# Patient Record
Sex: MALE | Race: WHITE | Employment: OTHER | URBAN - METROPOLITAN AREA
[De-identification: names, ages, dates, MRNs, and addresses within clinical notes are randomized per-mention and may not be internally consistent; named-entity substitution may affect disease eponyms.]

---

## 2017-01-01 ENCOUNTER — HOSPITAL ENCOUNTER (INPATIENT)
Facility: CLINIC | Age: 82
LOS: 6 days | Discharge: NURSING, REHAB OR LONG-TERM CARE | DRG: 086 | End: 2017-08-24
Attending: EMERGENCY MEDICINE | Admitting: INTERNAL MEDICINE
Payer: MEDICARE

## 2017-01-01 ENCOUNTER — APPOINTMENT (OUTPATIENT)
Dept: PHYSICAL THERAPY | Facility: CLINIC | Age: 82
DRG: 086 | End: 2017-01-01
Attending: PSYCHIATRY & NEUROLOGY
Payer: MEDICARE

## 2017-01-01 ENCOUNTER — TRANSFERRED RECORDS (OUTPATIENT)
Dept: HEALTH INFORMATION MANAGEMENT | Facility: CLINIC | Age: 82
End: 2017-01-01

## 2017-01-01 ENCOUNTER — APPOINTMENT (OUTPATIENT)
Dept: OCCUPATIONAL THERAPY | Facility: CLINIC | Age: 82
DRG: 086 | End: 2017-01-01
Attending: PSYCHIATRY & NEUROLOGY
Payer: MEDICARE

## 2017-01-01 ENCOUNTER — APPOINTMENT (OUTPATIENT)
Dept: SPEECH THERAPY | Facility: CLINIC | Age: 82
DRG: 280 | End: 2017-01-01
Payer: MEDICARE

## 2017-01-01 ENCOUNTER — HOSPITAL ENCOUNTER (OUTPATIENT)
Dept: GENERAL RADIOLOGY | Facility: CLINIC | Age: 82
Discharge: HOME | End: 2017-02-13
Attending: PHYSICIAN ASSISTANT | Admitting: PHYSICIAN ASSISTANT
Payer: MEDICARE

## 2017-01-01 ENCOUNTER — HOSPITAL ENCOUNTER (EMERGENCY)
Facility: CLINIC | Age: 82
Discharge: ANOTHER HOSPITAL | End: 2017-08-18
Attending: EMERGENCY MEDICINE | Admitting: EMERGENCY MEDICINE
Payer: MEDICARE

## 2017-01-01 ENCOUNTER — OFFICE VISIT (OUTPATIENT)
Dept: CARDIOLOGY | Facility: CLINIC | Age: 82
End: 2017-01-01
Attending: PHYSICIAN ASSISTANT
Payer: COMMERCIAL

## 2017-01-01 ENCOUNTER — CARE COORDINATION (OUTPATIENT)
Dept: CARE COORDINATION | Facility: CLINIC | Age: 82
End: 2017-01-01

## 2017-01-01 ENCOUNTER — APPOINTMENT (OUTPATIENT)
Dept: CT IMAGING | Facility: CLINIC | Age: 82
End: 2017-01-01
Attending: EMERGENCY MEDICINE
Payer: MEDICARE

## 2017-01-01 ENCOUNTER — DOCUMENTATION ONLY (OUTPATIENT)
Dept: GERIATRICS | Facility: CLINIC | Age: 82
End: 2017-01-01

## 2017-01-01 ENCOUNTER — APPOINTMENT (OUTPATIENT)
Dept: GENERAL RADIOLOGY | Facility: CLINIC | Age: 82
DRG: 086 | End: 2017-01-01
Attending: PHYSICIAN ASSISTANT
Payer: MEDICARE

## 2017-01-01 ENCOUNTER — NURSING HOME VISIT (OUTPATIENT)
Dept: GERIATRICS | Facility: CLINIC | Age: 82
End: 2017-01-01
Payer: COMMERCIAL

## 2017-01-01 ENCOUNTER — OFFICE VISIT (OUTPATIENT)
Dept: URGENT CARE | Facility: URGENT CARE | Age: 82
End: 2017-01-01
Payer: COMMERCIAL

## 2017-01-01 ENCOUNTER — HOSPITAL ENCOUNTER (OUTPATIENT)
Facility: CLINIC | Age: 82
End: 2017-01-01
Attending: OTOLARYNGOLOGY | Admitting: OTOLARYNGOLOGY
Payer: MEDICARE

## 2017-01-01 ENCOUNTER — APPOINTMENT (OUTPATIENT)
Dept: SPEECH THERAPY | Facility: CLINIC | Age: 82
DRG: 086 | End: 2017-01-01
Attending: PSYCHIATRY & NEUROLOGY
Payer: MEDICARE

## 2017-01-01 ENCOUNTER — RECORDS - HEALTHEAST (OUTPATIENT)
Dept: LAB | Facility: CLINIC | Age: 82
End: 2017-01-01

## 2017-01-01 ENCOUNTER — APPOINTMENT (OUTPATIENT)
Dept: OCCUPATIONAL THERAPY | Facility: CLINIC | Age: 82
DRG: 280 | End: 2017-01-01
Payer: MEDICARE

## 2017-01-01 ENCOUNTER — OFFICE VISIT (OUTPATIENT)
Dept: NEUROSURGERY | Facility: CLINIC | Age: 82
End: 2017-01-01

## 2017-01-01 ENCOUNTER — TELEPHONE (OUTPATIENT)
Dept: CARDIOLOGY | Facility: CLINIC | Age: 82
End: 2017-01-01

## 2017-01-01 ENCOUNTER — HOSPITAL ENCOUNTER (INPATIENT)
Facility: CLINIC | Age: 82
LOS: 7 days | Discharge: NURSING, REHAB OR LONG-TERM CARE | DRG: 280 | End: 2017-12-19
Attending: EMERGENCY MEDICINE | Admitting: INTERNAL MEDICINE
Payer: MEDICARE

## 2017-01-01 ENCOUNTER — OFFICE VISIT (OUTPATIENT)
Dept: CARDIOLOGY | Facility: CLINIC | Age: 82
End: 2017-01-01
Attending: INTERNAL MEDICINE
Payer: COMMERCIAL

## 2017-01-01 ENCOUNTER — APPOINTMENT (OUTPATIENT)
Dept: MRI IMAGING | Facility: CLINIC | Age: 82
DRG: 086 | End: 2017-01-01
Attending: NURSE PRACTITIONER
Payer: MEDICARE

## 2017-01-01 ENCOUNTER — APPOINTMENT (OUTPATIENT)
Dept: OCCUPATIONAL THERAPY | Facility: CLINIC | Age: 82
DRG: 280 | End: 2017-01-01
Attending: INTERNAL MEDICINE
Payer: MEDICARE

## 2017-01-01 ENCOUNTER — APPOINTMENT (OUTPATIENT)
Dept: GENERAL RADIOLOGY | Facility: CLINIC | Age: 82
DRG: 086 | End: 2017-01-01
Attending: PSYCHIATRY & NEUROLOGY
Payer: MEDICARE

## 2017-01-01 ENCOUNTER — APPOINTMENT (OUTPATIENT)
Dept: GENERAL RADIOLOGY | Facility: CLINIC | Age: 82
DRG: 086 | End: 2017-01-01
Attending: EMERGENCY MEDICINE
Payer: MEDICARE

## 2017-01-01 ENCOUNTER — ALLIED HEALTH/NURSE VISIT (OUTPATIENT)
Dept: NEUROLOGY | Facility: CLINIC | Age: 82
DRG: 086 | End: 2017-01-01
Attending: PSYCHIATRY & NEUROLOGY
Payer: MEDICARE

## 2017-01-01 ENCOUNTER — APPOINTMENT (OUTPATIENT)
Dept: CT IMAGING | Facility: CLINIC | Age: 82
DRG: 086 | End: 2017-01-01
Attending: SURGERY
Payer: MEDICARE

## 2017-01-01 ENCOUNTER — OFFICE VISIT (OUTPATIENT)
Dept: FAMILY MEDICINE | Facility: CLINIC | Age: 82
End: 2017-01-01
Payer: COMMERCIAL

## 2017-01-01 ENCOUNTER — APPOINTMENT (OUTPATIENT)
Dept: GENERAL RADIOLOGY | Facility: CLINIC | Age: 82
DRG: 280 | End: 2017-01-01
Attending: INTERNAL MEDICINE
Payer: MEDICARE

## 2017-01-01 ENCOUNTER — OFFICE VISIT (OUTPATIENT)
Dept: NEUROLOGY | Facility: CLINIC | Age: 82
End: 2017-01-01

## 2017-01-01 ENCOUNTER — APPOINTMENT (OUTPATIENT)
Dept: SPEECH THERAPY | Facility: CLINIC | Age: 82
DRG: 280 | End: 2017-01-01
Attending: INTERNAL MEDICINE
Payer: MEDICARE

## 2017-01-01 ENCOUNTER — APPOINTMENT (OUTPATIENT)
Dept: ULTRASOUND IMAGING | Facility: CLINIC | Age: 82
DRG: 086 | End: 2017-01-01
Attending: NURSE PRACTITIONER
Payer: MEDICARE

## 2017-01-01 ENCOUNTER — CARE COORDINATION (OUTPATIENT)
Dept: CARDIOLOGY | Facility: CLINIC | Age: 82
End: 2017-01-01

## 2017-01-01 ENCOUNTER — APPOINTMENT (OUTPATIENT)
Dept: CT IMAGING | Facility: CLINIC | Age: 82
DRG: 280 | End: 2017-01-01
Attending: EMERGENCY MEDICINE
Payer: MEDICARE

## 2017-01-01 ENCOUNTER — HOSPITAL ENCOUNTER (OUTPATIENT)
Dept: CARDIOLOGY | Facility: CLINIC | Age: 82
Discharge: HOME | End: 2017-12-26
Attending: INTERNAL MEDICINE | Admitting: INTERNAL MEDICINE
Payer: MEDICARE

## 2017-01-01 ENCOUNTER — APPOINTMENT (OUTPATIENT)
Dept: PHYSICAL THERAPY | Facility: CLINIC | Age: 82
DRG: 280 | End: 2017-01-01
Attending: INTERNAL MEDICINE
Payer: MEDICARE

## 2017-01-01 ENCOUNTER — TELEPHONE (OUTPATIENT)
Dept: FAMILY MEDICINE | Facility: CLINIC | Age: 82
End: 2017-01-01

## 2017-01-01 ENCOUNTER — APPOINTMENT (OUTPATIENT)
Dept: CARDIOLOGY | Facility: CLINIC | Age: 82
DRG: 280 | End: 2017-01-01
Attending: INTERNAL MEDICINE
Payer: MEDICARE

## 2017-01-01 ENCOUNTER — APPOINTMENT (OUTPATIENT)
Dept: PHYSICAL THERAPY | Facility: CLINIC | Age: 82
DRG: 086 | End: 2017-01-01
Attending: SURGERY
Payer: MEDICARE

## 2017-01-01 ENCOUNTER — OFFICE VISIT (OUTPATIENT)
Dept: CARDIOLOGY | Facility: CLINIC | Age: 82
End: 2017-01-01
Payer: COMMERCIAL

## 2017-01-01 ENCOUNTER — APPOINTMENT (OUTPATIENT)
Dept: SPEECH THERAPY | Facility: CLINIC | Age: 82
DRG: 086 | End: 2017-01-01
Attending: NURSE PRACTITIONER
Payer: MEDICARE

## 2017-01-01 ENCOUNTER — APPOINTMENT (OUTPATIENT)
Dept: OCCUPATIONAL THERAPY | Facility: CLINIC | Age: 82
DRG: 086 | End: 2017-01-01
Attending: SURGERY
Payer: MEDICARE

## 2017-01-01 ENCOUNTER — MEDICAL CORRESPONDENCE (OUTPATIENT)
Dept: HEALTH INFORMATION MANAGEMENT | Facility: CLINIC | Age: 82
End: 2017-01-01

## 2017-01-01 ENCOUNTER — TELEPHONE (OUTPATIENT)
Dept: NEUROLOGY | Facility: CLINIC | Age: 82
End: 2017-01-01

## 2017-01-01 VITALS
RESPIRATION RATE: 18 BRPM | WEIGHT: 198.19 LBS | HEART RATE: 64 BPM | OXYGEN SATURATION: 96 % | HEIGHT: 74 IN | BODY MASS INDEX: 25.44 KG/M2 | SYSTOLIC BLOOD PRESSURE: 122 MMHG | DIASTOLIC BLOOD PRESSURE: 65 MMHG | TEMPERATURE: 97.8 F

## 2017-01-01 VITALS
OXYGEN SATURATION: 95 % | HEART RATE: 69 BPM | DIASTOLIC BLOOD PRESSURE: 60 MMHG | TEMPERATURE: 97.9 F | WEIGHT: 182.5 LBS | BODY MASS INDEX: 23.42 KG/M2 | SYSTOLIC BLOOD PRESSURE: 107 MMHG | HEIGHT: 74 IN

## 2017-01-01 VITALS
BODY MASS INDEX: 23.51 KG/M2 | SYSTOLIC BLOOD PRESSURE: 137 MMHG | TEMPERATURE: 95.4 F | WEIGHT: 183.2 LBS | HEART RATE: 61 BPM | OXYGEN SATURATION: 95 % | RESPIRATION RATE: 16 BRPM | HEIGHT: 74 IN | DIASTOLIC BLOOD PRESSURE: 75 MMHG

## 2017-01-01 VITALS
HEART RATE: 64 BPM | DIASTOLIC BLOOD PRESSURE: 58 MMHG | SYSTOLIC BLOOD PRESSURE: 116 MMHG | WEIGHT: 194.3 LBS | HEIGHT: 74 IN | BODY MASS INDEX: 24.93 KG/M2 | OXYGEN SATURATION: 98 %

## 2017-01-01 VITALS
WEIGHT: 194.5 LBS | HEART RATE: 75 BPM | HEIGHT: 74 IN | SYSTOLIC BLOOD PRESSURE: 132 MMHG | BODY MASS INDEX: 24.96 KG/M2 | RESPIRATION RATE: 16 BRPM | DIASTOLIC BLOOD PRESSURE: 74 MMHG | OXYGEN SATURATION: 97 %

## 2017-01-01 VITALS
TEMPERATURE: 97.4 F | HEART RATE: 58 BPM | WEIGHT: 182.98 LBS | HEIGHT: 74 IN | RESPIRATION RATE: 20 BRPM | DIASTOLIC BLOOD PRESSURE: 57 MMHG | SYSTOLIC BLOOD PRESSURE: 121 MMHG | OXYGEN SATURATION: 95 % | BODY MASS INDEX: 23.48 KG/M2

## 2017-01-01 VITALS
HEIGHT: 74 IN | RESPIRATION RATE: 18 BRPM | SYSTOLIC BLOOD PRESSURE: 110 MMHG | WEIGHT: 185 LBS | OXYGEN SATURATION: 94 % | HEART RATE: 61 BPM | TEMPERATURE: 96.3 F | BODY MASS INDEX: 23.74 KG/M2 | DIASTOLIC BLOOD PRESSURE: 91 MMHG

## 2017-01-01 VITALS — DIASTOLIC BLOOD PRESSURE: 61 MMHG | SYSTOLIC BLOOD PRESSURE: 117 MMHG | HEART RATE: 61 BPM | HEIGHT: 74 IN

## 2017-01-01 VITALS — DIASTOLIC BLOOD PRESSURE: 62 MMHG | HEART RATE: 55 BPM | HEIGHT: 74 IN | SYSTOLIC BLOOD PRESSURE: 103 MMHG

## 2017-01-01 VITALS
OXYGEN SATURATION: 97 % | BODY MASS INDEX: 25.24 KG/M2 | TEMPERATURE: 96.6 F | DIASTOLIC BLOOD PRESSURE: 64 MMHG | HEART RATE: 60 BPM | RESPIRATION RATE: 18 BRPM | SYSTOLIC BLOOD PRESSURE: 108 MMHG | WEIGHT: 196.6 LBS

## 2017-01-01 VITALS
DIASTOLIC BLOOD PRESSURE: 76 MMHG | WEIGHT: 209.6 LBS | HEART RATE: 70 BPM | BODY MASS INDEX: 26.9 KG/M2 | HEIGHT: 74 IN | OXYGEN SATURATION: 98 % | SYSTOLIC BLOOD PRESSURE: 130 MMHG

## 2017-01-01 VITALS
BODY MASS INDEX: 22.72 KG/M2 | RESPIRATION RATE: 20 BRPM | OXYGEN SATURATION: 99 % | WEIGHT: 177 LBS | DIASTOLIC BLOOD PRESSURE: 57 MMHG | SYSTOLIC BLOOD PRESSURE: 113 MMHG | HEIGHT: 74 IN | TEMPERATURE: 98.3 F | HEART RATE: 66 BPM

## 2017-01-01 VITALS
HEIGHT: 74 IN | DIASTOLIC BLOOD PRESSURE: 72 MMHG | WEIGHT: 186.1 LBS | SYSTOLIC BLOOD PRESSURE: 126 MMHG | HEART RATE: 76 BPM | BODY MASS INDEX: 23.88 KG/M2

## 2017-01-01 VITALS
OXYGEN SATURATION: 100 % | BODY MASS INDEX: 24.27 KG/M2 | DIASTOLIC BLOOD PRESSURE: 76 MMHG | WEIGHT: 189.1 LBS | SYSTOLIC BLOOD PRESSURE: 124 MMHG | HEART RATE: 72 BPM | HEIGHT: 74 IN

## 2017-01-01 VITALS
HEIGHT: 74 IN | TEMPERATURE: 96.8 F | DIASTOLIC BLOOD PRESSURE: 68 MMHG | SYSTOLIC BLOOD PRESSURE: 131 MMHG | OXYGEN SATURATION: 98 % | WEIGHT: 193.8 LBS | HEART RATE: 73 BPM | BODY MASS INDEX: 24.87 KG/M2

## 2017-01-01 VITALS
DIASTOLIC BLOOD PRESSURE: 100 MMHG | OXYGEN SATURATION: 95 % | SYSTOLIC BLOOD PRESSURE: 150 MMHG | WEIGHT: 185 LBS | TEMPERATURE: 97.4 F | HEART RATE: 85 BPM | BODY MASS INDEX: 23.75 KG/M2

## 2017-01-01 VITALS
HEART RATE: 89 BPM | BODY MASS INDEX: 23.1 KG/M2 | SYSTOLIC BLOOD PRESSURE: 125 MMHG | WEIGHT: 180 LBS | DIASTOLIC BLOOD PRESSURE: 70 MMHG | HEIGHT: 74 IN

## 2017-01-01 VITALS
TEMPERATURE: 97.4 F | DIASTOLIC BLOOD PRESSURE: 78 MMHG | WEIGHT: 184.5 LBS | HEIGHT: 74 IN | HEART RATE: 87 BPM | OXYGEN SATURATION: 99 % | SYSTOLIC BLOOD PRESSURE: 123 MMHG | BODY MASS INDEX: 23.68 KG/M2

## 2017-01-01 DIAGNOSIS — N18.30 TYPE 2 DIABETES MELLITUS WITH STAGE 3 CHRONIC KIDNEY DISEASE, WITH LONG-TERM CURRENT USE OF INSULIN (H): ICD-10-CM

## 2017-01-01 DIAGNOSIS — S06.6X0A SUBARACHNOID HEMORRHAGE FOLLOWING INJURY, NO LOSS OF CONSCIOUSNESS, INITIAL ENCOUNTER (H): ICD-10-CM

## 2017-01-01 DIAGNOSIS — I50.22 CHRONIC SYSTOLIC CONGESTIVE HEART FAILURE (H): ICD-10-CM

## 2017-01-01 DIAGNOSIS — I48.20 CHRONIC ATRIAL FIBRILLATION (H): ICD-10-CM

## 2017-01-01 DIAGNOSIS — I25.10 CORONARY ARTERY DISEASE INVOLVING NATIVE CORONARY ARTERY OF NATIVE HEART WITHOUT ANGINA PECTORIS: ICD-10-CM

## 2017-01-01 DIAGNOSIS — E11.22 TYPE 2 DIABETES MELLITUS WITH STAGE 3 CHRONIC KIDNEY DISEASE, WITH LONG-TERM CURRENT USE OF INSULIN (H): ICD-10-CM

## 2017-01-01 DIAGNOSIS — I10 BENIGN ESSENTIAL HYPERTENSION: ICD-10-CM

## 2017-01-01 DIAGNOSIS — E78.5 HYPERLIPIDEMIA LDL GOAL <70: ICD-10-CM

## 2017-01-01 DIAGNOSIS — E11.22 TYPE 2 DIABETES MELLITUS WITH STAGE 3 CHRONIC KIDNEY DISEASE, WITH LONG-TERM CURRENT USE OF INSULIN (H): Primary | ICD-10-CM

## 2017-01-01 DIAGNOSIS — I44.0 FIRST DEGREE HEART BLOCK: ICD-10-CM

## 2017-01-01 DIAGNOSIS — I47.29 PAROXYSMAL VENTRICULAR TACHYCARDIA (H): ICD-10-CM

## 2017-01-01 DIAGNOSIS — R56.9 SEIZURES (H): Primary | ICD-10-CM
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
## 2017-01-01 DIAGNOSIS — Z79.4 TYPE 2 DIABETES MELLITUS WITH STAGE 3 CHRONIC KIDNEY DISEASE, WITH LONG-TERM CURRENT USE OF INSULIN (H): ICD-10-CM

## 2017-01-01 DIAGNOSIS — N18.30 TYPE 2 DIABETES MELLITUS WITH STAGE 3 CHRONIC KIDNEY DISEASE, WITH LONG-TERM CURRENT USE OF INSULIN (H): Primary | ICD-10-CM

## 2017-01-01 DIAGNOSIS — I10 ESSENTIAL HYPERTENSION WITH GOAL BLOOD PRESSURE LESS THAN 130/80: ICD-10-CM

## 2017-01-01 DIAGNOSIS — R06.09 DYSPNEA ON EXERTION: ICD-10-CM
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
## 2017-01-01 DIAGNOSIS — G40.909 SEIZURE DISORDER (H): ICD-10-CM

## 2017-01-01 DIAGNOSIS — D50.8 OTHER IRON DEFICIENCY ANEMIA: ICD-10-CM

## 2017-01-01 DIAGNOSIS — I50.43 ACUTE ON CHRONIC COMBINED SYSTOLIC AND DIASTOLIC CONGESTIVE HEART FAILURE (H): ICD-10-CM

## 2017-01-01 DIAGNOSIS — I50.22 CHRONIC SYSTOLIC CONGESTIVE HEART FAILURE (H): Primary | ICD-10-CM

## 2017-01-01 DIAGNOSIS — S01.01XA LACERATION OF SCALP, INITIAL ENCOUNTER: ICD-10-CM

## 2017-01-01 DIAGNOSIS — S91.312A LACERATION OF FOOT, LEFT, INITIAL ENCOUNTER: Primary | ICD-10-CM

## 2017-01-01 DIAGNOSIS — I25.10 ATHEROSCLEROSIS OF NATIVE CORONARY ARTERY OF NATIVE HEART WITHOUT ANGINA PECTORIS: ICD-10-CM

## 2017-01-01 DIAGNOSIS — S01.01XA SCALP LACERATION, INITIAL ENCOUNTER: ICD-10-CM

## 2017-01-01 DIAGNOSIS — I73.9 PERIPHERAL ARTERY DISEASE (H): ICD-10-CM

## 2017-01-01 DIAGNOSIS — K21.9 GASTROESOPHAGEAL REFLUX DISEASE, ESOPHAGITIS PRESENCE NOT SPECIFIED: ICD-10-CM

## 2017-01-01 DIAGNOSIS — I35.0 NONRHEUMATIC AORTIC VALVE STENOSIS: ICD-10-CM

## 2017-01-01 DIAGNOSIS — I67.9 CEREBRAL VASCULAR DISEASE: ICD-10-CM

## 2017-01-01 DIAGNOSIS — I60.9 SAH (SUBARACHNOID HEMORRHAGE) (H): ICD-10-CM

## 2017-01-01 DIAGNOSIS — E11.01 TYPE 2 DIABETES MELLITUS WITH HYPEROSMOLAR COMA, WITH LONG-TERM CURRENT USE OF INSULIN (H): ICD-10-CM

## 2017-01-01 DIAGNOSIS — I63.9 CEREBROVASCULAR ACCIDENT (CVA), UNSPECIFIED MECHANISM (H): ICD-10-CM

## 2017-01-01 DIAGNOSIS — I25.5 ISCHEMIC CARDIOMYOPATHY: Primary | ICD-10-CM

## 2017-01-01 DIAGNOSIS — I50.22 CHRONIC SYSTOLIC HEART FAILURE (H): Primary | ICD-10-CM

## 2017-01-01 DIAGNOSIS — I48.0 PAROXYSMAL ATRIAL FIBRILLATION (H): ICD-10-CM

## 2017-01-01 DIAGNOSIS — I25.10 ATHEROSCLEROSIS OF NATIVE CORONARY ARTERY OF NATIVE HEART WITHOUT ANGINA PECTORIS: Primary | ICD-10-CM

## 2017-01-01 DIAGNOSIS — I50.9 CHF (CONGESTIVE HEART FAILURE) (H): ICD-10-CM

## 2017-01-01 DIAGNOSIS — Z86.73 HISTORY OF STROKE: ICD-10-CM

## 2017-01-01 DIAGNOSIS — Z86.73 HISTORY OF CVA (CEREBROVASCULAR ACCIDENT): ICD-10-CM

## 2017-01-01 DIAGNOSIS — S06.5X0A: ICD-10-CM

## 2017-01-01 DIAGNOSIS — I50.22 CHRONIC SYSTOLIC HEART FAILURE (H): ICD-10-CM

## 2017-01-01 DIAGNOSIS — S10.93XA CONTUSION OF FACE, SCALP AND NECK, INITIAL ENCOUNTER: ICD-10-CM

## 2017-01-01 DIAGNOSIS — I50.23 ACUTE ON CHRONIC SYSTOLIC CONGESTIVE HEART FAILURE (H): ICD-10-CM

## 2017-01-01 DIAGNOSIS — K59.03 DRUG-INDUCED CONSTIPATION: Primary | ICD-10-CM

## 2017-01-01 DIAGNOSIS — R49.0 HOARSE VOICE QUALITY: ICD-10-CM

## 2017-01-01 DIAGNOSIS — I60.9 SUBARACHNOID HEMORRHAGE (H): Primary | ICD-10-CM

## 2017-01-01 DIAGNOSIS — E56.9 VITAMIN DEFICIENCY: ICD-10-CM

## 2017-01-01 DIAGNOSIS — F10.11 HISTORY OF ALCOHOL ABUSE: ICD-10-CM

## 2017-01-01 DIAGNOSIS — Z79.4 TYPE 2 DIABETES MELLITUS WITH HYPEROSMOLAR COMA, WITH LONG-TERM CURRENT USE OF INSULIN (H): ICD-10-CM
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
## 2017-01-01 DIAGNOSIS — I60.9 SAH (SUBARACHNOID HEMORRHAGE) (H): Primary | ICD-10-CM

## 2017-01-01 DIAGNOSIS — R55 SYNCOPE, UNSPECIFIED SYNCOPE TYPE: ICD-10-CM

## 2017-01-01 DIAGNOSIS — S01.01XD LACERATION OF SCALP, SUBSEQUENT ENCOUNTER: ICD-10-CM

## 2017-01-01 DIAGNOSIS — I50.21 ACUTE SYSTOLIC CONGESTIVE HEART FAILURE (H): ICD-10-CM

## 2017-01-01 DIAGNOSIS — G40.109 SEIZURE DISORDER, FOCAL MOTOR (H): Primary | ICD-10-CM

## 2017-01-01 DIAGNOSIS — I63.9 CEREBRAL INFARCTION, UNSPECIFIED MECHANISM (H): Primary | ICD-10-CM

## 2017-01-01 DIAGNOSIS — Z79.4 TYPE 2 DIABETES MELLITUS WITH STAGE 3 CHRONIC KIDNEY DISEASE, WITH LONG-TERM CURRENT USE OF INSULIN (H): Primary | ICD-10-CM

## 2017-01-01 DIAGNOSIS — R05.9 COUGH: Primary | ICD-10-CM

## 2017-01-01 DIAGNOSIS — K21.9 GASTROESOPHAGEAL REFLUX DISEASE, ESOPHAGITIS PRESENCE NOT SPECIFIED: Primary | ICD-10-CM

## 2017-01-01 DIAGNOSIS — K21.00 GASTROESOPHAGEAL REFLUX DISEASE WITH ESOPHAGITIS: ICD-10-CM

## 2017-01-01 DIAGNOSIS — G81.90 HEMIPLEGIA, UNSPECIFIED ETIOLOGY, UNSPECIFIED HEMIPLEGIA LATERALITY, UNSPECIFIED HEMIPLEGIA TYPE: ICD-10-CM

## 2017-01-01 DIAGNOSIS — S00.03XA CONTUSION OF FACE, SCALP AND NECK, INITIAL ENCOUNTER: ICD-10-CM

## 2017-01-01 DIAGNOSIS — S00.83XA CONTUSION OF FACE, SCALP AND NECK, INITIAL ENCOUNTER: ICD-10-CM

## 2017-01-01 DIAGNOSIS — D64.9 ANEMIA, UNSPECIFIED TYPE: ICD-10-CM

## 2017-01-01 DIAGNOSIS — S06.5XAA SDH (SUBDURAL HEMATOMA) (H): ICD-10-CM

## 2017-01-01 DIAGNOSIS — I10 ESSENTIAL HYPERTENSION: ICD-10-CM

## 2017-01-01 DIAGNOSIS — I35.0 AORTIC STENOSIS: Primary | ICD-10-CM

## 2017-01-01 DIAGNOSIS — R79.89 TROPONIN LEVEL ELEVATED: ICD-10-CM

## 2017-01-01 DIAGNOSIS — S50.312A ABRASION OF LEFT ELBOW, INITIAL ENCOUNTER: ICD-10-CM

## 2017-01-01 DIAGNOSIS — S80.211A ABRASION OF RIGHT KNEE, INITIAL ENCOUNTER: ICD-10-CM

## 2017-01-01 DIAGNOSIS — S80.211A ABRASION, RIGHT KNEE, INITIAL ENCOUNTER: ICD-10-CM

## 2017-01-01 DIAGNOSIS — R52 PAIN: ICD-10-CM

## 2017-01-01 DIAGNOSIS — I95.89 OTHER SPECIFIED HYPOTENSION: ICD-10-CM

## 2017-01-01 DIAGNOSIS — S06.5X0D SUBDURAL HEMATOMA, POST-TRAUMATIC, WITHOUT LOC, SUBSEQUENT ENCOUNTER: ICD-10-CM

## 2017-01-01 DIAGNOSIS — I25.9 CHRONIC ISCHEMIC HEART DISEASE, UNSPECIFIED: ICD-10-CM

## 2017-01-01 DIAGNOSIS — T07.XXXA MULTIPLE ABRASIONS: ICD-10-CM

## 2017-01-01 LAB
ABO + RH BLD: NORMAL
ALBUMIN SERPL-MCNC: 2.8 G/DL (ref 3.4–5)
ALBUMIN SERPL-MCNC: 3.6 G/DL (ref 3.4–5)
ALBUMIN UR-MCNC: 10 MG/DL
ALBUMIN UR-MCNC: NEGATIVE MG/DL
ALBUMIN UR-MCNC: NEGATIVE MG/DL
ALP SERPL-CCNC: 221 U/L (ref 40–150)
ALP SERPL-CCNC: 259 U/L (ref 40–150)
ALT SERPL W P-5'-P-CCNC: 20 U/L (ref 0–70)
ALT SERPL W P-5'-P-CCNC: 22 U/L (ref 0–70)
ANION GAP SERPL CALCULATED.3IONS-SCNC: 10 MMOL/L (ref 3–14)
ANION GAP SERPL CALCULATED.3IONS-SCNC: 10 MMOL/L (ref 3–14)
ANION GAP SERPL CALCULATED.3IONS-SCNC: 10 MMOL/L (ref 5–18)
ANION GAP SERPL CALCULATED.3IONS-SCNC: 11 MMOL/L (ref 3–14)
ANION GAP SERPL CALCULATED.3IONS-SCNC: 12 MMOL/L (ref 3–14)
ANION GAP SERPL CALCULATED.3IONS-SCNC: 12 MMOL/L (ref 3–14)
ANION GAP SERPL CALCULATED.3IONS-SCNC: 12 MMOL/L (ref 5–18)
ANION GAP SERPL CALCULATED.3IONS-SCNC: 13.1 MMOL/L (ref 6–17)
ANION GAP SERPL CALCULATED.3IONS-SCNC: 13.7 MMOL/L (ref 6–17)
ANION GAP SERPL CALCULATED.3IONS-SCNC: 13.8 MMOL/L (ref 6–17)
ANION GAP SERPL CALCULATED.3IONS-SCNC: 14 MMOL/L (ref 5–18)
ANION GAP SERPL CALCULATED.3IONS-SCNC: 14.8 MMOL/L (ref 6–17)
ANION GAP SERPL CALCULATED.3IONS-SCNC: 14.9 MMOL/L (ref 6–17)
ANION GAP SERPL CALCULATED.3IONS-SCNC: 15 MMOL/L (ref 6–17)
ANION GAP SERPL CALCULATED.3IONS-SCNC: 15.1 MMOL/L (ref 6–17)
ANION GAP SERPL CALCULATED.3IONS-SCNC: 15.2 MMOL/L (ref 6–17)
ANION GAP SERPL CALCULATED.3IONS-SCNC: 16 MMOL/L (ref 3–14)
ANION GAP SERPL CALCULATED.3IONS-SCNC: 17.8 MMOL/L (ref 6–17)
ANION GAP SERPL CALCULATED.3IONS-SCNC: 7 MMOL/L (ref 3–14)
ANION GAP SERPL CALCULATED.3IONS-SCNC: 8 MMOL/L (ref 3–14)
ANION GAP SERPL CALCULATED.3IONS-SCNC: 8 MMOL/L (ref 3–14)
ANION GAP SERPL CALCULATED.3IONS-SCNC: 8 MMOL/L (ref 5–18)
ANION GAP SERPL CALCULATED.3IONS-SCNC: 9 MMOL/L (ref 3–14)
ANION GAP SERPL CALCULATED.3IONS-SCNC: 9 MMOL/L (ref 3–14)
APPEARANCE UR: ABNORMAL
APPEARANCE UR: CLEAR
APPEARANCE UR: CLEAR
APTT PPP: 34 SEC (ref 22–37)
AST SERPL W P-5'-P-CCNC: 21 U/L (ref 0–45)
AST SERPL W P-5'-P-CCNC: 22 U/L (ref 0–45)
BACTERIA SPEC CULT: ABNORMAL
BACTERIA SPEC CULT: NO GROWTH
BASOPHILS # BLD AUTO: 0 10E9/L (ref 0–0.2)
BASOPHILS # BLD AUTO: 0.1 10E9/L (ref 0–0.2)
BASOPHILS NFR BLD AUTO: 0.4 %
BASOPHILS NFR BLD AUTO: 0.6 %
BILIRUB SERPL-MCNC: 0.8 MG/DL (ref 0.2–1.3)
BILIRUB SERPL-MCNC: 1.3 MG/DL (ref 0.2–1.3)
BILIRUB UR QL STRIP: NEGATIVE
BLD GP AB SCN SERPL QL: NORMAL
BLD GP AB SCN SERPL QL: NORMAL
BLOOD BANK CMNT PATIENT-IMP: NORMAL
BLOOD BANK CMNT PATIENT-IMP: NORMAL
BUN SERPL-MCNC: 23 MG/DL (ref 7–30)
BUN SERPL-MCNC: 26 MG/DL (ref 7–30)
BUN SERPL-MCNC: 26 MG/DL (ref 7–30)
BUN SERPL-MCNC: 27 MG/DL (ref 7–30)
BUN SERPL-MCNC: 30 MG/DL (ref 7–30)
BUN SERPL-MCNC: 31 MG/DL (ref 8–28)
BUN SERPL-MCNC: 33 MG/DL (ref 7–30)
BUN SERPL-MCNC: 33 MG/DL (ref 8–28)
BUN SERPL-MCNC: 33 MG/DL (ref 8–28)
BUN SERPL-MCNC: 36 MG/DL (ref 7–30)
BUN SERPL-MCNC: 37 MG/DL (ref 7–30)
BUN SERPL-MCNC: 37 MG/DL (ref 7–30)
BUN SERPL-MCNC: 38 MG/DL (ref 7–30)
BUN SERPL-MCNC: 38 MG/DL (ref 7–30)
BUN SERPL-MCNC: 39 MG/DL (ref 7–30)
BUN SERPL-MCNC: 39 MG/DL (ref 7–30)
BUN SERPL-MCNC: 39 MG/DL (ref 8–28)
BUN SERPL-MCNC: 40 MG/DL (ref 7–30)
BUN SERPL-MCNC: 41 MG/DL (ref 7–30)
BUN SERPL-MCNC: 42 MG/DL (ref 7–30)
BUN SERPL-MCNC: 43 MG/DL (ref 7–30)
BUN SERPL-MCNC: 45 MG/DL (ref 7–30)
BUN SERPL-MCNC: 46 MG/DL (ref 7–30)
BUN SERPL-MCNC: 47 MG/DL (ref 7–30)
C COLI+JEJUNI+LARI FUSA STL QL NAA+PROBE: NOT DETECTED
C DIFF TOX B STL QL: NEGATIVE
CALCIUM SERPL-MCNC: 7.3 MG/DL (ref 8.5–10.5)
CALCIUM SERPL-MCNC: 7.6 MG/DL (ref 8.5–10.1)
CALCIUM SERPL-MCNC: 7.7 MG/DL (ref 8.5–10.1)
CALCIUM SERPL-MCNC: 7.7 MG/DL (ref 8.5–10.1)
CALCIUM SERPL-MCNC: 8 MG/DL (ref 8.5–10.1)
CALCIUM SERPL-MCNC: 8.1 MG/DL (ref 8.5–10.1)
CALCIUM SERPL-MCNC: 8.2 MG/DL (ref 8.5–10.1)
CALCIUM SERPL-MCNC: 8.4 MG/DL (ref 8.5–10.1)
CALCIUM SERPL-MCNC: 8.5 MG/DL (ref 8.5–10.5)
CALCIUM SERPL-MCNC: 8.7 MG/DL (ref 8.5–10.1)
CALCIUM SERPL-MCNC: 8.7 MG/DL (ref 8.5–10.1)
CALCIUM SERPL-MCNC: 8.7 MG/DL (ref 8.5–10.5)
CALCIUM SERPL-MCNC: 8.8 MG/DL (ref 8.5–10.5)
CALCIUM SERPL-MCNC: 8.9 MG/DL (ref 8.5–10.1)
CALCIUM SERPL-MCNC: 9.1 MG/DL (ref 8.5–10.1)
CALCIUM SERPL-MCNC: 9.2 MG/DL (ref 8.5–10.1)
CALCIUM SERPL-MCNC: 9.2 MG/DL (ref 8.5–10.1)
CALCIUM SERPL-MCNC: 9.3 MG/DL (ref 8.5–10.5)
CALCIUM SERPL-MCNC: 9.4 MG/DL (ref 8.5–10.1)
CALCIUM SERPL-MCNC: 9.4 MG/DL (ref 8.5–10.5)
CALCIUM SERPL-MCNC: 9.5 MG/DL (ref 8.5–10.1)
CALCIUM SERPL-MCNC: 9.5 MG/DL (ref 8.5–10.5)
CALCIUM SERPL-MCNC: 9.5 MG/DL (ref 8.5–10.5)
CALCIUM SERPL-MCNC: 9.7 MG/DL (ref 8.5–10.5)
CALCIUM SERPL-MCNC: 9.9 MG/DL (ref 8.5–10.1)
CALCIUM SERPL-MCNC: 9.9 MG/DL (ref 8.5–10.5)
CHLORIDE SERPL-SCNC: 100 MMOL/L (ref 98–107)
CHLORIDE SERPL-SCNC: 102 MMOL/L (ref 94–109)
CHLORIDE SERPL-SCNC: 102 MMOL/L (ref 98–107)
CHLORIDE SERPL-SCNC: 102 MMOL/L (ref 98–107)
CHLORIDE SERPL-SCNC: 103 MMOL/L (ref 98–107)
CHLORIDE SERPL-SCNC: 103 MMOL/L (ref 98–107)
CHLORIDE SERPL-SCNC: 104 MMOL/L (ref 94–109)
CHLORIDE SERPL-SCNC: 104 MMOL/L (ref 94–109)
CHLORIDE SERPL-SCNC: 104 MMOL/L (ref 98–107)
CHLORIDE SERPL-SCNC: 105 MMOL/L (ref 94–109)
CHLORIDE SERPL-SCNC: 106 MMOL/L (ref 94–109)
CHLORIDE SERPL-SCNC: 106 MMOL/L (ref 98–107)
CHLORIDE SERPL-SCNC: 107 MMOL/L (ref 94–109)
CHLORIDE SERPL-SCNC: 108 MMOL/L (ref 94–109)
CHLORIDE SERPL-SCNC: 109 MMOL/L (ref 94–109)
CHLORIDE SERPL-SCNC: 110 MMOL/L (ref 94–109)
CHLORIDE SERPL-SCNC: 110 MMOL/L (ref 94–109)
CHLORIDE SERPLBLD-SCNC: 104 MMOL/L (ref 98–107)
CHLORIDE SERPLBLD-SCNC: 104 MMOL/L (ref 98–107)
CHLORIDE SERPLBLD-SCNC: 105 MMOL/L (ref 98–107)
CHLORIDE SERPLBLD-SCNC: 107 MMOL/L (ref 98–107)
CK SERPL-CCNC: 109 U/L (ref 30–300)
CK SERPL-CCNC: NORMAL U/L (ref 30–300)
CO2 BLDCOV-SCNC: 24 MMOL/L (ref 21–28)
CO2 BLDCOV-SCNC: 26 MMOL/L (ref 21–28)
CO2 SERPL-SCNC: 15 MMOL/L (ref 20–32)
CO2 SERPL-SCNC: 16 MMOL/L (ref 20–32)
CO2 SERPL-SCNC: 16 MMOL/L (ref 20–32)
CO2 SERPL-SCNC: 18 MMOL/L (ref 20–32)
CO2 SERPL-SCNC: 18 MMOL/L (ref 20–32)
CO2 SERPL-SCNC: 19 MMOL/L (ref 20–32)
CO2 SERPL-SCNC: 20 MMOL/L (ref 20–32)
CO2 SERPL-SCNC: 20 MMOL/L (ref 20–32)
CO2 SERPL-SCNC: 22 MMOL/L (ref 22–31)
CO2 SERPL-SCNC: 23 MMOL/L (ref 23–29)
CO2 SERPL-SCNC: 24 MMOL/L (ref 22–31)
CO2 SERPL-SCNC: 24 MMOL/L (ref 22–31)
CO2 SERPL-SCNC: 24 MMOL/L (ref 23–29)
CO2 SERPL-SCNC: 25 MMOL/L (ref 20–32)
CO2 SERPL-SCNC: 25 MMOL/L (ref 23–29)
CO2 SERPL-SCNC: 26 MMOL/L (ref 20–32)
CO2 SERPL-SCNC: 27 MMOL/L (ref 20–32)
CO2 SERPL-SCNC: 27 MMOL/L (ref 23–29)
CO2 SERPL-SCNC: 28 MMOL/L (ref 23–29)
CO2 SERPL-SCNC: 29 MMOL/L (ref 22–31)
CO2 SERPL-SCNC: 29 MMOL/L (ref 23–29)
COLOR UR AUTO: YELLOW
CREAT SERPL-MCNC: 1.15 MG/DL (ref 0.66–1.25)
CREAT SERPL-MCNC: 1.17 MG/DL (ref 0.66–1.25)
CREAT SERPL-MCNC: 1.18 MG/DL (ref 0.7–1.3)
CREAT SERPL-MCNC: 1.19 MG/DL (ref 0.66–1.25)
CREAT SERPL-MCNC: 1.19 MG/DL (ref 0.66–1.25)
CREAT SERPL-MCNC: 1.22 MG/DL (ref 0.7–1.3)
CREAT SERPL-MCNC: 1.27 MG/DL (ref 0.66–1.25)
CREAT SERPL-MCNC: 1.29 MG/DL (ref 0.66–1.25)
CREAT SERPL-MCNC: 1.36 MG/DL (ref 0.7–1.3)
CREAT SERPL-MCNC: 1.39 MG/DL (ref 0.7–1.3)
CREAT SERPL-MCNC: 1.4 MG/DL (ref 0.66–1.25)
CREAT SERPL-MCNC: 1.42 MG/DL (ref 0.66–1.25)
CREAT SERPL-MCNC: 1.44 MG/DL (ref 0.66–1.25)
CREAT SERPL-MCNC: 1.48 MG/DL (ref 0.7–1.3)
CREAT SERPL-MCNC: 1.49 MG/DL (ref 0.7–1.3)
CREAT SERPL-MCNC: 1.49 MG/DL (ref 0.7–1.3)
CREAT SERPL-MCNC: 1.5 MG/DL (ref 0.7–1.3)
CREAT SERPL-MCNC: 1.52 MG/DL (ref 0.66–1.25)
CREAT SERPL-MCNC: 1.52 MG/DL (ref 0.66–1.25)
CREAT SERPL-MCNC: 1.53 MG/DL (ref 0.7–1.3)
CREAT SERPL-MCNC: 1.56 MG/DL (ref 0.7–1.3)
CREAT SERPL-MCNC: 1.57 MG/DL (ref 0.66–1.25)
CREAT SERPL-MCNC: 1.61 MG/DL (ref 0.66–1.25)
CREAT SERPL-MCNC: 1.61 MG/DL (ref 0.7–1.3)
CREAT SERPL-MCNC: 1.61 MG/DL (ref 0.7–1.3)
CREAT SERPL-MCNC: 1.64 MG/DL (ref 0.66–1.25)
CREAT SERPL-MCNC: 1.69 MG/DL (ref 0.7–1.3)
CREAT SERPL-MCNC: 1.74 MG/DL (ref 0.66–1.25)
CREAT SERPL-MCNC: 1.76 MG/DL (ref 0.66–1.25)
CREAT UR-MCNC: 135 MG/DL
CREAT UR-MCNC: 96 MG/DL
DIFFERENTIAL METHOD BLD: ABNORMAL
DIFFERENTIAL METHOD BLD: ABNORMAL
EC STX1 GENE STL QL NAA+PROBE: NOT DETECTED
EC STX2 GENE STL QL NAA+PROBE: NOT DETECTED
ENTERIC PATHOGEN COMMENT: NORMAL
EOSINOPHIL # BLD AUTO: 0.1 10E9/L (ref 0–0.7)
EOSINOPHIL # BLD AUTO: 0.3 10E9/L (ref 0–0.7)
EOSINOPHIL NFR BLD AUTO: 1.1 %
EOSINOPHIL NFR BLD AUTO: 2.8 %
ERYTHROCYTE [DISTWIDTH] IN BLOOD BY AUTOMATED COUNT: 15.3 % (ref 11–14.5)
ERYTHROCYTE [DISTWIDTH] IN BLOOD BY AUTOMATED COUNT: 15.5 % (ref 10–15)
ERYTHROCYTE [DISTWIDTH] IN BLOOD BY AUTOMATED COUNT: 15.7 % (ref 10–15)
ERYTHROCYTE [DISTWIDTH] IN BLOOD BY AUTOMATED COUNT: 15.7 % (ref 10–15)
ERYTHROCYTE [DISTWIDTH] IN BLOOD BY AUTOMATED COUNT: 15.9 % (ref 10–15)
ERYTHROCYTE [DISTWIDTH] IN BLOOD BY AUTOMATED COUNT: 16 % (ref 10–15)
ERYTHROCYTE [DISTWIDTH] IN BLOOD BY AUTOMATED COUNT: 16.1 % (ref 10–15)
ERYTHROCYTE [DISTWIDTH] IN BLOOD BY AUTOMATED COUNT: 16.1 % (ref 11–14.5)
ERYTHROCYTE [DISTWIDTH] IN BLOOD BY AUTOMATED COUNT: 16.2 % (ref 10–15)
ERYTHROCYTE [DISTWIDTH] IN BLOOD BY AUTOMATED COUNT: 16.3 % (ref 10–15)
ERYTHROCYTE [DISTWIDTH] IN BLOOD BY AUTOMATED COUNT: 16.9 % (ref 10–15)
ERYTHROCYTE [DISTWIDTH] IN BLOOD BY AUTOMATED COUNT: 17 % (ref 10–15)
ERYTHROCYTE [DISTWIDTH] IN BLOOD BY AUTOMATED COUNT: 17 % (ref 10–15)
ERYTHROCYTE [DISTWIDTH] IN BLOOD BY AUTOMATED COUNT: 17.1 % (ref 10–15)
ERYTHROCYTE [DISTWIDTH] IN BLOOD BY AUTOMATED COUNT: 17.2 % (ref 10–15)
ERYTHROCYTE [DISTWIDTH] IN BLOOD BY AUTOMATED COUNT: 17.3 % (ref 11–14.5)
FRACT EXCRET NA UR+SERPL-RTO: 0.3 %
GFR SERPL CREATININE-BSD FRML MDRD: 37 ML/MIN/1.7M2
GFR SERPL CREATININE-BSD FRML MDRD: 38 ML/MIN/1.7M2
GFR SERPL CREATININE-BSD FRML MDRD: 39 ML/MIN/1.7M2
GFR SERPL CREATININE-BSD FRML MDRD: 41 ML/MIN/1.7M2
GFR SERPL CREATININE-BSD FRML MDRD: 41 ML/MIN/1.7M2
GFR SERPL CREATININE-BSD FRML MDRD: 42 ML/MIN/1.7M2
GFR SERPL CREATININE-BSD FRML MDRD: 42 ML/MIN/1.7M2
GFR SERPL CREATININE-BSD FRML MDRD: 43 ML/MIN/1.7M2
GFR SERPL CREATININE-BSD FRML MDRD: 43 ML/MIN/1.7M2
GFR SERPL CREATININE-BSD FRML MDRD: 44 ML/MIN/1.7M2
GFR SERPL CREATININE-BSD FRML MDRD: 45 ML/MIN/1.73M2
GFR SERPL CREATININE-BSD FRML MDRD: 45 ML/MIN/1.7M2
GFR SERPL CREATININE-BSD FRML MDRD: 45 ML/MIN/1.7M2
GFR SERPL CREATININE-BSD FRML MDRD: 46 ML/MIN/1.7M2
GFR SERPL CREATININE-BSD FRML MDRD: 47 ML/MIN/1.7M2
GFR SERPL CREATININE-BSD FRML MDRD: 48 ML/MIN/1.7M2
GFR SERPL CREATININE-BSD FRML MDRD: 49 ML/MIN/1.7M2
GFR SERPL CREATININE-BSD FRML MDRD: 49 ML/MIN/1.7M2
GFR SERPL CREATININE-BSD FRML MDRD: 50 ML/MIN/1.73M2
GFR SERPL CREATININE-BSD FRML MDRD: 53 ML/MIN/1.7M2
GFR SERPL CREATININE-BSD FRML MDRD: 54 ML/MIN/1.7M2
GFR SERPL CREATININE-BSD FRML MDRD: 57 ML/MIN/1.73M2
GFR SERPL CREATININE-BSD FRML MDRD: 59 ML/MIN/1.73M2
GFR SERPL CREATININE-BSD FRML MDRD: 59 ML/MIN/1.7M2
GFR SERPL CREATININE-BSD FRML MDRD: 59 ML/MIN/1.7M2
GFR SERPL CREATININE-BSD FRML MDRD: 60 ML/MIN/1.7M2
GFR SERPL CREATININE-BSD FRML MDRD: 61 ML/MIN/1.7M2
GLUCOSE BLDC GLUCOMTR-MCNC: 102 MG/DL (ref 70–99)
GLUCOSE BLDC GLUCOMTR-MCNC: 104 MG/DL (ref 70–99)
GLUCOSE BLDC GLUCOMTR-MCNC: 107 MG/DL (ref 70–99)
GLUCOSE BLDC GLUCOMTR-MCNC: 109 MG/DL (ref 70–99)
GLUCOSE BLDC GLUCOMTR-MCNC: 114 MG/DL (ref 70–99)
GLUCOSE BLDC GLUCOMTR-MCNC: 115 MG/DL (ref 70–99)
GLUCOSE BLDC GLUCOMTR-MCNC: 117 MG/DL (ref 70–99)
GLUCOSE BLDC GLUCOMTR-MCNC: 121 MG/DL (ref 70–99)
GLUCOSE BLDC GLUCOMTR-MCNC: 121 MG/DL (ref 70–99)
GLUCOSE BLDC GLUCOMTR-MCNC: 123 MG/DL (ref 70–99)
GLUCOSE BLDC GLUCOMTR-MCNC: 126 MG/DL (ref 70–99)
GLUCOSE BLDC GLUCOMTR-MCNC: 130 MG/DL (ref 70–99)
GLUCOSE BLDC GLUCOMTR-MCNC: 134 MG/DL (ref 70–99)
GLUCOSE BLDC GLUCOMTR-MCNC: 134 MG/DL (ref 70–99)
GLUCOSE BLDC GLUCOMTR-MCNC: 138 MG/DL (ref 70–99)
GLUCOSE BLDC GLUCOMTR-MCNC: 141 MG/DL (ref 70–99)
GLUCOSE BLDC GLUCOMTR-MCNC: 142 MG/DL (ref 70–99)
GLUCOSE BLDC GLUCOMTR-MCNC: 143 MG/DL (ref 70–99)
GLUCOSE BLDC GLUCOMTR-MCNC: 143 MG/DL (ref 70–99)
GLUCOSE BLDC GLUCOMTR-MCNC: 144 MG/DL (ref 70–99)
GLUCOSE BLDC GLUCOMTR-MCNC: 145 MG/DL (ref 70–99)
GLUCOSE BLDC GLUCOMTR-MCNC: 146 MG/DL (ref 70–99)
GLUCOSE BLDC GLUCOMTR-MCNC: 150 MG/DL (ref 70–99)
GLUCOSE BLDC GLUCOMTR-MCNC: 152 MG/DL (ref 70–99)
GLUCOSE BLDC GLUCOMTR-MCNC: 155 MG/DL (ref 70–99)
GLUCOSE BLDC GLUCOMTR-MCNC: 157 MG/DL (ref 70–99)
GLUCOSE BLDC GLUCOMTR-MCNC: 158 MG/DL (ref 70–99)
GLUCOSE BLDC GLUCOMTR-MCNC: 161 MG/DL (ref 70–99)
GLUCOSE BLDC GLUCOMTR-MCNC: 161 MG/DL (ref 70–99)
GLUCOSE BLDC GLUCOMTR-MCNC: 163 MG/DL (ref 70–99)
GLUCOSE BLDC GLUCOMTR-MCNC: 165 MG/DL (ref 70–99)
GLUCOSE BLDC GLUCOMTR-MCNC: 166 MG/DL (ref 70–99)
GLUCOSE BLDC GLUCOMTR-MCNC: 170 MG/DL (ref 70–99)
GLUCOSE BLDC GLUCOMTR-MCNC: 173 MG/DL (ref 70–99)
GLUCOSE BLDC GLUCOMTR-MCNC: 174 MG/DL (ref 70–99)
GLUCOSE BLDC GLUCOMTR-MCNC: 174 MG/DL (ref 70–99)
GLUCOSE BLDC GLUCOMTR-MCNC: 178 MG/DL (ref 70–99)
GLUCOSE BLDC GLUCOMTR-MCNC: 178 MG/DL (ref 70–99)
GLUCOSE BLDC GLUCOMTR-MCNC: 184 MG/DL (ref 70–99)
GLUCOSE BLDC GLUCOMTR-MCNC: 185 MG/DL (ref 70–99)
GLUCOSE BLDC GLUCOMTR-MCNC: 185 MG/DL (ref 70–99)
GLUCOSE BLDC GLUCOMTR-MCNC: 186 MG/DL (ref 70–99)
GLUCOSE BLDC GLUCOMTR-MCNC: 191 MG/DL (ref 70–99)
GLUCOSE BLDC GLUCOMTR-MCNC: 201 MG/DL (ref 70–99)
GLUCOSE BLDC GLUCOMTR-MCNC: 214 MG/DL (ref 70–99)
GLUCOSE BLDC GLUCOMTR-MCNC: 219 MG/DL (ref 70–99)
GLUCOSE BLDC GLUCOMTR-MCNC: 223 MG/DL (ref 70–99)
GLUCOSE BLDC GLUCOMTR-MCNC: 225 MG/DL (ref 70–99)
GLUCOSE BLDC GLUCOMTR-MCNC: 237 MG/DL (ref 70–99)
GLUCOSE BLDC GLUCOMTR-MCNC: 239 MG/DL (ref 70–99)
GLUCOSE BLDC GLUCOMTR-MCNC: 246 MG/DL (ref 70–99)
GLUCOSE BLDC GLUCOMTR-MCNC: 258 MG/DL (ref 70–99)
GLUCOSE BLDC GLUCOMTR-MCNC: 264 MG/DL (ref 70–99)
GLUCOSE BLDC GLUCOMTR-MCNC: 274 MG/DL (ref 70–99)
GLUCOSE BLDC GLUCOMTR-MCNC: 280 MG/DL (ref 70–99)
GLUCOSE BLDC GLUCOMTR-MCNC: 88 MG/DL (ref 70–99)
GLUCOSE BLDC GLUCOMTR-MCNC: 91 MG/DL (ref 70–99)
GLUCOSE BLDC GLUCOMTR-MCNC: 92 MG/DL (ref 70–99)
GLUCOSE SERPL-MCNC: 100 MG/DL (ref 70–105)
GLUCOSE SERPL-MCNC: 106 MG/DL (ref 70–99)
GLUCOSE SERPL-MCNC: 108 MG/DL (ref 70–125)
GLUCOSE SERPL-MCNC: 112 MG/DL (ref 70–99)
GLUCOSE SERPL-MCNC: 126 MG/DL (ref 70–99)
GLUCOSE SERPL-MCNC: 131 MG/DL (ref 70–99)
GLUCOSE SERPL-MCNC: 14 MG/DL (ref 70–125)
GLUCOSE SERPL-MCNC: 147 MG/DL (ref 70–99)
GLUCOSE SERPL-MCNC: 151 MG/DL (ref 70–99)
GLUCOSE SERPL-MCNC: 154 MG/DL (ref 70–99)
GLUCOSE SERPL-MCNC: 158 MG/DL (ref 70–105)
GLUCOSE SERPL-MCNC: 158 MG/DL (ref 70–99)
GLUCOSE SERPL-MCNC: 162 MG/DL (ref 70–105)
GLUCOSE SERPL-MCNC: 163 MG/DL (ref 70–99)
GLUCOSE SERPL-MCNC: 166 MG/DL (ref 70–105)
GLUCOSE SERPL-MCNC: 167 MG/DL (ref 70–99)
GLUCOSE SERPL-MCNC: 168 MG/DL (ref 70–105)
GLUCOSE SERPL-MCNC: 170 MG/DL (ref 70–99)
GLUCOSE SERPL-MCNC: 175 MG/DL (ref 70–105)
GLUCOSE SERPL-MCNC: 175 MG/DL (ref 70–99)
GLUCOSE SERPL-MCNC: 184 MG/DL (ref 70–99)
GLUCOSE SERPL-MCNC: 195 MG/DL (ref 70–105)
GLUCOSE SERPL-MCNC: 199 MG/DL (ref 70–99)
GLUCOSE SERPL-MCNC: 207 MG/DL (ref 70–125)
GLUCOSE SERPL-MCNC: 209 MG/DL (ref 70–105)
GLUCOSE SERPL-MCNC: 237 MG/DL (ref 70–105)
GLUCOSE SERPL-MCNC: 85 MG/DL (ref 70–99)
GLUCOSE SERPL-MCNC: 88 MG/DL (ref 70–125)
GLUCOSE SERPL-MCNC: 99 MG/DL (ref 70–99)
GLUCOSE UR STRIP-MCNC: NEGATIVE MG/DL
HBA1C MFR BLD: 6.6 % (ref 4.2–6.1)
HBA1C MFR BLD: 6.6 % (ref 4.2–6.1)
HBA1C MFR BLD: 7 % (ref 4.3–6)
HBA1C MFR BLD: 7.2 % (ref 4.3–6)
HBA1C MFR BLD: 7.4 % (ref 4.3–6)
HCT VFR BLD AUTO: 27.6 % (ref 40–53)
HCT VFR BLD AUTO: 28.5 % (ref 40–53)
HCT VFR BLD AUTO: 29.2 % (ref 40–53)
HCT VFR BLD AUTO: 29.3 % (ref 40–53)
HCT VFR BLD AUTO: 30.1 % (ref 40–53)
HCT VFR BLD AUTO: 30.2 % (ref 40–53)
HCT VFR BLD AUTO: 30.5 % (ref 40–53)
HCT VFR BLD AUTO: 30.5 % (ref 40–53)
HCT VFR BLD AUTO: 30.7 % (ref 40–54)
HCT VFR BLD AUTO: 30.9 % (ref 40–53)
HCT VFR BLD AUTO: 31 % (ref 40–53)
HCT VFR BLD AUTO: 31.1 % (ref 40–54)
HCT VFR BLD AUTO: 31.2 % (ref 40–53)
HCT VFR BLD AUTO: 31.7 % (ref 40–53)
HCT VFR BLD AUTO: 31.8 % (ref 40–53)
HCT VFR BLD AUTO: 32.1 % (ref 40–53)
HCT VFR BLD AUTO: 32.1 % (ref 40–53)
HCT VFR BLD AUTO: 32.1 % (ref 40–54)
HCT VFR BLD AUTO: 34 % (ref 40–53)
HEMOGLOBIN: 10 G/DL (ref 14–18)
HEMOGLOBIN: 10.1 G/DL (ref 14–18)
HEMOGLOBIN: 10.3 G/DL (ref 14–18)
HGB BLD-MCNC: 10 G/DL (ref 13.3–17.7)
HGB BLD-MCNC: 10.1 G/DL (ref 13.3–17.7)
HGB BLD-MCNC: 10.2 G/DL (ref 13.3–17.7)
HGB BLD-MCNC: 10.3 G/DL (ref 13.3–17.7)
HGB BLD-MCNC: 10.4 G/DL (ref 13.3–17.7)
HGB BLD-MCNC: 10.5 G/DL (ref 13.3–17.7)
HGB BLD-MCNC: 10.5 G/DL (ref 13.3–17.7)
HGB BLD-MCNC: 11.4 G/DL (ref 13.3–17.7)
HGB BLD-MCNC: 9.1 G/DL (ref 13.3–17.7)
HGB BLD-MCNC: 9.5 G/DL (ref 13.3–17.7)
HGB BLD-MCNC: 9.5 G/DL (ref 13.3–17.7)
HGB BLD-MCNC: 9.6 G/DL (ref 13.3–17.7)
HGB BLD-MCNC: 9.8 G/DL (ref 13.3–17.7)
HGB BLD-MCNC: 9.9 G/DL (ref 13.3–17.7)
HGB BLD-MCNC: 9.9 G/DL (ref 13.3–17.7)
HGB UR QL STRIP: NEGATIVE
HYALINE CASTS #/AREA URNS LPF: 19 /LPF (ref 0–2)
HYALINE CASTS #/AREA URNS LPF: 21 /LPF (ref 0–2)
IMM GRANULOCYTES # BLD: 0 10E9/L (ref 0–0.4)
IMM GRANULOCYTES # BLD: 0.1 10E9/L (ref 0–0.4)
IMM GRANULOCYTES NFR BLD: 0.4 %
IMM GRANULOCYTES NFR BLD: 0.6 %
INR PPP: 1.32 (ref 0.86–1.14)
INR PPP: 1.32 (ref 0.86–1.14)
INTERPRETATION ECG - MUSE: NORMAL
KETONES UR STRIP-MCNC: 10 MG/DL
KETONES UR STRIP-MCNC: NEGATIVE MG/DL
KETONES UR STRIP-MCNC: NEGATIVE MG/DL
LACTATE BLD-SCNC: 1.5 MMOL/L (ref 0.7–2)
LACTATE BLD-SCNC: 1.7 MMOL/L (ref 0.7–2.1)
LACTATE BLD-SCNC: 2.2 MMOL/L (ref 0.7–2.1)
LEUKOCYTE ESTERASE UR QL STRIP: ABNORMAL
LEUKOCYTE ESTERASE UR QL STRIP: NEGATIVE
LEUKOCYTE ESTERASE UR QL STRIP: NEGATIVE
LEVETIRACETAM SERPL-MCNC: 38 UG/ML (ref 12–46)
LEVETIRACETAM SERPL-MCNC: 45 UG/ML (ref 12–46)
LEVETIRACETAM SERPL-MCNC: 73 UG/ML (ref 12–46)
LYMPHOCYTES # BLD AUTO: 0.5 10E9/L (ref 0.8–5.3)
LYMPHOCYTES # BLD AUTO: 0.8 10E9/L (ref 0.8–5.3)
LYMPHOCYTES NFR BLD AUTO: 7 %
LYMPHOCYTES NFR BLD AUTO: 9 %
Lab: ABNORMAL
Lab: ABNORMAL
Lab: NORMAL
MAGNESIUM SERPL-MCNC: 1 MG/DL (ref 1.6–2.3)
MAGNESIUM SERPL-MCNC: 1.2 MG/DL (ref 1.6–2.3)
MAGNESIUM SERPL-MCNC: 1.9 MG/DL (ref 1.6–2.3)
MAGNESIUM SERPL-MCNC: 2 MG/DL (ref 1.6–2.3)
MAGNESIUM SERPL-MCNC: 2.1 MG/DL (ref 1.6–2.3)
MAGNESIUM SERPL-MCNC: 2.1 MG/DL (ref 1.6–2.3)
MAGNESIUM SERPL-MCNC: 2.2 MG/DL (ref 1.6–2.3)
MAGNESIUM SERPL-MCNC: 2.2 MG/DL (ref 1.6–2.3)
MCH RBC QN AUTO: 28.7 PG (ref 26.5–33)
MCH RBC QN AUTO: 28.9 PG (ref 26.5–33)
MCH RBC QN AUTO: 28.9 PG (ref 26.5–33)
MCH RBC QN AUTO: 29.1 PG (ref 26.5–33)
MCH RBC QN AUTO: 29.2 PG (ref 26.5–33)
MCH RBC QN AUTO: 29.2 PG (ref 26.5–33)
MCH RBC QN AUTO: 29.3 PG (ref 26.5–33)
MCH RBC QN AUTO: 29.3 PG (ref 26.5–33)
MCH RBC QN AUTO: 29.3 PG (ref 27–34)
MCH RBC QN AUTO: 29.4 PG (ref 26.5–33)
MCH RBC QN AUTO: 29.4 PG (ref 26.5–33)
MCH RBC QN AUTO: 29.5 PG (ref 26.5–33)
MCH RBC QN AUTO: 29.6 PG (ref 26.5–33)
MCH RBC QN AUTO: 29.6 PG (ref 26.5–33)
MCH RBC QN AUTO: 29.7 PG (ref 26.5–33)
MCH RBC QN AUTO: 29.7 PG (ref 27–34)
MCH RBC QN AUTO: 30.2 PG (ref 27–34)
MCHC RBC AUTO-ENTMCNC: 31.7 G/DL (ref 31.5–36.5)
MCHC RBC AUTO-ENTMCNC: 31.8 G/DL (ref 31.5–36.5)
MCHC RBC AUTO-ENTMCNC: 31.8 G/DL (ref 31.5–36.5)
MCHC RBC AUTO-ENTMCNC: 31.9 G/DL (ref 31.5–36.5)
MCHC RBC AUTO-ENTMCNC: 32.1 G/DL (ref 31.5–36.5)
MCHC RBC AUTO-ENTMCNC: 32.1 G/DL (ref 32–36)
MCHC RBC AUTO-ENTMCNC: 32.2 G/DL (ref 32–36)
MCHC RBC AUTO-ENTMCNC: 32.4 G/DL (ref 31.5–36.5)
MCHC RBC AUTO-ENTMCNC: 32.5 G/DL (ref 31.5–36.5)
MCHC RBC AUTO-ENTMCNC: 32.7 G/DL (ref 31.5–36.5)
MCHC RBC AUTO-ENTMCNC: 32.9 G/DL (ref 31.5–36.5)
MCHC RBC AUTO-ENTMCNC: 32.9 G/DL (ref 32–36)
MCHC RBC AUTO-ENTMCNC: 33 G/DL (ref 31.5–36.5)
MCHC RBC AUTO-ENTMCNC: 33.2 G/DL (ref 31.5–36.5)
MCHC RBC AUTO-ENTMCNC: 33.2 G/DL (ref 31.5–36.5)
MCHC RBC AUTO-ENTMCNC: 33.4 G/DL (ref 31.5–36.5)
MCHC RBC AUTO-ENTMCNC: 33.5 G/DL (ref 31.5–36.5)
MCHC RBC AUTO-ENTMCNC: 33.7 G/DL (ref 31.5–36.5)
MCHC RBC AUTO-ENTMCNC: 33.7 G/DL (ref 31.5–36.5)
MCV RBC AUTO: 87 FL (ref 78–100)
MCV RBC AUTO: 88 FL (ref 78–100)
MCV RBC AUTO: 89 FL (ref 78–100)
MCV RBC AUTO: 90 FL (ref 78–100)
MCV RBC AUTO: 91 FL (ref 78–100)
MCV RBC AUTO: 91 FL (ref 78–100)
MCV RBC AUTO: 91 FL (ref 80–100)
MCV RBC AUTO: 92 FL (ref 78–100)
MCV RBC AUTO: 92 FL (ref 80–100)
MCV RBC AUTO: 92 FL (ref 80–100)
MICROALBUMIN UR-MCNC: 192 MG/L
MICROALBUMIN/CREAT UR: 142.22 MG/G CR (ref 0–17)
MONOCYTES # BLD AUTO: 0.5 10E9/L (ref 0–1.3)
MONOCYTES # BLD AUTO: 0.7 10E9/L (ref 0–1.3)
MONOCYTES NFR BLD AUTO: 5.9 %
MONOCYTES NFR BLD AUTO: 9.6 %
MRSA DNA SPEC QL NAA+PROBE: NEGATIVE
MUCOUS THREADS #/AREA URNS LPF: PRESENT /LPF
NEUTROPHILS # BLD AUTO: 5.9 10E9/L (ref 1.6–8.3)
NEUTROPHILS # BLD AUTO: 7.3 10E9/L (ref 1.6–8.3)
NEUTROPHILS NFR BLD AUTO: 81.1 %
NEUTROPHILS NFR BLD AUTO: 81.5 %
NITRATE UR QL: NEGATIVE
NOROV GI+II ORF1-ORF2 JNC STL QL NAA+PR: NOT DETECTED
NRBC # BLD AUTO: 0 10*3/UL
NRBC # BLD AUTO: 0 10*3/UL
NRBC BLD AUTO-RTO: 0 /100
NRBC BLD AUTO-RTO: 0 /100
PCO2 BLDV: 37 MM HG (ref 40–50)
PCO2 BLDV: 38 MM HG (ref 40–50)
PH BLDV: 7.42 PH (ref 7.32–7.43)
PH BLDV: 7.44 PH (ref 7.32–7.43)
PH UR STRIP: 5 PH (ref 5–7)
PHOSPHATE SERPL-MCNC: 2.1 MG/DL (ref 2.5–4.5)
PHOSPHATE SERPL-MCNC: 3 MG/DL (ref 2.5–4.5)
PHOSPHATE SERPL-MCNC: 3.1 MG/DL (ref 2.5–4.5)
PHOSPHATE SERPL-MCNC: 3.1 MG/DL (ref 2.5–4.5)
PHOSPHATE SERPL-MCNC: 3.2 MG/DL (ref 2.5–4.5)
PHOSPHATE SERPL-MCNC: 3.4 MG/DL (ref 2.5–4.5)
PHOSPHATE SERPL-MCNC: 3.5 MG/DL (ref 2.5–4.5)
PHOSPHATE SERPL-MCNC: 3.9 MG/DL (ref 2.5–4.5)
PLATELET # BLD AUTO: 121 10E9/L (ref 150–450)
PLATELET # BLD AUTO: 124 THOU/UL (ref 140–440)
PLATELET # BLD AUTO: 125 10E9/L (ref 150–450)
PLATELET # BLD AUTO: 128 10E9/L (ref 150–450)
PLATELET # BLD AUTO: 130 10E9/L (ref 150–450)
PLATELET # BLD AUTO: 131 10E9/L (ref 150–450)
PLATELET # BLD AUTO: 132 10E9/L (ref 150–450)
PLATELET # BLD AUTO: 134 10E9/L (ref 150–450)
PLATELET # BLD AUTO: 142 10E9/L (ref 150–450)
PLATELET # BLD AUTO: 144 10E9/L (ref 150–450)
PLATELET # BLD AUTO: 144 10E9/L (ref 150–450)
PLATELET # BLD AUTO: 145 10E9/L (ref 150–450)
PLATELET # BLD AUTO: 148 10E9/L (ref 150–450)
PLATELET # BLD AUTO: 151 THOU/UL (ref 140–440)
PLATELET # BLD AUTO: 157 10E9/L (ref 150–450)
PLATELET # BLD AUTO: 158 10E9/L (ref 150–450)
PLATELET # BLD AUTO: 168 10E9/L (ref 150–450)
PLATELET # BLD AUTO: 184 10E9/L (ref 150–450)
PLATELET # BLD AUTO: 196 THOU/UL (ref 140–440)
PO2 BLDV: 32 MM HG (ref 25–47)
PO2 BLDV: 41 MM HG (ref 25–47)
POTASSIUM SERPL-SCNC: 3.4 MMOL/L (ref 3.4–5.3)
POTASSIUM SERPL-SCNC: 3.4 MMOL/L (ref 3.5–5)
POTASSIUM SERPL-SCNC: 3.6 MMOL/L (ref 3.4–5.3)
POTASSIUM SERPL-SCNC: 3.6 MMOL/L (ref 3.4–5.3)
POTASSIUM SERPL-SCNC: 3.7 MMOL/L (ref 3.4–5.3)
POTASSIUM SERPL-SCNC: 3.7 MMOL/L (ref 3.5–5)
POTASSIUM SERPL-SCNC: 3.7 MMOL/L (ref 3.5–5.1)
POTASSIUM SERPL-SCNC: 3.8 MMOL/L (ref 3.4–5.3)
POTASSIUM SERPL-SCNC: 3.8 MMOL/L (ref 3.5–5)
POTASSIUM SERPL-SCNC: 3.8 MMOL/L (ref 3.5–5.1)
POTASSIUM SERPL-SCNC: 3.9 MMOL/L (ref 3.4–5.3)
POTASSIUM SERPL-SCNC: 3.9 MMOL/L (ref 3.4–5.3)
POTASSIUM SERPL-SCNC: 3.9 MMOL/L (ref 3.5–5.1)
POTASSIUM SERPL-SCNC: 4 MMOL/L (ref 3.4–5.3)
POTASSIUM SERPL-SCNC: 4 MMOL/L (ref 3.5–5.1)
POTASSIUM SERPL-SCNC: 4.1 MMOL/L (ref 3.5–5.1)
POTASSIUM SERPL-SCNC: 4.1 MMOL/L (ref 3.5–5.1)
POTASSIUM SERPL-SCNC: 4.2 MMOL/L (ref 3.5–5)
POTASSIUM SERPL-SCNC: 4.2 MMOL/L (ref 3.5–5.1)
POTASSIUM SERPL-SCNC: 4.3 MMOL/L (ref 3.4–5.3)
POTASSIUM SERPL-SCNC: 4.4 MMOL/L (ref 3.4–5.3)
POTASSIUM SERPL-SCNC: 4.4 MMOL/L (ref 3.4–5.3)
POTASSIUM SERPL-SCNC: 4.7 MMOL/L (ref 3.4–5.3)
POTASSIUM SERPL-SCNC: 5.4 MMOL/L (ref 3.5–5)
PROT SERPL-MCNC: 6.6 G/DL (ref 6.8–8.8)
PROT SERPL-MCNC: 7.6 G/DL (ref 6.8–8.8)
RADIOLOGIST FLAGS: ABNORMAL
RADIOLOGIST FLAGS: NORMAL
RBC # BLD AUTO: 3.1 10E12/L (ref 4.4–5.9)
RBC # BLD AUTO: 3.24 10E12/L (ref 4.4–5.9)
RBC # BLD AUTO: 3.26 10E12/L (ref 4.4–5.9)
RBC # BLD AUTO: 3.29 10E12/L (ref 4.4–5.9)
RBC # BLD AUTO: 3.34 10E12/L (ref 4.4–5.9)
RBC # BLD AUTO: 3.34 MILL/UL (ref 4.4–6.2)
RBC # BLD AUTO: 3.37 MILL/UL (ref 4.4–6.2)
RBC # BLD AUTO: 3.38 10E12/L (ref 4.4–5.9)
RBC # BLD AUTO: 3.38 10E12/L (ref 4.4–5.9)
RBC # BLD AUTO: 3.42 10E12/L (ref 4.4–5.9)
RBC # BLD AUTO: 3.46 10E12/L (ref 4.4–5.9)
RBC # BLD AUTO: 3.47 10E12/L (ref 4.4–5.9)
RBC # BLD AUTO: 3.5 10E12/L (ref 4.4–5.9)
RBC # BLD AUTO: 3.52 MILL/UL (ref 4.4–6.2)
RBC # BLD AUTO: 3.53 10E12/L (ref 4.4–5.9)
RBC # BLD AUTO: 3.58 10E12/L (ref 4.4–5.9)
RBC # BLD AUTO: 3.91 10E12/L (ref 4.4–5.9)
RBC #/AREA URNS AUTO: 0 /HPF (ref 0–2)
RBC #/AREA URNS AUTO: 0 /HPF (ref 0–2)
RBC #/AREA URNS AUTO: 2 /HPF (ref 0–2)
RVA NSP5 STL QL NAA+PROBE: NOT DETECTED
SALMONELLA SP RPOD STL QL NAA+PROBE: NOT DETECTED
SAO2 % BLDV FROM PO2: 65 %
SAO2 % BLDV FROM PO2: 77 %
SHIGELLA SP+EIEC IPAH STL QL NAA+PROBE: NOT DETECTED
SODIUM SERPL-SCNC: 134 MMOL/L (ref 133–144)
SODIUM SERPL-SCNC: 136 MMOL/L (ref 133–144)
SODIUM SERPL-SCNC: 137 MMOL/L (ref 133–144)
SODIUM SERPL-SCNC: 137 MMOL/L (ref 133–144)
SODIUM SERPL-SCNC: 137 MMOL/L (ref 136–145)
SODIUM SERPL-SCNC: 138 MMOL/L (ref 133–144)
SODIUM SERPL-SCNC: 138 MMOL/L (ref 136–145)
SODIUM SERPL-SCNC: 139 MMOL/L (ref 133–144)
SODIUM SERPL-SCNC: 139 MMOL/L (ref 136–145)
SODIUM SERPL-SCNC: 139 MMOL/L (ref 136–145)
SODIUM SERPL-SCNC: 140 MMOL/L (ref 133–144)
SODIUM SERPL-SCNC: 140 MMOL/L (ref 136–145)
SODIUM SERPL-SCNC: 141 MMOL/L (ref 133–144)
SODIUM SERPL-SCNC: 141 MMOL/L (ref 133–144)
SODIUM SERPL-SCNC: 141 MMOL/L (ref 136–145)
SODIUM SERPL-SCNC: 142 MMOL/L (ref 133–144)
SODIUM SERPL-SCNC: 142 MMOL/L (ref 136–145)
SODIUM SERPL-SCNC: 142 MMOL/L (ref 136–145)
SODIUM SERPL-SCNC: 143 MMOL/L (ref 136–145)
SODIUM UR-SCNC: 25 MMOL/L
SOURCE: ABNORMAL
SP GR UR STRIP: 1.01 (ref 1–1.03)
SPECIMEN EXP DATE BLD: NORMAL
SPECIMEN EXP DATE BLD: NORMAL
SPECIMEN SOURCE: ABNORMAL
SPECIMEN SOURCE: ABNORMAL
SPECIMEN SOURCE: NORMAL
SQUAMOUS #/AREA URNS AUTO: <1 /HPF (ref 0–1)
TROPONIN I SERPL-MCNC: 0.63 UG/L (ref 0–0.04)
TROPONIN I SERPL-MCNC: 0.7 UG/L (ref 0–0.04)
TROPONIN I SERPL-MCNC: 0.71 UG/L (ref 0–0.04)
TROPONIN I SERPL-MCNC: NORMAL UG/L (ref 0–0.04)
UROBILINOGEN UR STRIP-MCNC: NORMAL MG/DL (ref 0–2)
V CHOL+PARA RFBL+TRKH+TNAA STL QL NAA+PR: NOT DETECTED
WBC # BLD AUTO: 13.4 10E9/L (ref 4–11)
WBC # BLD AUTO: 3.3 10E9/L (ref 4–11)
WBC # BLD AUTO: 4.4 10E9/L (ref 4–11)
WBC # BLD AUTO: 5.3 10E9/L (ref 4–11)
WBC # BLD AUTO: 5.8 10E9/L (ref 4–11)
WBC # BLD AUTO: 5.9 10E9/L (ref 4–11)
WBC # BLD AUTO: 6.1 10E9/L (ref 4–11)
WBC # BLD AUTO: 6.5 10E9/L (ref 4–11)
WBC # BLD AUTO: 7.2 THOU/UL (ref 4–11)
WBC # BLD AUTO: 7.3 10E9/L (ref 4–11)
WBC # BLD AUTO: 7.5 10E9/L (ref 4–11)
WBC # BLD AUTO: 7.6 10E9/L (ref 4–11)
WBC # BLD AUTO: 7.6 10E9/L (ref 4–11)
WBC # BLD AUTO: 8.2 10E9/L (ref 4–11)
WBC # BLD AUTO: 8.5 THOU/UL (ref 4–11)
WBC # BLD AUTO: 8.6 THOU/UL (ref 4–11)
WBC # BLD AUTO: 8.8 10E9/L (ref 4–11)
WBC # BLD AUTO: 8.9 10E9/L (ref 4–11)
WBC # BLD AUTO: 9.5 10E9/L (ref 4–11)
WBC #/AREA URNS AUTO: 1 /HPF (ref 0–2)
WBC #/AREA URNS AUTO: 14 /HPF (ref 0–2)
WBC #/AREA URNS AUTO: <1 /HPF (ref 0–2)
Y ENTERO RECN STL QL NAA+PROBE: NOT DETECTED

## 2017-01-01 PROCEDURE — 80048 BASIC METABOLIC PNL TOTAL CA: CPT | Performed by: NURSE PRACTITIONER

## 2017-01-01 PROCEDURE — A9270 NON-COVERED ITEM OR SERVICE: HCPCS | Mod: GY | Performed by: NURSE PRACTITIONER

## 2017-01-01 PROCEDURE — A9270 NON-COVERED ITEM OR SERVICE: HCPCS | Mod: GY | Performed by: INTERNAL MEDICINE

## 2017-01-01 PROCEDURE — 25000132 ZZH RX MED GY IP 250 OP 250 PS 637: Mod: GY | Performed by: NURSE PRACTITIONER

## 2017-01-01 PROCEDURE — 83036 HEMOGLOBIN GLYCOSYLATED A1C: CPT | Performed by: SURGERY

## 2017-01-01 PROCEDURE — 12004 RPR S/N/AX/GEN/TRK7.6-12.5CM: CPT

## 2017-01-01 PROCEDURE — 87493 C DIFF AMPLIFIED PROBE: CPT | Performed by: INTERNAL MEDICINE

## 2017-01-01 PROCEDURE — 25000132 ZZH RX MED GY IP 250 OP 250 PS 637: Mod: GY | Performed by: INTERNAL MEDICINE

## 2017-01-01 PROCEDURE — 12000000 ZZH R&B MED SURG/OB

## 2017-01-01 PROCEDURE — 99222 1ST HOSP IP/OBS MODERATE 55: CPT | Mod: 25 | Performed by: INTERNAL MEDICINE

## 2017-01-01 PROCEDURE — 97535 SELF CARE MNGMENT TRAINING: CPT | Mod: GO | Performed by: OCCUPATIONAL THERAPIST

## 2017-01-01 PROCEDURE — 84100 ASSAY OF PHOSPHORUS: CPT | Performed by: NURSE PRACTITIONER

## 2017-01-01 PROCEDURE — 99233 SBSQ HOSP IP/OBS HIGH 50: CPT | Performed by: INTERNAL MEDICINE

## 2017-01-01 PROCEDURE — 40000133 ZZH STATISTIC OT WARD VISIT: Performed by: OCCUPATIONAL THERAPIST

## 2017-01-01 PROCEDURE — 40000193 ZZH STATISTIC PT WARD VISIT

## 2017-01-01 PROCEDURE — 82550 ASSAY OF CK (CPK): CPT | Performed by: EMERGENCY MEDICINE

## 2017-01-01 PROCEDURE — A9270 NON-COVERED ITEM OR SERVICE: HCPCS | Mod: GY | Performed by: SURGERY

## 2017-01-01 PROCEDURE — 36415 COLL VENOUS BLD VENIPUNCTURE: CPT | Performed by: NURSE PRACTITIONER

## 2017-01-01 PROCEDURE — 84100 ASSAY OF PHOSPHORUS: CPT | Performed by: SURGERY

## 2017-01-01 PROCEDURE — 85027 COMPLETE CBC AUTOMATED: CPT | Performed by: NURSE PRACTITIONER

## 2017-01-01 PROCEDURE — A9270 NON-COVERED ITEM OR SERVICE: HCPCS | Mod: GY | Performed by: STUDENT IN AN ORGANIZED HEALTH CARE EDUCATION/TRAINING PROGRAM

## 2017-01-01 PROCEDURE — 71010 XR CHEST PORT 1 VW: CPT

## 2017-01-01 PROCEDURE — 85027 COMPLETE CBC AUTOMATED: CPT | Performed by: EMERGENCY MEDICINE

## 2017-01-01 PROCEDURE — 84100 ASSAY OF PHOSPHORUS: CPT | Performed by: INTERNAL MEDICINE

## 2017-01-01 PROCEDURE — 85027 COMPLETE CBC AUTOMATED: CPT | Performed by: INTERNAL MEDICINE

## 2017-01-01 PROCEDURE — 84484 ASSAY OF TROPONIN QUANT: CPT | Performed by: EMERGENCY MEDICINE

## 2017-01-01 PROCEDURE — 80048 BASIC METABOLIC PNL TOTAL CA: CPT | Performed by: PHYSICIAN ASSISTANT

## 2017-01-01 PROCEDURE — 25000128 H RX IP 250 OP 636: Performed by: NURSE PRACTITIONER

## 2017-01-01 PROCEDURE — 87640 STAPH A DNA AMP PROBE: CPT | Performed by: SURGERY

## 2017-01-01 PROCEDURE — 99207 ZZC APP CREDIT; MD BILLING SHARED VISIT: CPT | Performed by: NURSE PRACTITIONER

## 2017-01-01 PROCEDURE — 00000146 ZZHCL STATISTIC GLUCOSE BY METER IP

## 2017-01-01 PROCEDURE — 25000132 ZZH RX MED GY IP 250 OP 250 PS 637: Mod: GY | Performed by: STUDENT IN AN ORGANIZED HEALTH CARE EDUCATION/TRAINING PROGRAM

## 2017-01-01 PROCEDURE — 99213 OFFICE O/P EST LOW 20 MIN: CPT | Performed by: INTERNAL MEDICINE

## 2017-01-01 PROCEDURE — 97530 THERAPEUTIC ACTIVITIES: CPT | Mod: GO | Performed by: OCCUPATIONAL THERAPIST

## 2017-01-01 PROCEDURE — 80177 DRUG SCRN QUAN LEVETIRACETAM: CPT | Performed by: NURSE PRACTITIONER

## 2017-01-01 PROCEDURE — 93308 TTE F-UP OR LMTD: CPT | Mod: 26 | Performed by: INTERNAL MEDICINE

## 2017-01-01 PROCEDURE — 25000125 ZZHC RX 250: Performed by: NURSE PRACTITIONER

## 2017-01-01 PROCEDURE — 86900 BLOOD TYPING SEROLOGIC ABO: CPT | Performed by: EMERGENCY MEDICINE

## 2017-01-01 PROCEDURE — 36415 COLL VENOUS BLD VENIPUNCTURE: CPT | Performed by: INTERNAL MEDICINE

## 2017-01-01 PROCEDURE — 80048 BASIC METABOLIC PNL TOTAL CA: CPT | Performed by: INTERNAL MEDICINE

## 2017-01-01 PROCEDURE — 25000132 ZZH RX MED GY IP 250 OP 250 PS 637: Mod: GY | Performed by: SURGERY

## 2017-01-01 PROCEDURE — 96360 HYDRATION IV INFUSION INIT: CPT

## 2017-01-01 PROCEDURE — 97112 NEUROMUSCULAR REEDUCATION: CPT | Mod: GO | Performed by: OCCUPATIONAL THERAPIST

## 2017-01-01 PROCEDURE — 95951 ZZHC EEG VIDEO EACH 24 HR: CPT | Mod: ZF

## 2017-01-01 PROCEDURE — 86901 BLOOD TYPING SEROLOGIC RH(D): CPT | Performed by: EMERGENCY MEDICINE

## 2017-01-01 PROCEDURE — 0296T ZIO PATCH HOLTER: CPT

## 2017-01-01 PROCEDURE — 99214 OFFICE O/P EST MOD 30 MIN: CPT | Performed by: PHYSICIAN ASSISTANT

## 2017-01-01 PROCEDURE — 80048 BASIC METABOLIC PNL TOTAL CA: CPT | Performed by: EMERGENCY MEDICINE

## 2017-01-01 PROCEDURE — 40000264 ECHO LIMITED WITH LUMASON

## 2017-01-01 PROCEDURE — 84100 ASSAY OF PHOSPHORUS: CPT | Performed by: EMERGENCY MEDICINE

## 2017-01-01 PROCEDURE — 87506 IADNA-DNA/RNA PROBE TQ 6-11: CPT | Performed by: INTERNAL MEDICINE

## 2017-01-01 PROCEDURE — 87077 CULTURE AEROBIC IDENTIFY: CPT | Performed by: INTERNAL MEDICINE

## 2017-01-01 PROCEDURE — 83036 HEMOGLOBIN GLYCOSYLATED A1C: CPT | Performed by: INTERNAL MEDICINE

## 2017-01-01 PROCEDURE — 85610 PROTHROMBIN TIME: CPT | Performed by: EMERGENCY MEDICINE

## 2017-01-01 PROCEDURE — 84484 ASSAY OF TROPONIN QUANT: CPT | Performed by: NURSE PRACTITIONER

## 2017-01-01 PROCEDURE — 97530 THERAPEUTIC ACTIVITIES: CPT | Mod: GP | Performed by: PHYSICAL THERAPIST

## 2017-01-01 PROCEDURE — 87040 BLOOD CULTURE FOR BACTERIA: CPT | Performed by: INTERNAL MEDICINE

## 2017-01-01 PROCEDURE — 99214 OFFICE O/P EST MOD 30 MIN: CPT | Performed by: INTERNAL MEDICINE

## 2017-01-01 PROCEDURE — 12000008 ZZH R&B INTERMEDIATE UMMC

## 2017-01-01 PROCEDURE — 0HQ0XZZ REPAIR SCALP SKIN, EXTERNAL APPROACH: ICD-10-PCS | Performed by: EMERGENCY MEDICINE

## 2017-01-01 PROCEDURE — 36415 COLL VENOUS BLD VENIPUNCTURE: CPT | Performed by: PHYSICIAN ASSISTANT

## 2017-01-01 PROCEDURE — 97165 OT EVAL LOW COMPLEX 30 MIN: CPT | Mod: GO | Performed by: OCCUPATIONAL THERAPIST

## 2017-01-01 PROCEDURE — 81001 URINALYSIS AUTO W/SCOPE: CPT | Performed by: INTERNAL MEDICINE

## 2017-01-01 PROCEDURE — 97530 THERAPEUTIC ACTIVITIES: CPT | Mod: GO | Performed by: OCCUPATIONAL THERAPY ASSISTANT

## 2017-01-01 PROCEDURE — 70450 CT HEAD/BRAIN W/O DYE: CPT

## 2017-01-01 PROCEDURE — 25000131 ZZH RX MED GY IP 250 OP 636 PS 637: Mod: GY | Performed by: NURSE PRACTITIONER

## 2017-01-01 PROCEDURE — 99232 SBSQ HOSP IP/OBS MODERATE 35: CPT | Performed by: NURSE PRACTITIONER

## 2017-01-01 PROCEDURE — 82803 BLOOD GASES ANY COMBINATION: CPT

## 2017-01-01 PROCEDURE — 95951 ZZHC EEG VIDEO < 12 HR: CPT | Mod: 52,ZF

## 2017-01-01 PROCEDURE — 82570 ASSAY OF URINE CREATININE: CPT | Performed by: NURSE PRACTITIONER

## 2017-01-01 PROCEDURE — 12032 INTMD RPR S/A/T/EXT 2.6-7.5: CPT

## 2017-01-01 PROCEDURE — 25000128 H RX IP 250 OP 636: Performed by: SURGERY

## 2017-01-01 PROCEDURE — 92610 EVALUATE SWALLOWING FUNCTION: CPT | Mod: GN | Performed by: SPEECH-LANGUAGE PATHOLOGIST

## 2017-01-01 PROCEDURE — 99207 ZZC CDG-CORRECTLY CODED, REVIEWED AND AGREE: CPT | Performed by: NURSE PRACTITIONER

## 2017-01-01 PROCEDURE — 36415 COLL VENOUS BLD VENIPUNCTURE: CPT | Performed by: SURGERY

## 2017-01-01 PROCEDURE — 96365 THER/PROPH/DIAG IV INF INIT: CPT

## 2017-01-01 PROCEDURE — 97116 GAIT TRAINING THERAPY: CPT | Mod: GP

## 2017-01-01 PROCEDURE — 92526 ORAL FUNCTION THERAPY: CPT | Mod: GN | Performed by: SPEECH-LANGUAGE PATHOLOGIST

## 2017-01-01 PROCEDURE — 83735 ASSAY OF MAGNESIUM: CPT | Performed by: NURSE PRACTITIONER

## 2017-01-01 PROCEDURE — 25000132 ZZH RX MED GY IP 250 OP 250 PS 637: Mod: GY | Performed by: PHYSICIAN ASSISTANT

## 2017-01-01 PROCEDURE — 93005 ELECTROCARDIOGRAM TRACING: CPT

## 2017-01-01 PROCEDURE — 21000001 ZZH R&B HEART CARE

## 2017-01-01 PROCEDURE — 83605 ASSAY OF LACTIC ACID: CPT

## 2017-01-01 PROCEDURE — 81001 URINALYSIS AUTO W/SCOPE: CPT | Performed by: NURSE PRACTITIONER

## 2017-01-01 PROCEDURE — 25000131 ZZH RX MED GY IP 250 OP 636 PS 637: Mod: GY | Performed by: INTERNAL MEDICINE

## 2017-01-01 PROCEDURE — 40000193 ZZH STATISTIC PT WARD VISIT: Performed by: PHYSICAL THERAPIST

## 2017-01-01 PROCEDURE — 25000128 H RX IP 250 OP 636: Performed by: EMERGENCY MEDICINE

## 2017-01-01 PROCEDURE — 82043 UR ALBUMIN QUANTITATIVE: CPT | Performed by: INTERNAL MEDICINE

## 2017-01-01 PROCEDURE — A9270 NON-COVERED ITEM OR SERVICE: HCPCS | Mod: GY | Performed by: PHYSICIAN ASSISTANT

## 2017-01-01 PROCEDURE — 40000225 ZZH STATISTIC SLP WARD VISIT: Performed by: SPEECH-LANGUAGE PATHOLOGIST

## 2017-01-01 PROCEDURE — 97161 PT EVAL LOW COMPLEX 20 MIN: CPT | Mod: GP

## 2017-01-01 PROCEDURE — 99232 SBSQ HOSP IP/OBS MODERATE 35: CPT | Performed by: INTERNAL MEDICINE

## 2017-01-01 PROCEDURE — 93010 ELECTROCARDIOGRAM REPORT: CPT | Performed by: INTERNAL MEDICINE

## 2017-01-01 PROCEDURE — 40000225 ZZH STATISTIC SLP WARD VISIT

## 2017-01-01 PROCEDURE — 20000004 ZZH R&B ICU UMMC

## 2017-01-01 PROCEDURE — 71020 XR CHEST 2 VW: CPT

## 2017-01-01 PROCEDURE — 80053 COMPREHEN METABOLIC PANEL: CPT | Performed by: EMERGENCY MEDICINE

## 2017-01-01 PROCEDURE — 97110 THERAPEUTIC EXERCISES: CPT | Mod: GO | Performed by: OCCUPATIONAL THERAPY ASSISTANT

## 2017-01-01 PROCEDURE — 87088 URINE BACTERIA CULTURE: CPT | Performed by: SURGERY

## 2017-01-01 PROCEDURE — 25000128 H RX IP 250 OP 636: Performed by: STUDENT IN AN ORGANIZED HEALTH CARE EDUCATION/TRAINING PROGRAM

## 2017-01-01 PROCEDURE — 99285 EMERGENCY DEPT VISIT HI MDM: CPT | Mod: 25

## 2017-01-01 PROCEDURE — 40000893 ZZH STATISTIC PT IP EVAL DEFER: Performed by: PHYSICAL THERAPIST

## 2017-01-01 PROCEDURE — 99213 OFFICE O/P EST LOW 20 MIN: CPT | Performed by: FAMILY MEDICINE

## 2017-01-01 PROCEDURE — 73030 X-RAY EXAM OF SHOULDER: CPT | Mod: RT

## 2017-01-01 PROCEDURE — 96361 HYDRATE IV INFUSION ADD-ON: CPT

## 2017-01-01 PROCEDURE — 83735 ASSAY OF MAGNESIUM: CPT | Performed by: SURGERY

## 2017-01-01 PROCEDURE — 25000128 H RX IP 250 OP 636

## 2017-01-01 PROCEDURE — 83036 HEMOGLOBIN GLYCOSYLATED A1C: CPT | Performed by: PHYSICIAN ASSISTANT

## 2017-01-01 PROCEDURE — 87086 URINE CULTURE/COLONY COUNT: CPT | Performed by: SURGERY

## 2017-01-01 PROCEDURE — 97530 THERAPEUTIC ACTIVITIES: CPT | Mod: GP

## 2017-01-01 PROCEDURE — 93325 DOPPLER ECHO COLOR FLOW MAPG: CPT | Mod: 26 | Performed by: INTERNAL MEDICINE

## 2017-01-01 PROCEDURE — 99239 HOSP IP/OBS DSCHRG MGMT >30: CPT | Performed by: INTERNAL MEDICINE

## 2017-01-01 PROCEDURE — 0298T ZZC EXT ECG > 48HR TO 21 DAY REVIEW AND INTERPRETATN: CPT | Performed by: INTERNAL MEDICINE

## 2017-01-01 PROCEDURE — 40000141 ZZH STATISTIC PERIPHERAL IV START W/O US GUIDANCE

## 2017-01-01 PROCEDURE — 40000014 ZZH STATISTIC ARTERIAL MONITORING DAILY

## 2017-01-01 PROCEDURE — 92526 ORAL FUNCTION THERAPY: CPT | Mod: GN

## 2017-01-01 PROCEDURE — 97116 GAIT TRAINING THERAPY: CPT | Mod: GP | Performed by: PHYSICAL THERAPIST

## 2017-01-01 PROCEDURE — 99211 OFF/OP EST MAY X REQ PHY/QHP: CPT

## 2017-01-01 PROCEDURE — 73221 MRI JOINT UPR EXTREM W/O DYE: CPT | Mod: RT

## 2017-01-01 PROCEDURE — 99306 1ST NF CARE HIGH MDM 50: CPT | Performed by: INTERNAL MEDICINE

## 2017-01-01 PROCEDURE — 85018 HEMOGLOBIN: CPT | Performed by: EMERGENCY MEDICINE

## 2017-01-01 PROCEDURE — 85027 COMPLETE CBC AUTOMATED: CPT | Performed by: SURGERY

## 2017-01-01 PROCEDURE — 83735 ASSAY OF MAGNESIUM: CPT | Performed by: EMERGENCY MEDICINE

## 2017-01-01 PROCEDURE — 96361 HYDRATE IV INFUSION ADD-ON: CPT | Mod: 59

## 2017-01-01 PROCEDURE — 93880 EXTRACRANIAL BILAT STUDY: CPT

## 2017-01-01 PROCEDURE — 72125 CT NECK SPINE W/O DYE: CPT

## 2017-01-01 PROCEDURE — 85025 COMPLETE CBC W/AUTO DIFF WBC: CPT | Performed by: EMERGENCY MEDICINE

## 2017-01-01 PROCEDURE — 99285 EMERGENCY DEPT VISIT HI MDM: CPT | Mod: 25 | Performed by: EMERGENCY MEDICINE

## 2017-01-01 PROCEDURE — 99215 OFFICE O/P EST HI 40 MIN: CPT | Mod: 25 | Performed by: INTERNAL MEDICINE

## 2017-01-01 PROCEDURE — 25000131 ZZH RX MED GY IP 250 OP 636 PS 637: Mod: GY | Performed by: SURGERY

## 2017-01-01 PROCEDURE — 87186 SC STD MICRODIL/AGAR DIL: CPT | Performed by: INTERNAL MEDICINE

## 2017-01-01 PROCEDURE — 68200001 ZZH PARTIAL TRAUMA W/O CC LEVEL II: Performed by: EMERGENCY MEDICINE

## 2017-01-01 PROCEDURE — 99291 CRITICAL CARE FIRST HOUR: CPT | Mod: Z6 | Performed by: EMERGENCY MEDICINE

## 2017-01-01 PROCEDURE — 99207 ZZC APP CREDIT; MD BILLING SHARED VISIT: CPT | Performed by: SURGERY

## 2017-01-01 PROCEDURE — 40000133 ZZH STATISTIC OT WARD VISIT: Performed by: OCCUPATIONAL THERAPY ASSISTANT

## 2017-01-01 PROCEDURE — 86850 RBC ANTIBODY SCREEN: CPT | Performed by: EMERGENCY MEDICINE

## 2017-01-01 PROCEDURE — 73060 X-RAY EXAM OF HUMERUS: CPT | Mod: LT

## 2017-01-01 PROCEDURE — 87186 SC STD MICRODIL/AGAR DIL: CPT | Performed by: SURGERY

## 2017-01-01 PROCEDURE — 84300 ASSAY OF URINE SODIUM: CPT | Performed by: NURSE PRACTITIONER

## 2017-01-01 PROCEDURE — 87800 DETECT AGNT MULT DNA DIREC: CPT | Performed by: INTERNAL MEDICINE

## 2017-01-01 PROCEDURE — 93005 ELECTROCARDIOGRAM TRACING: CPT | Mod: 59

## 2017-01-01 PROCEDURE — 93321 DOPPLER ECHO F-UP/LMTD STD: CPT | Mod: 26 | Performed by: INTERNAL MEDICINE

## 2017-01-01 PROCEDURE — 93005 ELECTROCARDIOGRAM TRACING: CPT | Mod: 76

## 2017-01-01 PROCEDURE — 99207 ZZC CDG-MDM COMPONENT: MEETS LOW - DOWN CODED: CPT | Performed by: INTERNAL MEDICINE

## 2017-01-01 PROCEDURE — 40000826 ZZH STATISTIC TRAUMA CODE W/O ACCESS

## 2017-01-01 PROCEDURE — 83735 ASSAY OF MAGNESIUM: CPT | Performed by: INTERNAL MEDICINE

## 2017-01-01 PROCEDURE — 99310 SBSQ NF CARE HIGH MDM 45: CPT | Performed by: NURSE PRACTITIONER

## 2017-01-01 PROCEDURE — 81001 URINALYSIS AUTO W/SCOPE: CPT | Performed by: EMERGENCY MEDICINE

## 2017-01-01 PROCEDURE — 97162 PT EVAL MOD COMPLEX 30 MIN: CPT | Mod: GP | Performed by: PHYSICAL THERAPIST

## 2017-01-01 PROCEDURE — 85730 THROMBOPLASTIN TIME PARTIAL: CPT | Performed by: EMERGENCY MEDICINE

## 2017-01-01 PROCEDURE — 83605 ASSAY OF LACTIC ACID: CPT | Performed by: INTERNAL MEDICINE

## 2017-01-01 PROCEDURE — 93308 TTE F-UP OR LMTD: CPT

## 2017-01-01 PROCEDURE — 87641 MR-STAPH DNA AMP PROBE: CPT | Performed by: SURGERY

## 2017-01-01 PROCEDURE — 25500064 ZZH RX 255 OP 636: Performed by: INTERNAL MEDICINE

## 2017-01-01 PROCEDURE — 80048 BASIC METABOLIC PNL TOTAL CA: CPT | Performed by: SURGERY

## 2017-01-01 PROCEDURE — 73030 X-RAY EXAM OF SHOULDER: CPT | Mod: LT

## 2017-01-01 PROCEDURE — 99223 1ST HOSP IP/OBS HIGH 75: CPT | Mod: AI | Performed by: INTERNAL MEDICINE

## 2017-01-01 PROCEDURE — 97110 THERAPEUTIC EXERCISES: CPT | Mod: GP

## 2017-01-01 PROCEDURE — 99223 1ST HOSP IP/OBS HIGH 75: CPT | Performed by: NURSE PRACTITIONER

## 2017-01-01 RX ORDER — PANTOPRAZOLE SODIUM 40 MG/1
TABLET, DELAYED RELEASE ORAL
Qty: 90 TABLET | Refills: 2 | Status: ON HOLD | OUTPATIENT
Start: 2017-01-01 | End: 2017-01-01

## 2017-01-01 RX ORDER — ISOSORBIDE MONONITRATE 60 MG/1
120 TABLET, EXTENDED RELEASE ORAL DAILY
Qty: 180 TABLET | Refills: 3 | Status: ON HOLD | OUTPATIENT
Start: 2017-01-01 | End: 2017-01-01

## 2017-01-01 RX ORDER — NALOXONE HYDROCHLORIDE 0.4 MG/ML
.1-.4 INJECTION, SOLUTION INTRAMUSCULAR; INTRAVENOUS; SUBCUTANEOUS
Status: DISCONTINUED | OUTPATIENT
Start: 2017-01-01 | End: 2017-01-01 | Stop reason: HOSPADM

## 2017-01-01 RX ORDER — ISOSORBIDE MONONITRATE 30 MG/1
90 TABLET, EXTENDED RELEASE ORAL DAILY
DISCHARGE
Start: 2017-01-01

## 2017-01-01 RX ORDER — PANTOPRAZOLE SODIUM 40 MG/1
TABLET, DELAYED RELEASE ORAL
Qty: 90 TABLET | Refills: 0 | Status: SHIPPED | OUTPATIENT
Start: 2017-01-01 | End: 2017-01-01

## 2017-01-01 RX ORDER — PANTOPRAZOLE SODIUM 40 MG/1
40 TABLET, DELAYED RELEASE ORAL DAILY
Status: DISCONTINUED | OUTPATIENT
Start: 2017-01-01 | End: 2017-01-01 | Stop reason: HOSPADM

## 2017-01-01 RX ORDER — OXCARBAZEPINE 150 MG/1
150 TABLET, FILM COATED ORAL 2 TIMES DAILY
Status: ON HOLD | DISCHARGE
Start: 2017-01-01 | End: 2018-01-01

## 2017-01-01 RX ORDER — ACETAMINOPHEN 325 MG/1
650 TABLET ORAL EVERY 6 HOURS PRN
COMMUNITY

## 2017-01-01 RX ORDER — BISACODYL 10 MG
10 SUPPOSITORY, RECTAL RECTAL ONCE
Status: DISCONTINUED | OUTPATIENT
Start: 2017-01-01 | End: 2017-01-01 | Stop reason: HOSPADM

## 2017-01-01 RX ORDER — HYDRALAZINE HYDROCHLORIDE 50 MG/1
25 TABLET, FILM COATED ORAL 2 TIMES DAILY
Qty: 60 TABLET | DISCHARGE
Start: 2017-01-01

## 2017-01-01 RX ORDER — AMOXICILLIN 250 MG
1-2 CAPSULE ORAL 2 TIMES DAILY
Status: DISCONTINUED | OUTPATIENT
Start: 2017-01-01 | End: 2017-01-01 | Stop reason: HOSPADM

## 2017-01-01 RX ORDER — LEVETIRACETAM 500 MG/1
500 TABLET ORAL 2 TIMES DAILY
COMMUNITY

## 2017-01-01 RX ORDER — LORAZEPAM 2 MG/ML
0.5 INJECTION INTRAMUSCULAR EVERY 6 HOURS PRN
Status: DISCONTINUED | OUTPATIENT
Start: 2017-01-01 | End: 2017-01-01 | Stop reason: HOSPADM

## 2017-01-01 RX ORDER — ISOSORBIDE MONONITRATE 60 MG/1
120 TABLET, EXTENDED RELEASE ORAL DAILY
Status: DISCONTINUED | OUTPATIENT
Start: 2017-01-01 | End: 2017-01-01

## 2017-01-01 RX ORDER — TORSEMIDE 10 MG/1
TABLET ORAL
Qty: 135 TABLET | Refills: 3 | Status: SHIPPED | OUTPATIENT
Start: 2017-01-01 | End: 2017-01-01

## 2017-01-01 RX ORDER — LEVETIRACETAM 500 MG/1
500 TABLET ORAL 2 TIMES DAILY
Status: DISCONTINUED | OUTPATIENT
Start: 2017-01-01 | End: 2017-01-01 | Stop reason: HOSPADM

## 2017-01-01 RX ORDER — HYDRALAZINE HYDROCHLORIDE 25 MG/1
50 TABLET, FILM COATED ORAL 3 TIMES DAILY
Status: DISCONTINUED | OUTPATIENT
Start: 2017-01-01 | End: 2017-01-01

## 2017-01-01 RX ORDER — PROCHLORPERAZINE MALEATE 5 MG
5 TABLET ORAL EVERY 6 HOURS PRN
Status: DISCONTINUED | OUTPATIENT
Start: 2017-01-01 | End: 2017-01-01 | Stop reason: HOSPADM

## 2017-01-01 RX ORDER — TORSEMIDE 10 MG/1
20 TABLET ORAL DAILY
Status: DISCONTINUED | OUTPATIENT
Start: 2017-01-01 | End: 2017-01-01 | Stop reason: HOSPADM

## 2017-01-01 RX ORDER — SPIRONOLACTONE 50 MG/1
50 TABLET, FILM COATED ORAL DAILY
Status: DISCONTINUED | OUTPATIENT
Start: 2017-01-01 | End: 2017-01-01 | Stop reason: HOSPADM

## 2017-01-01 RX ORDER — AMOXICILLIN 250 MG
1-2 CAPSULE ORAL 2 TIMES DAILY PRN
Qty: 100 TABLET | DISCHARGE
Start: 2017-01-01 | End: 2017-01-01

## 2017-01-01 RX ORDER — NICOTINE POLACRILEX 4 MG
15-30 LOZENGE BUCCAL
Status: DISCONTINUED | OUTPATIENT
Start: 2017-01-01 | End: 2017-01-01 | Stop reason: HOSPADM

## 2017-01-01 RX ORDER — POLYETHYLENE GLYCOL 3350 17 G/17G
17 POWDER, FOR SOLUTION ORAL DAILY PRN
Status: DISCONTINUED | OUTPATIENT
Start: 2017-01-01 | End: 2017-01-01 | Stop reason: HOSPADM

## 2017-01-01 RX ORDER — POTASSIUM CHLORIDE 29.8 MG/ML
20 INJECTION INTRAVENOUS
Status: DISCONTINUED | OUTPATIENT
Start: 2017-01-01 | End: 2017-01-01 | Stop reason: HOSPADM

## 2017-01-01 RX ORDER — AMLODIPINE BESYLATE 10 MG/1
10 TABLET ORAL DAILY
Status: DISCONTINUED | OUTPATIENT
Start: 2017-01-01 | End: 2017-01-01 | Stop reason: HOSPADM

## 2017-01-01 RX ORDER — LIDOCAINE 40 MG/G
CREAM TOPICAL
Status: DISCONTINUED | OUTPATIENT
Start: 2017-01-01 | End: 2017-01-01 | Stop reason: HOSPADM

## 2017-01-01 RX ORDER — HYDRALAZINE HYDROCHLORIDE 50 MG/1
50 TABLET, FILM COATED ORAL 3 TIMES DAILY
Qty: 270 TABLET | Refills: 3 | Status: ON HOLD | OUTPATIENT
Start: 2017-01-01 | End: 2017-01-01

## 2017-01-01 RX ORDER — HYDRALAZINE HYDROCHLORIDE 20 MG/ML
10 INJECTION INTRAMUSCULAR; INTRAVENOUS EVERY 6 HOURS PRN
Status: DISCONTINUED | OUTPATIENT
Start: 2017-01-01 | End: 2017-01-01 | Stop reason: HOSPADM

## 2017-01-01 RX ORDER — ATORVASTATIN CALCIUM 40 MG/1
TABLET, FILM COATED ORAL
Qty: 90 TABLET | Refills: 0 | Status: SHIPPED | OUTPATIENT
Start: 2017-01-01 | End: 2017-01-01

## 2017-01-01 RX ORDER — LEVETIRACETAM 1000 MG/1
1000 TABLET ORAL 2 TIMES DAILY
Qty: 60 TABLET | DISCHARGE
Start: 2017-01-01 | End: 2017-01-01

## 2017-01-01 RX ORDER — POTASSIUM CHLORIDE 7.45 MG/ML
10 INJECTION INTRAVENOUS
Status: DISCONTINUED | OUTPATIENT
Start: 2017-01-01 | End: 2017-01-01 | Stop reason: HOSPADM

## 2017-01-01 RX ORDER — SPIRONOLACTONE 50 MG/1
50 TABLET, FILM COATED ORAL DAILY
Qty: 90 TABLET | Refills: 3 | Status: SHIPPED | OUTPATIENT
Start: 2017-01-01 | End: 2017-01-01

## 2017-01-01 RX ORDER — MUPIROCIN 20 MG/G
OINTMENT TOPICAL 2 TIMES DAILY
Qty: 22 G | DISCHARGE
Start: 2017-01-01 | End: 2017-01-01

## 2017-01-01 RX ORDER — CLOPIDOGREL BISULFATE 75 MG/1
75 TABLET ORAL DAILY
Qty: 90 TABLET | Refills: 3 | Status: ON HOLD | OUTPATIENT
Start: 2017-01-01 | End: 2017-01-01

## 2017-01-01 RX ORDER — LEVETIRACETAM 500 MG/1
1000 TABLET ORAL 2 TIMES DAILY
Status: DISCONTINUED | OUTPATIENT
Start: 2017-01-01 | End: 2017-01-01

## 2017-01-01 RX ORDER — AMOXICILLIN 250 MG
2 CAPSULE ORAL 2 TIMES DAILY PRN
Status: DISCONTINUED | OUTPATIENT
Start: 2017-01-01 | End: 2017-01-01 | Stop reason: HOSPADM

## 2017-01-01 RX ORDER — AMOXICILLIN 250 MG
1 CAPSULE ORAL 2 TIMES DAILY PRN
Status: DISCONTINUED | OUTPATIENT
Start: 2017-01-01 | End: 2017-01-01 | Stop reason: HOSPADM

## 2017-01-01 RX ORDER — LANOLIN ALCOHOL/MO/W.PET/CERES
100 CREAM (GRAM) TOPICAL DAILY
COMMUNITY

## 2017-01-01 RX ORDER — ONDANSETRON 4 MG/1
4 TABLET, ORALLY DISINTEGRATING ORAL EVERY 6 HOURS PRN
Status: DISCONTINUED | OUTPATIENT
Start: 2017-01-01 | End: 2017-01-01 | Stop reason: HOSPADM

## 2017-01-01 RX ORDER — CARVEDILOL 3.12 MG/1
12.5 TABLET ORAL EVERY MORNING
Status: DISCONTINUED | OUTPATIENT
Start: 2017-01-01 | End: 2017-01-01 | Stop reason: HOSPADM

## 2017-01-01 RX ORDER — LORAZEPAM 2 MG/ML
2 INJECTION INTRAMUSCULAR ONCE
Status: COMPLETED | OUTPATIENT
Start: 2017-01-01 | End: 2017-01-01

## 2017-01-01 RX ORDER — TORSEMIDE 10 MG/1
20 TABLET ORAL DAILY
Qty: 90 TABLET | Refills: 3
Start: 2017-01-01 | End: 2017-01-01

## 2017-01-01 RX ORDER — PANTOPRAZOLE SODIUM 40 MG/1
40 TABLET, DELAYED RELEASE ORAL EVERY EVENING
Status: DISCONTINUED | OUTPATIENT
Start: 2017-01-01 | End: 2017-01-01 | Stop reason: HOSPADM

## 2017-01-01 RX ORDER — SODIUM CHLORIDE 9 MG/ML
INJECTION, SOLUTION INTRAVENOUS CONTINUOUS
Status: DISCONTINUED | OUTPATIENT
Start: 2017-01-01 | End: 2017-01-01 | Stop reason: HOSPADM

## 2017-01-01 RX ORDER — AMLODIPINE BESYLATE 10 MG/1
10 TABLET ORAL DAILY
Qty: 90 TABLET | Refills: 4 | Status: ON HOLD | OUTPATIENT
Start: 2017-01-01 | End: 2017-01-01

## 2017-01-01 RX ORDER — POTASSIUM CHLORIDE 750 MG/1
20-40 TABLET, EXTENDED RELEASE ORAL
Status: DISCONTINUED | OUTPATIENT
Start: 2017-01-01 | End: 2017-01-01 | Stop reason: HOSPADM

## 2017-01-01 RX ORDER — DEXTROSE MONOHYDRATE 25 G/50ML
25-50 INJECTION, SOLUTION INTRAVENOUS
Status: DISCONTINUED | OUTPATIENT
Start: 2017-01-01 | End: 2017-01-01 | Stop reason: HOSPADM

## 2017-01-01 RX ORDER — SPIRONOLACTONE 25 MG/1
50 TABLET ORAL DAILY
Qty: 180 TABLET | Refills: 3 | Status: SHIPPED | OUTPATIENT
Start: 2017-01-01 | End: 2017-01-01

## 2017-01-01 RX ORDER — POTASSIUM CHLORIDE 1.5 G/1.58G
20-40 POWDER, FOR SOLUTION ORAL
Status: DISCONTINUED | OUTPATIENT
Start: 2017-01-01 | End: 2017-01-01 | Stop reason: HOSPADM

## 2017-01-01 RX ORDER — CLOPIDOGREL BISULFATE 75 MG/1
75 TABLET ORAL DAILY
Status: DISCONTINUED | OUTPATIENT
Start: 2017-01-01 | End: 2017-01-01 | Stop reason: HOSPADM

## 2017-01-01 RX ORDER — MUPIROCIN 20 MG/G
OINTMENT TOPICAL DAILY
Status: DISCONTINUED | OUTPATIENT
Start: 2017-01-01 | End: 2017-01-01 | Stop reason: HOSPADM

## 2017-01-01 RX ORDER — AMLODIPINE BESYLATE 10 MG/1
10 TABLET ORAL DAILY
Status: DISCONTINUED | OUTPATIENT
Start: 2017-01-01 | End: 2017-01-01

## 2017-01-01 RX ORDER — TORSEMIDE 10 MG/1
10 TABLET ORAL DAILY
Qty: 90 TABLET | Refills: 3 | Status: ON HOLD | OUTPATIENT
Start: 2017-01-01 | End: 2017-01-01

## 2017-01-01 RX ORDER — POTASSIUM CHLORIDE 750 MG/1
10 TABLET, EXTENDED RELEASE ORAL DAILY
Qty: 90 TABLET | Refills: 3
Start: 2017-01-01 | End: 2017-01-01

## 2017-01-01 RX ORDER — INSULIN GLARGINE 100 [IU]/ML
INJECTION, SOLUTION SUBCUTANEOUS
Qty: 30 ML | Refills: 0 | Status: SHIPPED | OUTPATIENT
Start: 2017-01-01 | End: 2017-01-01

## 2017-01-01 RX ORDER — HYDRALAZINE HYDROCHLORIDE 25 MG/1
25 TABLET, FILM COATED ORAL EVERY 8 HOURS SCHEDULED
Status: DISCONTINUED | OUTPATIENT
Start: 2017-01-01 | End: 2017-01-01 | Stop reason: HOSPADM

## 2017-01-01 RX ORDER — POTASSIUM CHLORIDE 750 MG/1
10 TABLET, EXTENDED RELEASE ORAL 2 TIMES DAILY
Qty: 90 TABLET
Start: 2017-01-01 | End: 2017-01-01

## 2017-01-01 RX ORDER — MULTIVIT WITH MINERALS/LUTEIN
1 TABLET ORAL DAILY
Qty: 30 TABLET | DISCHARGE
Start: 2017-01-01

## 2017-01-01 RX ORDER — CARVEDILOL 25 MG/1
12.5 TABLET ORAL EVERY MORNING
Qty: 180 TABLET | Refills: 3 | COMMUNITY
Start: 2017-01-01 | End: 2017-01-01

## 2017-01-01 RX ORDER — AMOXICILLIN 250 MG
1-2 CAPSULE ORAL 2 TIMES DAILY
Qty: 60 TABLET | Refills: 0 | DISCHARGE
Start: 2017-01-01 | End: 2017-01-01

## 2017-01-01 RX ORDER — OXCARBAZEPINE 150 MG/1
150 TABLET, FILM COATED ORAL 2 TIMES DAILY
Status: DISCONTINUED | OUTPATIENT
Start: 2017-01-01 | End: 2017-01-01 | Stop reason: HOSPADM

## 2017-01-01 RX ORDER — POTASSIUM CHLORIDE 750 MG/1
10 TABLET, EXTENDED RELEASE ORAL DAILY
Status: ON HOLD | COMMUNITY
End: 2017-01-01

## 2017-01-01 RX ORDER — CEFAZOLIN SODIUM 2 G/100ML
2 INJECTION, SOLUTION INTRAVENOUS
Status: CANCELLED | OUTPATIENT
Start: 2017-01-01

## 2017-01-01 RX ORDER — LEVETIRACETAM 500 MG/1
1000 TABLET ORAL
Status: DISCONTINUED | OUTPATIENT
Start: 2017-01-01 | End: 2017-01-01

## 2017-01-01 RX ORDER — HYDRALAZINE HYDROCHLORIDE 50 MG/1
50 TABLET, FILM COATED ORAL 2 TIMES DAILY
Status: ON HOLD | DISCHARGE
Start: 2017-01-01 | End: 2017-01-01

## 2017-01-01 RX ORDER — ONDANSETRON 2 MG/ML
4 INJECTION INTRAMUSCULAR; INTRAVENOUS EVERY 6 HOURS PRN
Status: DISCONTINUED | OUTPATIENT
Start: 2017-01-01 | End: 2017-01-01 | Stop reason: HOSPADM

## 2017-01-01 RX ORDER — ACETAMINOPHEN 325 MG/1
650 TABLET ORAL EVERY 4 HOURS PRN
Qty: 100 TABLET | DISCHARGE
Start: 2017-01-01 | End: 2017-01-01 | Stop reason: DRUGHIGH

## 2017-01-01 RX ORDER — ISOSORBIDE MONONITRATE 30 MG/1
90 TABLET, EXTENDED RELEASE ORAL DAILY
Status: DISCONTINUED | OUTPATIENT
Start: 2017-01-01 | End: 2017-01-01 | Stop reason: HOSPADM

## 2017-01-01 RX ORDER — HYDRALAZINE HYDROCHLORIDE 25 MG/1
50 TABLET, FILM COATED ORAL 2 TIMES DAILY
Status: DISCONTINUED | OUTPATIENT
Start: 2017-01-01 | End: 2017-01-01 | Stop reason: HOSPADM

## 2017-01-01 RX ORDER — OXCARBAZEPINE 150 MG/1
150 TABLET, FILM COATED ORAL 2 TIMES DAILY
Status: DISCONTINUED | OUTPATIENT
Start: 2017-01-01 | End: 2017-01-01

## 2017-01-01 RX ORDER — SPIRONOLACTONE 50 MG/1
50 TABLET, FILM COATED ORAL DAILY
Qty: 90 TABLET | Refills: 3 | Status: ON HOLD | DISCHARGE
Start: 2017-01-01 | End: 2017-01-01

## 2017-01-01 RX ORDER — ISOSORBIDE MONONITRATE 60 MG/1
120 TABLET, EXTENDED RELEASE ORAL DAILY
Status: DISCONTINUED | OUTPATIENT
Start: 2017-01-01 | End: 2017-01-01 | Stop reason: HOSPADM

## 2017-01-01 RX ORDER — INSULIN GLARGINE 100 [IU]/ML
INJECTION, SOLUTION SUBCUTANEOUS
Qty: 30 ML | Refills: 0 | Status: ON HOLD | OUTPATIENT
Start: 2017-01-01 | End: 2017-01-01

## 2017-01-01 RX ORDER — SODIUM CHLORIDE, SODIUM LACTATE, POTASSIUM CHLORIDE, CALCIUM CHLORIDE 600; 310; 30; 20 MG/100ML; MG/100ML; MG/100ML; MG/100ML
INJECTION, SOLUTION INTRAVENOUS CONTINUOUS
Status: DISCONTINUED | OUTPATIENT
Start: 2017-01-01 | End: 2017-01-01

## 2017-01-01 RX ORDER — SODIUM CHLORIDE, SODIUM LACTATE, POTASSIUM CHLORIDE, CALCIUM CHLORIDE 600; 310; 30; 20 MG/100ML; MG/100ML; MG/100ML; MG/100ML
INJECTION, SOLUTION INTRAVENOUS
Status: DISCONTINUED
Start: 2017-01-01 | End: 2017-01-01 | Stop reason: HOSPADM

## 2017-01-01 RX ORDER — CARVEDILOL 25 MG/1
25 TABLET ORAL 2 TIMES DAILY WITH MEALS
Qty: 180 TABLET | Refills: 3 | Status: SHIPPED | OUTPATIENT
Start: 2017-01-01 | End: 2017-01-01

## 2017-01-01 RX ORDER — ATORVASTATIN CALCIUM 40 MG/1
40 TABLET, FILM COATED ORAL AT BEDTIME
COMMUNITY
End: 2017-01-01

## 2017-01-01 RX ORDER — CARVEDILOL 12.5 MG/1
12.5 TABLET ORAL EVERY MORNING
Status: DISCONTINUED | OUTPATIENT
Start: 2017-01-01 | End: 2017-01-01 | Stop reason: HOSPADM

## 2017-01-01 RX ORDER — LORAZEPAM 2 MG/ML
2 INJECTION INTRAMUSCULAR EVERY 6 HOURS PRN
Status: DISCONTINUED | OUTPATIENT
Start: 2017-01-01 | End: 2017-01-01

## 2017-01-01 RX ORDER — HYDRALAZINE HYDROCHLORIDE 50 MG/1
50 TABLET, FILM COATED ORAL 2 TIMES DAILY
Status: DISCONTINUED | OUTPATIENT
Start: 2017-01-01 | End: 2017-01-01

## 2017-01-01 RX ORDER — DEXAMETHASONE SODIUM PHOSPHATE 10 MG/ML
10 INJECTION, SOLUTION INTRAMUSCULAR; INTRAVENOUS ONCE
Status: CANCELLED | OUTPATIENT
Start: 2017-01-01 | End: 2017-01-01

## 2017-01-01 RX ORDER — ATORVASTATIN CALCIUM 40 MG/1
40 TABLET, FILM COATED ORAL DAILY
Status: DISCONTINUED | OUTPATIENT
Start: 2017-01-01 | End: 2017-01-01 | Stop reason: HOSPADM

## 2017-01-01 RX ORDER — TORSEMIDE 10 MG/1
10 TABLET ORAL DAILY
Status: DISCONTINUED | OUTPATIENT
Start: 2017-01-01 | End: 2017-01-01 | Stop reason: HOSPADM

## 2017-01-01 RX ORDER — ATORVASTATIN CALCIUM 40 MG/1
40 TABLET, FILM COATED ORAL DAILY
Qty: 90 TABLET | Refills: 3 | Status: SHIPPED | OUTPATIENT
Start: 2017-01-01 | End: 2017-01-01 | Stop reason: DRUGHIGH

## 2017-01-01 RX ORDER — ACETAMINOPHEN 325 MG/1
650 TABLET ORAL EVERY 4 HOURS PRN
Status: DISCONTINUED | OUTPATIENT
Start: 2017-01-01 | End: 2017-01-01 | Stop reason: HOSPADM

## 2017-01-01 RX ORDER — LEVETIRACETAM 100 MG/ML
500 SOLUTION ORAL 2 TIMES DAILY
Status: DISCONTINUED | OUTPATIENT
Start: 2017-01-01 | End: 2017-01-01 | Stop reason: HOSPADM

## 2017-01-01 RX ORDER — LEVETIRACETAM 500 MG/1
500 TABLET ORAL 2 TIMES DAILY
Status: DISCONTINUED | OUTPATIENT
Start: 2017-01-01 | End: 2017-01-01

## 2017-01-01 RX ORDER — AMOXICILLIN 250 MG
1-2 CAPSULE ORAL 2 TIMES DAILY PRN
Status: DISCONTINUED | OUTPATIENT
Start: 2017-01-01 | End: 2017-01-01 | Stop reason: HOSPADM

## 2017-01-01 RX ORDER — MAGNESIUM SULFATE HEPTAHYDRATE 40 MG/ML
4 INJECTION, SOLUTION INTRAVENOUS EVERY 4 HOURS PRN
Status: DISCONTINUED | OUTPATIENT
Start: 2017-01-01 | End: 2017-01-01 | Stop reason: HOSPADM

## 2017-01-01 RX ORDER — POTASSIUM CL/LIDO/0.9 % NACL 10MEQ/0.1L
10 INTRAVENOUS SOLUTION, PIGGYBACK (ML) INTRAVENOUS
Status: DISCONTINUED | OUTPATIENT
Start: 2017-01-01 | End: 2017-01-01 | Stop reason: HOSPADM

## 2017-01-01 RX ORDER — BISACODYL 10 MG
10 SUPPOSITORY, RECTAL RECTAL DAILY PRN
Status: DISCONTINUED | OUTPATIENT
Start: 2017-01-01 | End: 2017-01-01 | Stop reason: HOSPADM

## 2017-01-01 RX ORDER — TORSEMIDE 10 MG/1
10 TABLET ORAL DAILY
Qty: 90 TABLET | Refills: 3 | DISCHARGE
Start: 2017-01-01 | End: 2017-01-01 | Stop reason: DRUGHIGH

## 2017-01-01 RX ORDER — CLOPIDOGREL BISULFATE 75 MG/1
75 TABLET ORAL DAILY
COMMUNITY

## 2017-01-01 RX ORDER — PROCHLORPERAZINE 25 MG
12.5 SUPPOSITORY, RECTAL RECTAL EVERY 12 HOURS PRN
Status: DISCONTINUED | OUTPATIENT
Start: 2017-01-01 | End: 2017-01-01 | Stop reason: HOSPADM

## 2017-01-01 RX ORDER — ATORVASTATIN CALCIUM 40 MG/1
40 TABLET, FILM COATED ORAL AT BEDTIME
Status: DISCONTINUED | OUTPATIENT
Start: 2017-01-01 | End: 2017-01-01 | Stop reason: HOSPADM

## 2017-01-01 RX ORDER — SPIRONOLACTONE 25 MG/1
50 TABLET ORAL DAILY
Qty: 90 TABLET | Refills: 3
Start: 2017-01-01 | End: 2017-01-01

## 2017-01-01 RX ORDER — SPIRONOLACTONE 25 MG/1
25 TABLET ORAL DAILY
DISCHARGE
Start: 2017-01-01

## 2017-01-01 RX ORDER — CARVEDILOL 12.5 MG/1
12.5 TABLET ORAL DAILY
COMMUNITY

## 2017-01-01 RX ORDER — ATORVASTATIN CALCIUM 40 MG/1
40 TABLET, FILM COATED ORAL AT BEDTIME
COMMUNITY

## 2017-01-01 RX ORDER — LEVETIRACETAM 500 MG/1
500 TABLET ORAL 2 TIMES DAILY
Qty: 60 TABLET | DISCHARGE
Start: 2017-01-01 | End: 2017-01-01 | Stop reason: DRUGHIGH

## 2017-01-01 RX ORDER — POLYETHYLENE GLYCOL 3350 17 G/17G
17 POWDER, FOR SOLUTION ORAL DAILY
Status: DISCONTINUED | OUTPATIENT
Start: 2017-01-01 | End: 2017-01-01 | Stop reason: HOSPADM

## 2017-01-01 RX ORDER — TORSEMIDE 10 MG/1
20 TABLET ORAL DAILY
DISCHARGE
Start: 2017-01-01 | End: 2018-01-01 | Stop reason: DRUGHIGH

## 2017-01-01 RX ORDER — SPIRONOLACTONE 50 MG/1
50 TABLET, FILM COATED ORAL DAILY
Qty: 90 TABLET | Refills: 3 | Status: ON HOLD | OUTPATIENT
Start: 2017-01-01 | End: 2017-01-01

## 2017-01-01 RX ORDER — SODIUM CHLORIDE, SODIUM LACTATE, POTASSIUM CHLORIDE, CALCIUM CHLORIDE 600; 310; 30; 20 MG/100ML; MG/100ML; MG/100ML; MG/100ML
INJECTION, SOLUTION INTRAVENOUS
Status: COMPLETED
Start: 2017-01-01 | End: 2017-01-01

## 2017-01-01 RX ORDER — MIRTAZAPINE 15 MG/1
15 TABLET, FILM COATED ORAL AT BEDTIME
COMMUNITY

## 2017-01-01 RX ORDER — TORSEMIDE 10 MG/1
30 TABLET ORAL DAILY
Status: ON HOLD | COMMUNITY
End: 2017-01-01

## 2017-01-01 RX ORDER — CEFAZOLIN SODIUM 1 G/3ML
1 INJECTION, POWDER, FOR SOLUTION INTRAMUSCULAR; INTRAVENOUS SEE ADMIN INSTRUCTIONS
Status: CANCELLED | OUTPATIENT
Start: 2017-01-01

## 2017-01-01 RX ORDER — PANTOPRAZOLE SODIUM 40 MG/1
TABLET, DELAYED RELEASE ORAL
Qty: 90 TABLET | Refills: 2 | DISCHARGE
Start: 2017-01-01

## 2017-01-01 RX ORDER — LABETALOL HYDROCHLORIDE 5 MG/ML
20 INJECTION, SOLUTION INTRAVENOUS EVERY 4 HOURS PRN
Status: DISCONTINUED | OUTPATIENT
Start: 2017-01-01 | End: 2017-01-01 | Stop reason: HOSPADM

## 2017-01-01 RX ADMIN — INSULIN ASPART 2 UNITS: 100 INJECTION, SOLUTION INTRAVENOUS; SUBCUTANEOUS at 12:46

## 2017-01-01 RX ADMIN — ACETAMINOPHEN 650 MG: 325 TABLET, FILM COATED ORAL at 20:24

## 2017-01-01 RX ADMIN — AMLODIPINE BESYLATE 10 MG: 10 TABLET ORAL at 11:59

## 2017-01-01 RX ADMIN — HYDRALAZINE HYDROCHLORIDE 25 MG: 25 TABLET ORAL at 14:11

## 2017-01-01 RX ADMIN — ISOSORBIDE MONONITRATE 120 MG: 60 TABLET, EXTENDED RELEASE ORAL at 08:30

## 2017-01-01 RX ADMIN — SENNOSIDES AND DOCUSATE SODIUM 1 TABLET: 8.6; 5 TABLET ORAL at 10:51

## 2017-01-01 RX ADMIN — LEVETIRACETAM 1000 MG: 500 TABLET ORAL at 20:03

## 2017-01-01 RX ADMIN — INSULIN ASPART 1 UNITS: 100 INJECTION, SOLUTION INTRAVENOUS; SUBCUTANEOUS at 18:10

## 2017-01-01 RX ADMIN — INSULIN ASPART 1 UNITS: 100 INJECTION, SOLUTION INTRAVENOUS; SUBCUTANEOUS at 12:45

## 2017-01-01 RX ADMIN — CLOPIDOGREL 75 MG: 75 TABLET, FILM COATED ORAL at 09:18

## 2017-01-01 RX ADMIN — HYDRALAZINE HYDROCHLORIDE 25 MG: 25 TABLET ORAL at 06:06

## 2017-01-01 RX ADMIN — PANTOPRAZOLE SODIUM 40 MG: 40 TABLET, DELAYED RELEASE ORAL at 11:59

## 2017-01-01 RX ADMIN — TORSEMIDE 20 MG: 20 TABLET ORAL at 09:19

## 2017-01-01 RX ADMIN — ONDANSETRON 4 MG: 4 TABLET, ORALLY DISINTEGRATING ORAL at 21:27

## 2017-01-01 RX ADMIN — SODIUM CHLORIDE 1000 ML: 9 INJECTION, SOLUTION INTRAVENOUS at 14:54

## 2017-01-01 RX ADMIN — OXCARBAZEPINE 150 MG: 150 TABLET ORAL at 21:18

## 2017-01-01 RX ADMIN — CARVEDILOL 12.5 MG: 12.5 TABLET, FILM COATED ORAL at 08:25

## 2017-01-01 RX ADMIN — ACETAMINOPHEN 650 MG: 325 TABLET, FILM COATED ORAL at 11:12

## 2017-01-01 RX ADMIN — ATORVASTATIN CALCIUM 40 MG: 40 TABLET, FILM COATED ORAL at 21:30

## 2017-01-01 RX ADMIN — SODIUM CHLORIDE, POTASSIUM CHLORIDE, SODIUM LACTATE AND CALCIUM CHLORIDE: 600; 310; 30; 20 INJECTION, SOLUTION INTRAVENOUS at 04:09

## 2017-01-01 RX ADMIN — CLOPIDOGREL 75 MG: 75 TABLET, FILM COATED ORAL at 09:31

## 2017-01-01 RX ADMIN — ATORVASTATIN CALCIUM 40 MG: 40 TABLET, FILM COATED ORAL at 12:01

## 2017-01-01 RX ADMIN — HYDRALAZINE HYDROCHLORIDE 50 MG: 25 TABLET ORAL at 20:03

## 2017-01-01 RX ADMIN — LEVETIRACETAM 500 MG: 100 SOLUTION ORAL at 21:18

## 2017-01-01 RX ADMIN — INSULIN ASPART 1 UNITS: 100 INJECTION, SOLUTION INTRAVENOUS; SUBCUTANEOUS at 12:20

## 2017-01-01 RX ADMIN — LEVETIRACETAM 1000 MG: 100 INJECTION, SOLUTION INTRAVENOUS at 00:43

## 2017-01-01 RX ADMIN — TORSEMIDE 20 MG: 20 TABLET ORAL at 09:02

## 2017-01-01 RX ADMIN — LEVETIRACETAM 1000 MG: 500 TABLET ORAL at 08:25

## 2017-01-01 RX ADMIN — INSULIN ASPART 1 UNITS: 100 INJECTION, SOLUTION INTRAVENOUS; SUBCUTANEOUS at 18:41

## 2017-01-01 RX ADMIN — CARVEDILOL 12.5 MG: 12.5 TABLET, FILM COATED ORAL at 09:01

## 2017-01-01 RX ADMIN — LEVETIRACETAM 1000 MG: 500 TABLET ORAL at 08:31

## 2017-01-01 RX ADMIN — INSULIN ASPART 1 UNITS: 100 INJECTION, SOLUTION INTRAVENOUS; SUBCUTANEOUS at 12:23

## 2017-01-01 RX ADMIN — SODIUM CHLORIDE: 9 INJECTION, SOLUTION INTRAVENOUS at 13:08

## 2017-01-01 RX ADMIN — ATORVASTATIN CALCIUM 40 MG: 40 TABLET, FILM COATED ORAL at 08:26

## 2017-01-01 RX ADMIN — TORSEMIDE 10 MG: 10 TABLET ORAL at 08:30

## 2017-01-01 RX ADMIN — HYDRALAZINE HYDROCHLORIDE 50 MG: 25 TABLET ORAL at 10:50

## 2017-01-01 RX ADMIN — CLOPIDOGREL 75 MG: 75 TABLET, FILM COATED ORAL at 10:22

## 2017-01-01 RX ADMIN — POLYETHYLENE GLYCOL 3350 17 G: 17 POWDER, FOR SOLUTION ORAL at 11:58

## 2017-01-01 RX ADMIN — POTASSIUM CHLORIDE 10 MEQ: 14.9 INJECTION, SOLUTION, CONCENTRATE PARENTERAL at 04:48

## 2017-01-01 RX ADMIN — LEVETIRACETAM 500 MG: 500 TABLET, FILM COATED ORAL at 22:20

## 2017-01-01 RX ADMIN — ISOSORBIDE MONONITRATE 120 MG: 60 TABLET, EXTENDED RELEASE ORAL at 08:52

## 2017-01-01 RX ADMIN — ATORVASTATIN CALCIUM 40 MG: 40 TABLET, FILM COATED ORAL at 21:13

## 2017-01-01 RX ADMIN — CARVEDILOL 12.5 MG: 12.5 TABLET, FILM COATED ORAL at 10:50

## 2017-01-01 RX ADMIN — SODIUM CHLORIDE: 9 INJECTION, SOLUTION INTRAVENOUS at 23:20

## 2017-01-01 RX ADMIN — TORSEMIDE 10 MG: 10 TABLET ORAL at 12:01

## 2017-01-01 RX ADMIN — INSULIN ASPART 3 UNITS: 100 INJECTION, SOLUTION INTRAVENOUS; SUBCUTANEOUS at 18:32

## 2017-01-01 RX ADMIN — MUPIROCIN: 20 OINTMENT TOPICAL at 08:33

## 2017-01-01 RX ADMIN — OXCARBAZEPINE 150 MG: 150 TABLET ORAL at 12:01

## 2017-01-01 RX ADMIN — SPIRONOLACTONE 50 MG: 25 TABLET ORAL at 08:26

## 2017-01-01 RX ADMIN — HYDRALAZINE HYDROCHLORIDE 25 MG: 25 TABLET ORAL at 15:45

## 2017-01-01 RX ADMIN — LEVETIRACETAM 500 MG: 500 TABLET ORAL at 10:51

## 2017-01-01 RX ADMIN — ACETAMINOPHEN 650 MG: 325 TABLET, FILM COATED ORAL at 00:58

## 2017-01-01 RX ADMIN — ISOSORBIDE MONONITRATE 120 MG: 60 TABLET, EXTENDED RELEASE ORAL at 08:19

## 2017-01-01 RX ADMIN — INSULIN ASPART 1 UNITS: 100 INJECTION, SOLUTION INTRAVENOUS; SUBCUTANEOUS at 08:22

## 2017-01-01 RX ADMIN — INSULIN GLARGINE 6 UNITS: 100 INJECTION, SOLUTION SUBCUTANEOUS at 21:14

## 2017-01-01 RX ADMIN — CARVEDILOL 12.5 MG: 12.5 TABLET, FILM COATED ORAL at 09:16

## 2017-01-01 RX ADMIN — LEVETIRACETAM 1000 MG: 500 TABLET ORAL at 08:24

## 2017-01-01 RX ADMIN — TORSEMIDE 10 MG: 10 TABLET ORAL at 09:16

## 2017-01-01 RX ADMIN — OXCARBAZEPINE 150 MG: 150 TABLET ORAL at 22:59

## 2017-01-01 RX ADMIN — PANTOPRAZOLE SODIUM 40 MG: 40 TABLET, DELAYED RELEASE ORAL at 21:18

## 2017-01-01 RX ADMIN — LEVETIRACETAM 500 MG: 500 TABLET, FILM COATED ORAL at 11:08

## 2017-01-01 RX ADMIN — ATORVASTATIN CALCIUM 40 MG: 40 TABLET, FILM COATED ORAL at 09:17

## 2017-01-01 RX ADMIN — SPIRONOLACTONE 50 MG: 25 TABLET ORAL at 08:28

## 2017-01-01 RX ADMIN — TORSEMIDE 20 MG: 20 TABLET ORAL at 11:08

## 2017-01-01 RX ADMIN — HYDRALAZINE HYDROCHLORIDE 50 MG: 25 TABLET ORAL at 13:47

## 2017-01-01 RX ADMIN — SPIRONOLACTONE 50 MG: 25 TABLET ORAL at 08:53

## 2017-01-01 RX ADMIN — CALCIUM CARBONATE-VITAMIN D TAB 500 MG-200 UNIT 1 TABLET: 500-200 TAB at 10:50

## 2017-01-01 RX ADMIN — SENNOSIDES AND DOCUSATE SODIUM 2 TABLET: 8.6; 5 TABLET ORAL at 11:59

## 2017-01-01 RX ADMIN — INSULIN ASPART 1 UNITS: 100 INJECTION, SOLUTION INTRAVENOUS; SUBCUTANEOUS at 18:33

## 2017-01-01 RX ADMIN — HYDRALAZINE HYDROCHLORIDE 50 MG: 50 TABLET ORAL at 22:21

## 2017-01-01 RX ADMIN — INSULIN ASPART 3 UNITS: 100 INJECTION, SOLUTION INTRAVENOUS; SUBCUTANEOUS at 17:06

## 2017-01-01 RX ADMIN — HYDRALAZINE HYDROCHLORIDE 25 MG: 25 TABLET ORAL at 05:26

## 2017-01-01 RX ADMIN — PANTOPRAZOLE SODIUM 40 MG: 40 TABLET, DELAYED RELEASE ORAL at 08:27

## 2017-01-01 RX ADMIN — THIAMINE HYDROCHLORIDE 250 MG: 100 INJECTION, SOLUTION INTRAMUSCULAR; INTRAVENOUS at 12:35

## 2017-01-01 RX ADMIN — OXCARBAZEPINE 150 MG: 150 TABLET ORAL at 10:50

## 2017-01-01 RX ADMIN — LEVETIRACETAM 500 MG: 100 SOLUTION ORAL at 21:38

## 2017-01-01 RX ADMIN — HYDRALAZINE HYDROCHLORIDE 50 MG: 50 TABLET ORAL at 08:20

## 2017-01-01 RX ADMIN — POLYETHYLENE GLYCOL 3350 17 G: 17 POWDER, FOR SOLUTION ORAL at 10:49

## 2017-01-01 RX ADMIN — CARVEDILOL 12.5 MG: 12.5 TABLET, FILM COATED ORAL at 12:01

## 2017-01-01 RX ADMIN — OXCARBAZEPINE 150 MG: 150 TABLET ORAL at 08:20

## 2017-01-01 RX ADMIN — CLOPIDOGREL 75 MG: 75 TABLET, FILM COATED ORAL at 08:45

## 2017-01-01 RX ADMIN — LORAZEPAM 2 MG: 2 INJECTION INTRAMUSCULAR; INTRAVENOUS at 18:45

## 2017-01-01 RX ADMIN — INSULIN ASPART 3 UNITS: 100 INJECTION, SOLUTION INTRAVENOUS; SUBCUTANEOUS at 17:48

## 2017-01-01 RX ADMIN — LEVETIRACETAM 500 MG: 500 TABLET, FILM COATED ORAL at 20:49

## 2017-01-01 RX ADMIN — HYDRALAZINE HYDROCHLORIDE 25 MG: 25 TABLET ORAL at 06:11

## 2017-01-01 RX ADMIN — INSULIN ASPART 1 UNITS: 100 INJECTION, SOLUTION INTRAVENOUS; SUBCUTANEOUS at 09:18

## 2017-01-01 RX ADMIN — INSULIN ASPART 1 UNITS: 100 INJECTION, SOLUTION INTRAVENOUS; SUBCUTANEOUS at 12:25

## 2017-01-01 RX ADMIN — ISOSORBIDE MONONITRATE 90 MG: 60 TABLET, EXTENDED RELEASE ORAL at 09:19

## 2017-01-01 RX ADMIN — ISOSORBIDE MONONITRATE 90 MG: 60 TABLET, EXTENDED RELEASE ORAL at 10:40

## 2017-01-01 RX ADMIN — ISOSORBIDE MONONITRATE 120 MG: 60 TABLET, EXTENDED RELEASE ORAL at 08:22

## 2017-01-01 RX ADMIN — ACETAMINOPHEN 650 MG: 325 TABLET, FILM COATED ORAL at 05:19

## 2017-01-01 RX ADMIN — CARVEDILOL 12.5 MG: 12.5 TABLET, FILM COATED ORAL at 08:47

## 2017-01-01 RX ADMIN — SULFUR HEXAFLUORIDE 4 ML: KIT at 10:43

## 2017-01-01 RX ADMIN — PANTOPRAZOLE SODIUM 40 MG: 40 TABLET, DELAYED RELEASE ORAL at 22:20

## 2017-01-01 RX ADMIN — ATORVASTATIN CALCIUM 40 MG: 40 TABLET, FILM COATED ORAL at 21:18

## 2017-01-01 RX ADMIN — CARVEDILOL 12.5 MG: 12.5 TABLET, FILM COATED ORAL at 08:28

## 2017-01-01 RX ADMIN — HYDRALAZINE HYDROCHLORIDE 25 MG: 25 TABLET ORAL at 13:51

## 2017-01-01 RX ADMIN — INSULIN ASPART 1 UNITS: 100 INJECTION, SOLUTION INTRAVENOUS; SUBCUTANEOUS at 18:37

## 2017-01-01 RX ADMIN — PANTOPRAZOLE SODIUM 40 MG: 40 TABLET, DELAYED RELEASE ORAL at 08:53

## 2017-01-01 RX ADMIN — LEVETIRACETAM 500 MG: 100 SOLUTION ORAL at 09:10

## 2017-01-01 RX ADMIN — HYDRALAZINE HYDROCHLORIDE 25 MG: 25 TABLET ORAL at 21:30

## 2017-01-01 RX ADMIN — ATORVASTATIN CALCIUM 40 MG: 40 TABLET, FILM COATED ORAL at 08:30

## 2017-01-01 RX ADMIN — HYDRALAZINE HYDROCHLORIDE 50 MG: 25 TABLET ORAL at 08:25

## 2017-01-01 RX ADMIN — LEVETIRACETAM 500 MG: 100 SOLUTION ORAL at 09:31

## 2017-01-01 RX ADMIN — HYDRALAZINE HYDROCHLORIDE 50 MG: 25 TABLET ORAL at 09:16

## 2017-01-01 RX ADMIN — MUPIROCIN: 20 OINTMENT TOPICAL at 08:28

## 2017-01-01 RX ADMIN — CARVEDILOL 12.5 MG: 12.5 TABLET, FILM COATED ORAL at 09:31

## 2017-01-01 RX ADMIN — ATORVASTATIN CALCIUM 40 MG: 40 TABLET, FILM COATED ORAL at 08:56

## 2017-01-01 RX ADMIN — ISOSORBIDE MONONITRATE 90 MG: 60 TABLET, EXTENDED RELEASE ORAL at 10:28

## 2017-01-01 RX ADMIN — AMLODIPINE BESYLATE 10 MG: 10 TABLET ORAL at 10:50

## 2017-01-01 RX ADMIN — OXCARBAZEPINE 150 MG: 150 TABLET ORAL at 21:02

## 2017-01-01 RX ADMIN — AMLODIPINE BESYLATE 10 MG: 10 TABLET ORAL at 08:52

## 2017-01-01 RX ADMIN — CALCIUM CARBONATE-VITAMIN D TAB 500 MG-200 UNIT 1 TABLET: 500-200 TAB at 08:32

## 2017-01-01 RX ADMIN — ISOSORBIDE MONONITRATE 90 MG: 60 TABLET, EXTENDED RELEASE ORAL at 08:45

## 2017-01-01 RX ADMIN — HYDRALAZINE HYDROCHLORIDE 50 MG: 25 TABLET ORAL at 08:23

## 2017-01-01 RX ADMIN — LEVETIRACETAM 1000 MG: 500 TABLET ORAL at 09:00

## 2017-01-01 RX ADMIN — OXCARBAZEPINE 150 MG: 150 TABLET ORAL at 20:49

## 2017-01-01 RX ADMIN — INSULIN ASPART 2 UNITS: 100 INJECTION, SOLUTION INTRAVENOUS; SUBCUTANEOUS at 12:16

## 2017-01-01 RX ADMIN — CARVEDILOL 12.5 MG: 12.5 TABLET, FILM COATED ORAL at 11:08

## 2017-01-01 RX ADMIN — HYDRALAZINE HYDROCHLORIDE 25 MG: 25 TABLET ORAL at 06:17

## 2017-01-01 RX ADMIN — LEVETIRACETAM 500 MG: 100 SOLUTION ORAL at 08:51

## 2017-01-01 RX ADMIN — OXCARBAZEPINE 150 MG: 150 TABLET ORAL at 21:14

## 2017-01-01 RX ADMIN — ACETAMINOPHEN 650 MG: 325 TABLET, FILM COATED ORAL at 16:48

## 2017-01-01 RX ADMIN — ATORVASTATIN CALCIUM 40 MG: 40 TABLET, FILM COATED ORAL at 10:50

## 2017-01-01 RX ADMIN — HYDRALAZINE HYDROCHLORIDE 50 MG: 25 TABLET ORAL at 08:53

## 2017-01-01 RX ADMIN — POTASSIUM CHLORIDE 10 MEQ: 14.9 INJECTION, SOLUTION, CONCENTRATE PARENTERAL at 06:08

## 2017-01-01 RX ADMIN — OXCARBAZEPINE 150 MG: 150 TABLET ORAL at 09:31

## 2017-01-01 RX ADMIN — PANTOPRAZOLE SODIUM 40 MG: 40 TABLET, DELAYED RELEASE ORAL at 20:49

## 2017-01-01 RX ADMIN — MUPIROCIN: 20 OINTMENT TOPICAL at 08:54

## 2017-01-01 RX ADMIN — SPIRONOLACTONE 50 MG: 25 TABLET ORAL at 09:16

## 2017-01-01 RX ADMIN — INSULIN ASPART 1 UNITS: 100 INJECTION, SOLUTION INTRAVENOUS; SUBCUTANEOUS at 08:29

## 2017-01-01 RX ADMIN — CARVEDILOL 12.5 MG: 12.5 TABLET, FILM COATED ORAL at 10:24

## 2017-01-01 RX ADMIN — ISOSORBIDE MONONITRATE 120 MG: 60 TABLET, EXTENDED RELEASE ORAL at 10:51

## 2017-01-01 RX ADMIN — TORSEMIDE 20 MG: 20 TABLET ORAL at 10:23

## 2017-01-01 RX ADMIN — CARVEDILOL 12.5 MG: 12.5 TABLET, FILM COATED ORAL at 09:19

## 2017-01-01 RX ADMIN — AMLODIPINE BESYLATE 10 MG: 10 TABLET ORAL at 08:28

## 2017-01-01 RX ADMIN — LEVETIRACETAM 500 MG: 500 TABLET, FILM COATED ORAL at 08:19

## 2017-01-01 RX ADMIN — SPIRONOLACTONE 50 MG: 25 TABLET ORAL at 08:31

## 2017-01-01 RX ADMIN — OXCARBAZEPINE 150 MG: 150 TABLET ORAL at 21:30

## 2017-01-01 RX ADMIN — INSULIN ASPART 1 UNITS: 100 INJECTION, SOLUTION INTRAVENOUS; SUBCUTANEOUS at 16:24

## 2017-01-01 RX ADMIN — MAGNESIUM SULFATE IN WATER 4 G: 40 INJECTION, SOLUTION INTRAVENOUS at 12:11

## 2017-01-01 RX ADMIN — HYDRALAZINE HYDROCHLORIDE 25 MG: 25 TABLET ORAL at 21:13

## 2017-01-01 RX ADMIN — INSULIN ASPART 1 UNITS: 100 INJECTION, SOLUTION INTRAVENOUS; SUBCUTANEOUS at 04:17

## 2017-01-01 RX ADMIN — AMLODIPINE BESYLATE 10 MG: 10 TABLET ORAL at 08:31

## 2017-01-01 RX ADMIN — ATORVASTATIN CALCIUM 40 MG: 40 TABLET, FILM COATED ORAL at 08:33

## 2017-01-01 RX ADMIN — CARVEDILOL 12.5 MG: 12.5 TABLET, FILM COATED ORAL at 08:54

## 2017-01-01 RX ADMIN — TORSEMIDE 10 MG: 10 TABLET ORAL at 08:53

## 2017-01-01 RX ADMIN — ATORVASTATIN CALCIUM 40 MG: 40 TABLET, FILM COATED ORAL at 22:20

## 2017-01-01 RX ADMIN — TORSEMIDE 10 MG: 10 TABLET ORAL at 08:28

## 2017-01-01 RX ADMIN — INSULIN ASPART 2 UNITS: 100 INJECTION, SOLUTION INTRAVENOUS; SUBCUTANEOUS at 09:27

## 2017-01-01 RX ADMIN — CARVEDILOL 12.5 MG: 12.5 TABLET, FILM COATED ORAL at 08:45

## 2017-01-01 RX ADMIN — AMLODIPINE BESYLATE 10 MG: 10 TABLET ORAL at 08:24

## 2017-01-01 RX ADMIN — OXCARBAZEPINE 150 MG: 150 TABLET ORAL at 20:46

## 2017-01-01 RX ADMIN — OXCARBAZEPINE 150 MG: 150 TABLET ORAL at 10:24

## 2017-01-01 RX ADMIN — OXCARBAZEPINE 150 MG: 150 TABLET ORAL at 20:19

## 2017-01-01 RX ADMIN — SPIRONOLACTONE 50 MG: 25 TABLET ORAL at 12:01

## 2017-01-01 RX ADMIN — LEVETIRACETAM 500 MG: 100 SOLUTION ORAL at 10:20

## 2017-01-01 RX ADMIN — OXCARBAZEPINE 150 MG: 150 TABLET ORAL at 09:16

## 2017-01-01 RX ADMIN — PANTOPRAZOLE SODIUM 40 MG: 40 TABLET, DELAYED RELEASE ORAL at 21:30

## 2017-01-01 RX ADMIN — SPIRONOLACTONE 50 MG: 25 TABLET ORAL at 10:51

## 2017-01-01 RX ADMIN — INSULIN ASPART 1 UNITS: 100 INJECTION, SOLUTION INTRAVENOUS; SUBCUTANEOUS at 13:39

## 2017-01-01 RX ADMIN — HYDRALAZINE HYDROCHLORIDE 50 MG: 25 TABLET ORAL at 12:01

## 2017-01-01 RX ADMIN — HYDRALAZINE HYDROCHLORIDE 50 MG: 25 TABLET ORAL at 19:34

## 2017-01-01 RX ADMIN — OXCARBAZEPINE 150 MG: 150 TABLET ORAL at 09:19

## 2017-01-01 RX ADMIN — MUPIROCIN: 20 OINTMENT TOPICAL at 10:49

## 2017-01-01 RX ADMIN — MUPIROCIN: 20 OINTMENT TOPICAL at 09:24

## 2017-01-01 RX ADMIN — PANTOPRAZOLE SODIUM 40 MG: 40 TABLET, DELAYED RELEASE ORAL at 08:32

## 2017-01-01 RX ADMIN — INSULIN ASPART 2 UNITS: 100 INJECTION, SOLUTION INTRAVENOUS; SUBCUTANEOUS at 14:43

## 2017-01-01 RX ADMIN — LEVETIRACETAM 500 MG: 100 SOLUTION ORAL at 09:18

## 2017-01-01 RX ADMIN — CLOPIDOGREL 75 MG: 75 TABLET, FILM COATED ORAL at 11:08

## 2017-01-01 RX ADMIN — LEVETIRACETAM 500 MG: 100 SOLUTION ORAL at 20:21

## 2017-01-01 RX ADMIN — ACETAMINOPHEN 650 MG: 325 SOLUTION ORAL at 03:48

## 2017-01-01 RX ADMIN — OXCARBAZEPINE 150 MG: 150 TABLET ORAL at 08:30

## 2017-01-01 RX ADMIN — INSULIN ASPART 1 UNITS: 100 INJECTION, SOLUTION INTRAVENOUS; SUBCUTANEOUS at 12:13

## 2017-01-01 RX ADMIN — CALCIUM CARBONATE-VITAMIN D TAB 500 MG-200 UNIT 1 TABLET: 500-200 TAB at 08:26

## 2017-01-01 RX ADMIN — OXCARBAZEPINE 150 MG: 150 TABLET ORAL at 22:37

## 2017-01-01 RX ADMIN — TORSEMIDE 20 MG: 20 TABLET ORAL at 11:11

## 2017-01-01 RX ADMIN — HYDRALAZINE HYDROCHLORIDE 25 MG: 25 TABLET ORAL at 22:19

## 2017-01-01 RX ADMIN — ACETAMINOPHEN 650 MG: 325 TABLET, FILM COATED ORAL at 01:04

## 2017-01-01 RX ADMIN — HYDRALAZINE HYDROCHLORIDE 50 MG: 25 TABLET ORAL at 08:31

## 2017-01-01 RX ADMIN — ACETAMINOPHEN 650 MG: 325 TABLET, FILM COATED ORAL at 09:33

## 2017-01-01 RX ADMIN — ISOSORBIDE MONONITRATE 120 MG: 60 TABLET, EXTENDED RELEASE ORAL at 08:29

## 2017-01-01 RX ADMIN — CALCIUM CARBONATE-VITAMIN D TAB 500 MG-200 UNIT 1 TABLET: 500-200 TAB at 08:53

## 2017-01-01 RX ADMIN — AMLODIPINE BESYLATE 10 MG: 10 TABLET ORAL at 08:20

## 2017-01-01 RX ADMIN — LEVETIRACETAM 500 MG: 100 SOLUTION ORAL at 21:02

## 2017-01-01 RX ADMIN — ISOSORBIDE MONONITRATE 120 MG: 60 TABLET, EXTENDED RELEASE ORAL at 11:08

## 2017-01-01 RX ADMIN — TORSEMIDE 20 MG: 20 TABLET ORAL at 09:31

## 2017-01-01 RX ADMIN — LEVETIRACETAM 500 MG: 100 SOLUTION ORAL at 01:47

## 2017-01-01 RX ADMIN — PANTOPRAZOLE SODIUM 40 MG: 40 TABLET, DELAYED RELEASE ORAL at 20:04

## 2017-01-01 RX ADMIN — HYDRALAZINE HYDROCHLORIDE 50 MG: 25 TABLET ORAL at 20:46

## 2017-01-01 RX ADMIN — PANTOPRAZOLE SODIUM 40 MG: 40 TABLET, DELAYED RELEASE ORAL at 08:24

## 2017-01-01 RX ADMIN — OXCARBAZEPINE 150 MG: 150 TABLET ORAL at 18:45

## 2017-01-01 RX ADMIN — INSULIN ASPART 1 UNITS: 100 INJECTION, SOLUTION INTRAVENOUS; SUBCUTANEOUS at 08:31

## 2017-01-01 RX ADMIN — INSULIN ASPART 2 UNITS: 100 INJECTION, SOLUTION INTRAVENOUS; SUBCUTANEOUS at 17:50

## 2017-01-01 RX ADMIN — TORSEMIDE 10 MG: 10 TABLET ORAL at 10:51

## 2017-01-01 RX ADMIN — ISOSORBIDE MONONITRATE 120 MG: 60 TABLET, EXTENDED RELEASE ORAL at 11:59

## 2017-01-01 RX ADMIN — CLOPIDOGREL 75 MG: 75 TABLET, FILM COATED ORAL at 08:20

## 2017-01-01 RX ADMIN — HYDRALAZINE HYDROCHLORIDE 50 MG: 25 TABLET ORAL at 21:14

## 2017-01-01 RX ADMIN — SODIUM CHLORIDE, POTASSIUM CHLORIDE, SODIUM LACTATE AND CALCIUM CHLORIDE: 600; 310; 30; 20 INJECTION, SOLUTION INTRAVENOUS at 03:56

## 2017-01-01 RX ADMIN — PANTOPRAZOLE SODIUM 40 MG: 40 TABLET, DELAYED RELEASE ORAL at 09:17

## 2017-01-01 RX ADMIN — CALCIUM CARBONATE-VITAMIN D TAB 500 MG-200 UNIT 1 TABLET: 500-200 TAB at 09:17

## 2017-01-01 RX ADMIN — Medication 2 G: at 14:01

## 2017-01-01 RX ADMIN — CARVEDILOL 12.5 MG: 12.5 TABLET, FILM COATED ORAL at 08:20

## 2017-01-01 RX ADMIN — ISOSORBIDE MONONITRATE 90 MG: 60 TABLET, EXTENDED RELEASE ORAL at 09:31

## 2017-01-01 RX ADMIN — ACETAMINOPHEN 650 MG: 325 TABLET, FILM COATED ORAL at 11:06

## 2017-01-01 RX ADMIN — HYDRALAZINE HYDROCHLORIDE 25 MG: 25 TABLET ORAL at 15:05

## 2017-01-01 RX ADMIN — POTASSIUM CHLORIDE 20 MEQ: 10 TABLET, EXTENDED RELEASE ORAL at 05:58

## 2017-01-01 RX ADMIN — HYDRALAZINE HYDROCHLORIDE 25 MG: 25 TABLET ORAL at 21:32

## 2017-01-01 RX ADMIN — TORSEMIDE 20 MG: 20 TABLET ORAL at 08:44

## 2017-01-01 RX ADMIN — CLOPIDOGREL 75 MG: 75 TABLET, FILM COATED ORAL at 09:02

## 2017-01-01 RX ADMIN — PANTOPRAZOLE SODIUM 40 MG: 40 TABLET, DELAYED RELEASE ORAL at 10:50

## 2017-01-01 RX ADMIN — ISOSORBIDE MONONITRATE 120 MG: 60 TABLET, EXTENDED RELEASE ORAL at 09:16

## 2017-01-01 RX ADMIN — OXCARBAZEPINE 150 MG: 150 TABLET ORAL at 09:02

## 2017-01-01 RX ADMIN — LEVETIRACETAM 500 MG: 500 TABLET ORAL at 21:14

## 2017-01-01 RX ADMIN — ATORVASTATIN CALCIUM 40 MG: 40 TABLET, FILM COATED ORAL at 22:19

## 2017-01-01 RX ADMIN — AMLODIPINE BESYLATE 10 MG: 10 TABLET ORAL at 09:16

## 2017-01-01 RX ADMIN — OXCARBAZEPINE 150 MG: 150 TABLET ORAL at 11:08

## 2017-01-01 RX ADMIN — OXCARBAZEPINE 150 MG: 150 TABLET ORAL at 08:45

## 2017-01-01 RX ADMIN — CALCIUM CARBONATE-VITAMIN D TAB 500 MG-200 UNIT 1 TABLET: 500-200 TAB at 08:30

## 2017-01-01 RX ADMIN — LEVETIRACETAM 1000 MG: 500 TABLET ORAL at 19:34

## 2017-01-01 RX ADMIN — HYDRALAZINE HYDROCHLORIDE 25 MG: 25 TABLET ORAL at 05:39

## 2017-01-01 RX ADMIN — CALCIUM CARBONATE-VITAMIN D TAB 500 MG-200 UNIT 1 TABLET: 500-200 TAB at 12:02

## 2017-01-01 RX ADMIN — ACETAMINOPHEN 650 MG: 325 TABLET, FILM COATED ORAL at 11:34

## 2017-01-01 RX ADMIN — PANTOPRAZOLE SODIUM 40 MG: 40 TABLET, DELAYED RELEASE ORAL at 20:19

## 2017-01-01 ASSESSMENT — VISUAL ACUITY
OU: NORMAL ACUITY

## 2017-01-01 ASSESSMENT — ENCOUNTER SYMPTOMS
RESPIRATORY NEGATIVE: 1
NECK PAIN: 0
NEUROLOGICAL NEGATIVE: 1
HEADACHES: 1
FEVER: 0
ENDOCRINE NEGATIVE: 1
WOUND: 1
WEAKNESS: 1
CONSTITUTIONAL NEGATIVE: 1
MYALGIAS: 1
PSYCHIATRIC NEGATIVE: 1
GASTROINTESTINAL NEGATIVE: 1
SPEECH DIFFICULTY: 1
ABDOMINAL PAIN: 0
ALLERGIC/IMMUNOLOGIC NEGATIVE: 1
HEMATOLOGIC/LYMPHATIC NEGATIVE: 1
CARDIOVASCULAR NEGATIVE: 1
FEVER: 0
SHORTNESS OF BREATH: 0
EYES NEGATIVE: 1
WOUND: 1
ARTHRALGIAS: 1
WOUND: 1

## 2017-01-01 ASSESSMENT — PAIN SCALES - GENERAL
PAINLEVEL: NO PAIN (0)
PAINLEVEL: NO PAIN (0)

## 2017-01-01 ASSESSMENT — PAIN DESCRIPTION - DESCRIPTORS: DESCRIPTORS: DULL;DISCOMFORT

## 2017-01-12 ENCOUNTER — TELEPHONE (OUTPATIENT)
Dept: CARDIOLOGY | Facility: CLINIC | Age: 82
End: 2017-01-12

## 2017-01-12 DIAGNOSIS — I50.9 CHF (CONGESTIVE HEART FAILURE) (H): Primary | ICD-10-CM

## 2017-01-12 NOTE — TELEPHONE ENCOUNTER
Received call from pt, he had been doing well, and then the last week he developed a terrible cough. Pt states it is dry, once he starts coughing, he can't stop, then he gets SOB and then all of a sudden it goes away. Pt has tried cough drops, but that does not seem to help.Pt does weigh daily, wt today 196#, up about 2-3# in the last few weeks or so. Pt states he has very little swelling in ankles, no change from usual. He gets a little SOB if sitting and moves quick, but then goes away. Pt denies SOB at night. Pt reports some wheezing at times before bed when getting ready/changing/brushing teeth/etc. Pt recently having trouble sleeping, he wakes at 2-3 am and then can't get back to sleep. He does not wake up from SOB or pain, he just wakes up and can't fall back to sleep. Denies chest pain. Denies fever or chills. Per last visit with Jean Marie 11/2016, dry weight around 185-190#. Pt taking torsemide 10mg daily and spironolactone 25mg daily. Pt not due for follow up until 5/2017. Pt wondering if he should see Jean Marie with labs soon. Not due for f/u with PMD until February, actually has f/u scheduled for 2/6. Will message Jean Marie for review. MIMI Montenegro

## 2017-01-12 NOTE — TELEPHONE ENCOUNTER
This sounds more like a uri but he can do a trial of torsemide 20 mg daily for 3 days then back to 10 mg daily.  No add'l potassium necessary.  If sx do not improve, he should see pcp for uri.    Eirca Beach PA-C 1/12/2017 3:09 PM

## 2017-01-16 NOTE — TELEPHONE ENCOUNTER
"Called pt for update, he states cough is \"fading away\" and improving. He went back to torsemide 10mg daily today. Wt remained about the same, he states been fluctuating about 196-198# up and down. Told pt if cough starts to come back, or if SOB, swelling, wt gain of 2# in a day or 5# in a week, then he needs to call back. Pt agreed with plan. MIMI Montenegro  "

## 2017-01-18 DIAGNOSIS — E11.22 TYPE 2 DIABETES MELLITUS WITH STAGE 3 CHRONIC KIDNEY DISEASE, WITH LONG-TERM CURRENT USE OF INSULIN (H): Primary | ICD-10-CM

## 2017-01-18 DIAGNOSIS — Z79.4 TYPE 2 DIABETES MELLITUS WITH STAGE 3 CHRONIC KIDNEY DISEASE, WITH LONG-TERM CURRENT USE OF INSULIN (H): Primary | ICD-10-CM

## 2017-01-18 DIAGNOSIS — N18.30 TYPE 2 DIABETES MELLITUS WITH STAGE 3 CHRONIC KIDNEY DISEASE, WITH LONG-TERM CURRENT USE OF INSULIN (H): Primary | ICD-10-CM

## 2017-01-18 RX ORDER — BLOOD SUGAR DIAGNOSTIC
STRIP MISCELLANEOUS
Qty: 300 STRIP | Refills: 1 | Status: SHIPPED | OUTPATIENT
Start: 2017-01-18 | End: 2017-01-01

## 2017-01-18 NOTE — TELEPHONE ENCOUNTER
ACCU-CHEK MARY PLUS STRIPS 100'S         Last Written Prescription Date: ?  Last Fill Quantity: ?, # refills: ?  Last Office Visit with [facility], P or  Health prescribing provider:  11/30/2016   Next 5 appointments (look out 90 days)     Feb 06, 2017  9:30 AM   [facility]    [address] [facility] [id number] [phone]                   BP Readings from Last 3 Encounters:   12/05/16 124/66   11/30/16 132/75   11/21/16 114/62     No results found for this basename: microl  No results found for this basename: microalbumin  CREATININE   Date Value Ref Range Status   12/05/2016 1.40* 0.70 - 1.30 mg/dL Final   ]  GFR ESTIMATE   Date Value Ref Range Status   12/05/2016 49* >60 mL/min/1.7m2 Final   11/21/2016 44* >60 mL/min/1.7m2 Final   10/31/2016 49* >60 mL/min/1.7m2 Final     GFR ESTIMATE IF BLACK   Date Value Ref Range Status   12/05/2016 59* >60 mL/min/1.7m2 Final   11/21/2016 54* >60 mL/min/1.7m2 Final   10/31/2016 59* >60 mL/min/1.7m2 Final     CHOL      139   9/6/2016  HDL       52   9/6/2016  LDL       62   9/6/2016  TRIG      123   9/6/2016  CHOLHDLRATIO      3.5   5/9/2013  AST       15   9/19/2016  ALT       20   9/19/2016  A1C      7.1   8/11/2016  A1C      7.5   4/6/2016  A1C      8.3   12/18/2015  A1C      7.9   6/24/2015  A1C      7.9   12/16/2014  POTASSIUM   Date Value Ref Range Status   12/05/2016 4.0 3.5 - 5.1 mmol/L Final

## 2017-01-30 PROBLEM — I50.22 CHRONIC SYSTOLIC HEART FAILURE (H): Status: ACTIVE | Noted: 2017-01-01

## 2017-01-30 NOTE — MR AVS SNAPSHOT
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 Medication changes:  Increase torsemide to 20 mg twice a day.  Start potassium 20 mEq twice a day.  Continue other medications.      2. Weigh yourself daily and write it down.     3. Call CORE nurse if your weight is up more than 2 pounds in one day, or 5 pounds in one week.    4. Call CORE nurse if you feel more short of breath, have more abdominal bloating or leg swelling.    5. Continue low sodium diet (less than 2000mg daily). If you eat less salt, you will retain less fluid.     6. Lab results:   Component      Latest Ref Rng 12/5/2016 1/30/2017   Sodium      136 - 145 mmol/L 141 140   Potassium      3.5 - 5.1 mmol/L 4.0 3.8   Chloride      98 - 107 mmol/L 106 104   Carbon Dioxide      23 - 29 mmol/L 25 25   Anion Gap      6 - 17 mmol/L 14 14.8   Glucose      70 - 105 mg/dL 139 (H) 209 (H)   Urea Nitrogen      7 - 30 mg/dL 22 26   Creatinine      0.70 - 1.30 mg/dL 1.40 (H) 1.49 (H)   GFR Estimate      >60 mL/min/1.7m2 49 (L) 45 (L)   GFR Estimate If Black      >60 mL/min/1.7m2 59 (L) 55 (L)   Calcium      8.5 - 10.5 mg/dL 9.0 9.5       **Do NOT take Aleve or Ibuprofen without checking with your doctor first    [facility]: Cardiomyopathy, Optimization, Rehabilitation, Education   The [facility] is a heart failure specialty clinic within the [facility]  [place] [facility] where you will work with specialized nurse practioners, physician assistants, doctors and registered nurses. They are dedicated to helping patients with heart failure to carefully adjust medications, receive education, and learn who and when to call if symptoms develop. They specialize in helping you better understand your condition, slow the progression of your disease, improve the length and quality of your life, help you detect future heart problems before they become life threatening, and avoid hospitalizations.              Follow-ups after your visit        Additional Services     Follow-Up with [facility] Clinic - ALVARO visit                 Your next 10 appointments already scheduled     Feb 06, 2017  9:30 AM   Office Visit with Etienne Gee MD   [facility]    [address] [facility] [address] [phone]           Bring a current list of meds and any records pertaining to this visit.  For Physicals, please bring immunization records and any forms needing to be filled out.  Please arrive 10 minutes early to complete paperwork.              Future tests that were ordered for you today     Open Future Orders        Priority Expected Expires Ordered    Basic metabolic panel Routine 2/6/2017 1/30/2018 1/30/2017    Follow-Up with [facility] Clinic - ALVARO visit Routine 2/6/2017 1/30/2018 1/30/2017            Who to contact     If you have questions or need follow up information about today's clinic visit or your schedule please contact [facility] AT [place] directly at [phone].  Normal or non-critical lab and imaging results will be communicated to you by MyChart, letter or phone within 4 business days after the clinic has received the results. If you do not hear from us within 7 days, please contact the clinic through MyChart or phone. If you have a critical or abnormal lab result, we will notify you by  "phone as soon as possible.  Submit refill requests through [employer] or call your pharmacy and they will forward the refill request to us. Please allow 3 business days for your refill to be completed.          Additional Information About Your Visit        [employer] Information     [employer] gives you secure access to your electronic health record. If you see a primary care provider, you can also send messages to your care team and make appointments. If you have questions, please call your primary care clinic.  If you do not have a primary care provider, please call [phone] and they will assist you.        Care EveryWhere ID     This is your Care EveryWhere ID. This could be used by other organizations to access your [place] medical records  [id number]        Your Vitals Were     Pulse Height BMI (Body Mass Index) Pulse Oximetry          70 1.88 m (6' 2\") 26.90 kg/m2 98%         Blood Pressure from Last 3 Encounters:   01/30/17 130/76   12/05/16 124/66   11/30/16 132/75    Weight from Last 3 Encounters:   01/30/17 95.074 kg (209 lb 9.6 oz)   12/20/16 88.905 kg (196 lb)   12/05/16 88.451 kg (195 lb)                 Today's Medication Changes          These changes are accurate as of: 1/30/17  3:11 PM.  If you have any questions, ask your nurse or doctor.               Start taking these medicines.        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  Ask your nurse if you have questions.     You don't need a prescription for these medications    - potassium chloride SA 10 MEQ CR tablet  - torsemide 10 MG tablet             Primary Care Provider Office Phone # Fax #    Etienne Gee [phone]       [facility] [address] [facility] [address]        Goals        Exercise    I will exercise 2x per week (15 min per time) , for the next 6 weeks.     Goal Comments - Note created  12/22/2016 12:14 PM by Ting Bullard, RN    As of today's date 12/22/2016 goal is met at 0 - 25%.   Goal Status:  Active           Patient-Stated    I will not add any salt to my food and will limit my salt intake to 2,000 mg of sodium per day.     Goal Comments - Note created  9/26/2016  3:53 PM by Ting Bullard, RN    As of today's date 9/26/2016 goal is met at 26 - 50%.   Goal Status:  Active        I will weigh myself every day and call my clinic if I am more than 2 pounds heavier in a day or 5 pounds heavier in a week.      Goal Comments - Note created  9/26/2016  3:52 PM by Ting Bullard RN    As of today's date 9/26/2016 goal is met at 26 - 50%.   Goal Status:  Active          Thank you!     Thank you for choosing [facility] AT [place]  for your care. Our goal is always to provide you with excellent care. Hearing back from our patients is one way we can continue to improve our services. Please take a few minutes to complete the written survey that you may receive in the mail after your visit with us. Thank you!             Your Updated Medication List - Protect others around you: Learn how to safely use, store and throw away your medicines  at www.disposemymeds.org.          This list is accurate as of: 1/30/17  3:11 PM.  Always use your most recent med list.                   Brand Name Dispense Instructions for use    ACCU-CHEK MARY PLUS test strip   Generic drug:  blood glucose monitoring     300 strip    TEST THREE TIMES DAILY       amLODIPine 10 MG tablet    NORVASC    90 tablet    Take 1 tablet (10 mg) by mouth daily       B-D U/F 31G X 8 MM   Generic drug:  insulin pen needle          CALTRATE 600+D 600-800 MG-UNIT Tabs   Generic drug:  Calcium Carb-Cholecalciferol      Take 1 tablet by mouth daily.       carvedilol 25 MG tablet    COREG    180 tablet    Take 1 tablet (25 mg) by mouth 2 times daily (with meals)       CENTRUM SILVER per tablet      Take 1 tablet by mouth daily.       clopidogrel 75 MG tablet    PLAVIX    90 tablet    Take 1 tablet (75 mg) by mouth daily       hydrALAZINE 50 MG tablet    APRESOLINE    270 tablet    Take 1 tablet (50 mg) by mouth 3 times daily       insulin glargine 100 UNIT/ML injection    LANTUS    36 mL    Inject 35-40 Units Subcutaneous At Bedtime       isosorbide mononitrate 60 MG 24 hr tablet    IMDUR    180 tablet    Take 2 tablets (120 mg) by mouth daily       LIPITOR 40 MG tablet   Generic drug:  atorvastatin     90 tablet    Take 1 tablet (40 mg) by mouth daily       pantoprazole 40 MG EC tablet    PROTONIX     Take 40 mg by mouth daily       potassium chloride SA 10 MEQ CR tablet    K-DUR/KLOR-CON M    90 tablet    Take 1 tablet (10 mEq) by mouth 2 times daily       spironolactone 25 MG tablet    ALDACTONE    90 tablet    Take 1 tablet (25 mg) by mouth daily       torsemide 10 MG tablet    DEMADEX    90 tablet    Take 2 tablets (20 mg) by mouth daily

## 2017-01-30 NOTE — PATIENT INSTRUCTIONS
Call CORE nurse for any questions or concerns:    Geeta Muñoz, or Venus: [phone]   If you have concerns after hours, please call [phone], option 2    1. Medication changes:  Increase torsemide to 20 mg twice a day.  Start potassium 20 mEq twice a day.  Continue other medications.      2. Weigh yourself daily and write it down.     3. Call CORE nurse if your weight is up more than 2 pounds in one day, or 5 pounds in one week.    4. Call CORE nurse if you feel more short of breath, have more abdominal bloating or leg swelling.    5. Continue low sodium diet (less than 2000mg daily). If you eat less salt, you will retain less fluid.     6. Lab results:   Component      Latest Ref Rng 12/5/2016 1/30/2017   Sodium      136 - 145 mmol/L 141 140   Potassium      3.5 - 5.1 mmol/L 4.0 3.8   Chloride      98 - 107 mmol/L 106 104   Carbon Dioxide      23 - 29 mmol/L 25 25   Anion Gap      6 - 17 mmol/L 14 14.8   Glucose      70 - 105 mg/dL 139 (H) 209 (H)   Urea Nitrogen      7 - 30 mg/dL 22 26   Creatinine      0.70 - 1.30 mg/dL 1.40 (H) 1.49 (H)   GFR Estimate      >60 mL/min/1.7m2 49 (L) 45 (L)   GFR Estimate If Black      >60 mL/min/1.7m2 59 (L) 55 (L)   Calcium      8.5 - 10.5 mg/dL 9.0 9.5       **Do NOT take Aleve or Ibuprofen without checking with your doctor first    [facility]: Cardiomyopathy, Optimization, Rehabilitation, Education   The [facility] is a heart failure specialty clinic within the [facility] where you will work with specialized nurse practioners, physician assistants, doctors and registered nurses. They are dedicated to helping patients with heart failure to carefully adjust medications, receive education, and learn who and when to call if symptoms develop. They specialize in helping you better understand your condition, slow the progression of your disease, improve the length and quality of your life, help you detect future heart problems before  they become life threatening, and avoid hospitalizations.

## 2017-01-30 NOTE — LETTER
1/30/2017    Etienne Gee MD  [facility]  [address] [place] [address]    RE: Kelechi Coleman       Dear Colleague,    I had the pleasure of seeing Kelechi Coleman in the [facility].    PRIMARY CARDIOLOGIST:  Dr. Charles.      REASON FOR VISIT:  Dyspnea on exertion and weight gain.      Mr. Coleman is a delightful 81-year-old gentleman with past cardiac history as follows:   1.  Coronary artery disease with history of CAB in 2002 with LIMA to LAD, saphenous vein graft to OM1, saphenous vein graft to the RPDA and RPLA sequentially with NSTEMI in 09/2016 where he underwent stenting to the proximal and distal portions of the vein graft to the RPDA and RPLA.  His graft to the OM1 was found to be occluded, but he had a patent LIMA to the LAD.   2.  Ischemic cardiomyopathy with EF about 40%-45% with associated chronic systolic heart failure.   3.  Hypertension.   4.  Hyperlipidemia.   5.  CVA post-MI when he presented in September with left arm and hand numbness.   6.  Mild to moderate MR and TR.      I met him after he presented with his NSTEMI this fall and was really having a difficult time with shortness of breath and readmissions for CVA and ultimately orthopnea, PND and progressive weight gain.  His weight went up to about 210, and we slowly diuresed him down to a weight of about 185.  He did well throughout the fall, and we eventually decreased his torsemide back to 10 mg daily with spironolactone at 25 mg daily.  He said he did well up until about the end of the year.  He then got a cold and noticed that his swelling was up, but his weight did not increase.  In fact, on 12/28 he had a documented weight at home at about 190.  Just in the last 3-4 days, he has developed worsening shortness of breath and has gained about 4-8 pounds.  This winter earlier in January he had a cough but no fevers and chills.  He was coughing all of the time, and this seems  to have resolved.  He also complains of hoarseness that comes and goes and raspiness of his voice.  He also seems to have worsening abdominal discomfort and passing gas.  He has not had samantha orthopnea or PND.  When he lies down, he feels well.  He does wake up frequently at night.  It is not necessarily due to difficulty breathing.  He also describes just overall weakness, particularly in his legs.  He denies samantha chest pain.  At the time of his presentation for visit NSTEMI in September, he had sudden onset shortness of breath.      SOCIAL HISTORY:  He is single.  Former smoker, quit in 1998.  Daily alcohol, no drug use.      PHYSICAL EXAMINATION:   GENERAL:  Well-developed, well-nourished pleasant gentleman in no acute distress.   HEENT:  Normocephalic, atraumatic.   HEART:  Regular, I do not appreciate murmur, rub or gallop.   LUNGS:  Markedly diminished in the left lower lobe to absent in the lower 1/4.  Right is clear in the upper lobes and middle lobes but diminished in the base.  I do not appreciate wheezes, rales or rhonchi.   EXTREMITIES:  With 2+ pitting edema to knee, trace edema in the thigh.   NECK:  Neck veins are about 3 cm above the sternal notch.   SKIN:  Warm and dry.      Outpatient Encounter Prescriptions as of 1/30/2017   Medication Sig Dispense Refill     [DISCONTINUED] torsemide (DEMADEX) 10 MG tablet Take 2 tablets (20 mg) by mouth daily 90 tablet 3     [DISCONTINUED] potassium chloride SA (K-DUR/KLOR-CON M) 10 MEQ CR tablet Take 1 tablet (10 mEq) by mouth 2 times daily 90 tablet      [DISCONTINUED] spironolactone (ALDACTONE) 25 MG tablet Take 1 tablet (25 mg) by mouth daily 90 tablet 3     insulin glargine (LANTUS) 100 UNIT/ML PEN Inject 35-40 Units Subcutaneous At Bedtime 36 mL 0     clopidogrel (PLAVIX) 75 MG tablet Take 1 tablet (75 mg) by mouth daily 90 tablet 3     carvedilol (COREG) 25 MG tablet Take 1 tablet (25 mg) by mouth 2 times daily (with meals) 180 tablet 3     hydrALAZINE  (APRESOLINE) 50 MG tablet Take 1 tablet (50 mg) by mouth 3 times daily 270 tablet 3     isosorbide mononitrate (IMDUR) 60 MG 24 hr tablet Take 2 tablets (120 mg) by mouth daily 180 tablet 3     amLODIPine (NORVASC) 10 MG tablet Take 1 tablet (10 mg) by mouth daily 90 tablet 4     atorvastatin (LIPITOR) 40 MG tablet Take 1 tablet (40 mg) by mouth daily 90 tablet 3     pantoprazole (PROTONIX) 40 MG enteric coated tablet Take 40 mg by mouth daily       Multiple Vitamins-Minerals (CENTRUM SILVER) per tablet Take 1 tablet by mouth daily.       Calcium Carb-Cholecalciferol (CALTRATE 600+D) 600-800 MG-UNIT TABS Take 1 tablet by mouth daily.       ACCU-CHEK MARY PLUS test strip TEST THREE TIMES DAILY 300 strip 1     B-D U/F 31G X 8 MM insulin pen needle        [DISCONTINUED] torsemide (DEMADEX) 10 MG tablet Take 1 tablet (10 mg) by mouth daily 90 tablet 3     No facility-administered encounter medications on file as of 1/30/2017.      ASSESSMENT AND PLAN:   1.  Recent weight gain and dyspnea on exertion and shortness of breath without orthopnea.  Based on his exam and knowing his history, his weight here today is 209, which is as high as it was this fall when he presented with heart failure.  I suspect the cause of his exacerbation is him developing upper respiratory infection versus slow gradual increase from us under-diuresing him due to changes this fall.  It is possible he also has a pleural effusion, as that is certainly what his exam appears to reveal.  At this point, we are going to increase his torsemide from 10 mg daily to 20 mg b.i.d.  I am going to have him start 20 mEq of potassium and continue his spironolactone at 25 mg daily.  We will see him back in a week with a BMP at that time.  I did ask him to call if his symptoms do not improve and his weight does not trend down; we may need to consider adding a 2.5 mg dose of metolazone.   2.  Hypertension, reasonable control.   3.  Coronary artery disease with  NSTEMI and drug-eluting stent to the vein graft to the right on Plavix.  Allergic to aspirin.   4.  Hyperlipidemia, excellent control.   5.  History of AFib at time of his CAB, none noted since then.  Clinically in sinus today.   6.  CVA with good recovery.   7.  Insomnia, per primary.      Thank you for allowing me to participate in this delightful patient's care.  Again, we will see him back in 1 week, sooner with concerns.  If he is not improving, we will consider echo at that time.     Sincerely,    Erica Beach PA-C     [facility]

## 2017-01-30 NOTE — TELEPHONE ENCOUNTER
"Pt called and states he is \"reaining liquid\". His wt 2 weeks ago was around 196-198#, then went up to 200# and was there all last week, and since Saturday has gained 2# every day. Wt this am is 204#, so wt actually up almost 8# in the last 2 weeks. He has increased swelling and feels a little more SOB than usual. Pt had called about 2 weeks ago with complaints of cough. Wt at that time was 196#. Pt normally takes torsemide 10mg daily. Torsemide increased to 20mg daily x 3 days per [facility]. Wt unchanged with extra torsemide. Cough he had a few weeks ago is gone however. Pt last saw Jean Marie 11/2016, at which time dry weight thought to be around 185-190#. Last BMP 12/5/16. Offered pt OV and labs this afternoon with Jean Marie, and he agreed. Scheduled pt for BMP and to see Jean Marie at 2:30pm today. Sent update to Jean Marie. MIMI Montenegro  "

## 2017-01-30 NOTE — PROGRESS NOTES
[id number]  HPI and Plan:   See dictation    Orders this Visit:  Orders Placed This Encounter   Procedures     Basic metabolic panel     Follow-Up with [facility] - [facility]O visit     Orders Placed This Encounter   Medications     torsemide (DEMADEX) 10 MG tablet     Sig: Take 2 tablets (20 mg) by mouth daily     Dispense:  90 tablet     Refill:  3     potassium chloride SA (K-DUR/KLOR-CON M) 10 MEQ CR tablet     Sig: Take 1 tablet (10 mEq) by mouth 2 times daily     Dispense:  90 tablet     Medications Discontinued During This Encounter   Medication Reason     torsemide (DEMADEX) 10 MG tablet Reorder         Encounter Diagnoses   Name Primary?     Chronic systolic heart failure (H) Yes     Acute on chronic combined systolic and diastolic congestive heart failure (H)        CURRENT MEDICATIONS:  Current Outpatient Prescriptions   Medication Sig Dispense Refill     torsemide (DEMADEX) 10 MG tablet Take 2 tablets (20 mg) by mouth daily 90 tablet 3     potassium chloride SA (K-DUR/KLOR-CON M) 10 MEQ CR tablet Take 1 tablet (10 mEq) by mouth 2 times daily 90 tablet      spironolactone (ALDACTONE) 25 MG tablet Take 1 tablet (25 mg) by mouth daily 90 tablet 3     insulin glargine (LANTUS) 100 UNIT/ML PEN Inject 35-40 Units Subcutaneous At Bedtime 36 mL 0     clopidogrel (PLAVIX) 75 MG tablet Take 1 tablet (75 mg) by mouth daily 90 tablet 3     carvedilol (COREG) 25 MG tablet Take 1 tablet (25 mg) by mouth 2 times daily (with meals) 180 tablet 3     hydrALAZINE (APRESOLINE) 50 MG tablet Take 1 tablet (50 mg) by mouth 3 times daily 270 tablet 3     isosorbide mononitrate (IMDUR) 60 MG 24 hr tablet Take 2 tablets (120 mg) by mouth daily 180 tablet 3     amLODIPine (NORVASC) 10 MG tablet Take 1 tablet (10 mg) by mouth daily 90 tablet 4     atorvastatin (LIPITOR) 40 MG tablet Take 1 tablet (40 mg) by mouth daily 90 tablet 3     pantoprazole (PROTONIX) 40 MG enteric coated tablet Take 40 mg by mouth daily       Multiple  Vitamins-Minerals (CENTRUM SILVER) per tablet Take 1 tablet by mouth daily.       Calcium Carb-Cholecalciferol (CALTRATE 600+D) 600-800 MG-UNIT TABS Take 1 tablet by mouth daily.       ACCU-CHEK MARY PLUS test strip TEST THREE TIMES DAILY 300 strip 1     B-D U/F 31G X 8 MM insulin pen needle        [DISCONTINUED] torsemide (DEMADEX) 10 MG tablet Take 1 tablet (10 mg) by mouth daily 90 tablet 3       ALLERGIES     Allergies   Allergen Reactions     Aspirin Hives     Penicillins Hives     Contrast Dye Hives       PAST MEDICAL HISTORY:  Past Medical History   Diagnosis Date     Coronary artery disease      Unspecified cerebral artery occlusion with cerebral infarction      Myocardial infarction (H)      PAD (peripheral artery disease) (H)      Hyperlipidaemia      Hypertension      Stented coronary artery      CABG 2002 4 VESSEL     Atrial fibrillation (H)      Diabetes (H)      Acute exacerbation of CHF (congestive heart failure) (H) 9/2/2016     Cerebral infarction (H) 12/13/2013, 9/2016      Diagnosis updated by automated process. Provider to review and confirm.     Type 2 diabetes mellitus with stage 3 chronic kidney disease, with long-term current use of insulin (H) 11/4/2016       PAST SURGICAL HISTORY:  Past Surgical History   Procedure Laterality Date     Coronary artery bypass  2002     LIMA-LAD, SVG-OM1, UAR-BBRC-JCIF     C mra upper extremity wo&w cont       Ent surgery       VOCAL CORD LESION REMOVED     Vascular surgery       ANGIOPLASTY LEFT LEG FOR pvd     Genitourinary surgery       ANGIOPLASTY FOR RENAL ARTERT STENOSIS     Phacoemulsification clear cornea with standard intraocular lens implant Left 1/22/2015     Procedure: PHACOEMULSIFICATION CLEAR CORNEA WITH STANDARD INTRAOCULAR LENS IMPLANT;  Surgeon: Bart Johnson MD;  Location: [facility]     Heart cath left heart cath  11/30/2001      left heart catheterization  9/7/2016 09/07/2016: CHUCHO x2 to distal and proximal SVG to to RPDA  "      FAMILY HISTORY:  Family History   Problem Relation Age of Onset     DIABETES Mother      CANCER Maternal Grandmother      Family History Negative Father        SOCIAL HISTORY:  Social History     Social History     Marital Status: Single     Spouse Name: N/A     Number of Children: N/A     Years of Education: N/A     Social History Main Topics     Smoking status: Former Smoker -- 40 years     Types: Pipe     Quit date: 12/18/1998     Smokeless tobacco: Never Used     Alcohol Use: 0.0 oz/week     0 Standard drinks or equivalent per week      Comment: 3 hard liquor drinks daily     Drug Use: No     Sexual Activity:     Partners: Female     Other Topics Concern     Parent/Sibling W/ Cabg, Mi Or Angioplasty Before 65f 55m? No     Caffeine Concern Yes     decaff only      Stress Concern No     Special Diet Yes     low sodium      Exercise Yes     walking, rehab 3 times a week      Seat Belt Yes     Social History Narrative       Review of Systems:  Skin:        Eyes:       ENT:  Positive for hoarseness  Respiratory:  Positive for dyspnea on exertion;wheezing  Cardiovascular:  Negative for;palpitations;chest pain Positive for;edema;fatigue;lightheadedness;dizziness  Gastroenterology: Positive for excessive gas or bloating  Genitourinary:  Negative    Musculoskeletal:  Negative    Neurologic:  Negative    Psychiatric:  Negative    Heme/Lymph/Imm:  Negative    Endocrine:  Negative      Physical Exam:  Vitals: /76 mmHg  Pulse 70  Ht 1.88 m (6' 2\")  Wt 95.074 kg (209 lb 9.6 oz)  BMI 26.90 kg/m2  SpO2 98%   Please refer to dictation for physical exam    Recent Lab Results:  LIPID RESULTS:  Lab Results   Component Value Date    CHOL 139 09/06/2016    HDL 52 09/06/2016    LDL 62 09/06/2016    TRIG 123 09/06/2016    CHOLHDLRATIO 3.5 05/09/2013       LIVER ENZYME RESULTS:  Lab Results   Component Value Date    AST 15 09/19/2016    ALT 20 09/19/2016       CBC RESULTS:  Lab Results   Component Value Date    WBC " 11.5* 09/19/2016    RBC 3.50* 09/19/2016    HGB 11.6* 10/26/2016    HCT 32.3* 09/19/2016    MCV 92 09/19/2016    MCH 30.6 09/19/2016    MCHC 33.1 09/19/2016    RDW 13.7 09/19/2016     09/19/2016       BMP RESULTS:  Lab Results   Component Value Date     01/30/2017    POTASSIUM 3.8 01/30/2017    CHLORIDE 104 01/30/2017    CO2 25 01/30/2017    ANIONGAP 14.8 01/30/2017    * 01/30/2017    BUN 26 01/30/2017    BUN 14.2 01/23/2013    CR 1.49* 01/30/2017    GFRESTIMATED 45* 01/30/2017    GFRESTBLACK 55* 01/30/2017    MIGUEL 9.5 01/30/2017        A1C RESULTS:  Lab Results   Component Value Date    A1C 7.1* 08/11/2016       INR RESULTS:  Lab Results   Component Value Date    INR 1.10 09/19/2016    INR 1.04 09/16/2016           CC  Etienne Gee MD  [facility]  [address] [place] [address]

## 2017-01-31 NOTE — PROGRESS NOTES
PRIMARY CARDIOLOGIST:  Dr. Charles.      REASON FOR VISIT:  Dyspnea on exertion and weight gain.      HISTORY OF PRESENT ILLNESS:  Mr. Coleman is a delightful 81-year-old gentleman with past cardiac history as follows:   1.  Coronary artery disease with history of CAB in 2002 with LIMA to LAD, saphenous vein graft to OM1, saphenous vein graft to the RPDA and RPLA sequentially with NSTEMI in 09/2016 where he underwent stenting to the proximal and distal portions of the vein graft to the RPDA and RPLA.  His graft to the OM1 was found to be occluded, but he had a patent LIMA to the LAD.   2.  Ischemic cardiomyopathy with EF about 40%-45% with associated chronic systolic heart failure.   3.  Hypertension.   4.  Hyperlipidemia.   5.  CVA post-MI when he presented in September with left arm and hand numbness.   6.  Mild to moderate MR and TR.      I met him after he presented with his NSTEMI this fall and was really having a difficult time with shortness of breath and readmissions for CVA and ultimately orthopnea, PND and progressive weight gain.  His weight went up to about 210, and we slowly diuresed him down to a weight of about 185.  He did well throughout the fall, and we eventually decreased his torsemide back to 10 mg daily with spironolactone at 25 mg daily.  He said he did well up until about the end of the year.  He then got a cold and noticed that his swelling was up, but his weight did not increase.  In fact, on 12/28 he had a documented weight at home at about 190.  Just in the last 3-4 days, he has developed worsening shortness of breath and has gained about 4-8 pounds.  This winter earlier in January he had a cough but no fevers and chills.  He was coughing all of the time, and this seems to have resolved.  He also complains of hoarseness that comes and goes and raspiness of his voice.  He also seems to have worsening abdominal discomfort and passing gas.  He has not had samantha orthopnea or PND.  When he lies  down, he feels well.  He does wake up frequently at night.  It is not necessarily due to difficulty breathing.  He also describes just overall weakness, particularly in his legs.  He denies samantha chest pain.  At the time of his presentation for visit NSTEMI in September, he had sudden onset shortness of breath.      SOCIAL HISTORY:  He is single.  Former smoker, quit in 1998.  Daily alcohol, no drug use.      PHYSICAL EXAMINATION:   GENERAL:  Well-developed, well-nourished pleasant gentleman in no acute distress.   HEENT:  Normocephalic, atraumatic.   HEART:  Regular, I do not appreciate murmur, rub or gallop.   LUNGS:  Markedly diminished in the left lower lobe to absent in the lower 1/4.  Right is clear in the upper lobes and middle lobes but diminished in the base.  I do not appreciate wheezes, rales or rhonchi.   EXTREMITIES:  With 2+ pitting edema to knee, trace edema in the thigh.   NECK:  Neck veins are about 3 cm above the sternal notch.   SKIN:  Warm and dry.      ASSESSMENT AND PLAN:   1.  Recent weight gain and dyspnea on exertion and shortness of breath without orthopnea.  Based on his exam and knowing his history, his weight here today is 209, which is as high as it was this fall when he presented with heart failure.  I suspect the cause of his exacerbation is him developing upper respiratory infection versus slow gradual increase from us under-diuresing him due to changes this fall.  It is possible he also has a pleural effusion, as that is certainly what his exam appears to reveal.  At this point, we are going to increase his torsemide from 10 mg daily to 20 mg b.i.d.  I am going to have him start 20 mEq of potassium and continue his spironolactone at 25 mg daily.  We will see him back in a week with a BMP at that time.  I did ask him to call if his symptoms do not improve and his weight does not trend down; we may need to consider adding a 2.5 mg dose of metolazone.   2.  Hypertension, reasonable  control.   3.  Coronary artery disease with NSTEMI and drug-eluting stent to the vein graft to the right on Plavix.  Allergic to aspirin.   4.  Hyperlipidemia, excellent control.   5.  History of AFib at time of his CAB, none noted since then.  Clinically in sinus today.   6.  CVA with good recovery.   7.  Insomnia, per primary.      Thank you for allowing me to participate in this delightful patient's care.  Again, we will see him back in 1 week, sooner with concerns.  If he is not improving, we will consider echo at that time.         JOANN EDGAR PA-C             D: 2017 15:25   T: 2017 22:04   MT: KARLEE      Name:     MARLA VÁZQUEZ   MRN:      [id number]        Account:      [id number]   :      1935           Service Date: 2017      Document: [id number]

## 2017-02-02 NOTE — PROGRESS NOTES
Clinic Care Coordination Contact  Care Team Conversations    Patient continues to be followed by [facility].  Will close to active clinic care coordination at this time.  Future referral may be made if need arises.    Will route to PCP as ELZBIETA Bullard RN  Clinic Care Coordinator  [facility]  [phone]

## 2017-02-06 NOTE — MR AVS SNAPSHOT
After Visit Summary   2/6/2017    Kelechi Coleman    MRN: [id number]           Patient Information     Date Of Birth          1935        Visit Information        Provider Department      2/6/2017 2:30 PM Baylee, Erica Ferreira PA-C [facility] [place]        Today's Diagnoses     Chronic systolic heart failure (H)    -  1     Acute on chronic combined systolic and diastolic congestive heart failure (H)         Type 2 diabetes mellitus with stage 3 chronic kidney disease, with long-term current use of insulin (H)         Chronic systolic congestive heart failure (H)           Care Instructions    Call [facility] Nurse for any questions or concerns:   Geeta Muñoz or Anne: [phone]   If you have concerns after hours, please call [phone], option 2    1. Medication changes: Continue torsemide at 20 mg once a day.   Increase spironolactone to 50 mg once a day.  This is 2 pills.   Decrease potassium to 10 mEq once a day.    Continue other medications.    2. Weigh yourself daily and write it down.     3. Call [facility] nurse if your weight is up more than 2 pounds in one day, or 5 pounds in one week.    4. Call [facility] nurse if you feel more short of breath, have more abdominal bloating or leg swelling.    5. Continue low sodium diet (less than 2000mg daily). If you eat less salt, you will retain less fluid.     6. Lab results:   Component      Latest Ref Rng 2/6/2017   Sodium      136 - 145 mmol/L 142   Potassium      3.5 - 5.1 mmol/L 3.8   Chloride      98 - 107 mmol/L 100   Carbon Dioxide      23 - 29 mmol/L 28   Anion Gap      6 - 17 mmol/L 17.8 (H)   Glucose      70 - 105 mg/dL 168 (H)   Urea Nitrogen      7 - 30 mg/dL 30   Creatinine      0.70 - 1.30 mg/dL 1.56 (H)   GFR Estimate      >60 mL/min/1.7m2 43 (L)   GFR Estimate If Black      >60 mL/min/1.7m2 52 (L)   Calcium      8.5 - 10.5 mg/dL 9.5       **Do NOT take Aleve or Ibuprofen without checking with  your doctor first        [facility]: Cardiomyopathy, Optimization, Rehabilitation, Education   The [facility] is a heart failure specialty clinic within the [facility] where you will work with specialized nurse practioners, physician assistants, doctors and registered nurses. They are dedicated to helping patients with heart failure to carefully adjust medications, receive education, and learn who and when to call if symptoms develop. They specialize in helping you better understand your condition, slow the progression of your disease, improve the length and quality of your life, help you detect future heart problems before they become life threatening, and avoid hospitalizations.          Follow-ups after your visit        Additional Services     Follow-Up with [facility] - ALVARO visit                 Your next 10 appointments already scheduled     May 08, 2017 10:30 AM   Office Visit with Et[facility] Gee MD   [facility]    [address] [address] [phone]           Bring a current list of meds and any records pertaining to this visit.  For Physicals, please bring immunization records and any forms needing to be filled out.  Please arrive 10 minutes early to complete paperwork.              Future tests that were ordered for you today     Open Future Orders        Priority Expected Expires Ordered    Follow-Up with [facility] - [facility] visit Routine 2/13/2017 2/6/2018 2/6/2017    Basic metabolic panel Routine 2/13/2017 2/6/2018 2/6/2017    Albumin Random Urine Quantitative Routine  2/6/2018 2/6/2017            Who to contact     If you have questions or need follow up information about today's clinic visit or your schedule please contact [facility] [place] directly at [phone].  Normal or non-critical lab and imaging results will be communicated to you by MyChart, letter or phone  "within 4 business days after the clinic has received the results. If you do not hear from us within 7 days, please contact the clinic through [employer] or phone. If you have a critical or abnormal lab result, we will notify you by phone as soon as possible.  Submit refill requests through [employer] or call your pharmacy and they will forward the refill request to us. Please allow 3 business days for your refill to be completed.          Additional Information About Your Visit        [employer] Information     [employer] gives you secure access to your electronic health record. If you see a primary care provider, you can also send messages to your care team and make appointments. If you have questions, please call your primary care clinic.  If you do not have a primary care provider, please call [phone] and they will assist you.        Care EveryWhere ID     This is your Care EveryWhere ID. This could be used by other organizations to access your [facility] medical records  [id number]        Your Vitals Were     Pulse Respirations Height BMI (Body Mass Index) Pulse Oximetry       75 16 1.88 m (6' 2\") 24.96 kg/m2 97%        Blood Pressure from Last 3 Encounters:   02/06/17 132/74   02/06/17 131/68   01/30/17 130/76    Weight from Last 3 Encounters:   02/06/17 88.225 kg (194 lb 8 oz)   02/06/17 87.907 kg (193 lb 12.8 oz)   01/30/17 95.074 kg (209 lb 9.6 oz)              We Performed the Following     Follow-Up with [facility] - [facility]VARO visit          Today's Medication Changes          These changes are accurate as of: 2/6/17  2:59 PM.  If you have any questions, ask your nurse or doctor.               These medicines have changed or have updated prescriptions.        Dose/Directions    potassium chloride SA 10 MEQ CR tablet   Commonly known as:  K-DUR/KLOR-CON M   This may have changed:  when to take this   Used for:  Acute on chronic combined systolic and diastolic congestive heart failure (H)   Changed by:  Erica Beach " ALIX Ferreira        Dose:  10 mEq   Take 1 tablet (10 mEq) by mouth daily   Quantity:  90 tablet   Refills:  3       spironolactone 25 MG tablet   Commonly known as:  ALDACTONE   This may have changed:  how much to take   Used for:  Chronic systolic congestive heart failure (H)   Changed by:  Erica Beach PA-C        Dose:  50 mg   Take 2 tablets (50 mg) by mouth daily   Quantity:  90 tablet   Refills:  3            Where to get your medicines      Some of these will need a paper prescription and others can be bought over the counter.  Ask your nurse if you have questions.     You don't need a prescription for these medications    - potassium chloride SA 10 MEQ CR tablet  - spironolactone 25 MG tablet             Primary Care Provider Office Phone # Fax #    Etienne Gee [phone]       [facility] [address]  [facility] [address]        Goals        Exercise    I will exercise 2x per week (15 min per time) , for the next 6 weeks.     Goal Comments - Note created  12/22/2016 12:14 PM by Ting Bullard RN    As of today's date 12/22/2016 goal is met at 0 - 25%.   Goal Status:  Active           Patient-Stated    I will not add any salt to my food and will limit my salt intake to 2,000 mg of sodium per day.     Goal Comments - Note created  9/26/2016  3:53 PM by Ting Bullard RN    As of today's date 9/26/2016 goal is met at 26 - 50%.   Goal Status:  Active        I will weigh myself every day and call my clinic if I am more than 2 pounds heavier in a day or 5 pounds heavier in a week.      Goal Comments - Note created  9/26/2016  3:52 PM by Ting Bullard, RN    As of today's date 9/26/2016 goal is met at 26 - 50%.   Goal Status:  Active          Thank you!     Thank you for choosing [facility] [place]  for your care. Our goal is always to provide you with excellent care. Hearing back from our patients is one way we can  continue to improve our services. Please take a few minutes to complete the written survey that you may receive in the mail after your visit with us. Thank you!             Your Updated Medication List - Protect others around you: Learn how to safely use, store and throw away your medicines at www.disposemymeds.org.          This list is accurate as of: 2/6/17  2:59 PM.  Always use your most recent med list.                   Brand Name Dispense Instructions for use    ACCU-CHEK MARY PLUS test strip   Generic drug:  blood glucose monitoring     300 strip    TEST THREE TIMES DAILY       amLODIPine 10 MG tablet    NORVASC    90 tablet    Take 1 tablet (10 mg) by mouth daily       B-D U/F 31G X 8 MM   Generic drug:  insulin pen needle          CALTRATE 600+D 600-800 MG-UNIT Tabs   Generic drug:  Calcium Carb-Cholecalciferol      Take 1 tablet by mouth daily.       carvedilol 25 MG tablet    COREG    180 tablet    Take 1 tablet (25 mg) by mouth 2 times daily (with meals)       CENTRUM SILVER per tablet      Take 1 tablet by mouth daily.       clopidogrel 75 MG tablet    PLAVIX    90 tablet    Take 1 tablet (75 mg) by mouth daily       hydrALAZINE 50 MG tablet    APRESOLINE    270 tablet    Take 1 tablet (50 mg) by mouth 3 times daily       insulin glargine 100 UNIT/ML injection    LANTUS    36 mL    Inject 35-40 Units Subcutaneous At Bedtime       isosorbide mononitrate 60 MG 24 hr tablet    IMDUR    180 tablet    Take 2 tablets (120 mg) by mouth daily       LIPITOR 40 MG tablet   Generic drug:  atorvastatin     90 tablet    Take 1 tablet (40 mg) by mouth daily       pantoprazole 40 MG EC tablet    PROTONIX     Take 40 mg by mouth daily       potassium chloride SA 10 MEQ CR tablet    K-DUR/KLOR-CON M    90 tablet    Take 1 tablet (10 mEq) by mouth daily       spironolactone 25 MG tablet    ALDACTONE    90 tablet    Take 2 tablets (50 mg) by mouth daily       torsemide 10 MG tablet    DEMADEX    90 tablet    Take 2  tablets (20 mg) by mouth daily

## 2017-02-06 NOTE — Clinical Note
[facility]  [address]  [place] [address]  [phone]                                                                                               Date: 2/7/2017    Kelechi Coleman                                                                               [address] [facility] [address]              Dear Kelechi,    The following letter pertains to your most recent diagnostic tests:     Good news! Your diabetes blood test is remaining stable and at goal.               Sincerely,     Dr. Gee/Gardenia MILLER CMA    Results for orders placed or performed in visit on 02/06/17   Basic metabolic panel   Result Value Ref Range    Sodium 142 136 - 145 mmol/L    Potassium 3.8 3.5 - 5.1 mmol/L    Chloride 100 98 - 107 mmol/L    Carbon Dioxide 28 23 - 29 mmol/L    Anion Gap 17.8 (H) 6 - 17 mmol/L    Glucose 168 (H) 70 - 105 mg/dL    Urea Nitrogen 30 7 - 30 mg/dL    Creatinine 1.56 (H) 0.70 - 1.30 mg/dL    GFR Estimate 43 (L) >60 mL/min/1.7m2    GFR Estimate If Black 52 (L) >60 mL/min/1.7m2    Calcium 9.5 8.5 - 10.5 mg/dL   HEMOGLOBIN A1C   Result Value Ref Range    Hemoglobin A1C 7.2 (H) 4.3 - 6.0 %

## 2017-02-06 NOTE — PROGRESS NOTES
[id number]  HPI and Plan:   See dictation    Orders this Visit:  Orders Placed This Encounter   Procedures     Basic metabolic panel     Follow-Up with [facility] - ALVARO visit     Orders Placed This Encounter   Medications     spironolactone (ALDACTONE) 25 MG tablet     Sig: Take 2 tablets (50 mg) by mouth daily     Dispense:  90 tablet     Refill:  3     potassium chloride SA (K-DUR/KLOR-CON M) 10 MEQ CR tablet     Sig: Take 1 tablet (10 mEq) by mouth daily     Dispense:  90 tablet     Refill:  3     Medications Discontinued During This Encounter   Medication Reason     spironolactone (ALDACTONE) 25 MG tablet Reorder     potassium chloride SA (K-DUR/KLOR-CON M) 10 MEQ CR tablet Reorder         Encounter Diagnoses   Name Primary?     Acute on chronic combined systolic and diastolic congestive heart failure (H)      Chronic systolic heart failure (H) Yes     Type 2 diabetes mellitus with stage 3 chronic kidney disease, with long-term current use of insulin (H)      Chronic systolic congestive heart failure (H)        CURRENT MEDICATIONS:  Current Outpatient Prescriptions   Medication Sig Dispense Refill     spironolactone (ALDACTONE) 25 MG tablet Take 2 tablets (50 mg) by mouth daily 90 tablet 3     potassium chloride SA (K-DUR/KLOR-CON M) 10 MEQ CR tablet Take 1 tablet (10 mEq) by mouth daily 90 tablet 3     torsemide (DEMADEX) 10 MG tablet Take 2 tablets (20 mg) by mouth daily 90 tablet 3     ACCU-CHEK MARY PLUS test strip TEST THREE TIMES DAILY 300 strip 1     B-D U/F 31G X 8 MM insulin pen needle        insulin glargine (LANTUS) 100 UNIT/ML PEN Inject 35-40 Units Subcutaneous At Bedtime 36 mL 0     clopidogrel (PLAVIX) 75 MG tablet Take 1 tablet (75 mg) by mouth daily 90 tablet 3     carvedilol (COREG) 25 MG tablet Take 1 tablet (25 mg) by mouth 2 times daily (with meals) 180 tablet 3     hydrALAZINE (APRESOLINE) 50 MG tablet Take 1 tablet (50 mg) by mouth 3 times daily 270 tablet 3     isosorbide mononitrate  (IMDUR) 60 MG 24 hr tablet Take 2 tablets (120 mg) by mouth daily 180 tablet 3     amLODIPine (NORVASC) 10 MG tablet Take 1 tablet (10 mg) by mouth daily 90 tablet 4     atorvastatin (LIPITOR) 40 MG tablet Take 1 tablet (40 mg) by mouth daily 90 tablet 3     pantoprazole (PROTONIX) 40 MG enteric coated tablet Take 40 mg by mouth daily       Multiple Vitamins-Minerals (CENTRUM SILVER) per tablet Take 1 tablet by mouth daily.       Calcium Carb-Cholecalciferol (CALTRATE 600+D) 600-800 MG-UNIT TABS Take 1 tablet by mouth daily.       [DISCONTINUED] spironolactone (ALDACTONE) 25 MG tablet Take 1 tablet (25 mg) by mouth daily 90 tablet 3       ALLERGIES     Allergies   Allergen Reactions     Aspirin Hives     Penicillins Hives     Contrast Dye Hives       PAST MEDICAL HISTORY:  Past Medical History   Diagnosis Date     Coronary artery disease      Unspecified cerebral artery occlusion with cerebral infarction      Myocardial infarction (H)      PAD (peripheral artery disease) (H)      Hyperlipidaemia      Hypertension      Stented coronary artery      CABG 2002 4 VESSEL     Atrial fibrillation (H)      Diabetes (H)      Acute exacerbation of CHF (congestive heart failure) (H) 9/2/2016     Cerebral infarction (H) 12/13/2013, 9/2016      Diagnosis updated by automated process. Provider to review and confirm.     Type 2 diabetes mellitus with stage 3 chronic kidney disease, with long-term current use of insulin (H) 11/4/2016       PAST SURGICAL HISTORY:  Past Surgical History   Procedure Laterality Date     Coronary artery bypass  2002     LIMA-LAD, SVG-OM1, [id number]     C mra upper extremity wo&w cont       Ent surgery       VOCAL CORD LESION REMOVED     Vascular surgery       ANGIOPLASTY LEFT LEG FOR pvd     Genitourinary surgery       ANGIOPLASTY FOR RENAL ARTERT STENOSIS     Phacoemulsification clear cornea with standard intraocular lens implant Left 1/22/2015     Procedure: PHACOEMULSIFICATION CLEAR CORNEA WITH  "STANDARD INTRAOCULAR LENS IMPLANT;  Surgeon: Bart Johnson MD;  [facility]:  [facility]     Heart cath left heart cath  11/30/2001     Hc left heart catheterization  9/7/2016 09/07/2016: CHUCHO x2 to distal and proximal SVG to to RPDA       FAMILY HISTORY:  Family History   Problem Relation Age of Onset     DIABETES Mother      CANCER Maternal Grandmother      Family History Negative Father        SOCIAL HISTORY:  Social History     Social History     Marital Status: Single     Spouse Name: N/A     Number of Children: N/A     Years of Education: N/A     Social History Main Topics     Smoking status: Former Smoker -- 40 years     Types: Pipe     Quit date: 12/18/1998     Smokeless tobacco: Never Used     Alcohol Use: 0.0 oz/week     0 Standard drinks or equivalent per week      Comment: 3 hard liquor drinks daily     Drug Use: No     Sexual Activity:     Partners: Female     Other Topics Concern     Parent/Sibling W/ Cabg, Mi Or Angioplasty Before 65f 55m? No     Caffeine Concern Yes     decaff only      Stress Concern No     Special Diet Yes     low sodium      Exercise Yes     walking, rehab 3 times a week      Seat Belt Yes     Social History Narrative       Review of Systems:  Skin:  Negative     Eyes:  Negative    ENT:  Negative    Respiratory:  Positive for dyspnea on exertion  Cardiovascular:    Positive for;edema  Gastroenterology: Negative    Genitourinary:  Negative    Musculoskeletal:  Positive for muscular weakness  Neurologic:  Negative    Psychiatric:  Negative    Heme/Lymph/Imm:  Negative    Endocrine:  Positive for diabetes    Physical Exam:  Vitals: /74 mmHg  Pulse 75  Resp 16  Ht 1.88 m (6' 2\")  Wt 88.225 kg (194 lb 8 oz)  BMI 24.96 kg/m2  SpO2 97%   Please refer to dictation for physical exam    Recent Lab Results:  LIPID RESULTS:  Lab Results   Component Value Date    CHOL 139 09/06/2016    HDL 52 09/06/2016    LDL 62 09/06/2016    TRIG 123 09/06/2016    CHOLHDLRATIO 3.5 05/09/2013 "       LIVER ENZYME RESULTS:  Lab Results   Component Value Date    AST 15 09/19/2016    ALT 20 09/19/2016       CBC RESULTS:  Lab Results   Component Value Date    WBC 11.5* 09/19/2016    RBC 3.50* 09/19/2016    HGB 11.6* 10/26/2016    HCT 32.3* 09/19/2016    MCV 92 09/19/2016    MCH 30.6 09/19/2016    MCHC 33.1 09/19/2016    RDW 13.7 09/19/2016     09/19/2016       BMP RESULTS:  Lab Results   Component Value Date     02/06/2017    POTASSIUM 3.8 02/06/2017    CHLORIDE 100 02/06/2017    CO2 28 02/06/2017    ANIONGAP 17.8* 02/06/2017    * 02/06/2017    BUN 30 02/06/2017    BUN 14.2 01/23/2013    CR 1.56* 02/06/2017    GFRESTIMATED 43* 02/06/2017    GFRESTBLACK 52* 02/06/2017    MIGUEL 9.5 02/06/2017        A1C RESULTS:  Lab Results   Component Value Date    A1C 7.2* 02/06/2017       INR RESULTS:  Lab Results   Component Value Date    INR 1.10 09/19/2016    INR 1.04 09/16/2016           CC  Erica Beach PA-C  [facility] [place]  [address]  [place] [id number]

## 2017-02-06 NOTE — PROGRESS NOTES
Quick Note:    Reviewed during clinic visit. Please see progress note for plan. Erica Beach PA-C 2/6/2017 4:39 PM        ______

## 2017-02-06 NOTE — PROGRESS NOTES
SUBJECTIVE:                                                    Kelechi Coleman is a 81 year old male who presents to clinic today for the following health issues:        Diabetes Follow-up    Patient is checking blood sugars: twice daily.    Blood sugar testing frequency justification: Risk of hypoglycemia with medication(s)  Results are as follows:         am -          suppertime -     Diabetic concerns: None     Symptoms of hypoglycemia (low blood sugar): none     Paresthesias (numbness or burning in feet) or sores: No     Date of last diabetic eye exam: 2016     Hyperlipidemia Follow-Up      Rate your low fat/cholesterol diet?: good    Taking statin?  Yes, no muscle aches from statin    Other lipid medications/supplements?:  none     Hypertension Follow-up      Outpatient blood pressures are not being checked.    Low Salt Diet: no added salt         Amount of exercise or physical activity: None    Problems taking medications regularly: No    Medication side effects: none    Diet: low salt    Vascular Disease Follow-up:  Coronary Artery Disease (CAD)      Chest pain or pressure, left side neck or arm pain: No    Shortness of breath/increased sweats/nausea with exertion: Yes stable unchanged     Pain in calves walking 1-2 blocks: No    Worsened or new symptoms since last visit: No    Nitroglycerin use: no    Daily aspirin use: Plavix due to aspirin allergy      Heart Failure Follow-up    Symptoms:    Shortness of breath: happens with exertion only - stable; with taking laundry up stairs     Lower extremity edema: none; improving since cardiology increased torsemide last week     Chest pain: No    Using more pillows than normal: No    Cough at night: No    Weight:    Checking weight daily: Yes    Weight change: weight decrease of 10 lb    Cardiology visits, ER/UC, or hospital admissions since last visit: None and Cardiology Visit - last week and today     Medication side effects: none         Problem list  and histories reviewed & adjusted, as indicated.  Additional history: as documented    Patient Active Problem List   Diagnosis     CAD (coronary artery disease)     Atrial fibrillation (H)     Peripheral artery disease (H)     Hyperlipidemia LDL goal <70     Hypertension     Paroxysmal ventricular tachycardia (H)     Cerebral infarction (H)     Acute exacerbation of CHF (congestive heart failure) (H)     Stroke (cerebrum) (H)     Type 2 diabetes mellitus with stage 3 chronic kidney disease, with long-term current use of insulin (H)     Chronic systolic heart failure (H)     Past Surgical History   Procedure Laterality Date     Coronary artery bypass  2002     LIMA-LAD, SVG-OM1, [id number]     C mra upper extremity wo&w cont       Ent surgery       VOCAL CORD LESION REMOVED     Vascular surgery       ANGIOPLASTY LEFT LEG FOR pvd     Genitourinary surgery       ANGIOPLASTY FOR RENAL ARTERT STENOSIS     Phacoemulsification clear cornea with standard intraocular lens implant Left 1/22/2015     Procedure: PHACOEMULSIFICATION CLEAR CORNEA WITH STANDARD INTRAOCULAR LENS IMPLANT;  Surgeon: Bart Johnson MD;  Location:  [facility]     Heart cath left heart cath  11/30/2001     Hc left heart catheterization  9/7/2016 09/07/2016: CHUCHO x2 to distal and proximal SVG to to RPDA       Social History   Substance Use Topics     Smoking status: Former Smoker -- 40 years     Types: Pipe     Quit date: 12/18/1998     Smokeless tobacco: Never Used     Alcohol Use: 0.0 oz/week     0 Standard drinks or equivalent per week      Comment: 3 hard liquor drinks daily     Family History   Problem Relation Age of Onset     DIABETES Mother      CANCER Maternal Grandmother      Family History Negative Father          Current Outpatient Prescriptions   Medication Sig Dispense Refill     torsemide (DEMADEX) 10 MG tablet Take 2 tablets (20 mg) by mouth daily 90 tablet 3     potassium chloride SA (K-DUR/KLOR-CON M) 10 MEQ CR tablet Take 1 tablet  "(10 mEq) by mouth 2 times daily 90 tablet      ACCU-CHEK MARY PLUS test strip TEST THREE TIMES DAILY 300 strip 1     B-D U/F 31G X 8 MM insulin pen needle        spironolactone (ALDACTONE) 25 MG tablet Take 1 tablet (25 mg) by mouth daily 90 tablet 3     insulin glargine (LANTUS) 100 UNIT/ML PEN Inject 35-40 Units Subcutaneous At Bedtime 36 mL 0     clopidogrel (PLAVIX) 75 MG tablet Take 1 tablet (75 mg) by mouth daily 90 tablet 3     carvedilol (COREG) 25 MG tablet Take 1 tablet (25 mg) by mouth 2 times daily (with meals) 180 tablet 3     hydrALAZINE (APRESOLINE) 50 MG tablet Take 1 tablet (50 mg) by mouth 3 times daily 270 tablet 3     isosorbide mononitrate (IMDUR) 60 MG 24 hr tablet Take 2 tablets (120 mg) by mouth daily 180 tablet 3     amLODIPine (NORVASC) 10 MG tablet Take 1 tablet (10 mg) by mouth daily 90 tablet 4     atorvastatin (LIPITOR) 40 MG tablet Take 1 tablet (40 mg) by mouth daily 90 tablet 3     pantoprazole (PROTONIX) 40 MG enteric coated tablet Take 40 mg by mouth daily       Multiple Vitamins-Minerals (CENTRUM SILVER) per tablet Take 1 tablet by mouth daily.       Calcium Carb-Cholecalciferol (CALTRATE 600+D) 600-800 MG-UNIT TABS Take 1 tablet by mouth daily.       Allergies   Allergen Reactions     Aspirin Hives     Penicillins Hives     Contrast Dye Hives       ROS:  C: NEGATIVE for fever, chills, change in weight other than weight loss associated with increasing diuretic   E/M: NEGATIVE for ear, mouth and throat problems  R: NEGATIVE for significant cough or SOB  CV: NEGATIVE for chest pain, palpitations; peripheral edema improving with increased diuretic dose   P:  No depressed mood, no anhedonia     OBJECTIVE:                                                    /68 mmHg  Pulse 73  Temp(Src) 96.8  F (36  C) (Oral)  Ht 6' 2\" (1.88 m)  Wt 193 lb 12.8 oz (87.907 kg)  BMI 24.87 kg/m2  SpO2 98%  Body mass index is 24.87 kg/(m^2).  GENERAL: healthy, alert and no distress  EYES: Eyes " grossly normal to inspection, PERRL and conjunctivae and sclerae normal  HENT: ear canals and TM's normal, nose and mouth without ulcers or lesions  NECK: no adenopathy, no asymmetry, masses, or scars and thyroid normal to palpation  RESP: lungs clear to auscultation - no rales, rhonchi or wheezes  CV: The heart has an irregularly irregular rhythm with a normal rate. 1+ bilateral pitting edema both lower extremities   NEURO: Normal strength and tone, mentation intact and speech normal  PSYCH: mentation appears normal, affect normal/bright    Diagnostic Test Results:  Results for orders placed or performed in visit on 01/30/17   Basic metabolic panel   Result Value Ref Range    Sodium 140 136 - 145 mmol/L    Potassium 3.8 3.5 - 5.1 mmol/L    Chloride 104 98 - 107 mmol/L    Carbon Dioxide 25 23 - 29 mmol/L    Anion Gap 14.8 6 - 17 mmol/L    Glucose 209 (H) 70 - 105 mg/dL    Urea Nitrogen 26 7 - 30 mg/dL    Creatinine 1.49 (H) 0.70 - 1.30 mg/dL    GFR Estimate 45 (L) >60 mL/min/1.7m2    GFR Estimate If Black 55 (L) >60 mL/min/1.7m2    Calcium 9.5 8.5 - 10.5 mg/dL        ASSESSMENT/PLAN:                                                      1. Type 2 diabetes mellitus with stage 3 chronic kidney disease, with long-term current use of insulin (H)  A1c with blood draw this afternoon at heart clinic  This has been under good control; microalbumin when we we can; hie no longer on losartan, but is on hydralazine and long acting nitrate  - HEMOGLOBIN A1C; Future  - Albumin Random Urine Quantitative; Future    2. Chronic systolic heart failure (H)  Improving volume management with the help of the [facility]     3. Benign essential hypertension  Well controlled     4. Hyperlipidemia LDL goal <70  On statin therapy      5. Atherosclerosis of native coronary artery of native heart without angina pectoris  Stable symptoms       FUTURE APPOINTMENTS:       - Follow-up visit in 3 months or sooner pending A1c    Etienne Gee,  MD  [facility]

## 2017-02-06 NOTE — PATIENT INSTRUCTIONS
Call [facility] Nurse for any questions or concerns:   Geeta Muñoz, or [place]: [phone]   If you have concerns after hours, please call [phone], option 2    1. Medication changes: Continue torsemide at 20 mg once a day.   Increase spironolactone to 50 mg once a day.  This is 2 pills.   Decrease potassium to 10 mEq once a day.    Continue other medications.    2. Weigh yourself daily and write it down.     3. Call [facility] nurse if your weight is up more than 2 pounds in one day, or 5 pounds in one week.    4. Call [facility] nurse if you feel more short of breath, have more abdominal bloating or leg swelling.    5. Continue low sodium diet (less than 2000mg daily). If you eat less salt, you will retain less fluid.     6. Lab results:   Component      Latest Ref Rng 2/6/2017   Sodium      136 - 145 mmol/L 142   Potassium      3.5 - 5.1 mmol/L 3.8   Chloride      98 - 107 mmol/L 100   Carbon Dioxide      23 - 29 mmol/L 28   Anion Gap      6 - 17 mmol/L 17.8 (H)   Glucose      70 - 105 mg/dL 168 (H)   Urea Nitrogen      7 - 30 mg/dL 30   Creatinine      0.70 - 1.30 mg/dL 1.56 (H)   GFR Estimate      >60 mL/min/1.7m2 43 (L)   GFR Estimate If Black      >60 mL/min/1.7m2 52 (L)   Calcium      8.5 - 10.5 mg/dL 9.5       **Do NOT take Aleve or Ibuprofen without checking with your doctor first        [facility]: Cardiomyopathy, Optimization, Rehabilitation, Education   The [facility] is a heart failure specialty clinic within the [facility] where you will work with specialized nurse practioners, physician assistants, doctors and registered nurses. They are dedicated to helping patients with heart failure to carefully adjust medications, receive education, and learn who and when to call if symptoms develop. They specialize in helping you better understand your condition, slow the progression of your disease, improve the length and quality of your life, help you detect future heart problems  before they become life threatening, and avoid hospitalizations.

## 2017-02-06 NOTE — Clinical Note
2/6/2017    Etienne Gee MD  [facility]   [address]  [place] [address]    RE: Kelechi Coleman       Dear Colleague,    I had the pleasure of seeing Kelechi Coleman in the [facility].    PRIMARY CARDIOLOGIST:  Isaiah Charles MD      REASON FOR VISIT:  Dyspnea on exertion, weight gain, followup.      HISTORY OF PRESENT ILLNESS:  Mr. Coleman is a delightful 81-year-old gentleman with past cardiac history as follows:   1.  Coronary artery disease with history of CAB in 2002 with a LIMA to the LAD, saphenous vein graft to the OM1, saphenous vein graft to the RPDA and RPLA sequentially.  He had an NSTEMI in 09/2016 where he underwent stenting to the proximal and distal portions of the vein graft to the RPDA and RPLA and was found to have a graft to the OM1 occluded but a patent LIMA to the LAD.   2.  Ischemic cardiomyopathy with EF 40%-45% and associated chronic systolic heart failure.   3.  Hypertension.   4.  Hyperlipidemia.   5.  CVA post-MI when he presented in September with left arm and hand numbness.   6.  Mild to moderate MR and TR.      I initially met him after he presented with his NSTEMI this fall and was having a difficult time with shortness of breath and readmissions with orthopnea, PND and progressive weight gain.  His weight had peaked at 210, and we slowly diuresed him back to a weight of 185.  Throughout the fall we decreased his torsemide back to 10 mg and increased his spironolactone, and he did well until late January.  Actually on 12/28/2016, he had a home weight of about 190 pounds.  He got a cough but no fevers and chills.  When I last saw him, his weight had gone up significantly and had peaked at 204 pounds.  He had dyspnea on exertion.  He had abdominal distention and gas.  He was waking up at night frequently with his mind racing but not necessarily orthopnea.  He had weakness.  We increased his torsemide back up to 20 mg daily,  and he comes in today for followup.      Today in the last 1 week, his weight has dropped from 204 to 191 pounds.  He still feels somewhat weak and a little bit short of breath, but this is much improved from previous.  He is now sleeping better, and his breathing seems the same but when he wakes up his mind is not racing as much.  His abdominal distention is much less.  He is no longer passing as much gas.  His peripheral edema is improved, although his legs still feels fairly weak.  He denies samantha chest pain.      SOCIAL HISTORY:  He is single.  He has never .  Former smoker, quit in 1998.  Daily alcohol, no drug use.  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      PHYSICAL EXAMINATION:   GENERAL:  Well-developed, well-nourished pleasant gentleman in no acute distress.   HEENT:  Normocephalic, atraumatic.   HEART:  Regular.  I do not appreciate a murmur, rub or gallop.   LUNGS:  Today are clear, slightly diminished in the left but much improved from previous.   EXTREMITIES:  With 1+ edema to mid shin.  No edema in the thighs today.  Neck veins are about 2 cm above the sternal notch.   SKIN:  Warm and dry.      ASSESSMENT AND PLAN:   1.  Chronic systolic heart failure with recent weight gain of unclear etiology, potentially exacerbated by upper respiratory infection or us under-diuresing him.  He is down 13 pounds in the last 1 week and has a stable creatinine and BUN today.  He still appears mildly hypervolemic.  At this point, we are going to continue him on torsemide 20 mg daily.  I am going to increase his spironolactone to 50 mg daily and decrease his potassium to 10 mEq daily.  Hopefully, this will provide him with a little bit of extra potassium and next  week we will be able to discontinue potassium completely.  I think his dry weight is likely in the high 180s, but we will check back with him in a week with a BMP at that time.   2.  Hypertension, reasonable control.   3.  Coronary artery disease with history of NSTEMI and drug-eluting stent to the vein graft to the right, maintained on Plavix.  Allergic to aspirin.   4.  Hyperlipidemia, excellent control.   5.  History of AFib noted at the time of his CAB, none known since then, clinically in sinus rhythm.   6.  CVA with good recovery.   7.  Insomnia, improved with diuresis even though he never described orthopnea, I do wonder if when he becomes hypervolemic and he is uncomfortable, this worsens his insomnia.      Thank you for allowing me to participate in his care.  I will see him back in 1 week, sooner with concerns.      Erica Beach PA-C

## 2017-02-06 NOTE — NURSING NOTE
"Chief Complaint   Patient presents with     Diabetes     Lipids     Hypertension       Initial /68 mmHg  Pulse 73  Temp(Src) 96.8  F (36  C) (Oral)  Ht 6' 2\" (1.88 m)  Wt 193 lb 12.8 oz (87.907 kg)  BMI 24.87 kg/m2  SpO2 98% Estimated body mass index is 24.87 kg/(m^2) as calculated from the following:    Height as of this encounter: 6' 2\" (1.88 m).    Weight as of this encounter: 193 lb 12.8 oz (87.907 kg).  Medication Reconciliation: complete. Madelin Earl MA  "

## 2017-02-07 NOTE — PROGRESS NOTES
Quick Note:    The following letter pertains to your most recent diagnostic tests:    Good news! Your diabetes blood test is remaining stable and at goal.               Sincerely,    Dr. Gee  ______

## 2017-02-07 NOTE — PROGRESS NOTES
PRIMARY CARDIOLOGIST:  Isaiah Charles MD      REASON FOR VISIT:  Dyspnea on exertion, weight gain, followup.      HISTORY OF PRESENT ILLNESS:  Mr. Coleman is a delightful 81-year-old gentleman with past cardiac history as follows:   1.  Coronary artery disease with history of CAB in 2002 with a LIMA to the LAD, saphenous vein graft to the OM1, saphenous vein graft to the RPDA and RPLA sequentially.  He had an NSTEMI in 09/2016 where he underwent stenting to the proximal and distal portions of the vein graft to the RPDA and RPLA and was found to have a graft to the OM1 occluded but a patent LIMA to the LAD.   2.  Ischemic cardiomyopathy with EF 40%-45% and associated chronic systolic heart failure.   3.  Hypertension.   4.  Hyperlipidemia.   5.  CVA post-MI when he presented in September with left arm and hand numbness.   6.  Mild to moderate MR and TR.      I initially met him after he presented with his NSTEMI this fall and was having a difficult time with shortness of breath and readmissions with orthopnea, PND and progressive weight gain.  His weight had peaked at 210, and we slowly diuresed him back to a weight of 185.  Throughout the fall we decreased his torsemide back to 10 mg and increased his spironolactone, and he did well until late January.  Actually on 12/28/2016, he had a home weight of about 190 pounds.  He got a cough but no fevers and chills.  When I last saw him, his weight had gone up significantly and had peaked at 204 pounds.  He had dyspnea on exertion.  He had abdominal distention and gas.  He was waking up at night frequently with his mind racing but not necessarily orthopnea.  He had weakness.  We increased his torsemide back up to 20 mg daily, and he comes in today for followup.      Today in the last 1 week, his weight has dropped from 204 to 191 pounds.  He still feels somewhat weak and a little bit short of breath, but this is much improved from previous.  He is now sleeping better, and  his breathing seems the same but when he wakes up his mind is not racing as much.  His abdominal distention is much less.  He is no longer passing as much gas.  His peripheral edema is improved, although his legs still feels fairly weak.  He denies samantha chest pain.      SOCIAL HISTORY:  He is single.  He has never .  Former smoker, quit in 1998.  Daily alcohol, no drug use.  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      PHYSICAL EXAMINATION:   GENERAL:  Well-developed, well-nourished pleasant gentleman in no acute distress.   HEENT:  Normocephalic, atraumatic.   HEART:  Regular.  I do not appreciate a murmur, rub or gallop.   LUNGS:  Today are clear, slightly diminished in the left but much improved from previous.   EXTREMITIES:  With 1+ edema to mid shin.  No edema in the thighs today.  Neck veins are about 2 cm above the sternal notch.   SKIN:  Warm and dry.      ASSESSMENT AND PLAN:   1.  Chronic systolic heart failure with recent weight gain of unclear etiology, potentially exacerbated by upper respiratory infection or us under-diuresing him.  He is down 13 pounds in the last 1 week and has a stable creatinine and BUN today.  He still appears mildly hypervolemic.  At this point, we are going to continue him on torsemide 20 mg daily.  I am going to increase his spironolactone to 50 mg daily and decrease his potassium to 10 mEq daily.  Hopefully, this will provide him with a little bit of extra potassium and next week we will be able to discontinue potassium completely.  I think his dry weight is likely in the high 180s, but we will check back with him in a week with a BMP at that time.   2.  Hypertension, reasonable control.   3.  Coronary artery disease with  history of NSTEMI and drug-eluting stent to the vein graft to the right, maintained on Plavix.  Allergic to aspirin.   4.  Hyperlipidemia, excellent control.   5.  History of AFib noted at the time of his CAB, none known since then, clinically in sinus rhythm.   6.  CVA with good recovery.   7.  Insomnia, improved with diuresis even though he never described orthopnea, I do wonder if when he becomes hypervolemic and he is uncomfortable, this worsens his insomnia.      Thank you for allowing me to participate in his care.  I will see him back in 1 week, sooner with concerns.      ALIX Locke PA-C             D: 2017 15:11   T: 2017 04:26   MT: KARLEE      Name:     MARLA VÁZQUEZ   MRN:      [id number]        Account:      [id number]   :      1935           Service Date: 2017      Document: [id number]

## 2017-02-13 NOTE — MR AVS SNAPSHOT
After Visit Summary   2/13/2017    Kelechi Coleman    MRN: [id number]           Patient Information     Date Of Birth          1935        Visit Information        Provider Department      2/13/2017 3:50 PM More, Erica Ferreira PA-C [facility] [place]        Today's Diagnoses     Cough    -  1    Chronic systolic heart failure (H)        Acute on chronic combined systolic and diastolic congestive heart failure (H)          Care Instructions    Please call [facility] nurse for any questions or concerns:       [phone]    1. Medication changes:  Stop taking potassium.   Change torsemide to 20 mg (2 tablets) one day, alternating with 10 mg (1 tablet) on the other day.      2.  Weigh yourself daily and write it down.     3. Call [facility] nurse if your weight is up more than 2 pounds in one day, or 5 pounds in one week.    4. Call [facility] nurse if you feel more short of breath, have more abdominal bloating or leg swelling.    5. Eat a low sodium diet (less than 2,000mg daily). If you eat less salt, you will retain less fluid.     6. Results: Kidney numbers are a bit high, potassium is normal.    Component      Latest Ref Rng & Units 2/13/2017   Sodium      136 - 145 mmol/L 141   Potassium      3.5 - 5.1 mmol/L 4.2   Chloride      98 - 107 mmol/L 103   Carbon Dioxide      23 - 29 mmol/L 27   Anion Gap      6 - 17 mmol/L 15.2   Glucose      70 - 105 mg/dL 175 (H)   Urea Nitrogen      7 - 30 mg/dL 33 (H)   Creatinine      0.70 - 1.30 mg/dL 1.69 (H)   GFR Estimate      >60 mL/min/1.7m2 39 (L)   GFR Estimate If Black      >60 mL/min/1.7m2 47 (L)   Calcium      8.5 - 10.5 mg/dL 9.4     CXR today please.      7.  Follow up: with me in 1 month with labs prior.      Scheduling phone number: [phone]  Reminder: Please bring in all current medications, over the counter supplements and vitamin bottles to your next appointment.          Follow-ups after your visit         Additional Services     Follow-Up with [facility] - [facility] visit                 Your next 10 appointments already scheduled     May 08, 2017 10:30 AM CDT   [facility]ee [facility]    [address]  [facility] [address] [phone]           Bring a current list of meds and any records pertaining to this visit.  For Physicals, please bring immunization records and any forms needing to be filled out.  Please arrive 10 minutes early to complete paperwork.              Future tests that were ordered for you today     Open Future Orders        Priority Expected Expires Ordered    Follow-Up with [facility] - [facility] visit Routine 3/13/2017 2/13/2018 2/13/2017    Basic metabolic panel Routine 2/20/2017 3/13/2018 2/13/2017            Who to contact     If you have questions or need follow up information about today's clinic visit or your schedule please contact [facility] AT [place] directly at [phone].  Normal or non-critical lab and imaging results will be communicated to you by Blue Tornadohart, letter or phone within 4 business days after the clinic has received the results. If you do not hear from us within 7 days, please contact the clinic through [employer] or phone. If you have a critical or abnormal lab result, we will notify you by phone as soon as possible.  Submit refill requests through [employer] or call your pharmacy and they will forward the refill request to us. Please allow 3 business days for your refill to be completed.          Additional Information About Your Visit        [employer] Information     [employer] gives you secure access to your electronic health record. If you see a primary care provider, you can also send messages to your care team and make appointments. If you have questions, please call your primary care clinic.  If you do not have a primary care provider, please call [phone] and they will assist  "you.        Care EveryWhere ID     This is your Care EveryWhere ID. This could be used by other organizations to access your [place] medical records  [id number]        Your Vitals Were     Pulse Height Pulse Oximetry BMI (Body Mass Index)          64 1.88 m (6' 2\") 98% 24.95 kg/m2         Blood Pressure from Last 3 Encounters:   02/13/17 116/58   02/06/17 132/74   02/06/17 131/68    Weight from Last 3 Encounters:   02/13/17 88.1 kg (194 lb 4.8 oz)   02/06/17 88.2 kg (194 lb 8 oz)   02/06/17 87.9 kg (193 lb 12.8 oz)              We Performed the Following     Follow-Up with [facility] - [facility] visit     X-ray Chest 2 vws*          Today's Medication Changes          These changes are accurate as of: 2/13/17  4:21 PM.  If you have any questions, ask your nurse or doctor.               These medicines have changed or have updated prescriptions.        Dose/Directions    torsemide 10 MG tablet   Commonly known as:  DEMADEX   This may have changed:    - how much to take  - how to take this  - when to take this  - additional instructions   Used for:  Acute on chronic combined systolic and diastolic congestive heart failure (H)   Changed by:  Erica Beach PA-C        Take 20 mg (2 tabs) every other day with 10 mg (1 tab) on the alternate day.   Quantity:  135 tablet   Refills:  3         Stop taking these medicines if you haven't already. Please contact your care team if you have questions.     potassium chloride SA 10 MEQ CR tablet   Commonly known as:  K-DUR/KLOR-CON M   Stopped by:  Erica Beach PA-C                Where to get your medicines      These medications were sent to [facility] [place]""[place], [place] - [address] AT [facility] [address]  [address] [facility] [address]     Phone:  [phone]     torsemide 10 MG tablet                Primary Care Provider Office Phone # Fax #    Etienne Gee [phone]       " [facility] [address] [place] [address]        Goals        Exercise    I will exercise 2x per week (15 min per time) , for the next 6 weeks.     Notes - Note created  12/22/2016 12:14 PM by Ting Bullard, RN    As of today's date 12/22/2016 goal is met at 0 - 25%.   Goal Status:  Active           General    I will not add any salt to my food and will limit my salt intake to 2,000 mg of sodium per day. (pt-stated)     Notes - Note created  9/26/2016  3:53 PM by Ting Bullard, RN    As of today's date 9/26/2016 goal is met at 26 - 50%.   Goal Status:  Active        I will weigh myself every day and call my clinic if I am more than 2 pounds heavier in a day or 5 pounds heavier in a week.  (pt-stated)     Notes - Note created  9/26/2016  3:52 PM by Ting Bullard RN    As of today's date 9/26/2016 goal is met at 26 - 50%.   Goal Status:  Active          Thank you!     Thank you for choosing [facility] AT [place]  for your care. Our goal is always to provide you with excellent care. Hearing back from our patients is one way we can continue to improve our services. Please take a few minutes to complete the written survey that you may receive in the mail after your visit with us. Thank you!             Your Updated Medication List - Protect others around you: Learn how to safely use, store and throw away your medicines at www.disposemymeds.org.          This list is accurate as of: 2/13/17  4:21 PM.  Always use your most recent med list.                   Brand Name Dispense Instructions for use    ACCU-CHEK MARY PLUS test strip   Generic drug:  blood glucose monitoring     300 strip    TEST THREE TIMES DAILY       amLODIPine 10 MG tablet    NORVASC    90 tablet    Take 1 tablet (10 mg) by mouth daily       B-D U/F 31G X 8 MM   Generic drug:  insulin pen needle          CALTRATE 600+D 600-800 MG-UNIT Tabs   Generic drug:  Calcium Carb-Cholecalciferol      Take 1  tablet by mouth daily.       carvedilol 25 MG tablet    COREG    180 tablet    Take 1 tablet (25 mg) by mouth 2 times daily (with meals)       CENTRUM SILVER per tablet      Take 1 tablet by mouth daily.       clopidogrel 75 MG tablet    PLAVIX    90 tablet    Take 1 tablet (75 mg) by mouth daily       hydrALAZINE 50 MG tablet    APRESOLINE    270 tablet    Take 1 tablet (50 mg) by mouth 3 times daily       insulin glargine 100 UNIT/ML injection    LANTUS    36 mL    Inject 35-40 Units Subcutaneous At Bedtime       isosorbide mononitrate 60 MG 24 hr tablet    IMDUR    180 tablet    Take 2 tablets (120 mg) by mouth daily       LIPITOR 40 MG tablet   Generic drug:  atorvastatin     90 tablet    Take 1 tablet (40 mg) by mouth daily       pantoprazole 40 MG EC tablet    PROTONIX     Take 40 mg by mouth daily       spironolactone 25 MG tablet    ALDACTONE    90 tablet    Take 2 tablets (50 mg) by mouth daily       torsemide 10 MG tablet    DEMADEX    135 tablet    Take 20 mg (2 tabs) every other day with 10 mg (1 tab) on the alternate day.

## 2017-02-13 NOTE — PATIENT INSTRUCTIONS
Please call [facility] nurse for any questions or concerns:       [phone]    1. Medication changes:  Stop taking potassium.   Change torsemide to 20 mg (2 tablets) one day, alternating with 10 mg (1 tablet) on the other day.      2.  Weigh yourself daily and write it down.     3. Call [facility] nurse if your weight is up more than 2 pounds in one day, or 5 pounds in one week.    4. Call [facility] nurse if you feel more short of breath, have more abdominal bloating or leg swelling.    5. Eat a low sodium diet (less than 2,000mg daily). If you eat less salt, you will retain less fluid.     6. Results: Kidney numbers are a bit high, potassium is normal.    Component      Latest Ref Rng & Units 2/13/2017   Sodium      136 - 145 mmol/L 141   Potassium      3.5 - 5.1 mmol/L 4.2   Chloride      98 - 107 mmol/L 103   Carbon Dioxide      23 - 29 mmol/L 27   Anion Gap      6 - 17 mmol/L 15.2   Glucose      70 - 105 mg/dL 175 (H)   Urea Nitrogen      7 - 30 mg/dL 33 (H)   Creatinine      0.70 - 1.30 mg/dL 1.69 (H)   GFR Estimate      >60 mL/min/1.7m2 39 (L)   GFR Estimate If Black      >60 mL/min/1.7m2 47 (L)   Calcium      8.5 - 10.5 mg/dL 9.4     CXR today please.      7.  Follow up: with me in 1 month with labs prior.      Scheduling phone number: [phone]  Reminder: Please bring in all current medications, over the counter supplements and vitamin bottles to your next appointment.

## 2017-02-13 NOTE — PROGRESS NOTES
Reviewed during clinic visit.  Please see progress note for plan.  Erica Beach PA-C 2/13/2017 3:54 PM

## 2017-02-13 NOTE — PROGRESS NOTES
[id number]  HPI and Plan:   See dictation    Orders this Visit:  Orders Placed This Encounter   Procedures     X-ray Chest 2 vws*     Basic metabolic panel     Follow-Up with [facility] - [facility] visit     Orders Placed This Encounter   Medications     torsemide (DEMADEX) 10 MG tablet     Sig: Take 20 mg (2 tabs) every other day with 10 mg (1 tab) on the alternate day.     Dispense:  135 tablet     Refill:  3     Medications Discontinued During This Encounter   Medication Reason     potassium chloride SA (K-DUR/KLOR-CON M) 10 MEQ CR tablet      torsemide (DEMADEX) 10 MG tablet Reorder         Encounter Diagnoses   Name Primary?     Chronic systolic heart failure (H)      Cough Yes     Acute on chronic combined systolic and diastolic congestive heart failure (H)        CURRENT MEDICATIONS:  Current Outpatient Prescriptions   Medication Sig Dispense Refill     torsemide (DEMADEX) 10 MG tablet Take 20 mg (2 tabs) every other day with 10 mg (1 tab) on the alternate day. 135 tablet 3     spironolactone (ALDACTONE) 25 MG tablet Take 2 tablets (50 mg) by mouth daily 90 tablet 3     insulin glargine (LANTUS) 100 UNIT/ML PEN Inject 35-40 Units Subcutaneous At Bedtime 36 mL 0     clopidogrel (PLAVIX) 75 MG tablet Take 1 tablet (75 mg) by mouth daily 90 tablet 3     carvedilol (COREG) 25 MG tablet Take 1 tablet (25 mg) by mouth 2 times daily (with meals) 180 tablet 3     hydrALAZINE (APRESOLINE) 50 MG tablet Take 1 tablet (50 mg) by mouth 3 times daily 270 tablet 3     isosorbide mononitrate (IMDUR) 60 MG 24 hr tablet Take 2 tablets (120 mg) by mouth daily 180 tablet 3     amLODIPine (NORVASC) 10 MG tablet Take 1 tablet (10 mg) by mouth daily 90 tablet 4     atorvastatin (LIPITOR) 40 MG tablet Take 1 tablet (40 mg) by mouth daily 90 tablet 3     pantoprazole (PROTONIX) 40 MG enteric coated tablet Take 40 mg by mouth daily       Multiple Vitamins-Minerals (CENTRUM SILVER) per tablet Take 1 tablet by mouth daily.       Calcium  Carb-Cholecalciferol (CALTRATE 600+D) 600-800 MG-UNIT TABS Take 1 tablet by mouth daily.       [DISCONTINUED] torsemide (DEMADEX) 10 MG tablet Take 2 tablets (20 mg) by mouth daily 90 tablet 3     ACCU-CHEK MARY PLUS test strip TEST THREE TIMES DAILY 300 strip 1     B-D U/F 31G X 8 MM insulin pen needle          ALLERGIES     Allergies   Allergen Reactions     Aspirin Hives     Penicillins Hives     Contrast Dye Hives       PAST MEDICAL HISTORY:  Past Medical History   Diagnosis Date     Acute exacerbation of CHF (congestive heart failure) (H) 9/2/2016     Atrial fibrillation (H)      Cerebral infarction (H) 12/13/2013, 9/2016      Diagnosis updated by automated process. Provider to review and confirm.     Coronary artery disease      Diabetes (H)      Hyperlipidaemia      Hypertension      Myocardial infarction (H)      PAD (peripheral artery disease) (H)      Stented coronary artery      CABG 2002 4 VESSEL     Type 2 diabetes mellitus with stage 3 chronic kidney disease, with long-term current use of insulin (H) 11/4/2016     Unspecified cerebral artery occlusion with cerebral infarction        PAST SURGICAL HISTORY:  Past Surgical History   Procedure Laterality Date     Coronary artery bypass  2002     LIMA-LAD, SVG-OM1, [id number]     C mra upper extremity wo&w cont       Ent surgery       VOCAL CORD LESION REMOVED     Vascular surgery       ANGIOPLASTY LEFT LEG FOR pvd     Genitourinary surgery       ANGIOPLASTY FOR RENAL ARTERT STENOSIS     Phacoemulsification clear cornea with standard intraocular lens implant Left 1/22/2015     Procedure: PHACOEMULSIFICATION CLEAR CORNEA WITH STANDARD INTRAOCULAR LENS IMPLANT;  Surgeon: Bart Johnson MD;  Location:  [facility]     Heart cath left heart cath  11/30/2001      left heart catheterization  9/7/2016 09/07/2016: CHUCHO x2 to distal and proximal SVG to to RPDA       FAMILY HISTORY:  Family History   Problem Relation Age of Onset     DIABETES Mother       "CANCER Maternal Grandmother      Family History Negative Father        SOCIAL HISTORY:  Social History     Social History     Marital status: Single     Spouse name: N/A     Number of children: N/A     Years of education: N/A     Social History Main Topics     Smoking status: Former Smoker     Years: 40.00     Types: Pipe     Quit date: 12/18/1998     Smokeless tobacco: Never Used     Alcohol use 0.0 oz/week     0 Standard drinks or equivalent per week      Comment: 3 hard liquor drinks daily     Drug use: No     Sexual activity: Not Currently     Partners: Female     Other Topics Concern     Parent/Sibling W/ Cabg, Mi Or Angioplasty Before 65f 55m? No     Caffeine Concern Yes     decaff only      Stress Concern No     Special Diet Yes     low sodium      Exercise Yes     walking, rehab 3 times a week      Seat Belt Yes     Social History Narrative       Review of Systems:  Skin:  Negative     Eyes:  Negative    ENT:  Negative    Respiratory:  Positive for cough  Cardiovascular:    Positive for;fatigue;exercise intolerance  Gastroenterology: Negative    Genitourinary:  Negative    Musculoskeletal:  Positive for muscular weakness  Neurologic:  Negative    Psychiatric:  Negative    Heme/Lymph/Imm:  Negative    Endocrine:  Positive for diabetes    Physical Exam:  Vitals: /58  Pulse 64  Ht 1.88 m (6' 2\")  Wt 88.1 kg (194 lb 4.8 oz)  SpO2 98%  BMI 24.95 kg/m2   Please refer to dictation for physical exam    Recent Lab Results:  LIPID RESULTS:  Lab Results   Component Value Date    CHOL 139 09/06/2016    HDL 52 09/06/2016    LDL 62 09/06/2016    TRIG 123 09/06/2016    CHOLHDLRATIO 3.5 05/09/2013       LIVER ENZYME RESULTS:  Lab Results   Component Value Date    AST 15 09/19/2016    ALT 20 09/19/2016       CBC RESULTS:  Lab Results   Component Value Date    WBC 11.5 (H) 09/19/2016    RBC 3.50 (L) 09/19/2016    HGB 11.6 (L) 10/26/2016    HCT 32.3 (L) 09/19/2016    MCV 92 09/19/2016    MCH 30.6 09/19/2016    MCHC 33.1 " 09/19/2016    RDW 13.7 09/19/2016     09/19/2016       BMP RESULTS:  Lab Results   Component Value Date     02/13/2017    POTASSIUM 4.2 02/13/2017    CHLORIDE 103 02/13/2017    CO2 27 02/13/2017    ANIONGAP 15.2 02/13/2017     (H) 02/13/2017    BUN 33 (H) 02/13/2017    BUN 14.2 01/23/2013    CR 1.69 (H) 02/13/2017    GFRESTIMATED 39 (L) 02/13/2017    GFRESTBLACK 47 (L) 02/13/2017    MIGUEL 9.4 02/13/2017        A1C RESULTS:  Lab Results   Component Value Date    A1C 7.2 (H) 02/06/2017       INR RESULTS:  Lab Results   Component Value Date    INR 1.10 09/19/2016    INR 1.04 09/16/2016           CC  Erica Beach PA-C  [facility] HEART AT [address]  [place] [address]

## 2017-02-13 NOTE — LETTER
2/13/2017    Etienne Gee MD  [facility]   [address]  [facility] [address]    RE: Kelechi Coleman       Dear Colleague,    I had the pleasure of seeing Kelechi Coleman in the [facility].    PRIMARY CARDIOLOGIST:  Dr. Charles.      REASON FOR VISIT:  Heart failure followup.      Mr. Coleman is a delightful 81-year-old gentleman with past cardiac history as follows:   1.  Coronary artery disease with history of CABG in 2000 with LIMA to LAD, saphenous vein graft to the OM and saphenous vein graft to the RPA and RPLA sequentially.  He had an NSTEMI in 09/2016 where he underwent a drug-eluting stent to the proximal and distal portions of the vein graft to the RPDA and RPLA, had a patent LIMA to the LAD, but occluded graft to the OM1.   2.  Ischemic cardiomyopathy with EF 40%-45% and associated chronic systolic heart failure.   3.  Hypertension.   4.  Hyperlipidemia.   5.  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   6.  Mild to moderate mitral regurgitation and tricuspid regurgitation.      I had initially met him in the fall after he presented with an NSTEMI and was having a difficult time with shortness of breath, with readmissions for orthopnea, PND with weight gain.  His weight had peaked at 210 pounds.  We had slowly diuresed him back to 185 pounds.  We decreased his torsemide back to 10 and increased his spironolactone, and he did well until late January and his weight again peaked up to 204 pounds.  We could not identify a trigger other than he had had a cough for a couple of weeks.  At that time, he also had abdominal distention and gas.  We have been diuresing him slowly.      He comes in today for followup.  His weight is now down to 187 pounds at home.  The lowest it has been in the last week is 185.  This is another 4 pounds from when I saw him a week ago.  He continues to feel okay except he still gets weak  carrying stuff up stairs.  His abdomen is better.  He has a cough, and he tells me he has had this cough for over a month now.  It is nonproductive.  He has no fevers or chills.  He thought it was going away, and now it seems to have worsened.  He has no orthopnea or PND.  He previously would not wake up necessarily gasping for air but with his mind racing.  This seems to resolve when we diurese him.      SOCIAL HISTORY:  He is single, never , former smoker, quit in 1998, daily alcohol, no drug use.  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      PHYSICAL EXAMINATION:   GENERAL:  Well-developed, well-nourished, elderly gentleman, hard of hearing.   HEENT:  Normocephalic, atraumatic.   HEART:  Regular in rate and rhythm with a 2/6 systolic murmur heard best over lower left sternal border.   LUNGS:  Clear today.  No wheezes, rales or rhonchi.   EXTREMITIES:  Without edema.   NECK:  Neck veins are just 2 cm above the sternal notch.   SKIN:  Warm and dry.     Outpatient Encounter Prescriptions as of 2/13/2017   Medication Sig Dispense Refill     torsemide (DEMADEX) 10 MG tablet Take 20 mg (2 tabs) every other day with 10 mg (1 tab) on the alternate day. 135 tablet 3     [DISCONTINUED] spironolactone (ALDACTONE) 25 MG tablet Take 2 tablets (50 mg) by mouth daily 90 tablet 3     insulin glargine (LANTUS) 100 UNIT/ML PEN Inject 35-40 Units Subcutaneous At Bedtime 36 mL 0     clopidogrel (PLAVIX) 75 MG tablet Take 1 tablet (75 mg) by mouth daily 90 tablet 3     carvedilol (COREG) 25 MG tablet Take 1 tablet (25 mg) by mouth 2 times daily (with meals) 180 tablet 3     hydrALAZINE (APRESOLINE) 50 MG tablet Take 1 tablet (50 mg) by mouth 3 times daily 270 tablet 3     isosorbide mononitrate  (IMDUR) 60 MG 24 hr tablet Take 2 tablets (120 mg) by mouth daily 180 tablet 3     amLODIPine (NORVASC) 10 MG tablet Take 1 tablet (10 mg) by mouth daily 90 tablet 4     atorvastatin (LIPITOR) 40 MG tablet Take 1 tablet (40 mg) by mouth daily 90 tablet 3     pantoprazole (PROTONIX) 40 MG enteric coated tablet Take 40 mg by mouth daily       Multiple Vitamins-Minerals (CENTRUM SILVER) per tablet Take 1 tablet by mouth daily.       Calcium Carb-Cholecalciferol (CALTRATE 600+D) 600-800 MG-UNIT TABS Take 1 tablet by mouth daily.       [DISCONTINUED] potassium chloride SA (K-DUR/KLOR-CON M) 10 MEQ CR tablet Take 1 tablet (10 mEq) by mouth daily 90 tablet 3     [DISCONTINUED] torsemide (DEMADEX) 10 MG tablet Take 2 tablets (20 mg) by mouth daily 90 tablet 3     ACCU-CHEK MARY PLUS test strip TEST THREE TIMES DAILY 300 strip 1     B-D U/F 31G X 8 MM insulin pen needle        No facility-administered encounter medications on file as of 2/13/2017.       ASSESSMENT AND PLAN:   1.  Chronic systolic heart failure with recent weight gain of unclear etiology, now back to baseline of about 185-190 pounds.  At this point, his creatinine is up to 1.69 from a baseline of about 1.4, BUN of 33, potassium 4.2.  I did increase his spironolactone in order to get him off potassium and give him a little better diuresis.  I am going to continue his spironolactone at 50 mg daily.  We are going to switch his torsemide from 20 mg daily to 20 mg alternating with 10 mg on an every other day pattern.  He will discontinue his potassium completely.  Otherwise will continue on 50 mg of hydralazine t.i.d., 120 mg of Imdur daily, 10 mg of amlodipine daily.  In addition, he is on 25 mg bid of carvedilol daily.  Again, we are unclear of his cause of exacerbation.  We will consider echo at next visit if he is still having any difficulty at all.   2.  Cough, ongoing x4 weeks without any sinus congestion, fevers or chills.  I am going to do a chest x-ray  today, a PA and lateral.  If this is not revealing, we will ask him to follow up with Dr. Gee regarding this.   3.  Coronary artery disease with history of NSTEMI and drug-eluting stent to the vein graft to the RCA, maintained on Plavix.  Allergic to aspirin.   4.  Hyperlipidemia.  Excellent control.   5.  History of AFib noted at time of his CAB, none since then.   6.  Insomnia, improved with diuresis, it does seem that when he becomes hypervolemic he cannot sleep, although he does not have clear orthopnea.      Thank you for allowing me to participate in this delightful patient's care.  We will see him back in 1 month with labs prior.     Sincerely,    Erica Beach PA-C     [facility]

## 2017-02-14 NOTE — PROGRESS NOTES
PRIMARY CARDIOLOGIST:  Dr. Charles.      REASON FOR VISIT:  Heart failure followup.      HISTORY OF PRESENT ILLNESS:  Mr. Coleman is a delightful 81-year-old gentleman with past cardiac history as follows:   1.  Coronary artery disease with history of CABG in 2000 with LIMA to LAD, saphenous vein graft to the OM and saphenous vein graft to the RPA and RPLA sequentially.  He had an NSTEMI in 09/2016 where he underwent a drug-eluting stent to the proximal and distal portions of the vein graft to the RPDA and RPLA, had a patent LIMA to the LAD, but occluded graft to the OM1.   2.  Ischemic cardiomyopathy with EF 40%-45% and associated chronic systolic heart failure.   3.  Hypertension.   4.  Hyperlipidemia.   5.  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   6.  Mild to moderate mitral regurgitation and tricuspid regurgitation.      I had initially met him in the fall after he presented with an NSTEMI and was having a difficult time with shortness of breath, with readmissions for orthopnea, PND with weight gain.  His weight had peaked at 210 pounds.  We had slowly diuresed him back to 185 pounds.  We decreased his torsemide back to 10 and increased his spironolactone, and he did well until late January and his weight again peaked up to 204 pounds.  We could not identify a trigger other than he had had a cough for a couple of weeks.  At that time, he also had abdominal distention and gas.  We have been diuresing him slowly.      He comes in today for followup.  His weight is now down to 187 pounds at home.  The lowest it has been in the last week is 185.  This is another 4 pounds from when I saw him a week ago.  He continues to feel okay except he still gets weak carrying stuff up stairs.  His abdomen is better.  He has a cough, and he tells me he has had this cough for over a month now.  It is nonproductive.  He has no fevers or chills.  He thought it was going away, and now it seems to  have worsened.  He has no orthopnea or PND.  He previously would not wake up necessarily gasping for air but with his mind racing.  This seems to resolve when we diurese him.      SOCIAL HISTORY:  He is single, never , former smoker, quit in 1998, daily alcohol, no drug use.  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      PHYSICAL EXAMINATION:   GENERAL:  Well-developed, well-nourished, elderly gentleman, hard of hearing.   HEENT:  Normocephalic, atraumatic.   HEART:  Regular in rate and rhythm with a 2/6 systolic murmur heard best over lower left sternal border.   LUNGS:  Clear today.  No wheezes, rales or rhonchi.   EXTREMITIES:  Without edema.   NECK:  Neck veins are just 2 cm above the sternal notch.   SKIN:  Warm and dry.      ASSESSMENT AND PLAN:   1.  Chronic systolic heart failure with recent weight gain of unclear etiology, now back to baseline of about 185-190 pounds.  At this point, his creatinine is up to 1.69 from a baseline of about 1.4, BUN of 33, potassium 4.2.  I did increase his spironolactone in order to get him off potassium and give him a little better diuresis.  I am going to continue his spironolactone at 50 mg daily.  We are going to switch his torsemide from 20 mg daily to 20 mg alternating with 10 mg on an every other day pattern.  He will discontinue his potassium completely.  Otherwise will continue on 50 mg of hydralazine t.i.d., 120 mg of Imdur daily, 10 mg of amlodipine daily.  In addition, he is on 25 mg bid of carvedilol daily.  Again, we are unclear of his cause of exacerbation.  We will consider echo at next visit if he is still having any difficulty at all.   2.  Cough, ongoing x4 weeks without any sinus congestion, fevers or chills.  I am going  to do a chest x-ray today, a PA and lateral.  If this is not revealing, we will ask him to follow up with Dr. Gee regarding this.   3.  Coronary artery disease with history of NSTEMI and drug-eluting stent to the vein graft to the RCA, maintained on Plavix.  Allergic to aspirin.   4.  Hyperlipidemia.  Excellent control.   5.  History of AFib noted at time of his CAB, none since then.   6.  Insomnia, improved with diuresis, it does seem that when he becomes hypervolemic he cannot sleep, although he does not have clear orthopnea.      Thank you for allowing me to participate in this delightful patient's care.  We will see him back in 1 month with labs prior.      ALIX Locke PA-C             D: 2017 16:43   T: 2017 01:48   MT: KARLEE      Name:     MARLA VÁZQUEZ   MRN:      [id number]        Account:      [id number]   :      1935           Service Date: 2017      Document: [id number]

## 2017-03-01 NOTE — TELEPHONE ENCOUNTER
"Pt called, his wt is down another 7# since he saw Jean Marie on 2/14. Wt today is 180#, down from 187# on 2/14. Pt is feeling well and states he is \"not retaining any more liquid\". Pt denies SOB, swelling, or dizziness. Verified medications with pt. He is no longer taking KCL. He is taking Torsemide 20mg every other day, alternating with 10mg every other day, and Spironolactone 50mg daily. Pt states he is in need of refill on Spironolactone since we increased dose. Will send Rx to [facility]'s as requested. Pt also states since he kept losing wt, the last 2 days has only taken 10mg of torsemide. Pt's next OV 3/13 with Jean Marie and a BMP. Will review with Jean Marie. MIMI Montenegro  "

## 2017-03-02 NOTE — TELEPHONE ENCOUNTER
Given wt loss please repeat bmp, ok to refill spironolactone for 50 mg daily Erica Beach PA-C 3/2/2017 10:05 AM

## 2017-03-02 NOTE — TELEPHONE ENCOUNTER
Called pt, reviewed I sent refill to [facility] for Spironolactone. Also reviewed need for BMP given wt loss and want to make sure not getting dehydrated. Pt agreed. Scheduled him for lab tomorrow 3/3. Will call pt tomorrow with results. Also told pt, depending on results, may be able to cancel BMP for 3/13 prior to visit with Jean Marie. MIMI Montenegro

## 2017-03-03 NOTE — TELEPHONE ENCOUNTER
BMP noted in EPIC, Creatinine improved. Will review with Jean Marie if pt still needs labs prior to visit on 3/13. Moni, RN   Component      Latest Ref Rng & Units 2/6/2017 2/13/2017 3/3/2017   Sodium      136 - 145 mmol/L 142 141 138   Potassium      3.5 - 5.1 mmol/L 3.8 4.2 3.8   Chloride      98 - 107 mmol/L 100 103 104   Carbon Dioxide      23 - 29 mmol/L 28 27 24   Anion Gap      6 - 17 mmol/L 17.8 (H) 15.2 13.8   Glucose      70 - 105 mg/dL 168 (H) 175 (H) 237 (H)   Urea Nitrogen      7 - 30 mg/dL 30 33 (H) 23   Creatinine      0.70 - 1.30 mg/dL 1.56 (H) 1.69 (H) 1.49 (H)   GFR Estimate      >60 mL/min/1.7m2 43 (L) 39 (L) 45 (L)   GFR Estimate If Black      >60 mL/min/1.7m2 52 (L) 47 (L) 55 (L)   Calcium      8.5 - 10.5 mg/dL 9.5 9.4 9.9

## 2017-03-03 NOTE — TELEPHONE ENCOUNTER
Called pt, reviewed improved BMP, no signs of dehydration. Told pt to continue current medications and keep labs prior to visit on 3/13. Pt agreed. MIMI Montenegro

## 2017-03-13 NOTE — PATIENT INSTRUCTIONS
Call CORE nurse for any questions or concerns:    Geeta Muñoz, or Venus: [phone]   If you have concerns after hours, please call [phone], option 2    1. Medication changes:  For now stop taking your Lipitor/ atorvastatin.  Let's see if your leg weakness gets better- try staying off it for 2 weeks.  If your weakness get's better please call, if it doesn't please go back on the medicine.      2. Weigh yourself daily and write it down.   CALL IF YOUR WEIGHT HITS 185- WE'LL TALK THROUGH IF YOU NEED MORE WATER PILL.      3. Call CORE nurse if your weight is up more than 2 pounds in one day, or 5 pounds in one week.    4. Call CORE nurse if you feel more short of breath, have more abdominal bloating or leg swelling.    5. Continue low sodium diet (less than 2000mg daily). If you eat less salt, you will retain less fluid.     6. Lab results:   Component      Latest Ref Rng & Units 3/3/2017 3/13/2017   Sodium      136 - 145 mmol/L 138 141   Potassium      3.5 - 5.1 mmol/L 3.8 3.9   Chloride      98 - 107 mmol/L 104 106   Carbon Dioxide      23 - 29 mmol/L 24 24   Anion Gap      6 - 17 mmol/L 13.8 14.9   Glucose      70 - 105 mg/dL 237 (H) 166 (H)   Urea Nitrogen      7 - 30 mg/dL 23 26   Creatinine      0.70 - 1.30 mg/dL 1.49 (H) 1.39 (H)   GFR Estimate      >60 mL/min/1.7m2 45 (L) 49 (L)   GFR Estimate If Black      >60 mL/min/1.7m2 55 (L) 59 (L)   Calcium      8.5 - 10.5 mg/dL 9.9 9.7       **Do NOT take Aleve or Ibuprofen without checking with your doctor first    [facility]: Cardiomyopathy, Optimization, Rehabilitation, Education   The [facility] is a heart failure specialty clinic within the [facility] where you will work with specialized nurse practioners, physician assistants, doctors and registered nurses. They are dedicated to helping patients with heart failure to carefully adjust medications, receive education, and learn who and when to call if symptoms  develop. They specialize in helping you better understand your condition, slow the progression of your disease, improve the length and quality of your life, help you detect future heart problems before they become life threatening, and avoid hospitalizations.

## 2017-03-13 NOTE — LETTER
3/13/2017    Etienne Gee MD  [facility]   [address]  [facility] [address]    RE: Kelechi Coleman       Dear Colleague,    I had the pleasure of seeing Kelechi Coleman in the [facility].    PRIMARY CARDIOLOGIST:  Dr. Charles.      REASON FOR VISIT:  Heart failure followup.      Mr. Coleman is a delightful 81-year-old gentleman with past cardiac history significant for the followin.  Coronary artery disease with history of CAB in  with LIMA to the LAD, saphenous vein graft to the OM and vein graft to the RPDA and RPLA sequentially.  He had an NSTEMI in 2016 where he underwent a drug-eluting stent to the proximal and distal portions of the vein graft to the RPDA and RPLA.  LIMA to the LAD was patent, but he had an occluded graft to the OM1.   2.  Ischemic cardiomyopathy with EF 40%-45% with chronic systolic heart failure.   3.  Hypertension.   4.  Hyperlipidemia.   5.  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   6.  Mild to moderate MR and TR.      I had met him in the fall when he presented with NSTEMI and had a difficult time with shortness of breath readmissions, orthopnea or PND.  His weight had peaked at 210 pounds, and we diuresed him back down to 185 pounds.  We decreased his torsemide back to 10 and increased his spironolactone, but unfortunately his weight again peaked at 204 pounds in late January.  I was unable to identify a trigger, but he has diuresed nicely.      I saw him a month ago, and his weight had been down to 187.  Since that time, he has now dropped down to 180 pounds, right around 182 over the last 3 days.  He feels well.  He denies shortness of breath, orthopnea or PND.  He can walk further.  He walked all the way across the skyway without stopping and walked to the cafeteria and back.  His most difficult thing is carrying something going up stairs.  If he just goes upstairs, he  is okay, but if he has to carry laundry he gets very fatigued and has to stop and rest.  He also complains of leg weakness.      SOCIAL HISTORY:  He is single, never , former smoker, quit in 1998.  Daily alcohol, no drug use.  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      PHYSICAL EXAMINATION:   GENERAL:  Well-developed, well-nourished, delightful gentleman in no acute distress.   HEENT:  Normocephalic, atraumatic.   HEART:  Regular with early beats consistent with PVCs.  He has a 2/6 systolic murmur heard best at the lower left sternal border.   LUNGS:  Clear.  Good airflow deep bilateral lobes.   EXTREMITIES:  Without peripheral edema.   NECK:  Neck veins are flat at 30 degrees.     Outpatient Encounter Prescriptions as of 3/13/2017   Medication Sig Dispense Refill     spironolactone (ALDACTONE) 50 MG tablet Take 1 tablet (50 mg) by mouth daily 90 tablet 3     torsemide (DEMADEX) 10 MG tablet Take 20 mg (2 tabs) every other day with 10 mg (1 tab) on the alternate day. 135 tablet 3     [DISCONTINUED] insulin glargine (LANTUS) 100 UNIT/ML PEN Inject 35-40 Units Subcutaneous At Bedtime 36 mL 0     clopidogrel (PLAVIX) 75 MG tablet Take 1 tablet (75 mg) by mouth daily 90 tablet 3     carvedilol (COREG) 25 MG tablet Take 1 tablet (25 mg) by mouth 2 times daily (with meals) 180 tablet 3     hydrALAZINE (APRESOLINE) 50 MG tablet Take 1 tablet (50 mg) by mouth 3 times daily 270 tablet 3     isosorbide mononitrate (IMDUR) 60 MG 24 hr tablet Take 2 tablets (120 mg) by mouth daily 180 tablet 3     amLODIPine (NORVASC) 10 MG tablet Take 1 tablet (10 mg) by mouth daily 90 tablet 4     [DISCONTINUED] atorvastatin (LIPITOR) 40 MG tablet Take 1 tablet (40 mg) by mouth daily  90 tablet 3     [DISCONTINUED] pantoprazole (PROTONIX) 40 MG enteric coated tablet Take 40 mg by mouth daily       Multiple Vitamins-Minerals (CENTRUM SILVER) per tablet Take 1 tablet by mouth daily.       Calcium Carb-Cholecalciferol (CALTRATE 600+D) 600-800 MG-UNIT TABS Take 1 tablet by mouth daily.       [DISCONTINUED] spironolactone (ALDACTONE) 25 MG tablet Take 2 tablets (50 mg) by mouth daily 180 tablet 3     ACCU-CHEK MARY PLUS test strip TEST THREE TIMES DAILY 300 strip 1     B-D U/F 31G X 8 MM insulin pen needle        No facility-administered encounter medications on file as of 3/13/2017.         ASSESSMENT AND PLAN:   1.  Chronic systolic heart failure with a dry weight somewhere between 180-185 pounds.  He is currently maintained on 20 mg of torsemide alternating with 10 mg every other day and 50 mg of spironolactone.  He has not required potassium replacement with this.  For his mild cardiomyopathy, we also have him on carvedilol 25 mg b.i.d., hydralazine 50 mg t.i.d. and Imdur 120 mg daily.  He is also on 10 mg of amlodipine for hypertension.  He is currently euvolemic, class II symptoms.   2.  Leg weakness.  This is only on climbing stairs with a heavy load.  I wonder if he could have a little bit of statin intolerance.  We are going to have him do a trial off of his Lipitor for 2 weeks.  If his weakness improves, we would probably try a low dose of Crestor.  If his leg weakness does not improve, would continue it and consider workup for peripheral arterial disease.   3.  Hypertension, well controlled.   4.  Hyperlipidemia, excellent control.   5.  Mild to moderate MR and TR.  This is consistent on exam.  Last echo was done in September and can be repeated at that time.      I am going to see him back in about 2 months.  We will probably have him see Dr. Charles at the 1 year rama from his MI.  He can call sooner with sooner with concerns.  Thank you for allowing me to participate in his care.      Again, thank you for allowing me to participate in the care of your patient.      Sincerely,    Erica Beach PA-C     [facility]

## 2017-03-13 NOTE — PROGRESS NOTES
[id number]  HPI and Plan:   See dictation    Orders this Visit:  Orders Placed This Encounter   Procedures     Basic metabolic panel     Follow-Up with [facility] - ALVARO visit     Orders Placed This Encounter   Medications     spironolactone (ALDACTONE) 50 MG tablet     Sig: Take 1 tablet (50 mg) by mouth daily     Dispense:  90 tablet     Refill:  3     Medications Discontinued During This Encounter   Medication Reason     spironolactone (ALDACTONE) 25 MG tablet Reorder         Encounter Diagnoses   Name Primary?     Chronic systolic heart failure (H)      Chronic systolic congestive heart failure (H)        CURRENT MEDICATIONS:  Current Outpatient Prescriptions   Medication Sig Dispense Refill     spironolactone (ALDACTONE) 50 MG tablet Take 1 tablet (50 mg) by mouth daily 90 tablet 3     torsemide (DEMADEX) 10 MG tablet Take 20 mg (2 tabs) every other day with 10 mg (1 tab) on the alternate day. 135 tablet 3     insulin glargine (LANTUS) 100 UNIT/ML PEN Inject 35-40 Units Subcutaneous At Bedtime 36 mL 0     clopidogrel (PLAVIX) 75 MG tablet Take 1 tablet (75 mg) by mouth daily 90 tablet 3     carvedilol (COREG) 25 MG tablet Take 1 tablet (25 mg) by mouth 2 times daily (with meals) 180 tablet 3     hydrALAZINE (APRESOLINE) 50 MG tablet Take 1 tablet (50 mg) by mouth 3 times daily 270 tablet 3     isosorbide mononitrate (IMDUR) 60 MG 24 hr tablet Take 2 tablets (120 mg) by mouth daily 180 tablet 3     amLODIPine (NORVASC) 10 MG tablet Take 1 tablet (10 mg) by mouth daily 90 tablet 4     atorvastatin (LIPITOR) 40 MG tablet Take 1 tablet (40 mg) by mouth daily 90 tablet 3     pantoprazole (PROTONIX) 40 MG enteric coated tablet Take 40 mg by mouth daily       Multiple Vitamins-Minerals (CENTRUM SILVER) per tablet Take 1 tablet by mouth daily.       Calcium Carb-Cholecalciferol (CALTRATE 600+D) 600-800 MG-UNIT TABS Take 1 tablet by mouth daily.       [DISCONTINUED] spironolactone (ALDACTONE) 25 MG tablet Take 2 tablets (50  mg) by mouth daily 180 tablet 3     ACCU-CHEK MARY PLUS test strip TEST THREE TIMES DAILY 300 strip 1     B-D U/F 31G X 8 MM insulin pen needle          ALLERGIES     Allergies   Allergen Reactions     Aspirin Hives     Penicillins Hives     Contrast Dye Hives       PAST MEDICAL HISTORY:  Past Medical History   Diagnosis Date     Acute exacerbation of CHF (congestive heart failure) (H) 9/2/2016     Atrial fibrillation (H)      Cerebral infarction (H) 12/13/2013, 9/2016      Diagnosis updated by automated process. Provider to review and confirm.     Coronary artery disease      Diabetes (H)      Hyperlipidaemia      Hypertension      Myocardial infarction (H)      PAD (peripheral artery disease) (H)      Stented coronary artery      CABG 2002 4 VESSEL     Type 2 diabetes mellitus with stage 3 chronic kidney disease, with long-term current use of insulin (H) 11/4/2016     Unspecified cerebral artery occlusion with cerebral infarction        PAST SURGICAL HISTORY:  Past Surgical History   Procedure Laterality Date     Coronary artery bypass  2002     LIMA-LAD, SVG-OM1, [id number]-IBFB     C mra upper extremity wo&w cont       Ent surgery       VOCAL CORD LESION REMOVED     Vascular surgery       ANGIOPLASTY LEFT LEG FOR pvd     Genitourinary surgery       ANGIOPLASTY FOR RENAL ARTERT STENOSIS     Phacoemulsification clear cornea with standard intraocular lens implant Left 1/22/2015     Procedure: PHACOEMULSIFICATION CLEAR CORNEA WITH STANDARD INTRAOCULAR LENS IMPLANT;  Surgeon: Bart Johnson MD;  Location: [facility]     Heart cath left heart cath  11/30/2001      left heart catheterization  9/7/2016 09/07/2016: CHUCHO x2 to distal and proximal SVG to to RPDA       FAMILY HISTORY:  Family History   Problem Relation Age of Onset     DIABETES Mother      CANCER Maternal Grandmother      Family History Negative Father        SOCIAL HISTORY:  Social History     Social History     Marital status: Single     Spouse name:  "N/A     Number of children: N/A     Years of education: N/A     Social History Main Topics     Smoking status: Former Smoker     Years: 40.00     Types: Pipe     Quit date: 12/18/1998     Smokeless tobacco: Never Used     Alcohol use 0.0 oz/week     0 Standard drinks or equivalent per week      Comment: 3 hard liquor drinks daily     Drug use: No     Sexual activity: Not Currently     Partners: Female     Other Topics Concern     Parent/Sibling W/ Cabg, Mi Or Angioplasty Before 65f 55m? No     Caffeine Concern Yes     decaff only      Stress Concern No     Special Diet Yes     low sodium      Exercise Yes     walking, rehab 3 times a week      Seat Belt Yes     Social History Narrative       Review of Systems:  Skin:  Negative     Eyes:  Negative    ENT:  Negative    Respiratory:  Negative for shortness of breath;cough;wheezing  Cardiovascular:  Negative for;palpitations;chest pain;lightheadedness;dizziness;edema fatigue  Gastroenterology: Negative    Genitourinary:  Negative    Musculoskeletal:  Negative    Neurologic:  Negative    Psychiatric:  Negative    Heme/Lymph/Imm:  Negative    Endocrine:  Positive for diabetes    Physical Exam:  Vitals: /76  Pulse 72  Ht 1.88 m (6' 2\")  Wt 85.8 kg (189 lb 1.6 oz)  SpO2 100%  BMI 24.28 kg/m2   Please refer to dictation for physical exam    Recent Lab Results:  LIPID RESULTS:  Lab Results   Component Value Date    CHOL 139 09/06/2016    HDL 52 09/06/2016    LDL 62 09/06/2016    TRIG 123 09/06/2016    CHOLHDLRATIO 3.5 05/09/2013       LIVER ENZYME RESULTS:  Lab Results   Component Value Date    AST 15 09/19/2016    ALT 20 09/19/2016       CBC RESULTS:  Lab Results   Component Value Date    WBC 11.5 (H) 09/19/2016    RBC 3.50 (L) 09/19/2016    HGB 11.6 (L) 10/26/2016    HCT 32.3 (L) 09/19/2016    MCV 92 09/19/2016    MCH 30.6 09/19/2016    MCHC 33.1 09/19/2016    RDW 13.7 09/19/2016     09/19/2016       BMP RESULTS:  Lab Results   Component Value Date     " 03/13/2017    POTASSIUM 3.9 03/13/2017    CHLORIDE 106 03/13/2017    CO2 24 03/13/2017    ANIONGAP 14.9 03/13/2017     (H) 03/13/2017    BUN 26 03/13/2017    BUN 14.2 01/23/2013    CR 1.39 (H) 03/13/2017    GFRESTIMATED 49 (L) 03/13/2017    GFRESTBLACK 59 (L) 03/13/2017    MIGUEL 9.7 03/13/2017        A1C RESULTS:  Lab Results   Component Value Date    A1C 7.2 (H) 02/06/2017       INR RESULTS:  Lab Results   Component Value Date    INR 1.10 09/19/2016    INR 1.04 09/16/2016           CC  Erica Beach PA-C  [facility] HEART AT [place] [address]  [place] [address]

## 2017-03-13 NOTE — MR AVS SNAPSHOT
After Visit Summary   3/13/2017    Kelechi Coleman    MRN: [id number]           Patient Information     Date Of Birth          1935        Visit Information        Provider Department      3/13/2017 2:30 PM More, Erica Ferreira PA-C [facility] AT [place]        Today's Diagnoses     Chronic systolic heart failure (H)        Chronic systolic congestive heart failure (H)          Care Instructions    Call [facility] nurse for any questions or concerns:    Geeta Muñoz or Anne: [phone]   If you have concerns after hours, please call [phone], option 2    1. Medication changes:  For now stop taking your Lipitor/ atorvastatin.  Let's see if your leg weakness gets better- try staying off it for 2 weeks.  If your weakness get's better please call, if it doesn't please go back on the medicine.      2. Weigh yourself daily and write it down.   CALL IF YOUR WEIGHT HITS 185- WE'LL TALK THROUGH IF YOU NEED MORE WATER PILL.      3. Call C[facility] nurse if your weight is up more than 2 pounds in one day, or 5 pounds in one week.    4. Call [facility] nurse if you feel more short of breath, have more abdominal bloating or leg swelling.    5. Continue low sodium diet (less than 2000mg daily). If you eat less salt, you will retain less fluid.     6. Lab results:   Component      Latest Ref Rng & Units 3/3/2017 3/13/2017   Sodium      136 - 145 mmol/L 138 141   Potassium      3.5 - 5.1 mmol/L 3.8 3.9   Chloride      98 - 107 mmol/L 104 106   Carbon Dioxide      23 - 29 mmol/L 24 24   Anion Gap      6 - 17 mmol/L 13.8 14.9   Glucose      70 - 105 mg/dL 237 (H) 166 (H)   Urea Nitrogen      7 - 30 mg/dL 23 26   Creatinine      0.70 - 1.30 mg/dL 1.49 (H) 1.39 (H)   GFR Estimate      >60 mL/min/1.7m2 45 (L) 49 (L)   GFR Estimate If Black      >60 mL/min/1.7m2 55 (L) 59 (L)   Calcium      8.5 - 10.5 mg/dL 9.9 9.7       **Do NOT take Aleve or Ibuprofen without checking with your  doctor first    [facility]: Cardiomyopathy, Optimization, Rehabilitation, Education   The [facility] is a heart failure specialty clinic within the [facility] where you will work with specialized nurse practioners, physician assistants, doctors and registered nurses. They are dedicated to helping patients with heart failure to carefully adjust medications, receive education, and learn who and when to call if symptoms develop. They specialize in helping you better understand your condition, slow the progression of your disease, improve the length and quality of your life, help you detect future heart problems before they become life threatening, and avoid hospitalizations.        Follow-ups after your visit        Additional Services     Follow-Up with [facility] - ALVARO visit                 Your next 10 appointments already scheduled     May 08, 2017 10:30 AM CDT   Office Visit with Etienne Gee MD   [facility] ([facility])    [address] [address] [phone]           Bring a current list of meds and any records pertaining to this visit.  For Physicals, please bring immunization records and any forms needing to be filled out.  Please arrive 10 minutes early to complete paperwork.              Future tests that were ordered for you today     Open Future Orders        Priority Expected Expires Ordered    Basic metabolic panel Routine 5/20/2017 3/13/2018 3/13/2017    Follow-Up with [facility] - ALVARO visit Routine 5/13/2017 3/13/2018 3/13/2017            Who to contact     If you have questions or need follow up information about today's clinic visit or your schedule please contact [facility] PHYSICIANS HEART AT [place] directly at [phone].  Normal or non-critical lab and imaging results will be communicated to you by MyChart, letter or phone within 4 business days after the clinic has received the results. If  "you do not hear from us within 7 days, please contact the clinic through [employer] or phone. If you have a critical or abnormal lab result, we will notify you by phone as soon as possible.  Submit refill requests through [employer] or call your pharmacy and they will forward the refill request to us. Please allow 3 business days for your refill to be completed.          Additional Information About Your Visit        [employer] Information     [employer] gives you secure access to your electronic health record. If you see a primary care provider, you can also send messages to your care team and make appointments. If you have questions, please call your primary care clinic.  If you do not have a primary care provider, please call [phone] and they will assist you.        Care EveryWhere ID     This is your Care EveryWhere ID. This could be used by other organizations to access your [facility] medical records  [id number]        Your Vitals Were     Pulse Height Pulse Oximetry BMI (Body Mass Index)          72 1.88 m (6' 2\") 100% 24.28 kg/m2         Blood Pressure from Last 3 Encounters:   03/13/17 124/76   02/13/17 116/58   02/06/17 132/74    Weight from Last 3 Encounters:   03/13/17 85.8 kg (189 lb 1.6 oz)   02/13/17 88.1 kg (194 lb 4.8 oz)   02/06/17 88.2 kg (194 lb 8 oz)              We Performed the Following     Follow-Up with [facility] - ALVARO visit          Today's Medication Changes          These changes are accurate as of: 3/13/17  3:20 PM.  If you have any questions, ask your nurse or doctor.               These medicines have changed or have updated prescriptions.        Dose/Directions    spironolactone 50 MG tablet   Commonly known as:  ALDACTONE   This may have changed:  medication strength   Used for:  Chronic systolic congestive heart failure (H)   Changed by:  Erica Beach PA-C        Dose:  50 mg   Take 1 tablet (50 mg) by mouth daily   Quantity:  90 tablet   Refills:  3            Where to get " your medicines      These medications were sent to [id number] - [place], [place] - [address] AT [facility] OF  [address]  [address], [place] [address]     Phone:  [phone]     spironolactone 50 MG tablet                Primary Care Provider Office Phone # Fax #    Etienne Gee [phone]       [facility] [address] [facility] [address]        Goals        Exercise    I will exercise 2x per week (15 min per time) , for the next 6 weeks.     Notes - Note created  12/22/2016 12:14 PM by Ting Bullard RN    As of today's date 12/22/2016 goal is met at 0 - 25%.   Goal Status:  Active           General    I will not add any salt to my food and will limit my salt intake to 2,000 mg of sodium per day. (pt-stated)     Notes - Note created  9/26/2016  3:53 PM by Ting Bullard RN    As of today's date 9/26/2016 goal is met at 26 - 50%.   Goal Status:  Active        I will weigh myself every day and call my clinic if I am more than 2 pounds heavier in a day or 5 pounds heavier in a week.  (pt-stated)     Notes - Note created  9/26/2016  3:52 PM by Ting Bullard, RN    As of today's date 9/26/2016 goal is met at 26 - 50%.   Goal Status:  Active          Thank you!     Thank you for choosing [facility] AT [place]  for your care. Our goal is always to provide you with excellent care. Hearing back from our patients is one way we can continue to improve our services. Please take a few minutes to complete the written survey that you may receive in the mail after your visit with us. Thank you!             Your Updated Medication List - Protect others around you: Learn how to safely use, store and throw away your medicines at www.disposemymeds.org.          This list is accurate as of: 3/13/17  3:20 PM.  Always use your most recent med list.                   Brand Name Dispense Instructions for  use    ACCU-CHEK MARY PLUS test strip   Generic drug:  blood glucose monitoring     300 strip    TEST THREE TIMES DAILY       amLODIPine 10 MG tablet    NORVASC    90 tablet    Take 1 tablet (10 mg) by mouth daily       B-D U/F 31G X 8 MM   Generic drug:  insulin pen needle          CALTRATE 600+D 600-800 MG-UNIT Tabs   Generic drug:  Calcium Carb-Cholecalciferol      Take 1 tablet by mouth daily.       carvedilol 25 MG tablet    COREG    180 tablet    Take 1 tablet (25 mg) by mouth 2 times daily (with meals)       CENTRUM SILVER per tablet      Take 1 tablet by mouth daily.       clopidogrel 75 MG tablet    PLAVIX    90 tablet    Take 1 tablet (75 mg) by mouth daily       hydrALAZINE 50 MG tablet    APRESOLINE    270 tablet    Take 1 tablet (50 mg) by mouth 3 times daily       insulin glargine 100 UNIT/ML injection    LANTUS    36 mL    Inject 35-40 Units Subcutaneous At Bedtime       isosorbide mononitrate 60 MG 24 hr tablet    IMDUR    180 tablet    Take 2 tablets (120 mg) by mouth daily       LIPITOR 40 MG tablet   Generic drug:  atorvastatin     90 tablet    Take 1 tablet (40 mg) by mouth daily       pantoprazole 40 MG EC tablet    PROTONIX     Take 40 mg by mouth daily       spironolactone 50 MG tablet    ALDACTONE    90 tablet    Take 1 tablet (50 mg) by mouth daily       torsemide 10 MG tablet    DEMADEX    135 tablet    Take 20 mg (2 tabs) every other day with 10 mg (1 tab) on the alternate day.

## 2017-03-13 NOTE — PROGRESS NOTES
Reviewed during clinic visit.  Please see progress note for plan.  Erica Beach PA-C 3/13/2017 4:01 PM

## 2017-03-14 NOTE — PROGRESS NOTES
PRIMARY CARDIOLOGIST:  Dr. Charles.      REASON FOR VISIT:  Heart failure followup.      HISTORY OF PRESENT ILLNESS:  Mr. Coleman is a delightful 81-year-old gentleman with past cardiac history significant for the followin.  Coronary artery disease with history of CAB in  with LIMA to the LAD, saphenous vein graft to the OM and vein graft to the RPDA and RPLA sequentially.  He had an NSTEMI in 2016 where he underwent a drug-eluting stent to the proximal and distal portions of the vein graft to the RPDA and RPLA.  LIMA to the LAD was patent, but he had an occluded graft to the OM1.   2.  Ischemic cardiomyopathy with EF 40%-45% with chronic systolic heart failure.   3.  Hypertension.   4.  Hyperlipidemia.   5.  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   6.  Mild to moderate MR and TR.      I had met him in the fall when he presented with NSTEMI and had a difficult time with shortness of breath readmissions, orthopnea or PND.  His weight had peaked at 210 pounds, and we diuresed him back down to 185 pounds.  We decreased his torsemide back to 10 and increased his spironolactone, but unfortunately his weight again peaked at 204 pounds in late January.  I was unable to identify a trigger, but he has diuresed nicely.      I saw him a month ago, and his weight had been down to 187.  Since that time, he has now dropped down to 180 pounds, right around 182 over the last 3 days.  He feels well.  He denies shortness of breath, orthopnea or PND.  He can walk further.  He walked all the way across the skyway without stopping and walked to the cafeteria and back.  His most difficult thing is carrying something going up stairs.  If he just goes upstairs, he is okay, but if he has to carry laundry he gets very fatigued and has to stop and rest.  He also complains of leg weakness.      SOCIAL HISTORY:  He is single, never , former smoker, quit in .  Daily alcohol, no  drug use.  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      PHYSICAL EXAMINATION:   GENERAL:  Well-developed, well-nourished, delightful gentleman in no acute distress.   HEENT:  Normocephalic, atraumatic.   HEART:  Regular with early beats consistent with PVCs.  He has a 2/6 systolic murmur heard best at the lower left sternal border.   LUNGS:  Clear.  Good airflow deep bilateral lobes.   EXTREMITIES:  Without peripheral edema.   NECK:  Neck veins are flat at 30 degrees.      ASSESSMENT AND PLAN:   1.  Chronic systolic heart failure with a dry weight somewhere between 180-185 pounds.  He is currently maintained on 20 mg of torsemide alternating with 10 mg every other day and 50 mg of spironolactone.  He has not required potassium replacement with this.  For his mild cardiomyopathy, we also have him on carvedilol 25 mg b.i.d., hydralazine 50 mg t.i.d. and Imdur 120 mg daily.  He is also on 10 mg of amlodipine for hypertension.  He is currently euvolemic, class II symptoms.   2.  Leg weakness.  This is only on climbing stairs with a heavy load.  I wonder if he could have a little bit of statin intolerance.  We are going to have him do a trial off of his Lipitor for 2 weeks.  If his weakness improves, we would probably try a low dose of Crestor.  If his leg weakness does not improve, would continue it and consider workup for peripheral arterial disease.   3.  Hypertension, well controlled.   4.  Hyperlipidemia, excellent control.   5.  Mild to moderate MR and TR.  This is consistent on exam.  Last echo was done in September and can be repeated at that time.      I am going to see him back in about 2 months.  We will probably have him see Dr. Charles at the 1 year rama from  his MI.  He can call sooner with sooner with concerns.  Thank you for allowing me to participate in his care.      ALIX Locke PA-C             D: 2017 15:28   T: 2017 03:15   MT: KARLEE      Name:     MARLA VÁZQUEZ   MRN:      [id number]        Account:      [id number]   :      1935           Service Date: 2017      Document: [id number]

## 2017-03-27 NOTE — TELEPHONE ENCOUNTER
NICOLATUS SOLOSTAR 100 UNIT/ML soln           Last Written Prescription Date: 11/19/2016  Last Fill Quantity: 36mL, # refills: 0  Last Office Visit with [facility], [facility] or [facility] prescribing provider:  2/6/2017   Next 5 appointments (look out 90 days)     May 08, 2017 10:30 AM CDT   Office Visit with Etienne Gee MD   [facility]    [address] [address] [phone]            Jun 21, 2017  4:15 PM CDT   Return Visit with Isaiah Charles MD   [facility] PHYSICIANS HEART AT [place] ([facility])    [address]  [facility] [address] [phone]                   BP Readings from Last 3 Encounters:   03/13/17 124/76   02/13/17 116/58   02/06/17 132/74     No results found for: MICROL  No results found for: MICROALBUMIN  Creatinine   Date Value Ref Range Status   03/13/2017 1.39 (H) 0.70 - 1.30 mg/dL Final   ]  GFR Estimate   Date Value Ref Range Status   03/13/2017 49 (L) >60 mL/min/1.7m2 Final   03/03/2017 45 (L) >60 mL/min/1.7m2 Final   02/13/2017 39 (L) >60 mL/min/1.7m2 Final     GFR Estimate If Black   Date Value Ref Range Status   03/13/2017 59 (L) >60 mL/min/1.7m2 Final   03/03/2017 55 (L) >60 mL/min/1.7m2 Final   02/13/2017 47 (L) >60 mL/min/1.7m2 Final     Lab Results   Component Value Date    CHOL 139 09/06/2016     Lab Results   Component Value Date    HDL 52 09/06/2016     Lab Results   Component Value Date    LDL 62 09/06/2016     Lab Results   Component Value Date    TRIG 123 09/06/2016     Lab Results   Component Value Date    CHOLHDLRATIO 3.5 05/09/2013     Lab Results   Component Value Date    AST 15 09/19/2016     Lab Results   Component Value Date    ALT 20 09/19/2016     Lab Results   Component Value Date    A1C 7.2 02/06/2017    A1C 7.1 08/11/2016    A1C 7.5 04/06/2016    A1C 8.3 12/18/2015    A1C 7.9 06/24/2015     Potassium   Date Value Ref Range Status   03/13/2017 3.9 3.5 - 5.1 mmol/L Final

## 2017-04-10 NOTE — TELEPHONE ENCOUNTER
pantoprazole (PROTONIX) 40 MG EC tablet      Last Written Prescription Date:  ?  Last Fill Quantity: ?,   # refills: ?  Last Office Visit with [facility], [facility] or [facility] prescribing provider: 2/6/2017  Future Office visit:    Next 5 appointments (look out 90 days)     May 08, 2017 10:30 AM CDT   Office Visit with Etienne Gee MD   [facility] ([facility])    [address] [address] [phone]            Jun 21, 2017  4:15 PM CDT   Return Visit with Isaiah Charles MD   [facility]YS[facility] AT [place] ([facility])    [address]  [facility] [address] [phone]                   Routing refill request to provider for review/approval because:  Medication is reported/historical

## 2017-04-21 NOTE — LETTER
2017    Etienne Gee MD  [facility]   [address]  [facility] [address]    RE: Kelechi Coleman       Dear Colleague,    I had the pleasure of seeing Kelechi Coleman in the [facility].    PRIMARY CARDIOLOGIST:  Isaiah Charles MD      REASON FOR VISIT:  Heart failure followup.      Mr. Coleman is a delightful 81-year-old gentleman with past medical history significant for the followin.  Coronary artery disease with history of CAB in .  This was a LIMA to the LAD, saphenous vein graft to the OM, and saphenous vein graft to the RPDA and RPLA sequentially.  He suffered an NSTEMI in 2016, he had drug-eluting stent to the proximal and distal portions of the vein graft to the RPDA and RPLA.  He had an occluded graft to the OM1 but patent LIMA to the LAD.   2.  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   3.  Hypertension.   4.  Hyperlipidemia.   5.  CKD.   6.  Possible CVA post-MI when he presented in September with left arm and hand weakness.   7.  Mild to moderate mitral regurgitation and tricuspid regurgitation.      I had met him in the  of  when he had profound shortness of breath and his weight had peaked at 200 pounds.  We diuresed him down around 185, cut back his diuretic, but unfortunately this resulted in increased weight gain and again heart failure symptoms.  This time, he peaked at a weight of 204.  We have been managing him now closely and his current diuretic dosing is 20 mg of torsemide alternating with 10 mg torsemide and 50 mg of spironolactone for potassium sparing.      Today, he comes in and he is overall doing well.  He denies chest pain, shortness of breath, orthopnea or PND.  He walked across the skyway, down to the cafeteria and back.  He considers this as his exercise for today.  He is sleeping well.  He does not need a sleep aid.  He overall thinks he is back to  himself.  He is worried that something is going to happen again.  He is concerned, though, about his leg weakness.  He has absolutely no cramping or aching when he climbs stairs, but he has to stop as his legs are weak and will not go further.  He held his statin per her recommendation for 2 weeks, and this did not change his leg weakness.      SOCIAL HISTORY:  He is single, never , former smoker, quit in 1998.  Daily alcohol, no drug use.  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      PHYSICAL EXAMINATION:   GENERAL:  Well-developed, well-nourished pleasant gentleman in no acute distress.   HEENT:  Normocephalic, atraumatic.   HEART:  Regular.  He has a 2/6 systolic murmur heard best at the apex.   LUNGS:  Clear.  Good airflow deep bilateral lobes.   EXTREMITIES:  Without peripheral edema.   NECK:  Veins are flat at 30 degrees.   SKIN:  Warm and dry.     Outpatient Encounter Prescriptions as of 4/21/2017   Medication Sig Dispense Refill     ASPIRIN NOT PRESCRIBED (INTENTIONAL) Please choose reason not prescribed, below 0 each 0     pantoprazole (PROTONIX) 40 MG EC tablet TAKE 1 TABLET BY MOUTH EVERY DAY 90 tablet 0     LANTUS SOLOSTAR 100 UNIT/ML soln ADMINISTER 35 TO 40 UNITS UNDER THE SKIN AT BEDTIME 30 mL 0     spironolactone (ALDACTONE) 50 MG tablet Take 1 tablet (50 mg) by mouth daily 90 tablet 3     torsemide (DEMADEX) 10 MG tablet Take 20 mg (2 tabs) every other day with 10 mg (1 tab) on the alternate day. 135 tablet 3     ACCU-CHEK MARY PLUS test strip TEST THREE TIMES DAILY 300 strip 1     B-D U/F 31G X 8 MM insulin pen needle        clopidogrel (PLAVIX) 75 MG tablet Take 1 tablet (75 mg) by mouth daily 90 tablet 3     carvedilol (COREG) 25 MG tablet Take 1 tablet (25 mg) by mouth 2 times daily (with meals) 180 tablet 3     hydrALAZINE (APRESOLINE) 50 MG tablet Take 1  tablet (50 mg) by mouth 3 times daily 270 tablet 3     isosorbide mononitrate (IMDUR) 60 MG 24 hr tablet Take 2 tablets (120 mg) by mouth daily 180 tablet 3     amLODIPine (NORVASC) 10 MG tablet Take 1 tablet (10 mg) by mouth daily 90 tablet 4     [DISCONTINUED] atorvastatin (LIPITOR) 40 MG tablet Take 1 tablet (40 mg) by mouth daily 90 tablet 3     Multiple Vitamins-Minerals (CENTRUM SILVER) per tablet Take 1 tablet by mouth daily.       Calcium Carb-Cholecalciferol (CALTRATE 600+D) 600-800 MG-UNIT TABS Take 1 tablet by mouth daily.       No facility-administered encounter medications on file as of 4/21/2017.       ASSESSMENT AND PLAN:   1.  Chronic systolic heart failure with EF about 40%-45% with class II symptoms.  Dry weight at home is right around 180 pounds.  We have managed him on torsemide 10 alternating with 20 mg daily with 50 mg of spironolactone.  On less than this, he has done poorly and quickly gained weight.  His creatinine is elevated but at his baseline of 1.48 with a BUN of 27 and need to follow.   2.  Leg weakness.  He does have a history of SFA angioplasty.  I do suspect now after much discussion and off his statin this is likely the case.  I offered him ABIs and vascular ultrasound, both of which he declined.  He has had lots of doctors' appointments, lots of procedure and just wants to feel well for a while.   3.  Hypertension, well controlled.   4.  Hyperlipidemia, excellent control.   5.  Mild to moderate MR and TR.  We will repeat echo in the fall.      I encouraged him to exercise doing 15 minutes of walking and climbing the stairs a couple of extra times a day to get his strength back.  He will consider this.  Otherwise, he will see Dr. Charles back in June and myself back in late summer or fall.  Thank you for allowing me to participate in his care.     Sincerely,    Erica Beach PA-C     [facility]

## 2017-04-21 NOTE — PROGRESS NOTES
[id number]  HPI and Plan:   See dictation    Orders this Visit:  Orders Placed This Encounter   Procedures     Basic metabolic panel     Follow-Up with [facility] - ALVARO visit     No orders of the defined types were placed in this encounter.    There are no discontinued medications.      Encounter Diagnosis   Name Primary?     Chronic systolic congestive heart failure (H)        CURRENT MEDICATIONS:  Current Outpatient Prescriptions   Medication Sig Dispense Refill     pantoprazole (PROTONIX) 40 MG EC tablet TAKE 1 TABLET BY MOUTH EVERY DAY 90 tablet 0     LANTUS SOLOSTAR 100 UNIT/ML soln ADMINISTER 35 TO 40 UNITS UNDER THE SKIN AT BEDTIME 30 mL 0     spironolactone (ALDACTONE) 50 MG tablet Take 1 tablet (50 mg) by mouth daily 90 tablet 3     torsemide (DEMADEX) 10 MG tablet Take 20 mg (2 tabs) every other day with 10 mg (1 tab) on the alternate day. 135 tablet 3     ACCU-CHEK MARY PLUS test strip TEST THREE TIMES DAILY 300 strip 1     B-D U/F 31G X 8 MM insulin pen needle        clopidogrel (PLAVIX) 75 MG tablet Take 1 tablet (75 mg) by mouth daily 90 tablet 3     carvedilol (COREG) 25 MG tablet Take 1 tablet (25 mg) by mouth 2 times daily (with meals) 180 tablet 3     hydrALAZINE (APRESOLINE) 50 MG tablet Take 1 tablet (50 mg) by mouth 3 times daily 270 tablet 3     isosorbide mononitrate (IMDUR) 60 MG 24 hr tablet Take 2 tablets (120 mg) by mouth daily 180 tablet 3     amLODIPine (NORVASC) 10 MG tablet Take 1 tablet (10 mg) by mouth daily 90 tablet 4     atorvastatin (LIPITOR) 40 MG tablet Take 1 tablet (40 mg) by mouth daily 90 tablet 3     Multiple Vitamins-Minerals (CENTRUM SILVER) per tablet Take 1 tablet by mouth daily.       Calcium Carb-Cholecalciferol (CALTRATE 600+D) 600-800 MG-UNIT TABS Take 1 tablet by mouth daily.         ALLERGIES     Allergies   Allergen Reactions     Aspirin Hives     Penicillins Hives     Contrast Dye Hives       PAST MEDICAL HISTORY:  Past Medical History:   Diagnosis Date     Acute  exacerbation of CHF (congestive heart failure) (H) 9/2/2016     Atrial fibrillation (H)      Cerebral infarction (H) 12/13/2013, 9/2016     Diagnosis updated by automated process. Provider to review and confirm.     Coronary artery disease      Diabetes (H)      Hyperlipidaemia      Hypertension      Myocardial infarction (H)      PAD (peripheral artery disease) (H)      Stented coronary artery     CABG 2002 4 VESSEL     Type 2 diabetes mellitus with stage 3 chronic kidney disease, with long-term current use of insulin (H) 11/4/2016     Unspecified cerebral artery occlusion with cerebral infarction        PAST SURGICAL HISTORY:  Past Surgical History:   Procedure Laterality Date     C MRA UPPER EXTREMITY WO&W CONT       CORONARY ARTERY BYPASS  2002    LIMA-LAD, SVG-OM1, III-FPBB-TOAY     ENT SURGERY      VOCAL CORD LESION REMOVED     GENITOURINARY SURGERY      ANGIOPLASTY FOR RENAL ARTERT STENOSIS     HC LEFT HEART CATHETERIZATION  9/7/2016 09/07/2016: CHUCHO x2 to distal and proximal SVG to to RPDA     HEART CATH LEFT HEART CATH  11/30/2001     PHACOEMULSIFICATION CLEAR CORNEA WITH STANDARD INTRAOCULAR LENS IMPLANT Left 1/22/2015    Procedure: PHACOEMULSIFICATION CLEAR CORNEA WITH STANDARD INTRAOCULAR LENS IMPLANT;  Surgeon: Bart Johnson MD;  Location: [facility]     VASCULAR SURGERY      ANGIOPLASTY LEFT LEG FOR pvd       FAMILY HISTORY:  Family History   Problem Relation Age of Onset     DIABETES Mother      CANCER Maternal Grandmother      Family History Negative Father        SOCIAL HISTORY:  Social History     Social History     Marital status: Single     Spouse name: N/A     Number of children: N/A     Years of education: N/A     Social History Main Topics     Smoking status: Former Smoker     Years: 40.00     Types: Pipe     Quit date: 12/18/1998     Smokeless tobacco: Never Used     Alcohol use 0.0 oz/week     0 Standard drinks or equivalent per week      Comment: 3 hard liquor drinks daily     Drug use:  "No     Sexual activity: Not Currently     Partners: Female     Other Topics Concern     Parent/Sibling W/ Cabg, Mi Or Angioplasty Before 65f 55m? No     Caffeine Concern Yes     decaff only      Stress Concern No     Special Diet Yes     low sodium      Exercise Yes     walking, rehab 3 times a week      Seat Belt Yes     Social History Narrative       Review of Systems:  Skin:  Negative     Eyes:  Positive for cataracts  ENT:  Positive for hoarseness  Respiratory:  Positive for dyspnea on exertion  Cardiovascular:  Negative    Gastroenterology: Negative    Genitourinary:  Negative    Musculoskeletal:  Positive for muscular weakness  Neurologic:  Negative    Psychiatric:  Negative    Heme/Lymph/Imm:  Negative    Endocrine:  Positive for diabetes    Physical Exam:  Vitals: /72  Pulse 76  Ht 1.88 m (6' 2\")  Wt 84.4 kg (186 lb 1.6 oz)  BMI 23.89 kg/m2   Please refer to dictation for physical exam    Recent Lab Results:  LIPID RESULTS:  Lab Results   Component Value Date    CHOL 139 09/06/2016    HDL 52 09/06/2016    LDL 62 09/06/2016    TRIG 123 09/06/2016    CHOLHDLRATIO 3.5 05/09/2013       LIVER ENZYME RESULTS:  Lab Results   Component Value Date    AST 15 09/19/2016    ALT 20 09/19/2016       CBC RESULTS:  Lab Results   Component Value Date    WBC 11.5 (H) 09/19/2016    RBC 3.50 (L) 09/19/2016    HGB 11.6 (L) 10/26/2016    HCT 32.3 (L) 09/19/2016    MCV 92 09/19/2016    MCH 30.6 09/19/2016    MCHC 33.1 09/19/2016    RDW 13.7 09/19/2016     09/19/2016       BMP RESULTS:  Lab Results   Component Value Date     04/21/2017    POTASSIUM 4.1 04/21/2017    CHLORIDE 102 04/21/2017    CO2 24 04/21/2017    ANIONGAP 15.1 04/21/2017     (H) 04/21/2017    BUN 27 04/21/2017    CR 1.48 (H) 04/21/2017    GFRESTIMATED 46 (L) 04/21/2017    GFRESTBLACK 55 (L) 04/21/2017    MIGUEL 9.4 04/21/2017        A1C RESULTS:  Lab Results   Component Value Date    A1C 7.2 (H) 02/06/2017       INR RESULTS:  Lab Results "   Component Value Date    INR 1.10 09/19/2016    INR 1.04 09/16/2016           CC  Erica Beach PA-C  [facility] [place]  [address]  [place] [address]

## 2017-04-21 NOTE — MR AVS SNAPSHOT
After Visit Summary   4/21/2017    Kelechi Coleman    MRN: [id number]           Patient Information     Date Of Birth          1935        Visit Information        Provider Department      4/21/2017 1:50 PM More, Erica Frereira PA-C [facility] [place]        Today's Diagnoses     Chronic systolic congestive heart failure (H)          Care Instructions      Call CORE nurse for any questions or concerns:  [phone]   *If you have concerns after hours, please call [phone], option 2 to speak with on call Cardiologist.    Start walking everyday at least 15 minutes and climb the stairs 2 extra times a day.      1. Medication changes from today:  Continue current medications.       2. Weigh yourself daily and write it down.     3. Call CORE nurse if your weight is up more than 2 pounds in one day or 5 pounds in one week.     4. Call CORE nurse if you feel more short of breath, have more abdominal bloating, or leg swelling.     5. Continue low sodium diet (less than 2000 mg daily). If you eat less salt, you will retain less fluid.     6. Alcohol can weaken your heart further. You should avoid alcohol or limit its use to special times, such as a holiday or birthday.      7. Do NOT take Aleve or ibuprofen without talking to your doctor first.      8. Lab Results:   Component      Latest Ref Rng & Units 3/13/2017 4/21/2017   Sodium      136 - 145 mmol/L 141 137   Potassium      3.5 - 5.1 mmol/L 3.9 4.1   Chloride      98 - 107 mmol/L 106 102   Carbon Dioxide      23 - 29 mmol/L 24 24   Anion Gap      6 - 17 mmol/L 14.9 15.1   Glucose      70 - 105 mg/dL 166 (H) 195 (H)   Urea Nitrogen      7 - 30 mg/dL 26 27   Creatinine      0.70 - 1.30 mg/dL 1.39 (H) 1.48 (H)   GFR Estimate      >60 mL/min/1.7m2 49 (L) 46 (L)   GFR Estimate If Black      >60 mL/min/1.7m2 59 (L) 55 (L)   Calcium      8.5 - 10.5 mg/dL 9.7 9.4        [facility] Clinic: Cardiomyopathy,  Optimization, Rehabilitation, Education  The CORE Clinic is a heart failure specialty clinic within the [facility] where you will work with specialized nurse practitioners, physician assistants, doctors, and registered nurses. They are dedicated to helping patients with heart failure to carefully adjust medications, receive education, and learn who and when to call if symptoms develop. They specialize in helping you better understand your condition, slow the progression of your disease, improve the length and quality of your life, help you detect future heart problems before they become life threatening, and avoid hospitalizations.        Follow-ups after your visit        Your next 10 appointments already scheduled     May 08, 2017 10:30 AM CDT   Office Visit with Etienne [facility]    [address] [place] [address] [phone]           Bring a current list of meds and any records pertaining to this visit.  For Physicals, please bring immunization records and any forms needing to be filled out.  Please arrive 10 minutes early to complete paperwork.            Jun 21, 2017  4:15 PM CDT   Return Visit with Isaiah Charles MD   [facility] AT [place] ([facility])    [address]  [facility] [id number] [phone]              Who to contact     If you have questions or need follow up information about today's clinic visit or your schedule please contact [facility] AT [place] directly at [phone].  Normal or non-critical lab and imaging results will be communicated to you by MyChart, letter or phone within 4 business days after the clinic has received the results. If you do not hear from us within 7 days, please contact the clinic through MyChart or phone. If you have a critical or abnormal lab result, we will notify you by phone as soon as  "possible.  Submit refill requests through [employer] or call your pharmacy and they will forward the refill request to us. Please allow 3 business days for your refill to be completed.          Additional Information About Your Visit        Apicahart Information     [employer] gives you secure access to your electronic health record. If you see a primary care provider, you can also send messages to your care team and make appointments. If you have questions, please call your primary care clinic.  If you do not have a primary care provider, please call [phone] and they will assist you.        Care EveryWhere ID     This is your Care EveryWhere ID. This could be used by other organizations to access your [place] medical records  [id number]        Your Vitals Were     Pulse Height BMI (Body Mass Index)             76 1.88 m (6' 2\") 23.89 kg/m2          Blood Pressure from Last 3 Encounters:   04/21/17 126/72   03/13/17 124/76   02/13/17 116/58    Weight from Last 3 Encounters:   04/21/17 84.4 kg (186 lb 1.6 oz)   03/13/17 85.8 kg (189 lb 1.6 oz)   02/13/17 88.1 kg (194 lb 4.8 oz)              We Performed the Following     Follow-Up with [facility] Clinic - ALVARO visit        Primary Care Provider Office Phone # Fax #    Etienne Gee [phone]       [facility] [address]  [facility] [address]        Goals        Exercise    I will exercise 2x per week (15 min per time) , for the next 6 weeks.     Notes - Note created  12/22/2016 12:14 PM by Ting Bullard RN    As of today's date 12/22/2016 goal is met at 0 - 25%.   Goal Status:  Active           General    I will not add any salt to my food and will limit my salt intake to 2,000 mg of sodium per day. (pt-stated)     Notes - Note created  9/26/2016  3:53 PM by Ting Bullard RN    As of today's date 9/26/2016 goal is met at 26 - 50%.   Goal Status:  Active        I will weigh myself every day and call my clinic if I am more than 2 " pounds heavier in a day or 5 pounds heavier in a week.  (pt-stated)     Notes - Note created  9/26/2016  3:52 PM by Ting Bullard RN    As of today's date 9/26/2016 goal is met at 26 - 50%.   Goal Status:  Active          Thank you!     Thank you for choosing [facility] AT [place]  for your care. Our goal is always to provide you with excellent care. Hearing back from our patients is one way we can continue to improve our services. Please take a few minutes to complete the written survey that you may receive in the mail after your visit with us. Thank you!             Your Updated Medication List - Protect others around you: Learn how to safely use, store and throw away your medicines at www.disposemymeds.org.          This list is accurate as of: 4/21/17  2:30 PM.  Always use your most recent med list.                   Brand Name Dispense Instructions for use    ACCU-CHEK MARY PLUS test strip   Generic drug:  blood glucose monitoring     300 strip    TEST THREE TIMES DAILY       amLODIPine 10 MG tablet    NORVASC    90 tablet    Take 1 tablet (10 mg) by mouth daily       B-D U/F 31G X 8 MM   Generic drug:  insulin pen needle          CALTRATE 600+D 600-800 MG-UNIT Tabs   Generic drug:  Calcium Carb-Cholecalciferol      Take 1 tablet by mouth daily.       carvedilol 25 MG tablet    COREG    180 tablet    Take 1 tablet (25 mg) by mouth 2 times daily (with meals)       CENTRUM SILVER per tablet      Take 1 tablet by mouth daily.       clopidogrel 75 MG tablet    PLAVIX    90 tablet    Take 1 tablet (75 mg) by mouth daily       hydrALAZINE 50 MG tablet    APRESOLINE    270 tablet    Take 1 tablet (50 mg) by mouth 3 times daily       isosorbide mononitrate 60 MG 24 hr tablet    IMDUR    180 tablet    Take 2 tablets (120 mg) by mouth daily       LANTUS SOLOSTAR 100 UNIT/ML injection   Generic drug:  insulin glargine     30 mL    ADMINISTER 35 TO 40 UNITS UNDER THE SKIN AT BEDTIME        LIPITOR 40 MG tablet   Generic drug:  atorvastatin     90 tablet    Take 1 tablet (40 mg) by mouth daily       pantoprazole 40 MG EC tablet    PROTONIX    90 tablet    TAKE 1 TABLET BY MOUTH EVERY DAY       spironolactone 50 MG tablet    ALDACTONE    90 tablet    Take 1 tablet (50 mg) by mouth daily       torsemide 10 MG tablet    DEMADEX    135 tablet    Take 20 mg (2 tabs) every other day with 10 mg (1 tab) on the alternate day.

## 2017-04-21 NOTE — PROGRESS NOTES
Reviewed during clinic visit.  Please see progress note for plan.  Erica Beach PA-C 4/21/2017 4:47 PM

## 2017-04-21 NOTE — PATIENT INSTRUCTIONS
Call CORE nurse for any questions or concerns:  [phone]   *If you have concerns after hours, please call [phone], option 2 to speak with on call Cardiologist.    Start walking everyday at least 15 minutes and climb the stairs 2 extra times a day.      1. Medication changes from today:  Continue current medications.       2. Weigh yourself daily and write it down.     3. Call CORE nurse if your weight is up more than 2 pounds in one day or 5 pounds in one week.     4. Call CORE nurse if you feel more short of breath, have more abdominal bloating, or leg swelling.     5. Continue low sodium diet (less than 2000 mg daily). If you eat less salt, you will retain less fluid.     6. Alcohol can weaken your heart further. You should avoid alcohol or limit its use to special times, such as a holiday or birthday.      7. Do NOT take Aleve or ibuprofen without talking to your doctor first.      8. Lab Results:   Component      Latest Ref Rng & Units 3/13/2017 4/21/2017   Sodium      136 - 145 mmol/L 141 137   Potassium      3.5 - 5.1 mmol/L 3.9 4.1   Chloride      98 - 107 mmol/L 106 102   Carbon Dioxide      23 - 29 mmol/L 24 24   Anion Gap      6 - 17 mmol/L 14.9 15.1   Glucose      70 - 105 mg/dL 166 (H) 195 (H)   Urea Nitrogen      7 - 30 mg/dL 26 27   Creatinine      0.70 - 1.30 mg/dL 1.39 (H) 1.48 (H)   GFR Estimate      >60 mL/min/1.7m2 49 (L) 46 (L)   GFR Estimate If Black      >60 mL/min/1.7m2 59 (L) 55 (L)   Calcium      8.5 - 10.5 mg/dL 9.7 9.4        [facility]: Cardiomyopathy, Optimization, Rehabilitation, Education  The [facility] Clinic is a heart failure specialty clinic within the [facility] where you will work with specialized nurse practitioners, physician assistants, doctors, and registered nurses. They are dedicated to helping patients with heart failure to carefully adjust medications, receive education, and learn who and when to call if symptoms develop. They specialize in helping you  better understand your condition, slow the progression of your disease, improve the length and quality of your life, help you detect future heart problems before they become life threatening, and avoid hospitalizations.

## 2017-04-22 NOTE — TELEPHONE ENCOUNTER
Pending Prescriptions:                       Disp   Refills    atorvastatin (LIPITOR) 40 MG tablet [Phar*90 tab*0            Sig: TAKE 1 TABLET BY MOUTH DAILY          Last Written Prescription Date:  Historical  Last Fill Quantity: -,   # refills: -  Last Office Visit with [facility] or [facility] prescribing provider: 2/6/17 Gearrd  Future Office visit:    Next 5 appointments (look out 90 days)     May 08, 2017 10:30 AM CDT   Office Visit with Etienne Gee MD   [facility] ([address] [id number] [phone]            Jun 21, 2017  4:15 PM CDT   Return Visit with Isaiah Charles MD   [facility] AT [place] ([address] [id number] [phone]                   Routing refill request to provider for review/approval because:  Medication is reported/historical    RT Ashley(R)

## 2017-04-24 NOTE — PROGRESS NOTES
PRIMARY CARDIOLOGIST:  Isaiah Charles MD      REASON FOR VISIT:  Heart failure followup.      HISTORY OF PRESENT ILLNESS:  Mr. Coleman is a delightful 81-year-old gentleman with past medical history significant for the followin.  Coronary artery disease with history of CAB in .  This was a LIMA to the LAD, saphenous vein graft to the OM, and saphenous vein graft to the RPDA and RPLA sequentially.  He suffered an NSTEMI in 2016, he had drug-eluting stent to the proximal and distal portions of the vein graft to the RPDA and RPLA.  He had an occluded graft to the OM1 but patent LIMA to the LAD.   2.  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   3.  Hypertension.   4.  Hyperlipidemia.   5.  CKD.   6.  Possible CVA post-MI when he presented in September with left arm and hand weakness.   7.  Mild to moderate mitral regurgitation and tricuspid regurgitation.      I had met him in the  of  when he had profound shortness of breath and his weight had peaked at 200 pounds.  We diuresed him down around 185, cut back his diuretic, but unfortunately this resulted in increased weight gain and again heart failure symptoms.  This time, he peaked at a weight of 204.  We have been managing him now closely and his current diuretic dosing is 20 mg of torsemide alternating with 10 mg torsemide and 50 mg of spironolactone for potassium sparing.      Today, he comes in and he is overall doing well.  He denies chest pain, shortness of breath, orthopnea or PND.  He walked across the skyway, down to the cafeteria and back.  He considers this as his exercise for today.  He is sleeping well.  He does not need a sleep aid.  He overall thinks he is back to himself.  He is worried that something is going to happen again.  He is concerned, though, about his leg weakness.  He has absolutely no cramping or aching when he climbs stairs, but he has to stop as his legs are weak and will  not go further.  He held his statin per her recommendation for 2 weeks, and this did not change his leg weakness.      SOCIAL HISTORY:  He is single, never , former smoker, quit in 1998.  Daily alcohol, no drug use.  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      PHYSICAL EXAMINATION:   GENERAL:  Well-developed, well-nourished pleasant gentleman in no acute distress.   HEENT:  Normocephalic, atraumatic.   HEART:  Regular.  He has a 2/6 systolic murmur heard best at the apex.   LUNGS:  Clear.  Good airflow deep bilateral lobes.   EXTREMITIES:  Without peripheral edema.   NECK:  Veins are flat at 30 degrees.   SKIN:  Warm and dry.      ASSESSMENT AND PLAN:   1.  Chronic systolic heart failure with EF about 40%-45% with class II symptoms.  Dry weight at home is right around 180 pounds.  We have managed him on torsemide 10 alternating with 20 mg daily with 50 mg of spironolactone.  On less than this, he has done poorly and quickly gained weight.  His creatinine is elevated but at his baseline of 1.48 with a BUN of 27 and need to follow.   2.  Leg weakness.  He does have a history of SFA angioplasty.  I do suspect now after much discussion and off his statin this is likely the case.  I offered him ABIs and vascular ultrasound, both of which he declined.  He has had lots of doctors' appointments, lots of procedure and just wants to feel well for a while.   3.  Hypertension, well controlled.   4.  Hyperlipidemia, excellent control.   5.  Mild to moderate MR and TR.  We will repeat echo in the fall.      I encouraged him to exercise doing 15 minutes of walking and climbing the stairs a couple of extra times a day to get his strength back.  He will consider this.  Otherwise, he will see Dr. Charles back in June and myself back in late summer or fall.  Thank you for allowing me to participate in  his care.      ALIX Locke PA-C             D: 2017 14:39   T: 2017 00:32   MT: KARLEE      Name:     MARLA VÁZQUEZ   MRN:      [id number]        Account:      [id number]   :      1935           Service Date: 2017      Document: [id number]

## 2017-04-24 NOTE — TELEPHONE ENCOUNTER
Routing refill request to provider for review/approval because:  Medication is reported/historical  Maxine VELARDE RN

## 2017-05-08 PROBLEM — K21.00 GASTROESOPHAGEAL REFLUX DISEASE WITH ESOPHAGITIS: Status: ACTIVE | Noted: 2017-01-01

## 2017-05-08 NOTE — PROGRESS NOTES
SUBJECTIVE:                                                    Kelechi Coleman is a 81 year old male who presents to clinic today for the following health issues:        Diabetes Follow-up    Patient is checking blood sugars: twice daily.    Blood sugar testing frequency justification: Adjustment of medication(s) and Risk of hypoglycemia with medication(s)  Results are as follows:         am - 98          suppertime - 120's    Diabetic concerns: None     Symptoms of hypoglycemia (low blood sugar): none     Paresthesias (numbness or burning in feet) or sores: No     Date of last diabetic eye exam: 2016     Hyperlipidemia Follow-Up      Rate your low fat/cholesterol diet?: fair    Taking statin?  Yes, no muscle aches from statin    Other lipid medications/supplements?:  none     Hypertension Follow-up      Outpatient blood pressures: does not check regularly    Low Salt Diet: low salt         Amount of exercise or physical activity: None    Problems taking medications regularly: No    Medication side effects: none    Diet: low salt        His insurance company sent him a letter urging him to consider stopping protonix; but he had an EGD in 7/14 showing reflux esophagitis and PPI therapy war recommended; he does not have current dysphagia, odynophagia, weight loss, abdominal pain, blood in stools or melena       Problem list and histories reviewed & adjusted, as indicated.  Additional history: as documented    Patient Active Problem List   Diagnosis     Atherosclerosis of native coronary artery of native heart without angina pectoris     Paroxysmal atrial fibrillation (H)     Peripheral artery disease (H)     Hyperlipidemia LDL goal <70     Benign essential hypertension     Paroxysmal ventricular tachycardia (H)     Cerebral infarction (H)     Acute exacerbation of CHF (congestive heart failure) (H)     Stroke (cerebrum) (H)     Type 2 diabetes mellitus with stage 3 chronic kidney disease, with long-term  current use of insulin (H)     Chronic systolic heart failure (H)     Past Surgical History:   Procedure Laterality Date     C MRA UPPER EXTREMITY WO&W CONT       CORONARY ARTERY BYPASS  2002    LIMA-LAD, SVG-OM1, [id number]-OPCA     ENT SURGERY      VOCAL CORD LESION REMOVED     GENITOURINARY SURGERY      ANGIOPLASTY FOR RENAL ARTERT STENOSIS     HC LEFT HEART CATHETERIZATION  9/7/2016 09/07/2016: CHUCHO x2 to distal and proximal SVG to to RPDA     HEART CATH LEFT HEART CATH  11/30/2001     PHACOEMULSIFICATION CLEAR CORNEA WITH STANDARD INTRAOCULAR LENS IMPLANT Left 1/22/2015    Procedure: PHACOEMULSIFICATION CLEAR CORNEA WITH STANDARD INTRAOCULAR LENS IMPLANT;  Surgeon: Bart Johnson MD;  Location:  [facility]     VASCULAR SURGERY      ANGIOPLASTY LEFT LEG FOR pvd       Social History   Substance Use Topics     Smoking status: Former Smoker     Years: 40.00     Types: Pipe     Quit date: 12/18/1998     Smokeless tobacco: Never Used     Alcohol use 0.0 oz/week     0 Standard drinks or equivalent per week      Comment: 3 hard liquor drinks daily     Family History   Problem Relation Age of Onset     DIABETES Mother      CANCER Maternal Grandmother      Family History Negative Father          Current Outpatient Prescriptions   Medication Sig Dispense Refill     atorvastatin (LIPITOR) 40 MG tablet TAKE 1 TABLET BY MOUTH DAILY 90 tablet 0     ASPIRIN NOT PRESCRIBED (INTENTIONAL) Please choose reason not prescribed, below 0 each 0     pantoprazole (PROTONIX) 40 MG EC tablet TAKE 1 TABLET BY MOUTH EVERY DAY 90 tablet 0     LANTUS SOLOSTAR 100 UNIT/ML soln ADMINISTER 35 TO 40 UNITS UNDER THE SKIN AT BEDTIME 30 mL 0     spironolactone (ALDACTONE) 50 MG tablet Take 1 tablet (50 mg) by mouth daily 90 tablet 3     torsemide (DEMADEX) 10 MG tablet Take 20 mg (2 tabs) every other day with 10 mg (1 tab) on the alternate day. 135 tablet 3     ACCU-CHEK MARY PLUS test strip TEST THREE TIMES DAILY 300 strip 1     B-D U/F 31G X 8 MM  "insulin pen needle        clopidogrel (PLAVIX) 75 MG tablet Take 1 tablet (75 mg) by mouth daily 90 tablet 3     carvedilol (COREG) 25 MG tablet Take 1 tablet (25 mg) by mouth 2 times daily (with meals) 180 tablet 3     hydrALAZINE (APRESOLINE) 50 MG tablet Take 1 tablet (50 mg) by mouth 3 times daily 270 tablet 3     isosorbide mononitrate (IMDUR) 60 MG 24 hr tablet Take 2 tablets (120 mg) by mouth daily 180 tablet 3     amLODIPine (NORVASC) 10 MG tablet Take 1 tablet (10 mg) by mouth daily 90 tablet 4     Multiple Vitamins-Minerals (CENTRUM SILVER) per tablet Take 1 tablet by mouth daily.       Calcium Carb-Cholecalciferol (CALTRATE 600+D) 600-800 MG-UNIT TABS Take 1 tablet by mouth daily.       Allergies   Allergen Reactions     Aspirin Hives     Penicillins Hives     Contrast Dye Hives       Reviewed and updated as needed this visit by clinical staff       Reviewed and updated as needed this visit by Provider         ROS:  Constitutional, HEENT, cardiovascular, pulmonary, gi and gu systems are negative, except as otherwise noted.    OBJECTIVE:                                                    /78 (BP Location: Right arm, Patient Position: Chair, Cuff Size: Adult Regular)  Pulse 87  Temp 97.4  F (36.3  C) (Oral)  Ht 6' 2\" (1.88 m)  Wt 184 lb 8 oz (83.7 kg)  SpO2 99%  BMI 23.69 kg/m2  Body mass index is 23.69 kg/(m^2).  GENERAL: healthy, alert and no distress  HENT: ear canals and TM's normal, nose and mouth without ulcers or lesions  NECK: no adenopathy, no asymmetry, masses, or scars and thyroid normal to palpation  RESP: lungs clear to auscultation - no rales, rhonchi or wheezes  CV: regular rate and rhythm, normal S1 S2, no S3 or S4, no murmur, click or rub, no peripheral edema and peripheral pulses strong  MS: no gross musculoskeletal defects noted, no edema  NEURO: Normal strength and tone, mentation intact and speech normal  PSYCH: mentation appears normal, affect normal/bright  Diabetic foot " exam: normal DP and PT pulses, no trophic changes or ulcerative lesions and normal sensory exam    Diagnostic Test Results:  none      ASSESSMENT/PLAN:                                                      1. Type 2 diabetes mellitus with stage 3 chronic kidney disease, with long-term current use of insulin (H)  This seems to be under good control  Recheck A1c after 8/6/17    2. Chronic systolic heart failure (H)  Finally, this seems to have stabilized on his current regimen; weights have been stable; exam suggests well compensated volume status     3. Benign essential hypertension  Well controlled    4. Gastroesophageal reflux disease with esophagitis  He should remain on a PPI indefinitely due to his history of esophagitis and severe reflux esophagitis despite what the letter from his insurance company tells him; the benefits of protonix outweight the risks         FUTURE APPOINTMENTS:       - Follow-up visit in after 8/6 for diabetes check with A1c    Etienne Gee MD  [facility]

## 2017-05-08 NOTE — MR AVS SNAPSHOT
After Visit Summary   5/8/2017    Kelechi Coleman    MRN: [id number]           Patient Information     Date Of Birth          1935        Visit Information        Provider Department      5/8/2017 10:30 AM Etienne Gee MD [facility]        Today's Diagnoses     Type 2 diabetes mellitus with stage 3 chronic kidney disease, with long-term current use of insulin (H)    -  1    Chronic systolic heart failure (H)        Benign essential hypertension        Gastroesophageal reflux disease with esophagitis        Chronic systolic congestive heart failure (H)           Follow-ups after your visit        Follow-up notes from your care team     Return in about 3 months (around 8/8/2017).      Your next 10 appointments already scheduled     Jun 21, 2017  4:15 PM CDT   Return Visit with Isaiah Charles MD   [facility] [place] ([facility])    [address] [place] [id number] [phone]              Who to contact     If you have questions or need follow up information about today's clinic visit or your schedule please contact [facility] directly at [phone].  Normal or non-critical lab and imaging results will be communicated to you by YoBuckohart, letter or phone within 4 business days after the clinic has received the results. If you do not hear from us within 7 days, please contact the clinic through YoBuckohart or phone. If you have a critical or abnormal lab result, we will notify you by phone as soon as possible.  Submit refill requests through GeoMe or call your pharmacy and they will forward the refill request to us. Please allow 3 business days for your refill to be completed.          Additional Information About Your Visit        MyChart Information     GeoMe gives you secure access to your electronic health record. If you see a primary care provider, you can also send messages to your care team  "and make appointments. If you have questions, please call your primary care clinic.  If you do not have a primary care provider, please call [phone] and they will assist you.        Care EveryWhere ID     This is your Care EveryWhere ID. This could be used by other organizations to access your [facility] medical records  [id number]        Your Vitals Were     Pulse Temperature Height Pulse Oximetry BMI (Body Mass Index)       87 97.4  F (36.3  C) (Oral) 6' 2\" (1.88 m) 99% 23.69 kg/m2        Blood Pressure from Last 3 Encounters:   05/08/17 123/78   04/21/17 126/72   03/13/17 124/76    Weight from Last 3 Encounters:   05/08/17 184 lb 8 oz (83.7 kg)   04/21/17 186 lb 1.6 oz (84.4 kg)   03/13/17 189 lb 1.6 oz (85.8 kg)              Today, you had the following     No orders found for display         Where to get your medicines      Some of these will need a paper prescription and others can be bought over the counter.  Ask your nurse if you have questions.     You don't need a prescription for these medications     ASPIRIN NOT PRESCRIBED          Primary Care Provider Office Phone # Fax #    Etienne Gee [phone]       [facility] [address]  [facility] [address]        Goals        Exercise    I will exercise 2x per week (15 min per time) , for the next 6 weeks.     Notes - Note created  12/22/2016 12:14 PM by Ting Bullard RN    As of today's date 12/22/2016 goal is met at 0 - 25%.   Goal Status:  Active           General    I will not add any salt to my food and will limit my salt intake to 2,000 mg of sodium per day. (pt-stated)     Notes - Note created  9/26/2016  3:53 PM by Ting Bullard RN    As of today's date 9/26/2016 goal is met at 26 - 50%.   Goal Status:  Active        I will weigh myself every day and call my clinic if I am more than 2 pounds heavier in a day or 5 pounds heavier in a week.  (pt-stated)     Notes - Note created  9/26/2016  3:52 PM by " Ting Bullard RN    As of today's date 9/26/2016 goal is met at 26 - 50%.   Goal Status:  Active          Thank you!     Thank you for choosing [facility]  for your care. Our goal is always to provide you with excellent care. Hearing back from our patients is one way we can continue to improve our services. Please take a few minutes to complete the written survey that you may receive in the mail after your visit with us. Thank you!             Your Updated Medication List - Protect others around you: Learn how to safely use, store and throw away your medicines at www.disposemymeds.org.          This list is accurate as of: 5/8/17 11:09 AM.  Always use your most recent med list.                   Brand Name Dispense Instructions for use    ACCU-CHEK MARY PLUS test strip   Generic drug:  blood glucose monitoring     300 strip    TEST THREE TIMES DAILY       amLODIPine 10 MG tablet    NORVASC    90 tablet    Take 1 tablet (10 mg) by mouth daily       ASPIRIN NOT PRESCRIBED    INTENTIONAL    0 each    Please choose reason not prescribed, below       atorvastatin 40 MG tablet    LIPITOR    90 tablet    TAKE 1 TABLET BY MOUTH DAILY       B-D U/F 31G X 8 MM   Generic drug:  insulin pen needle          CALTRATE 600+D 600-800 MG-UNIT Tabs   Generic drug:  Calcium Carb-Cholecalciferol      Take 1 tablet by mouth daily.       carvedilol 25 MG tablet    COREG    180 tablet    Take 1 tablet (25 mg) by mouth 2 times daily (with meals)       CENTRUM SILVER per tablet      Take 1 tablet by mouth daily.       clopidogrel 75 MG tablet    PLAVIX    90 tablet    Take 1 tablet (75 mg) by mouth daily       hydrALAZINE 50 MG tablet    APRESOLINE    270 tablet    Take 1 tablet (50 mg) by mouth 3 times daily       isosorbide mononitrate 60 MG 24 hr tablet    IMDUR    180 tablet    Take 2 tablets (120 mg) by mouth daily       LANTUS SOLOSTAR 100 UNIT/ML injection   Generic drug:  insulin glargine     30 mL    ADMINISTER 35  TO 40 UNITS UNDER THE SKIN AT BEDTIME       pantoprazole 40 MG EC tablet    PROTONIX    90 tablet    TAKE 1 TABLET BY MOUTH EVERY DAY       spironolactone 50 MG tablet    ALDACTONE    90 tablet    Take 1 tablet (50 mg) by mouth daily       torsemide 10 MG tablet    DEMADEX    135 tablet    Take 20 mg (2 tabs) every other day with 10 mg (1 tab) on the alternate day.

## 2017-06-21 NOTE — MR AVS SNAPSHOT
After Visit Summary   6/21/2017    Kelechi Coleman    MRN: [id number]           Patient Information     Date Of Birth          1935        Visit Information        Provider Department      6/21/2017 4:15 PM Isaiah Charles MD [facility] [place]        Today's Diagnoses     Acute on chronic combined systolic and diastolic congestive heart failure (H)        Coronary artery disease involving native coronary artery of native heart without angina pectoris        Hyperlipidemia LDL goal <70        Essential hypertension        Peripheral artery disease (H)        Cerebrovascular accident (CVA), unspecified mechanism (H)        Essential hypertension with goal blood pressure less than 130/80        Chronic ischemic heart disease, unspecified        Chronic systolic congestive heart failure (H)           Follow-ups after your visit        Additional Services     Follow-Up with Cardiologist                 Your next 10 appointments already scheduled     Aug 09, 2017 10:00 AM CDT   [facility]    [address] [id number] [phone]           Bring a current list of meds and any records pertaining to this visit.  For Physicals, please bring immunization records and any forms needing to be filled out.  Please arrive 10 minutes early to complete paperwork.            Aug 28, 2017 12:50 PM CDT   LAB with [facility]   [facility] ([facility])    [address]  [place] [id number] [phone]           Patient must bring picture ID.  Patient should be prepared to give a urine specimen  Please do not eat 10-12 hours before your appointment if you are coming in fasting for labs on lipids, cholesterol, or glucose (sugar).  Pregnant women should follow their Care Team instructions. Water with medications is  okay. Do not drink coffee or other fluids.   If you have concerns about taking  your medications, please ask at office or if scheduling via LEAFER, send a message by clicking on Secure Messaging, Message Your Care Team.            Aug 28, 2017  1:50 PM CDT   Core Return with Erica Beach PA-C   [facility] [place] ([facility])    [address] [place] [id number] [phone]              Future tests that were ordered for you today     Open Future Orders        Priority Expected Expires Ordered    Follow-Up with Cardiologist Routine 6/21/2018 6/21/2018 6/21/2017            Who to contact     If you have questions or need follow up information about today's clinic visit or your schedule please contact [facility] [place] directly at [phone].  Normal or non-critical lab and imaging results will be communicated to you by [employer], letter or phone within 4 business days after the clinic has received the results. If you do not hear from us within 7 days, please contact the clinic through [employer] or phone. If you have a critical or abnormal lab result, we will notify you by phone as soon as possible.  Submit refill requests through LEAFER or call your pharmacy and they will forward the refill request to us. Please allow 3 business days for your refill to be completed.          Additional Information About Your Visit        [employer] Information     LEAFER gives you secure access to your electronic health record. If you see a primary care provider, you can also send messages to your care team and make appointments. If you have questions, please call your primary care clinic.  If you do not have a primary care provider, please call [phone] and they will assist you.        Care EveryWhere ID     This is your Care EveryWhere ID. This could be used by other organizations to access your [facility]  "records  [id number]        Your Vitals Were     Pulse Height BMI (Body Mass Index)             89 1.88 m (6' 2\") 23.11 kg/m2          Blood Pressure from Last 3 Encounters:   06/21/17 125/70   05/08/17 123/78   04/21/17 126/72    Weight from Last 3 Encounters:   06/21/17 81.6 kg (180 lb)   05/08/17 83.7 kg (184 lb 8 oz)   04/21/17 84.4 kg (186 lb 1.6 oz)              We Performed the Following     Follow-Up with Cardiologist          Today's Medication Changes          These changes are accurate as of: 6/21/17  4:49 PM.  If you have any questions, ask your nurse or doctor.               These medicines have changed or have updated prescriptions.        Dose/Directions    atorvastatin 40 MG tablet   Commonly known as:  LIPITOR   This may have changed:  See the new instructions.   Used for:  Hyperlipidemia LDL goal <70   Changed by:  Isaiah Charles MD        Dose:  40 mg   Take 1 tablet (40 mg) by mouth daily   Quantity:  90 tablet   Refills:  3            Where to get your medicines      These medications were sent to [facility] [address] [place], [place] - [address] AT [facility] [address]  [address] [facility] [address]     Phone:  [phone]     amLODIPine 10 MG tablet    atorvastatin 40 MG tablet    carvedilol 25 MG tablet    clopidogrel 75 MG tablet    hydrALAZINE 50 MG tablet    isosorbide mononitrate 60 MG 24 hr tablet    spironolactone 50 MG tablet    torsemide 10 MG tablet                Primary Care Provider Office Phone # Fax #    Etienne Gee [phone]       [facility] [address]  [facility] [address]        Goals        Exercise    I will exercise 2x per week (15 min per time) , for the next 6 weeks.     Notes - Note created  12/22/2016 12:14 PM by Ting Bullard RN    As of today's date 12/22/2016 goal is met at 0 - 25%.   Goal Status:  Active           General    I will not add any salt to my food and will " limit my salt intake to 2,000 mg of sodium per day. (pt-stated)     Notes - Note created  9/26/2016  3:53 PM by Ting Bullard RN    As of today's date 9/26/2016 goal is met at 26 - 50%.   Goal Status:  Active        I will weigh myself every day and call my clinic if I am more than 2 pounds heavier in a day or 5 pounds heavier in a week.  (pt-stated)     Notes - Note created  9/26/2016  3:52 PM by Ting Bullard RN    As of today's date 9/26/2016 goal is met at 26 - 50%.   Goal Status:  Active          Equal Access to Services     [facility] : Hadii christine Becerra, waaxda justine, qaybta kaalmada tez, nenita reynoso . So [facility] [phone].    ATENCIÓN: Si habla español, tiene a conteh disposición servicios gratuitos de asistencia lingüística. Llame al [phone].    We comply with applicable federal civil rights laws and [place] laws. We do not discriminate on the basis of race, color, national origin, age, disability sex, sexual orientation or gender identity.            Thank you!     Thank you for choosing [facility] [place]  for your care. Our goal is always to provide you with excellent care. Hearing back from our patients is one way we can continue to improve our services. Please take a few minutes to complete the written survey that you may receive in the mail after your visit with us. Thank you!             Your Updated Medication List - Protect others around you: Learn how to safely use, store and throw away your medicines at www.disposemymeds.org.          This list is accurate as of: 6/21/17  4:49 PM.  Always use your most recent med list.                   Brand Name Dispense Instructions for use Diagnosis    ACCU-CHEK MARY PLUS test strip   Generic drug:  blood glucose monitoring     300 strip    TEST THREE TIMES DAILY    Type 2 diabetes mellitus with stage 3 chronic kidney disease, with long-term current use of  insulin (H)       amLODIPine 10 MG tablet    NORVASC    90 tablet    Take 1 tablet (10 mg) by mouth daily    Peripheral artery disease (H), Coronary artery disease involving native coronary artery of native heart without angina pectoris       ASPIRIN NOT PRESCRIBED    INTENTIONAL    0 each    Please choose reason not prescribed, below    Chronic systolic congestive heart failure (H)       atorvastatin 40 MG tablet    LIPITOR    90 tablet    Take 1 tablet (40 mg) by mouth daily    Hyperlipidemia LDL goal <70       B-D U/F 31G X 8 MM   Generic drug:  insulin pen needle           CALTRATE 600+D 600-800 MG-UNIT Tabs   Generic drug:  Calcium Carb-Cholecalciferol      Take 1 tablet by mouth daily.        carvedilol 25 MG tablet    COREG    180 tablet    Take 1 tablet (25 mg) by mouth 2 times daily (with meals)    Essential hypertension with goal blood pressure less than 130/80       CENTRUM SILVER per tablet      Take 1 tablet by mouth daily.        clopidogrel 75 MG tablet    PLAVIX    90 tablet    Take 1 tablet (75 mg) by mouth daily    Chronic ischemic heart disease, unspecified       hydrALAZINE 50 MG tablet    APRESOLINE    270 tablet    Take 1 tablet (50 mg) by mouth 3 times daily    Essential hypertension with goal blood pressure less than 130/80       isosorbide mononitrate 60 MG 24 hr tablet    IMDUR    180 tablet    Take 2 tablets (120 mg) by mouth daily    Coronary artery disease involving native coronary artery of native heart without angina pectoris       LANTUS SOLOSTAR 100 UNIT/ML injection   Generic drug:  insulin glargine     30 mL    ADMINISTER 35 TO 40 UNITS UNDER THE SKIN AT BEDTIME    Type 2 diabetes mellitus with stage 3 chronic kidney disease, with long-term current use of insulin (H)       pantoprazole 40 MG EC tablet    PROTONIX    90 tablet    TAKE 1 TABLET BY MOUTH EVERY DAY    Gastroesophageal reflux disease, esophagitis presence not specified       spironolactone 50 MG tablet    ALDACTONE     90 tablet    Take 1 tablet (50 mg) by mouth daily    Chronic systolic congestive heart failure (H)       torsemide 10 MG tablet    DEMADEX    135 tablet    Take 20 mg (2 tabs) every other day with 10 mg (1 tab) on the alternate day.    Acute on chronic combined systolic and diastolic congestive heart failure (H)

## 2017-06-21 NOTE — PROGRESS NOTES
HISTORY OF PRESENT ILLNESS:  Mr. Coleman is a delightful 81-year-old gentleman with past medical history significant for the followin.  Coronary artery disease with history of CAB in .  This was a LIMA to the LAD, saphenous vein graft to the OM, and saphenous vein graft to the RPDA and RPLA sequentially.  He suffered an NSTEMI in 2016, he had drug-eluting stent to the proximal and distal portions of the vein graft to the RPDA and RPLA.  He had an occluded graft to the OM1 but patent LIMA to the LAD.   2.  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   3.  Hypertension.   4.  Hyperlipidemia.   5.  CKD.   6.  Possible CVA post-MI when he presented in September with left arm and hand weakness.   7.  Mild to moderate mitral regurgitation and tricuspid regurgitation.       HPI and Plan:   See dictation  I recommend continuing current treatment plans in the chart.    No orders of the defined types were placed in this encounter.    No orders of the defined types were placed in this encounter.    There are no discontinued medications.      Encounter Diagnoses   Name Primary?     Acute on chronic combined systolic and diastolic congestive heart failure (H)      Coronary artery disease involving native coronary artery of native heart without angina pectoris      Hyperlipidemia LDL goal <70      Essential hypertension      Peripheral artery disease (H)      Cerebrovascular accident (CVA), unspecified mechanism (H)        CURRENT MEDICATIONS:  Current Outpatient Prescriptions   Medication Sig Dispense Refill     ASPIRIN NOT PRESCRIBED (INTENTIONAL) Please choose reason not prescribed, below 0 each 0     atorvastatin (LIPITOR) 40 MG tablet TAKE 1 TABLET BY MOUTH DAILY 90 tablet 0     pantoprazole (PROTONIX) 40 MG EC tablet TAKE 1 TABLET BY MOUTH EVERY DAY 90 tablet 0     LANTUS SOLOSTAR 100 UNIT/ML soln ADMINISTER 35 TO 40 UNITS UNDER THE SKIN AT BEDTIME 30 mL 0      spironolactone (ALDACTONE) 50 MG tablet Take 1 tablet (50 mg) by mouth daily 90 tablet 3     torsemide (DEMADEX) 10 MG tablet Take 20 mg (2 tabs) every other day with 10 mg (1 tab) on the alternate day. 135 tablet 3     ACCU-CHEK MARY PLUS test strip TEST THREE TIMES DAILY 300 strip 1     B-D U/F 31G X 8 MM insulin pen needle        clopidogrel (PLAVIX) 75 MG tablet Take 1 tablet (75 mg) by mouth daily 90 tablet 3     carvedilol (COREG) 25 MG tablet Take 1 tablet (25 mg) by mouth 2 times daily (with meals) 180 tablet 3     hydrALAZINE (APRESOLINE) 50 MG tablet Take 1 tablet (50 mg) by mouth 3 times daily 270 tablet 3     isosorbide mononitrate (IMDUR) 60 MG 24 hr tablet Take 2 tablets (120 mg) by mouth daily 180 tablet 3     amLODIPine (NORVASC) 10 MG tablet Take 1 tablet (10 mg) by mouth daily 90 tablet 4     Multiple Vitamins-Minerals (CENTRUM SILVER) per tablet Take 1 tablet by mouth daily.       Calcium Carb-Cholecalciferol (CALTRATE 600+D) 600-800 MG-UNIT TABS Take 1 tablet by mouth daily.         ALLERGIES     Allergies   Allergen Reactions     Aspirin Hives     Penicillins Hives     Contrast Dye Hives       PAST MEDICAL HISTORY:  Past Medical History:   Diagnosis Date     Acute exacerbation of CHF (congestive heart failure) (H) 9/2/2016     Atrial fibrillation (H)      Cerebral infarction (H) 12/13/2013, 9/2016     Diagnosis updated by automated process. Provider to review and confirm.     Coronary artery disease      Diabetes (H)      Gastroesophageal reflux disease with esophagitis 5/8/2017     Hyperlipidaemia      Hypertension      Myocardial infarction (H)      PAD (peripheral artery disease) (H)      Stented coronary artery     CABG 2002 4 VESSEL     Type 2 diabetes mellitus with stage 3 chronic kidney disease, with long-term current use of insulin (H) 11/4/2016     Unspecified cerebral artery occlusion with cerebral infarction        PAST SURGICAL HISTORY:  Past Surgical History:   Procedure Laterality  Date     C MRA UPPER EXTREMITY WO&W CONT       CORONARY ARTERY BYPASS  2002    LIMA-LAD, SVG-OM1, FWO-PDJO-TSTK     ENT SURGERY      VOCAL CORD LESION REMOVED     GENITOURINARY SURGERY      ANGIOPLASTY FOR RENAL ARTERT STENOSIS     HC LEFT HEART CATHETERIZATION  9/7/2016 09/07/2016: CHUCHO x2 to distal and proximal SVG to to RPDA     HEART CATH LEFT HEART CATH  11/30/2001     PHACOEMULSIFICATION CLEAR CORNEA WITH STANDARD INTRAOCULAR LENS IMPLANT Left 1/22/2015    Procedure: PHACOEMULSIFICATION CLEAR CORNEA WITH STANDARD INTRAOCULAR LENS IMPLANT;  Surgeon: Bart Johnson MD;  Location: [facility]     VASCULAR SURGERY      ANGIOPLASTY LEFT LEG FOR pvd       FAMILY HISTORY:  Family History   Problem Relation Age of Onset     DIABETES Mother      CANCER Maternal Grandmother      Family History Negative Father        SOCIAL HISTORY:  Social History     Social History     Marital status: Single     Spouse name: N/A     Number of children: N/A     Years of education: N/A     Social History Main Topics     Smoking status: Former Smoker     Years: 40.00     Types: Pipe     Quit date: 12/18/1998     Smokeless tobacco: Never Used     Alcohol use 0.0 oz/week     0 Standard drinks or equivalent per week      Comment: 3 hard liquor drinks daily     Drug use: No     Sexual activity: Not Currently     Partners: Female     Other Topics Concern     Parent/Sibling W/ Cabg, Mi Or Angioplasty Before 65f 55m? No     Caffeine Concern Yes     decaff only      Stress Concern No     Special Diet Yes     low sodium      Exercise Yes     walking, rehab 3 times a week      Seat Belt Yes     Social History Narrative         Review of Systems:  Skin:  Negative       Eyes:  Positive for cataracts    ENT:  Negative      Respiratory:  Negative       Cardiovascular:  Negative      Gastroenterology: Negative      Genitourinary:  Negative      Musculoskeletal:  Negative      Neurologic:  Negative      Psychiatric:  Negative      Heme/Lymph/Imm:   "Negative      Endocrine:  Positive for diabetes      Physical Exam:  Vitals: /70  Pulse 89  Ht 1.88 m (6' 2\")  Wt 81.6 kg (180 lb)  BMI 23.11 kg/m2    Constitutional:  cooperative, alert and oriented, well developed, well nourished, in no acute distress        Skin:  warm and dry to the touch, no apparent skin lesions or masses noted        Head:  normocephalic, no masses or lesions        Eyes:  pupils equal and round, conjunctivae and lids unremarkable, sclera white, no xanthalasma, EOMS intact, no nystagmus        ENT:  no pallor or cyanosis, dentition good        Neck:  carotid pulses are full and equal bilaterally;JVP normal;no carotid bruit        Chest:  clear to auscultation          Cardiac: regular rhythm;normal S1 and S2 frequent premature beats     systolic murmur;LUSB;grade 1          Abdomen:  abdomen soft;no masses;non-tender        Vascular:               right dorsalis pedis artery;1+             left dorsalis pedis artery;1+            Extremities and Back:  no deformities, clubbing, cyanosis, erythema observed;no edema              Neurological:  affect appropriate, oriented to time, person and place;no gross motor deficits          Recent Lab Results:  LIPID RESULTS:  Lab Results   Component Value Date    CHOL 139 09/06/2016    HDL 52 09/06/2016    LDL 62 09/06/2016    TRIG 123 09/06/2016    CHOLHDLRATIO 3.5 05/09/2013       LIVER ENZYME RESULTS:  Lab Results   Component Value Date    AST 15 09/19/2016    ALT 20 09/19/2016       CBC RESULTS:  Lab Results   Component Value Date    WBC 11.5 (H) 09/19/2016    RBC 3.50 (L) 09/19/2016    HGB 11.6 (L) 10/26/2016    HCT 32.3 (L) 09/19/2016    MCV 92 09/19/2016    MCH 30.6 09/19/2016    MCHC 33.1 09/19/2016    RDW 13.7 09/19/2016     09/19/2016       BMP RESULTS:  Lab Results   Component Value Date     06/21/2017    POTASSIUM 4.0 06/21/2017    CHLORIDE 102 06/21/2017    CO2 24 06/21/2017    ANIONGAP 15 06/21/2017     (H) " 06/21/2017    BUN 36 (H) 06/21/2017    CR 1.61 (H) 06/21/2017    GFRESTIMATED 42 (L) 06/21/2017    GFRESTBLACK 50 (L) 06/21/2017    MIGUEL 9.4 06/21/2017        A1C RESULTS:  Lab Results   Component Value Date    A1C 7.2 (H) 02/06/2017       INR RESULTS:  Lab Results   Component Value Date    INR 1.10 09/19/2016    INR 1.04 09/16/2016           CC  Isaiah Charles MD   [facility]  [address]  [place] [id number]

## 2017-06-21 NOTE — LETTER
2017    Etienne Gee MD  [facility] [place]   [address]  [place] [address]    RE: Kelechi Coleman       Dear Colleague,    I had the pleasure of seeing Kelechi Coleman in the [facility].    HISTORY OF PRESENT ILLNESS:  Mr. Coleman is a delightful 81-year-old gentleman with past medical history significant for the followin.  Coronary artery disease with history of CAB in .  This was a LIMA to the LAD, saphenous vein graft to the OM, and saphenous vein graft to the RPDA and RPLA sequentially.  He suffered an NSTEMI in 2016, he had drug-eluting stent to the proximal and distal portions of the vein graft to the RPDA and RPLA.  He had an occluded graft to the OM1 but patent LIMA to the LAD.   2.  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   3.  Hypertension.   4.  Hyperlipidemia.   5.  CKD.   6.  Possible CVA post-MI when he presented in September with left arm and hand weakness.   7.  Mild to moderate mitral regurgitation and tricuspid regurgitation.       HPI and Plan:   See dictation  I recommend continuing current treatment plans in the chart.    No orders of the defined types were placed in this encounter.    No orders of the defined types were placed in this encounter.    There are no discontinued medications.      Encounter Diagnoses   Name Primary?     Acute on chronic combined systolic and diastolic congestive heart failure (H)      Coronary artery disease involving native coronary artery of native heart without angina pectoris      Hyperlipidemia LDL goal <70      Essential hypertension      Peripheral artery disease (H)      Cerebrovascular accident (CVA), unspecified mechanism (H)        CURRENT MEDICATIONS:  Current Outpatient Prescriptions   Medication Sig Dispense Refill     ASPIRIN NOT PRESCRIBED (INTENTIONAL) Please choose reason not prescribed, below 0 each 0     atorvastatin  (LIPITOR) 40 MG tablet TAKE 1 TABLET BY MOUTH DAILY 90 tablet 0     pantoprazole (PROTONIX) 40 MG EC tablet TAKE 1 TABLET BY MOUTH EVERY DAY 90 tablet 0     LANTUS SOLOSTAR 100 UNIT/ML soln ADMINISTER 35 TO 40 UNITS UNDER THE SKIN AT BEDTIME 30 mL 0     spironolactone (ALDACTONE) 50 MG tablet Take 1 tablet (50 mg) by mouth daily 90 tablet 3     torsemide (DEMADEX) 10 MG tablet Take 20 mg (2 tabs) every other day with 10 mg (1 tab) on the alternate day. 135 tablet 3     ACCU-CHEK MARY PLUS test strip TEST THREE TIMES DAILY 300 strip 1     B-D U/F 31G X 8 MM insulin pen needle        clopidogrel (PLAVIX) 75 MG tablet Take 1 tablet (75 mg) by mouth daily 90 tablet 3     carvedilol (COREG) 25 MG tablet Take 1 tablet (25 mg) by mouth 2 times daily (with meals) 180 tablet 3     hydrALAZINE (APRESOLINE) 50 MG tablet Take 1 tablet (50 mg) by mouth 3 times daily 270 tablet 3     isosorbide mononitrate (IMDUR) 60 MG 24 hr tablet Take 2 tablets (120 mg) by mouth daily 180 tablet 3     amLODIPine (NORVASC) 10 MG tablet Take 1 tablet (10 mg) by mouth daily 90 tablet 4     Multiple Vitamins-Minerals (CENTRUM SILVER) per tablet Take 1 tablet by mouth daily.       Calcium Carb-Cholecalciferol (CALTRATE 600+D) 600-800 MG-UNIT TABS Take 1 tablet by mouth daily.         ALLERGIES     Allergies   Allergen Reactions     Aspirin Hives     Penicillins Hives     Contrast Dye Hives       PAST MEDICAL HISTORY:  Past Medical History:   Diagnosis Date     Acute exacerbation of CHF (congestive heart failure) (H) 9/2/2016     Atrial fibrillation (H)      Cerebral infarction (H) 12/13/2013, 9/2016     Diagnosis updated by automated process. Provider to review and confirm.     Coronary artery disease      Diabetes (H)      Gastroesophageal reflux disease with esophagitis 5/8/2017     Hyperlipidaemia      Hypertension      Myocardial infarction (H)      PAD (peripheral artery disease) (H)      Stented coronary artery     CABG 2002 4 VESSEL      Type 2 diabetes mellitus with stage 3 chronic kidney disease, with long-term current use of insulin (H) 11/4/2016     Unspecified cerebral artery occlusion with cerebral infarction        PAST SURGICAL HISTORY:  Past Surgical History:   Procedure Laterality Date     C MRA UPPER EXTREMITY WO&W CONT       CORONARY ARTERY BYPASS  2002    LIMA-LAD, SVG-OM1, [id number]     ENT SURGERY      VOCAL CORD LESION REMOVED     GENITOURINARY SURGERY      ANGIOPLASTY FOR RENAL ARTERT STENOSIS     HC LEFT HEART CATHETERIZATION  9/7/2016 09/07/2016: CHUCHO x2 to distal and proximal SVG to to RPDA     HEART CATH LEFT HEART CATH  11/30/2001     PHACOEMULSIFICATION CLEAR CORNEA WITH STANDARD INTRAOCULAR LENS IMPLANT Left 1/22/2015    Procedure: PHACOEMULSIFICATION CLEAR CORNEA WITH STANDARD INTRAOCULAR LENS IMPLANT;  Surgeon: Bart Johnson MD;  Location: [facility]     VASCULAR SURGERY      ANGIOPLASTY LEFT LEG FOR pvd       FAMILY HISTORY:  Family History   Problem Relation Age of Onset     DIABETES Mother      CANCER Maternal Grandmother      Family History Negative Father        SOCIAL HISTORY:  Social History     Social History     Marital status: Single     Spouse name: N/A     Number of children: N/A     Years of education: N/A     Social History Main Topics     Smoking status: Former Smoker     Years: 40.00     Types: Pipe     Quit date: 12/18/1998     Smokeless tobacco: Never Used     Alcohol use 0.0 oz/week     0 Standard drinks or equivalent per week      Comment: 3 hard liquor drinks daily     Drug use: No     Sexual activity: Not Currently     Partners: Female     Other Topics Concern     Parent/Sibling W/ Cabg, Mi Or Angioplasty Before 65f 55m? No     Caffeine Concern Yes     decaff only      Stress Concern No     Special Diet Yes     low sodium      Exercise Yes     walking, rehab 3 times a week      Seat Belt Yes     Social History Narrative         Review of Systems:  Skin:  Negative       Eyes:  Positive for  "cataracts    ENT:  Negative      Respiratory:  Negative       Cardiovascular:  Negative      Gastroenterology: Negative      Genitourinary:  Negative      Musculoskeletal:  Negative      Neurologic:  Negative      Psychiatric:  Negative      Heme/Lymph/Imm:  Negative      Endocrine:  Positive for diabetes      Physical Exam:  Vitals: /70  Pulse 89  Ht 1.88 m (6' 2\")  Wt 81.6 kg (180 lb)  BMI 23.11 kg/m2    Constitutional:  cooperative, alert and oriented, well developed, well nourished, in no acute distress        Skin:  warm and dry to the touch, no apparent skin lesions or masses noted        Head:  normocephalic, no masses or lesions        Eyes:  pupils equal and round, conjunctivae and lids unremarkable, sclera white, no xanthalasma, EOMS intact, no nystagmus        ENT:  no pallor or cyanosis, dentition good        Neck:  carotid pulses are full and equal bilaterally;JVP normal;no carotid bruit        Chest:  clear to auscultation          Cardiac: regular rhythm;normal S1 and S2 frequent premature beats     systolic murmur;LUSB;grade 1          Abdomen:  abdomen soft;no masses;non-tender        Vascular:               right dorsalis pedis artery;1+             left dorsalis pedis artery;1+            Extremities and Back:  no deformities, clubbing, cyanosis, erythema observed;no edema              Neurological:  affect appropriate, oriented to time, person and place;no gross motor deficits          Recent Lab Results:  LIPID RESULTS:  Lab Results   Component Value Date    CHOL 139 09/06/2016    HDL 52 09/06/2016    LDL 62 09/06/2016    TRIG 123 09/06/2016    CHOLHDLRATIO 3.5 05/09/2013       LIVER ENZYME RESULTS:  Lab Results   Component Value Date    AST 15 09/19/2016    ALT 20 09/19/2016       CBC RESULTS:  Lab Results   Component Value Date    WBC 11.5 (H) 09/19/2016    RBC 3.50 (L) 09/19/2016    HGB 11.6 (L) 10/26/2016    HCT 32.3 (L) 09/19/2016    MCV 92 09/19/2016    MCH 30.6 09/19/2016    " MCHC 33.1 09/19/2016    RDW 13.7 09/19/2016     09/19/2016       BMP RESULTS:  Lab Results   Component Value Date     06/21/2017    POTASSIUM 4.0 06/21/2017    CHLORIDE 102 06/21/2017    CO2 24 06/21/2017    ANIONGAP 15 06/21/2017     (H) 06/21/2017    BUN 36 (H) 06/21/2017    CR 1.61 (H) 06/21/2017    GFRESTIMATED 42 (L) 06/21/2017    GFRESTBLACK 50 (L) 06/21/2017    MIGUEL 9.4 06/21/2017        A1C RESULTS:  Lab Results   Component Value Date    A1C 7.2 (H) 02/06/2017       INR RESULTS:  Lab Results   Component Value Date    INR 1.10 09/19/2016    INR 1.04 09/16/2016       Thank you for allowing me to participate in the care of your patient.    Sincerely,     SARA BISHOP MD     [facility]

## 2017-06-22 NOTE — TELEPHONE ENCOUNTER
Per Verbal order read back of Dr. Charles, due to increased Creatine to 1.61 yesterday, patient is to decrease Demadex to 10 mg daily.  Patient called with recommended change of the Demadex to 10 mg daily. Reviewed increase in Creatine, indicating stress on kidneys.  Patient verbalized understanding.

## 2017-06-26 NOTE — TELEPHONE ENCOUNTER
Saw Dr. Charles on 6/21/17. Started retaking Coreg 25 mg bid after OV. Took AM dose of Coreg 25 mg bid on Friday 6/23/17 at 1:00 and when walking from car to restaurant in late afternoon, had an episode which he decribes as almost passing out. He did not take his medications with food that day and had not eaten yet. BP when he got home was ~120/70.  Normally takes his AM medications at 11 AM and PM medications at 10:30-11 PM.   Has not taken the Coreg since episode on Friday. Was not taking the medications regularly prior to OV on 6/23, but states Dr. Carter encouraged him to take the medication.  Encouraged him to take medications with a full glass of water and with food. Educated him of peak of medications on BP at 1-2 hours after taking.   Asked him to try taking only 12.5 mg in AM and 25 mg of Coreg in the PM and to update us with how he tolerates. Will update Dr. Charles for further recommendations.

## 2017-07-10 NOTE — TELEPHONE ENCOUNTER
pantoprazole (PROTONIX) 40 MG EC tablet        Last Written Prescription Date: 4/10/2017  Last Fill Quantity: 90,  # refills: 0   Last Office Visit with [facility], [facility] or [facility] prescribing provider: 5/8/2017                                         Next 5 appointments (look out 90 days)     Aug 09, 2017 10:00 AM CDT   [facility]    [address] [facility] [address]   [phone]

## 2017-07-21 NOTE — MR AVS SNAPSHOT
After Visit Summary   7/21/2017    Kelechi Coleman    MRN: [id number]           Patient Information     Date Of Birth          1935        Visit Information        Provider Department      7/21/2017 2:45 PM Boaz Iraheta,  [facility]        Today's Diagnoses     Laceration of foot, left, initial encounter    -  1       Follow-ups after your visit        Your next 10 appointments already scheduled     Aug 09, 2017 10:00 AM CDT   Office Visit with Etienne Gee MD   [facility] ([facility])    [address] [id number] [phone]           Bring a current list of meds and any records pertaining to this visit.  For Physicals, please bring immunization records and any forms needing to be filled out.  Please arrive 10 minutes early to complete paperwork.            Aug 28, 2017 12:50 PM CDT   LAB with [facility]   [facility] ([facility]    [address] [place] [id number] [phone]           Patient must bring picture ID.  Patient should be prepared to give a urine specimen  Please do not eat 10-12 hours before your appointment if you are coming in fasting for labs on lipids, cholesterol, or glucose (sugar).  Pregnant women should follow their Care Team instructions. Water with medications is okay. Do not drink coffee or other fluids.   If you have concerns about taking  your medications, please ask at office or if scheduling via [employer], send a message by clicking on Secure Messaging, Message Your Care Team.            Aug 28, 2017  1:50 PM CDT   Core Return with Erica Beach PA-C   [facility] ([facility])    [address] [place] [id number] [phone]              Who to contact     If you have questions or need follow up information about today's clinic  visit or your schedule please contact [facility] directly at [phone].  Normal or non-critical lab and imaging results will be communicated to you by MyChart, letter or phone within 4 business days after the clinic has received the results. If you do not hear from us within 7 days, please contact the clinic through [employer] or phone. If you have a critical or abnormal lab result, we will notify you by phone as soon as possible.  Submit refill requests through [employer] or call your pharmacy and they will forward the refill request to us. Please allow 3 business days for your refill to be completed.          Additional Information About Your Visit        [employer] Information     [employer] gives you secure access to your electronic health record. If you see a primary care provider, you can also send messages to your care team and make appointments. If you have questions, please call your primary care clinic.  If you do not have a primary care provider, please call [phone] and they will assist you.        Care EveryWhere ID     This is your Care EveryWhere ID. This could be used by other organizations to access your [facility] medical records  [id number]        Your Vitals Were     Pulse Temperature Pulse Oximetry BMI (Body Mass Index)          85 97.4  F (36.3  C) (Oral) 95% 23.75 kg/m2         Blood Pressure from Last 3 Encounters:   07/21/17 (!) 150/100   06/21/17 125/70   05/08/17 123/78    Weight from Last 3 Encounters:   07/21/17 185 lb (83.9 kg)   06/21/17 180 lb (81.6 kg)   05/08/17 184 lb 8 oz (83.7 kg)              Today, you had the following     No orders found for display       Primary Care Provider Office Phone # Fax #    Etienne Gee [phone]       [facility] [address] [address]        Goals        Exercise    I will exercise 2x per week (15 min per time) , for the next 6 weeks.     Notes - Note created  12/22/2016 12:14 PM  by Ting Bullard RN    As of today's date 12/22/2016 goal is met at 0 - 25%.   Goal Status:  Active           General    I will not add any salt to my food and will limit my salt intake to 2,000 mg of sodium per day. (pt-stated)     Notes - Note created  9/26/2016  3:53 PM by Ting Bullard RN    As of today's date 9/26/2016 goal is met at 26 - 50%.   Goal Status:  Active        I will weigh myself every day and call my clinic if I am more than 2 pounds heavier in a day or 5 pounds heavier in a week.  (pt-stated)     Notes - Note created  9/26/2016  3:52 PM by Ting Bullard RN    As of today's date 9/26/2016 goal is met at 26 - 50%.   Goal Status:  Active          Equal Access to Services     [facility]: Hadii christine kelsey hadasho Soomaali, waaxda luqadaha, qaybta kaalmada adeegyada, nenita reynoso . So [facility] [phone].    ATENCIÓN: Si habla español, tiene a conteh disposición servicios gratuitos de asistencia lingüística. Llame al [phone].    We comply with applicable federal civil rights laws and [place] laws. We do not discriminate on the basis of race, color, national origin, age, disability sex, sexual orientation or gender identity.            Thank you!     Thank you for choosing [facility]  for your care. Our goal is always to provide you with excellent care. Hearing back from our patients is one way we can continue to improve our services. Please take a few minutes to complete the written survey that you may receive in the mail after your visit with us. Thank you!             Your Updated Medication List - Protect others around you: Learn how to safely use, store and throw away your medicines at www.disposemymeds.org.          This list is accurate as of: 7/21/17  2:57 PM.  Always use your most recent med list.                   Brand Name Dispense Instructions for use Diagnosis    ACCU-CHEK MARY PLUS test strip   Generic drug:  blood  glucose monitoring     300 strip    TEST THREE TIMES DAILY    Type 2 diabetes mellitus with stage 3 chronic kidney disease, with long-term current use of insulin (H)       amLODIPine 10 MG tablet    NORVASC    90 tablet    Take 1 tablet (10 mg) by mouth daily    Peripheral artery disease (H), Coronary artery disease involving native coronary artery of native heart without angina pectoris       ASPIRIN NOT PRESCRIBED    INTENTIONAL    0 each    Please choose reason not prescribed, below    Chronic systolic congestive heart failure (H)       atorvastatin 40 MG tablet    LIPITOR    90 tablet    Take 1 tablet (40 mg) by mouth daily    Hyperlipidemia LDL goal <70       B-D U/F 31G X 8 MM   Generic drug:  insulin pen needle           CALTRATE 600+D 600-800 MG-UNIT Tabs   Generic drug:  Calcium Carb-Cholecalciferol      Take 1 tablet by mouth daily.        carvedilol 25 MG tablet    COREG    180 tablet    Take 1 tablet (25 mg) by mouth 2 times daily (with meals)    Essential hypertension with goal blood pressure less than 130/80       CENTRUM SILVER per tablet      Take 1 tablet by mouth daily.        clopidogrel 75 MG tablet    PLAVIX    90 tablet    Take 1 tablet (75 mg) by mouth daily    Chronic ischemic heart disease, unspecified       hydrALAZINE 50 MG tablet    APRESOLINE    270 tablet    Take 1 tablet (50 mg) by mouth 3 times daily    Essential hypertension with goal blood pressure less than 130/80       isosorbide mononitrate 60 MG 24 hr tablet    IMDUR    180 tablet    Take 2 tablets (120 mg) by mouth daily    Coronary artery disease involving native coronary artery of native heart without angina pectoris       LANTUS SOLOSTAR 100 UNIT/ML injection   Generic drug:  insulin glargine     30 mL    ADMINISTER 35 TO 40 UNITS UNDER THE SKIN AT BEDTIME    Type 2 diabetes mellitus with stage 3 chronic kidney disease, with long-term current use of insulin (H)       pantoprazole 40 MG EC tablet    PROTONIX    90 tablet     TAKE 1 TABLET BY MOUTH EVERY DAY    Gastroesophageal reflux disease, esophagitis presence not specified       spironolactone 50 MG tablet    ALDACTONE    90 tablet    Take 1 tablet (50 mg) by mouth daily    Chronic systolic congestive heart failure (H)       torsemide 10 MG tablet    DEMADEX    90 tablet    Take 1 tablet (10 mg) by mouth daily    Acute on chronic combined systolic and diastolic congestive heart failure (H)

## 2017-08-07 NOTE — TELEPHONE ENCOUNTER
ELISABETH PERESOSTAR 100 UNIT/ML soln     Last Written Prescription Date: 3/27/17  Last Fill Quantity: 30mL, # refills: 0  Last Office Visit with G, P or  Health prescribing provider:  5/08/17   Next 5 appointments (look out 90 days)     Aug 09, 2017 10:00 AM CDT   [facility]    [address] [facility] [id number] [phone]                   BP Readings from Last 3 Encounters:   07/21/17 (!) 150/100   06/21/17 125/70   05/08/17 123/78     No results found for: MICROL  No results found for: UMALCR  Creatinine   Date Value Ref Range Status   06/21/2017 1.61 (H) 0.70 - 1.30 mg/dL Final   ]  GFR Estimate   Date Value Ref Range Status   06/21/2017 42 (L) >60 mL/min/1.7m2 Final   04/21/2017 46 (L) >60 mL/min/1.7m2 Final   03/13/2017 49 (L) >60 mL/min/1.7m2 Final     GFR Estimate If Black   Date Value Ref Range Status   06/21/2017 50 (L) >60 mL/min/1.7m2 Final   04/21/2017 55 (L) >60 mL/min/1.7m2 Final   03/13/2017 59 (L) >60 mL/min/1.7m2 Final     Lab Results   Component Value Date    CHOL 139 09/06/2016     Lab Results   Component Value Date    HDL 52 09/06/2016     Lab Results   Component Value Date    LDL 62 09/06/2016     Lab Results   Component Value Date    TRIG 123 09/06/2016     Lab Results   Component Value Date    CHOLHDLRATIO 3.5 05/09/2013     Lab Results   Component Value Date    AST 15 09/19/2016     Lab Results   Component Value Date    ALT 20 09/19/2016     Lab Results   Component Value Date    A1C 7.2 02/06/2017    A1C 7.1 08/11/2016    A1C 7.5 04/06/2016    A1C 8.3 12/18/2015    A1C 7.9 06/24/2015     Potassium   Date Value Ref Range Status   06/21/2017 4.0 3.5 - 5.1 mmol/L Final

## 2017-08-07 NOTE — TELEPHONE ENCOUNTER
Routing refill request to provider for review/approval because:  Labs out of range:  Cr, GFR    Pt is coming in to see you on 8/9. He is due for A1C.   Med pended     Lashanda Santana RN

## 2017-08-08 NOTE — PROGRESS NOTES
SUBJECTIVE:                                                    Kelechi Coleman is a 81 year old male who presents to clinic today for the following health issues:      Diabetes Follow-up    Patient is checking blood sugars: twice daily.    Blood sugar testing frequency justification: Adjustment of medication(s)  Results are as follows:   106-140        Diabetic concerns: None     Symptoms of hypoglycemia (low blood sugar): none     Paresthesias (numbness or burning in feet) or sores: No   Date of last diabetic eye exam: last week      Amount of exercise or physical activity: None    Problems taking medications regularly: No    Medication side effects: none  Diet: low salt      His weight has been stable, but he still has BATES without chest pain or palpitation  BATES is moderate and prevents him from comfortably carrying laundry up stairs etc.  He does not have orthopnea or PND  He continues to follow up with cardiology regularly  His sugar control has been good  He complains of moderate hoarse voice for several months  He denies associated dysphagia or odynophagia  He has a remote history of vocal chord polyp      Problem list and histories reviewed & adjusted, as indicated.  Additional history: as documented    Patient Active Problem List   Diagnosis     Atherosclerosis of native coronary artery of native heart without angina pectoris     Paroxysmal atrial fibrillation (H)     Peripheral artery disease (H)     Hyperlipidemia LDL goal <70     Benign essential hypertension     Paroxysmal ventricular tachycardia (H)     Cerebral infarction (H)     Acute exacerbation of CHF (congestive heart failure) (H)     Stroke (cerebrum) (H)     Type 2 diabetes mellitus with stage 3 chronic kidney disease, with long-term current use of insulin (H)     Chronic systolic heart failure (H)     Gastroesophageal reflux disease with esophagitis     Past Surgical History:   Procedure Laterality Date     C MRA UPPER EXTREMITY WO&W  CONT       CORONARY ARTERY BYPASS  2002    LIMA-LAD, SVG-OM1, IVY-JCDR-LRSO     ENT SURGERY      VOCAL CORD LESION REMOVED     GENITOURINARY SURGERY      ANGIOPLASTY FOR RENAL ARTERT STENOSIS     HC LEFT HEART CATHETERIZATION  9/7/2016 09/07/2016: CHUCHO x2 to distal and proximal SVG to to RPDA     HEART CATH LEFT HEART CATH  11/30/2001     PHACOEMULSIFICATION CLEAR CORNEA WITH STANDARD INTRAOCULAR LENS IMPLANT Left 1/22/2015    Procedure: PHACOEMULSIFICATION CLEAR CORNEA WITH STANDARD INTRAOCULAR LENS IMPLANT;  Surgeon: Bart Johnson MD;  Location:  [facility]     VASCULAR SURGERY      ANGIOPLASTY LEFT LEG FOR pvd       Social History   Substance Use Topics     Smoking status: Former Smoker     Years: 40.00     Types: Pipe     Quit date: 12/18/1998     Smokeless tobacco: Never Used     Alcohol use 0.0 oz/week     0 Standard drinks or equivalent per week      Comment: 3 hard liquor drinks daily     Family History   Problem Relation Age of Onset     DIABETES Mother      Family History Negative Father      CANCER Maternal Grandmother          Current Outpatient Prescriptions   Medication Sig Dispense Refill     carvedilol (COREG) 25 MG tablet Take 0.5 tablets (12.5 mg) by mouth every morning 180 tablet 3     LANTUS SOLOSTAR 100 UNIT/ML soln ADMINISTER 35 TO 40 UNITS UNDER THE SKIN AT BEDTIME 30 mL 0     pantoprazole (PROTONIX) 40 MG EC tablet TAKE 1 TABLET BY MOUTH EVERY DAY 90 tablet 2     torsemide (DEMADEX) 10 MG tablet Take 1 tablet (10 mg) by mouth daily 90 tablet 3     amLODIPine (NORVASC) 10 MG tablet Take 1 tablet (10 mg) by mouth daily 90 tablet 4     atorvastatin (LIPITOR) 40 MG tablet Take 1 tablet (40 mg) by mouth daily 90 tablet 3     clopidogrel (PLAVIX) 75 MG tablet Take 1 tablet (75 mg) by mouth daily 90 tablet 3     hydrALAZINE (APRESOLINE) 50 MG tablet Take 1 tablet (50 mg) by mouth 3 times daily 270 tablet 3     isosorbide mononitrate (IMDUR) 60 MG 24 hr tablet Take 2 tablets (120 mg) by mouth daily  "180 tablet 3     spironolactone (ALDACTONE) 50 MG tablet Take 1 tablet (50 mg) by mouth daily 90 tablet 3     ASPIRIN NOT PRESCRIBED (INTENTIONAL) Please choose reason not prescribed, below 0 each 0     ACCU-CHEK MARY PLUS test strip TEST THREE TIMES DAILY 300 strip 1     B-D U/F 31G X 8 MM insulin pen needle        Multiple Vitamins-Minerals (CENTRUM SILVER) per tablet Take 1 tablet by mouth daily.       Calcium Carb-Cholecalciferol (CALTRATE 600+D) 600-800 MG-UNIT TABS Take 1 tablet by mouth daily.       [DISCONTINUED] carvedilol (COREG) 25 MG tablet Take 1 tablet (25 mg) by mouth 2 times daily (with meals) 180 tablet 3     Allergies   Allergen Reactions     Aspirin Hives     Penicillins Hives     Contrast Dye Hives         Reviewed and updated as needed this visit by clinical staff     Reviewed and updated as needed this visit by Provider         ROS:  Constitutional, HEENT, cardiovascular, pulmonary, gi and gu systems are negative, except as otherwise noted.      OBJECTIVE:   /60 (BP Location: Right arm, Patient Position: Chair, Cuff Size: Adult Regular)  Pulse 69  Temp 97.9  F (36.6  C) (Oral)  Ht 6' 2\" (1.88 m)  Wt 182 lb 8 oz (82.8 kg)  SpO2 95%  BMI 23.43 kg/m2  Body mass index is 23.43 kg/(m^2).  GENERAL: healthy, alert and no distress  EYES: Eyes grossly normal to inspection, PERRL and conjunctivae and sclerae normal  HENT: ear canals and TM's normal, nose and mouth without ulcers or lesions  NECK: no adenopathy, no asymmetry, masses, or scars and thyroid normal to palpation  RESP: lungs clear to auscultation - no rales, rhonchi or wheezes  CV: Heart with regular rate and rhythm  ABDOMEN: soft, nontender, no hepatosplenomegaly, no masses and bowel sounds normal  MS: no gross musculoskeletal defects noted, no edema  NEURO: Normal strength and tone, mentation intact and speech normal  PSYCH: mentation appears normal, affect normal/bright  Diabetic foot exam: normal DP and PT pulses, no trophic " changes or ulcerative lesions and normal sensory exam    Diagnostic Test Results:  Results for orders placed or performed in visit on 08/09/17   HEMOGLOBIN A1C   Result Value Ref Range    Hemoglobin A1C 7.0 (H) 4.3 - 6.0 %       ASSESSMENT/PLAN:       1. Dyspnea on exertion  He seems well compensated from CHF standpoint  He was a smoker 40 pack year hx; check PFTs to exclude obstructive lung disease contributing to residual dyspnea symptoms   If obstructive disease diagnosed, return to clinic to discuss bronchodilators/pulmomary rehab   - General PFT Lab (Please always keep checked); Future  - Pulmonary Function Test; Future  - 6 minute walk test; Future    2. Type 2 diabetes mellitus with stage 3 chronic kidney disease, with long-term current use of insulin (H)  Well controlled; continue current insulin   - HEMOGLOBIN A1C  - Albumin Random Urine Quantitative    3. Chronic systolic heart failure (H)  Stable, continue follow up with cardiology     4. Hoarse voice quality  Needs laryngoscopy to further evaluate, referred to Dr. Wheat   - OTOLARYNGOLOGY REFERRAL    5. Essential hypertension with goal blood pressure less than 130/80  Well controlled   - carvedilol (COREG) 25 MG tablet; Take 0.5 tablets (12.5 mg) by mouth every morning  Dispense: 180 tablet; Refill: 3    FUTURE APPOINTMENTS:       - Follow-up visit pending PFTs and symptoms no later than 6 months with A1c     Etienne Gee MD  [facility]

## 2017-08-09 NOTE — NURSING NOTE
"Chief Complaint   Patient presents with     Diabetes        Initial /60 (BP Location: Right arm, Patient Position: Chair, Cuff Size: Adult Regular)  Pulse 69  Temp 97.9  F (36.6  C) (Oral)  Ht 6' 2\" (1.88 m)  Wt 182 lb 8 oz (82.8 kg)  SpO2 95%  BMI 23.43 kg/m2 Estimated body mass index is 23.43 kg/(m^2) as calculated from the following:    Height as of this encounter: 6' 2\" (1.88 m).    Weight as of this encounter: 182 lb 8 oz (82.8 kg)..    BP completed using cuff size: regular  MEDICATIONS REVIEWED  SOCIAL AND FAMILY HX REVIEWED  Julia Larsen CMA  "

## 2017-08-09 NOTE — MR AVS SNAPSHOT
After Visit Summary   8/9/2017    Kelechi Coleman    MRN: [id number]           Patient Information     Date Of Birth          1935        Visit Information        Provider Department      8/9/2017 10:00 AM Etienne Gee MD [facility]        Today's Diagnoses     Type 2 diabetes mellitus with stage 3 chronic kidney disease, with long-term current use of insulin (H)    -  1    Dyspnea on exertion        Chronic systolic heart failure (H)        Hoarse voice quality        Essential hypertension with goal blood pressure less than 130/80          Care Instructions    Call  to schedule LUNG FUNCTION TESTS to see if we cannot help you more with your breathing           Follow-ups after your visit        Additional Services     OTOLARYNGOLOGY REFERRAL       Your provider has referred you to: N: [facility] [place] [phone]   [email or web address]    Dr. Gigi Wheat     Please be aware that coverage of these services is subject to the terms and limitations of your health insurance plan.  Call member services at your health plan with any benefit or coverage questions.      Please bring the following with you to your appointment:    (1) Any X-Rays, CTs or MRIs which have been performed.  Contact the facility where they were done to arrange for  prior to your scheduled appointment.   (2) List of current medications  (3) This referral request   (4) Any documents/labs given to you for this referral                  Follow-up notes from your care team     Return in about 6 months (around 2/9/2018).      Your next 10 appointments already scheduled     Aug 28, 2017 12:50 PM CDT   LAB with [facility]   [facility] ([facility])    [address] [place] [id number]   [phone]           Patient must bring picture ID. Patient should be prepared to give a urine  specimen  Please do not eat 10-12 hours before your appointment if you are coming in fasting for labs on lipids, cholesterol, or glucose (sugar). Pregnant women should follow their Care Team instructions. Water with medications is okay. Do not drink coffee or other fluids. If you have concerns about taking  your medications, please ask at office or if scheduling via [employer], send a message by clicking on Secure Messaging, Message Your Care Team.            Aug 28, 2017  1:50 PM CDT   Core Return with Erica Beach PA-C   [facility] [place] ([facility])    [address] [place] [id number] [phone]              Future tests that were ordered for you today     Open Future Orders        Priority Expected Expires Ordered    General PFT Lab (Please always keep checked) Routine  8/9/2018 8/9/2017    Pulmonary Function Test Routine  8/9/2018 8/9/2017    6 minute walk test Routine  8/9/2018 8/9/2017            Who to contact     If you have questions or need follow up information about today's clinic visit or your schedule please contact [facility] directly at [phone].  Normal or non-critical lab and imaging results will be communicated to you by [employer], letter or phone within 4 business days after the clinic has received the results. If you do not hear from us within 7 days, please contact the clinic through [employer] or phone. If you have a critical or abnormal lab result, we will notify you by phone as soon as possible.  Submit refill requests through [employer] or call your pharmacy and they will forward the refill request to us. Please allow 3 business days for your refill to be completed.          Additional Information About Your Visit        [employer] Information     [employer] gives you secure access to your electronic health record. If you see a primary care provider, you can also send messages to your care team and make  "appointments. If you have questions, please call your primary care clinic.  If you do not have a primary care provider, please call [phone] and they will assist you.        Care EveryWhere ID     This is your Care EveryWhere ID. This could be used by other organizations to access your [place] medical records  [id number]        Your Vitals Were     Pulse Temperature Height Pulse Oximetry BMI (Body Mass Index)       69 97.9  F (36.6  C) (Oral) 6' 2\" (1.88 m) 95% 23.43 kg/m2        Blood Pressure from Last 3 Encounters:   08/09/17 107/60   07/21/17 (!) 150/100   06/21/17 125/70    Weight from Last 3 Encounters:   08/09/17 182 lb 8 oz (82.8 kg)   07/21/17 185 lb (83.9 kg)   06/21/17 180 lb (81.6 kg)              We Performed the Following     Albumin Random Urine Quantitative     HEMOGLOBIN A1C     OTOLARYNGOLOGY REFERRAL          Today's Medication Changes          These changes are accurate as of: 8/9/17 10:26 AM.  If you have any questions, ask your nurse or doctor.               These medicines have changed or have updated prescriptions.        Dose/Directions    carvedilol 25 MG tablet   Commonly known as:  COREG   This may have changed:    - how much to take  - when to take this   Used for:  Essential hypertension with goal blood pressure less than 130/80   Changed by:  Etienne Gee MD        Dose:  12.5 mg   Take 0.5 tablets (12.5 mg) by mouth every morning   Quantity:  180 tablet   Refills:  3                Primary Care Provider Office Phone # Fax #    Etienne Gee [phone] [address] [place] [address]        Goals        Exercise    I will exercise 2x per week (15 min per time) , for the next 6 weeks.     Notes - Note created  12/22/2016 12:14 PM by Ting Bullard RN    As of today's date 12/22/2016 goal is met at 0 - 25%.   Goal Status:  Active           General    I will not add any salt to my food and will limit my salt intake to 2,000 mg of sodium per day. " (pt-stated)     Notes - Note created  9/26/2016  3:53 PM by Ting Bullard, RN    As of today's date 9/26/2016 goal is met at 26 - 50%.   Goal Status:  Active        I will weigh myself every day and call my clinic if I am more than 2 pounds heavier in a day or 5 pounds heavier in a week.  (pt-stated)     Notes - Note created  9/26/2016  3:52 PM by Ting Bullard, RN    As of today's date 9/26/2016 goal is met at 26 - 50%.   Goal Status:  Active          Equal Access to Services     [facility] AH: Hadii aad ku hadasho Soomaali, waaxda luqadaha, qaybta kaalmada adeegyada, nenita reynoso . So [facility] [phone].    ATENCIÓN: Si habla español, tiene a conteh disposición servicios gratuitos de asistencia lingüística. Llame al [phone].    We comply with applicable federal civil rights laws and [place] laws. We do not discriminate on the basis of race, color, national origin, age, disability sex, sexual orientation or gender identity.            Thank you!     Thank you for choosing [facility]  for your care. Our goal is always to provide you with excellent care. Hearing back from our patients is one way we can continue to improve our services. Please take a few minutes to complete the written survey that you may receive in the mail after your visit with us. Thank you!             Your Updated Medication List - Protect others around you: Learn how to safely use, store and throw away your medicines at www.disposemymeds.org.          This list is accurate as of: 8/9/17 10:26 AM.  Always use your most recent med list.                   Brand Name Dispense Instructions for use Diagnosis    ACCU-CHEK MARY PLUS test strip   Generic drug:  blood glucose monitoring     300 strip    TEST THREE TIMES DAILY    Type 2 diabetes mellitus with stage 3 chronic kidney disease, with long-term current use of insulin (H)       amLODIPine 10 MG tablet    NORVASC    90 tablet    Take 1 tablet (10  mg) by mouth daily    Peripheral artery disease (H), Coronary artery disease involving native coronary artery of native heart without angina pectoris       ASPIRIN NOT PRESCRIBED    INTENTIONAL    0 each    Please choose reason not prescribed, below    Chronic systolic congestive heart failure (H)       atorvastatin 40 MG tablet    LIPITOR    90 tablet    Take 1 tablet (40 mg) by mouth daily    Hyperlipidemia LDL goal <70       B-D U/F 31G X 8 MM   Generic drug:  insulin pen needle           CALTRATE 600+D 600-800 MG-UNIT Tabs   Generic drug:  Calcium Carb-Cholecalciferol      Take 1 tablet by mouth daily.        carvedilol 25 MG tablet    COREG    180 tablet    Take 0.5 tablets (12.5 mg) by mouth every morning    Essential hypertension with goal blood pressure less than 130/80       CENTRUM SILVER per tablet      Take 1 tablet by mouth daily.        clopidogrel 75 MG tablet    PLAVIX    90 tablet    Take 1 tablet (75 mg) by mouth daily    Chronic ischemic heart disease, unspecified       hydrALAZINE 50 MG tablet    APRESOLINE    270 tablet    Take 1 tablet (50 mg) by mouth 3 times daily    Essential hypertension with goal blood pressure less than 130/80       isosorbide mononitrate 60 MG 24 hr tablet    IMDUR    180 tablet    Take 2 tablets (120 mg) by mouth daily    Coronary artery disease involving native coronary artery of native heart without angina pectoris       LANTUS SOLOSTAR 100 UNIT/ML injection   Generic drug:  insulin glargine     30 mL    ADMINISTER 35 TO 40 UNITS UNDER THE SKIN AT BEDTIME    Type 2 diabetes mellitus with stage 3 chronic kidney disease, with long-term current use of insulin (H)       pantoprazole 40 MG EC tablet    PROTONIX    90 tablet    TAKE 1 TABLET BY MOUTH EVERY DAY    Gastroesophageal reflux disease, esophagitis presence not specified       spironolactone 50 MG tablet    ALDACTONE    90 tablet    Take 1 tablet (50 mg) by mouth daily    Chronic systolic congestive heart failure  (H)       torsemide 10 MG tablet    DEMADEX    90 tablet    Take 1 tablet (10 mg) by mouth daily    Acute on chronic combined systolic and diastolic congestive heart failure (H)

## 2017-08-14 NOTE — PROGRESS NOTES
SUBJECTIVE: @[email or web address]@.ident who presents to the clinic with a laceration on the toe sustained 4 hours(s) ago.    This is a non-work related and accidental injury.    Mechanism of injury: nail clipper.  Associated symptoms: Denies numbness, weakness, or loss of function  Last tetanus booster within 10 years: yes    Past Medical History:   Diagnosis Date     Acute exacerbation of CHF (congestive heart failure) (H) 9/2/2016     Atrial fibrillation (H)      Cerebral infarction (H) 12/13/2013, 9/2016     Diagnosis updated by automated process. Provider to review and confirm.     Coronary artery disease      Diabetes (H)      Gastroesophageal reflux disease with esophagitis 5/8/2017     Hyperlipidaemia      Hypertension      Myocardial infarction (H)      PAD (peripheral artery disease) (H)      Stented coronary artery     CABG 2002 4 VESSEL     Type 2 diabetes mellitus with stage 3 chronic kidney disease, with long-term current use of insulin (H) 11/4/2016     Unspecified cerebral artery occlusion with cerebral infarction      Allergies   Allergen Reactions     Aspirin Hives     Penicillins Hives     Contrast Dye Hives     Social History   Substance Use Topics     Smoking status: Former Smoker     Years: 40.00     Types: Pipe     Quit date: 12/18/1998     Smokeless tobacco: Never Used     Alcohol use 0.0 oz/week     0 Standard drinks or equivalent per week      Comment: 3 hard liquor drinks daily       ROS:  Neuro: good distal sensation  Motor: normal rom and strenght  Hem: capillary refill < 2 sec    EXAM: The patient appears today in alert,no apparent distress distressVITALS:   BP (!) 150/100 (BP Location: Left arm, Patient Position: Chair, Cuff Size: Adult Regular)  Pulse 85  Temp 97.4  F (36.3  C) (Oral)  Wt 185 lb (83.9 kg)  SpO2 95%  BMI 23.75 kg/m2  Size of laceration: half centimeters  Characteristics of the laceration: clean and straight  Tendon function intact: yes  Sensation to light touch intact:  yes  Pulses/Capillary refill intact: yes      ICD-10-CM    1. Laceration of foot, left, initial encounter S91.312A      Clean and dressed wound  After care instructions:Keep wound clean   Signs of infection discussed today
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
## 2017-08-18 NOTE — PLAN OF CARE
Problem: Goal Outcome Summary  Goal: Goal Outcome Summary  OT4A: Recommend pt discharge to ARU. Pt limited by R UE tone (decreased), coordination and strength. Pt unable to use R UE for functional activities which is a dramatic change from baseline as described by pt. Pt required Min A for mobility/safety when tx from bed to chair.

## 2017-08-18 NOTE — PROGRESS NOTES
Spoke to Dr. Mohamud at [facility] ED.  Patient presented after a fall down the stairs and hitting his head, denied LOC. Suffered laceration to the head and brought to ED via EMS.  Patient with extensive cardiac history and on anticoagulants, Plavix. CT of head SAH over left frontal and parietal lobes and small SDH anterior to left frontal lobe. Patient is awake and alert.  Neurologically intact at time of this call, and during call Dr. Mohamud states per staff he just now started to have RUE weakness, otherwise, continues to be awake and alert and answering questions appropriately with minimal pain noted to site of laceration.    Recommendations:  1) Agree with admission thru hospitalist  2) Keep SBP <140  3) DC all bloodthinners and anti-inflammatories  4) F/u CT in the am and/or sooner if mental status changes or neuro changes  5) Full consult to follow in the am  6) Nonsurgical at this time

## 2017-08-18 NOTE — IP AVS SNAPSHOT
` `     [address] [facility] BANK: [phone]            Medication Administration Report for Kelechi Coleman as of 08/24/17 1059   Legend:    Given Hold Not Given Due Canceled Entry Other Actions    Time Time (Time) Time  Time-Action       Inactive    Active    Linked        Medications 08/18/17 08/19/17 08/20/17 08/21/17 08/22/17 08/23/17 08/24/17    0.9% sodium chloride infusion  Rate: 10 mL/hr Freq: CONTINUOUS Route: IV  Start: 08/21/17 1245       1308 ( )-New Bag        0828 ( )-Rate/Dose Change       1211 ( )-Rate/Dose Change        1530 ( )-Rate/Dose Verify            acetaminophen (TYLENOL) tablet 650 mg  Dose: 650 mg Freq: EVERY 4 HOURS PRN Route: PO  PRN Reason: mild pain  Start: 08/18/17 0330   Admin Instructions: Maximum acetaminophen dose from all sources = 75 mg/kg/day not to exceed 4 grams/day.      1648 (650 mg)-Given       2024 (650 mg)-Given        0104 (650 mg)-Given       1134 (650 mg)-Given                     Or  acetaminophen (TYLENOL) solution 650 mg  Dose: 650 mg Freq: EVERY 4 HOURS PRN Route: PER NG TUBE  PRN Reason: mild pain  Start: 08/18/17 0330   Admin Instructions: Maximum acetaminophen dose from all sources= 75 mg/kg/day not to exceed 4 grams/day.                                      0348 (650 mg)-Given            amLODIPine (NORVASC) tablet 10 mg  Dose: 10 mg Freq: DAILY Route: PO  Start: 08/18/17 0800    0824 (10 mg)-Given        0852 (10 mg)-Given        0828 (10 mg)-Given        0831 (10 mg)-Given        1159 (10 mg)-Given        0916 (10 mg)-Given        1050 (10 mg)-Given           atorvastatin (LIPITOR) tablet 40 mg  Dose: 40 mg Freq: DAILY Route: PO  Start: 08/18/17 0800    0826 (40 mg)-Given        0856 (40 mg)-Given        0833 (40 mg)-Given        0830 (40 mg)-Given        1201 (40 mg)-Given        0917 (40 mg)-Given        1050 (40 mg)-Given           bisacodyl (DULCOLAX) Suppository 10 mg  Dose: 10 mg Freq: ONCE Route: RE  Start: 08/21/17 1417 (2034)-Not  Given [C]              bisacodyl (DULCOLAX) Suppository 10 mg  Dose: 10 mg Freq: DAILY PRN Route: RE  PRN Reason: constipation  Start: 08/18/17 0330   Admin Instructions: Hold for loose stools.  This is the third step of a three step constipation treatment protocol.               calcium carb 1250 mg (500 mg Telida)/vitamin D 200 units (OSCAL with D) per tablet 1 tablet  Dose: 1 tablet Freq: DAILY Route: PO  Start: 08/18/17 0800    0826 (1 tablet)-Given        0853 (1 tablet)-Given        0830 (1 tablet)-Given        0832 (1 tablet)-Given        1202 (1 tablet)-Given        0917 (1 tablet)-Given        1050 (1 tablet)-Given           carvedilol (COREG) tablet 12.5 mg  Dose: 12.5 mg Freq: EVERY MORNING Route: PO  Start: 08/18/17 0800    0825 (12.5 mg)-Given        0854 (12.5 mg)-Given        0828 (12.5 mg)-Given        0847 (12.5 mg)-Given        1201 (12.5 mg)-Given        0916 (12.5 mg)-Given        1050 (12.5 mg)-Given           glucose 40 % gel 15-30 g  Dose: 15-30 g Freq: EVERY 15 MIN PRN Route: PO  PRN Reason: low blood sugar  Start: 08/18/17 0330   Admin Instructions: Give 15 g for BG 51 to 69 mg/dL IF patient is conscious and able to swallow. Give 30 g for BG less than or equal to 50 mg/dL IF patient is conscious and able to swallow. Do NOT give glucose gel via enteral tube.  IF patient has enteral tube: give apple juice 120 mL (4 oz or 15 g of CHO) via enteral tube for BG 51 to 69 mg/dL.  Give apple juice 240 mL (8 oz or 30 g of CHO) via enteral tube for BG less than or equal to 50 mg/dL.    ~Oral gel is preferable for conscious and able to swallow patient.   ~IF gel unavailable or patient refuses may provide apple juice 120 mL (4 oz or 15 g of CHO). Document juice on I and O flowsheet.              Or  dextrose 50 % injection 25-50 mL  Dose: 25-50 mL Freq: EVERY 15 MIN PRN Route: IV  PRN Reason: low blood sugar  Start: 08/18/17 8840   Admin Instructions: Use if have IV access, BG less than 70 mg/dL and meet  dose criteria below:  Dose if conscious and alert (or disorientated) and NPO = 25 mL  Dose if unconscious / not alert = 50 mL  Vesicant.              Or  glucagon injection 1 mg  Dose: 1 mg Freq: EVERY 15 MIN PRN Route: SC  PRN Reason: low blood sugar  PRN Comment: May repeat x 1 only  Start: 08/18/17 0330   Admin Instructions: May give SQ or IM. ONLY use glucagon IF patient has NO IV access AND is UNABLE to swallow AND blood glucose is LESS than or EQUAL to 50 mg/dL.               hydrALAZINE (APRESOLINE) injection 10 mg  Dose: 10 mg Freq: EVERY 6 HOURS PRN Route: IV  PRN Reason: high blood pressure  PRN Comment: For SBP >140  Start: 08/18/17 1544              hydrALAZINE (APRESOLINE) tablet 50 mg  Dose: 50 mg Freq: 2 TIMES DAILY Route: PO  Start: 08/18/17 2000    1934 (50 mg)-Given        0853 (50 mg)-Given       2003 (50 mg)-Given        0825 (50 mg)-Given       (2238)-Not Given [C]        0831 (50 mg)-Given       (1917)-Not Given        1201 (50 mg)-Given       2046 (50 mg)-Given        0916 (50 mg)-Given       2114 (50 mg)-Given        1050 (50 mg)-Given       [ ] 2000           insulin aspart (NovoLOG) inj (RAPID ACTING)  Dose: 1-5 Units Freq: AT BEDTIME Route: SC  Start: 08/18/17 2200   Admin Instructions: MEDIUM INSULIN RESISTANCE DOSING    Do Not give Bedtime Correction Insulin if BG less than  200.   For  - 249 give 1 units.   For  - 299 give 2 units.   For  - 349 give 3 units.   For  -399 give 4 units.   For BG greater than or equal to 400 give 5 units.  Notify provider if glucose greater than or equal to 350 mg/dL after administration of correction dose.  If given at mealtime, must be administered 5 min before meal or immediately after.     (2120)-Not Given        2138 (1 Units)-Given        (2152)-Not Given [C]        (2202)-Not Given [C]        2219 (2 Units)-Given        2114 (1 Units)-Given        [ ] 2200           insulin aspart (NovoLOG) inj (RAPID ACTING)  Dose: 1-7 Units  Freq: 3 TIMES DAILY BEFORE MEALS Route: SC  Start: 08/18/17 1700   Admin Instructions: Correction Scale - MEDIUM INSULIN RESISTANCE DOSING     Do Not give Correction Insulin if Pre-Meal BG less than 140.   For Pre-Meal  - 189 give 1 unit.   For Pre-Meal  - 239 give 2 units.   For Pre-Meal  - 289 give 3 units.   For Pre-Meal  - 339 give 4 units.   For Pre-Meal - 399 give 5 units.   For Pre-Meal -449 give 6 units  For Pre-Meal BG greater than or equal to 450 give 7 units.   To be given with prandial insulin, and based on pre-meal blood glucose.    Notify provider if glucose greater than or equal to 350 mg/dL after administration of correction dose.  If given at mealtime, must be administered 5 min before meal or immediately after.             (0854)-Not Given       (1414)-Not Given       1748 (3 Units)-Given        0822 (1 Units)-Given       1213 (1 Units)-Given       1624 (1 Units)-Given               1246 (2 Units)-Given       1706 (3 Units)-Given [C]        0829 (1 Units)-Given       1220 (1 Units)-Given       1832 (3 Units)-Given        0927 (2 Units)-Given       1443 (2 Units)-Given       (1700)-Not Given [C]        0918 (1 Units)-Given       [ ] 1200       [ ] 1700           isosorbide mononitrate (IMDUR) 24 hr tablet 120 mg  Dose: 120 mg Freq: DAILY Route: PO  Start: 08/18/17 0800   Admin Instructions: DO NOT CRUSH. Can split tablet in half along score rama.     0822 (120 mg)-Given        0852 (120 mg)-Given        0829 (120 mg)-Given        0830 (120 mg)-Given        1159 (120 mg)-Given        0916 (120 mg)-Given        1051 (120 mg)-Given           labetalol (NORMODYNE/TRANDATE) injection 20 mg  Dose: 20 mg Freq: EVERY 4 HOURS PRN Route: IV  PRN Reason: high blood pressure  PRN Comment: For SBP over 140 or DBP over 100  Start: 08/18/17 0330   Admin Instructions: Do not give if HR below 60               levETIRAcetam (KEPPRA) tablet 500 mg  Dose: 500 mg Freq: 2 TIMES DAILY  Route: ORAL OR FEED  Start: 08/23/17 2000         2114 (500 mg)-Given        1051 (500 mg)-Given       [ ] 2000           LORazepam (ATIVAN) injection 0.5 mg  Dose: 0.5 mg Freq: EVERY 6 HOURS PRN Route: IV  PRN Reason: seizures  Start: 08/22/17 1137   Admin Instructions: For IV PUSH: Dilute with equal volume of NS.               magnesium sulfate 2 g in NS intermittent infusion (PharMEDium or FV Cmpd)  Dose: 2 g Freq: DAILY PRN Route: IV  PRN Reason: magnesium supplementation  Start: 08/18/17 0950   Admin Instructions: For Serum Mg++ 1.6 - 2 mg/dL  Give 2 g and recheck magnesium level next AM.          1401 (2 g)-New Bag            magnesium sulfate 4 g in 100 mL sterile water (premade)  Dose: 4 g Freq: EVERY 4 HOURS PRN Route: IV  PRN Reason: magnesium supplementation  Last Dose: 4 g (08/18/17 1211)  Start: 08/18/17 0950   Admin Instructions: For serum Mg++ less than 1.6 mg/dL  Give 4 g and recheck magnesium level 2 hours after dose, and next AM.     1211 (4 g)-New Bag                 mupirocin (BACTROBAN) 2 % ointment  Freq: DAILY Route: Top  Start: 08/19/17 0800   Admin Instructions: Apply to left elbow, left scalp, and right knee      0854 ( )-Given        0833 ( )-Given        (1031)-Not Given        0828 ( )-Given        0924 ( )-Given        1049 ( )-Given           naloxone (NARCAN) injection 0.1-0.4 mg  Dose: 0.1-0.4 mg Freq: EVERY 2 MIN PRN Route: IV  PRN Reason: opioid reversal  Start: 08/18/17 0330   Admin Instructions: For respiratory rate LESS than or EQUAL to 8.  Partial reversal dose:  0.1 mg titrated q 2 minutes for Analgesia Side Effects Monitoring Sedation Level of 3 (frequently drowsy, arousable, drifts to sleep during conversation).Full reversal dose:  0.4 mg bolus for Analgesia Side Effects Monitoring Sedation Level of 4 (somnolent, minimal or no response to stimulation).               ondansetron (ZOFRAN-ODT) ODT tab 4 mg  Dose: 4 mg Freq: EVERY 6 HOURS PRN Route: PO  PRN Reasons:  nausea,vomiting  Start: 08/18/17 0330   Admin Instructions: This is Step 1 of nausea and vomiting management.  If nausea not resolved in 15 minutes, go to Step 2 prochlorperazine (COMPAZINE). Do not push through foil backing. Peel back foil and gently remove. Place on tongue immediately. Administration with liquid unnecessary              Or  ondansetron (ZOFRAN) injection 4 mg  Dose: 4 mg Freq: EVERY 6 HOURS PRN Route: IV  PRN Reasons: nausea,vomiting  Start: 08/18/17 0330   Admin Instructions: This is Step 1 of nausea and vomiting management.  If nausea not resolved in 15 minutes, go to Step 2 prochlorperazine (COMPAZINE).  Irritant.               OXcarbazepine (TRILEPTAL) tablet 150 mg  Dose: 150 mg Freq: 2 TIMES DAILY Route: PO  Start: 08/20/17 1845      1845 (150 mg)-Given        0830 (150 mg)-Given       (1917)-Not Given        1201 (150 mg)-Given       2046 (150 mg)-Given        0916 (150 mg)-Given       2114 (150 mg)-Given        1050 (150 mg)-Given       [ ] 2000           pantoprazole (PROTONIX) EC tablet 40 mg  Dose: 40 mg Freq: DAILY Route: PO  Start: 08/18/17 0800   Admin Instructions: DO NOT CRUSH.     0824 (40 mg)-Given        0853 (40 mg)-Given        0827 (40 mg)-Given        0832 (40 mg)-Given        1159 (40 mg)-Given        0917 (40 mg)-Given        [ ] 0800           polyethylene glycol (MIRALAX/GLYCOLAX) Packet 17 g  Dose: 17 g Freq: DAILY Route: PO  Start: 08/21/17 0845   Admin Instructions: Hold for loose stools  1 Packet = 17 grams. Mixed prescribed dose in 8 ounces of water. Follow with 8 oz. of water.        (1031)-Not Given        1158 (17 g)-Given        (0916)-Not Given        1049 (17 g)-Given           polyethylene glycol (MIRALAX/GLYCOLAX) Packet 17 g  Dose: 17 g Freq: DAILY PRN Route: PO  PRN Reason: constipation  Start: 08/18/17 0330   Admin Instructions: Give in 8oz of  water, juice, or soda. Hold for loose stools.  This is the second step of a three step constipation treatment  protocol.  1 Packet = 17 grams. Mixed prescribed dose in 8 ounces of water. Follow with 8 oz. of water.               potassium chloride (KLOR-CON) Packet 20-40 mEq  Dose: 20-40 mEq Freq: EVERY 2 HOURS PRN Route: ORAL OR FEED  PRN Reason: potassium supplementation  Start: 08/18/17 0950   Admin Instructions: Use if unable to tolerate tablets.    If Serum K+ 3.4-4.0, dose = 20 mEq x1. Recheck K+ level the next AM.  If Serum K+ 3.0-3.3, dose = 60 mEq po total dose (40 mEq x 1 followed in 2 hours by 20 mEq X1). Recheck K+ level 4 hours after dose and the next AM.  If Serum K+ 2.5-2.9, dose = 80 mEq po total dose (40 mEq Q2H x2). Recheck K+ level 4 hours after dose and the next AM.  If Serum K+ less than 2.5, See IV order.  Dissolve packet contents in 4-8 ounces of cold water or juice.               potassium chloride 10 mEq in 100 mL intermittent infusion  Dose: 10 mEq Freq: EVERY 1 HOUR PRN Route: IV  PRN Reason: potassium supplementation  Start: 08/18/17 0950   Admin Instructions: Infuse via PERIPHERAL LINE or CENTRAL LINE. Use for central line replacement if patient weight less than 65 kg, if patient is on TPN with high potassium content or if unit does not stock 20 mEq bags.  If Serum K+ 3.4-4.0, dose = 10 mEq/hr x2 doses. Recheck K+ level the next AM.  If Serum K+ 3.0-3.3, dose = 10 mEq/hr x4 doses (40 mEq IV total dose). Recheck K+ level 2 hours after dose and the next AM.  If Serum K+ less than 3.0, dose = 10 mEq/hr x6 doses (60 mEq IV total dose). Recheck K+ level 2 hours after dose and the next AM.               potassium chloride 10 mEq in 100 mL intermittent infusion with 10 mg lidocaine  Dose: 10 mEq Freq: EVERY 1 HOUR PRN Route: IV  PRN Reason: potassium supplementation  Start: 08/18/17 0950   Admin Instructions: Infuse via PERIPHERAL LINE. Use potassium with lidocaine for pain with peripheral administration.  If Serum K+ 3.4-4.0, dose = 10 mEq/hr x2 doses. Recheck K+ level the next AM.  If Serum K+  3.0-3.3, dose = 10 mEq/hr x4 doses (40 mEq IV total dose). Recheck K+ level 2 hours after dose and the next AM.  If Serum K+ less than 3.0, dose = 10 mEq/hr x6 doses (60 mEq IV total dose). Recheck K+ level 2 hours after dose and the next AM.       0448 (10 mEq)-New Bag       0608 (10 mEq)-New Bag               potassium chloride 20 mEq in 50 mL intermittent infusion  Dose: 20 mEq Freq: EVERY 1 HOUR PRN Route: IV  PRN Reason: potassium supplementation  Start: 08/18/17 0950   Admin Instructions: Infuse via CENTRAL LINE Only.  May need EKG if less than 65 kg or on TPN - Max rate is 0.3 mEq/kg/hr for patients not on EKG monitoring.    If Serum K+ 3.4-4.0, dose = 20 mEq/hr x1 doses. Recheck K+ level the next AM.  If Serum K+ 3.0-3.3, dose = 20 mEq/hr x2 doses (40 mEq IV total dose).  Recheck K+ level 2 hours after dose and the next AM.  If Serum K+ less than 3.0, dose = 20 mEq/hr x3 doses (60 mEq IV total dose). Recheck K+ level 2 hours after dose and the next AM.               potassium chloride SA (K-DUR/KLOR-CON M) CR tablet 20-40 mEq  Dose: 20-40 mEq Freq: EVERY 2 HOURS PRN Route: PO  PRN Reason: potassium supplementation  Start: 08/18/17 0950   Admin Instructions: Use if able to take PO.   If Serum K+ 3.4-4.0, dose = 20 mEq x1. Recheck K+ level the next AM.  If Serum K+ 3.0-3.3, dose = 60 mEq po total dose (40 mEq x1 followed in 2 hours by 20 mEq x1). Recheck K+ level 4 hours after dose and the next AM.  If Serum K+ 2.5-2.9, dose = 80 mEq po total dose (40 mEq Q2H x2). Recheck K+ level 4 hours after dose and the next AM.  If Serum K+ less than 2.5, See IV order.  DO NOT CRUSH.      0558 (20 mEq)-Given                prochlorperazine (COMPAZINE) injection 5 mg  Dose: 5 mg Freq: EVERY 6 HOURS PRN Route: IV  PRN Reasons: nausea,vomiting  Start: 08/18/17 0330   Admin Instructions: This is Step 2 of nausea and vomiting management.   If nausea not resolved in 15 minutes, give metoclopramide (REGLAN) if ordered (step 3 of  nausea and vomiting management)              Or  prochlorperazine (COMPAZINE) tablet 5 mg  Dose: 5 mg Freq: EVERY 6 HOURS PRN Route: PO  PRN Reason: vomiting  Start: 08/18/17 0330   Admin Instructions: This is Step 2 of nausea and vomiting management.   If nausea not resolved in 15 minutes, give metoclopramide (REGLAN) if ordered (step 3 of nausea and vomiting management)              Or  prochlorperazine (COMPAZINE) Suppository 12.5 mg  Dose: 12.5 mg Freq: EVERY 12 HOURS PRN Route: RE  PRN Reasons: nausea,vomiting  Start: 08/18/17 0330   Admin Instructions: This is Step 2 of nausea and vomiting management.   If nausea not resolved in 15 minutes, give metoclopramide (REGLAN) if ordered (step 3 of nausea and vomiting management)               senna-docusate (SENOKOT-S;PERICOLACE) 8.6-50 MG per tablet 1-2 tablet  Dose: 1-2 tablet Freq: 2 TIMES DAILY Route: PO  Start: 08/21/17 0845       (1053)-Not Given [C]       (1918)-Not Given        1159 (2 tablet)-Given       (2047)-Not Given        (0917)-Not Given       (2114)-Not Given        1051 (1 tablet)-Given       [ ] 2000           senna-docusate (SENOKOT-S;PERICOLACE) 8.6-50 MG per tablet 1-2 tablet  Dose: 1-2 tablet Freq: 2 TIMES DAILY PRN Route: PO  PRN Comment: constipation   Start: 08/18/17 0330   Admin Instructions: If no bowel movement in 24 hours, increase to 2 tablets PO BID.  Hold for loose stools.   This is the first step of a three step constipation treatment protocol.               sodium phosphate 10 mmol in D5W intermittent infusion  Dose: 10 mmol Freq: DAILY PRN Route: IV  PRN Reason: phosphorous supplementation  Start: 08/18/17 0950   Admin Instructions: For serum phosphorus level 2.5-2.7  Do not infuse Phosphorus in the same line as TPN.   Give 10 mmol and recheck phosphorus level next AM.               sodium phosphate 15 mmol in D5W intermittent infusion  Dose: 15 mmol Freq: DAILY PRN Route: IV  PRN Reason: phosphorous supplementation  Start:  08/18/17 0950   Admin Instructions: For serum phosphorus level 2.0-2.4  Do not infuse Phosphorus in the same line as TPN.   Give 15 mmol and recheck phosphorus level next AM.               sodium phosphate 20 mmol in D5W intermittent infusion  Dose: 20 mmol Freq: EVERY 6 HOURS PRN Route: IV  PRN Reason: phosphorous supplementation  PRN Comment: serum phosphorus level 1.1-1.9  Start: 08/18/17 0950   Admin Instructions: For serum phosphorus level 1.1-1.9  Do not infuse Phosphorus in the same line as TPN.   Give 20 mmol and recheck phosphorus level 2 hours after last dose and next AM.               sodium phosphate 25 mmol in D5W intermittent infusion  Dose: 25 mmol Freq: EVERY 8 HOURS PRN Route: IV  PRN Reason: phosphorous supplementation  Start: 08/18/17 0950   Admin Instructions: For serum phosphorus level less than 1.1  Do not infuse Phosphorus in the same line as TPN.   Give 25 mmol and recheck phosphorus level 2 hours after last dose and next AM.               spironolactone (ALDACTONE) tablet 50 mg  Dose: 50 mg Freq: DAILY Route: PO  Start: 08/18/17 0800    0828 (50 mg)-Given        0853 (50 mg)-Given        0826 (50 mg)-Given        0831 (50 mg)-Given        1201 (50 mg)-Given        0916 (50 mg)-Given        1051 (50 mg)-Given           thiamine (B-1) 250 mg in NaCl 0.9 % 50 mL intermittent infusion  Dose: 250 mg Freq: DAILY Route: IV  Last Dose: 250 mg (08/23/17 1235)  Start: 08/23/17 1000   End: 08/26/17 0759         1235 (250 mg)-New Bag        [ ] 0800           torsemide (DEMADEX) tablet 10 mg  Dose: 10 mg Freq: DAILY Route: PO  Start: 08/19/17 0800     0853 (10 mg)-Given        0828 (10 mg)-Given        0830 (10 mg)-Given        1201 (10 mg)-Given        0916 (10 mg)-Given        1051 (10 mg)-Given          Discontinued Medications  Medications 08/18/17 08/19/17 08/20/17 08/21/17 08/22/17 08/23/17 08/24/17         Dose: 1,000 mg Freq: HOLD Route: ORAL OR FEED  PRN Comment: keppra level 72  Start:  08/21/17 0845   End: 08/23/17 1534       0854-Rx hold  [C]         1534-Unhold       1534-Med Discontinued          Dose: 2 mg Freq: EVERY 6 HOURS PRN Route: IV  PRN Reason: seizures  Start: 08/21/17 1621   End: 08/22/17 1137   Admin Instructions: For IV PUSH: Dilute with equal volume of NS.         1137-Med Discontinued

## 2017-08-18 NOTE — ED NOTES
Bed: ST02  Expected date:   Expected time:   Means of arrival:   Comments:  Red trauma. Igor Eagle MD  08/17/17 0730

## 2017-08-18 NOTE — H&P
SURGICAL ICU ADMISSION NOTE  8/18/2017    PRIMARY TEAM: Trauma  PRIMARY PHYSICIAN: Cayden    REASON FOR CRITICAL CARE ADMISSION: Neuro checks   ADMITTING PHYSICIAN: Yuliana    ASSESSMENT: 81 year old with history of CAD and CVA on plavix who presents after fall.  Injuries include traumatic SAH and SDH.      PLAN:   Neuro/ pain/ sedation:  -NSG recs for brain bleed - Q1 hr neuro checks, keep SBP under 140, repeat head CT at 5:30 AM.  Does not need Keppra or sodium checks.  Hold plavix  -Tylenol for pain.     Pulmonary care:   -Supplemental oxygen to keep saturation above 92 %.     Cardiovascular:    -Monitor hemodynamic status.   -Will plan to restart home BP meds  -Holding plavix     GI care:   -NPO except ice chips and medications.     Fluids/ Electrolytes/ Nutrition:   -LR @ 75 for IV fluid hydration     Renal/ Fluid Balance:    -Urine output is adequate so far.  -Will continue to monitor intake and output.  -Mancuso placed at OSH     Endocrine:    -Sliding scale for diabetes management.     ID/ Antibiotics:  -No indication for antibiotics.      Heme:     -Hemoglobin stable.   -Recheck labs in AM     Prophylaxis:    -Mechanical prophylaxis for DVT.      MSK:    -PT and OT consulted. Appreciate recs.  -Right shoulder Xray pending     Disposition:  -Surgical ICU.     Patient seen, findings and plan discussed with surgical ICU staff.    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 Noted bleeding from head following fall but no new neurological deficitis. Denies any LOC. Had 2 drinks today and has 2+drinks daily. Has  noted R shoulder pain and RUE weakness which he has had intermittently from his strokes. It is worse than baseline and has some weakeness in RUE. He was found to have a L SDH and L SAH at OSH. He was loaded with Keppra, had the left parietal laceration repaired.    REVIEW OF SYSTEMS: 10 point ROS neg other than the symptoms noted above in the HPI.    PAST MEDICAL HISTORY:    has a past medical history of Acute exacerbation of CHF (congestive heart failure) (H) (9/2/2016); Atrial fibrillation (H); Cerebral infarction (H) (12/13/2013, 9/2016); Coronary artery disease; Diabetes (H); Gastroesophageal reflux disease with esophagitis (5/8/2017); Hyperlipidaemia; Hypertension; Myocardial infarction (H); PAD (peripheral artery disease) (H); Stented coronary artery; Type 2 diabetes mellitus with stage 3 chronic kidney disease, with long-term current use of insulin (H) (11/4/2016); and Unspecified cerebral artery occlusion with cerebral infarction. He also has no past medical history of Bleeding disorder (H); Blood clotting disorder (H); Cancer (H); COPD (chronic obstructive pulmonary disease) (H); History of blood transfusion; Sleep apnea; or Thyroid disease.    SURGICAL HISTORY:    has a past surgical history that includes coronary artery bypass (2002); MRA UPPER EXTREMITY WO&W CONT; ENT surgery; vascular surgery; Abdomen surgery; Phacoemulsification clear cornea with standard intraocular lens implant (Left, 1/22/2015); Heart Cath Left heart cath (11/30/2001); and LEFT HEART CATHETERIZATION (9/7/2016).    SOCIAL HISTORY:    reports that he quit smoking about 18 years ago. His smoking use included Pipe. He quit after 40.00 years of use. He has never used smokeless tobacco. He reports that he drinks alcohol. He reports that he does not use illicit drugs.    FAMILY HISTORY: No bleeding/clotting disorders    ALLERGIES:      Allergies   Allergen Reactions     Aspirin Hives     Penicillins Hives     Contrast Dye Hives        MEDICATIONS:    Current Facility-Administered Medications on File Prior to Encounter:  [COMPLETED] levETIRAcetam (KEPPRA) 1,000 mg in NaCl 0.9 % 100 mL intermittent infusion   [DISCONTINUED] 0.9% sodium chloride infusion     Current Outpatient Prescriptions on File Prior to Encounter:  carvedilol (COREG) 25 MG tablet Take 0.5 tablets (12.5 mg) by mouth every morning   LANTUS SOLOSTAR 100 UNIT/ML soln ADMINISTER 35 TO 40 UNITS UNDER THE SKIN AT BEDTIME   pantoprazole (PROTONIX) 40 MG EC tablet TAKE 1 TABLET BY MOUTH EVERY DAY   torsemide (DEMADEX) 10 MG tablet Take 1 tablet (10 mg) by mouth daily   amLODIPine (NORVASC) 10 MG tablet Take 1 tablet (10 mg) by mouth daily   atorvastatin (LIPITOR) 40 MG tablet Take 1 tablet (40 mg) by mouth daily   clopidogrel (PLAVIX) 75 MG tablet Take 1 tablet (75 mg) by mouth daily   hydrALAZINE (APRESOLINE) 50 MG tablet Take 1 tablet (50 mg) by mouth 3 times daily   isosorbide mononitrate (IMDUR) 60 MG 24 hr tablet Take 2 tablets (120 mg) by mouth daily   spironolactone (ALDACTONE) 50 MG tablet Take 1 tablet (50 mg) by mouth daily   ASPIRIN NOT PRESCRIBED (INTENTIONAL) Please choose reason not prescribed, below   ACCU-CHEK MARY PLUS test strip TEST THREE TIMES DAILY   B-D U/F 31G X 8 MM insulin pen needle    Multiple Vitamins-Minerals (CENTRUM SILVER) per tablet Take 1 tablet by mouth daily.   Calcium Carb-Cholecalciferol (CALTRATE 600+D) 600-800 MG-UNIT TABS Take 1 tablet by mouth daily.       PHYSICAL EXAMINATION:  Temp:  [96.1  F (35.6  C)-97.8  F (36.6  C)] 97.3  F (36.3  C)  Pulse:  [61-70] 70  Heart Rate:  [58-71] 68  Resp:  [7-30] 16  BP: ()/(43-94) 121/69  SpO2:  [76 %-98 %] 95 %    Elderly man sitting up in bed in NAD  Laceration to left parietal region of scalp.  Repaired with suture  Neuro: cranial nerves grossly intact.  Moving all extremities.  Weakness on right upper extremity secondary to shoulder pain  GCS 15  RRR  Non-labored breathing on RA.  Lungs clear  bilaterally  Skin tears on left arm  Extremities warm and well perfused  Abdomen soft and non distended.    LABS: Reviewed.   Arterial Blood Gases   No lab results found in last 7 days.  Complete Blood Count     Recent Labs  Lab 08/17/17  2322   WBC 8.9   HGB 11.4*   *     Basic Metabolic Panel    Recent Labs  Lab 08/17/17 2322      POTASSIUM 3.8   CHLORIDE 102   CO2 16*   BUN 37*   CR 1.74*   *     Liver Function Tests    Recent Labs  Lab 08/18/17 0215 08/17/17  2322   AST  --  22   ALT  --  22   ALKPHOS  --  259*   BILITOTAL  --  1.3   ALBUMIN  --  3.6   INR 1.32*  --      Pancreatic Enzymes  No lab results found in last 7 days.  Coagulation Profile    Recent Labs  Lab 08/18/17 0215   INR 1.32*   PTT 34     Lactate  Invalid input(s): LACTATE    IMAGING:  Recent Results (from the past 24 hour(s))   CT Head w/o Contrast   Result Value    Radiologist flags Acute intracranial hemorrhage (AA)    Narrative    CT HEAD W/O CONTRAST  8/17/2017 11:42 PM      HISTORY: Trauma.    TECHNIQUE: Routine noncontrast head CT. Radiation dose for this scan  was reduced using automated exposure control, adjustment of the mA  and/or kV according to patient size, or iterative reconstruction  technique.    COMPARISON: None.    FINDINGS: There is generalized brain atrophy. There is subarachnoid  hemorrhage over the left frontal and parietal lobes. Small subdural  hematoma anterior to the left frontal lobe measuring approximately 0.7  cm in thickness. Probable tiny amount of subarachnoid hemorrhage at  the inferior surface of the left frontal lobe. No significant mass  effect. Periventricular and deep white matter low attenuation is  consistent with chronic small vessel ischemic disease. There is a left  parietal scalp hematoma. No fracture. Retention cyst or polyp in the  left maxillary sinus.      Impression    IMPRESSION:  1. Subarachnoid hemorrhage over the left frontal and parietal lobes.  2. Small subdural  hematoma anterior to the left frontal lobe.    [Critical Result: Acute intracranial hemorrhage]    Finding was identified on 8/17/2017 11:42 PM.     Dr. Mohamud was contacted by me on 8/17/2017 11:47 PM and verbalized  understanding of the critical result.   CT Cervical Spine w/o Contrast    Narrative    CT CERVICAL SPINE W/O CONTRAST  8/17/2017 11:42 PM      HISTORY: Trauma.    TECHNIQUE: Multiplanar imaging of the cervical spine without  intravenous contrast. Radiation dose for this scan was reduced using  automated exposure control, adjustment of the mA and/or kV according  to patient size, or iterative reconstruction technique.     COMPARISON: None.    FINDINGS: There is no acute fracture or traumatic malalignment. There  is multilevel degenerative disc and facet disease. Degenerative disc  disease is worst at C5-C6 and C6-C7. There is no significant spinal  stenosis. The paraspinal soft tissues are unremarkable aside from  atherosclerotic disease. Mild scarring at the lung apices.      Impression    IMPRESSION: No acute traumatic abnormality.

## 2017-08-18 NOTE — PLAN OF CARE
Problem: Goal Outcome Summary  Goal: Goal Outcome Summary  PT 4A: Evaluation completed, treatment initiated.  Patient requiring mod assist for sit > stand from chair.  Poor initial standing balance with initial gait trial limited to 5' due to significant LOB and ataxic gait.  Gait improved on 2nd trial but limited to very short distances near EOB.  Mobility limited by impaired balance and fatigue.      Patient well below baseline regarding functional mobility due to R UE weakness, impaired coordination and balance deficits.  Recommend transfer to ARU setting.

## 2017-08-18 NOTE — ED PROVIDER NOTES
History     Chief Complaint   Patient presents with     Trauma     Fall     HPI  Kelechi Coleman is a 81 year old male with history of CHF, atrial fibrillation, diabetes, hypertension and history of right-sided CVA who presents as a transfer from [facility] for concerns of intracranial hemorrhage after a fall.  Patient fell down 6 stairs.  Did hit his head.  Imaging at [facility] did show left-sided frontal subdural hematoma and posterior subarachnoid hemorrhage.  No midline shift.  CT of the C-spine was negative.  Neck was cleared clinically.  Patient did have some tonic-clonic jerking of his right upper extremity at [facility].  Was given 1 g of IV Keppra for this.  Laceration of his scalp was repaired there.  Patient is on Plavix.  Denies other blood thinners.  He does have increased weakness of his right upper and lower extremity.  States he does have some baseline weakness of his right upper extremity.  Denies any other symptoms.  No chest pain.  No abdominal pain.  Has not any nausea or vomiting.  I have reviewed the Medications, Allergies, Past Medical and Surgical History, and Social History in the [employer] system.  Past Medical History:   Diagnosis Date     Acute exacerbation of CHF (congestive heart failure) (H) 9/2/2016     Atrial fibrillation (H)      Cerebral infarction (H) 12/13/2013, 9/2016     Diagnosis updated by automated process. Provider to review and confirm.     Coronary artery disease      Diabetes (H)      Gastroesophageal reflux disease with esophagitis 5/8/2017     Hyperlipidaemia      Hypertension      Myocardial infarction (H)      PAD (peripheral artery disease) (H)      Stented coronary artery     CABG 2002 4 VESSEL     Type 2 diabetes mellitus with stage 3 chronic kidney disease, with long-term current use of insulin (H) 11/4/2016     Unspecified cerebral artery occlusion with cerebral infarction        Past Surgical History:   Procedure Laterality Date     C MRA  "UPPER EXTREMITY WO&W CONT       CORONARY ARTERY BYPASS  2002    LIMA-LAD, SVG-OM1, [id number]     ENT SURGERY      VOCAL CORD LESION REMOVED     GENITOURINARY SURGERY      ANGIOPLASTY FOR RENAL ARTERT STENOSIS     HC LEFT HEART CATHETERIZATION  9/7/2016 09/07/2016: CHUCHO x2 to distal and proximal SVG to to RPDA     HEART CATH LEFT HEART CATH  11/30/2001     PHACOEMULSIFICATION CLEAR CORNEA WITH STANDARD INTRAOCULAR LENS IMPLANT Left 1/22/2015    Procedure: PHACOEMULSIFICATION CLEAR CORNEA WITH STANDARD INTRAOCULAR LENS IMPLANT;  Surgeon: Bart Johnson MD;  Location: [facility]     VASCULAR SURGERY      ANGIOPLASTY LEFT LEG FOR pvd       Family History   Problem Relation Age of Onset     DIABETES Mother      Family History Negative Father      CANCER Maternal Grandmother        Social History   Substance Use Topics     Smoking status: Former Smoker     Years: 40.00     Types: Pipe     Quit date: 12/18/1998     Smokeless tobacco: Never Used     Alcohol use 0.0 oz/week     0 Standard drinks or equivalent per week      Comment: 3 hard liquor drinks daily       Review of Systems   Constitutional: Negative for fever.   Respiratory: Negative for shortness of breath.    Cardiovascular: Negative for chest pain.   Gastrointestinal: Negative for abdominal pain.   Neurological: Positive for weakness and headaches.   All other systems reviewed and are negative.      Physical Exam   BP: 110/64  Pulse: 70  Heart Rate: 65  Resp: 14  Height: 188 cm (6' 2\")  Weight: 83.9 kg (185 lb)  SpO2: 95 %  Physical Exam   Vital Signs and Nursing Notes Reviewed.  General:  NAD  HEENT: Oropharynx clear.  Pressure bandage on head.  There is a noted laceration that was repaired at [facility]..  PERRL.  EOMI  Neck: Supple.  No lymphadenopathy.  No midline tenderness.  FROM without pain.    Cardiac: RRR.  No murmurs, rubs or gallops  Lungs: Clear bilaterally.  Normal respiratory rate.    Abdomen:  Soft, Nontender, no rebound or guarding.  Back: " No CVA tenderness.  Skin: Abrasions noted on right knee and left elbow.  No rash.  No diaphoresis  Extremities:  Full range of motion of all extremities.  No tenderness.  No cyanosis, clubbing, or edema.  Neurological:  CN II-XII intact diminished strength and proximal muscles of right upper extremity and right lower extremity.  This is greater than the right upper than lower., Sensation intact,   Pulse:  Symmetric in bilateral upper and lower extremities.      ED Course     ED Course     Procedures             Critical Care time:  was 60 minutes for this patient excluding procedures.  Trauma:  Level of trauma activation: Partial  C-collar and immobilization: not indicated, cleared.  CSpine Clearance: C spine cleared clinically  GCS at arrival: 15  GCS at disposition: unchanged  Full Primary and Secondary survey with appropriate immobilization of spine completed in exam section.  Consults prior to admission or transfer: Neurosurgery  Procedures done in the ED: none          Labs Ordered and Resulted from Time of ED Arrival Up to the Time of Departure from the ED - No data to display         Assessments & Plan (with Medical Decision Making)   81 year old who presents with subdural hematoma and subarachnoid hematoma.  Patient was called as a trauma White.  Neurosurgery and trauma service here on arrival.  Primary and secondary survey done here.  We will get a portable chest x-ray.  We'll add on coags.  Did review labs and imaging from self though hospital.  Does have a subarachnoid and subdural hematoma.  Patient did have some tonic-clonic jerking of his right arm and leg.  Was given IV Keppra 1 g at [facility].  No new neurologic deficits.  Patient is on Plavix.  Not on other anticoagulation.  Patient admitted the ICU for further neurosurgical care.  No need for acute blood pressure management.  Given intercranial hemorrhage with neurologic deficits and need for urgent neurosurgical evaluation patient is critically  ill.  Total critical care time 60 minutes.  This is exclusive of procedures.  This includes direct patient care, review of electronic medical record, review of imaging and laboratory studies, discussion with consultants and further documentation.    I have reviewed the nursing notes.    I have reviewed the findings, diagnosis, plan and need for follow up with the patient.    New Prescriptions    No medications on file       Final diagnoses:   SDH (subdural hematoma) (H)   SAH (subarachnoid hemorrhage) (H)   Laceration of scalp, initial encounter   Abrasion, right knee, initial encounter   Abrasion of left elbow, initial encounter       8/18/2017   [facility], [place], EMERGENCY DEPARTMENT     Mayank Mason MD  08/18/17 0239

## 2017-08-18 NOTE — PHARMACY-ADMISSION MEDICATION HISTORY
Admission medication history interview status for the 8/18/2017 admission is complete. See Epic admission navigator for allergy information, pharmacy, prior to admission medications and immunization status.     Medication history interview sources:  Patient and Rx Benefits    Changes made to PTA medication list (reason)  Changed:   -Hydralazine 50 mg po TID -> 50 mg po BID  -Insulin glargine 35 to 40 units sc at bedtime -> 20 to 35 units sc at bedtime    Additional medication history information (including reliability of information, actions taken by pharmacist): Patient was a good historian regarding the names of his medications and how often he takes them.    1. Hypertension - He takes 12.5 mg of carvedilol by mouth daily, hydralazine 50 mg po BID, spironolactone 50 mg po daily, and torsemide 10 mg po daily.    2. Hyperglycemia - He injects between 20-35 units of insulin glargine subcutaneously depending on his blood sugar readings. Recently, he reports that his blood glucose has been between 115-120 at bedtime. His most recent A1C was on 8/9/17 which was 7.0.      Prior to Admission medications    Medication Sig Last Dose Taking? Auth Provider   carvedilol (COREG) 25 MG tablet Take 0.5 tablets (12.5 mg) by mouth every morning 8/17/2017 at AM Yes Etienne Gee MD   LANTUS SOLOSTAR 100 UNIT/ML soln ADMINISTER 35 TO 40 UNITS UNDER THE SKIN AT BEDTIME  Patient taking differently: ADMINISTERS 20 TO 35 UNITS UNDER THE SKIN AT BEDTIME DEPENDING ON BG READING 8/16/2017 at PM Yes Etienne Gee MD   pantoprazole (PROTONIX) 40 MG EC tablet TAKE 1 TABLET BY MOUTH EVERY DAY  Patient taking differently: TAKE 1 TABLET BY MOUTH EVERY EVENING 8/16/2017 at PM Yes Etienne Gee MD   torsemide (DEMADEX) 10 MG tablet Take 1 tablet (10 mg) by mouth daily 8/17/2017 at AM Yes Isaiah Charles MD   amLODIPine (NORVASC) 10 MG tablet Take 1 tablet (10 mg) by mouth daily 8/17/2017 at AM Yes Isaiah Charles MD   atorvastatin  (LIPITOR) 40 MG tablet Take 1 tablet (40 mg) by mouth daily 8/17/2017 at AM Yes Isaiah Charles MD   clopidogrel (PLAVIX) 75 MG tablet Take 1 tablet (75 mg) by mouth daily 8/17/2017 at AM Yes Isaiah Charles MD   hydrALAZINE (APRESOLINE) 50 MG tablet Take 1 tablet (50 mg) by mouth 3 times daily  Patient taking differently: Take 50 mg by mouth 2 times daily  8/17/2017 at AM Yes Isaiah Charles MD   isosorbide mononitrate (IMDUR) 60 MG 24 hr tablet Take 2 tablets (120 mg) by mouth daily 8/17/2017 at AM Yes Isaiah Charles MD   spironolactone (ALDACTONE) 50 MG tablet Take 1 tablet (50 mg) by mouth daily 8/17/2017 at AM Yes Isaiah Charles MD   ASPIRIN NOT PRESCRIBED (INTENTIONAL) Please choose reason not prescribed, below  Yes Etienne Gee MD   ACCU-CHEK MARY PLUS test strip TEST THREE TIMES DAILY  Yes Etienne Gee MD   B-D U/F 31G X 8 MM insulin pen needle   Yes Reported, Patient   Multiple Vitamins-Minerals (CENTRUM SILVER) per tablet Take 1 tablet by mouth daily. 8/17/2017 at AM Yes Reported, Patient   Calcium Carb-Cholecalciferol (CALTRATE 600+D) 600-800 MG-UNIT TABS Take 1 tablet by mouth daily. 8/17/2017 at AM Yes Reported, Patient         Medication history completed by: Vidal Ray, PharmD Student

## 2017-08-18 NOTE — PROGRESS NOTES
SURGICAL ICU PROGRESS NOTE  August 18, 2017      CO-MORBIDITIES:   SDH (subdural hematoma) (H)  SAH (subarachnoid hemorrhage) (H)  Laceration of scalp, initial encounter  Abrasion, right knee, initial encounter  Abrasion of left elbow, initial encounter    ASSESSMENT: 81 year old with history of CAD and CVA on plavix who presents after fall donw 6 stairs.  Injuries include traumatic SAH and SDH, minor AC joint separation.       MAIN PLANS/CHANGES TODAY:  - Right shoulder Xray: no fracture, widening of right AC Joint increased from prior.   - d/c IVF  - Regular diet   - Transfer to     PLAN:   Neuro/ pain/ sedation:  - NSG recs for SAH and SDH - Q1 hr neuro checks, keep SBP under 140, repeat head CT at 5:30 AM showed no progression. Started Keppra. Does not need sodium checks. Hold plavix.  - Tylenol for pain.  - Monitor for signs of EtOH withdrawal     Pulmonary care:   - Supplemental oxygen to keep saturation above 92%.  - CXR result: left sided pleural effusion vs hemothorax, left retrocardiac opacity, left basilar opacities, no rib frx identified      Cardiovascular:    - Monitor hemodynamic status.   - Will plan to restart home BP meds  - Holding plavix     GI care:   - NPO except ice chips and medications.      Fluids/ Electrolytes/ Nutrition:   - d/c IVF  - Regular diet     Renal/ Fluid Balance:    - Urine output is adequate so far.  - Will continue to monitor intake and output.  - No fu      Endocrine:    - Sliding scale for diabetes management.      ID/ Antibiotics:  - No indication for antibiotics.       Heme:     - Hemoglobin stable, hold plavix      Prophylaxis:    - Mechanical prophylaxis for DVT.       MSK:    - PT and OT consulted. Appreciate recs.  - Right shoulder Xray: no fracture, widening of right AC Joint increased from prior exam  - RUE weakness: inconsistently participating in exam but appears to have 4/5 strength throughout all RUE muscles. Claims he is unable to abduct arm however  demonstrates some ability to abduct his arm when not specifically asked to do so. Weakness could be due to acute posterior plexus injury versus motor deficits from left sided intracranial hemorrhage. Recommend evaluation by PM&R.      Disposition:  - Transfer to     Patient seen, findings and plan discussed with surgical ICU staff Dr. Singh.    Signed,  Soren Zarate MD  PGY-1, Orthopaedic Surgery      ====================================    TODAY'S PROGRESS:   SUBJECTIVE:   No acute events overnight. Complains of significantly decreased strength and ROM in right arm since the time of his fall, worse from baseline deficits due to prior stroke. Reports pain in right shoulder and arm although pain is manageable with current regimen. Denies pain in head, chest, and abdomen, extremities other than right arm.    OBJECTIVE:   1. VITAL SIGNS:   Temp:  [96.1  F (35.6  C)-97.8  F (36.6  C)] 97.1  F (36.2  C)  Pulse:  [61-70] 70  Heart Rate:  [58-74] 71  Resp:  [7-30] 18  BP: ()/(43-94) 142/69  SpO2:  [76 %-98 %] 97 %  Resp: 18    2. INTAKE/ OUTPUT:   I/O last 3 completed shifts:  In: 155 [I.V.:155]  Out: 100 [Urine:100]    3. PHYSICAL EXAMINATION:   General: Sitting in chair, speaking in full sentences  Neuro: A&Ox3, NAD. Cranial nerves 2-10 and 12 intact.  HEENT: left occipital laceration with sutures in place  Resp: Breathing non-labored on room air.  CV: RRR  Abdomen: Soft, Non-distended, Non-tender  Extremities: warm and well perfused. 2+ distal pulses throughout BUE and BLE  RUE: No gross deformities. SILT throughout. 4/5 strength in trapezius, triceps, biceps; 3/5 strength in deltoid although inconsistently participating in exam, 5/5 strength in rotator cuff muscles, 4/5 strength in wrist and finger flexors and extensors.    4. INVESTIGATIONS:   Arterial Blood Gases   No lab results found in last 7 days.  Complete Blood Count     Recent Labs  Lab 08/18/17  0215 08/17/17  2322   WBC 13.4* 8.9   HGB 10.2*  11.4*   * 144*     Basic Metabolic Panel    Recent Labs  Lab 08/18/17  0215 08/17/17  2322    134   POTASSIUM 3.8 3.8   CHLORIDE 104 102   CO2 16* 16*   BUN 40* 37*   CR 1.61* 1.74*   * 158*     Liver Function Tests    Recent Labs  Lab 08/18/17  0215 08/17/17  2322   AST  --  22   ALT  --  22   ALKPHOS  --  259*   BILITOTAL  --  1.3   ALBUMIN  --  3.6   INR 1.32*  --      Pancreatic Enzymes  No lab results found in last 7 days.  Coagulation Profile    Recent Labs  Lab 08/18/17  0215   INR 1.32*   PTT 34     Lactate  Invalid input(s): LACTATE    5. RADIOLOGY:   Recent Results (from the past 24 hour(s))   CT Head w/o Contrast   Result Value    Radiologist flags Acute intracranial hemorrhage (AA)    Narrative    CT HEAD W/O CONTRAST  8/17/2017 11:42 PM      HISTORY: Trauma.    TECHNIQUE: Routine noncontrast head CT. Radiation dose for this scan  was reduced using automated exposure control, adjustment of the mA  and/or kV according to patient size, or iterative reconstruction  technique.    COMPARISON: None.    FINDINGS: There is generalized brain atrophy. There is subarachnoid  hemorrhage over the left frontal and parietal lobes. Small subdural  hematoma anterior to the left frontal lobe measuring approximately 0.7  cm in thickness. Probable tiny amount of subarachnoid hemorrhage at  the inferior surface of the left frontal lobe. No significant mass  effect. Periventricular and deep white matter low attenuation is  consistent with chronic small vessel ischemic disease. There is a left  parietal scalp hematoma. No fracture. Retention cyst or polyp in the  left maxillary sinus.      Impression    IMPRESSION:  1. Subarachnoid hemorrhage over the left frontal and parietal lobes.  2. Small subdural hematoma anterior to the left frontal lobe.    [Critical Result: Acute intracranial hemorrhage]    Finding was identified on 8/17/2017 11:42 PM.     Dr. Mohamud was contacted by me on 8/17/2017 11:47 PM and  verbalized  understanding of the critical result.    COLE TRAYLOR MD   CT Cervical Spine w/o Contrast    Narrative    CT CERVICAL SPINE W/O CONTRAST  8/17/2017 11:42 PM      HISTORY: Trauma.    TECHNIQUE: Multiplanar imaging of the cervical spine without  intravenous contrast. Radiation dose for this scan was reduced using  automated exposure control, adjustment of the mA and/or kV according  to patient size, or iterative reconstruction technique.     COMPARISON: None.    FINDINGS: There is no acute fracture or traumatic malalignment. There  is multilevel degenerative disc and facet disease. Degenerative disc  disease is worst at C5-C6 and C6-C7. There is no significant spinal  stenosis. The paraspinal soft tissues are unremarkable aside from  atherosclerotic disease. Mild scarring at the lung apices.      Impression    IMPRESSION: No acute traumatic abnormality.    COLE TRAYLOR MD   Chest  XR, 1 view portable    Narrative    Exam:  XR CHEST PORT 1 VW, 8/18/2017 2:49 AM    History: trauma.  Chest pain    Comparison:  Chest x-ray 2/13/2017    Findings:  Single AP view the chest. Cardiac silhouette is enlarged.  Pulmonary vascularity within normal limits. There is dense  retrocardiac opacity, hazy left basilar opacities and a probable  layering left pleural effusion. Chronic blunting of the right  costophrenic angle is unchanged. No pneumothorax. No displaced rib  fracture identified. Median sternotomy wires. Aortic knob with  calcifications.      Impression    Impression:    1. Left retrocardiac opacity as well as hazy left basilar opacities,  differential for which includes contusion, pneumonia, or atelectasis.  2. Probable layering left-sided pleural effusion. In the setting of  trauma, hemothorax not excluded.    I have personally reviewed the examination and initial interpretation  and I agree with the findings.    GEORGE ROWAN MD   XR Shoulder Right Port G/E 2 Views   Result Value    Radiologist  flags Mild widening of AC joint space.    Narrative    Exam:  XR SHOULDER RT PORT G/E 2 VW, 8/18/2017 3:16 AM    History: R shoulder pain    Comparison:  Chest radiographs September 2, 2016    Findings:    AP and transscapular Y views of the right shoulder were obtained.    There is flattening of the superolateral aspect of the humeral head,  similar to prior study and likely representing sequelae of Hill-Sachs  lesion. Glenohumeral joint is congruent on these 2 views.    Widening of acromioclavicular joint space, measuring 11 mm, previously  widened but slightly increased from prior study.    Patchy lucencies of visualized bones, likely related to underlying  osteopenia.      Impression    Impression:    1. No acute osseous abnormality.  2. Widening of acromioclavicular joint space, measuring 11 mm,  previously widened but slightly increased from prior study. Correlate  clinically for focal symptom.    [Consider Follow Up: Mild widening of AC joint space.]    This report will be copied to the [facility] to ensure a  provider acknowledges the finding.     I have personally reviewed the examination and initial interpretation  and I agree with the findings.    LOLIS CONTRERAS   CT Head w/o Contrast    Narrative    CT HEAD W/O CONTRAST 8/18/2017 5:43 AM    Provided History: Evaluate for progression of head bleed    Comparison: Head CT 8/17/2017.    Technique: Using multidetector thin collimation helical acquisition  technique, axial, coronal and sagittal CT images from the skull base  to the vertex were obtained without intravenous contrast.     Findings:    No significant change in extensive subarachnoid hemorrhage in the  sulci overlying the left frontal and parietal lobes. Hemorrhage  overlying the anterior left frontal lobe measuring 7 mm in thickness  is also unchanged, representing additional subarachnoid hemorrhage  versus a small subdural hemorrhage. No evidence of new intracranial  hemorrhage.  The ventricles are proportionate to the cerebral sulci.  Mild generalized cerebral volume loss is unchanged. Chronic lacunar  infarct in the right basal ganglia The gray to white matter  differentiation of the cerebral hemispheres is preserved. The basal  cisterns are patent. Atherosclerotic calcifications of the vertebral  arteries and intracranial ICAs. Left parietal scalp hematoma.    Mucosal thickening versus mucus retention cyst in the left maxilla  sinus. Otherwise, the paranasal sinuses are clear. The mastoid air  cells are clear.       Impression    Impression:  Subarachnoid hemorrhage over the left frontal and  parietal lobes and probable small subdural hematoma over the anterior  left frontal lobe are unchanged in extent since head CT 8/17/2017. No  new intracranial hemorrhage.    I have personally reviewed the examination and initial interpretation  and I agree with the findings.    ISMAEL MARTIN MD       =========================================

## 2017-08-18 NOTE — PROCEDURES
[facility] Trauma Service: Cervical-Spine Clearance     Date of Service: 8/18/2017  Admission Date/Time: 8/18/2017  2:07 AM    Clinically Cleared: Yes    Cervical-Spines Cleared  By:  Clinical exam and by imaging  Date: 8/18/2017  Time: 0215  By Whom: Steve Willima MD    CT Cervical-Spine  Date: 8/17/2017  Results: Negative    Call Trauma staff or Trauma ALVARO with questions.

## 2017-08-18 NOTE — H&P
"[facility]    History and Physical / Consult note: Trauma Service     Date of Admission:  8/18/2017    Time of Admission/Consult Request (page/call): [phone]  Time of my evaluation: 0210  Consulting services:  Neurosurgery - Emergent consult (within 30 mins): Called by ED    Assessment & Plan   Trauma mechanism:Fall down 6 stairs on plavix  Time/date of injury: 8/17/2017  Known Injuries:  1. Left Subarachnoid Hemorrhage  2. Left Subdural Hematoma  Other diagnoses:   1. Acute renal failure  2. Coagulopathy  Alcohol abuse    Procedure:  None    Plan:  1. Admit to SICU  2. Q1 neuro checks  3. SBP < 140  4. Repeat CT scan  5. NPO until repeat CT scan  6. S tylenol for pain  7. Mancuso for strict I/O    Code status: Full code confirmed with patient.     General Cares:  GI Prophylaxis: None  DVT Prophylaxis: Hold for bleed  Date of last stool/Bowel Regimen:Senokot BID and miralax  Pulmonary toilet:IS    ETOH: This patient was asked if in the last 3-6 months there has been a time when he had  5 or more drinks in a single day/outing.. Patient answer to the screening question was in the positive. Spoke with the patient about the correlation of ETOH use and accidents/injuries. We also discussed the importance of abstaining from ETOH use while healing from existing injuries, especially if prescribed narcotics at the time of discharge. The patient demonstrated understanding. He drinks daily at least 2 drinks.  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 Noted bleeding from head following fall but no new neurological deficitis. Denies any LOC. Had 2 " drinks today and has 2+drinks daily. Has noted R shoulder pain and RUE weakness which he has had intermittently from his strokes. It is worse than baseline and has some weakeness in RUE. He was found to have a L SDH and L SAH at OSH. He was loaded with Keppra, had the left parietal laceration repaired.    Past Medical History    I have reviewed this patient's medical history and updated it with pertinent information if needed.   Past Medical History:   Diagnosis Date     Acute exacerbation of CHF (congestive heart failure) (H) 9/2/2016     Atrial fibrillation (H)      Cerebral infarction (H) 12/13/2013, 9/2016     Diagnosis updated by automated process. Provider to review and confirm.     Coronary artery disease      Diabetes (H)      Gastroesophageal reflux disease with esophagitis 5/8/2017     Hyperlipidaemia      Hypertension      Myocardial infarction (H)      PAD (peripheral artery disease) (H)      Stented coronary artery     CABG 2002 4 VESSEL     Type 2 diabetes mellitus with stage 3 chronic kidney disease, with long-term current use of insulin (H) 11/4/2016     Unspecified cerebral artery occlusion with cerebral infarction        Past Surgical History   I have reviewed this patient's surgical history and updated it with pertinent information if needed.  Past Surgical History:   Procedure Laterality Date     C MRA UPPER EXTREMITY WO&W CONT       CORONARY ARTERY BYPASS  2002    LIMA-LAD, SVG-OM1, [id number]     ENT SURGERY      VOCAL CORD LESION REMOVED     GENITOURINARY SURGERY      ANGIOPLASTY FOR RENAL ARTERT STENOSIS     HC LEFT HEART CATHETERIZATION  9/7/2016 09/07/2016: CHUCHO x2 to distal and proximal SVG to to RPDA     HEART CATH LEFT HEART CATH  11/30/2001     PHACOEMULSIFICATION CLEAR CORNEA WITH STANDARD INTRAOCULAR LENS IMPLANT Left 1/22/2015    Procedure: PHACOEMULSIFICATION CLEAR CORNEA WITH STANDARD INTRAOCULAR LENS IMPLANT;  Surgeon: Bart Johnson MD;  Location: [facility]     VASCULAR  SURGERY      ANGIOPLASTY LEFT LEG FOR pvd     Prior to Admission Medications   Prior to Admission Medications   Prescriptions Last Dose Informant Patient Reported? Taking?   ACCU-CHEK MARY PLUS test strip   No Yes   Sig: TEST THREE TIMES DAILY   ASPIRIN NOT PRESCRIBED (INTENTIONAL)   No Yes   Sig: Please choose reason not prescribed, below   B-D U/F 31G X 8 MM insulin pen needle   Yes Yes   Calcium Carb-Cholecalciferol (CALTRATE 600+D) 600-800 MG-UNIT TABS   Yes Yes   Sig: Take 1 tablet by mouth daily.   LANTUS SOLOSTAR 100 UNIT/ML soln   No Yes   Sig: ADMINISTER 35 TO 40 UNITS UNDER THE SKIN AT BEDTIME   Multiple Vitamins-Minerals (CENTRUM SILVER) per tablet   Yes Yes   Sig: Take 1 tablet by mouth daily.   amLODIPine (NORVASC) 10 MG tablet   No Yes   Sig: Take 1 tablet (10 mg) by mouth daily   atorvastatin (LIPITOR) 40 MG tablet   No Yes   Sig: Take 1 tablet (40 mg) by mouth daily   carvedilol (COREG) 25 MG tablet   Yes Yes   Sig: Take 0.5 tablets (12.5 mg) by mouth every morning   clopidogrel (PLAVIX) 75 MG tablet   No Yes   Sig: Take 1 tablet (75 mg) by mouth daily   hydrALAZINE (APRESOLINE) 50 MG tablet   No Yes   Sig: Take 1 tablet (50 mg) by mouth 3 times daily   isosorbide mononitrate (IMDUR) 60 MG 24 hr tablet   No Yes   Sig: Take 2 tablets (120 mg) by mouth daily   pantoprazole (PROTONIX) 40 MG EC tablet   No Yes   Sig: TAKE 1 TABLET BY MOUTH EVERY DAY   spironolactone (ALDACTONE) 50 MG tablet   No Yes   Sig: Take 1 tablet (50 mg) by mouth daily   torsemide (DEMADEX) 10 MG tablet   No Yes   Sig: Take 1 tablet (10 mg) by mouth daily      Facility-Administered Medications: None     Allergies   Allergies   Allergen Reactions     Aspirin Hives     Penicillins Hives     Contrast Dye Hives       Social History   Social History     Social History     Marital status: Single     Spouse name: N/A     Number of children: N/A     Years of education: N/A     Occupational History     Not on file.     Social History Main  Topics     Smoking status: Former Smoker     Years: 40.00     Types: Pipe     Quit date: 12/18/1998     Smokeless tobacco: Never Used     Alcohol use 0.0 oz/week     0 Standard drinks or equivalent per week      Comment: 3 hard liquor drinks daily     Drug use: No     Sexual activity: Not Currently     Partners: Female     Other Topics Concern     Parent/Sibling W/ Cabg, Mi Or Angioplasty Before 65f 55m? No     Caffeine Concern Yes     decaff only      Stress Concern No     Special Diet Yes     low sodium      Exercise Yes     walking, rehab 3 times a week      Seat Belt Yes     Social History Narrative        Family History   No history of bleeding or problems with anesthesia    Review of Systems   CONSTITUTIONAL: No fever, chills, sweats, fatigue   EYES: no visual blurring, no double vision or visual loss  ENT: no decrease in hearing, no tinnitus, no vertigo, no hoarseness  RESPIRATORY: no shortness of breath, no cough, no sputum   CARDIOVASCULAR: no palpitations, no chest  pain, no exertional chest pain or pressure  GASTROINTESTINAL: no nausea or vomiting, or abd pain  GENITOURINARY: no dysuria, no frequency or hesitancy, no hematuria  MUSCULOSKELETAL: no weakness, no redness, no swelling, +R shoulder pain   SKIN: no rashes, ecchymoses, abrasions or lacerations  NEUROLOGIC: +numbness/tingling RUE, no numbness or tingling  of feet, no syncope, no tremors or weakness  PSYCHIATRIC: no sleep disturbances, no anxiety or depression    Physical Exam     Temp src: Axillary BP: 121/69 Pulse: 70 Heart Rate: 68 Resp: 16 SpO2: 95 % O2 Device: None (Room air)    Vital Signs with Ranges  Temp:  [96.1  F (35.6  C)-97.8  F (36.6  C)] 96.3  F (35.7  C)  Pulse:  [61-70] 70  Heart Rate:  [58-71] 68  Resp:  [7-30] 16  BP: ()/(43-94) 121/69  SpO2:  [76 %-98 %] 95 % 185 lbs 0 oz    Primary Survey:  Airway: patient talking  Breathing: symmetric respiratory effort bilaterally  Circulation: central pulses present and peripheral  pulses present  Disability: Pupils - left 4 mm and brisk, right 4 mm and brisk     Eric Coma Scale - Total 15/15  Eye Response (E): 4  4= spontaneous,  3= to verbal/voice, 2=  to pain, 1= No response   Verbal Response (V): 5   5= Orientated, converses,  4= Confused, converses, 3= Inappropriate words,  2= Incomprehensible sounds,  1=No response   Motor Response (M): 6   6= Obeys commands, 5= Localizes to pain, 4= Withdrawal to pain, 3=Fexion to pain, 2= Extension to pain, 1= No response    Secondary Survey:  General: alert, oriented to person, place, time  Head: left parietal laceration repaired, no active bleeding, normocephalic, trachea midline  Eyes: PERRLA, pupils 2mm, EOMI, corneas and conjunctivae clear  Ears: pearly grey bilateral TMs and non-inflamed external ear canals  Nose: nares patent, no drainage, nasal septum non-tender  Mouth/Throat: no exudates or erythema,  no dental tenderness or malocclusions, no tongue lacerations  Neck:  no cervical collar present. No midline posterior tenderness, full AROM without pain or tenderness   Chest/Pulmonary: normal respiratory rate and rhythm,  bilateral clear breath sounds, no wheezes, rales or rhonchi, no chest wall tenderness or deformities, sternotomy scar present  Cardiovascular: S1, S2,  normal and regular rate and rhythm, no murmurs  Abdomen: soft, non-tender, no guarding, no rebound tenderness and no tenderness to palpation  : normal external genitalia, pelvis stable to lateral compression, +fu, urine yellow and clear  Back/Spine: no deformity, no midline tenderness, no sacral tenderness,  no step-offs and no abrasions or contusions  Musculoskel/Extremities: Pain in R shoulder - TTP. Pain with active and passive ROM. Otherwise normal extremities, full AROM of major joints without tenderness, edema, erythema, ecchymosis, or abrasions. Negative edema.   Hand: no gross deformities of hands or fingers. Full AROM of hand and fingers in flexion and  extension.  strength equal and symmetric.   Skin: no rashes, laceration, ecchymosis, skin warm and dry.   Neuro: PERRLA, alert, oriented x 3. CN II-XII grossly intact. Strength 2/5 R shoulder due to pain. Otherwise Strength 5/5 x 3 extremities.  +numbness in RUE, Otherwise sensation intact.  Psychiatric: affect/mood normal, cooperative, normal judgement/insight and memory intact    Data   UA RESULTS:  No results for input(s): COLOR, APPEARANCE, URINEGLC, URINEBILI, URINEKETONE, SG, UBLD, URINEPH, PROTEIN, UROBILINOGEN, NITRITE, LEUKEST, RBCU, WBCU in the last [id number] hours.   Results for orders placed or performed during the hospital encounter of 08/18/17 (from the past 24 hour(s))   INR   Result Value Ref Range    INR 1.32 (H) 0.86 - 1.14   Partial thromboplastin time   Result Value Ref Range    PTT 34 22 - 37 sec   Chest  XR, 1 view portable    Narrative    1. Left lower lobe opacity, differential for which includes contusion,  pneumonia, or atelectasis.  2. Probable layering left-sided pleural effusion. In the setting of  trauma, hemothorax not excluded.   Results for MARLA VÁZQUEZ (MRN [id number]) as of 8/18/2017 02:53   Ref. Range 8/17/2017 23:22   Sodium Latest Ref Range: 133 - 144 mmol/L 134   Potassium Latest Ref Range: 3.4 - 5.3 mmol/L 3.8   Chloride Latest Ref Range: 94 - 109 mmol/L 102   Carbon Dioxide Latest Ref Range: 20 - 32 mmol/L 16 (L)   Urea Nitrogen Latest Ref Range: 7 - 30 mg/dL 37 (H)   Creatinine Latest Ref Range: 0.66 - 1.25 mg/dL 1.74 (H)   GFR Estimate Latest Ref Range: >60 mL/min/1.7m2 38 (L)   GFR Estimate If Black Latest Ref Range: >60 mL/min/1.7m2 46 (L)   Calcium Latest Ref Range: 8.5 - 10.1 mg/dL 8.9   Anion Gap Latest Ref Range: 3 - 14 mmol/L 16 (H)   Albumin Latest Ref Range: 3.4 - 5.0 g/dL 3.6   Protein Total Latest Ref Range: 6.8 - 8.8 g/dL 7.6   Bilirubin Total Latest Ref Range: 0.2 - 1.3 mg/dL 1.3   Alkaline Phosphatase Latest Ref Range: 40 - 150 U/L 259 (H)   ALT  Latest Ref Range: 0 - 70 U/L 22   AST Latest Ref Range: 0 - 45 U/L 22   Results for MARLA VÁZQUEZ (MRN [id number]) as of 8/18/2017 02:53   Ref. Range 8/17/2017 23:22   WBC Latest Ref Range: 4.0 - 11.0 10e9/L 8.9   Hemoglobin Latest Ref Range: 13.3 - 17.7 g/dL 11.4 (L)   Hematocrit Latest Ref Range: 40.0 - 53.0 % 34.0 (L)   Platelet Count Latest Ref Range: 150 - 450 10e9/L 144 (L)       Studies:  Chest  XR, 1 view portable    (Results Pending)   CT head: 1. Subarachnoid hemorrhage over the left frontal and parietal lobes.  2. Small subdural hematoma anterior to the left frontal lobe.  CT c spine: negative      Steve Butler

## 2017-08-18 NOTE — PLAN OF CARE
Problem: Goal Outcome Summary  Goal: Goal Outcome Summary  Outcome: No Change  D/I/A: Pt admitted to  w/ [facility] at 0330 on 8/18. Pt suffered a fall at home on 8/17 around 1800, on stairs into house. After going to [facility], pt transferred to [facility]. Neuro deficits noted are RUE weakness, tingling. Pt had twitching in RLE for approx 10 sec upon transfer to unit. Repeat CT obtained this morning.      Neuro: A&Ox4. PERRLA intact. Neuro deficit include weak R shoulder shrug,   CV: Sinus w/ frequent PVCs. HR 60-80s, -140s/60-80s.   Pulm: LS clear, more diminished on R side. Room air.   : Mancuso removed after leaking and bleeding around insertion site, pt complaining of pressure to void. Since removal, pt voided once, reddened/vicky output. Bleeding continuing from meatus, SICU aware.   GI: NPO except meds. No BM.   Skin: L forearm bandage, L lower forearm bandage, R below knee bandage, not inspected underneath. Dressings intact. L head incision, minimal drainage from sutures.   Gtts: MIV 75mL/hr.      See flowsheets for further interventions and assessments.     P: Monitor neuro q1hr, notify trauma/SICU about changes/concerns.

## 2017-08-18 NOTE — IP AVS SNAPSHOT
` `     UNIT [address] [facility]: [phone]                 INTERAGENCY TRANSFER FORM - NOTES (H&P, Discharge Summary, Consults, Procedures, Therapies)   2017                    Hospital Admission Date: 2017  MARLA VÁZQUEZ   : 1935  Sex: Male        Patient PCP Information     Provider PCP Type    Etienne Gee MD General         History & Physicals      H&P by Lucas Christopher MD at 2017  2:48 AM     Author:  Lucas Christopher MD Service:  Surgical ICU Author Type:  Resident    Filed:  2017  3:06 AM Date of Service:  2017  2:48 AM Creation Time:  2017  2:48 AM    Status:  Attested :  Lucas Christopher MD (Resident)    Cosigner:  Vinayak Bridges MD at 2017  6:26 AM        Attestation signed by Vinayak Bridges MD at 2017  6:26 AM        Attestation:  Physician Attestation   I, Vinayak Bridges, saw this patient with the resident and agree with the resident s findings and plan of care as documented in the resident s note.      I personally reviewed vital signs, medications, labs and imaging.    Key findings: 81 y.o with history of CAD and CVAs on antiplatelet therapy, admitted following a fall at home. Noted to have SAH and SDH with no significant neuro deficits.  Plan for frequent neuro exams, BP control and repeat imaging. Holding plavix. No current respiratory issues. Hemodynamically stable.     Vinayak Bridges  Date of Service (when I saw the patient): 17                               SURGICAL ICU ADMISSION NOTE  2017    PRIMARY TEAM: Trauma  PRIMARY PHYSICIAN: Cayden    REASON FOR CRITICAL CARE ADMISSION: Neuro checks   ADMITTING PHYSICIAN: Yuliana    ASSESSMENT: 81 year old with history of CAD and CVA on plavix who presents after fall.  Injuries include traumatic SAH and SDH.      PLAN:   Neuro/ pain/ sedation:  -NSG recs for brain bleed - Q1 hr neuro checks, keep SBP under 140, repeat head CT at 5:30 AM.  Does not  need Keppra or sodium checks.  Hold plavix  -Tylenol for pain.     Pulmonary care:   -Supplemental oxygen to keep saturation above 92 %.     Cardiovascular:    -Monitor hemodynamic status.   -Will plan to restart home BP meds  -Holding plavix     GI care:   -NPO except ice chips and medications.     Fluids/ Electrolytes/ Nutrition:   -LR @ 75 for IV fluid hydration     Renal/ Fluid Balance:    -Urine output is adequate so far.  -Will continue to monitor intake and output.  -Mancuso placed at OSH     Endocrine:    -Sliding scale for diabetes management.     ID/ Antibiotics:  -No indication for antibiotics.      Heme:     -Hemoglobin stable.   -Recheck labs in AM     Prophylaxis:    -Mechanical prophylaxis for DVT.      MSK:    -PT and OT consulted. Appreciate recs.  -Right shoulder Xray pending     Disposition:  -Surgical ICU.     Patient seen, findings and plan discussed with surgical ICU staff.    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 Noted bleeding from head following fall but no new neurological deficitis. Denies any LOC. Had 2 drinks today and has 2+drinks daily. Has noted R shoulder pain and RUE weakness which he has had intermittently from his strokes. It is worse than baseline and has some weakeness in RUE. He was found to have a L SDH and L SAH at OSH. He was loaded with Keppra, had the left parietal laceration repaired.    REVIEW OF SYSTEMS: 10 point ROS neg other than the symptoms noted above in the HPI.    PAST MEDICAL HISTORY:    has a past medical history of Acute exacerbation of CHF (congestive  heart failure) (H) (9/2/2016); Atrial fibrillation (H); Cerebral infarction (H) (12/13/2013, 9/2016); Coronary artery disease; Diabetes (H); Gastroesophageal reflux disease with esophagitis (5/8/2017); Hyperlipidaemia; Hypertension; Myocardial infarction (H); PAD (peripheral artery disease) (H); Stented coronary artery; Type 2 diabetes mellitus with stage 3 chronic kidney disease, with long-term current use of insulin (H) (11/4/2016); and Unspecified cerebral artery occlusion with cerebral infarction. He also has no past medical history of Bleeding disorder (H); Blood clotting disorder (H); Cancer (H); COPD (chronic obstructive pulmonary disease) (H); History of blood transfusion; Sleep apnea; or Thyroid disease.    SURGICAL HISTORY:    has a past surgical history that includes coronary artery bypass (2002); MRA UPPER EXTREMITY WO&W CONT; ENT surgery; vascular surgery; Abdomen surgery; Phacoemulsification clear cornea with standard intraocular lens implant (Left, 1/22/2015); Heart Cath Left heart cath (11/30/2001); and LEFT HEART CATHETERIZATION (9/7/2016).    SOCIAL HISTORY:    reports that he quit smoking about 18 years ago. His smoking use included Pipe. He quit after 40.00 years of use. He has never used smokeless tobacco. He reports that he drinks alcohol. He reports that he does not use illicit drugs.    FAMILY HISTORY: No bleeding/clotting disorders    ALLERGIES:      Allergies   Allergen Reactions     Aspirin Hives     Penicillins Hives     Contrast Dye Hives       MEDICATIONS:    Current Facility-Administered Medications on File Prior to Encounter:  [COMPLETED] levETIRAcetam (KEPPRA) 1,000 mg in NaCl 0.9 % 100 mL intermittent infusion   [DISCONTINUED] 0.9% sodium chloride infusion     Current Outpatient Prescriptions on File Prior to Encounter:  carvedilol (COREG) 25 MG tablet Take 0.5 tablets (12.5 mg) by mouth every morning   LANTUS SOLOSTAR 100 UNIT/ML soln ADMINISTER 35 TO 40 UNITS UNDER THE SKIN AT  BEDTIME   pantoprazole (PROTONIX) 40 MG EC tablet TAKE 1 TABLET BY MOUTH EVERY DAY   torsemide (DEMADEX) 10 MG tablet Take 1 tablet (10 mg) by mouth daily   amLODIPine (NORVASC) 10 MG tablet Take 1 tablet (10 mg) by mouth daily   atorvastatin (LIPITOR) 40 MG tablet Take 1 tablet (40 mg) by mouth daily   clopidogrel (PLAVIX) 75 MG tablet Take 1 tablet (75 mg) by mouth daily   hydrALAZINE (APRESOLINE) 50 MG tablet Take 1 tablet (50 mg) by mouth 3 times daily   isosorbide mononitrate (IMDUR) 60 MG 24 hr tablet Take 2 tablets (120 mg) by mouth daily   spironolactone (ALDACTONE) 50 MG tablet Take 1 tablet (50 mg) by mouth daily   ASPIRIN NOT PRESCRIBED (INTENTIONAL) Please choose reason not prescribed, below   ACCU-CHEK MARY PLUS test strip TEST THREE TIMES DAILY   B-D U/F 31G X 8 MM insulin pen needle    Multiple Vitamins-Minerals (CENTRUM SILVER) per tablet Take 1 tablet by mouth daily.   Calcium Carb-Cholecalciferol (CALTRATE 600+D) 600-800 MG-UNIT TABS Take 1 tablet by mouth daily.       PHYSICAL EXAMINATION:  Temp:  [96.1  F (35.6  C)-97.8  F (36.6  C)] 97.3  F (36.3  C)  Pulse:  [61-70] 70  Heart Rate:  [58-71] 68  Resp:  [7-30] 16  BP: ()/(43-94) 121/69  SpO2:  [76 %-98 %] 95 %    Elderly man sitting up in bed in NAD  Laceration to left parietal region of scalp.  Repaired with suture  Neuro: cranial nerves grossly intact.  Moving all extremities.  Weakness on right upper extremity secondary to shoulder pain  GCS 15  RRR  Non-labored breathing on RA.  Lungs clear bilaterally  Skin tears on left arm  Extremities warm and well perfused  Abdomen soft and non distended.    LABS: Reviewed.   Arterial Blood Gases   No lab results found in last 7 days.  Complete Blood Count     Recent Labs  Lab 08/17/17  2322   WBC 8.9   HGB 11.4*   *     Basic Metabolic Panel    Recent Labs  Lab 08/17/17  2322      POTASSIUM 3.8   CHLORIDE 102   CO2 16*   BUN 37*   CR 1.74*   *     Liver Function  Tests    Recent Labs  Lab 08/18/17  0215 08/17/17  2322   AST  --  22   ALT  --  22   ALKPHOS  --  259*   BILITOTAL  --  1.3   ALBUMIN  --  3.6   INR 1.32*  --      Pancreatic Enzymes  No lab results found in last 7 days.  Coagulation Profile    Recent Labs  Lab 08/18/17  0215   INR 1.32*   PTT 34     Lactate  Invalid input(s): LACTATE    IMAGING:  Recent Results (from the past 24 hour(s))   CT Head w/o Contrast   Result Value    Radiologist flags Acute intracranial hemorrhage (AA)    Narrative    CT HEAD W/O CONTRAST  8/17/2017 11:42 PM      HISTORY: Trauma.    TECHNIQUE: Routine noncontrast head CT. Radiation dose for this scan  was reduced using automated exposure control, adjustment of the mA  and/or kV according to patient size, or iterative reconstruction  technique.    COMPARISON: None.    FINDINGS: There is generalized brain atrophy. There is subarachnoid  hemorrhage over the left frontal and parietal lobes. Small subdural  hematoma anterior to the left frontal lobe measuring approximately 0.7  cm in thickness. Probable tiny amount of subarachnoid hemorrhage at  the inferior surface of the left frontal lobe. No significant mass  effect. Periventricular and deep white matter low attenuation is  consistent with chronic small vessel ischemic disease. There is a left  parietal scalp hematoma. No fracture. Retention cyst or polyp in the  left maxillary sinus.      Impression    IMPRESSION:  1. Subarachnoid hemorrhage over the left frontal and parietal lobes.  2. Small subdural hematoma anterior to the left frontal lobe.    [Critical Result: Acute intracranial hemorrhage]    Finding was identified on 8/17/2017 11:42 PM.     Dr. Mohamud was contacted by me on 8/17/2017 11:47 PM and verbalized  understanding of the critical result.   CT Cervical Spine w/o Contrast    Narrative    CT CERVICAL SPINE W/O CONTRAST  8/17/2017 11:42 PM      HISTORY: Trauma.    TECHNIQUE: Multiplanar imaging of the cervical spine  without  intravenous contrast. Radiation dose for this scan was reduced using  automated exposure control, adjustment of the mA and/or kV according  to patient size, or iterative reconstruction technique.     COMPARISON: None.    FINDINGS: There is no acute fracture or traumatic malalignment. There  is multilevel degenerative disc and facet disease. Degenerative disc  disease is worst at C5-C6 and C6-C7. There is no significant spinal  stenosis. The paraspinal soft tissues are unremarkable aside from  atherosclerotic disease. Mild scarring at the lung apices.      Impression    IMPRESSION: No acute traumatic abnormality.[MR1.1]          Revision History        User Key Date/Time User Provider Type Action    > MR1.1 8/18/2017  3:06 AM Lucas Christopher MD Resident Sign            H&P by Steve William MD at 8/18/2017  2:28 AM     Author:  Steve William MD Service:  Trauma Author Type:  Resident    Filed:  8/18/2017  3:07 AM Date of Service:  8/18/2017  2:28 AM Creation Time:  8/18/2017  2:28 AM    Status:  Cosign Needed :  Steve William MD (Resident)    Cosign Required:  Yes             [facility] [place]    History and Physical / Consult note: Trauma Service     Date of Admission:  8/18/2017    Time of Admission/Consult Request (page/call): [phone]  Time of my evaluation: 0210  Consulting services:  Neurosurgery - Emergent consult (within 30 mins): Called by ED    Assessment & Plan   Trauma mechanism:Fall down 6 stairs on plavix  Time/date of injury: 8/17/2017  Known Injuries:  1. Left Subarachnoid Hemorrhage  2. Left Subdural Hematoma  Other diagnoses:[KG1.1]   1. Acute renal failure  2. Coagulopathy  Alcohol abuse[KG1.2]    Procedure:[KG1.1]  None[KG1.2]    Plan:[KG1.1]  1. Admit to SICU  2. Q1 neuro checks  3. SBP < 140  4. Repeat CT scan  5. NPO until repeat CT scan  6. S tylenol for pain[KG1.2]  7. Mancuso for strict I/O[KG1.3]    Code status:[KG1.1] Full code  "confirmed with patient[KG1.2].     General Cares:  GI Prophylaxis:[KG1.1] None[KG1.2]  DVT Prophylaxis:[KG1.1] Hold for bleed[KG1.2]  Date of last stool/Bowel Regimen:[KG1.1]Senokot BID and miralax[KG1.2]  Pulmonary toilet:[KG1.1]IS[KG1.2]    ETOH: This patient was asked if in the last 3-6 months there has been a time when[KG1.1] he had  5 or more drinks in a single day/outing.[KG1.2]. Patient answer to the screening question was[KG1.1] in the positive. Spoke with the patient about the correlation of ETOH use and accidents/injuries. We also discussed the importance of abstaining from ETOH use while healing from existing injuries, especially if prescribed narcotics at the time of discharge. The patient demonstrated understanding. 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 Noted bleeding from head following fall but no new neurological deficitis. Denies any LOC. Had 2 drinks today and has 2+drinks daily. Has noted R shoulder pain and RUE weakness which he has had intermittently from his strokes. It is worse than baseline and has some weakeness in RUE. He was found to have a L SDH and L SAH at OSH. He was loaded with Keppra, had the left parietal laceration repaired.[KG1.2]    Past Medical History[KG1.1]    I have reviewed this patient's medical history and updated it with pertinent information if needed.[KG1.2]   Past Medical History:   Diagnosis Date     Acute exacerbation of CHF (congestive heart failure) (H) 9/2/2016     Atrial fibrillation (H)      Cerebral infarction (H) 12/13/2013, 9/2016     " Diagnosis updated by automated process. Provider to review and confirm.     Coronary artery disease      Diabetes (H)      Gastroesophageal reflux disease with esophagitis 5/8/2017     Hyperlipidaemia      Hypertension      Myocardial infarction (H)      PAD (peripheral artery disease) (H)      Stented coronary artery     CABG 2002 4 VESSEL     Type 2 diabetes mellitus with stage 3 chronic kidney disease, with long-term current use of insulin (H) 11/4/2016     Unspecified cerebral artery occlusion with cerebral infarction[KG1.4]        Past Surgical History[KG1.1]   I have reviewed this patient's surgical history and updated it with pertinent information if needed.[KG1.2]  Past Surgical History:   Procedure Laterality Date     C MRA UPPER EXTREMITY WO&W CONT       CORONARY ARTERY BYPASS  2002    LIMA-LAD, SVG-OM1, [id number]     ENT SURGERY      VOCAL CORD LESION REMOVED     GENITOURINARY SURGERY      ANGIOPLASTY FOR RENAL ARTERT STENOSIS     HC LEFT HEART CATHETERIZATION  9/7/2016 09/07/2016: CHUCHO x2 to distal and proximal SVG to to RPDA     HEART CATH LEFT HEART CATH  11/30/2001     PHACOEMULSIFICATION CLEAR CORNEA WITH STANDARD INTRAOCULAR LENS IMPLANT Left 1/22/2015    Procedure: PHACOEMULSIFICATION CLEAR CORNEA WITH STANDARD INTRAOCULAR LENS IMPLANT;  Surgeon: Bart Johnson MD;  [facility]     VASCULAR SURGERY      ANGIOPLASTY LEFT LEG FOR pvd[KG1.4]     Prior to Admission Medications   Prior to Admission Medications   Prescriptions Last Dose Informant Patient Reported? Taking?   ACCU-CHEK MARY PLUS test strip   No Yes   Sig: TEST THREE TIMES DAILY   ASPIRIN NOT PRESCRIBED (INTENTIONAL)   No Yes   Sig: Please choose reason not prescribed, below   B-D U/F 31G X 8 MM insulin pen needle   Yes Yes   Calcium Carb-Cholecalciferol (CALTRATE 600+D) 600-800 MG-UNIT TABS   Yes Yes   Sig: Take 1 tablet by mouth daily.   LANTUS SOLOSTAR 100 UNIT/ML soln   No Yes   Sig: ADMINISTER 35 TO 40 UNITS UNDER THE  SKIN AT BEDTIME   Multiple Vitamins-Minerals (CENTRUM SILVER) per tablet   Yes Yes   Sig: Take 1 tablet by mouth daily.   amLODIPine (NORVASC) 10 MG tablet   No Yes   Sig: Take 1 tablet (10 mg) by mouth daily   atorvastatin (LIPITOR) 40 MG tablet   No Yes   Sig: Take 1 tablet (40 mg) by mouth daily   carvedilol (COREG) 25 MG tablet   Yes Yes   Sig: Take 0.5 tablets (12.5 mg) by mouth every morning   clopidogrel (PLAVIX) 75 MG tablet   No Yes   Sig: Take 1 tablet (75 mg) by mouth daily   hydrALAZINE (APRESOLINE) 50 MG tablet   No Yes   Sig: Take 1 tablet (50 mg) by mouth 3 times daily   isosorbide mononitrate (IMDUR) 60 MG 24 hr tablet   No Yes   Sig: Take 2 tablets (120 mg) by mouth daily   pantoprazole (PROTONIX) 40 MG EC tablet   No Yes   Sig: TAKE 1 TABLET BY MOUTH EVERY DAY   spironolactone (ALDACTONE) 50 MG tablet   No Yes   Sig: Take 1 tablet (50 mg) by mouth daily   torsemide (DEMADEX) 10 MG tablet   No Yes   Sig: Take 1 tablet (10 mg) by mouth daily      Facility-Administered Medications: None     Allergies   Allergies   Allergen Reactions     Aspirin Hives     Penicillins Hives     Contrast Dye Hives       Social History   Social History     Social History     Marital status: Single     Spouse name: N/A     Number of children: N/A     Years of education: N/A     Occupational History     Not on file.     Social History Main Topics     Smoking status: Former Smoker     Years: 40.00     Types: Pipe     Quit date: 12/18/1998     Smokeless tobacco: Never Used     Alcohol use 0.0 oz/week     0 Standard drinks or equivalent per week      Comment: 3 hard liquor drinks daily     Drug use: No     Sexual activity: Not Currently     Partners: Female     Other Topics Concern     Parent/Sibling W/ Cabg, Mi Or Angioplasty Before 65f 55m? No     Caffeine Concern Yes     decaff only      Stress Concern No     Special Diet Yes     low sodium      Exercise Yes     walking, rehab 3 times a week      Seat Belt Yes     Social  History Narrative        Family History[KG1.1]   No history of bleeding or problems with anesthesia[KG1.2]    Review of Systems   CONSTITUTIONAL: No fever, chills, sweats, fatigue   EYES: no visual blurring, no double vision or visual loss  ENT: no decrease in hearing, no tinnitus, no vertigo, no hoarseness  RESPIRATORY: no shortness of breath, no cough, no sputum   CARDIOVASCULAR: no palpitations, no chest  pain, no exertional chest pain or pressure  GASTROINTESTINAL: no nausea or vomiting, or abd pain  GENITOURINARY: no dysuria, no frequency or hesitancy, no hematuria  MUSCULOSKELETAL: no weakness, no redness, no swelling,[KG1.1] +R shoulder pain[KG1.2]   SKIN: no rashes, ecchymoses, abrasions or lacerations  NEUROLOGIC:[KG1.1] +numbness/tingling RUE[KG1.2], no numbness or tingling  of feet, no syncope, no tremors or weakness  PSYCHIATRIC: no sleep disturbances, no anxiety or depression    Physical Exam     Temp src: Axillary BP: 121/69 Pulse: 70 Heart Rate: 68 Resp: 16 SpO2: 95 % O2 Device: None (Room air)    Vital Signs with Ranges  Temp:  [96.1  F (35.6  C)-97.8  F (36.6  C)] 96.3  F (35.7  C)  Pulse:  [61-70] 70  Heart Rate:  [58-71] 68  Resp:  [7-30] 16  BP: ()/(43-94) 121/69  SpO2:  [76 %-98 %] 95 % 185 lbs 0 oz    Primary Survey:  Airway: patient talking  Breathing: symmetric respiratory effort bilaterally  Circulation: central pulses present and peripheral pulses present  Disability: Pupils - left 4 mm and brisk, right 4 mm and brisk     Eric Coma Scale - Total[KG1.1] 15[KG1.2]/15  Eye Response (E):[KG1.1] 4[KG1.2]  4= spontaneous,  3= to verbal/voice, 2=  to pain, 1= No response   Verbal Response (V):[KG1.1] 5[KG1.2]   5= Orientated, converses,  4= Confused, converses, 3= Inappropriate words,  2= Incomprehensible sounds,  1=No response   Motor Response (M):[KG1.1] 6[KG1.2]   6= Obeys commands, 5= Localizes to pain, 4= Withdrawal to pain, 3=Fexion to pain, 2= Extension to pain, 1= No  response    Secondary Survey:  General: alert, oriented to person, place, time  Head:[KG1.1] left parietal laceration repaired, no active bleeding[KG1.2], normocephalic, trachea midline  Eyes: PERRLA, pupils[KG1.1] 2[KG1.2]mm, EOMI, corneas and conjunctivae clear  Ears: pearly grey bilateral TMs and non-inflamed external ear canals  Nose: nares patent, no drainage, nasal septum non-tender  Mouth/Throat: no exudates or erythema,  no dental tenderness or malocclusions, no tongue lacerations  Neck:[KG1.1]  no[KG1.2] cervical collar present. No midline posterior tenderness, full AROM without pain or tenderness   Chest/Pulmonary: normal respiratory rate and rhythm,  bilateral clear breath sounds, no wheezes, rales or rhonchi, no chest wall tenderness or deformities,[KG1.1] sternotomy scar present[KG1.3]  Cardiovascular: S1, S2,  normal and regular rate and rhythm, no murmurs  Abdomen: soft, non-tender, no guarding, no rebound tenderness and no tenderness to palpation  : normal external genitalia, pelvis stable to lateral compression,[KG1.1] +[KG1.3]fu, urine yellow and clear  Back/Spine: no deformity, no midline tenderness, no sacral tenderness,  no step-offs and no abrasions or contusions  Musculoskel/Extremities:[KG1.1] Pain in R shoulder - TTP. Pain with active and passive ROM. Otherwise[KG1.3] normal extremities, full AROM of major joints without tenderness, edema, erythema, ecchymosis, or abrasions.[KG1.1] Negative[KG1.3] edema.   Hand: no gross deformities of hands or fingers. Full AROM of hand and fingers in flexion and extension.  strength equal and symmetric.   Skin: no rashes, laceration, ecchymosis, skin warm and dry.   Neuro: PERRLA, alert, oriented x[KG1.1] 3[KG1.3]. CN II-XII grossly intact.[KG1.1] Strength 2/5 R shoulder due to pain. Otherwise[KG1.3] Strength[KG1.1] 5[KG1.3]/5 x[KG1.1] 3[KG1.3] extremities.[KG1.1]  +numbness in RUE, Otherwise s[KG1.3]ensation intact.  Psychiatric: affect/mood  normal, cooperative, normal judgement/insight and memory intact    Data   UA RESULTS:  No results for input(s): COLOR, APPEARANCE, URINEGLC, URINEBILI, URINEKETONE, SG, UBLD, URINEPH, PROTEIN, UROBILINOGEN, NITRITE, LEUKEST, RBCU, WBCU in the last 32440 hours.[KG1.1]   Results for orders placed or performed during the hospital encounter of 08/18/17 (from the past 24 hour(s))   INR   Result Value Ref Range    INR 1.32 (H) 0.86 - 1.14   Partial thromboplastin time   Result Value Ref Range    PTT 34 22 - 37 sec   Chest  XR, 1 view portable    Narrative    1. Left lower lobe opacity, differential for which includes contusion,  pneumonia, or atelectasis.  2. Probable layering left-sided pleural effusion. In the setting of  trauma, hemothorax not excluded.[KG1.5]   Results for MARLA VÁZQUEZ CECELIA (MRN [id number]) as of 8/18/2017 02:53   Ref. Range 8/17/2017 23:22   Sodium Latest Ref Range: 133 - 144 mmol/L 134   Potassium Latest Ref Range: 3.4 - 5.3 mmol/L 3.8   Chloride Latest Ref Range: 94 - 109 mmol/L 102   Carbon Dioxide Latest Ref Range: 20 - 32 mmol/L 16 (L)   Urea Nitrogen Latest Ref Range: 7 - 30 mg/dL 37 (H)   Creatinine Latest Ref Range: 0.66 - 1.25 mg/dL 1.74 (H)   GFR Estimate Latest Ref Range: >60 mL/min/1.7m2 38 (L)   GFR Estimate If Black Latest Ref Range: >60 mL/min/1.7m2 46 (L)   Calcium Latest Ref Range: 8.5 - 10.1 mg/dL 8.9   Anion Gap Latest Ref Range: 3 - 14 mmol/L 16 (H)   Albumin Latest Ref Range: 3.4 - 5.0 g/dL 3.6   Protein Total Latest Ref Range: 6.8 - 8.8 g/dL 7.6   Bilirubin Total Latest Ref Range: 0.2 - 1.3 mg/dL 1.3   Alkaline Phosphatase Latest Ref Range: 40 - 150 U/L 259 (H)   ALT Latest Ref Range: 0 - 70 U/L 22   AST Latest Ref Range: 0 - 45 U/L 22   Results for MARLA VÁZQUEZ (MRN [id number]) as of 8/18/2017 02:53   Ref. Range 8/17/2017 23:22   WBC Latest Ref Range: 4.0 - 11.0 10e9/L 8.9   Hemoglobin Latest Ref Range: 13.3 - 17.7 g/dL 11.4 (L)   Hematocrit Latest Ref Range:  40.0 - 53.0 % 34.0 (L)   Platelet Count Latest Ref Range: 150 - 450 10e9/L 144 (L)[KG1.3]       Studies:  Chest  XR, 1 view portable    (Results Pending)[KG1.1]   CT head: 1. Subarachnoid hemorrhage over the left frontal and parietal lobes.  2. Small subdural hematoma anterior to the left frontal lobe.  CT c spine: negative[KG1.3]      Steve William[KG1.1]   Carrie[KG1.3]       Revision History        User Key Date/Time User Provider Type Action    > KG1.5 8/18/2017  3:07 AM Steve William MD Resident Sign     KG1.3 8/18/2017  3:02 AM Steve William MD Resident      KG1.4 8/18/2017  2:55 AM Steve William MD Resident      KG1.2 8/18/2017  2:48 AM Steve William MD Resident      KG1.1 8/18/2017  2:28 AM Steve William MD Resident                      Discharge Summaries      Discharge Summaries by Rosario Martinez APRN CNP at 8/23/2017  2:32 PM     Author:  Rosario Martinez APRN CNP Service:  Trauma Author Type:  Nurse Practitioner    Filed:  8/24/2017  8:52 AM Date of Service:  8/23/2017  2:32 PM Creation Time:  8/23/2017  2:31 PM    Status:  Cosign Needed :  Rosario Martinez APRN CNP (Nurse Practitioner)    Cosign Required:  Yes             [facility]    Discharge Summary  Trauma Surgery Service    Date of Admission:  8/18/2017  Date of Discharge:[KH1.1]  8/24/2017[DS1.1]  Discharging Provider:[KH1.1] Rosario Martinez[DS1.1]   Date of Service (when I saw the patient):[KH1.1] 08/24/17[DS1.1]    Primary Provider: Etienne Gee  Primary Care clinic: [place] [address] [facility] [address]  [place] [address]  Phone: [phone]  Fax number: [phone][KH1.1]     Discharge Diagnoses[KH1.2]   1. Fall down 6 stairs  2. Left Subarachnoid Hemorrhage  3. Left Subdural Hematoma  4. Left temporal/parietal scalp abrasion   5. Left elbow abrasion  6. Right knee abrasion   7. Acute pain   8. Acute on chronic renal failure[KH1.3],  resolved[KH1.4]   9. Systolic heart failure[KH1.3] s/p CABG  10. PAD[KH1.4]   11. A.fib   12. DM Type II  13. H[KH1.3]ypertension   14. Hyperlipidemia[KH1.4]   15. 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 He noted bleeding from head following fall but no new neurological deficits and denied any loss of consciousness.  Has noted right  shoulder pain and right upper extremity weakness which he has had intermittently from his strokes. It is worse than baseline and has some weakeness in right upper extremity. He was brought to [facility] where he  was found to have a left subdural and subarachnoid bleeds along with a scalp laceration. He was loaded with Keppra, had the left parietal laceration repaired. He was then transferred to [facility] for trauma admission and speciality cares. His hospital course is as follows:     S/p fall down stairs:   The patient sustained the above injury as a result of[KH1.1] mechanical fall.  He[KH1.3] was seen by the Trauma Resource Nurse and injury prevention education was performed.  The mechanism of injury and factors contributing to the accident were discussed with the patient.  Strategies on how to prevent future accidents were reviewed.  The patient underwent tertiary examination to evaluate for additional injuries.  The systematic review did not find any other injuries.    Right shoulder pain, tendinoplasty.   Kelechi Coleman was evaluated by Orthopedics for his right arm pain and weakness. He underwent MRI of this extremity which was notable for tendinoplasty of the rotator cuff.  This injury was likely and exacerbation of his chronic issue given his fall. He is requested to follow up in the Orthopedic Clinic as  needed. He can weightbear and do range of motion as he tolerates for his injury. He  was evaluated by physical and occupational therapies during his hospitalization.  Please see summary of most current therapy notes below.     Left subdural and left subarachnoid.[KH1.1] Right arm weakness and movements.[KH1.5]   He was  evaluated by Neurosurgery for his injury. He was managed non-operatively.   He had repeat imaging with CT of head injury which showed stability. He was monitored with serial neurological examinations which remained stable.  Neurosurgery recommends follow up in Neurosurgery clinic in 4 weeks with repeat CT of his head.     He was evaluated by physical and occupational therapies during his hospitalization.  Patient underwent Traumatic Brain Injury evaluation and screening by therapies. He has been provided with information for TBI follow-up. Please see summary of most current therapy notes below.[KH1.1]    He was seen by neurology this admission to evaluate for right arm movements and tremors. Daljit nous EEG monitoring did not show any seizure activity however given his recent intracranial hemorrhages, h[KH1.5]e will continue on Trileptal and[KH1.3]  Keppra[KH1.4] until he is seen in epilepsy clinic with follow up with neurology.[KH1.5]     Hx of CVA 1998, on Plavix. PAD, CABG 2000 s/p stents. He unfortunately fell and sustained the above injuries. Because of these head bleeds, neurosurgery request that his plavix be on hold until follow up in clinic and repeat CT can be done.    Abrasions. This was treated supportively with local wound cares.     Diabetes Mellitus.[KH1.1]   He was previously taking anywhere form 20-25 units of Lantus at bedtime prior to admissions, but he was not on meal or carbohydrate coverage[KH1.3] d[DS1.2]uring this admission[KH1.3] with fairly low blood sugars given the lack of carbs[DS1.2],[KH1.3] as[DS1.2] his oral intake[KH1.3] increased we have started him at[DS1.2] 10  "units[KH1.3] lantus[DS1.2] and started on meal coverage with sliding scale insulin.[KH1.3]     Hypertension. Hyperlipidemia. His home medications were resumed. No changes were made to this home regimen.     Acute pain. His[KH1.1] p[DS1.2]ain was controlled with a multi-modality approach.  The current regimen for Kelechi Coleman includes the following,[KH1.1] acetaminophen.  We have avoided narcotics to preserve mental status as much as possible and not contribute to any confusion or delirium he may have.[DS1.2]  Adequate pain control was achieved with this regimen.  We anticipate that they will taper off this regimen over the next several weeks.    Atrial fibrillation. Systolic heart failure. His home diuretics and heart rate controlling medications were resumed, no changes were made to this regimen.     ETOH. The patient was screened for hazardous consumption of alcohol and dependence.  The patient reported use of ETOH, typically drinks 2 drinks a day[KH1.1] and did not require intervention.  He showed no signs of withdrawal during his stay.[DS1.2]    Palliative Care Consult  The palliative care team was consulted to assist in the care and future life planning regarding the changes the above injuries have created. Often this sort of injury is a clear marker of general decline in the aged population.  During their time with the patient and family, these are the things they focused on this admission:[KH1.1]  POLST, they were unable to complete this while here.  They also are concerned with recent weight loss and request this be watched closely.[DS1.2]    Therapy Recommendations:   Current status of physical therapies on discharge:[KH1.1]  8/23/17\"Pt was sitting up in upright chair upon arrival. Increased rest breaks needed due to fatigue. Delay in response time noted. Pt required MODA-MAXA x 2 sit<>stand and to pivot from bedside chair back to bed with support under UEs, with care for R UE (WBAT per MD " "orders). Pt able to stand x 2.5 min with ALANA-MODA x 2 once standing. Pt sat at edge of bed with initial ALANA to steady due to R lean and progressed SBA x 6 min. MAXA x 2 required for sit>supine.      Discussed need for R UE sling during therapy when transferring to further protect AC jt/shoulder. Pt's nurse Kate agreed to order for pt.      Recommend ARU vs TCU. Activity tolerance yet reduced. Pt progressing slowly in PT, participated in ~ 30 min visit with encouragement needed for participation. Pt however would benefit from ARU stay to maximize functional return and address complex rehab needs as pt is far from functional baseline.    \"[KH1.4]   Current status of occupational therapies on discharge[KH1.1]: 8/23/17: \"Pt mod Ax1 and CGA of 2nd person for sit>stand, and min Ax2 for pivot to chair. Ambulated forward approx 5 feet with min Ax2, and tolerating RUE neuro re-ed well without major episodes of tremors. Rec d/c to ARU vs TCU with improved activity tolerance.  \"[KH1.4]      Code Status[KH1.2]   Full Code[KH1.1]  Physical Exam   Temp: 95.1  F (35.1  C) Temp src: Oral BP: 102/56 Pulse: 61 Heart Rate: 68 Resp: 16 SpO2: 97 % O2 Device: None (Room air)    Vitals:    08/18/17 0400 08/19/17 0600 08/20/17 0500   Weight: 83.5 kg (184 lb 1.4 oz) 82.4 kg (181 lb 10.5 oz) 83.1 kg (183 lb 3.2 oz)[KH1.6]     Vital Signs with Ranges[KH1.4]  Temp:  [95.1  F (35.1  C)-97.7  F (36.5  C)] 95.1  F (35.1  C)  Pulse:  [61-71] 61  Heart Rate:  [68] 68  Resp:  [16] 16  BP: (102-129)/(52-67) 102/56  SpO2:  [93 %-97 %] 97 %  I/O last 3 completed shifts:  In: 820 [P.O.:820]  Out: 500 [Urine:500][KH1.6]    [place] Coma Scale - Total 14/15  Constitutional: Awake, alert, cooperative, no apparent distress  Eyes: Lids and lashes normal, pupils equal, round and reactive to light, extra ocular muscles intact, sclera clear, conjunctiva normal.  ENT: Normocephalic, atraumatic, teeth with poor hygiene, left posterior scalp obsured by dried " "blood  Respiratory: No increased work of breathing, good air exchange, clear to auscultation bilaterally, no crackles or wheezing.  Cardiovascular:  regular rate and rhythm, normal S1 and S2, and no murmur noted.  GI: Normal bowel sounds, soft, non-distended, non-tender, no guarding  Genitourinary:  No urine to assess  Skin:  Normal skin color pale, warm and dry  Musculoskeletal: There is no redness, warmth, or swelling of the joints.  Pedal pulse palpated  Neurologic: Awake, alert,   Cranial nerves II-XII are grossly intact. RUE weakness  Neuropsychiatric: Calm, perseverating. Difficult to assess orientation[DS1.2]    Discharge Disposition[KH1.2]   Discharged to rehabilitation facility[DS1.2]  Condition at discharge:[KH1.1] Stable[DS1.2]  Discharge VS:[KH1.1] Blood pressure 102/56, pulse 61, temperature 95.1  F (35.1  C), temperature source Oral, resp. rate 16, height 1.88 m (6' 2\"), weight 83.1 kg (183 lb 3.2 oz), SpO2 97 %.    1. Consultations This Hospital Stay   2. PHYSICAL MEDICINE & REHAB GENERAL ADULT IP CONSULT  3. MEDICATION HISTORY IP PHARMACY CONSULT  4. NEUROLOGY GENERAL ADULT IP CONSULT  5. PALLIATIVE CARE ADULT  6. ORTHOPEDICS IP CONSULT  7. VASCULAR ACCESS CARE ADULT IP CONSULT  8. PHYSICAL THERAPY ADULT IP CONSULT  9. OCCUPATIONAL THERAPY ADULT IP CONSULT  10. SPEECH LANGUAGE PATH ADULT IP CONSULT    Discharge Orders     General info for SNF   Length of Stay Estimate: Short Term Care: Estimated # of Days <30  Condition at Discharge: Stable  Level of care:skilled   Rehabilitation Potential: Good  Admission H&P remains valid and up-to-date: Yes  Recent Chemotherapy: N/A  Use Nursing Home Standing Orders: Yes     Mantoux instructions   Give two-step Mantoux (PPD) Per Facility Policy Yes     Reason for your hospital stay   Trauma mechanism:Fall down 6 stairs on plavix on  8/17/2017  Known Injuries:  1. Left Subarachnoid Hemorrhage  2. Left Subdural Hematoma  3. Left temporal/parietal scalp abrasion   4. " Left elbow abrasion  5. Right knee abrasion     Other diagnoses:   1. Acute pain   2. Acute on chronic renal failure  3. Systolic heart failure   4. A.fib   5. DM Type II  6. Hypertension   7. Hyperlipidemia   8. Leukocytosis, resolved  9. HX CVA-1998, on Plavix     Daily weights   Call Provider for weight gain of more than 2 pounds per day or 5 pounds per week.     Intake and output   Every shift     Wound care (specify)   Site:   Knees and elbows   Instructions:  Bacitracin twice per day     Activity - Up with assistive device   Use assistive devices as needed for ambulation     Weight bearing status   Weight bearing as tolerated   Right shoulder range of motion as tolerated     Encourage PO fluids     Follow Up and recommended labs and tests   Follow up with your primary care provider for continued medical care and hospital follow up in 5-10 days.     Trauma Clinic as needed   [facility]  Floor [address]  [address] [place] [address]   Appointments: [phone]    Follow up in Orthopaedic Clinic as needed for right shoulder tendinous   [facility]  Floor 4   [address] [place] [address]   Appointments: [phone]     Neurosurgery Clinic--follow up with Ms. Karen Taylor in 4 weeks with repeat CT of head ( no contrast)  Floor 3   [address] [place] [address]   Appointments: [phone]    Follow up if you have persistent headache, nausea, dizziness, or thinking problems.  Concussion Clinic  [facility]  Floor 4   [address] [place] [address]   Appointments/Questions: [phone]    Neurology Clinic--follow up with Dr Smith in epilepsy clinic in one month after discharge   [facility]  Floor 3   [address] [place] [address]   Appointments: [phone]     Additional Discharge Instructions   Hold Plavix until seen in clinic for followup per neurosurgery.     Full  Code     Physical Therapy Adult Consult   Evaluate and treat as clinically indicated.    Reason:  Fall, subdural hematoma     Occupational Therapy Adult Consult   Evaluate and treat as clinically indicated.    Reason:  Fall, subdural hematoma     Speech Language Path Adult Consult   Evaluate and treat as clinically indicated.    Reason:  Dysphagia, diet advancement     Fall precautions     Advance Diet as Tolerated   Follow this diet upon discharge:      Full Liquid Diet Nectar Thickened Liquids (pre-thickened or use instant food thickener)       Discharge Medications   Current Discharge Medication List      START taking these medications    Details   mupirocin (BACTROBAN) 2 % ointment Apply topically 2 times daily  Qty: 22 g    Associated Diagnoses: Abrasion, right knee, initial encounter      senna-docusate (SENOKOT-S;PERICOLACE) 8.6-50 MG per tablet Take 1-2 tablets by mouth 2 times daily  Qty: 60 tablet, Refills: 0    Comments: Hold for loose stools  Associated Diagnoses: Drug-induced constipation      acetaminophen (TYLENOL) 325 MG tablet Take 2 tablets (650 mg) by mouth every 4 hours as needed for mild pain  Qty: 100 tablet    Associated Diagnoses: Pain         CONTINUE these medications which have CHANGED    Details   hydrALAZINE (APRESOLINE) 50 MG tablet Take 1 tablet (50 mg) by mouth 2 times daily    Associated Diagnoses: Essential hypertension with goal blood pressure less than 130/80      torsemide (DEMADEX) 10 MG tablet Take 1 tablet (10 mg) by mouth daily  Qty: 90 tablet, Refills: 3    Associated Diagnoses: Acute on chronic combined systolic and diastolic congestive heart failure (H)      spironolactone (ALDACTONE) 50 MG tablet Take 1 tablet (50 mg) by mouth daily  Qty: 90 tablet, Refills: 3    Associated Diagnoses: Chronic systolic congestive heart failure (H)      pantoprazole (PROTONIX) 40 MG EC tablet TAKE 1 TABLET BY MOUTH EVERY EVENING  Qty: 90 tablet, Refills: 2    Associated Diagnoses:  Gastroesophageal reflux disease, esophagitis presence not specified      Multiple Vitamins-Minerals (CENTRUM SILVER) per tablet Take 1 tablet by mouth daily  Qty: 30 tablet    Associated Diagnoses: Vitamin deficiency      Calcium Carb-Cholecalciferol (CALTRATE 600+D) 600-800 MG-UNIT TABS Take 1 tablet by mouth daily  Qty: 30 tablet, Refills: 0    Associated Diagnoses: Vitamin deficiency         CONTINUE these medications which have NOT CHANGED    Details   carvedilol (COREG) 25 MG tablet Take 0.5 tablets (12.5 mg) by mouth every morning  Qty: 180 tablet, Refills: 3    Associated Diagnoses: Essential hypertension with goal blood pressure less than 130/80      LANTUS SOLOSTAR 100 UNIT/ML soln ADMINISTER 35 TO 40 UNITS UNDER THE SKIN AT BEDTIME  Qty: 30 mL, Refills: 0    Associated Diagnoses: Type 2 diabetes mellitus with stage 3 chronic kidney disease, with long-term current use of insulin (H)      amLODIPine (NORVASC) 10 MG tablet Take 1 tablet (10 mg) by mouth daily  Qty: 90 tablet, Refills: 4    Associated Diagnoses: Peripheral artery disease (H); Coronary artery disease involving native coronary artery of native heart without angina pectoris      atorvastatin (LIPITOR) 40 MG tablet Take 1 tablet (40 mg) by mouth daily  Qty: 90 tablet, Refills: 3    Associated Diagnoses: Hyperlipidemia LDL goal <70      clopidogrel (PLAVIX) 75 MG tablet Take 1 tablet (75 mg) by mouth daily  Qty: 90 tablet, Refills: 3    Associated Diagnoses: Chronic ischemic heart disease, unspecified      isosorbide mononitrate (IMDUR) 60 MG 24 hr tablet Take 2 tablets (120 mg) by mouth daily  Qty: 180 tablet, Refills: 3    Associated Diagnoses: Coronary artery disease involving native coronary artery of native heart without angina pectoris      ASPIRIN NOT PRESCRIBED (INTENTIONAL) Please choose reason not prescribed, below  Qty: 0 each, Refills: 0    Associated Diagnoses: Chronic systolic congestive heart failure (H)      ACCU-CHEK MARY PLUS  test strip TEST THREE TIMES DAILY  Qty: 300 strip, Refills: 1    Associated Diagnoses: Type 2 diabetes mellitus with stage 3 chronic kidney disease, with long-term current use of insulin (H)      B-D U/F 31G X 8 MM insulin pen needle            Allergies   Allergies   Allergen Reactions     Aspirin Hives     Penicillins Hives     Contrast Dye Hives     Data[KH1.2]   Most Recent 3 CBC's:[KH1.1]  Recent Labs   Lab Test  08/23/17   0840  08/22/17   0806  08/21/17   0749   WBC  6.5  7.5  8.2   HGB  10.1*  9.9*  9.9*   MCV  91  91  90   PLT  144*  142*  134*[KH1.2]      Most Recent 3 BMP's:[KH1.1]  Recent Labs   Lab Test  08/23/17   0840  08/22/17   0806  08/21/17   0749   NA  139  140  138   POTASSIUM  4.4  4.3  4.4   CHLORIDE  110*  110*  108   CO2  20  18*  19*   BUN  40*  37*  40*   CR  1.19  1.27*  1.42*   ANIONGAP  9  12  11   MIGUEL  9.2  9.5  9.9   GLC  199*  147*  154*[KH1.2]     Most Recent 2 LFT's:[KH1.1]  Recent Labs   Lab Test  08/17/17   2322  09/19/16   0651   AST  22  15   ALT  22  20   ALKPHOS  259*  107   BILITOTAL  1.3  0.7[KH1.2]     Most Recent INR's and Anticoagulation Dosing History:  Anticoagulation Dose History     Recent Dosing and Labs Latest Ref Rng & Units 5/9/2013 9/2/2016 9/16/2016 9/19/2016 8/18/2017    INR 0.86 - 1.14 0.96 1.01 1.04 1.10 1.32(H)        Most Recent 3 Troponin's:[KH1.1]  Recent Labs   Lab Test  09/19/16   1800  09/19/16   1152  09/19/16   0651   TROPI  9.746*  10.287*  11.294*[KH1.2]     Most Recent 6 Bacteria Isolates From Any Culture (See EPIC Reports for Culture Details):[KH1.1]  Recent Labs   Lab Test  08/18/17   2014   CULT  <10,000 colonies/mL  Coagulase negative Staphylococcus  *  10,000 to 50,000 colonies/mL  urogenital daryl  Susceptibility testing not routinely done[KH1.2]       Most Recent TSH, T4 and A1c Labs:[KH1.1]  Recent Labs   Lab Test  08/19/17   0303   10/26/16   1139  09/16/16   1559   TSH   --    --   5.55*  4.41*   T4   --    --    --   1.17   A1C  7.4*    < >   --    --     < > = values in this interval not displayed.[KH1.2]     Results for orders placed or performed during the hospital encounter of 08/18/17   Chest  XR, 1 view portable    Narrative    Exam:  XR CHEST PORT 1 VW, 8/18/2017 2:49 AM    History: trauma.  Chest pain    Comparison:  Chest x-ray 2/13/2017    Findings:  Single AP view the chest. Cardiac silhouette is enlarged.  Pulmonary vascularity within normal limits. There is dense  retrocardiac opacity, hazy left basilar opacities and a probable  layering left pleural effusion. Chronic blunting of the right  costophrenic angle is unchanged. No pneumothorax. No displaced rib  fracture identified. Median sternotomy wires. Aortic knob with  calcifications.      Impression    Impression:    1. Left retrocardiac opacity as well as hazy left basilar opacities,  differential for which includes contusion, pneumonia, or atelectasis.  2. Probable layering left-sided pleural effusion. In the setting of  trauma, hemothorax not excluded.    I have personally reviewed the examination and initial interpretation  and I agree with the findings.    GEORGE ROWAN MD   XR Shoulder Right Port G/E 2 Views     Value    Radiologist flags Mild widening of AC joint space.    Narrative    Exam:  XR SHOULDER RT PORT G/E 2 VW, 8/18/2017 3:16 AM    History: R shoulder pain    Comparison:  Chest radiographs September 2, 2016    Findings:    AP and transscapular Y views of the right shoulder were obtained.    There is flattening of the superolateral aspect of the humeral head,  similar to prior study and likely representing sequelae of Hill-Sachs  lesion. Glenohumeral joint is congruent on these 2 views.    Widening of acromioclavicular joint space, measuring 11 mm, previously  widened but slightly increased from prior study.    Patchy lucencies of visualized bones, likely related to underlying  osteopenia.      Impression    Impression:    1. No acute osseous abnormality.  2.  Widening of acromioclavicular joint space, measuring 11 mm,  previously widened but slightly increased from prior study. Correlate  clinically for focal symptom.    [Consider Follow Up: Mild widening of AC joint space.]    This report will be copied to the [facility] to ensure a  provider acknowledges the finding.     I have personally reviewed the examination and initial interpretation  and I agree with the findings.    LOLIS BAJWAASHI   CT Head w/o Contrast    Narrative    CT HEAD W/O CONTRAST 8/18/2017 5:43 AM    Provided History: Evaluate for progression of head bleed    Comparison: Head CT 8/17/2017.    Technique: Using multidetector thin collimation helical acquisition  technique, axial, coronal and sagittal CT images from the skull base  to the vertex were obtained without intravenous contrast.     Findings:    No significant change in extensive subarachnoid hemorrhage in the  sulci overlying the left frontal and parietal lobes. Hemorrhage  overlying the anterior left frontal lobe measuring 7 mm in thickness  is also unchanged, representing additional subarachnoid hemorrhage  versus a small subdural hemorrhage. No evidence of new intracranial  hemorrhage. The ventricles are proportionate to the cerebral sulci.  Mild generalized cerebral volume loss is unchanged. Chronic lacunar  infarct in the right basal ganglia The gray to white matter  differentiation of the cerebral hemispheres is preserved. The basal  cisterns are patent. Atherosclerotic calcifications of the vertebral  arteries and intracranial ICAs. Left parietal scalp hematoma.    Mucosal thickening versus mucus retention cyst in the left maxilla  sinus. Otherwise, the paranasal sinuses are clear. The mastoid air  cells are clear.       Impression    Impression:  Subarachnoid hemorrhage over the left frontal and  parietal lobes and probable small subdural hematoma over the anterior  left frontal lobe are unchanged in extent since head CT  8/17/2017. No  new intracranial hemorrhage.    I have personally reviewed the examination and initial interpretation  and I agree with the findings.    ISMAEL MARTIN MD   MR Shoulder Right w/o Contrast    Narrative    EXAM: MR right shoulder without  contrast 8/20/2017 5:28 PM    TECHNIQUE: Multiplanar, multisequence imaging of the right shoulder  were obtained without administration of intravenous or intra-articular  gadolinium contrast using routine protocol.    History: new acute weakness of right arm    Comparison: Radiographs August 18, 2017    Findings:    A marker is placed at the distal aspect of the clavicle.    ROTATOR CUFF and ASSOCIATED STRUCTURES  Rotator cuff: In a background tendinosis, there is moderate grade  intrasubstance tearing of the supraspinatus involving the posterior  fibers at the footprint with associated subjacent bony cystic changes.  In a background of tendinosis, moderate grade intrasubstance tearing  of the anterior fibers of infraspinatus at the footprint with  associated subjacent bony cystic changes. Teres minor tendon is  intact. Severe tendinosis of subscapularis.    Bursa: Trace subacromial/subdeltoid bursal fluid.    Musculature: Muscle bulk of rotator cuff is preserved.  Deltoid muscle  bulk is also preserved. Mild muscle edema involving the infraspinatus,  and teres minor muscles.    Acromioclavicular joint.  Redemonstration of mild widening of acromioclavicular joint with  heterogeneous fluid signal within the joint, measuring 9 mm in space  with associated mild to moderate degenerative change. Coracoacromial  ligament is not thickened. There are tearing of the superior and  inferior acromioclavicular ligaments. Acromion is type II in sagittal  morphology.    OSSEOUS STRUCTURES  There is flattening of the contour of the superior lateral aspect of  humeral head with subjacent cystic changes, likely related to prior  Hill-Sachs lesion. No evidence of marrow infiltrative  change,  fracture, or marrow contusion.    LONG BICIPITAL TENDON  The long head of the biceps tendon is normally situated within the  bicipital groove. Tendinosis of long head of biceps tendon with  thinning particularly at the bicipital pulley, possibly representing  partial tearing.    GLENOHUMERAL JOINT  Joint fluid: Small joint effusion. There is leakage of joint fluid  along the medial aspect of the humerus. Associated tearing of the  inferior glenohumeral ligaments and axillary pouch without bony  change.    Cartilage and subarticular bone:  Limited evaluation without evidence  of high-grade chondral loss. No evidence of bony Bankart lesion.    Labrum: Limited assessment on this study with relative lack of joint  distention shows diminutive appearance of the posterior superior and  anterior superior labrum, possibly torn.    ANCILLARY FINDINGS:      Impression    Impression:  1. No evidence of occult fracture.  2. Redemonstration of mild widening of acromioclavicular joint space  with associated acromioclavicular ligament tear. Given pain marker  located over this area, finding may possibly represent acute on  chronic injury.  3. Tear of inferior glenohumeral ligaments and axillary pouch. No  associated bony edema.  4. In a background of tendinosis, moderate grade intrasubstance tear  of the supraspinatus and infraspinatus tendons at the footprint. No  associated muscle bulk loss or full thickness tear.  5. Severe tendinosis of subscapularis.  6. Tendinosis of long head of biceps tendon with thinning particularly  at the bicipital pulley, possibly representing partial tearing.  7. Diminutive appearance of the posterior superior and anterior  superior labrum, possibly torn.    LOLIS CONTRERAS[KH1.1]       Time Spent on this Encounter[KH1.2]   I, Rosario Martinez, personally saw the patient today and spent greater than 30 minutes discharging this patient.[DS1.2]      We appreciate the opportunity to care  for your patient while in the hospital.  Should you have any questions about their injuries or this discharge summary our contact information is below.    Trauma Services  [facility]   Department of Critical Care and Acute Care Surgery  [address]  [place] [address]  Office: [phone][KH1.1]       Revision History        User Key Date/Time User Provider Type Action    > DS1.1 8/24/2017  8:52 AM Rosario Martinez APRN CNP Nurse Practitioner Sign     DS1.2 8/24/2017  8:46 AM Rosario Martinez APRN CNP Nurse Practitioner      KH1.5 8/23/2017  3:37 PM Jarad Ga PA-C Physician Assistant Pend     KH1.6 8/23/2017  3:30 PM Jarad Ga PA-C Physician Assistant      KH1.4 8/23/2017  3:29 PM Jarad Ga PA-C Physician Assistant      KH1.3 8/23/2017  3:23 PM Jarad Ga PA-C Physician Assistant      KH1.2 8/23/2017  2:32 PM Jarad Ga PA-C Physician Assistant      KH1.1 8/23/2017  2:31 PM Jarad Ga PA-C Physician Assistant                      Consult Notes      Consults by Caprice Gonsales MD at 8/21/2017 10:18 AM     Author:  Caprice Gonsales MD Service:  (none) Author Type:  Physician    Filed:  8/23/2017 11:39 AM Date of Service:  8/21/2017 10:18 AM Creation Time:  8/21/2017 10:04 AM    Status:  Signed :  Caprice Gonsales MD (Physician)     Consult Orders:    1. Physical Medicine & Rehab General Adult IP Consult: Patient to be seen: Routine within 24 hrs; Call back #: [id number]; ARU assessment. Ready for DC as soon as tomorrow; Consultant may enter orders: Yes [[id number]] ordered by Immanuel Lakhani NP at 08/18/17 1043                [facility]   PM&R CONSULT    Consulting Provider: Immanuel Lakhani   Reason for Consult: Assessment of rehabilitation   Location of Patient: 6A  Date of Encounter: 8/21/2017       ASSESSMENT/PLAN:    Mr. Kelechi Zuniga Jared is a Right handed 81 year old yo male  who presents with[FI1.1] Right upper extremity weakness, Much worse than baseline prior to baseline secondary to previous stroke. He presents following a mechanical fall resulting in SAH and SDH.   Prior is admitted he was Independent with his mobility and ADL's.  He concurs to recent weight loss, which may be indicated of poor functioning premorbid. Though he had a stroke back in 1997 he states  that he did not have any weakness secondary to the stroke, that limited his function.  Some mild right sided weakness specifically in the upper extremity is endorsed.   During my encounter he has decreased meditation, he is able to answer questions correctly but for the sleep doing the conversation. He has flaccid RUE.   I question the ability of this gentleman to return to his previous evel of function. At present, he has RUE weakness, but the lethargy, suspect deconditioning could easily creep in. Due to the lethargy, he is not a candidate for an ARU setting.   Recommend TCU on discharge unless there is significant improvement in his mentation and he has 24 hrs support on discharge.[FI1.2]       HPI:[FI1.1]    Kelechi Coleman[FI1.3] is a[FI1.2] 82yo male with history of CVA, A. fib, who had a fall[FI1.1],[FI1.2] presenting with episodes of arm shaking[FI1.1] on[FI1.2] 8/18/2017[FI1.4]    He was noted to have[FI1.2] Right arm and right leg[FI1.1] s[FI1.2]haking: worrisome for partial seizures . Patient[FI1.1] was[FI1.2] already on Keppra 1000 mg twice a day.[FI1.1] Seen by[FI1.2] Neurolog[FI1.1]y and started on[FI1.2] EEG[FI1.1]. He is currently on[FI1.2] TRileptal 150 mg BID[FI1.1] and[FI1.2] Keppra[FI1.1]  Seen by[FI1.2] speech pathology[FI1.1] in[FI1.2] consult due to mild dysarthria[FI1.1]    Diagnosed with[FI1.2] SAH and L SDH, abrasion on L temple, L elbow and R knee. H[FI1.1]e has been[FI1.2] brianna[FI1.1]cardi at[FI1.2] 54-57, on room air, afebrile, other VSS.[FI1.1] Continues to be[FI1.2] lethargic and  garbled speech    EEG   No electrographic seizures and no interictal epileptiform abnormalities were observed.  8/20 MRI right shoulder shows tearing of ligaments/tendons.[FI1.1]  Required transfers of[FI1.2] Ax2 with lift[FI1.1]  On[FI1.2] Reg diet.[FI1.1]   He has been[FI1.2] Incont.[FI1.1] o[FI1.2]f urine x 2 since being lethargic after Ativan, brief in place, otherwise was able to use urinal prior to Ativan.    PREVIOUS LEVEL OF FUNCTION[FI1.1]   He was independent with basic mobility and basic ADL's prior to admit.   He was independent with grocery shopping and basic adl's. Denies any recurrent falls.   He has  Cleaning once a week.   One fall a year back.[FI1.2]       CURRENT FUNCTION   PT[FI1.1]  In PT sessions, he is[FI1.2] requiring assist x 2 to stand from chair.  Needed mod - max assist to maintain standing balance due to R lean.  Unable to move feet in standing today.  Required assist x 2 to perform standing pivot transfer chair > bed.  Appears to have motor planning deficits which significantly limit mobility.  Patient requiring assist x 2 to maintain balance with wide ALICIA, flexed knees and difficulty moving feet despite  OT  Pt reporting more fatigue today. No RUE movement today, decreased from last session, but pt does seem to wax and wane. R-sided tremors at rest and during activity. Increased difficulty standing, needing mod Ax2 initially, and unable to perform stepping to get to chair, needing lift for transfer this session. Difficulty performing simple L/R stepping sequence while seated EOB.       SOCIAL HISTORY/Home Setting/Support:[FI1.1]  Lives alone. Endorses a sister in [place] for support.  Lives in a town house with many steps.[FI1.2]      Social History     Social History     Marital status: Single     Spouse name: N/A     Number of children: N/A     Years of education: N/A     Social History Main Topics     Smoking status: Former Smoker     Years: 40.00     Types: Pipe     Quit date:  12/18/1998     Smokeless tobacco: Never Used     Alcohol use 0.0 oz/week     0 Standard drinks or equivalent per week      Comment: 3 hard liquor drinks daily     Drug use: No     Sexual activity: Not Currently     Partners: Female     Other Topics Concern     Parent/Sibling W/ Cabg, Mi Or Angioplasty Before 65f 55m? No     Caffeine Concern Yes     decaff only      Stress Concern No     Special Diet Yes     low sodium      Exercise Yes     walking, rehab 3 times a week      Seat Belt Yes     Social History Narrative                   Past Medical History:  Past Medical History:   Diagnosis Date     Acute exacerbation of CHF (congestive heart failure) (H) 9/2/2016     Atrial fibrillation (H)      Cerebral infarction (H) 12/13/2013, 9/2016     Diagnosis updated by automated process. Provider to review and confirm.     Coronary artery disease      Diabetes (H)      Gastroesophageal reflux disease with esophagitis 5/8/2017     Hyperlipidaemia      Hypertension      Myocardial infarction (H)      PAD (peripheral artery disease) (H)      Stented coronary artery     CABG 2002 4 VESSEL     Type 2 diabetes mellitus with stage 3 chronic kidney disease, with long-term current use of insulin (H) 11/4/2016     Unspecified cerebral artery occlusion with cerebral infarction            Current Medications:  Current Facility-Administered Medications   Medication     [Rx hold ] levETIRAcetam (KEPPRA) tablet 1,000 mg     senna-docusate (SENOKOT-S;PERICOLACE) 8.6-50 MG per tablet 1-2 tablet     polyethylene glycol (MIRALAX/GLYCOLAX) Packet 17 g     OXcarbazepine (TRILEPTAL) tablet 150 mg     amLODIPine (NORVASC) tablet 10 mg     atorvastatin (LIPITOR) tablet 40 mg     carvedilol (COREG) tablet 12.5 mg     isosorbide mononitrate (IMDUR) 24 hr tablet 120 mg     pantoprazole (PROTONIX) EC tablet 40 mg     spironolactone (ALDACTONE) tablet 50 mg     naloxone (NARCAN) injection 0.1-0.4 mg     acetaminophen (TYLENOL) tablet 650 mg    Or      acetaminophen (TYLENOL) solution 650 mg     ondansetron (ZOFRAN-ODT) ODT tab 4 mg    Or     ondansetron (ZOFRAN) injection 4 mg     prochlorperazine (COMPAZINE) injection 5 mg    Or     prochlorperazine (COMPAZINE) tablet 5 mg    Or     prochlorperazine (COMPAZINE) Suppository 12.5 mg     senna-docusate (SENOKOT-S;PERICOLACE) 8.6-50 MG per tablet 1-2 tablet     polyethylene glycol (MIRALAX/GLYCOLAX) Packet 17 g     bisacodyl (DULCOLAX) Suppository 10 mg     glucose 40 % gel 15-30 g    Or     dextrose 50 % injection 25-50 mL    Or     glucagon injection 1 mg     labetalol (NORMODYNE/TRANDATE) injection 20 mg     calcium carb 1250 mg (500 mg [place])/vitamin D 200 units (OSCAL with D) per tablet 1 tablet     potassium chloride SA (K-DUR/KLOR-CON M) CR tablet 20-40 mEq     potassium chloride (KLOR-CON) Packet 20-40 mEq     potassium chloride 10 mEq in 100 mL intermittent infusion     potassium chloride 10 mEq in 100 mL intermittent infusion with 10 mg lidocaine     potassium chloride 20 mEq in 50 mL intermittent infusion     magnesium sulfate 2 g in NS intermittent infusion (PharMEDium or FV Cmpd)     magnesium sulfate 4 g in 100 mL sterile water (premade)     sodium phosphate 10 mmol in D5W intermittent infusion     sodium phosphate 15 mmol in D5W intermittent infusion     sodium phosphate 20 mmol in D5W intermittent infusion     sodium phosphate 25 mmol in D5W intermittent infusion     mupirocin (BACTROBAN) 2 % ointment     torsemide (DEMADEX) tablet 10 mg     hydrALAZINE (APRESOLINE) injection 10 mg     insulin aspart (NovoLOG) inj (RAPID ACTING)     insulin aspart (NovoLOG) inj (RAPID ACTING)     hydrALAZINE (APRESOLINE) tablet 50 mg         Review of Systems:  Total of ten systems reviewed, pertinent positives and negatives as follows  Instability with standing and walking.[FI1.1]  RUE WEAKNESS[FI1.2]     Feels generally fatigued  Denies any tingling or numbness  No problems with bladder[FI1.1] prior but incontinent  "now[FI1.2]   No chest pain. No cough or SOB.  No visual changes.   No headache or photophobia.   No nausea, or abdominal pain.  Slept poorly last night  No joint pain, muscle pain or swelling.  Remainder of the review of the systems was negative.          Labs   Lab Results   Component Value Date    WBC 8.2 08/21/2017    HGB 9.9 (L) 08/21/2017    HCT 30.5 (L) 08/21/2017    MCV 90 08/21/2017     (L) 08/21/2017     Lab Results   Component Value Date     08/21/2017    POTASSIUM 4.4 08/21/2017    CHLORIDE 108 08/21/2017    CO2 19 (L) 08/21/2017     (H) 08/21/2017     Lab Results   Component Value Date    GFRESTIMATED 48 (L) 08/21/2017    GFRESTBLACK 58 (L) 08/21/2017     Lab Results   Component Value Date    AST 22 08/17/2017    ALT 22 08/17/2017    ALKPHOS 259 (H) 08/17/2017    BILITOTAL 1.3 08/17/2017     Lab Results   Component Value Date    INR 1.32 (H) 08/18/2017     Lab Results   Component Value Date    BUN 40 (H) 08/21/2017    CR 1.42 (H) 08/21/2017         ON EXAMINATION:  Vitals:    08/20/17 2329 08/21/17 0500 08/21/17 0718 08/21/17 0831   BP: 114/62 112/47 135/68 128/73   BP Location:       Pulse: 57 54 55    Resp: 16 16 16    Temp: 96.4  F (35.8  C) 96.3  F (35.7  C) 96  F (35.6  C)    TempSrc: Axillary Axillary Axillary    SpO2: 94% 92% 92%    Weight:       Height:           Physical Exam:  Blood pressure 128/73, pulse 55, temperature 96  F (35.6  C), temperature source Axillary, resp. rate 16, height 1.88 m (6' 2\"), weight 83.1 kg (183 lb 3.2 oz), SpO2 92 %.    GEN: NAD, lying comfortably in bed[FI1.1]  He is very lethargic, falls asleep while in conversation, however remarkable ability to tell his story[FI1.2]   He is appropriate, cooperative  Speech is[FI1.1] dysarthric[FI1.2]  Comprehension is[FI1.1] intermittent[FI1.2]   MSK:[FI1.1]   Mild[FI1.2]  muscle atrophy noted  ABD: soft, non tender, pos BS  NEURO:   CRANIAL NERVES: Discs flat. Pupils equal, round and reactive to light. "  Extraocular movements full. Visual fields full. Face moves symmetrically.  Tongue midline. Hearing intact to finger rubbing.    Strength:[FI1.1] RUE flaccid, RLE has antigravity strength. No episodes of jerking was noted during  my encounter.[FI1.2]    Cognition: fund of knowledge and train of thought appropriate  SKIN:[FI1.1] ecchymosis of the LUE[FI1.2]   EXT:[FI1.1] mild[FI1.2] edema bilaterally, chronic stasis changes, no ulcers.         Thank you for the consult. Please call for any questions. Pager number [phone]     Caprice Gonsales       Total of 70 min spent in this encounter, more than 50% in counseling and coordination.[FI1.1]      Revision History        User Key Date/Time User Provider Type Action    > FI1.4 8/23/2017 11:39 AM Caprice Gonsales MD Physician Sign     FI1.3 8/23/2017 11:28 AM Caprice Gonsales MD Physician      FI1.2 8/23/2017 11:17 AM Caprice Gonsales MD Physician      FI1.1 8/21/2017 10:04 AM Caprice Gonsales MD Physician             Consults by Shanda Sullivan MD at 8/21/2017  9:29 AM     Author:  Shanda Sullivan MD Service:  Orthopedics Author Type:  Resident    Filed:  8/21/2017 12:10 PM Date of Service:  8/21/2017  9:29 AM Creation Time:  8/21/2017  9:29 AM    Status:  Attested :  Shanda Sullivan MD (Resident)    Cosigner:  Surinder Elam MD at 8/23/2017 10:25 AM        Attestation signed by Surinder Elam MD at 8/23/2017 10:25 AM        Attestation:  Resident only. Plan mine.                               [facility]  ORTHOPAEDIC SURGERY CONSULT - HISTORY AND PHYSICAL    DATE OF CONSULT: 8/21/2017 9:29 AM    REQUESTING PROVIDER: Josefa Rodarte MD, MD - [facility] Staff.    CC: R shoulder pain    DATE OF INJURY: 8/17/17    HISTORY OF PRESENT ILLNESS:   The orthopaedic surgery service was consulted by Jacob Lutzie Kayla, MD for evaluation and treatment recommendations of R shoulder pain.    Kelechi Zuniga  Jared is a very pleasant 81 year old right-hand dominant male[KW1.1] on plavix[KW1.2] with a history of CAD, MI, PAD, HTN, hyperlipidemia, atrial fibrillation, DMII, cerebral infarction, and CKD here s/p mechanical fall down 6 steps 8/17. During fall patient hit head but denies LOC. Subsequent imaging with evidence of subdural hematoma and SAH. Patient also complained of R shoulder pain. Imaging[KW1.1] (XR and MRI)[KW1.2] without evidence of fractures but with AC joint widening and rotator cuff partial-thickness tears/tendinosis.    Today patient denies R shoulder pain. Denies pain elsewhere. Complains of difficulty with R hand movement and coordination. Denies numbness or tingling. Denies fever, chills, nausea, vomiting, chest pain, SOB. No prior surgery to RUE or problems with this extremity.[KW1.1] Was functional with RUE before fall.[KW1.2]      PAST MEDICAL HISTORY:   Past Medical History:   Diagnosis Date     Acute exacerbation of CHF (congestive heart failure) (H) 9/2/2016     Atrial fibrillation (H)      Cerebral infarction (H) 12/13/2013, 9/2016     Diagnosis updated by automated process. Provider to review and confirm.     Coronary artery disease      Diabetes (H)      Gastroesophageal reflux disease with esophagitis 5/8/2017     Hyperlipidaemia      Hypertension      Myocardial infarction (H)      PAD (peripheral artery disease) (H)      Stented coronary artery     CABG 2002 4 VESSEL     Type 2 diabetes mellitus with stage 3 chronic kidney disease, with long-term current use of insulin (H) 11/4/2016     Unspecified cerebral artery occlusion with cerebral infarction      Patient denies any personal history of bleeding disorders, clotting disorders, or adverse reactions to anesthesia.    PAST SURGICAL HISTORY:    Past Surgical History:   Procedure Laterality Date     C MRA UPPER EXTREMITY WO&W CONT       CORONARY ARTERY BYPASS  2002    LIMA-LAD, SVG-OM1, [id number]     ENT SURGERY      VOCAL CORD  LESION REMOVED     GENITOURINARY SURGERY      ANGIOPLASTY FOR RENAL ARTERT STENOSIS     HC LEFT HEART CATHETERIZATION  9/7/2016 09/07/2016: CHUCHO x2 to distal and proximal SVG to to RPDA     HEART CATH LEFT HEART CATH  11/30/2001     PHACOEMULSIFICATION CLEAR CORNEA WITH STANDARD INTRAOCULAR LENS IMPLANT Left 1/22/2015    Procedure: PHACOEMULSIFICATION CLEAR CORNEA WITH STANDARD INTRAOCULAR LENS IMPLANT;  Surgeon: Bart Johnson MD;  Location: [facility]     VASCULAR SURGERY      ANGIOPLASTY LEFT LEG FOR pvd       MEDICATIONS:   Prior to Admission medications    Medication Sig Last Dose Taking? Auth Provider   carvedilol (COREG) 25 MG tablet Take 0.5 tablets (12.5 mg) by mouth every morning 8/17/2017 at AM Yes Etienne Gee MD   LANTUS SOLOSTAR 100 UNIT/ML soln ADMINISTER 35 TO 40 UNITS UNDER THE SKIN AT BEDTIME  Patient taking differently: ADMINISTERS 20 TO 35 UNITS UNDER THE SKIN AT BEDTIME DEPENDING ON BG READING 8/16/2017 at PM Yes Etienne Gee MD   pantoprazole (PROTONIX) 40 MG EC tablet TAKE 1 TABLET BY MOUTH EVERY DAY  Patient taking differently: TAKE 1 TABLET BY MOUTH EVERY EVENING 8/16/2017 at PM Yes Etienne Gee MD   torsemide (DEMADEX) 10 MG tablet Take 1 tablet (10 mg) by mouth daily 8/17/2017 at AM Yes Isaiah Charles MD   amLODIPine (NORVASC) 10 MG tablet Take 1 tablet (10 mg) by mouth daily 8/17/2017 at AM Yes Isaiah Charles MD   atorvastatin (LIPITOR) 40 MG tablet Take 1 tablet (40 mg) by mouth daily 8/17/2017 at AM Yes Isaiah Charles MD   clopidogrel (PLAVIX) 75 MG tablet Take 1 tablet (75 mg) by mouth daily 8/17/2017 at AM Yes Isaiah Charles MD   hydrALAZINE (APRESOLINE) 50 MG tablet Take 1 tablet (50 mg) by mouth 3 times daily  Patient taking differently: Take 50 mg by mouth 2 times daily  8/17/2017 at AM Yes Isaiah Charles MD   isosorbide mononitrate (IMDUR) 60 MG 24 hr tablet Take 2 tablets (120 mg) by mouth daily 8/17/2017 at AM Yes Isaiah Charles MD   spironolactone  (ALDACTONE) 50 MG tablet Take 1 tablet (50 mg) by mouth daily 8/17/2017 at AM Yes Isaiah Charles MD   ASPIRIN NOT PRESCRIBED (INTENTIONAL) Please choose reason not prescribed, below  Yes Etienne Gee MD   ACCU-CHEK MARY PLUS test strip TEST THREE TIMES DAILY  Yes Etienne Gee MD   B-D U/F 31G X 8 MM insulin pen needle   Yes Reported, Patient   Multiple Vitamins-Minerals (CENTRUM SILVER) per tablet Take 1 tablet by mouth daily. 8/17/2017 at AM Yes Reported, Patient   Calcium Carb-Cholecalciferol (CALTRATE 600+D) 600-800 MG-UNIT TABS Take 1 tablet by mouth daily. 8/17/2017 at AM Yes Reported, Patient       ALLERGIES:   Aspirin; Penicillins; and Contrast dye    SOCIAL HISTORY:   Social History     Social History     Marital status: Single     Spouse name: N/A     Number of children: N/A     Years of education: N/A     Occupational History     Not on file.     Social History Main Topics     Smoking status: Former Smoker     Years: 40.00     Types: Pipe     Quit date: 12/18/1998     Smokeless tobacco: Never Used     Alcohol use 0.0 oz/week     0 Standard drinks or equivalent per week      Comment: 3 hard liquor drinks daily     Drug use: No     Sexual activity: Not Currently     Partners: Female     Other Topics Concern     Parent/Sibling W/ Cabg, Mi Or Angioplasty Before 65f 55m? No     Caffeine Concern Yes     decaff only      Stress Concern No     Special Diet Yes     low sodium      Exercise Yes     walking, rehab 3 times a week      Seat Belt Yes     Social History Narrative     Living situation: Patient lives in a[KW1.1] townhouse by himself.[KW1.2]  Tobacco:[KW1.1] quit in 1998[KW1.2]  Alcohol:[KW1.1] Drinks socially[KW1.2]    FAMILY HISTORY:  Family History   Problem Relation Age of Onset     DIABETES Mother      Family History Negative Father      CANCER Maternal Grandmother        Patient denies known family history of bleeding, clotting, or anesthesia related complications.     REVIEW OF SYSTEMS:    10-point reviews of systems was negative except as noted above in the HPI.     PHYSICAL EXAM:   Vitals:    08/20/17 2329 08/21/17 0500 08/21/17 0718 08/21/17 0831   BP: 114/62 112/47 135/68 128/73   BP Location:       Pulse: 57 54 55    Resp: 16 16 16    Temp: 96.4  F (35.8  C) 96.3  F (35.7  C) 96  F (35.6  C)    TempSrc: Axillary Axillary Axillary    SpO2: 94% 92% 92%    Weight:       Height:         General: Awake,[KW1.1] alert and oriented x3, but voice a little muffled and groggy[KW1.2]  Lungs: Breathing comfortably and nonlabored, no wheezes or stridor noted.  Heart/Cardiovascular: Regular pulse, no peripheral cyanosis.  Abdomen: Soft, non-tender, non-distended.   Pelvis: Stable to AP and Lateral compression, non-tender.  Right Upper Extremity:[KW1.1] Scattered small hematomas on arm. Skin intact.[KW1.2] No significant tenderness to palpation over clavicle, AC joint, shoulder, arm, elbow, forearm, wrist.[KW1.1] Unable to move shoulder joint. Able to flex elbow but spastic movements. Pincer-like movement between thumb and index finger when patient prompted to move fingers. Unable to give thumbs up, 'A-okay' sign, or make fist on command. Motion in hand very uncoordinated.[KW1.2] SILT ax/m/r/u nerve distributions. Radial pulse palpable, 2+.   Left Upper Extremity:[KW1.1] Abrasion on left elbow[KW1.2]. No significant tenderness to palpation over clavicle, AC joint, shoulder, arm, elbow, forearm, wrist. Normal ROM shoulder, elbow, wrist without pain. Motor intact distally with finger flexion/extension/intrinsics/EPL, OK sign 5/5 strength. SILT ax/m/r/u nerve distributions. Radial pulse palpable, 2+.   Right Lower Extremity:[KW1.1] Small R knee abrasion[KW1.2]. No significant tenderness to palpation over thigh, knee, leg, ankle/foot. Motor intact distally TA/GSC/EHL/FHL with 5/5 strength. SILT sp/dp/tibial/saph/sural nerves. DP/PT pulses palpable, 2+, toes warm and well perfused.   Left Lower Extremity: No  deformity, skin intact. No significant tenderness to palpation over thigh, knee, leg, ankle/foot. Motor intact distally TA/GSC/EHL/FHL with 5/5 strength. SILT sp/dp/tibial/saph/sural nerves. DP/PT pulses palpable, 2+, toes warm and well perfused.     LABS:  Hemoglobin   Date Value Ref Range Status   08/21/2017 9.9 (L) 13.3 - 17.7 g/dL Final   08/20/2017 9.1 (L) 13.3 - 17.7 g/dL Final     WBC   Date Value Ref Range Status   08/21/2017 8.2 4.0 - 11.0 10e9/L Final     Platelet Count   Date Value Ref Range Status   08/21/2017 134 (L) 150 - 450 10e9/L Final     INR   Date Value Ref Range Status   08/18/2017 1.32 (H) 0.86 - 1.14 Final     Creatinine   Date Value Ref Range Status   08/21/2017 1.42 (H) 0.66 - 1.25 mg/dL Final     Glucose   Date Value Ref Range Status   08/21/2017 154 (H) 70 - 99 mg/dL Final       IMAGING:[KW1.1]  Reviewed - no fractures. AC joint space widening. Rotator cuff partial tears/tendinopathy.[KW1.2]    IMPRESSION:   Kelechi Coleman is a 81 year old[KW1.1] R[KW1.2]-hand dominant male status-post[KW1.1] fall on 8/17 with increased widening at R AC joint and tendinopathy of the rotator cuff. AC joint widening could be a chronic finding that[KW1.2] may[KW1.3] have been exacerbated with the fall. The tendinopathy and soft tissue wear are common findings given patient's age. With no acute fracture and all patient's comorbidities, there is no urgent/emergent indication for surgical intervention.[KW1.2]    RECOMMENDATIONS:   - Weight bearing:[KW1.1] WBAT RUE[KW1.2]  -[KW1.1] Recommend continued PT/OT work for strength and control of RUE  - Can f/u PRN to ortho clinic[KW1.2]    Assessment and Plan discussed with [KW1.1] Marialuisa[KW1.2], Orthopaedic Surgery.     Shanda Sullivan MD 8/21/2017 9:29 AM  Orthopaedic Surgery Resident, PGY-1  Pager: [phone]    For questions about this patient, please contact me at my pager.[KW1.1]       Revision History        User Key Date/Time User Provider Type  Action    > KW1.3 8/21/2017 12:10 PM Shanda Sullivan MD Resident Sign     KW1.2 8/21/2017  9:44 AM Shanda Sullivan MD Resident      KW1.1 8/21/2017  9:29 AM Shanda Sullivan MD Resident             Consults by Roel Hall MD at 8/18/2017  2:33 AM     Author:  Roel Hall MD Service:  Neurosurgery Author Type:  Resident    Filed:  8/19/2017 12:28 AM Date of Service:  8/18/2017  2:33 AM Creation Time:  8/18/2017  2:33 AM    Status:  Attested :  Roel Hall MD (Resident)    Cosigner:  Lucas Santiago MD at 8/22/2017  2:01 PM        Attestation signed by Lucas Santiago MD at 8/22/2017  2:01 PM        Attestation:  Physician Attestation   I, Lucas Santiago, saw this patient with the resident and agree with the resident s findings and plan of care as documented in the resident s note.      I personally reviewed vital signs and imaging.    Key findings: Traumatic contusion with neurologic deficit. Stable.    Lucas Santiago  Date of Service (when I saw the patient): 8/18/17                               [facility]       NEUROSURGERY CONSULTATION NOTE    This consultation was requested[BL1.1] by Dr. Mason[BL1.2] from the EM service.    Reason for Consultation: fall    HPI:  Mr Coleman is an 81 year old male on Plavix who experienced a mechanical fall down approximately 6 stairs. No LOC. Noted bleeding from his head, but no new neurological deficits at the time. Taken to [facility] for evaluation and found to have a traumatic left fronto-parietal subarachnoid hemorrhage and left frontal subdural hematoma. While at [facility], the patient began to complain of tingling in his right arm. Experiences tinging in the RUE at baseline following a stroke, but this was worse. He then noted weakness in that same extremity. He was loaded with Keppra and was then transferred to [facility] for further cares. En route, paramedics noted twitching in the  RUE and RLE, no change in mentation, episodes resolved without intervention.    In the [facility] ED, the patient denies headache, nausea, vomiting. Apart from RUE tingling and weakness, denies other symptoms.[BL1.1]    Time Paged/Notified: 2:07AM  Trauma Activation: Yes - White  Arrival time in ED: 2:09AM  GCS: E 4 M 6 V 5 = 15[BL1.3]        PAST MEDICAL HISTORY:   Past Medical History:   Diagnosis Date     Acute exacerbation of CHF (congestive heart failure) (H) 9/2/2016     Atrial fibrillation (H)      Cerebral infarction (H) 12/13/2013, 9/2016     Diagnosis updated by automated process. Provider to review and confirm.     Coronary artery disease      Diabetes (H)      Gastroesophageal reflux disease with esophagitis 5/8/2017     Hyperlipidaemia      Hypertension      Myocardial infarction (H)      PAD (peripheral artery disease) (H)      Stented coronary artery     CABG 2002 4 VESSEL     Type 2 diabetes mellitus with stage 3 chronic kidney disease, with long-term current use of insulin (H) 11/4/2016     Unspecified cerebral artery occlusion with cerebral infarction        PAST SURGICAL HISTORY:   Past Surgical History:   Procedure Laterality Date     C MRA UPPER EXTREMITY WO&W CONT       CORONARY ARTERY BYPASS  2002    LIMA-LAD, SVG-OM1, [id number]     ENT SURGERY      VOCAL CORD LESION REMOVED     GENITOURINARY SURGERY      ANGIOPLASTY FOR RENAL ARTERT STENOSIS     HC LEFT HEART CATHETERIZATION  9/7/2016 09/07/2016: CHUCHO x2 to distal and proximal SVG to to RPDA     HEART CATH LEFT HEART CATH  11/30/2001     PHACOEMULSIFICATION CLEAR CORNEA WITH STANDARD INTRAOCULAR LENS IMPLANT Left 1/22/2015    Procedure: PHACOEMULSIFICATION CLEAR CORNEA WITH STANDARD INTRAOCULAR LENS IMPLANT;  Surgeon: Bart Johnson MD;  Location: [facility]     VASCULAR SURGERY      ANGIOPLASTY LEFT LEG FOR pvd       FAMILY HISTORY:   Family History   Problem Relation Age of Onset     DIABETES Mother      Family History Negative Father       CANCER Maternal Grandmother        SOCIAL HISTORY:   Social History   Substance Use Topics     Smoking status: Former Smoker     Years: 40.00     Types: Pipe     Quit date: 12/18/1998     Smokeless tobacco: Never Used     Alcohol use 0.0 oz/week     0 Standard drinks or equivalent per week      Comment: 3 hard liquor drinks daily       MEDICATIONS:  Current Outpatient Prescriptions   Medication Sig Dispense Refill     carvedilol (COREG) 25 MG tablet Take 0.5 tablets (12.5 mg) by mouth every morning 180 tablet 3     LANTUS SOLOSTAR 100 UNIT/ML soln ADMINISTER 35 TO 40 UNITS UNDER THE SKIN AT BEDTIME 30 mL 0     pantoprazole (PROTONIX) 40 MG EC tablet TAKE 1 TABLET BY MOUTH EVERY DAY 90 tablet 2     torsemide (DEMADEX) 10 MG tablet Take 1 tablet (10 mg) by mouth daily 90 tablet 3     amLODIPine (NORVASC) 10 MG tablet Take 1 tablet (10 mg) by mouth daily 90 tablet 4     atorvastatin (LIPITOR) 40 MG tablet Take 1 tablet (40 mg) by mouth daily 90 tablet 3     clopidogrel (PLAVIX) 75 MG tablet Take 1 tablet (75 mg) by mouth daily 90 tablet 3     hydrALAZINE (APRESOLINE) 50 MG tablet Take 1 tablet (50 mg) by mouth 3 times daily 270 tablet 3     isosorbide mononitrate (IMDUR) 60 MG 24 hr tablet Take 2 tablets (120 mg) by mouth daily 180 tablet 3     spironolactone (ALDACTONE) 50 MG tablet Take 1 tablet (50 mg) by mouth daily 90 tablet 3     ASPIRIN NOT PRESCRIBED (INTENTIONAL) Please choose reason not prescribed, below 0 each 0     ACCU-CHEK MARY PLUS test strip TEST THREE TIMES DAILY 300 strip 1     B-D U/F 31G X 8 MM insulin pen needle        Multiple Vitamins-Minerals (CENTRUM SILVER) per tablet Take 1 tablet by mouth daily.       Calcium Carb-Cholecalciferol (CALTRATE 600+D) 600-800 MG-UNIT TABS Take 1 tablet by mouth daily.         Allergies:  Allergies   Allergen Reactions     Aspirin Hives     Penicillins Hives     Contrast Dye Hives         ROS: 10 point ROS of systems including Constitutional, Eyes, Respiratory,  "Cardiovascular, Gastroenterology, Genitourinary, Integumentary, Muscularskeletal, Psychiatric were all negative except for pertinent positives noted in my HPI.      PE:  Blood pressure 121/69, pulse 70, resp. rate 16, height 1.88 m (6' 2\"), weight 83.9 kg (185 lb), SpO2 95 %.    -- Left occipital laceration w/ pressure bandage in place.    NEUROLOGIC:  -- Awake; Alert; oriented x 3  -- Follows commands briskly  -- +repetition and naming  -- Speech fluent, spontaneous. No aphasia or dysarthria.  -- no gaze preference. No apparent hemineglect.  Cranial Nerves:  -- visual fields full to confrontation, PERRL 3-2mm bilat and brisk, extraocular movements intact  -- face symmetrical, tongue midline  -- sensory V1-V3 intact bilaterally  -- palate elevates symmetrically, uvula midline  -- hearing grossly intact bilat  -- Right Trapezii 2/5, left 5/5  -- Cerebellar: intact rapid alternating motions bilaterally    Motor:  Normal bulk / tone; no tremor, rigidity, or bradykinesia.  No muscle wasting or fasciculations  No Pronator Drift, but not able to fully participate with RUE     Delt Bi Tri FE IP Quad Hamst TibAnt EHL Gastroc    C5 C6 C7 C8/T1 L2 L3 L4-S1 L4 L5 S1   R 1 5 5 5 5 5 5 5 5 5   L 5 5 5 5 5 5 5 5 5 5     Sensory:   intact to LT, mildly decreased sensation in RUE compared to left, no dermatomal pattern    Reflexes:     Bi Tri BR Guillermina Pat Ach Bab    C5-6 C7-8 C6 UMN L2-4 S1 UMN   R 2+ 2+ 2+ Norm 2+ 2+ Norm   L 2+ 2+ 2+ Norm 2+ 2+ Norm     Gait: Deferred       ASSESSMENT:  81 year old male on Plavix presenting following mechanical fall down stairs, with right shoulder and trapezius weakness, found to have left fronto-parietal SAH tracking along central sulcus and left frontal SDH measuring 7mm thick w/o midline shift or hydrocephalus. No emergent need for Neurosurgerical intervention as his exam can be followed. Will continue to follow closely.       RECOMMENDATIONS:  - No neurosurgical intervention indicated at this " time   - Requires ICU level observation at this time  - Repeat head CT w/o contrast in 6 hours from the time of first acquisition (5:30AM)  - Avoid sedating medications that would alter a neurological examination  - SBPs < 140  - No ongoing need for Keppra  - Hold Plavix  - Normonatremia  - Occipital laceration cares per SICU team[BL1.1]    Addendum:  - Following further discussion this morning, a short course of Keppra is being recommended. 1g BID for 1 week.[BL1.4]      Roel Hall MD  Neurosurgery PGY2      The patient was discussed with Dr. Lopez, neurosurgery chief resident, and he agrees with the above.[BL1.1]         Revision History        User Key Date/Time User Provider Type Action    > BL1.3 8/19/2017 12:28 AM Roel Hall MD Resident Sign     BL1.4 8/18/2017  5:09 PM Roel Hall MD Resident Sign     BL1.2 8/18/2017  3:03 AM Roel Hall MD Resident Sign     BL1.1 8/18/2017  2:56 AM Roel Hall MD Resident Sign            Consults by Josh Medellin APRN CNP at 8/21/2017 11:10 AM     Author:  Josh Medellin APRN CNP Service:  Palliative Author Type:  Nurse Practitioner    Filed:  8/21/2017  9:45 PM Date of Service:  8/21/2017 11:10 AM Creation Time:  8/21/2017  4:53 PM    Status:  Signed :  Josh Medellin APRN CNP (Nurse Practitioner)     Consult Orders:    1. Palliative Care ADULT: Patient to be seen: Routine within 24 hrs; Call back #: [id number]; Routine trauma consult; Consultant may enter orders: Yes [[id number]] ordered by Immanuel Lakhani NP at 08/20/17 1106                [facility]  Palliative Care Consultation         Kelechi Coleman MRN# [id number]   Age: 81 year old YOB: 1935   Date of Admission: 8/18/2017    Reason for consult: Goals of care  Decisional support  Patient and family support       Requesting physician/service: Trauma Service       Recommendations        Pt seen and examined. No family  "present. Pt with some somnolence. Answers questions coherently[MF1.1].  REC: continue present pain mgt.   - will likely need rehab stay and additional assistance at home   - will have discussion about code status and goals of care implications[MF1.2]       POLST- was full code- should be addressed prior to discharge     Disease Process/es & Symptoms[MF1.1]     Fall- SAH and SDH. R arm injury.  ETOH use- ? Influence on fall  CAD-   Prior CVA with R arm weakness- Question soft tissue vs ligament injury.[MF1.2]      Support/Coping   (We typically ask each of our patients the following questions:)    How do you make sense of this? Fell on stairs- it was stupidity  Are you Muslim? Spiritual? Not so much? Not asked  Are you at peace? N'A  What are your concerns, medical and non-medical, that are not being addressed? N/A  Who are your \"go-to\" people when you need support?  in [place]    Plan for psychosocial/spiritual support/follow-up from palliative team:will continue to assess needs     Decision-Making & Goals of Care     Discussion/counseling today about prognosis/goals of care/decisions:   He is too somnolent to discuss discharge plans in detail  Patient has a completed health care directive available in the chart (Y)  Health care agent (only if patient has an available, complete HCD):   Physician orders for life-sustaining treatment (POLST) form is not completed  Code Status: Full code   Patient has decision-making capacity (Y)- prior    Coordination of Care     Findings & plan of care discussed with: PM&R and primary trauma team.   Follow-up plan from palliative team: willl continue to follow  Thank you for involving us in the patient's care.     Josh LUCAS NP  Nurse Practitioner- Lead Advanced Practice Provider  [facility] Palliative Medicine Consult Service   [phone]    TT spent: 50 minutes of which 35 minutes were spent in direct face to face contact with patient/family. Greater than 50% " of time spent counseling and/or coordinating care.             Chief Complaint     Fall with SAH and SDH with concern for ligamentous injury of dominant R arm.         History of Present Illness     History obtained from: Pt interview and EPIC[MF1.1]  81 y.o community dwelling independent male with history of CAD and CVA's (R arm weakness residual since 97') on antiplatelet therapy, admitted following a fall on stairs at home. ED evaluation noted SAH and SDH with question of R arm/shoulder injury- no significant immediate neuro deficits. There has subsequently been concern for partial seizures- loaded with keppra and trileptal, continues with frequent neuro exams, BP control and functional rehab. Palliative care consulted for trauma impact and goals of care. Holding plavix. No significant respiratory issues. Hemodynamically stable.[MF1.2]             Past Medical History:[MF1.1]   Past Medical History:   Diagnosis Date     Acute exacerbation of CHF (congestive heart failure) (H) 9/2/2016     Atrial fibrillation (H)      Cerebral infarction (H) 12/13/2013, 9/2016     Diagnosis updated by automated process. Provider to review and confirm.     Coronary artery disease      Diabetes (H)      Gastroesophageal reflux disease with esophagitis 5/8/2017     Hyperlipidaemia      Hypertension      Myocardial infarction (H)      PAD (peripheral artery disease) (H)      Stented coronary artery     CABG 2002 4 VESSEL     Type 2 diabetes mellitus with stage 3 chronic kidney disease, with long-term current use of insulin (H) 11/4/2016     Unspecified cerebral artery occlusion with cerebral infarction[MF1.3]               Past Surgical History:[MF1.1]   Past Surgical History:   Procedure Laterality Date     C MRA UPPER EXTREMITY WO&W CONT       CORONARY ARTERY BYPASS  2002    LIMA-LAD, SVG-OM1, [id number]     ENT SURGERY      VOCAL CORD LESION REMOVED     GENITOURINARY SURGERY      ANGIOPLASTY FOR RENAL ARTERT STENOSIS     HC LEFT  "HEART CATHETERIZATION  9/7/2016 09/07/2016: CHUCHO x2 to distal and proximal SVG to to RPDA     HEART CATH LEFT HEART CATH  11/30/2001     PHACOEMULSIFICATION CLEAR CORNEA WITH STANDARD INTRAOCULAR LENS IMPLANT Left 1/22/2015    Procedure: PHACOEMULSIFICATION CLEAR CORNEA WITH STANDARD INTRAOCULAR LENS IMPLANT;  Surgeon: Bart Johnson MD;  Location: [facility]     VASCULAR SURGERY      ANGIOPLASTY LEFT LEG FOR pvd[MF1.4]               Social/Spiritual History:     Living situation:  Lived alone in [facility]  Family system: Sister in [place], some friends- no children  Functional status (needs help with ADLs or IADLs): was IADL's  Employment/education: owned a shoe sale store  Use of community resources: None   Activities/interests: golfiing, fishing, time with friends  History of substance use/abuse: ++ ETOH- 2 drinks or more per day.             Family History:[MF1.1]   Family History   Problem Relation Age of Onset     DIABETES Mother      Family History Negative Father      CANCER Maternal Grandmother[MF1.5]      Family history reviewed in EPIC and reviewed with pt           Allergies:[MF1.1]   Allergies   Allergen Reactions     Aspirin Hives     Penicillins Hives     Contrast Dye Hives[MF1.5]              Medications:   I have reviewed this patient's medication profile and medications during this hospitalization             Review of Systems:   The comprehensive review of systems is negative other than noted here and in the HPI.    Palliative Symptom Review (0=no symptom/no concern, 1=mild, 2=moderate, 3=severe): based on observation and not self report except pain and GI concerns.       Pain: 0-1      Fatigue: 2-3      Nausea: 0-1      Constipation: 0-1      Diarrhea: 0      Depressive Symptoms: 0-1      Anxiety:1      Drowsiness: 2-3      Poor Appetite: 2      Shortness of Breath: 2           Physical exam: Vitals:[MF1.1] /59  Pulse 55  Temp 96.6  F (35.9  C) (Axillary)  Resp 16  Ht 1.88 m (6' 2\")  Wt " 83.1 kg (183 lb 3.2 oz)  SpO2 93%  BMI 23.52 kg/m2[MF1.6]  BMI=[MF1.1] Body mass index is 23.52 kg/(m^2).[MF1.6]  Elderly man lying in bed. Sleepy. Able to answer questions with prompting. Notes pain and weakness in R shoulder- more pronounced than before injury. NAD  HEENT: symmetrical features. NT/NC. EOMI. Somewhat slurred speech. Tongue midline. Dentition- moderate disrepair. MM dry.  Lungs: some accessory use. RR in mid 20's. No cough or wheeze. Lungs anterolaterally clear  HT:  S1S2 with RRR HR in 80's at rest. Tones distant.  ABD: soft non tender or distended. BS present/   Neuro: ongoing assessment of cranial nerves grossly intact. Moving all extremities. Weakness on right upper extremity secondary to shoulder pain.  strength less than right.   Skin tears on left arm- ecchymosis L arm/elbow  Extremities warm and well perfused[MF1.2]         Data Reviewed:     ROUTINE LABS (Last four results)  BMP  Recent Labs  Lab 08/21/17  0749 08/20/17  0302 08/19/17  0303 08/18/17  0215    137 138 137   POTASSIUM 4.4 4.0 4.0 3.8   CHLORIDE 108 107 107 104   MIGUEL 9.9 9.2 9.1 8.7   CO2 19* 18* 15* 16*   BUN 40* 45* 38* 40*   CR 1.42* 1.64* 1.40* 1.61*   * 170* 126* 163*     CBC  Recent Labs  Lab 08/21/17  0749 08/20/17  0302 08/19/17  0303 08/18/17  0215   WBC 8.2 7.6 8.8 13.4*   RBC 3.38* 3.10* 3.34* 3.53*   HGB 9.9* 9.1* 9.6* 10.2*   HCT 30.5* 27.6* 30.2* 31.8*   MCV 90 89 90 90   MCH 29.3 29.4 28.7 28.9   MCHC 32.5 33.0 31.8 32.1   RDW 16.0* 15.9* 15.7* 15.7*   * 121* 128* 130*     INR  Recent Labs  Lab 08/18/17  0215   INR 1.32*[MF1.1]              Revision History        User Key Date/Time User Provider Type Action    > MF1.3 8/21/2017  9:45 PM Josh Medellin APRN CNP Nurse Practitioner Sign     MF1.2 8/21/2017  8:55 PM Josh Medellin APRN CNP Nurse Practitioner      MF1.4 8/21/2017  5:05 PM Josh Medellin APRN CNP Nurse Practitioner      MF1.5 8/21/2017  4:56 PM Josh Medellin APRN  CNP Nurse Practitioner      MF1.6 8/21/2017  4:54 PM Josh Medellin APRN CNP Nurse Practitioner      MF1.1 8/21/2017  4:53 PM Josh Medellin APRN CNP Nurse Practitioner             Consults by Brice Linares MD at 8/20/2017  3:07 PM     Author:  Brice Linares MD Service:  Neurology Author Type:  Physician    Filed:  8/20/2017  4:20 PM Date of Service:  8/20/2017  3:07 PM Creation Time:  8/20/2017  3:06 PM    Status:  Signed :  Brice Linares MD (Physician)     Consult Orders:    1. Neurology General Adult IP Consult: Patient to be seen: Routine within 24 hrs; Call back #: [id number]; New onset tremors after fall, Has SAH, SDH. Tremors start in RUE which there is residual wekaness.; Consultant may enter orders: Yes [[id number]] ordered by Immanuel Lakhani NP at 08/20/17 1025                Neurology Consultation    Kelechi Efrain Jared    81 year old       Reason for consult: R arm and leg jerking          HPI:   81 years old man with personal history of CVA on plavix, A. fib had a mechanical fall 2 days ago. Since his fall he has been noticing jerking and his right arm. Those episodes are brief, usually seconds. Whe the episodes are a little bit longer, he starts to have a jerking on his right leg. Since yesterday his right arm, that was already a little bit weaker than his left, is impossible to move. He thinks that his right leg is also a little bit weaker. During the episodes of jerking he can't move his arm, although he tried. He denies any change in his speech, confusion alteration in his vision. He denies headache, shortness of breath, chest pain. He had some tingling in the first episode.    On investigation of his motor episodes, a CT head as done, that showed multiple subarachnoid hemorrhages. He was put on Keppra thousand milligrams twice a day since 8/18. He is also being followed by neurosurgery    IT is unclear why he is not on anticoagulation  for his atrial fibrillation, Since he has history of CVA. While in the hospital  He is not on a. Fib.   .   He refers that this was his first fall, he does not have history of frequent falls. Physical therapist that is followin Mr Coleman since admission refers that he is now needing more assistance to walk            Past Medical History:[EH1.1]     Past Medical History:   Diagnosis Date     Acute exacerbation of CHF (congestive heart failure) (H) 9/2/2016     Atrial fibrillation (H)      Cerebral infarction (H) 12/13/2013, 9/2016     Diagnosis updated by automated process. Provider to review and confirm.     Coronary artery disease      Diabetes (H)      Gastroesophageal reflux disease with esophagitis 5/8/2017     Hyperlipidaemia      Hypertension      Myocardial infarction (H)      PAD (peripheral artery disease) (H)      Stented coronary artery     CABG 2002 4 VESSEL     Type 2 diabetes mellitus with stage 3 chronic kidney disease, with long-term current use of insulin (H) 11/4/2016     Unspecified cerebral artery occlusion with cerebral infarction[EH1.2]               Past Surgical History:[EH1.1]     Past Surgical History:   Procedure Laterality Date     C MRA UPPER EXTREMITY WO&W CONT       CORONARY ARTERY BYPASS  2002    LIMA-LAD, SVG-OM1, MRI-CROV-ERZR     ENT SURGERY      VOCAL CORD LESION REMOVED     GENITOURINARY SURGERY      ANGIOPLASTY FOR RENAL ARTERT STENOSIS     HC LEFT HEART CATHETERIZATION  9/7/2016 09/07/2016: CHUCHO x2 to distal and proximal SVG to to RPDA     HEART CATH LEFT HEART CATH  11/30/2001     PHACOEMULSIFICATION CLEAR CORNEA WITH STANDARD INTRAOCULAR LENS IMPLANT Left 1/22/2015    Procedure: PHACOEMULSIFICATION CLEAR CORNEA WITH STANDARD INTRAOCULAR LENS IMPLANT;  Surgeon: Bart Johnson MD;  Location: [facility]     VASCULAR SURGERY      ANGIOPLASTY LEFT LEG FOR pvd[EH1.2]              Social History:[EH1.1]     Social History   Substance Use Topics     Smoking status: Former Smoker      Years: 40.00     Types: Pipe     Quit date: 12/18/1998     Smokeless tobacco: Never Used     Alcohol use 0.0 oz/week     0 Standard drinks or equivalent per week      Comment: 3 hard liquor drinks daily[EH1.2]              Family History:[EH1.1]     Family History   Problem Relation Age of Onset     DIABETES Mother      Family History Negative Father      CANCER Maternal Grandmother[EH1.2]               Allergies:[EH1.1]     Allergies   Allergen Reactions     Aspirin Hives     Penicillins Hives     Contrast Dye Hives[EH1.2]             Medications:[EH1.1]     Current Facility-Administered Medications   Medication     lactated ringers injection     amLODIPine (NORVASC) tablet 10 mg     atorvastatin (LIPITOR) tablet 40 mg     carvedilol (COREG) tablet 12.5 mg     isosorbide mononitrate (IMDUR) 24 hr tablet 120 mg     pantoprazole (PROTONIX) EC tablet 40 mg     spironolactone (ALDACTONE) tablet 50 mg     naloxone (NARCAN) injection 0.1-0.4 mg     acetaminophen (TYLENOL) tablet 650 mg    Or     acetaminophen (TYLENOL) solution 650 mg     ondansetron (ZOFRAN-ODT) ODT tab 4 mg    Or     ondansetron (ZOFRAN) injection 4 mg     prochlorperazine (COMPAZINE) injection 5 mg    Or     prochlorperazine (COMPAZINE) tablet 5 mg    Or     prochlorperazine (COMPAZINE) Suppository 12.5 mg     senna-docusate (SENOKOT-S;PERICOLACE) 8.6-50 MG per tablet 1-2 tablet     polyethylene glycol (MIRALAX/GLYCOLAX) Packet 17 g     bisacodyl (DULCOLAX) Suppository 10 mg     glucose 40 % gel 15-30 g    Or     dextrose 50 % injection 25-50 mL    Or     glucagon injection 1 mg     labetalol (NORMODYNE/TRANDATE) injection 20 mg     calcium carb 1250 mg (500 mg [place])/vitamin D 200 units (OSCAL with D) per tablet 1 tablet     levETIRAcetam (KEPPRA) tablet 1,000 mg     potassium chloride SA (K-DUR/KLOR-CON M) CR tablet 20-40 mEq     potassium chloride (KLOR-CON) Packet 20-40 mEq     potassium chloride 10 mEq in 100 mL intermittent infusion      "potassium chloride 10 mEq in 100 mL intermittent infusion with 10 mg lidocaine     potassium chloride 20 mEq in 50 mL intermittent infusion     magnesium sulfate 2 g in NS intermittent infusion ([facility] or [facility])     magnesium sulfate 4 g in 100 mL sterile water (premade)     sodium phosphate 10 mmol in D5W intermittent infusion     sodium phosphate 15 mmol in D5W intermittent infusion     sodium phosphate 20 mmol in D5W intermittent infusion     sodium phosphate 25 mmol in D5W intermittent infusion     mupirocin (BACTROBAN) 2 % ointment     torsemide (DEMADEX) tablet 10 mg     hydrALAZINE (APRESOLINE) injection 10 mg     insulin aspart (NovoLOG) inj (RAPID ACTING)     insulin aspart (NovoLOG) inj (RAPID ACTING)     hydrALAZINE (APRESOLINE) tablet 50 mg[EH1.2]              Review of Systems:   The Review of Systems is negative other than noted in the HPI          Physical Exam:   Vital signs:[EH1.1]  Temp: 97  F (36.1  C) Temp src: Axillary BP: 127/64   Heart Rate: 63 Resp: 18 SpO2: 95 % O2 Device: None (Room air)   Height: 188 cm (6' 2\") Weight: 83.1 kg (183 lb 3.2 oz)  Estimated body mass index is 23.52 kg/(m^2) as calculated from the following:    Height as of this encounter: 1.88 m (6' 2\").    Weight as of this encounter: 83.1 kg (183 lb 3.2 oz).[EH1.2]      Constitutional : thin and tall man seated in a chair.. His right arm is jerking and finishes after 5 seconds.   Head:  anicteric. See neuroexam  Eyes: see neuroexam  CVC: S1/S2 rhythmic.   LUng: Clear. On room air. No respiratory distress.   Adomen: soft, non tender, bowel movements heard  Extremities:  No edema. Well perfused. Hammer toes, high arches.   Psychiatry: in good mood. Collaborative  Neuroexam  Alert and oriented to place, date, self and reasons of hospitalization.  Follow commands  Face symmetric  Eyes: motility preserved, no nystagmus  Tongue centered  No tremors  No dysmetria (arms and legs tested)  Strength preserved on L arm. R arm is " flaccid, on entering on his room, it was jerking, but it spontaneously stop. Bilateral proximal weakness in legs, worse on the Right side (3/5) L side 4+/5. Extension of legs and feet movements are preserved.   Ankle reflexes are absent. R patelar > L patellar.   Sensory exam to light touch: preserved   Proprioception preserved  Vibration sensory absent on R leg. Decreased in  L toe,   No aphasia[EH1.1]  Mild[EH1.3] dysarthria           Data:[EH1.1]     Results for orders placed or performed during the hospital encounter of 08/18/17 (from the past 24 hour(s))   Glucose by meter   Result Value Ref Range    Glucose 258 (H) 70 - 99 mg/dL   Glucose by meter   Result Value Ref Range    Glucose 237 (H) 70 - 99 mg/dL   EKG 12-lead, complete   Result Value Ref Range    Interpretation ECG Click View Image link to view waveform and result    CBC with platelets   Result Value Ref Range    WBC 7.6 4.0 - 11.0 10e9/L    RBC Count 3.10 (L) 4.4 - 5.9 10e12/L    Hemoglobin 9.1 (L) 13.3 - 17.7 g/dL    Hematocrit 27.6 (L) 40.0 - 53.0 %    MCV 89 78 - 100 fl    MCH 29.4 26.5 - 33.0 pg    MCHC 33.0 31.5 - 36.5 g/dL    RDW 15.9 (H) 10.0 - 15.0 %    Platelet Count 121 (L) 150 - 450 10e9/L   Basic metabolic panel   Result Value Ref Range    Sodium 137 133 - 144 mmol/L    Potassium 4.0 3.4 - 5.3 mmol/L    Chloride 107 94 - 109 mmol/L    Carbon Dioxide 18 (L) 20 - 32 mmol/L    Anion Gap 12 3 - 14 mmol/L    Glucose 170 (H) 70 - 99 mg/dL    Urea Nitrogen 45 (H) 7 - 30 mg/dL    Creatinine 1.64 (H) 0.66 - 1.25 mg/dL    GFR Estimate 41 (L) >60 mL/min/1.7m2    GFR Estimate If Black 49 (L) >60 mL/min/1.7m2    Calcium 9.2 8.5 - 10.1 mg/dL   Magnesium   Result Value Ref Range    Magnesium 2.1 1.6 - 2.3 mg/dL   Phosphorus   Result Value Ref Range    Phosphorus 3.1 2.5 - 4.5 mg/dL   EKG 12-lead, tracing only   Result Value Ref Range    Interpretation ECG Click View Image link to view waveform and result    Glucose by meter   Result Value Ref Range     Glucose 186 (H) 70 - 99 mg/dL   Glucose by meter   Result Value Ref Range    Glucose 185 (H) 70 - 99 mg/dL[EH1.4]       CT BRain :  Subarachnoid hemorrhage over the left frontal and  parietal lobes and probable small subdural hematoma over the anterior  left frontal lobe are unchanged in extent since head CT 8/17/2017. No  new intracranial hemorrhage.    Xray right shoulder: 1. No acute osseous abnormality.  2. Widening of acromioclavicular joint space, measuring 11 mm,  previously widened but slightly increased from prior study         Assessment and Plan:[EH1.1]   82yo male[EH1.3] with history of CVA, A. fib, who had a fall 2 days ago showing multiple subarachnoid  presenting with episodes of arm shaking   Right arm and right leg  Shaking: worrisome for partial seizures . Patient is already on Keppra 1000 mg twice a day. Neurologist suggests  - EEG   - TRileptal 150 mg BID (ordered for you)  - Keppra level.[EH1.1]   -consider speech pathology consult due to mild dysarthria[EH1.3]  - consider trial ativan 2mg IV for a longer episode of R arm twitching[EH1.5]      Attestation:  Patient discussed over the phone with  Dr. Linares and Dr. Davy Lopez  PGY4 Neurology  [id number][EH1.1]    ATTENDING ADDENDUM: Pt discussed with resident Dr Lopez today, but not seen. Agree with assessment and recs as above. 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 Agree with choice of anticonvulsants, and would recommend video EEG monitoring. Patient to be seen by my colleague who will be covering the General Neurology/Consult service on 8/21/2017. Brice Linares MD[GM1.1]       Revision History        User Key Date/Time User Provider Type Action    > GM1.1 8/20/2017  4:20 PM Brice Linares MD Physician Sign     EH1.5 8/20/2017  4:00 PM Kanchan Lopez MD Resident Sign     [N/A] 8/20/2017  3:58 PM Kanchan Lopez MD Resident  Sign     EH1.3 2017  3:58 PM Kanchan Lopez MD Resident Sign     EH1.4 2017  3:45 PM Kanchan Lopez MD Resident Sign     EH1.2 2017  3:07 PM Kanchan Lopez MD Resident      EH1.1 2017  3:06 PM Kanchan Lopez MD Resident             Consults by Bernadette Christopher at 2017  4:40 PM     Author:  Bernadette Christopher Service:  Social Work Author Type:      Filed:  2017  4:40 PM Date of Service:  2017  4:40 PM Creation Time:  2017  4:36 PM    Status:  Signed :  Bernadette Christopher ()         Social Work: Assessment with Discharge Plan    Patient Name:  Kelechi Coleman  :  1935  Age:  81 year old  MRN:  [id number]  Risk/Complexity Score:   Filed Complexity Score: 5  Completed assessment with:  Patient.    Presenting Information   Reason for Referral:  ICU admission; psychosocial assessment, patient/family support and discharge planning.   Date of Intake:  2017  Referral Source:   auto case finding.  Decision Maker:  Self at baseline.   Alternate Decision Maker:  In absence of a health care directive, next of kin: Sister: Samantha Fenton ([phone]; [phone]). No ; no children; parents .  Health Care Directive:  Not on file; not discussed today.  Living Situation:  House : Alone.  Previous Functional Status:  Independent  Patient and family understanding of hospitalization:  Restorative; full recovery.  Cultural/Language/Spiritual Considerations:  None specific reported.  Adjustment to Illness:  Appropriate; no concerns noted.     Physical Health  Reason for Admission:    1. SDH (subdural hematoma) (H)    2. SAH (subarachnoid hemorrhage) (H)    3. Laceration of scalp, initial encounter    4. Abrasion, right knee, initial encounter    5. Abrasion of left elbow, initial encounter      Services Needed/Recommended:    PT/OT currently recommending ARU.   PM&R consulted and referral  made to [facility].    Mental Health/Chemical Dependency  Diagnosis:  NA  Support/Services in Place:  NA  Services Needed/Recommended:  NA    Support System  Significant relationship at present time:  Sister, Samantha who lives in [place].  Family of origin is available for support:  Yes; from afar.   Other support available:  Patient reports he has 2 friends who are local and supportive to him.   Gaps in support system:  Lives alone.  Patient is caregiver to:  JOEL     Provider Information   Primary Care Physician:  Etienne Gee   [phone]   Clinic:  [facility] [address] [id number]      :  JOEL    Financial   Income Source:  Retired.  Financial Concerns:  None specific reported.   Insurance:    Payor/Plan Subscriber Name Rel Member # Group #   MEDICARE - MEDICARE F* MARLA VÁZQUEZ*  [id number]       ATTN CLAIMS, [address]   BCBS - BCBS PLATINUM * MARLA VÁZQUEZ*  [id number]      [address] [address]       Discharge Plan   Patient and family discharge goal:  ARU.  Provided education on discharge plan:  YES  Patient agreeable to discharge plan:  YES - patient is agreeable to recommendations for ARU.  A list of Medicare Certified Facilities was provided to the patient and/or family to encourage patient choice. Patient's choices for facility are:  Patient is open to [facility].  Barriers to discharge:  Medical stability.     Discharge Recommendations   Anticipated Disposition:  Referral out to [facility].  Transportation Needs:  TBD  Name of Transportation Company and Phone:  TBD    Additional comments   Spoke with Trauma and patient may be ready to d/c as early as tomorrow, Saturday 8/19.   PT/OT consulted and recommending ARU; referral made to [facility] (patient aware and agreeable.)    Will ask weekend SW to follow for possible d/c this weekend.     ELENITA Fuller, [facility]  ICU   Pager: [phone]  Phone: [phone][SR1.1]             Revision History        User Key Date/Time User  Provider Type Action    > SR1.1 8/18/2017  4:40 PM Bernadette Christopher  Sign                     Progress Notes - Physician (Notes from 08/21/17 through 08/24/17)      Progress Notes by Aida Jeronimo BSW at 8/24/2017 10:02 AM     Author:  Aida Jeronimo BSW Service:  (none) Author Type:      Filed:  8/24/2017 10:41 AM Date of Service:  8/24/2017 10:02 AM Creation Time:  8/24/2017 10:02 AM    Status:  Signed :  Aida Jeronimo BSW ()         Social Work Services Discharge Note      Patient Name:  Kelechi Coleman     Anticipated Discharge Date:  8/24/17    Discharge Disposition:   [facility] ([phone])    Following MD:  Facility Assignment     Pre-Admission Screening (PAS) online form has been completed.  The Level of Care (LOC) is:  Determined  Confirmation Code is:  [id number].  Patient/caregiver informed of referral to [facility] for Pre-Admission Screening for skilled nursing facility (SNF) placement and to expect a phone call post discharge from SNF.     Additional Services/Equipment Arranged:  Confirmed acceptance to [facility] with Sonali Admissions Coordinator.  Confirmed readiness for discharge with Rosario Martinez (Trauma). Arranged for [employer] ([facility] [phone]) to provide private pay w/c transport at 11am.     Patient / Family response to discharge plan:  Pt voices understanding of the discharge plan and agreement with the discharge plan.     Persons notified of above discharge plan:  Pt, 6A nursing and Rosario Martinez (Trauma).  Left a message for pt's sister (Samantha) to call (2  Messages left on home voice mail and 1 messaged left on cell phone).    Staff Discharge Instructions:  Please fax discharge orders and signed hard scripts for any controlled substances (SW completed this task).  Please print a packet and send with patient.     CTS Handoff completed:  YES    Medicare Notice of Rights provided  to the patient/family:[TK1.1]  YES          SOL Valles  Social Work, 6A  Phone:  [phone]  Pager:  [phone]  8/24/2017[TK1.2]           Revision History        User Key Date/Time User Provider Type Action    > TK1.2 8/24/2017 10:41 AM Aida Jeronimo BSW  Sign     TK1.1 8/24/2017 10:02 AM Aida Jeronimo BSW              Progress Notes by Aida Jeronimo BSW at 8/23/2017  4:18 PM     Author:  Aida Jeronimo BSW Service:  (none) Author Type:      Filed:  8/24/2017  8:31 AM Date of Service:  8/23/2017  4:18 PM Creation Time:  8/23/2017  4:18 PM    Status:  Signed :  Aida Jeronimo BSW ()         Social Work Services Progress Note    Hospital Day: 6  Date of Initial Social Work Evaluation:[TK1.1]  8/18/17[TK1.2]  Collaborated with:[TK1.1]  Pt, SNF Admissions Coordinators, Trauma[TK1.2]     Data:[TK1.1]  Per Trauma, pt is medically ready for discharge.[TK1.2]    Intervention:[TK1.1]  Spoke with the Admissions Coordinators at [facility] (Sonali) and [facility] (Lisa).  Both facility's have assessed and approved pt for admit.   Met with pt to provide an update.  Pt selects [facility] as his first choice.[TK1.2]    Assessment:[TK1.1]  Pt  Is agreeable to rehab stay.[TK1.2]    Plan:    Anticipated Disposition:[TK1.1] Rehab placement at [facility][TK1.2]    Barriers to d/c plan:[TK1.1]  None identified[TK1.2]    Follow Up:[TK1.1]  SW will coordinate discharge.      SOL Valles  Social Work, 6A  Phone:  [phone]  Pager:  [phone]  8/24/2017[TK1.2]           Revision History        User Key Date/Time User Provider Type Action    > TK1.2 8/24/2017  8:31 AM Aida Jeronimo BSW  Sign     TK1.1 8/23/2017  4:18 PM Aida Jeronimo BSW              Progress Notes by Aida Jeronimo BSW at 8/23/2017 12:30 PM     Author:  Aida Jeronimo BSW  Service:  (none) Author Type:      Filed:  8/23/2017  2:23 PM Date of Service:  8/23/2017 12:30 PM Creation Time:  8/23/2017 12:30 PM    Status:  Signed :  Aida Jeronimo BSW ()         Social Work Services Progress Note[TK1.1]    Hospital Day: 6[TK1.2]  Date of Initial Social Work Evaluation:  8/18/17  Collaborated with:  Patient    Data:[TK1.1]  Per Ban Gonzalez, Trauma, discharge is anticipated today.   Pt was evaluated by PMR and a TCU stay is recommended.[TK1.3]      Intervention:[TK1.1]  Met with pt to discuss TCU placement and readiness for discharge.  Pt is agreeable to a short term rehab stay.  Pt wants placement near to home but adds that he does not want to be referred to [facility].  Pt states that he wants a well rated facility.  Reviewed list with pt.  Pt has[TK1.3] requested referrals to [facility] (pt states that he attends [facility]), [facility] and [facility].  SW phoned these facilities and referred pt to the Admissions Coordinators. SW faxed assessment materials to these facilities.[TK1.4]      Assessment:[TK1.1]  Pt is agreeable to rehab placement.[TK1.4]      Plan:    Anticipated Disposition:[TK1.1] Rehab placement in a community SNF[TK1.4]    Barriers to d/c plan:[TK1.1]  None identified at this time.[TK1.4]    Follow Up:[TK1.1]  SW will continue to follow for discharge planning.    SOL Valles  Social Work, 6A  Phone:  [phone]  Pager:  [phone]  8/23/2017[TK1.4]           Revision History        User Key Date/Time User Provider Type Action    > TK1.4 8/23/2017  2:23 PM Aida Jeronimo BSW  Sign     TK1.3 8/23/2017 12:32 PM Aida Jeronimo BSW       TK1.2 8/23/2017 12:31 PM Aida Jeronimo BSW       TK1.1 8/23/2017 12:30 PM Aida Jeronimo BSW              ED Notes signed by Flory, Non-Provider at 8/23/2017  8:08 AM      Author:  Flory  Non-Provider Service:  (none) Author Type:  (none)    Filed:  8/23/2017  8:08 AM Date of Service:  8/22/2017  8:34 PM Creation Time:  8/23/2017  8:08 AM    Status:  Signed :  Scan, Non-Provider     Scan on 8/23/2017  8:08 AM by Scan, Non-Provider : [employer] [facility]          Revision History        User Key Date/Time User Provider Type Action    > [N/A] 8/23/2017  8:08 AM Scan, Non-Provider (none) Sign            Progress Notes by Norris Garland MD at 8/21/2017  4:27 PM     Author:  Norris Garland MD Service:  Neurology Author Type:  Resident    Filed:  8/21/2017  4:35 PM Date of Service:  8/21/2017  4:27 PM Creation Time:  8/21/2017  4:27 PM    Status:  Attested :  Norris Garland MD (Resident)    Cosigner:  Mio Smith MD at 8/22/2017  5:59 PM        Attestation signed by Mio Smith MD at 8/22/2017  5:59 PM        I have seen and examined him on 8/21 and agree with note.NO further seizures seen right arm plegic. CT scan reviewed. jeison                               Brief neurology update note    Patient seen and examined this morning. Patient reports continued slow but steady improvement in overall status.  Does appear to be nearing baseline. No outward signs of seizures overnight. Exam today mildly improved from the weekend.  No new focal deficits noted. Patient is now on keppra and Trileptal which which was recently started. EEG noted no new seizure activity this afternoon. 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 Following assessment of his history and physical,especially his history of CVA and A. Fib, we also believe limb shaking TIA to be in the differential; would thus recommend a CTA head and neck. Also would order p.r.n. Ativan for any new evidence of focal seizures.    -Ativan 2 mg p.r.n. For new outward signs of seizure  -CTA head and neck when able to evaluate status  of patient's carotids and thus the possibility of limb shaking TIA to be in the differential    Neurology will continue to follow and reassess tomorrow in the a.m.    Patient was seen  And discussed with attending neurologist Dr. Olivas feel who agrees with my assessment and plan.    Norris Garland MD  Neurology PGY3  P: [id number][RA1.1]     Revision History        User Key Date/Time User Provider Type Action    > RA1.1 8/21/2017  4:35 PM Norris Garland MD Resident Sign            Progress Notes by Norirs Garland MD at 8/22/2017  4:15 PM     Author:  Norris Garland MD Service:  Neurology Author Type:  Resident    Filed:  8/22/2017  4:18 PM Date of Service:  8/22/2017  4:15 PM Creation Time:  8/22/2017  4:15 PM    Status:  Attested :  Norris Garland MD (Resident)    Cosigner:  Mio Smith MD at 8/22/2017  5:12 PM        Attestation signed by Mio Smith MD at 8/22/2017  5:12 PM        I have seen and examined her  On 8/22 and agree with note.right arm is plegic. No more seizures on EEG.to discontinue. MFiol                               Brief Neurology Update Note:    Patient seen and examined this morning. Continued improvement in mental status and ability to follow complex commands. No new seizures overnight and thus we ordered EEG discontinued. No new focal deficits on examination. We were previously considering CTA due to possibility of limb shaking TIA though noted now that patient has a somewhat undefined, but per discussion with him, a likely contrast allergy. Thus, will not pursue this investigation at this time. Given his size, we did discuss with primary team that ativan 1mg prn for outward signs of seizures would be acceptable. No new recommendations at this time.     Patient seen and discussed with attending neurologist Dr. Mio Smith.    Norris Garland MD  Neurology PGY3  P: [id number][RA1.1]     Revision History        User Key Date/Time User Provider Type Action    >  "RA1.1 8/22/2017  4:18 PM Norris Garland MD Resident Sign            Progress Notes by Sonali Ocampo, RN at 8/22/2017 11:30 AM     Author:  Sonali Ocampo RN Service:  Trauma Author Type:  Registered Nurse    Filed:  8/22/2017  3:43 PM Date of Service:  8/22/2017 11:30 AM Creation Time:  8/22/2017  3:42 PM    Status:  Signed :  Sonali Ocampo RN (Registered Nurse)         TRN Rounding: Have stopped twice this AM to see pt and address any questions regarding fall prevention and brain injury instructions. Pt has been sleeping both times and was not disturbed.[JS1.1]     Revision History        User Key Date/Time User Provider Type Action    > JS1.1 8/22/2017  3:43 PM Sonali Ocampo RN Registered Nurse Sign            Progress Notes by Josh Medellin APRN CNP at 8/22/2017  2:56 PM     Author:  Josh Medellin APRN CNP Service:  Palliative Author Type:  Nurse Practitioner    Filed:  8/22/2017  3:16 PM Date of Service:  8/22/2017  2:56 PM Creation Time:  8/22/2017  2:56 PM    Status:  Signed :  Johs Medellin APRN CNP (Nurse Practitioner)         [facility]   Palliative [facility] Daily Progress Note          Recommendations, Patient/Family Counseling & Coordination     Pt seen and examined. No family present. Pt with slightly less somnolence. Answers questions coherently. Denies pain/discomfort at rest.   REC: continue present pain mgt.   - will likely need rehab stay and additional assistance at home   - will address code status and goals of care implications when he is consistently more awake.  - notes 25-30 # weight loss \"without trying\" in past several months. Thinks it is \"leveling out\" now. Occ incontinence. Question etiology.   - remote chance of ETOH withdrawal needs to be considered given\"daily\" use. No sx at present. Monitor       POLST - not completed     Thank you for the opportunity to continue to participate in the care of this patient and " "family.  Please feel free to contact on-call palliative provider with any emergent needs.  We can be reached via team pager [phone] (answered 8-4:30 Monday-Friday); after-hours answering service ([phone])  Josh Medellin, NP    Josh LUCAS NP  Nurse Practitioner- Lead Advanced Practice Provider  [facility] Palliative Medicine Consult Service   [phone]    TT spent: 30 minutes of which 20 minutes were spent in direct face to face contact with patient/family. Greater than 50% of time spent counseling and/or coordinating care.         Assessment      1) Diagnoses & symptoms:        Fall- SAH and SDH. R arm injury.  ETOH use- ? Influence on fall  CAD-   Prior CVA with R arm weakness- Question rotator tendon/ ligament injury. Quite limited function compared to baseline     2)  Psychosocial/Spiritual Needs:   Ongoing:Will ask LICSW to follow  New: No        Is new assessment/intervention required by palliative team?:  No         Interval History:   Was seen initially with pt sleeping soundly and not rousable to light tactile stim. Later in am he is more alert. Pleased EEG monitoring leads have been removed. Has discomforts and weakness that limit mobility. No shortness of breath. Downplays concern about swallow and cough.   When asked about is ETOH use he tells me he is \"cutting down,\" denies any concern about his 2-3 drinks per day and no hx of DUI.. .etc.  Open to TCU placement as needed with stated goal to return to prior independence.            Review of Systems:   Palliative Symptom Review (0=no symptom/no concern, 1=mild, 2=moderate, 3=severe):      Pain: 1      Fatigue: 1-2      Nausea: 0-1      Constipation: 0      Diarrhea: 0      Depressive Symptoms: 0      Anxiety: 0      Drowsiness: 2      Poor Appetite: 1-2      Shortness of Breath: 0-1      Insomnia: 0               Medications:   I have reviewed this patient's medication profile and medications given in past 24 hours.        {   " "Physical exam: Vitals: /59 (BP Location: Right arm)  Pulse 55  Temp 95.1  F (35.1  C) (Oral)  Resp 16  Ht 1.88 m (6' 2\")  Wt 83.1 kg (183 lb 3.2 oz)  SpO2 95%  BMI 23.52 kg/m2  BMI= Body mass index is 23.52 kg/(m^2).   Elderly man lying in bed. Sleepy. Able to answer questions with prompting. Notes pain and weakness in R shoulder- more pronounced than before injury. NAD  HEENT: symmetrical features. NT/NC. EOMI. Somewhat slurred speech. Tongue midline. Dentition- moderate disrepair. MM dry.  Lungs: Some accessory use. RR in mid 20's. No cough or wheeze. Lungs anterolaterally clear  HT:  S1 S2 with grade 2/6 NENA consistent with A/S.  RRR HR in 70's at rest.   Abd: soft non tender or distended. BS present/   Neuro: ongoing assessment of cranial nerves grossly intact. Moving all extremities except 1+-2/5 weakness right upper extremity- dominant arm.  strength less on right.   Skin tears on left arm- ecchymosis L arm/elbow  Extremities warm and well perfused               Data Reviewed:   ROUTINE LABS (Last four results)  BMP  Recent Labs  Lab 08/22/17  0806 08/21/17  0749 08/20/17  0302 08/19/17  0303    138 137 138   POTASSIUM 4.3 4.4 4.0 4.0   CHLORIDE 110* 108 107 107   MIGUEL 9.5 9.9 9.2 9.1   CO2 18* 19* 18* 15*   BUN 37* 40* 45* 38*   CR 1.27* 1.42* 1.64* 1.40*   * 154* 170* 126*     CBC  Recent Labs  Lab 08/22/17  0806 08/21/17  0749 08/20/17  0302 08/19/17  0303   WBC 7.5 8.2 7.6 8.8   RBC 3.42* 3.38* 3.10* 3.34*   HGB 9.9* 9.9* 9.1* 9.6*   HCT 31.2* 30.5* 27.6* 30.2*   MCV 91 90 89 90   MCH 28.9 29.3 29.4 28.7   MCHC 31.7 32.5 33.0 31.8   RDW 16.1* 16.0* 15.9* 15.7*   * 134* 121* 128*     INR  Recent Labs  Lab 08/18/17  0215   INR 1.32*[MF1.1]                Revision History        User Key Date/Time User Provider Type Action    > MF1.1 8/22/2017  3:16 PM Josh Medellin, APRN CNP Nurse Practitioner Sign            Progress Notes by Jillian Linares, SLP at 8/21/2017 " 12:41 PM     Author:  Jillian Linares SLP Service:  (none) Author Type:  Speech Therapist    Filed:  8/21/2017 12:42 PM Date of Service:  8/21/2017 12:41 PM Creation Time:  8/21/2017 12:41 PM    Status:  Signed :  Jillian Linares SLP (Speech Therapist)            08/21/17 1215   General Information   Onset Date 08/18/17   Start of Care Date 08/21/17   Referring Physician Ban Gonzalez CNP   Patient Profile Review/OT: Additional Occupational Profile Info See Profile for full history and prior level of function   Patient/Family Goals Statement Patient did not state.   Swallowing Evaluation Bedside swallow evaluation   Behaviorial Observations Lethargic;Initiation problems  (right arm twitching x1 lasting about 15 seconds; RN notified)   Mode of current nutrition NPO   Respiratory Status Room air   Comments Kelechi Coleman is a very pleasant 81 year old right-hand dominant male on plavix with a history of CAD, MI, PAD, HTN, hyperlipidemia, atrial fibrillation, DMII, cerebral infarction, and CKD here s/p mechanical fall down 6 steps 8/17. During fall patient hit head but denies LOC. Subsequent imaging with evidence of subdural hematoma and SAH. 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 HCT revealed SAH left frontal and parietal lobes and probable small SDH anterior left frontal lobe. CXR revealed left retrocardiac opacity and hazy left basilar opacities. Patient had been on regular diet but made NPO today due to difficulty swallowing pills and breakfast today. Patient more lethargic with garbled speech.    Clinical Swallow Evaluation   Dentition (dentition in poor repair)   Mucosal Quality dry   Mandibular Strength and Mobility impaired   Oral Labial Strength and Mobility impaired retraction;impaired pursing;impaired coordination   Lingual Strength and Mobility impaired protrusion;impaired left lateral movement;impaired right lateral movement;impaired  coordination   Velar Elevation (not able to visualize fully)   Laryngeal Function Cough;Throat clear;Swallow;Voicing initiated  (weak cough/throat clear effort; hoarse weak vocal quality)   Clinical Swallow Eval: Thin Liquid Texture Trial   Mode of Presentation, Thin Liquids cup;spoon;self-fed;fed by clinician   Volume of Liquid or Food Presented 2 ice chips; 2 sips water by spoon; 1 sip water by cup   Oral Phase of Swallow Poor AP movement;Premature pharyngeal entry  (suspected)   Pharyngeal Phase of Swallow repeated swallows;wet vocal quality after swallow;throat clearing   Diagnostic Statement High aspiration risk   Clinical Swallow Eval: Nectar Thick Liquid Texture Trial   Mode of Presentation, Nectar spoon;fed by clinician   Volume of Nectar Presented 2 sips   Oral Phase, Nectar Poor AP movement;Premature pharyngeal entry  (suspected)   Pharyngeal Phase, Nectar repeated swallows;wet vocal quality after swallow;throat clearing   Diagnostic Statement High risk for aspiration   Clinical Swallow Eval: Puree Solid Texture Trial   Mode of Presentation, Puree spoon;fed by clinician   Volume of Puree Presented 3 small bites applesauce   Oral Phase, Puree Poor AP movement   Pharyngeal Phase, Puree repeated swallows   Diagnostic Statement Risk for aspiration   Swallow Eval: Clinical Impressions   Skilled Criteria for Therapy Intervention Skilled criteria met.  Treatment indicated.   Functional Assessment Scale (FAS) 2   Treatment Diagnosis moderate-severe oropharyngeal dysphagia   Diet texture recommendations NPO   Demonstrates Need for Referral to Another Service (involved)   Therapy Frequency daily   Predicted Duration of Therapy Intervention (days/wks) 2 weeks   Anticipated Discharge Disposition inpatient rehabilitation facility   Risks and Benefits of Treatment have been explained. Yes   Patient, family and/or staff in agreement with Plan of Care Yes   Clinical Impression Comments Patient presents with  moderate-severe oropharyngeal dysphagia characterized by poor oral bolus control, delayed swallow initiation and signs of aspiration across consistencies. Lethargy, generalized weakness and weak hoarse vocal quality and cough/throat clear effort further compromise safe oral intake. Recommend remain NPO at this time with thorough oral cares. Patient may have oral swabs with excess moisture squeezed out for comfort.   Total Evaluation Time   Total Evaluation Time (Minutes) 20[KL1.1]        Revision History        User Key Date/Time User Provider Type Action    > KL1.1 8/21/2017 12:42 PM Jillian Linares, SLP Speech Therapist Sign            Progress Notes by Aida Jeronimo BSW at 8/21/2017  8:57 AM     Author:  Aida Jeronimo BSW Service:  (none) Author Type:      Filed:  8/21/2017  9:02 AM Date of Service:  8/21/2017  8:57 AM Creation Time:  8/21/2017  8:57 AM    Status:  Signed :  Aida Jeronimo BSW ()         Social Work Services Progress Note    Hospital Day: 4  Date of Initial Social Work Evaluation:  8/18/17  Collaborated with:  Ban Sneed (Trauma)    Data:[TK1.1]  OT and Physical Therapy are recommending ARU placement upon discharge from acute hospitalization. The PMR eval is pending.[TK1.2]    Intervention:[TK1.1]  Phoned Gertrude Mejia, to inquire about anticipated discharge date.  Per Ban, pt has a ligament tear in his shoulder and needs to be seen by Ortho.  A MRI was completed.  Per Ban, pt is not yet ready for discharge.[TK1.2]    Assessment:[TK1.1]  Pt is not ready for discharge[TK1.2]    Plan:    Anticipated Disposition:[TK1.1]  Rehab placement is anticipated[TK1.2]    Barriers to d/c plan:[TK1.1]  Await ortho consult[TK1.2]    Follow Up:[TK1.1]  SW will follow for discharge planning.    [facility] Valles  Social Work, 6A  Phone:  [phone]  Pager:  [phone]  8/21/2017[TK1.2]           Revision History        User Key Date/Time  User Provider Type Action    > TK1.2 8/21/2017  9:02 AM Aida Jeronimo BSW  Sign     TK1.1 8/21/2017  8:57 AM Aida Jeronimo BSW              Progress Notes by Brenda Ryan RN at 8/21/2017  5:39 AM     Author:  Brenda Ryan RN Service:  (none) Author Type:  Registered Nurse    Filed:  8/21/2017  6:05 AM Date of Service:  8/21/2017  5:39 AM Creation Time:  8/21/2017  5:39 AM    Status:  Signed :  Brenda Ryan RN (Registered Nurse)         Problem: Goal Outcome Summary  Goal: Goal Outcome Summary  Outcome: No Change  Hx/o of CAD and CVA, DM2, HTN . Admitted s/p after fall.  Injuries include traumatic L SAH and L SDH, abrasion on L temple, L elbow and R knee. HR brianna 54-57, on room air, afebrile, other VSS. Keep SBP <140. Still lethargic and garbled speech after Ativan given from evening shift.  A&Ox 4 when fully awake ,other Neuro s intact ex baseline RUE tingling and weakness 2/5 with minimum movement. 8/20 MRI right shoulder shows tearing of ligaments/tendons. Other extremities 4-5/5.  RUE  event of jerking, twitching noticed at 0230; VEEG leads in place and well as sz precautions.  A&Ox4 throughout event and able to complete ROAR during and after events. Increased weakness and trouble coordinating movements over the past couple days; now Ax2 with lift, T&RQ2hrs. PIV x2 SL.  Reg diet. Incont. Of urine x 2 since being lethargic after Ativan, brief in place, otherwise was able to use urinal prior to Ativan. 0200 . Cont to monitor and follow POC.[CW1.1]      Revision History        User Key Date/Time User Provider Type Action    > CW1.1 8/21/2017  6:05 AM Brenda Ryan RN Registered Nurse Sign            Progress Notes by Brenda Ryan RN at 8/21/2017  2:30 AM     Author:  Brenda Ryan RN Service:  (none) Author Type:  Registered Nurse    Filed:  8/21/2017  3:37 AM Date of Service:  8/21/2017  2:30 AM Creation Time:  8/21/2017   3:25 AM    Status:  Signed :  Brenda Ryan RN (Registered Nurse)         Pt had event. Leads intact.  Event consisted of RUE shaking. Pt A&Ox4 and able to complete ROAR through all events.  No post-ictal events. No medication given, shaking stopped on own. Seizure pads placed and suction set up. Cont to monitor.      Time/length of seizure event: 1-2 minutes  Movements (head, eyes, extremities): shaking in trunk, RUE.  Orientation during seizure: A&Ox4.  After seizure, remembers the unique phrase given during seizure: Yes  After seizure, remembers an unnamed visual object shown during seizure: Yes  Able to identify/name aloud item during seizure: Yes  Able to follow command during seizure: Yes  Able to read test sentence aloud during seizure: Yes  Able to read test sentence aloud again after the seizure: Yes  After seizure, remembers name of object shown during seizure: Yes  Orientation and level of consciousness after seizure: A&Ox4  VS and oxygen saturation during/after seizure: VSS, 02 sats in mid-high 90's Room Air  Recall of the event?: Yes  Was this a typical seizure/event?: Yes. However, only RUE movement.  Presence of aura or pre-seizure activity: No  Incontinence: No[CW1.1]     Revision History        User Key Date/Time User Provider Type Action    > CW1.1 8/21/2017  3:37 AM Brenda Ryan, RN Registered Nurse Sign                     Procedure Notes      Procedures by Steve William MD at 8/18/2017  3:07 AM     Author:  Steve William MD Service:  Trauma Author Type:  Resident    Filed:  8/18/2017  3:09 AM Date of Service:  8/18/2017  3:07 AM Creation Time:  8/18/2017  3:07 AM    Status:  Signed :  Steve William MD (Resident)         [facility] Trauma Service: Cervical-Spine Clearance     Date of Service: 8/18/2017  Admission Date/Time: 8/18/2017  2:07 AM    Clinically Cleared: Yes    Cervical-Spines Cleared  By:  Clinical exam and by imaging  Date:[KG1.1]  8/18/2017[KG1.2]  Time: 0215  By Whom: Steve William MD    CT Cervical-Spine  Date: 8/17/2017  Results: Negative    Call Trauma staff or Trauma [facility] with questions.[KG1.1]       Revision History        User Key Date/Time User Provider Type Action    > KG1.2 8/18/2017  3:09 AM Steve William MD Resident Sign     KG1.1 8/18/2017  3:07 AM Steve William MD Resident                      Progress Notes - Therapies (Notes from 08/21/17 through 08/24/17)      Progress Notes by Jillian Linares SLP at 8/21/2017 12:41 PM     Author:  Jillian Linares SLP Service:  (none) Author Type:  Speech Therapist    Filed:  8/21/2017 12:42 PM Date of Service:  8/21/2017 12:41 PM Creation Time:  8/21/2017 12:41 PM    Status:  Signed :  Jillian Linares SLP (Speech Therapist)            08/21/17 1215   General Information   Onset Date 08/18/17   Start of Care Date 08/21/17   Referring Physician Ban Gonzalez CNP   Patient Profile Review/OT: Additional Occupational Profile Info See Profile for full history and prior level of function   Patient/Family Goals Statement Patient did not state.   Swallowing Evaluation Bedside swallow evaluation   Behaviorial Observations Lethargic;Initiation problems  (right arm twitching x1 lasting about 15 seconds; RN notified)   Mode of current nutrition NPO   Respiratory Status Room air   Comments Kelechi Coleman is a very pleasant 81 year old right-hand dominant male on plavix with a history of CAD, MI, PAD, HTN, hyperlipidemia, atrial fibrillation, DMII, cerebral infarction, and CKD here s/p mechanical fall down 6 steps 8/17. During fall patient hit head but denies LOC. Subsequent imaging with evidence of subdural hematoma and SAH. 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 HCT revealed SAH left frontal and parietal lobes and probable small SDH anterior left frontal lobe. CXR revealed left retrocardiac opacity and hazy left basilar  opacities. Patient had been on regular diet but made NPO today due to difficulty swallowing pills and breakfast today. Patient more lethargic with garbled speech.    Clinical Swallow Evaluation   Dentition (dentition in poor repair)   Mucosal Quality dry   Mandibular Strength and Mobility impaired   Oral Labial Strength and Mobility impaired retraction;impaired pursing;impaired coordination   Lingual Strength and Mobility impaired protrusion;impaired left lateral movement;impaired right lateral movement;impaired coordination   Velar Elevation (not able to visualize fully)   Laryngeal Function Cough;Throat clear;Swallow;Voicing initiated  (weak cough/throat clear effort; hoarse weak vocal quality)   Clinical Swallow Eval: Thin Liquid Texture Trial   Mode of Presentation, Thin Liquids cup;spoon;self-fed;fed by clinician   Volume of Liquid or Food Presented 2 ice chips; 2 sips water by spoon; 1 sip water by cup   Oral Phase of Swallow Poor AP movement;Premature pharyngeal entry  (suspected)   Pharyngeal Phase of Swallow repeated swallows;wet vocal quality after swallow;throat clearing   Diagnostic Statement High aspiration risk   Clinical Swallow Eval: Nectar Thick Liquid Texture Trial   Mode of Presentation, Nectar spoon;fed by clinician   Volume of Nectar Presented 2 sips   Oral Phase, Nectar Poor AP movement;Premature pharyngeal entry  (suspected)   Pharyngeal Phase, Nectar repeated swallows;wet vocal quality after swallow;throat clearing   Diagnostic Statement High risk for aspiration   Clinical Swallow Eval: Puree Solid Texture Trial   Mode of Presentation, Puree spoon;fed by clinician   Volume of Puree Presented 3 small bites applesauce   Oral Phase, Puree Poor AP movement   Pharyngeal Phase, Puree repeated swallows   Diagnostic Statement Risk for aspiration   Swallow Eval: Clinical Impressions   Skilled Criteria for Therapy Intervention Skilled criteria met.  Treatment indicated.   Functional Assessment Scale  (FAS) 2   Treatment Diagnosis moderate-severe oropharyngeal dysphagia   Diet texture recommendations NPO   Demonstrates Need for Referral to Another Service (involved)   Therapy Frequency daily   Predicted Duration of Therapy Intervention (days/wks) 2 weeks   Anticipated Discharge Disposition inpatient rehabilitation facility   Risks and Benefits of Treatment have been explained. Yes   Patient, family and/or staff in agreement with Plan of Care Yes   Clinical Impression Comments Patient presents with moderate-severe oropharyngeal dysphagia characterized by poor oral bolus control, delayed swallow initiation and signs of aspiration across consistencies. Lethargy, generalized weakness and weak hoarse vocal quality and cough/throat clear effort further compromise safe oral intake. Recommend remain NPO at this time with thorough oral cares. Patient may have oral swabs with excess moisture squeezed out for comfort.   Total Evaluation Time   Total Evaluation Time (Minutes) 20[KL1.1]        Revision History        User Key Date/Time User Provider Type Action    > KL1.1 8/21/2017 12:42 PM Jillian Linares, SLP Speech Therapist Sign

## 2017-08-18 NOTE — LETTER
Transition Communication Hand-off for Care Transitions to Next Level of Care Provider    Name: Kelechi Coleman  MRN #: [id number]    Primary Care Provider: Etienne Gee   Primary Clinic: [facility] [address] [facility] [address]  [facility] [address]    Admit Date/Time: 8/18/2017  2:07 AM  Discharge Date:  8-24-17    Reason for Hospitalization:   Fall down 6 stairs; on Plavix. Date of injury:  8-171-7  Left SAH (subarachnoid hemorrhage) (H) [I60.9]  Left SDH (subdural hematoma) (H) [I62.00]  Abrasion of left elbow, initial encounter [S50.312A]  Laceration of scalp, initial encounter [S01.01XA]  Abrasion, right knee, initial encounter [S80.211A]  Other diagnoses (per Trauma progress notes):  Acute pain; acute on chronic renal failure; systolic heart failure; a-fib; diabetes type II; HTN; HLD; leukocytosis; iatrogenic coagulopathy; chronic normocytic anemia; CVA 1998    __________________________________________________________________________    Payor Source: Payor: BCBS / Plan: BCBS PLATINUM BLUE / Product Type: PPO /   Readmission Assessment Measure (KATHY) Risk Score/category:  Elevated       Reason for Communication Hand-off Referral: Multiple providers/specialties   ___________________________________________________________________________    Discharge Plan:  Discharged to:  [facility], phone: [phone]     --Follow up in Orthopaedic Clinic as needed for right shoulder tendinous   --Neurosurgery Clinic--follow up with Ms. Karen Taylor in 4 weeks with repeat CT of head ( no contrast)   --Neurology Clinic--follow up with Dr Smith in epilepsy clinic in one month after discharge     Follow-up specialty is recommended: Yes    Follow-up plan:  Future Appointments  Date Time Provider Department Center   8/24/2017 2:30 PM Tacho Iraheta, PT [facility]   8/28/2017 12:50 PM [facility]   8/28/2017 1:50 PM Erica Beach PA-C [facility]   9/5/2017 10:15 AM  [facility]   2/9/2018 11:30 AM Etienne Gee MD [facility]       Any outstanding tests or procedures:        Referrals     Future Labs/Procedures    Occupational Therapy Adult Consult     Comments:    Evaluate and treat as clinically indicated.    Reason:  Fall, subdural hematoma    Physical Therapy Adult Consult     Comments:    Evaluate and treat as clinically indicated.    Reason:  Fall, subdural hematoma    Speech Language Path Adult Consult     Comments:    Evaluate and treat as clinically indicated.    Reason:  Dysphagia, diet advancement             Next of kin is sister, Samantha Fenton ([phone]; [phone]). Samantha lives in [place].   Pt is not , no children.         Albania Clarke, RN Care Coordinator  [address], [facility]        AVS/Discharge Summary is the source of truth; this is a helpful guide for improved communication of patient story

## 2017-08-18 NOTE — PROGRESS NOTES
Brief Neurosurgery Note:     Patient seen with staff.   Repeat scan stable.   OK for q4 hour neurochecks.     Neurosurgery signing off at this time.     Contact the neurosurgery resident on call with questions.    Dial * * *[phone]: Enter [phone] when prompted.   Virgil Mcpherson MD, PhD  Neurosurgery PGY-3

## 2017-08-18 NOTE — PLAN OF CARE
Problem: Individualization  Goal: Patient Preferences  Outcome: Improving  D: Pt alert and oriented x4, neuros intact per baseline with RUE weakness and tingling. Denies pain. Voiding spontaneously in moderate amounts. Lung sounds remain clear.  I: Assisted up to chair x 1 today,  sat up for 3 hrs. IV saline locked after magnesium supplementation, pt has not had and increase in po intake as of yet. BG checked q 4 hr, one unit of insulin given this shift. Pt instructed in use of incentive spirometer, deep breathes to 1200ml. A: Pt continues to deny pain. P: Neuros q 4 hr per 6A orders. awaiting bed on 6A. Continue plan of care.

## 2017-08-18 NOTE — ED NOTES
Pt is transfer from [facility] to see Trauma with c/o fall down 6 stairs. Trauma white activated. MD at bedside on arrival. See trauma paperwork for more details.

## 2017-08-18 NOTE — PROGRESS NOTES
08/18/17 1200   Quick Adds   Type of Visit Initial PT Evaluation       Present no   Living Environment   Lives With alone   Living Arrangements house   Home Accessibility stairs to enter home;stairs within home   Number of Stairs to Enter Home 1  (to enter through garage)   Number of Stairs Within Home 7  (split level, 7+7, one rail)   Transportation Available car;family or friend will provide   Living Environment Comment per OT has friend that can help at home   Self-Care   Dominant Hand right   Usual Activity Tolerance moderate   Current Activity Tolerance fair   Regular Exercise no   Equipment Currently Used at Home none   Functional Level Prior   Ambulation 0-->independent   Transferring 0-->independent   Toileting 0-->independent   Bathing 0-->independent   Dressing 0-->independent   Eating 0-->independent   Communication 0-->understands/communicates without difficulty   Swallowing 0-->swallows foods/liquids without difficulty   Cognition 0 - no cognition issues reported   Fall history within last six months yes   Number of times patient has fallen within last six months 1   Prior Functional Level Comment independent with all functional mobility and ADLs   General Information   Onset of Illness/Injury or Date of Surgery - Date 08/18/17   Referring Physician Lucas Christopher MD   Patient/Family Goals Statement return home   Pertinent History of Current Problem (include personal factors and/or comorbidities that impact the POC) Kelechi Coleman is a 81 year old male 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 Noted bleeding from head following fall but no new neurological deficitis. Denies any LOC. Had 2 drinks today and has 2+drinks daily. Has noted R shoulder pain and RUE weakness which he has had intermittently from his strokes. It is worse than  baseline and has some weakeness in RUE. He was found to have a L SDH and L SAH at OSH. He was loaded with Keppra, had the left parietal laceration repaired.   Cognitive Status Examination   Orientation orientation to person, place and time   Level of Consciousness lethargic/somnolent   Follows Commands and Answers Questions 100% of the time   Personal Safety and Judgment intact   Pain Assessment   Patient Currently in Pain Yes, see Vital Sign flowsheet   Posture    Posture Forward head position;Protracted shoulders;Kyphosis   Range of Motion (ROM)   ROM Comment B LE grossly WNL   Strength   Strength Comments B LE grossly 5/5   Bed Mobility   Bed Mobility Comments sit > supine min assist   Transfer Skills   Transfer Comments sit > stand mod assist from chair   Gait   Gait Comments a few steps in room with mod assist   Balance   Balance Comments needs min assist to maintain standing balance   Sensory Examination   Sensory Perception no deficits were identified   Coordination   Coordination Comments decreased coordination R UE , LE not formally assessed   General Therapy Interventions   Planned Therapy Interventions balance training;bed mobility training;gait training;transfer training;neuromuscular re-education;progressive activity/exercise   Clinical Impression   Criteria for Skilled Therapeutic Intervention yes, treatment indicated   PT Diagnosis impaired functional mobility s/p SDH/SAH   Influenced by the following impairments decreased strength, decreas   Functional limitations due to impairments impaired bed mobility,impaired transfers, impaired gait   Clinical Presentation Evolving/Changing   Clinical Presentation Rationale comorbidities   Clinical Decision Making (Complexity) Moderate complexity   Therapy Frequency` daily   Predicted Duration of Therapy Intervention (days/wks) 1 week   Anticipated Discharge Disposition Acute Rehabilitation Facility   Risk & Benefits of therapy have been explained Yes   Patient,  "Family & other staff in agreement with plan of care Yes   [facility] AM-PAC  \"6 Clicks\" V.2 Basic Mobility Inpatient Short Form   1. Turning from your back to your side while in a flat bed without using bedrails? 3 - A Little   2. Moving from lying on your back to sitting on the side of a flat bed without using bedrails? 3 - A Little   3. Moving to and from a bed to a chair (including a wheelchair)? 2 - A Lot   4. Standing up from a chair using your arms (e.g., wheelchair, or bedside chair)? 2 - A Lot   5. To walk in hospital room? 2 - A Lot   6. Climbing 3-5 steps with a railing? 1 - Total   Basic Mobility Raw Score (Score out of 24.Lower scores equate to lower levels of function) 13   Total Evaluation Time   Total Evaluation Time (Minutes) 10     "

## 2017-08-18 NOTE — PROGRESS NOTES
[facility] [place]    Trauma Service Tertiary Survey     Date of Service: 08/18/2017    Trauma mechanism:Fall down 6 stairs on plavix  Time/date of injury: 8/17/2017  Known Injuries:  1. Left Subarachnoid Hemorrhage  2. Left Subdural Hematoma  3. Left temporal/parietal scalp abrasion   4. Left elbow abrasion  5. Right knee abrasion   Other diagnoses:   1. Acute pain   2. Acute on chronic renal failure  3. Systolic heart failure   4. A.fib   5. DM Type II  6. HTN  7. HLD  8. Leukocytosis   9. Iatrogenic coagulopathy   10. Chronic normocytic anemia   11. HX CVA-1998     Procedure:  None     Plan:  1. Tertiary exam completed. No additional injuries noted   2. SAH/SDH- NSG consulted. No surgical intervention need. Repeat head CT stable. No interval increase in bleed. Continue Keppra for 7 days. SBP <140. Hold   3. Right shoulder injury- Noted widening of acromioclavicular joint space. C/o pain with movement. ROM relatively intact. Has baseline weakness. Discussed peripherally with ortho. No surgical intervention needed at this time. PMR consulted.   4. A.Fib/HTN/CHF- Resume home medications. PRN hydralazine and labetalol available for HTN. Hold ASA and plavix given bleed.   5. DM- Hold home lantus for now. Medium SSI.  6. JUVENTINO/CKD- Baseline Crt ~1.5. 1.61 on admission. Will continue gentle MIVF for now. Recheck in AM.   7. Anemia- Baseline Hgb ~10.5. Admission hgb 10.2. Will continue to monitor. Transfuse for hgb <7.0.   8. Leukocytosis- 13.4 on admission. Likely inflammatory from traumatic injuries. Will send UA. Continue to monitor.   9. Bactroban to wounds   10. PT/OT consult   11. PMR consult   12. Sw consult     Code status: Full code confirmed with patient.      General Cares:  GI Prophylaxis: Home PPI   DVT Prophylaxis: [facility] only   Date of last stool/Bowel Regimen:PTA, Senokot BID and miralax  Pulmonary toilet:IS  ETOH: Kelechi Coleman was asked if in the last 3-6 months there  "has been a time when he had  5 or more drinks in a single day/outing.. Patient answer to the screening question was in the negative. No intervention needed..    SUBJECTIVE:  Review of Systems   Constitutional: Negative.    HENT: Negative.    Eyes: Negative.    Respiratory: Negative.    Cardiovascular: Negative.    Gastrointestinal: Negative.    Endocrine: Negative.    Genitourinary: Negative.    Musculoskeletal: Positive for arthralgias and myalgias.   Skin: Positive for wound.   Allergic/Immunologic: Negative.    Neurological: Negative.    Hematological: Negative.    Psychiatric/Behavioral: Negative.        OBJECTIVE:  Blood pressure 134/70, pulse 70, temperature 97.9  F (36.6  C), temperature source Axillary, resp. rate 16, height 1.88 m (6' 2\"), weight 83.5 kg (184 lb 1.4 oz), SpO2 96 %.  Physical Exam   Constitutional: He is oriented to person, place, and time. He appears well-developed and well-nourished.   HENT:   Head: Normocephalic. Head is with abrasion and with laceration.       Right Ear: External ear normal.   Left Ear: External ear normal.   Nose: Nose normal.   Mouth/Throat: Oropharynx is clear and moist.   Eyes: Conjunctivae and EOM are normal. Pupils are equal, round, and reactive to light.   Neck: Neck supple.   Cardiovascular: Normal rate, regular rhythm, normal heart sounds and intact distal pulses.    No murmur heard.  Pulmonary/Chest: Effort normal and breath sounds normal. No respiratory distress. He has no wheezes. He has no rales. He exhibits no tenderness.   Abdominal: Bowel sounds are normal. He exhibits no distension. There is no tenderness.   Musculoskeletal: He exhibits edema and tenderness.        Right shoulder: He exhibits decreased range of motion, tenderness and pain.   Neurological: He is alert and oriented to person, place, and time. He has normal strength. No cranial nerve deficit or sensory deficit. Coordination normal. GCS eye subscore is 4. GCS verbal subscore is 4. GCS motor " subscore is 6.   RUE, exams varies depending on time. Will have 4/5 strength with limited range of motion. However, at times will have 5/5.    Skin: Skin is warm and dry. Abrasion and ecchymosis noted.        Psychiatric: He has a normal mood and affect. His behavior is normal. Judgment and thought content normal.       ROUTINE LABS: (Last four results)  CMP  Recent Labs  Lab 08/18/17 0215 08/17/17 2322    134   POTASSIUM 3.8 3.8   CHLORIDE 104 102   CO2 16* 16*   ANIONGAP 16* 16*   * 158*   BUN 40* 37*   CR 1.61* 1.74*   GFRESTIMATED 41* 38*   GFRESTBLACK 50* 46*   MIGUEL 8.7 8.9   MAG 1.0*  --    PHOS 3.0  --    PROTTOTAL  --  7.6   ALBUMIN  --  3.6   BILITOTAL  --  1.3   ALKPHOS  --  259*   AST  --  22   ALT  --  22     CBC  Recent Labs  Lab 08/18/17 0215 08/17/17 2322   WBC 13.4* 8.9   RBC 3.53* 3.91*   HGB 10.2* 11.4*   HCT 31.8* 34.0*   MCV 90 87   MCH 28.9 29.2   MCHC 32.1 33.5   RDW 15.7* 15.5*   * 144*     INR  Recent Labs  Lab 08/18/17 0215   INR 1.32*     Arterial Blood GasNo lab results found in last 7 days.    RADIOLOGY:  All radiology reviewed.   Recent Results (from the past 744 hour(s))   CT Head w/o Contrast   Result Value    Radiologist flags Acute intracranial hemorrhage (AA)    Narrative    CT HEAD W/O CONTRAST  8/17/2017 11:42 PM      HISTORY: Trauma.    TECHNIQUE: Routine noncontrast head CT. Radiation dose for this scan  was reduced using automated exposure control, adjustment of the mA  and/or kV according to patient size, or iterative reconstruction  technique.    COMPARISON: None.    FINDINGS: There is generalized brain atrophy. There is subarachnoid  hemorrhage over the left frontal and parietal lobes. Small subdural  hematoma anterior to the left frontal lobe measuring approximately 0.7  cm in thickness. Probable tiny amount of subarachnoid hemorrhage at  the inferior surface of the left frontal lobe. No significant mass  effect. Periventricular and deep white matter  low attenuation is  consistent with chronic small vessel ischemic disease. There is a left  parietal scalp hematoma. No fracture. Retention cyst or polyp in the  left maxillary sinus.      Impression    IMPRESSION:  1. Subarachnoid hemorrhage over the left frontal and parietal lobes.  2. Small subdural hematoma anterior to the left frontal lobe.    [Critical Result: Acute intracranial hemorrhage]    Finding was identified on 8/17/2017 11:42 PM.     Dr. Mohamud was contacted by me on 8/17/2017 11:47 PM and verbalized  understanding of the critical result.    CLOE TRAYLOR MD   CT Cervical Spine w/o Contrast    Narrative    CT CERVICAL SPINE W/O CONTRAST  8/17/2017 11:42 PM      HISTORY: Trauma.    TECHNIQUE: Multiplanar imaging of the cervical spine without  intravenous contrast. Radiation dose for this scan was reduced using  automated exposure control, adjustment of the mA and/or kV according  to patient size, or iterative reconstruction technique.     COMPARISON: None.    FINDINGS: There is no acute fracture or traumatic malalignment. There  is multilevel degenerative disc and facet disease. Degenerative disc  disease is worst at C5-C6 and C6-C7. There is no significant spinal  stenosis. The paraspinal soft tissues are unremarkable aside from  atherosclerotic disease. Mild scarring at the lung apices.      Impression    IMPRESSION: No acute traumatic abnormality.    COLE TRAYLOR MD   Chest  XR, 1 view portable    Narrative    Exam:  XR CHEST PORT 1 VW, 8/18/2017 2:49 AM    History: trauma.  Chest pain    Comparison:  Chest x-ray 2/13/2017    Findings:  Single AP view the chest. Cardiac silhouette is enlarged.  Pulmonary vascularity within normal limits. There is dense  retrocardiac opacity, hazy left basilar opacities and a probable  layering left pleural effusion. Chronic blunting of the right  costophrenic angle is unchanged. No pneumothorax. No displaced rib  fracture identified. Median sternotomy wires.  Aortic knob with  calcifications.      Impression    Impression:    1. Left retrocardiac opacity as well as hazy left basilar opacities,  differential for which includes contusion, pneumonia, or atelectasis.  2. Probable layering left-sided pleural effusion. In the setting of  trauma, hemothorax not excluded.    I have personally reviewed the examination and initial interpretation  and I agree with the findings.    GEOREG ROWAN MD   XR Shoulder Right Port G/E 2 Views   Result Value    Radiologist flags Mild widening of AC joint space.    Narrative    Exam:  XR SHOULDER RT PORT G/E 2 VW, 8/18/2017 3:16 AM    History: R shoulder pain    Comparison:  Chest radiographs September 2, 2016    Findings:    AP and transscapular Y views of the right shoulder were obtained.    There is flattening of the superolateral aspect of the humeral head,  similar to prior study and likely representing sequelae of Hill-Sachs  lesion. Glenohumeral joint is congruent on these 2 views.    Widening of acromioclavicular joint space, measuring 11 mm, previously  widened but slightly increased from prior study.    Patchy lucencies of visualized bones, likely related to underlying  osteopenia.      Impression    Impression:    1. No acute osseous abnormality.  2. Widening of acromioclavicular joint space, measuring 11 mm,  previously widened but slightly increased from prior study. Correlate  clinically for focal symptom.    [Consider Follow Up: Mild widening of AC joint space.]    This report will be copied to the [facility] to ensure a  provider acknowledges the finding.     I have personally reviewed the examination and initial interpretation  and I agree with the findings.    LOLIS CONTRERAS   CT Head w/o Contrast    Narrative    CT HEAD W/O CONTRAST 8/18/2017 5:43 AM    Provided History: Evaluate for progression of head bleed    Comparison: Head CT 8/17/2017.    Technique: Using multidetector thin collimation helical  acquisition  technique, axial, coronal and sagittal CT images from the skull base  to the vertex were obtained without intravenous contrast.     Findings:    No significant change in extensive subarachnoid hemorrhage in the  sulci overlying the left frontal and parietal lobes. Hemorrhage  overlying the anterior left frontal lobe measuring 7 mm in thickness  is also unchanged, representing additional subarachnoid hemorrhage  versus a small subdural hemorrhage. No evidence of new intracranial  hemorrhage. The ventricles are proportionate to the cerebral sulci.  Mild generalized cerebral volume loss is unchanged. Chronic lacunar  infarct in the right basal ganglia The gray to white matter  differentiation of the cerebral hemispheres is preserved. The basal  cisterns are patent. Atherosclerotic calcifications of the vertebral  arteries and intracranial ICAs. Left parietal scalp hematoma.    Mucosal thickening versus mucus retention cyst in the left maxilla  sinus. Otherwise, the paranasal sinuses are clear. The mastoid air  cells are clear.       Impression    Impression:  Subarachnoid hemorrhage over the left frontal and  parietal lobes and probable small subdural hematoma over the anterior  left frontal lobe are unchanged in extent since head CT 8/17/2017. No  new intracranial hemorrhage.    I have personally reviewed the examination and initial interpretation  and I agree with the findings.    MD Immanuel SALINAS, NP

## 2017-08-18 NOTE — IP AVS SNAPSHOT
` ` Patient Information     Patient Name Sex     Kelechi Coleman ([id number]) Male 1935       Room Bed    1646 6206-02      Patient Demographics     Address Phone E-mail Address    [address] [facility] [address] [phone] (Home) *Preferred*  none (Work)  [phone] (Mobile) [email or web address]      Patient Ethnicity & Race     Ethnic Group Patient Race    American White      Emergency Contact(s)     Name Relation Home Work Mobile    Samantha Fenton Sister [phone]    no secondary contact  none        Documents on File        Status Date Received Description       Documents for the Patient    Face Sheet  08     Consent Form  08     Insurance Card  08 Empty    Privacy Notice - [place] Received 07/12/10     External Medication Information Consent Accepted 14     Patient ID Received 12     Consent for Services - Hospital/Clinic       Insurance Card Received 12     Consent for Services - Hospital/Clinic Received () 01/10/13     Privacy Notice - [place]  13 ACKNOWLEDGMENT OF RECEIPT OF NOTICE OF PRIVACY PRACTICE    Consent for Services - Hospital/Clinic  () 13 CONSENT FOR SERVICES    Consent for EHR Access  13 Copied from existing Consent for services - C/HOD collected on 2013    [facility] Specified Other       Patient ID Received 13 Exp: 10/2013    Insurance Card Received 13 Medicare    Patient ID Received 13 Exp: 10/2017    Insurance Card Received 13 BCBS - Plat Blue    Consent to Communicate Received 13     Consent for Services - Hospital/Clinic  () 14 CONSENT FOR SERVICE    Consent for Services - Hospital/Clinic Received () 14     Insurance Card Received 09/10/15     Consent for Services - Hospital/Clinic Received () 09/10/15     Consent for Services/Privacy Notice - Hospital/Clinic Received () 16     Insurance Card  Received 09/12/16 BCBS    Consent for Services - Informed Received 09/27/16 [facility]    HIM JOHN Authorization - File Only Received 09/27/16 Electronic Comm.    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 REPORT 11/30/16    Insurance Card Received 01/30/17 bcbs 2017    Consent for Services/Privacy Notice - Hospital/Clinic Received 08/18/17        Documents for the Encounter    CMS IM for Patient Signature Received 08/18/17     Assessment/Questionnaire  08/23/17 MRI HISTORY QUESTIONNAIRE AND CONTRAST NOTICE    EMS/Ambulance Record  08/23/17 [facility] MEDICAL TRANSPORTATION      Admission Information     Attending Provider Admitting Provider Admission Type Admission Date/Time    Josefa Rodarte MD Banton, Kaysie Lynn, MD Emergency 08/18/17  0207    Discharge Date Hospital Service Auth/Cert Status Service Area     Trauma Incomplete [facility] SERVICES    Unit Room/Bed Admission Status       UU U6A 6206/6206-02 Admission (Confirmed)       Admission     Complaint    Fall      Hospital Account     Name Acct ID Class Status Primary Coverage    Kelechi Coleman [id number] Inpatient Open MEDICARE - MEDICARE FOR HB SUPPLEMENT            Guarantor Account (for Hospital Account #[id number])     Name Relation to Pt Service Area Active? Acct Type    Kelechi Coleman  FCS Yes Personal/Family    Address Phone          [address] [place] [address] [phone](H)  none(O)              Coverage Information (for Hospital Account #[id number])     1. MEDICARE/MEDICARE FOR HB SUPPLEMENT     F/O Payor/Plan Precert #    MEDICARE/MEDICARE FOR HB SUPPLEMENT     Subscriber Subscriber #    Kelechi Coleman [id number]    Address Phone    ATTN CLAIMS  [address]  [facility] [place] [address] [phone]          2. BCBS/BCBS PLATINUM BLUE     F/O Payor/Plan Precert #    BCBS/BCBS PLATINUM BLUE      Subscriber Subscriber #    Kelechi Coleman [id number]    Address Phone    [address] [address]  [place] [address] [phone]

## 2017-08-18 NOTE — CONSULTS
Social Work: Assessment with Discharge Plan    Patient Name:  Kelechi Coleman  :  1935  Age:  81 year old  MRN:  [id number]  Risk/Complexity Score:   Filed Complexity Score: 5  Completed assessment with:  Patient.    Presenting Information   Reason for Referral:  ICU admission; psychosocial assessment, patient/family support and discharge planning.   Date of Intake:  2017  Referral Source:   auto case finding.  Decision Maker:  Self at baseline.   Alternate Decision Maker:  In absence of a health care directive, next of kin: Sister: Samantha Fenton ([phone]; [phone]). No ; no children; parents .  Health Care Directive:  Not on file; not discussed today.  Living Situation:  House : Alone.  Previous Functional Status:  Independent  Patient and family understanding of hospitalization:  Restorative; full recovery.  Cultural/Language/Spiritual Considerations:  None specific reported.  Adjustment to Illness:  Appropriate; no concerns noted.     Physical Health  Reason for Admission:    1. SDH (subdural hematoma) (H)    2. SAH (subarachnoid hemorrhage) (H)    3. Laceration of scalp, initial encounter    4. Abrasion, right knee, initial encounter    5. Abrasion of left elbow, initial encounter      Services Needed/Recommended:    PT/OT currently recommending ARU.   PM&R consulted and referral made to  ARU.    Mental Health/Chemical Dependency  Diagnosis:  NA  Support/Services in Place:  NA  Services Needed/Recommended:  NA    Support System  Significant relationship at present time:  Sister, Samantha who lives in [place].  Family of origin is available for support:  Yes; from afar.   Other support available:  Patient reports he has 2 friends who are local and supportive to him.   Gaps in support system:  Lives alone.  Patient is caregiver to:  NA     Provider Information   Primary Care Physician:  Etienne Gee   [phone]   Clinic:  [address] [place] [address]       :  JOEL    Financial   Income Source:  Retired.  Financial Concerns:  None specific reported.   Insurance:    Payor/Plan Subscriber Name Rel Member # Group #   MEDICARE - MEDICARE F* MARLA VÁZQUEZ*  [id number]       ATTN CLAIMS, [address]   BCBS - BCBS PLATINUM * MARLA VÁZQUEZ*  [id number]      [address] [address]       Discharge Plan   Patient and family discharge goal:  ARU.  Provided education on discharge plan:  YES  Patient agreeable to discharge plan:  YES - patient is agreeable to recommendations for ARU.  A list of Medicare Certified Facilities was provided to the patient and/or family to encourage patient choice. Patient's choices for facility are:  Patient is open to [facility].  Barriers to discharge:  Medical stability.     Discharge Recommendations   Anticipated Disposition:  Referral out to [facility].  Transportation Needs:  TBD  Name of Transportation Company and Phone:  TBD    Additional comments   Spoke with Trauma and patient may be ready to d/c as early as tomorrow, Saturday 8/19.   PT/OT consulted and recommending ARU; referral made to [facility] (patient aware and agreeable.)    Will ask weekend SW to follow for possible d/c this weekend.     EELNITA Fuller, [facility]  ICU   Pager: [phone]  Phone: [phone]

## 2017-08-18 NOTE — ED PROVIDER NOTES
History     Chief Complaint:  Mechanical fall    HPI   Kelechi Coleman is an anticoagulated 81 year old male with a history of CAD, MI, PAD, HTN, hyperlipidemia, atrial fibrillation, diabetes mellitus, cerebral infarction, and chronic kidney disease who presents to the emergency department for evaluation of a mechanical fall. Of note, the patient is currently on Plavix. Approximately 30 minutes while walking up stairs and getting to the landing, the patient reports tripping and falling backwards down 6 stairs. During this mechanical fall, the patient reports hitting his head and sustaining a laceration to left lateral occipital region of the head. The patient denies loss of consciousness. This prompted the patient to see evaluation here in the emergency department via EMS. Here, the patient presents with a significant among of bleeding on his head and face but denies pain anywhere or any other complaints. He denies pain to the wrists, elbows, shoulders, legs, hips, back, and chest.     Allergies:  Aspirin  Penicillins  Contrast Dye     Medications:    carvedilol   LANTUS SOLOSTAR   pantoprazole   torsemide  amlodipine   atorvastatin   clopidogrel   hydralazine   isosorbide mononitrate   spironolactone     Past Medical History:    Acute exacerbation of CHF  Atrial fibrillation   Cerebral infarction   Coronary artery disease   Diabetes   Gastroesophageal reflux disease with esophagitis   Hyperlipidemia   Hypertension   Myocardial infarction   PAD    Stented coronary artery   Type 2 diabetes mellitus   Kidney disease  Cerebral artery occlusion with cerebral infarction    Past Surgical History:    Coronary artery bypass  ENT surgery  Angioplasty for renal artert stenosis  Left heart catheterization  Intraocular lens implant  vascular surgery    Family History:    diabetes mellitus  Cancer    Social History:  Former smoker: quit date 12/18/1998   Alcohol use: 3 hard liquor drinks/day  Marital Status:  Single [1]  "    Review of Systems   Skin: Positive for wound (left occipital region laceration).   Neurological:        Negative for loss of consciousness     All other systems reviewed and are negative.      Physical Exam   First Vitals:     /62  Pulse 62  Resp 14  Ht 1.88 m (6' 2\")  Wt 83.9 kg (185 lb)  BMI 23.75 kg/m2    Physical Exam  Constitutional: The patient is oriented to person, place, and time.   HENT: The patient is awake and alert.  Head: Large left lateral hematoma.   Right Ear: Normal  Left Ear: Normal  Nose: Nose normal.   Mouth/Throat: Oropharynx is clear and moist. No erythema or exudate.   Eyes: Conjunctivae and EOM are normal. Pupils are equal, round, and reactive to light. No discharge  Neck: Normal range of motion. Neck supple.   Cardiovascular: Normal rate, regular rhythm, no murmur gallops or rubs. Intact distal pulses.    Pulmonary/Chest: CTA bilaterally. No wheezes rale or rhonchi.  Abdominal: Soft. Non tender.  No masses   Musculoskeletal: No edema. No bony deformity. Normal range of motion. No spinal tenderness.  Lymphadenopathy:     The patient has no cervical adenopathy.   Neurological: The patient is alert and oriented to person, place, and time. The patient has normal strength and normal reflexes. No cranial nerve deficit. Coordination normal. GCS 15.  Skin: Skin is warm and dry. No rash noted. The patient is not diaphoretic.   Psychiatric: The patient has a normal mood and affect.    Emergency Department Course   ECG:  Indication: fall  Time: 2313  Vent. Rate 72 bpm. AZ interval *. QRS duration 84. QT/QTc 422/462. P-R-T axis * -39 171. Accelerated Junctional rhythm with occasional premature ventricular complexes. Left axis deviation. ST and T wave abnormality. Consider lateral ischemia. Abnormal ECG. No significant change from 9/19/2016. Read time: 2323.    Imaging:  Radiographic findings were communicated with the patient who voiced understanding of the findings.    CT Head without " contrast:   1. Subarachnoid hemorrhage over the left frontal and parietal lobes.  2. Small subdural hematoma anterior to the left frontal lobe. As per radiology.    CT Cervical spine without contrast:   No acute traumatic abnormality. As per radiology.    Laboratory:  CBC: WBC: 8.9, HGB: 11.4 (L), PLT: 114 (L)  CMP: Glucose 158 (H), BUN 37 (H), Creatinine 1.74 (H), GFR 38 (L), ALKPHOS 259 (H), Carbon dioxide 16 (L), Anion gap 16 (H), o/w WNL  ABO/Rh type and screen: O+    Procedures:    Narrative: Procedure: Laceration Repair        LACERATION:  A subcutaneous clean 4 cm laceration.      LOCATION:  Left occipital region of the head      ANESTHESIA:  Local using mepivacaine with epi total of 4 mLs      PREPARATION:  Irrigation and Scrubbing with Normal Saline and Shur Clens      DEBRIDEMENT:  Subcutaneous hematoma evacuated      CLOSURE:  Wound was closed with Two Layers: Subcutaneous layer closed with galga x 4.0 Vicryl Sutures. Skin closed with 6 interrupted sutures    Interventions:  2320 NS 1L IV  0043 Keppra 1000 mg IV    Emergency Department Course:  2313 The patient arrived in [facility] via EMS. Vitals reviewed with EMS.  I performed an exam of the patient as documented above.     IV inserted. Medicine administered as documented above. Blood drawn. This was sent to the lab for further testing, results above.    The patient was placed on continuous pulse oximetry and nasal cannula while here in the ED.      The patient was sent for a head CT and cervical spine CT while in the emergency department, findings above.     2345 I consulted with radiology regarding the patient's head CT scans.      2350 I rechecked the patient, who now reports he cannot move his right elbow or lift this off of the bed. The patient also reports the onset of numbness.     0016 I rechecked the patient, who started having some convulsions and spasms of his right arm.    2357 I consulted with Dr. Keating, Neurosurgery, regarding the patient's  history and presentation here in the emergency department.    0017 I consulted with Dr. Santiago, Neurosurgery, and Dr. Mason, ED, regarding the patient's history and presentation here in the emergency department.    0050 I cleaned and repaired the patient's left occipital laceration.    0100 I consulted with Dr. Mason, ED. I updated him on the patient's condition, who may be starting to have some seizure activity.    Findings and plan explained to the Patient. Patient will be transferred to [facility] ICU via EMS. Discussed the case with Dr. Mason, who will admit the patient to a monitored bed for further monitoring, evaluation, and treatment.    Impression & Plan           Trauma Summary Disposition     Patient is trauma admission:  Trauma  Evaluation    Spine  Backboard removal time: Backboard not applied   C-collar and immobilization: applied prior to arrival.  CSpine Clearance: C spine cleared clinically  Full Primary and Secondary survey with appropriate immobilization of spine completed in exam section.     Neuro  GCS at arrival:  Motor 6=Obeys commands   Verbal 5=Oriented   Eye Opening 4=Spontaneous   Total: 15     GCS at disposition: unchanged      Medical Decision Making:  Kelechi Coleman is a 81 year old male who presents at a fall down 6 stairs. Based on his history, it sounds like a simple mechanical fall. He did strike of his head but denies any loss of consciousness. The patient himself denies any significant pain or other injury other than a mild headache. On exam, he has approximately 4 cm left sided scalp laceration with a large hematoma. He is in a collar but has no spinal tenderness. This was left in place until he could be cleared radiographically. Neurologically, he was moving all 4 extremities and had no focal deficit. Note, that while in the ED, he did develop increased weakness in his proximal right upper extremity and having some tonic clonic movement of his right  arm raising concern for possible focal seizure and was given 1 gram of keppra. CT of his head did reveal a subarachnoid hemorrhage as well as subdural without signs of swelling or significant midline shift. CT of the cervical spine was negative and the collar was removed. His laceration was repaired per the above procedure note, including evacuation of a large portion of the subcutaneous hematoma. The patient had multiple skin tears on arm and knee. These were also dressed and cleaned. Initially, I spoke with neurosurgery here at [facility] but noted there were no ICU beds available and the patient will require transfer to [facility]. I then spoke with Dr. Santiago, Neurosurgery, as well as Dr. Mason, ED. The patient will be transferred to the [place] for admission to the ICU.     Diagnosis:    ICD-10-CM    1. SAH (subarachnoid hemorrhage) (H) I60.9 ABO/Rh type and screen   2. Laceration of scalp, initial encounter S01.01XA    3. Multiple abrasions T14.8        Disposition:    Transfer to the [facility]    I, Lacie Painter, am serving as a scribe on 8/17/2017 at 11:19 PM to personally document services performed by Igor Mohamud MD based on my observations and the provider's statements to me.     Lacie Painter  8/17/2017    EMERGENCY DEPARTMENT       Igor Mohamud MD  08/18/17 [id number]

## 2017-08-18 NOTE — PROGRESS NOTES
Received page from ED that patient will be transferred to [facility] due to no ICU beds available at this time.

## 2017-08-18 NOTE — PROGRESS NOTES
[facility], [facility]  Trauma Education Note    Date of Service: 08/18/2017     Trauma Mechanism: Fall backwards down 6 steps.  Known Injuries:  1. Left Subarachnoid Hemorrhage  2. Left Subdural Hematoma  3. Left occipital scalp lac    Assessment:    Education Needs   Brain injury instructions, fall prevention, pain management, s/sx of complications, follow up, wound care, rehab and s/sx of infection.     Primary Learner: Patient    Other Learner(s): None    Barriers: None    Learning Style: Explain, handouts, demonstrate.       Plan:    Education provided: Brain injury s/sx, complications,treatment and follow up, fall prevention, wound care.    Trauma Mechanism: Fall prevention related to KAREL and stroke hx.    Treatment Plan Education: Pain management, physical and occupational therapies.  Handouts provided: [facility]'s Safe From Falls, Trauma Brain Injury sheet.                Follow up Education Needs: Reinforcement.    Sonali Ocampo

## 2017-08-18 NOTE — CONSULTS
[facility]       NEUROSURGERY CONSULTATION NOTE    This consultation was requested by Dr. Mason from the EM service.    Reason for Consultation: fall    HPI:  Mr Coleman is an 81 year old male on Plavix who experienced a mechanical fall down approximately 6 stairs. No LOC. Noted bleeding from his head, but no new neurological deficits at the time. Taken to [facility] for evaluation and found to have a traumatic left fronto-parietal subarachnoid hemorrhage and left frontal subdural hematoma. While at [facility], the patient began to complain of tingling in his right arm. Experiences tinging in the RUE at baseline following a stroke, but this was worse. He then noted weakness in that same extremity. He was loaded with Keppra and was then transferred to [facility] for further cares. En route, paramedics noted twitching in the RUE and RLE, no change in mentation, episodes resolved without intervention.    In the [facility] ED, the patient denies headache, nausea, vomiting. Apart from RUE tingling and weakness, denies other symptoms.    Time Paged/Notified: 2:07AM  Trauma Activation: Yes - White  Arrival time in ED: 2:09AM  GCS: E 4 M 6 V 5 = 15        PAST MEDICAL HISTORY:   Past Medical History:   Diagnosis Date     Acute exacerbation of CHF (congestive heart failure) (H) 9/2/2016     Atrial fibrillation (H)      Cerebral infarction (H) 12/13/2013, 9/2016     Diagnosis updated by automated process. Provider to review and confirm.     Coronary artery disease      Diabetes (H)      Gastroesophageal reflux disease with esophagitis 5/8/2017     Hyperlipidaemia      Hypertension      Myocardial infarction (H)      PAD (peripheral artery disease) (H)      Stented coronary artery     CABG 2002 4 VESSEL     Type 2 diabetes mellitus with stage 3 chronic kidney disease, with long-term current use of insulin (H) 11/4/2016     Unspecified cerebral artery occlusion with cerebral infarction         PAST SURGICAL HISTORY:   Past Surgical History:   Procedure Laterality Date     C MRA UPPER EXTREMITY WO&W CONT       CORONARY ARTERY BYPASS  2002    LIMA-LAD, SVG-OM1, [id number]     ENT SURGERY      VOCAL CORD LESION REMOVED     GENITOURINARY SURGERY      ANGIOPLASTY FOR RENAL ARTERT STENOSIS     HC LEFT HEART CATHETERIZATION  9/7/2016 09/07/2016: CHUCHO x2 to distal and proximal SVG to to RPDA     HEART CATH LEFT HEART CATH  11/30/2001     PHACOEMULSIFICATION CLEAR CORNEA WITH STANDARD INTRAOCULAR LENS IMPLANT Left 1/22/2015    Procedure: PHACOEMULSIFICATION CLEAR CORNEA WITH STANDARD INTRAOCULAR LENS IMPLANT;  Surgeon: Bart Johnson MD;  Location:  EC     VASCULAR SURGERY      ANGIOPLASTY LEFT LEG FOR pvd       FAMILY HISTORY:   Family History   Problem Relation Age of Onset     DIABETES Mother      Family History Negative Father      CANCER Maternal Grandmother        SOCIAL HISTORY:   Social History   Substance Use Topics     Smoking status: Former Smoker     Years: 40.00     Types: Pipe     Quit date: 12/18/1998     Smokeless tobacco: Never Used     Alcohol use 0.0 oz/week     0 Standard drinks or equivalent per week      Comment: 3 hard liquor drinks daily       MEDICATIONS:  Current Outpatient Prescriptions   Medication Sig Dispense Refill     carvedilol (COREG) 25 MG tablet Take 0.5 tablets (12.5 mg) by mouth every morning 180 tablet 3     LANTUS SOLOSTAR 100 UNIT/ML soln ADMINISTER 35 TO 40 UNITS UNDER THE SKIN AT BEDTIME 30 mL 0     pantoprazole (PROTONIX) 40 MG EC tablet TAKE 1 TABLET BY MOUTH EVERY DAY 90 tablet 2     torsemide (DEMADEX) 10 MG tablet Take 1 tablet (10 mg) by mouth daily 90 tablet 3     amLODIPine (NORVASC) 10 MG tablet Take 1 tablet (10 mg) by mouth daily 90 tablet 4     atorvastatin (LIPITOR) 40 MG tablet Take 1 tablet (40 mg) by mouth daily 90 tablet 3     clopidogrel (PLAVIX) 75 MG tablet Take 1 tablet (75 mg) by mouth daily 90 tablet 3     hydrALAZINE (APRESOLINE)  "50 MG tablet Take 1 tablet (50 mg) by mouth 3 times daily 270 tablet 3     isosorbide mononitrate (IMDUR) 60 MG 24 hr tablet Take 2 tablets (120 mg) by mouth daily 180 tablet 3     spironolactone (ALDACTONE) 50 MG tablet Take 1 tablet (50 mg) by mouth daily 90 tablet 3     ASPIRIN NOT PRESCRIBED (INTENTIONAL) Please choose reason not prescribed, below 0 each 0     ACCU-CHEK MARY PLUS test strip TEST THREE TIMES DAILY 300 strip 1     B-D U/F 31G X 8 MM insulin pen needle        Multiple Vitamins-Minerals (CENTRUM SILVER) per tablet Take 1 tablet by mouth daily.       Calcium Carb-Cholecalciferol (CALTRATE 600+D) 600-800 MG-UNIT TABS Take 1 tablet by mouth daily.         Allergies:  Allergies   Allergen Reactions     Aspirin Hives     Penicillins Hives     Contrast Dye Hives         ROS: 10 point ROS of systems including Constitutional, Eyes, Respiratory, Cardiovascular, Gastroenterology, Genitourinary, Integumentary, Muscularskeletal, Psychiatric were all negative except for pertinent positives noted in my HPI.      PE:  Blood pressure 121/69, pulse 70, resp. rate 16, height 1.88 m (6' 2\"), weight 83.9 kg (185 lb), SpO2 95 %.    -- Left occipital laceration w/ pressure bandage in place.    NEUROLOGIC:  -- Awake; Alert; oriented x 3  -- Follows commands briskly  -- +repetition and naming  -- Speech fluent, spontaneous. No aphasia or dysarthria.  -- no gaze preference. No apparent hemineglect.  Cranial Nerves:  -- visual fields full to confrontation, PERRL 3-2mm bilat and brisk, extraocular movements intact  -- face symmetrical, tongue midline  -- sensory V1-V3 intact bilaterally  -- palate elevates symmetrically, uvula midline  -- hearing grossly intact bilat  -- Right Trapezii 2/5, left 5/5  -- Cerebellar: intact rapid alternating motions bilaterally    Motor:  Normal bulk / tone; no tremor, rigidity, or bradykinesia.  No muscle wasting or fasciculations  No Pronator Drift, but not able to fully participate with " RUE     Delt Bi Tri FE IP Quad Hamst TibAnt EHL Gastroc    C5 C6 C7 C8/T1 L2 L3 L4-S1 L4 L5 S1   R 1 5 5 5 5 5 5 5 5 5   L 5 5 5 5 5 5 5 5 5 5     Sensory:   intact to LT, mildly decreased sensation in RUE compared to left, no dermatomal pattern    Reflexes:     Bi Tri BR Guillermina Pat Ach Bab    C5-6 C7-8 C6 UMN L2-4 S1 UMN   R 2+ 2+ 2+ Norm 2+ 2+ Norm   L 2+ 2+ 2+ Norm 2+ 2+ Norm     Gait: Deferred       ASSESSMENT:  81 year old male on Plavix presenting following mechanical fall down stairs, with right shoulder and trapezius weakness, found to have left fronto-parietal SAH tracking along central sulcus and left frontal SDH measuring 7mm thick w/o midline shift or hydrocephalus. No emergent need for Neurosurgerical intervention as his exam can be followed. Will continue to follow closely.       RECOMMENDATIONS:  - No neurosurgical intervention indicated at this time   - Requires ICU level observation at this time  - Repeat head CT w/o contrast in 6 hours from the time of first acquisition (5:30AM)  - Avoid sedating medications that would alter a neurological examination  - SBPs < 140  - No ongoing need for Keppra  - Hold Plavix  - Normonatremia  - Occipital laceration cares per SICU team    Addendum:  - Following further discussion this morning, a short course of Keppra is being recommended. 1g BID for 1 week.      Roel Hall MD  Neurosurgery PGY2      The patient was discussed with Dr. Lopez, neurosurgery chief resident, and he agrees with the above.

## 2017-08-18 NOTE — LETTER
Transition Communication Hand-off for Care Transitions to Next Level of Care Provider    Name: Kelechi Coleman  MRN #: [id number]  Primary Care Provider: Etienne Gee     Primary Clinic: [place] [address] [facility] [address]  [facility] [address]     Reason for Hospitalization:  SAH (subarachnoid hemorrhage) (H) [I60.9]  SDH (subdural hematoma) (H) [I62.00]  Abrasion of left elbow, initial encounter [S50.312A]  Laceration of scalp, initial encounter [S01.01XA]  Abrasion, right knee, initial encounter [S80.211A]  Admit Date/Time: 8/18/2017  2:07 AM  Discharge Date: 8/24/17  Payor Source: Payor: BCBS / Plan: BCBS PLATINUM BLUE / Product Type: PPO /              Reason for Communication Hand-off Referral:     Discharge Plan:    Social Work Services Discharge Note     Patient Name:  Kelechi Coleman     Anticipated Discharge Date:  8/24/17     Discharge Disposition:   [facility] ([phone])     Following MD:  Facility Assignment     Pre-Admission Screening (PAS) online form has been completed.  The Level of Care (LOC) is:  Determined  Confirmation Code is:  [id number].  Patient/caregiver informed of referral to [facility] for Pre-Admission Screening for skilled nursing facility (SNF) placement and to expect a phone call post discharge from SNF.     Additional Services/Equipment Arranged:  Confirmed acceptance to [facility] with Sonali Admissions Coordinator.  Confirmed readiness for discharge with Rosario Martinez (Trauma). Arranged for [facility] ([facility] [phone]) to provide private pay w/c transport at 11am.     Patient / Family response to discharge plan:  Pt voices understanding of the discharge plan and agreement with the discharge plan.     Persons notified of above discharge plan:  Pt, 6A nursing and Rosario Martinez (Trauma).  Left a message for pt's sister (Samantha) to call (2  Messages left on home voice mail and 1 messaged left on cell phone).     Staff  Discharge Instructions:  Please fax discharge orders and signed hard scripts for any controlled substances (SW completed this task).  Please print a packet and send with patient.      CTS Handoff completed:  YES     Medicare Notice of Rights provided to the patient/family:  YES              SOL Valles  Social Work, 6A  Phone:  [phone]  Pager:  [phone]  8/24/2017

## 2017-08-18 NOTE — IP AVS SNAPSHOT
"` `           [address] [facility]: [phone]                                              INTERAGENCY TRANSFER FORM - NURSING   2017                    Hospital Admission Date: 2017  MARLA VÁZQUEZ   : 1935  Sex: Male        Attending Provider: Josefa Rodarte MD     Allergies:  Aspirin, Penicillins, Contrast Dye    Infection:  None   Service:  TRAUMA    Ht:  1.88 m (6' 2\")   Wt:  83.1 kg (183 lb 3.2 oz)   Admission Wt:  88.1 kg (194 lb 4.8 oz)    BMI:  23.52 kg/m 2   BSA:  2.08 m 2            Patient PCP Information     Provider PCP Type    Etienne Gee MD General      Current Code Status     Date Active Code Status Order ID Comments User Context       Prior      Code Status History     Date Active Date Inactive Code Status Order ID Comments User Context    2017  1:50 PM  Full Code [id number]  Jarad Ga PA-C Outpatient    2017  9:53 AM 2017  1:50 PM Full Code [id number]  Rosario Martinez APRN [facility] Inpatient    2016 10:37 AM 2017  9:53 AM Full Code [id number]  Lul Boss MD Outpatient    2016  9:36 AM 2016 10:37 AM Full Code [id number]  Fracisco Hernandez MD Inpatient    2016  3:28 PM 2016  9:36 AM Full Code [id number]  Lul Boss MD Outpatient    2016  2:49 PM 2016  3:28 PM Full Code [id number]  Ned Wood DO Inpatient    2016  4:42 PM 2016  2:49 PM Full Code [id number]  Samson Nelson MD Outpatient    2016 12:02 PM 2016  4:42 PM Full Code [id number]  Liana Thorpe MD Inpatient      Advance Directives        Does patient have a scanned Advance Directive/ACP document in EPIC?           No        Hospital Problems as of 2017              Priority Class Noted POA    Fall Medium  2017 Yes      Non-Hospital Problems as of 2017              Priority Class Noted    Atherosclerosis of native coronary artery of native heart without angina pectoris High  " 12/13/2013    Paroxysmal atrial fibrillation (H) Medium  12/13/2013    Peripheral artery disease (H) Medium  12/13/2013    Hyperlipidemia LDL goal <70 Medium  12/13/2013    Benign essential hypertension Medium  12/13/2013    Paroxysmal ventricular tachycardia (H) Medium  12/13/2013    Cerebral infarction (H) Medium  12/13/2013    Acute exacerbation of CHF (congestive heart failure) (H) Medium  9/2/2016    Stroke (cerebrum) (H) Medium  9/16/2016    Type 2 diabetes mellitus with stage 3 chronic kidney disease, with long-term current use of insulin (H) Medium  11/4/2016    Chronic systolic heart failure (H) Medium  1/30/2017    Gastroesophageal reflux disease with esophagitis Medium  5/8/2017    Seizures (H) Medium  8/20/2017      Immunizations     Name Date      Influenza (High Dose) 3 valent vaccine 09/22/16     Pneumococcal (PCV 13) 06/19/14     Pneumococcal 23 valent 09/03/16     TDAP Vaccine (Adacel) 08/27/12     Zoster vaccine, live 11/10/11          END      ASSESSMENT     Discharge Profile Flowsheet     DISCHARGE NEEDS ASSESSMENT     Full except areas not inspected   -- (under dressings on L arm, L wrist, and R knee) 08/18/17 0626    Equipment Currently Used at Home  none 08/18/17 1221   Skin WDL  ex 08/24/17 0225    Transportation Available  car;family or friend will provide 08/18/17 1221   Skin Color/Characteristics  pale 08/24/17 0225    # of Referrals Placed by CTS  Communication hand-offs to next level of Care Providers (CS Pool, pt appt w/ PCP 9/23) 09/22/16 1106   Skin Temperature  warm 08/24/17 0225    GASTROINTESTINAL (ADULT,PEDIATRIC,OB)     Skin Moisture  dry 08/24/17 0225    GI WDL  WDL 08/24/17 0225   Skin Elasticity  quick return to original state 08/22/17 1003    Last Bowel Movement  08/22/17 08/24/17 0147   Skin Integrity  abrasion(s);bruise(s);drain/device(s);scab(s) 08/24/17 0225    COMMUNICATION ASSESSMENT     SAFETY      Patient's communication style  spoken language (English or Bilingual)  "08/18/17 0207   Safety WDL  WDL 08/24/17 0225    SKIN     Safety Equipment  manual resuscitator/PEEP valve in room 08/20/17 1656    Inspection of bony prominences  Full 08/24/17 0225   Safety Factors  ID band on;upper side rails raised x 2;call light in reach;wheels locked;bed in low position 08/24/17 0153                 Assessment WDL (Within Defined Limits) Definitions           Safety WDL     Effective: 09/28/15    Row Information: <b>WDL Definition:</b> Bed in low position, wheels locked; call light in reach; upper side rails up x 2; ID band on<br> <font color=\"gray\"><i>Item=AS safety wdl>>List=AS safety wdl>>Version=F14</i></font>      Skin WDL     Effective: 09/28/15    Row Information: <b>WDL Definition:</b> Warm; dry; intact; elastic; without discoloration; pressure points without redness<br> <font color=\"gray\"><i>Item=AS skin wdl>>List=AS skin wdl>>Version=F14</i></font>      Vitals     Vital Signs Flowsheet     COMMENTS     HEIGHT AND WEIGHT      Comments  pt transferred from ED to 4A 08/18/17 0413   Height  1.88 m (6' 2\") 08/18/17 0227    VITAL SIGNS     Height Method  Actual 08/18/17 0237    Temp  95.4  F (35.2  C) 08/24/17 0709   Weight  83.1 kg (183 lb 3.2 oz) 08/20/17 0503    Temp src  Axillary 08/24/17 0709   Weight Method  Bed scale 08/18/17 0415    Resp  16 08/24/17 0709   Bed Scale  Standard (fitted sheet, draw sheet/ pad, cover/flat sheet, blanket, two pillows) 08/18/17 0415    Pulse  61 08/23/17 2225   BSA (Calculated - sq m)  2.14 08/18/17 0237    Heart Rate  69 08/24/17 0709   BMI (Calculated)  25 08/18/17 0237    Pulse/Heart Rate Source  Monitor 08/24/17 0709   POSITIONING      BP  137/75 08/24/17 0709   Body Position  left, side-lying 08/24/17 0231    BP Location  Left arm 08/24/17 0709   Head of Bed (HOB)  HOB at 20 degrees 08/24/17 0231    OXYGEN THERAPY     Positioning/Transfer Devices  pillows 08/24/17 0231    SpO2  95 % 08/24/17 0709   Chair  -- 08/24/17 0231    O2 Device  None " (Room air) 08/24/17 0709   DAILY CARE      PAIN/COMFORT     Activity Type  activity adjusted per tolerance 08/24/17 0231    Patient Currently in Pain  denies 08/24/17 0844   Activity Level of Assistance  assistance, 2 people 08/24/17 0231    Preferred Pain Scale  CAPA (Clinically Aligned Pain Assessment) ([facility] Adults Only) 08/24/17 0210   Activity Assistive Device  mechanical lift 08/24/17 0231    Pain Location  Shoulder 08/24/17 0117   ECG      Pain Orientation  Left;Right 08/24/17 0117   ECG Rhythm  First degree AV block 08/20/17 1634    Pain Intervention(s)  Repositioned (Declined medication) 08/24/17 0117   Ectopy  PVC 08/20/17 1634    Response to Interventions  Decrease in pain 08/24/17 0117   Ectopy Frequency  Occasional 08/20/17 1634    CLINICALLY ALIGNED PAIN ASSESSMENT (CAPA) ([facility] ADULTS ONLY)     Lead Monitored  Lead II;V 5 08/20/17 0512    Comfort  negligible pain 08/24/17 0117   Equipment  electrodes changed 08/19/17 0530    Change in Pain  about the same 08/24/17 0117   SENTHIL COMA SCALE      Pain Control  partially effective 08/24/17 0117   Best Eye Response  4-->(E4) spontaneous 08/24/17 0225    Functioning  can do most things, but pain gets in the way of some 08/24/17 0117   Best Motor Response  6-->(M6) obeys commands 08/24/17 0225    Sleep  normal sleep 08/20/17 1634   Best Verbal Response  5-->(V5) oriented 08/24/17 0225    ANALGESIA SIDE EFFECTS MONITORING     Senthil Coma Scale Score  15 08/24/17 0225    Side Effects Monitoring: Respiratory Quality  R 08/24/17 0117   POINT OF CARE TESTING      Side Effects Monitoring: Respiratory Depth  N 08/24/17 0117   Puncture Site  fingertip 08/20/17 1624    Side Effects Monitoring: Sedation Level  S 08/24/17 0117   Bedside Glucose (mg/dl )   178 mg/dl 08/20/17 1624            Patient Lines/Drains/Airways Status    Active LINES/DRAINS/AIRWAYS     Name: Placement date: Placement time: Site: Days: Last dressing  change:    Peripheral IV 08/21/17 Right;Posterior Lower forearm 08/21/17   1558   Lower forearm   2             Patient Lines/Drains/Airways Status    Active PICC/CVC     None            Intake/Output Detail Report     Date Intake     Output Net    Shift P.O. I.V. IV Piggyback Total Urine Total       Day 08/23/17 0000 - 08/23/17 0659 200 -- -- 200 -- -- 200    Juliane 08/23/17 0700 - 08/23/17 1459 300 -- -- 300 400 400 -100    Noc 08/23/17 1500 - 08/23/17 2359 540 1245.08 -- 1785.08 .08    Day 08/24/17 0000 - 08/24/17 0659 50 -- -- 50 150 150 -100    Juliane 08/24/17 0700 - 08/24/17 1459 -- -- -- -- 100 100 -100      Last Void/BM       Most Recent Value    Urine Occurrence 1 at 08/23/2017 1300    Stool Occurrence 1 at 08/22/2017 1600      Case Management/Discharge Planning     Case Management/Discharge Planning Flowsheet     REFERRAL INFORMATION     FINAL RESOURCES      # of Referrals Placed by CTS  Communication hand-offs to next level of Care Providers (CS Pool, pt appt w/ PCP 9/23) 09/22/16 1106   Equipment Currently Used at Home  none 08/18/17 1221    Primary Care MD Name  Dr. Gee 09/21/16 1244   ABUSE RISK SCREEN      LIVING ENVIRONMENT     QUESTION TO PATIENT:  Has a member of your family or a partner(now or in the past) intimidated, hurt, manipulated, or controlled you in any way?  no 08/18/17 0228    Lives With  alone 08/18/17 1221   QUESTION TO PATIENT: Do you feel safe going back to the place where you are living?  yes 08/18/17 0228    Living Arrangements  house 08/18/17 1221   OBSERVATION: Is there reason to believe there has been maltreatment of a vulnerable adult (ie. Physical/Sexual/Emotional abuse, self neglect, lack of adequate food, shelter, medical care, or financial exploitation)?  no 08/18/17 0228    Provides Primary Care For  no one (independent, drives self to and from appt/ shopping) 09/21/16 1244   (R) MENTAL HEALTH SUICIDE RISK      COPING/STRESS     Are you depressed or being treated  for depression?  No 08/19/17 0427    Major Change/Loss/Stressor  hospitalization 08/19/17 0427   HOMICIDE RISK      DISCHARGE PLANNING     Homicidal Ideation  no 08/18/17 0228    Transportation Available  car;family or friend will provide 08/18/17 1222

## 2017-08-18 NOTE — PROGRESS NOTES
08/18/17 0908   Quick Adds   Type of Visit Initial Occupational Therapy Evaluation   Living Environment   Lives With alone   Living Arrangements ([facility])   Home Accessibility stairs to enter home;stairs within home;tub/shower is not walk in   Number of Stairs to Enter Home 4   Number of Stairs Within Home (7+7 split level)   Transportation Available car;family or friend will provide   Living Environment Comment friend is able to come over and help if needed   Self-Care   Dominant Hand right   Usual Activity Tolerance moderate   Current Activity Tolerance fair   Regular Exercise no  (does some walking)   Equipment Currently Used at Home none   Functional Level Prior   Ambulation 0-->independent   Transferring 0-->independent   Toileting 0-->independent   Bathing 0-->independent   Dressing 0-->independent   Eating 0-->independent   Communication 0-->understands/communicates without difficulty   Swallowing 0-->swallows foods/liquids without difficulty   Cognition 0 - no cognition issues reported   Fall history within last six months yes   Number of times patient has fallen within last six months 1   Which of the above functional risks had a recent onset or change? fall history   Prior Functional Level Comment in past year 2 MIs and 1 CVA   General Information   Onset of Illness/Injury or Date of Surgery - Date 08/18/17   Referring Physician Lucas Christopher MD   Patient/Family Goals Statement to get home and be Ind   Additional Occupational Profile Info/Pertinent History of Current Problem 81 year old with history of CAD and CVA on plavix who presents after fall.  Injuries include traumatic SAH and SDH.     Precautions/Limitations fall precautions   Cognitive Status Examination   Orientation orientation to person, place and time   Level of Consciousness alert   Visual Perception   Visual Perception No deficits were identified   Sensory Examination   Sensory Comments decreased sensation in R UE, tingling,  but light touch appeared intact   Pain Assessment   Patient Currently in Pain No   Integumentary/Edema   Integumentary/Edema no deficits were identifed   Range of Motion (ROM)   ROM Comment L shoulder to 90 as limited by pain, R shoulder flaccid   Strength   Strength Comments R distal 3+/5, L UE WNL, shoulder limited by pain   Muscle Tone Assessment   Muscle Tone Comments R UE decreased tone, more deficit proximally   Coordination   Upper Extremity Coordination Right UE impaired   Transfer Skill: Sit to Stand   Level of Cherry: Sit/Stand minimum assist (75% patients effort)   Upper Body Dressing   Level of Cherry: Dress Upper Body moderate assist (50% patients effort)   Lower Body Dressing   Level of Cherry: Dress Lower Body maximum assist (25% patients effort)   Toileting   Level of Cherry: Toilet maximum assist (25% patients effort)   Grooming   Level of Cherry: Grooming minimum assist (75% patients effort)   Instrumental Activities of Daily Living (IADL)   Previous Responsibilities (Pt reports Ind)   Activities of Daily Living Analysis   Impairments Contributing to Impaired Activities of Daily Living pain;strength decreased;sensation decreased;muscle tone abnormal;motor control impaired   General Therapy Interventions   Planned Therapy Interventions ADL retraining   Clinical Impression   Criteria for Skilled Therapeutic Interventions Met yes, treatment indicated   OT Diagnosis decreased ADL I and tolerance   Influenced by the following impairments apraxia, low tone, pain, fall precautions   Assessment of Occupational Performance 3-5 Performance Deficits   Identified Performance Deficits dressing, bathing, toileting, home mgmt, leisure   Clinical Decision Making (Complexity) Low complexity   Therapy Frequency daily   Predicted Duration of Therapy Intervention (days/wks) 9/18/17   Anticipated Equipment Needs at Discharge (TBD)   Anticipated Discharge Disposition Acute Rehabilitation  "Facility   Risks and Benefits of Treatment have been explained. Yes   Patient, Family & other staff in agreement with plan of care Yes   Clinical Impression Comments Pt would benefit from skilled OT to help increase ADL I and tolerance   [facility] TM \"6 Clicks\"   2016, Trustees of [facility], under license to [employer].  All rights reserved.   6 Clicks Short Forms Daily Activity Inpatient Short Form   [facility] AM-PAC  \"6 Clicks\" Daily Activity Inpatient Short Form   1. Putting on and taking off regular lower body clothing? 2 - A Lot   2. Bathing (including washing, rinsing, drying)? 2 - A Lot   3. Toileting, which includes using toilet, bedpan or urinal? 2 - A Lot   4. Putting on and taking off regular upper body clothing? 2 - A Lot   5. Taking care of personal grooming such as brushing teeth? 3 - A Little   6. Eating meals? 3 - A Little   Daily Activity Raw Score (Score out of 24.Lower scores equate to lower levels of function) 14   Total Evaluation Time   Total Evaluation Time (Minutes) 10     "

## 2017-08-18 NOTE — IP AVS SNAPSHOT
"    UNIT [address] [facility]: [phone]                                              INTERAGENCY TRANSFER FORM - PHYSICIAN ORDERS   2017                    Hospital Admission Date: 2017  MARLA VÁZQUEZ   : 1935  Sex: Male        Attending Provider: Josefa Rodarte MD     Allergies:  Aspirin, Penicillins, Contrast Dye    Infection:  None   Service:  TRAUMA    Ht:  1.88 m (6' 2\")   Wt:  83.1 kg (183 lb 3.2 oz)   Admission Wt:  88.1 kg (194 lb 4.8 oz)    BMI:  23.52 kg/m 2   BSA:  2.08 m 2            Patient PCP Information     Provider PCP Type    Etienne Gee MD General      ED Clinical Impression     Diagnosis Description Comment Added By Time Added    SDH (subdural hematoma) (H) [I62.00] SDH (subdural hematoma) (H) [I62.00]  Mayank Mason MD 2017  2:27 AM    SAH (subarachnoid hemorrhage) (H) [I60.9] SAH (subarachnoid hemorrhage) (H) [I60.9]  Mayank Mason MD 2017  2:27 AM    Laceration of scalp, initial encounter [S01.01XA] Laceration of scalp, initial encounter [S01.01XA]  Mayank Mason MD 2017  2:27 AM    Abrasion, right knee, initial encounter [S80.211A] Abrasion, right knee, initial encounter [S80.211A]  Mayank Mason MD 2017  2:27 AM    Abrasion of left elbow, initial encounter [S50.312A] Abrasion of left elbow, initial encounter [S50.312A]  Mayank Mason MD 2017  2:27 AM      Hospital Problems as of 2017              Priority Class Noted POA    Fall Medium  2017 Yes      Non-Hospital Problems as of 2017              Priority Class Noted    Atherosclerosis of native coronary artery of native heart without angina pectoris High  2013    Paroxysmal atrial fibrillation (H) Medium  2013    Peripheral artery disease (H) Medium  2013    Hyperlipidemia LDL goal <70 Medium  2013    Benign essential hypertension Medium  2013    Paroxysmal ventricular tachycardia (H) Medium  2013    " Cerebral infarction (H) Medium  12/13/2013    Acute exacerbation of CHF (congestive heart failure) (H) Medium  9/2/2016    Stroke (cerebrum) (H) Medium  9/16/2016    Type 2 diabetes mellitus with stage 3 chronic kidney disease, with long-term current use of insulin (H) Medium  11/4/2016    Chronic systolic heart failure (H) Medium  1/30/2017    Gastroesophageal reflux disease with esophagitis Medium  5/8/2017    Seizures (H) Medium  8/20/2017      Code Status History     Date Active Date Inactive Code Status Order ID Comments User Context    8/23/2017  1:50 PM  Full Code [id number]  Jarad Ga PA-C Outpatient    8/23/2017  9:53 AM 8/23/2017  1:50 PM Full Code [id number]  Rosario Martinez APRN CNP Inpatient    9/22/2016 10:37 AM 8/23/2017  9:53 AM Full Code [id number]  Lul Boss MD Outpatient    9/19/2016  9:36 AM 9/22/2016 10:37 AM Full Code [id number]  Fracisco Hernandez MD Inpatient    9/18/2016  3:28 PM 9/19/2016  9:36 AM Full Code [id number]  Lul Boss MD Outpatient    9/16/2016  2:49 PM 9/18/2016  3:28 PM Full Code [id number]  Ned Wood DO Inpatient    9/8/2016  4:42 PM 9/16/2016  2:49 PM Full Code [id number]  Samson Nelson MD Outpatient    9/2/2016 12:02 PM 9/8/2016  4:42 PM Full Code [id number]  Liana Thorpe MD Inpatient         Medication Review      START taking        Dose / Directions Comments    acetaminophen 325 MG tablet   Commonly known as:  TYLENOL   Used for:  Pain        Dose:  650 mg   Take 2 tablets (650 mg) by mouth every 4 hours as needed for mild pain   Quantity:  100 tablet   Refills:  0        * insulin aspart 100 UNIT/ML injection   Commonly known as:  NovoLOG PEN   Used for:  Chronic systolic congestive heart failure (H), Type 2 diabetes mellitus with hyperosmolar coma, with long-term current use of insulin (H)        Subcutaneous 3 times daily (before meals).  For Pre-Meal Blood Glucose: Give with prandial insulin,  Do Not give Insulin if Pre-Meal  BG < 140.  -189 give 1 unit.  -239 give 2 units.  -289 give 3 units.  -339 give 4 units.  -399 give 5 units.  -449 give 6 units BG = or > 450 give 7 units.  Notify MD if glucose > or = 350 mg/dL and give insulin.   Refills:  0        * insulin aspart 100 UNIT/ML injection   Commonly known as:  NovoLOG PEN   Used for:  Type 2 diabetes mellitus with hyperosmolar coma, with long-term current use of insulin (H)        Dose:  1-5 Units   Inject 1-5 Units Subcutaneous At Bedtime MEDIUM INSULIN RESISTANCE DOSING   Do Not give Bedtime Correction Insulin if BG less than  200.  For  - 249 give 1 units.  For  - 299 give 2 units.  For  - 349 give 3 units.  For  -399 give 4 units.  For BG greater than or equal to 400 give 5 units. Notify provider if glucose greater than or equal to 350 mg/dL after administration of correction dose.   Refills:  0        levETIRAcetam 500 MG tablet   Commonly known as:  KEPPRA   Used for:  SDH (subdural hematoma) (H), SAH (subarachnoid hemorrhage) (H)        Dose:  500 mg   1 tablet (500 mg) by Oral or Feeding Tube route 2 times daily   Quantity:  60 tablet   Refills:  0        mupirocin 2 % ointment   Commonly known as:  BACTROBAN   Used for:  Abrasion, right knee, initial encounter        Apply topically 2 times daily   Quantity:  22 g   Refills:  0        OXcarbazepine 150 MG tablet   Commonly known as:  TRILEPTAL   Used for:  History of stroke        Dose:  150 mg   Take 1 tablet (150 mg) by mouth 2 times daily   Refills:  0        senna-docusate 8.6-50 MG per tablet   Commonly known as:  SENOKOT-S;PERICOLACE   Used for:  Drug-induced constipation        Dose:  1-2 tablet   Take 1-2 tablets by mouth 2 times daily   Quantity:  60 tablet   Refills:  0    Hold for loose stools       * Notice:  This list has 2 medication(s) that are the same as other medications prescribed for you. Read the directions carefully, and ask your doctor or  other care provider to review them with you.      CONTINUE these medications which may have CHANGED, or have new prescriptions. If we are uncertain of the size of tablets/capsules you have at home, strength may be listed as something that might have changed.        Dose / Directions Comments    insulin glargine 100 UNIT/ML injection   Commonly known as:  LANTUS SOLOSTAR   This may have changed:  See the new instructions.   Used for:  Type 2 diabetes mellitus with stage 3 chronic kidney disease, with long-term current use of insulin (H)        Dose:  10 Units   Inject 10 Units Subcutaneous At Bedtime   Quantity:  30 mL   Refills:  0        pantoprazole 40 MG EC tablet   Commonly known as:  PROTONIX   This may have changed:  See the new instructions.   Used for:  Gastroesophageal reflux disease, esophagitis presence not specified        TAKE 1 TABLET BY MOUTH EVERY EVENING   Quantity:  90 tablet   Refills:  2          CONTINUE these medications which have NOT CHANGED        Dose / Directions Comments    ACCU-CHEK MARY PLUS test strip   Used for:  Type 2 diabetes mellitus with stage 3 chronic kidney disease, with long-term current use of insulin (H)   Generic drug:  blood glucose monitoring        TEST THREE TIMES DAILY   Quantity:  300 strip   Refills:  1        amLODIPine 10 MG tablet   Commonly known as:  NORVASC   Used for:  Peripheral artery disease (H), Coronary artery disease involving native coronary artery of native heart without angina pectoris        Dose:  10 mg   Take 1 tablet (10 mg) by mouth daily   Quantity:  90 tablet   Refills:  4        ASPIRIN NOT PRESCRIBED   Commonly known as:  INTENTIONAL   Used for:  Chronic systolic congestive heart failure (H)        Please choose reason not prescribed, below   Quantity:  0 each   Refills:  0        atorvastatin 40 MG tablet   Commonly known as:  LIPITOR   Used for:  Hyperlipidemia LDL goal <70        Dose:  40 mg   Take 1 tablet (40 mg) by mouth daily    Quantity:  90 tablet   Refills:  3        B-D U/F 31G X 8 MM   Generic drug:  insulin pen needle        Refills:  0        Calcium Carb-Cholecalciferol 600-800 MG-UNIT Tabs   Commonly known as:  CALTRATE 600+D   Used for:  Vitamin deficiency        Dose:  1 tablet   Take 1 tablet by mouth daily   Quantity:  30 tablet   Refills:  0        carvedilol 25 MG tablet   Commonly known as:  COREG   Used for:  Essential hypertension with goal blood pressure less than 130/80        Dose:  12.5 mg   Take 0.5 tablets (12.5 mg) by mouth every morning   Quantity:  180 tablet   Refills:  3        CENTRUM SILVER per tablet   Used for:  Vitamin deficiency        Dose:  1 tablet   Take 1 tablet by mouth daily   Quantity:  30 tablet   Refills:  0        hydrALAZINE 50 MG tablet   Commonly known as:  APRESOLINE   Used for:  Essential hypertension with goal blood pressure less than 130/80        Dose:  50 mg   Take 1 tablet (50 mg) by mouth 2 times daily   Refills:  0        isosorbide mononitrate 60 MG 24 hr tablet   Commonly known as:  IMDUR   Used for:  Coronary artery disease involving native coronary artery of native heart without angina pectoris        Dose:  120 mg   Take 2 tablets (120 mg) by mouth daily   Quantity:  180 tablet   Refills:  3        spironolactone 50 MG tablet   Commonly known as:  ALDACTONE   Used for:  Chronic systolic congestive heart failure (H)        Dose:  50 mg   Take 1 tablet (50 mg) by mouth daily   Quantity:  90 tablet   Refills:  3        torsemide 10 MG tablet   Commonly known as:  DEMADEX   Used for:  Acute on chronic combined systolic and diastolic congestive heart failure (H)        Dose:  10 mg   Take 1 tablet (10 mg) by mouth daily   Quantity:  90 tablet   Refills:  3          STOP taking     clopidogrel 75 MG tablet   Commonly known as:  PLAVIX                   Summary of Visit     Reason for your hospital stay       Trauma mechanism:Fall down 6 stairs on plavix on  8/17/2017  Known  Injuries:  1. Left Subarachnoid Hemorrhage  2. Left Subdural Hematoma  3. Left temporal/parietal scalp abrasion   4. Left elbow abrasion  5. Right knee abrasion     Other diagnoses:   1. Acute pain   2. Acute on chronic renal failure  3. Systolic heart failure   4. A.fib   5. DM Type II  6. Hypertension   7. Hyperlipidemia   8. Leukocytosis, resolved  9. HX CVA-1998, on Plavix             After Care     Activity - Up with assistive device       Use assistive devices as needed for ambulation       Additional Discharge Instructions       Hold Plavix until seen in clinic for followup per neurosurgery.       Advance Diet as Tolerated       Follow this diet upon discharge:       Dysphagia level 2 diet with thin liquids       Daily weights       Call Provider for weight gain of more than 2 pounds per day or 5 pounds per week.       Encourage PO fluids           Fall precautions           General info for SNF       Length of Stay Estimate: Short Term Care: Estimated # of Days <30  Condition at Discharge: Stable  Level of care:skilled   Rehabilitation Potential: Good  Admission H&P remains valid and up-to-date: Yes  Recent Chemotherapy: N/A  Use Nursing Home Standing Orders: Yes       Intake and output       Every shift       Mantoux instructions       Give two-step Mantoux (PPD) Per Facility Policy Yes       Weight bearing status       Weight bearing as tolerated   Right shoulder range of motion as tolerated       Wound care (specify)       Site:   Knees and elbows   Instructions:  Bacitracin twice per day             Referrals     Occupational Therapy Adult Consult       Evaluate and treat as clinically indicated.    Reason:  Fall, subdural hematoma       Physical Therapy Adult Consult       Evaluate and treat as clinically indicated.    Reason:  Fall, subdural hematoma       Speech Language Path Adult Consult       Evaluate and treat as clinically indicated.    Reason:  Dysphagia, diet advancement             Your next  10 appointments already scheduled     Aug 28, 2017 12:50 PM CDT   LAB with [facility]   [facility] ([facility])    [address]  [facility] [address] [phone]           Patient must bring picture ID. Patient should be prepared to give a urine specimen  Please do not eat 10-12 hours before your appointment if you are coming in fasting for labs on lipids, cholesterol, or glucose (sugar). Pregnant women should follow their Care Team instructions. Water with medications is okay. Do not drink coffee or other fluids. If you have concerns about taking  your medications, please ask at office or if scheduling via Pressablet, send a message by clicking on Secure Messaging, Message Your Care Team.            Aug 28, 2017  1:50 PM CDT   Core Return with Erica Beach PA-C   [facility] ([facility])    [id number]  [facility] [address] [phone]            Sep 05, 2017 10:15 AM CDT   (Arrive by 10:00 AM)   New Patient Visit with Milad Romero MD   [facility] ([facility])    [address] [place] [address]  [address] [facility] [address] [phone]            Sep 14, 2017   Procedure with Gigi Wheat MD   [facility] (--)    [address]  [facility] [address] [phone]            Feb 09, 2018 11:30 AM CST   [facility] Visit with Etienne Gee MD   [facility]    [address] [id number] [phone]           Bring a current list of meds and any records pertaining to this visit. For Physicals, please bring immunization records and any forms needing to be filled out. Please arrive 10 minutes early to complete paperwork.              Follow-Up Appointment Instructions     Future Labs/Procedures    Follow Up and recommended labs and tests      Comments:    Follow up with your primary care provider for continued medical care and hospital follow up in 5-10 days.     Trauma Clinic as needed   [facility]  Floor 4  [address] [place] [address]   Appointments: [phone]    Follow up in Orthopaedic Clinic as needed for right shoulder tendinous   [facility]  Floor 4   [address] [place] [address]   Appointments: [phone]     Neurosurgery Clinic--follow up with Ms. Karen Taylor in 4 weeks with repeat CT of head ( no contrast)  Floor 3   [address] [place] [address]   Appointments: [phone]    Follow up if you have persistent headache, nausea, dizziness, or thinking problems.  Concussion Clinic  [facility]  Floor 4   [address] [place] [address]   Appointments/Questions: [phone]    Neurology Clinic--follow up with Dr Smith in epilepsy clinic in one month after discharge   [facility]  Floor 3   [address] [place] [address]   Appointments: [phone]      Follow-Up Appointment Instructions     Follow Up and recommended labs and tests       Follow up with your primary care provider for continued medical care and hospital follow up in 5-10 days.     Trauma Clinic as needed   [facility]  Floor 4  [address] [place] [address]   Appointments: [phone]    Follow up in Orthopaedic Clinic as needed for right shoulder tendinous   [facility]  Floor 4   [address] [place] [address]   Appointments: [phone]     Neurosurgery Clinic--follow up with Ms. Karen Taylor in 4 weeks with repeat CT of head ( no contrast)  Floor 3   [address] [place] [address]   Appointments: [phone]    Follow up if you have persistent headache, nausea, dizziness, or thinking problems.  Concussion Clinic  [facility]  Floor 4   [address] [place] [address]   Appointments/Questions: [phone]    Neurology Clinic--follow up with Dr Smith in epilepsy clinic in one month after discharge   [facility]  Floor 3   [address] [place] [address]   Appointments: [phone]             Statement of Approval     Ordered          08/24/17 0845  I have reviewed and agree with all the recommendations and orders detailed in this document.  EFFECTIVE NOW     Approved and electronically signed by:  Rosario Martinez APRN CNP

## 2017-08-18 NOTE — IP AVS SNAPSHOT
"          UNIT [address] [facility] BANK: [phone]                                              INTERAGENCY TRANSFER FORM - LAB / IMAGING / EKG / EMG RESULTS   2017                    Hospital Admission Date: 2017  MARLA VÁZQUEZ   : 1935  Sex: Male        Attending Provider: Josefa Rodarte MD     Allergies:  Aspirin, Penicillins, Contrast Dye    Infection:  None   Service:  TRAUMA    Ht:  1.88 m (6' 2\")   Wt:  83.1 kg (183 lb 3.2 oz)   Admission Wt:  88.1 kg (194 lb 4.8 oz)    BMI:  23.52 kg/m 2   BSA:  2.08 m 2            Patient PCP Information     Provider PCP Type    Etienne Gee MD General         Lab Results - 3 Days      Magnesium [[id number]]  Resulted: 17, Result status: Final result    Ordering provider: Lucas Christopher MD  17 Resulting lab: [facility]    Specimen Information    Type Source Collected On   Blood  17 0743          Components       Value Reference Range Flag Lab   Magnesium 2.1 1.6 - 2.3 mg/dL  51            Phosphorus [[id number]]  Resulted: 17, Result status: Final result    Ordering provider: Lucas Christopher MD  17 Resulting lab: [facility]    Specimen Information    Type Source Collected On   Blood  17 0743          Components       Value Reference Range Flag Lab   Phosphorus 3.2 2.5 - 4.5 mg/dL  51            Basic metabolic panel [[id number]] (Abnormal)  Resulted: 17, Result status: Final result    Ordering provider: Lucas Christopher MD  17 Resulting lab: [facility]    Specimen Information    Type Source Collected On   Blood  17 0743          Components       Value Reference Range Flag Lab   Sodium 136 133 - 144 mmol/L  51   Potassium 4.7 3.4 - 5.3 mmol/L  51   Chloride 109 94 - 109 mmol/L  51   Carbon Dioxide 20 20 - 32 mmol/L  51   Anion Gap 8 3 - " 14 mmol/L  51   Glucose 184 70 - 99 mg/dL H 51   Urea Nitrogen 38 7 - 30 mg/dL H 51   Creatinine 1.17 0.66 - 1.25 mg/dL  51   GFR Estimate 60 >60 mL/min/1.7m2 L 51   Comment:  Non  GFR Calc   GFR Estimate If Black 72 >60 mL/min/1.7m2  51   Comment:  African American GFR Calc   Calcium 9.4 8.5 - 10.1 mg/dL  51            CBC with platelets [[id number]] (Abnormal)  Resulted: 08/24/17 0848, Result status: Final result    Ordering provider: Lucas Christopher MD  08/23/17 2200 Resulting lab: [facility]    Specimen Information    Type Source Collected On   Blood  08/24/17 0743          Components       Value Reference Range Flag Lab   WBC 7.6 4.0 - 11.0 10e9/L  51   RBC Count 3.58 4.4 - 5.9 10e12/L L 51   Hemoglobin 10.5 13.3 - 17.7 g/dL L 51   Hematocrit 32.1 40.0 - 53.0 % L 51   MCV 90 78 - 100 fl  51   MCH 29.3 26.5 - 33.0 pg  51   MCHC 32.7 31.5 - 36.5 g/dL  51   RDW 16.3 10.0 - 15.0 % H 51   Platelet Count 184 150 - 450 10e9/L  51            Keppra (Levetiracetam) Level [[id number]]  Resulted: 08/23/17 1239, Result status: Final result    Ordering provider: Immanuel Lakhani NP  08/22/17 0806 Resulting lab: [facility]    Specimen Information    Type Source Collected On     08/22/17 0806          Components       Value Reference Range Flag Lab   Keppra (Levetiracetam) Level 45 12 - 46 ug/mL  51   Comment:         (Note)  INTERPRETIVE INFORMATION: Keppra (Levetiracetam)  Therapeutic Range:  12-46 ug/mL             Toxic:  Not well Established  Pharmacokinetics of levetiracetam are affected by renal   function. Adverse effects may include somnolence, weakness,   headache and vomiting.  Performed by [employer],  [address] [place] [address],[place] [address] [phone]  [email or web address], Rudy May MD, Lab. Director              Basic metabolic panel [[id number]] (Abnormal)  Resulted: 08/23/17 0942, Result status: Final result    Ordering  provider: Lucas Christopher MD  08/22/17 2200 Resulting lab: [facility]    Specimen Information    Type Source Collected On   Blood  08/23/17 0840          Components       Value Reference Range Flag Lab   Sodium 139 133 - 144 mmol/L  51   Potassium 4.4 3.4 - 5.3 mmol/L  51   Chloride 110 94 - 109 mmol/L H 51   Carbon Dioxide 20 20 - 32 mmol/L  51   Anion Gap 9 3 - 14 mmol/L  51   Glucose 199 70 - 99 mg/dL H 51   Urea Nitrogen 40 7 - 30 mg/dL H 51   Creatinine 1.19 0.66 - 1.25 mg/dL  51   GFR Estimate 59 >60 mL/min/1.7m2 L 51   Comment:  Non  GFR Calc   GFR Estimate If Black 71 >60 mL/min/1.7m2  51   Comment:  African American GFR Calc   Calcium 9.2 8.5 - 10.1 mg/dL  51            Magnesium [[id number]]  Resulted: 08/23/17 0942, Result status: Final result    Ordering provider: Lucas Christopher MD  08/22/17 2200 Resulting lab: [facility]    Specimen Information    Type Source Collected On   Blood  08/23/17 0840          Components       Value Reference Range Flag Lab   Magnesium 1.9 1.6 - 2.3 mg/dL  51            Phosphorus [[id number]]  Resulted: 08/23/17 0942, Result status: Final result    Ordering provider: Lucas Christopher MD  08/22/17 2200 Resulting lab: [facility]    Specimen Information    Type Source Collected On   Blood  08/23/17 0840          Components       Value Reference Range Flag Lab   Phosphorus 3.1 2.5 - 4.5 mg/dL  51            CBC with platelets [[id number]] (Abnormal)  Resulted: 08/23/17 0927, Result status: Final result    Ordering provider: Lucas Christopher MD  08/22/17 2200 Resulting lab: [facility]    Specimen Information    Type Source Collected On   Blood  08/23/17 0840          Components       Value Reference Range Flag Lab   WBC 6.5 4.0 - 11.0 10e9/L  51   RBC Count 3.47 4.4 - 5.9 10e12/L L 51   Hemoglobin 10.1  13.3 - 17.7 g/dL L 51   Hematocrit 31.7 40.0 - 53.0 % L 51   MCV 91 78 - 100 fl  51   MCH 29.1 26.5 - 33.0 pg  51   MCHC 31.9 31.5 - 36.5 g/dL  51   RDW 16.2 10.0 - 15.0 % H 51   Platelet Count 144 150 - 450 10e9/L L 51            Basic metabolic panel [[id number]] (Abnormal)  Resulted: 08/22/17 0920, Result status: Final result    Ordering provider: Lucas Christopher MD  08/21/17 2200 Resulting lab: [facility]    Specimen Information    Type Source Collected On   Blood  08/22/17 0806          Components       Value Reference Range Flag Lab   Sodium 140 133 - 144 mmol/L  51   Potassium 4.3 3.4 - 5.3 mmol/L  51   Chloride 110 94 - 109 mmol/L H 51   Carbon Dioxide 18 20 - 32 mmol/L L 51   Anion Gap 12 3 - 14 mmol/L  51   Glucose 147 70 - 99 mg/dL H 51   Urea Nitrogen 37 7 - 30 mg/dL H 51   Creatinine 1.27 0.66 - 1.25 mg/dL H 51   GFR Estimate 54 >60 mL/min/1.7m2 L 51   Comment:  Non  GFR Calc   GFR Estimate If Black 66 >60 mL/min/1.7m2  51   Comment:  African American GFR Calc   Calcium 9.5 8.5 - 10.1 mg/dL  51            Magnesium [[id number]]  Resulted: 08/22/17 0920, Result status: Final result    Ordering provider: Lucas Christopher MD  08/21/17 2200 Resulting lab: [facility]    Specimen Information    Type Source Collected On   Blood  08/22/17 0806          Components       Value Reference Range Flag Lab   Magnesium 2.0 1.6 - 2.3 mg/dL  51            Phosphorus [[id number]]  Resulted: 08/22/17 0920, Result status: Final result    Ordering provider: Lucas Christopher MD  08/21/17 2200 Resulting lab: [facility]    Specimen Information    Type Source Collected On   Blood  08/22/17 0806          Components       Value Reference Range Flag Lab   Phosphorus 3.4 2.5 - 4.5 mg/dL  51            CBC with platelets [[id number]] (Abnormal)  Resulted: 08/22/17 0910, Result status: Final result     Ordering provider: Lucas Christopher MD  08/21/17 2200 Resulting lab: [facility]    Specimen Information    Type Source Collected On   Blood  08/22/17 0806          Components       Value Reference Range Flag Lab   WBC 7.5 4.0 - 11.0 10e9/L  51   RBC Count 3.42 4.4 - 5.9 10e12/L L 51   Hemoglobin 9.9 13.3 - 17.7 g/dL L 51   Hematocrit 31.2 40.0 - 53.0 % L 51   MCV 91 78 - 100 fl  51   MCH 28.9 26.5 - 33.0 pg  51   MCHC 31.7 31.5 - 36.5 g/dL  51   RDW 16.1 10.0 - 15.0 % H 51   Platelet Count 142 150 - 450 10e9/L L 51            Magnesium [[id number]]  Resulted: 08/21/17 0842, Result status: Final result    Ordering provider: Lucas Christopher MD  08/20/17 2200 Resulting lab: [facility]    Specimen Information    Type Source Collected On   Blood  08/21/17 0749          Components       Value Reference Range Flag Lab   Magnesium 2.2 1.6 - 2.3 mg/dL  51            Phosphorus [[id number]]  Resulted: 08/21/17 0842, Result status: Final result    Ordering provider: Lucas Christopher MD  08/20/17 2200 Resulting lab: [facility]    Specimen Information    Type Source Collected On   Blood  08/21/17 0749          Components       Value Reference Range Flag Lab   Phosphorus 3.5 2.5 - 4.5 mg/dL  51            Basic metabolic panel [[id number]] (Abnormal)  Resulted: 08/21/17 0842, Result status: Final result    Ordering provider: Lucas Christopher MD  08/20/17 2200 Resulting lab: [facility]    Specimen Information    Type Source Collected On   Blood  08/21/17 0749          Components       Value Reference Range Flag Lab   Sodium 138 133 - 144 mmol/L  51   Potassium 4.4 3.4 - 5.3 mmol/L  51   Chloride 108 94 - 109 mmol/L  51   Carbon Dioxide 19 20 - 32 mmol/L L 51   Anion Gap 11 3 - 14 mmol/L  51   Glucose 154 70 - 99 mg/dL H 51   Urea Nitrogen 40 7 - 30 mg/dL H 51   Creatinine  1.42 0.66 - 1.25 mg/dL H 51   GFR Estimate 48 >60 mL/min/1.7m2 L 51   Comment:  Non  GFR Calc   GFR Estimate If Black 58 >60 mL/min/1.7m2 L 51   Comment:  African American GFR Calc   Calcium 9.9 8.5 - 10.1 mg/dL  51            CBC with platelets [[id number]] (Abnormal)  Resulted: 08/21/17 0833, Result status: Final result    Ordering provider: Lucas Christopher MD  08/20/17 2200 Resulting lab: [facility]    Specimen Information    Type Source Collected On   Blood  08/21/17 0749          Components       Value Reference Range Flag Lab   WBC 8.2 4.0 - 11.0 10e9/L  51   RBC Count 3.38 4.4 - 5.9 10e12/L L 51   Hemoglobin 9.9 13.3 - 17.7 g/dL L 51   Hematocrit 30.5 40.0 - 53.0 % L 51   MCV 90 78 - 100 fl  51   MCH 29.3 26.5 - 33.0 pg  51   MCHC 32.5 31.5 - 36.5 g/dL  51   RDW 16.0 10.0 - 15.0 % H 51   Platelet Count 134 150 - 450 10e9/L L 51            Urine Culture Aerobic Bacterial [[id number]] (Abnormal)  Resulted: 08/21/17 0752, Result status: Edited Result - FINAL    Ordering provider: Josefa Rodarte MD  08/18/17 2014 Resulting lab: [facility]    Specimen Information    Type Source Collected On   Catheterized Urine  08/18/17 2014          Components       Value Reference Range Flag Lab   Specimen Description Catheterized Urine      Special Requests Specimen received in preservative   75   Culture Micro --  A 226   Result:         <10,000 colonies/mL  Coagulase negative Staphylococcus     Culture Micro --   226   Result:         10,000 to 50,000 colonies/mL  urogenital daryl  Susceptibility testing not routinely done              Keppra (Levetiracetam) Level [[id number]] (Abnormal)  Resulted: 08/21/17 0516, Result status: Final result    Ordering provider: Immanuel Lakhani NP  08/20/17 0302 Resulting lab: [facility]    Specimen Information    Type Source Collected On     08/20/17 0302          Components        "Value Reference Range Flag Lab   Keppra (Levetiracetam) Level 73 12 - 46 ug/mL H 51   Comment:         (Note)  INTERPRETIVE INFORMATION: Keppra (Levetiracetam)  Therapeutic Range:  12-46 ug/mL             Toxic:  Not well Established  Pharmacokinetics of levetiracetam are affected by renal   function. Adverse effects may include somnolence, weakness,   headache and vomiting.  Performed by [employer],  [address] [place] [address] [phone]  [email or web address], Rudy May MD, Lab. Director              Testing Performed By     Lab - Abbreviation Name Director Address Valid Date Range    51 - Unknown [facility] Unknown [address] [facility] [address] 12/31/14 1010 - Present    75 - Unknown [facility] Unknown [address] [facility] [address] 01/15/15 1019 - Present    226 - Unknown [facility] Unknown [address] [facility] [address] 12/19/14 0955 - Present            Unresulted Labs (24h ago through future)    Start       Ordered    08/18/17 0400  CBC with platelets  DAILY,   Routine     Comments:  Last Lab Result: Hemoglobin (g/dL)       Date                     Value                 08/17/2017               11.4 (L)         ----------    08/18/17 0330    08/18/17 0400  Basic metabolic panel  DAILY,   Routine      08/18/17 0330    08/18/17 0400  Magnesium  DAILY,   Routine      08/18/17 0330    08/18/17 0400  Phosphorus  DAILY,   Routine      08/18/17 0330    Unscheduled  Potassium  (Potassium Replacement - \"High\" - Replacement for all levels less than 4.1 mmol/L - UU,UR,UA,RH,SH,PH,WY )  CONDITIONAL (SPECIFY),   Routine     Comments:  Obtain Potassium Level for these conditions:  *IF no potassium result within 24 hrs before initiation of order set, draw potassium level with next lab collect.    *2 HOURS AFTER last IV potassium replacement dose and 4 hours after an oral replacement dose when potassium replacement " "given for level less than 3.4.  *Next morning after potassium dose.     Repeat Potassium Replacement if necessary.    08/18/17 0950    Unscheduled  Magnesium  (Magnesium Replacement - Adult - \"High\" - Replacement for all levels less than or equal to 2 mg/dL)  CONDITIONAL (SPECIFY),   Routine     Comments:  Obtain Magnesium Level for these conditions:  *IF no magnesium result within 24 hrs before initiation of order set, draw magnesium level with next lab collect.    *2 HOURS AFTER last magnesium replacement dose when magnesium replacement given for level less than 1.6  *Next morning after magnesium dose.     Repeat Magnesium Replacement if necessary.    08/18/17 0950    Unscheduled  Phosphorus  (or    SODIUM Phosphate - \"High\" - Replacement for all levels less than 2.8 mg/dL)  CONDITIONAL (SPECIFY),   Routine     Comments:  Obtain Phosphorus Level for these conditions:  *IF no phosphorus result within 24 hrs before initiation of order set, draw phosphorus level with next lab collect.    *2 HOURS AFTER last phosphorus replacement dose when phosphorus replacement given for level less than 2.0  *Next morning after phosphorus dose.     Repeat Phosphorus Replacement if necessary.    08/18/17 0950         Imaging Results - 3 Days      XR Humerus Port Left G/E 2 Views [[id number]]  Resulted: 08/23/17 1942, Result status: Final result    Ordering provider: Jarad Ga PA-C  08/23/17 1451 Resulted by: Kaleb Mohamud MD    Performed: 08/23/17 1503 - 08/23/17 1919 Resulting lab: RADIOLOGY RESULTS    Narrative:       History: Fall with pain.    COMPARISON: None.    FINDINGS: No acute fracture, destructive bony lesion or active  periosteal reaction about the left upper arm. Changes of mild  osteoarthrosis about the elbow. Mild acromial downsloping. There is a  large lucent lesion in the mid humeral region measuring roughly 15 cm  in craniocaudad dimension by up to 10 cm in transverse extent. There  appear to be some " septations within this fairly well-circumscribed and  mildly lobulated lesion. No associated bony scalloping or periosteal  reaction in this area other than normal irregularity about the deltoid  tuberosity.      Impression:       IMPRESSION:  1. No acute bony abnormality about the left upper arm. Mild  osteoarthrosis about the left elbow.  2. 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 Please correlate with clinical findings and if further  evaluation desired, ultrasound, CT or MR could provide additional  information.    KALEB MOHAMUD MD      XR Shoulder Left Port G/E 2 Views [[id number]]  Resulted: 08/23/17 1937, Result status: Final result    Ordering provider: Jarad Ga PA-C  08/23/17 1451 Resulted by: Kaleb Mohamud MD    Performed: 08/23/17 1503 - 08/23/17 1919 Resulting lab: RADIOLOGY RESULTS    Narrative:       History: Left shoulder pain.    COMPARISON: None.    FINDINGS: No acute fracture, dislocation or abnormal calcification  about the left glenohumeral joint. Increased density within the  lateral scapula under the glenoid extending nearly to the tip. The  acromioclavicular joint is intact. There may be small undersurface  osteophytes off the distal clavicle and acromium at the AC joint  versus a long enthesophyte about the undersurface of the acromium  without obvious narrowing of the subacromial space. Ossified granuloma  noted near the left lung base. Calcification of the thoracic aorta.  Left chest otherwise grossly normal.      Impression:       IMPRESSION:  No acute bony abnormality about the left shoulder. Sclerosis about the  left scapula inferior to the glenoid is similar to that seen on the  right from 5 days ago and may be within normal limits although if the  patient has a history of cancer, sclerotic metastases could also have  this appearance.    KALEB MOHAMUD MD      Testing Performed By     Lab - Abbreviation Name Director Address Valid  Date Range    104 - Rad Rslts RADIOLOGY RESULTS Unknown Unknown 02/16/05 1553 - Present            Encounter-Level Documents:     There are no encounter-level documents.      Order-Level Documents:     There are no order-level documents.

## 2017-08-19 NOTE — PLAN OF CARE
Problem: Brain Injury, Moderate Traumatic (GCS 9-12) (Adult)  Goal: Signs and Symptoms of Listed Potential Problems Will be Absent or Manageable (Brain Injury, Moderate Traumatic)  Signs and symptoms of listed potential problems will be absent or manageable by discharge/transition of care (reference Brain Injury, Moderate Traumatic (GCS 9-12) (Adult) CPG).   Outcome: No Change  D up out of bed with much assist unsteady on feet- weak unable to move r leg- R hand flaccid,   A assist with any movement - encouragement given- rom to r hand  I cont to monitor assist with all activity- rails up call light in reach

## 2017-08-19 NOTE — PLAN OF CARE
Problem: Goal Outcome Summary  Goal: Goal Outcome Summary  PT 4A: patient requiring min - mod assist to transfer to EOB . Stood with assist x 2 and ambulated 7'.  Patient requiring assist x 2 to maintain balance with wide ALICIA, flexed knees and difficulty moving feet despite PT providing significant assistance for lateral weight shifting to initiate steps.  Patient appears to have difficulty with motor planning during ambulation.      Continue to recommend transfer to ARU setting when stable per previous note.

## 2017-08-19 NOTE — PLAN OF CARE
Problem: Goal Outcome Summary  Goal: Goal Outcome Summary     OT-4a: Unable to stand on first attempt with max A, then stood with CGA on 2nd attempt, transferring bed>chair with IV pole and close CGA, needing VCs to  feet, unsteady. RUE neuro re-ed performed. No shoulder activity noted yet, obtained 4/5 into elbow flexion, with 75% AROM into finger/wrist flex/ext, although waxing/waning performance throughout session, at times unable to perform any proximal or distal AROM. Rec d/c to ARU.

## 2017-08-19 NOTE — PROGRESS NOTES
[facility], [place]  Trauma Service Progress Note    Date of Service (when I saw the patient): 08/19/2017     Assessment & Plan     Trauma mechanism:Fall down 6 stairs on plavix  Time/date of injury: 8/17/2017  Known Injuries:  1. Left Subarachnoid Hemorrhage  2. Left Subdural Hematoma  3. Left temporal/parietal scalp abrasion   4. Left elbow abrasion  5. Right knee abrasion   Other diagnoses:   1. Acute pain   2. Acute on chronic renal failure  3. Systolic heart failure   4. A.fib   5. DM Type II  6. HTN  7. HLD  8. Leukocytosis   9. Iatrogenic coagulopathy   10. Chronic normocytic anemia   11. HX CVA-1998     Procedure:  None      Plan:  1. Tertiary exam completed. No additional injuries noted   2. SAH/SDH- NSG consulted. No surgical intervention need. Repeat head CT stable. No interval increase in bleed. Continue Keppra for 7 days. SBP <140. Hold   3. Right shoulder injury- Noted widening of acromioclavicular joint space. C/o pain with movement. ROM relatively intact. Has baseline weakness. Discussed peripherally with ortho. No surgical intervention needed at this time. PMR consulted.   4. A.Fib/HTN/CHF- Resume home medications. PRN hydralazine and labetalol available for HTN. Hold ASA and plavix given bleed.   5. DM- Hold home lantus for now. Medium SSI.  6. JUVENTINO/CKD- Baseline Crt ~1.5. 1.61 on admission. Will continue gentle MIVF for now. Stable. Recheck daily   7. Anemia- Baseline Hgb ~10.5. Admission hgb 10.2. Will continue to monitor. Transfuse for hgb <7.0.   8. Leukocytosis- Resolved.   9. UA on admission marginal indicative of infection. Will await for cultures before starting ABX   10. Bactroban to wounds   11. PT/OT consult   12. PMR consult   13. Sw consult      Code status: Full code confirmed with patient.       General Cares:  GI Prophylaxis: Home PPI   [facility]   Date of last stool/Bowel Regimen:PTA, Senokot BID and miralax  Pulmonary  toilet:IS  Interval History   Course reviewed. No acute events noted. Pain well controlled. Working with therapies. Denies dyspnea, chest pain, palpitations, dizziness, vomiting.   ROS x 8 negative with exception of those things listed in interval hx    Physical Exam   Temp: 98  F (36.7  C) Temp src: Axillary BP: 141/88   Heart Rate: 73 Resp: 16 SpO2: 98 % O2 Device: None (Room air)    Vitals:    08/18/17 0208 08/18/17 0400 08/19/17 0600   Weight: 88.1 kg (194 lb 4.8 oz) 83.5 kg (184 lb 1.4 oz) 82.4 kg (181 lb 10.5 oz)     Vital Signs with Ranges  Temp:  [96.8  F (36  C)-98.4  F (36.9  C)] 98  F (36.7  C)  Heart Rate:  [57-73] 73  Resp:  [16-18] 16  BP: (123-148)/(54-88) 141/88  SpO2:  [93 %-99 %] 98 %  I/O last 3 completed shifts:  In: 1150 [P.O.:600; I.V.:550]  Out: 500 [Urine:500]    [place] Coma Scale - Total 15/15    Constitutional: Awake, alert, cooperative, no apparent distress  Eyes: Lids and lashes normal, pupils equal, round and reactive to light, extra ocular muscles intact, sclera clear, conjunctiva normal.  HENT: Normocephalic, atraumatic, left scalp abrasion healing well.   Respiratory: No increased work of breathing, good air exchange, clear to auscultation bilaterally, no crackles or wheezing.  Cardiovascular:  regular rate and rhythm, normal S1 and S2, no S3 or S4, and no murmur noted.  GI: Normal bowel sounds, soft, non-distended, non-tender, no guarding  Genitourinary:  No urine assessed   Skin:  Normal skin color, no redness, warmth, or swelling, no ecchymosis, no abrasions, and no jaundice. Left elbow abrasion and right shin abrasion healing well.   Musculoskeletal: There is no redness, warmth, or swelling of the joints.  Pedal pulse palpated  Neurologic: Awake, alert, oriented.  Cranial nerves II-XII are grossly intact.  2/5 strength of RUE. Baseline numbness of right wrist to elbow.   Neuropsychiatric: Calm, normal eye contact, alert, affect appropriate to situation, oriented, thought process  normal.    Immanuel Lakhani NP  To contact the trauma service use job code pager [phone],   Numeric texts or alpha text through [facility]

## 2017-08-19 NOTE — PLAN OF CARE
Problem: Goal Outcome Summary  Goal: Goal Outcome Summary  Outcome: No Change  Patient A&O x4. Neuro checks being performed q4 hrs. Patient c/o tingling in RUE and unable to lift extremity. Patient denies pain during night. Patient having frequent PVCs on cardiac monitor. Patient has hx afib and HR 60's-70's. Cardiac strip also showed prolonged PA interval with first degree AV block. Goal to keep SBP <140, no PRNs given during shift. Patient on room air, lungs clear and diminished on right side. Patient taking meds PO without difficulty. Patient uses call light appropriately for urinal. Urine specimen and culture sent earlier this shift, results pending. Plan to transfer to  when available

## 2017-08-20 PROBLEM — R56.9 SEIZURES (H): Status: ACTIVE | Noted: 2017-01-01

## 2017-08-20 NOTE — PROVIDER NOTIFICATION
At approx 2000 pt had an episode of uncontrollable right arm jerking lasting 20-30 sec. Patient was A&O during episode and after. Patient has had 3 more episodes between 0500 and 0630. At 0605 pt was noted to have tremor in head and then moved throughout body with uncontrollable twitches. At 0630 pt had uncontrollable right arm jerking that lasted approx 3 min this time. Writer at bedside during all episodes. Patient states these jerking episodes started occurring after his fall on 8/18/17. Trauma MD paged and notified after jerking episodes. MD came to bedside to see pt and will f/u with Neuro Surg. No new orders at this time.

## 2017-08-20 NOTE — PLAN OF CARE
Problem: Goal Outcome Summary  Goal: Goal Outcome Summary  Outcome: Declining  PT 4A; patient requiring assist x 2 to stand from chair.  Needed mod - max assist to maintain standing balance due to R lean.  Unable to move feet in standing today.  Required assist x 2 to perform standing pivot transfer chair > bed.  Appears to have motor planning deficits which significantly limit mobility.      Continue to recommend transfer to ARU setting when stable.

## 2017-08-20 NOTE — PLAN OF CARE
Problem: Goal Outcome Summary  Goal: Goal Outcome Summary  Outcome: No Change  Pt A&O, neuro's intact and has baseline RUE tingling with minimal movement. See provider notification note for details about new RUE jerking episodes occurring during shift. Pt given tylenol x2 during shift for tenderness on left side of head where he fell. Continuing q4hr neuros. HR 50's-70's during shift. Rhythm strip showed 1st degree AV block and prolonged QT interval with frequent PVC's, and possible ST depression. MD notified and STAT 12 lead EKG ordered. F/u EKG ordered for this AM. Pt denied chest pain or other symptoms so plan to continue to monitor. SBP goal <140. No issues during shift. Patient on room air and lungs clear. Patient taking meds PO without difficulty. Plan to transfer to  when beds available

## 2017-08-20 NOTE — PROGRESS NOTES
PAtient had 2 more partial seizures. Ativan given. He is waiting for his first dose of trileptal    Kanchan Lopez  PGY4 NEurology.   2423

## 2017-08-20 NOTE — PROGRESS NOTES
[facility], [address]  Trauma Service Progress Note    Date of Service (when I saw the patient): 08/20/2017     Assessment & Plan     Trauma mechanism:Fall down 6 stairs on plavix  Time/date of injury: 8/17/2017  Known Injuries:  1. Left Subarachnoid Hemorrhage  2. Left Subdural Hematoma  3. Left temporal/parietal scalp abrasion   4. Left elbow abrasion  5. Right knee abrasion   Other diagnoses:   1. Acute pain   2. Acute on chronic renal failure  3. Systolic heart failure   4. A.fib   5. DM Type II  6. HTN  7. HLD  8. Leukocytosis   9. Iatrogenic coagulopathy   10. Chronic normocytic anemia   11. HX CVA-1998     Procedure:  None      Plan:  1. Tertiary exam completed. No additional injuries noted   2. SAH/SDH- NSG consulted. No surgical intervention need. Repeat head CT stable. No interval increase in bleed. Continue Keppra for 7 days. SBP <140. Hold   3. Right shoulder injury- Noted widening of acromioclavicular joint space. C/o pain with movement. ROM relatively intact. Has baseline weakness. Discussed peripherally with ortho. No surgical intervention needed at this time. PMR consulted. Will obtain MRI today given worsening weakness on exam   4. Tremors- Had an episode of tremor-like RUE shaking in EMS. Overnight and continues into this AM, has intermittent tremors that start in the RUE. Also reported a whole body tremor this AM. No altered LOC during or after occurrences. Pt is on keppra. Neurology consulted. Plan for EEG and will obtain keppra level.    5. A.Fib/HTN/CHF- Resume home medications. PRN hydralazine and labetalol available for HTN. Hold ASA and plavix given bleed.   6. DM- Hold home lantus for now. Medium SSI.  7. JUVENTINO/CKD- Baseline Crt ~1.5. 1.61 on admission. Will continue gentle MIVF for now. Stable. Recheck daily   8. Anemia- Baseline Hgb ~10.5. Admission hgb 10.2. Will continue to monitor. Transfuse for hgb <7.0.   9. Leukocytosis- Resolved.   10. UA on admission  marginal indicative of infection. Cultures show <10K of GPC. Likely contamination. No indication for ABX    11. Bactroban to wounds   12. PT/OT consult   13. PMR consult   14. SW consult      Code status: Full code confirmed with patient.       General Cares:  GI Prophylaxis: Home PPI   [facility]   Date of last stool/Bowel Regimen:PTA, Senokot BID and miralax  Pulmonary toilet:IS  Interval History   Course reviewed. No acute events noted. Pain well controlled. Working with therapies. Denies dyspnea, chest pain, palpitations, dizziness, vomiting.   ROS x 8 negative with exception of those things listed in interval hx    Physical Exam   Temp: 97.6  F (36.4  C) Temp src: Oral BP: 128/73   Heart Rate: 56 Resp: 16 SpO2: 99 % O2 Device: None (Room air)    Vitals:    08/18/17 0400 08/19/17 0600 08/20/17 0500   Weight: 83.5 kg (184 lb 1.4 oz) 82.4 kg (181 lb 10.5 oz) 83.1 kg (183 lb 3.2 oz)     Vital Signs with Ranges  Temp:  [97.2  F (36.2  C)-98  F (36.7  C)] 97.6  F (36.4  C)  Heart Rate:  [51-72] 56  Resp:  [16-24] 16  BP: (106-130)/(46-98) 128/73  SpO2:  [96 %-99 %] 99 %  I/O last 3 completed shifts:  In: 300 [P.O.:300]  Out: 875 [Urine:875]    Eric Coma Scale - Total 15/15    Constitutional: Awake, alert, cooperative, no apparent distress  Eyes: Lids and lashes normal, pupils equal, round and reactive to light, extra ocular muscles intact, sclera clear, conjunctiva normal.  HENT: Normocephalic, atraumatic, left scalp abrasion healing well.   Respiratory: No increased work of breathing, good air exchange, clear to auscultation bilaterally, no crackles or wheezing.  Cardiovascular:  regular rate and rhythm, normal S1 and S2, no S3 or S4, and no murmur noted.  GI: Normal bowel sounds, soft, non-distended, non-tender, no guarding  Genitourinary:  No urine assessed   Skin:  Normal skin color, no redness, warmth, or swelling, no ecchymosis, no abrasions, and no jaundice. Left elbow abrasion and right  shin abrasion healing well.   Musculoskeletal: There is no redness, warmth, or swelling of the joints.  Pedal pulse palpated  Neurologic: Awake, alert, oriented.  Cranial nerves II-XII are grossly intact.  2/5 strength of RUE. Baseline numbness of right wrist to elbow.   Neuropsychiatric: Calm, normal eye contact, alert, affect appropriate to situation, oriented, thought process normal.    Immanuel Lakhani NP  To contact the trauma service use job code pager [phone],   Numeric texts or alpha text through [facility]

## 2017-08-20 NOTE — MR AVS SNAPSHOT
After Visit Summary   8/20/2017    Kelechi Coleman    MRN: [id number]           Patient Information     Date Of Birth          1935        Visit Information        Provider Department      8/20/2017 4:00 PM [email or web address] EEG TECH 4 [facility] [[email or web address]] EEG        Today's Diagnoses     Seizures (H)    -  1       Follow-ups after your visit        Your next 10 appointments already scheduled     Aug 21, 2017  7:00 AM CDT   24 Hour Video Visit with [email or web address] EEG TECH 4   [facility] [email or web address]] EEG ([facility] [email or web address]] [facility] Clinics)    [facility]  [address] [facility] [address]   [phone]           [place]: Your appointment is scheduled at [facility]. [address] [place] [address]            Aug 28, 2017 12:50 PM CDT   LAB with [facility]   [facility]    [address] [place] [address] [address]  [facility] [address] [phone]           Patient must bring picture ID. Patient should be prepared to give a urine specimen  Please do not eat 10-12 hours before your appointment if you are coming in fasting for labs on lipids, cholesterol, or glucose (sugar). Pregnant women should follow their Care Team instructions. Water with medications is okay. Do not drink coffee or other fluids. If you have concerns about taking  your medications, please ask at office or if scheduling via [employer], send a message by clicking on Secure Messaging, Message Your Care Team.            Aug 28, 2017  1:50 PM CDT   Core Return with Erica Beach PA-C   [facility]    [address] [place] [address] [address]  [facility] [address] [phone]            Sep 14, 2017   Procedure with Gigi Wheat MD   [facility] (--)    [address]  [facility] [address] [phone]            Feb 09, 2018 11:30 AM CST   Office Visit with Etienne MCKEON  MD Gerard   [facility]    [address] [facility] [address] [phone]           Bring a current list of meds and any records pertaining to this visit. For Physicals, please bring immunization records and any forms needing to be filled out. Please arrive 10 minutes early to complete paperwork.              Who to contact     Please call your clinic at [phone] to:    Ask questions about your health    Make or cancel appointments    Discuss your medicines    Learn about your test results    Speak to your doctor   If you have compliments or concerns about an experience at your clinic, or if you wish to file a complaint, please contact [facility] Patient Relations at [phone] or email us at [email or web address]         Additional Information About Your Visit        MyChart Information     [employer] gives you secure access to your electronic health record. If you see a primary care provider, you can also send messages to your care team and make appointments. If you have questions, please call your primary care clinic.  If you do not have a primary care provider, please call [phone] and they will assist you.      [employer] is an electronic gateway that provides easy, online access to your medical records. With [employer], you can request a clinic appointment, read your test results, renew a prescription or communicate with your care team.     To access your existing account, please contact your [facility] or call [phone] for assistance.        Care EveryWhere ID     This is your Care EveryWhere ID. This could be used by other organizations to access your [facility] medical records  [id number]         Blood Pressure from Last 3 Encounters:   08/20/17 130/70   08/18/17 (!) 110/91   08/09/17 107/60    Weight from Last 3 Encounters:   08/20/17 83.1 kg (183 lb 3.2 oz)   08/17/17 83.9 kg (185 lb)    08/09/17 82.8 kg (182 lb 8 oz)              Today, you had the following     No orders found for display         Today's Medication Changes      Notice     This visit is during an admission. Changes to the med list made in this visit will be reflected in the After Visit Summary of the admission.             Primary Care Provider Office Phone # Fax #    Etienne Gee [phone] [address] [place] [address]        Goals        Exercise    I will exercise 2x per week (15 min per time) , for the next 6 weeks.     Notes - Note created  12/22/2016 12:14 PM by Ting Bullard, RN    As of today's date 12/22/2016 goal is met at 0 - 25%.   Goal Status:  Active           General    I will not add any salt to my food and will limit my salt intake to 2,000 mg of sodium per day. (pt-stated)     Notes - Note created  9/26/2016  3:53 PM by Ting Bullard RN    As of today's date 9/26/2016 goal is met at 26 - 50%.   Goal Status:  Active        I will weigh myself every day and call my clinic if I am more than 2 pounds heavier in a day or 5 pounds heavier in a week.  (pt-stated)     Notes - Note created  9/26/2016  3:52 PM by Ting Bullard, RN    As of today's date 9/26/2016 goal is met at 26 - 50%.   Goal Status:  Active          Equal Access to Services     [facility] AH: Hadii aad ku hadasho Soomaali, waaxda luqadaha, qaybta kaalmada adeegyada, nenita reynoso . So [facility] [phone].    ATENCIÓN: Si habla español, tiene a conteh disposición servicios gratuitos de asistencia lingüística. Llame al [phone].    We comply with applicable federal civil rights laws and [place] laws. We do not discriminate on the basis of race, color, national origin, age, disability sex, sexual orientation or gender identity.            Thank you!     Thank you for choosing [facility]  for your care. Our goal is always to provide you with excellent care. Hearing back from our patients  is one way we can continue to improve our services. Please take a few minutes to complete the written survey that you may receive in the mail after your visit with us. Thank you!             Your Updated Medication List - Protect others around you: Learn how to safely use, store and throw away your medicines at www.disposemymeds.org.      Notice     This visit is during an admission. Changes to the med list made in this visit will be reflected in the After Visit Summary of the admission.

## 2017-08-20 NOTE — PLAN OF CARE
Problem: Goal Outcome Summary  Goal: Goal Outcome Summary     OT-4a: Pt reporting more fatigue today. No RUE movement today, decreased from last session, but pt does seem to wax and wane. R-sided tremors at rest and during activity. Increased difficulty standing, needing mod Ax2 initially, and unable to perform stepping to get to chair, needing lift for transfer this session. Difficulty performing simple L/R stepping sequence while seated EOB. Rec d/c to ARU.

## 2017-08-20 NOTE — PLAN OF CARE
Problem: Goal Outcome Summary  Goal: Goal Outcome Summary  Outcome: Declining  D: Pt has been afebrile, VSS today. Still no appetite, po intake of fluids marginal to adequate, urine outputs marginal to adequate. RLE strong but uncoordinated, RUE with increased weakness over the course of the day and increased jerking motions. Otherwise neuros intact per baseline.  Notified Trauma coverage physician of weakness at 1700 today. Complains of slight headache I: Pt up to chair with sling lift  X 3 hrs, tolerated well. Tylenol x1 for headache. R shoulder MRI completed and EEG leads attached before transfer to 6A.  A: Pt alert and oriented x4. States adequate pain control on tylenol dose. Pt states he is eager to resolve jerking motions in RUE. P; Report given to Chantale LE on 6A. Pt transferred via bed at 6 pm. Continue plan of care.

## 2017-08-20 NOTE — CONSULTS
Neurology Consultation    Kelechi Efrain Coleman    81 year old       Reason for consult: R arm and leg jerking          HPI:   81 years old man with personal history of CVA on plavix, A. fib had a mechanical fall 2 days ago. Since his fall he has been noticing jerking and his right arm. Those episodes are brief, usually seconds. Whe the episodes are a little bit longer, he starts to have a jerking on his right leg. Since yesterday his right arm, that was already a little bit weaker than his left, is impossible to move. He thinks that his right leg is also a little bit weaker. During the episodes of jerking he can't move his arm, although he tried. He denies any change in his speech, confusion alteration in his vision. He denies headache, shortness of breath, chest pain. He had some tingling in the first episode.    On investigation of his motor episodes, a CT head as done, that showed multiple subarachnoid hemorrhages. He was put on Keppra thousand milligrams twice a day since 8/18. He is also being followed by neurosurgery    IT is unclear why he is not on anticoagulation for his atrial fibrillation, Since he has history of CVA. While in the hospital  He is not on a. Fib.   .   He refers that this was his first fall, he does not have history of frequent falls. Physical therapist that is followin Mr Coleman since admission refers that he is now needing more assistance to walk            Past Medical History:     Past Medical History:   Diagnosis Date     Acute exacerbation of CHF (congestive heart failure) (H) 9/2/2016     Atrial fibrillation (H)      Cerebral infarction (H) 12/13/2013, 9/2016     Diagnosis updated by automated process. Provider to review and confirm.     Coronary artery disease      Diabetes (H)      Gastroesophageal reflux disease with esophagitis 5/8/2017     Hyperlipidaemia      Hypertension      Myocardial infarction (H)      PAD (peripheral artery disease) (H)      Stented coronary artery      CABG 2002 4 VESSEL     Type 2 diabetes mellitus with stage 3 chronic kidney disease, with long-term current use of insulin (H) 11/4/2016     Unspecified cerebral artery occlusion with cerebral infarction               Past Surgical History:     Past Surgical History:   Procedure Laterality Date     C MRA UPPER EXTREMITY WO&W CONT       CORONARY ARTERY BYPASS  2002    LIMA-LAD, SVG-OM1, [id number]     ENT SURGERY      VOCAL CORD LESION REMOVED     GENITOURINARY SURGERY      ANGIOPLASTY FOR RENAL ARTERT STENOSIS     HC LEFT HEART CATHETERIZATION  9/7/2016 09/07/2016: CHUCHO x2 to distal and proximal SVG to to RPDA     HEART CATH LEFT HEART CATH  11/30/2001     PHACOEMULSIFICATION CLEAR CORNEA WITH STANDARD INTRAOCULAR LENS IMPLANT Left 1/22/2015    Procedure: PHACOEMULSIFICATION CLEAR CORNEA WITH STANDARD INTRAOCULAR LENS IMPLANT;  Surgeon: Bart Johnson MD;  Location: [facility]     VASCULAR SURGERY      ANGIOPLASTY LEFT LEG FOR pvd              Social History:     Social History   Substance Use Topics     Smoking status: Former Smoker     Years: 40.00     Types: Pipe     Quit date: 12/18/1998     Smokeless tobacco: Never Used     Alcohol use 0.0 oz/week     0 Standard drinks or equivalent per week      Comment: 3 hard liquor drinks daily              Family History:     Family History   Problem Relation Age of Onset     DIABETES Mother      Family History Negative Father      CANCER Maternal Grandmother               Allergies:     Allergies   Allergen Reactions     Aspirin Hives     Penicillins Hives     Contrast Dye Hives             Medications:     Current Facility-Administered Medications   Medication     lactated ringers injection     amLODIPine (NORVASC) tablet 10 mg     atorvastatin (LIPITOR) tablet 40 mg     carvedilol (COREG) tablet 12.5 mg     isosorbide mononitrate (IMDUR) 24 hr tablet 120 mg     pantoprazole (PROTONIX) EC tablet 40 mg     spironolactone (ALDACTONE) tablet 50 mg     naloxone (NARCAN)  injection 0.1-0.4 mg     acetaminophen (TYLENOL) tablet 650 mg    Or     acetaminophen (TYLENOL) solution 650 mg     ondansetron (ZOFRAN-ODT) ODT tab 4 mg    Or     ondansetron (ZOFRAN) injection 4 mg     prochlorperazine (COMPAZINE) injection 5 mg    Or     prochlorperazine (COMPAZINE) tablet 5 mg    Or     prochlorperazine (COMPAZINE) Suppository 12.5 mg     senna-docusate (SENOKOT-S;PERICOLACE) 8.6-50 MG per tablet 1-2 tablet     polyethylene glycol (MIRALAX/GLYCOLAX) Packet 17 g     bisacodyl (DULCOLAX) Suppository 10 mg     glucose 40 % gel 15-30 g    Or     dextrose 50 % injection 25-50 mL    Or     glucagon injection 1 mg     labetalol (NORMODYNE/TRANDATE) injection 20 mg     calcium carb 1250 mg (500 mg [place])/vitamin D 200 units (OSCAL with D) per tablet 1 tablet     levETIRAcetam (KEPPRA) tablet 1,000 mg     potassium chloride SA (K-DUR/KLOR-CON M) CR tablet 20-40 mEq     potassium chloride (KLOR-CON) Packet 20-40 mEq     potassium chloride 10 mEq in 100 mL intermittent infusion     potassium chloride 10 mEq in 100 mL intermittent infusion with 10 mg lidocaine     potassium chloride 20 mEq in 50 mL intermittent infusion     magnesium sulfate 2 g in NS intermittent infusion (PharMEDium or FV Cmpd)     magnesium sulfate 4 g in 100 mL sterile water (premade)     sodium phosphate 10 mmol in D5W intermittent infusion     sodium phosphate 15 mmol in D5W intermittent infusion     sodium phosphate 20 mmol in D5W intermittent infusion     sodium phosphate 25 mmol in D5W intermittent infusion     mupirocin (BACTROBAN) 2 % ointment     torsemide (DEMADEX) tablet 10 mg     hydrALAZINE (APRESOLINE) injection 10 mg     insulin aspart (NovoLOG) inj (RAPID ACTING)     insulin aspart (NovoLOG) inj (RAPID ACTING)     hydrALAZINE (APRESOLINE) tablet 50 mg              Review of Systems:   The Review of Systems is negative other than noted in the HPI          Physical Exam:   Vital signs:  Temp: 97  F (36.1  C) Temp src:  "Axillary BP: 127/64   Heart Rate: 63 Resp: 18 SpO2: 95 % O2 Device: None (Room air)   Height: 188 cm (6' 2\") Weight: 83.1 kg (183 lb 3.2 oz)  Estimated body mass index is 23.52 kg/(m^2) as calculated from the following:    Height as of this encounter: 1.88 m (6' 2\").    Weight as of this encounter: 83.1 kg (183 lb 3.2 oz).      Constitutional : thin and tall man seated in a chair.. His right arm is jerking and finishes after 5 seconds.   Head:  anicteric. See neuroexam  Eyes: see neuroexam  CVC: S1/S2 rhythmic.   LUng: Clear. On room air. No respiratory distress.   Adomen: soft, non tender, bowel movements heard  Extremities:  No edema. Well perfused. Hammer toes, high arches.   Psychiatry: in good mood. Collaborative  Neuroexam  Alert and oriented to place, date, self and reasons of hospitalization.  Follow commands  Face symmetric  Eyes: motility preserved, no nystagmus  Tongue centered  No tremors  No dysmetria (arms and legs tested)  Strength preserved on L arm. R arm is flaccid, on entering on his room, it was jerking, but it spontaneously stop. Bilateral proximal weakness in legs, worse on the Right side (3/5) L side 4+/5. Extension of legs and feet movements are preserved.   Ankle reflexes are absent. R patelar > L patellar.   Sensory exam to light touch: preserved   Proprioception preserved  Vibration sensory absent on R leg. Decreased in  L toe,   No aphasia  Mild dysarthria           Data:     Results for orders placed or performed during the hospital encounter of 08/18/17 (from the past 24 hour(s))   Glucose by meter   Result Value Ref Range    Glucose 258 (H) 70 - 99 mg/dL   Glucose by meter   Result Value Ref Range    Glucose 237 (H) 70 - 99 mg/dL   EKG 12-lead, complete   Result Value Ref Range    Interpretation ECG Click View Image link to view waveform and result    CBC with platelets   Result Value Ref Range    WBC 7.6 4.0 - 11.0 10e9/L    RBC Count 3.10 (L) 4.4 - 5.9 10e12/L    Hemoglobin 9.1 (L) " 13.3 - 17.7 g/dL    Hematocrit 27.6 (L) 40.0 - 53.0 %    MCV 89 78 - 100 fl    MCH 29.4 26.5 - 33.0 pg    MCHC 33.0 31.5 - 36.5 g/dL    RDW 15.9 (H) 10.0 - 15.0 %    Platelet Count 121 (L) 150 - 450 10e9/L   Basic metabolic panel   Result Value Ref Range    Sodium 137 133 - 144 mmol/L    Potassium 4.0 3.4 - 5.3 mmol/L    Chloride 107 94 - 109 mmol/L    Carbon Dioxide 18 (L) 20 - 32 mmol/L    Anion Gap 12 3 - 14 mmol/L    Glucose 170 (H) 70 - 99 mg/dL    Urea Nitrogen 45 (H) 7 - 30 mg/dL    Creatinine 1.64 (H) 0.66 - 1.25 mg/dL    GFR Estimate 41 (L) >60 mL/min/1.7m2    GFR Estimate If Black 49 (L) >60 mL/min/1.7m2    Calcium 9.2 8.5 - 10.1 mg/dL   Magnesium   Result Value Ref Range    Magnesium 2.1 1.6 - 2.3 mg/dL   Phosphorus   Result Value Ref Range    Phosphorus 3.1 2.5 - 4.5 mg/dL   EKG 12-lead, tracing only   Result Value Ref Range    Interpretation ECG Click View Image link to view waveform and result    Glucose by meter   Result Value Ref Range    Glucose 186 (H) 70 - 99 mg/dL   Glucose by meter   Result Value Ref Range    Glucose 185 (H) 70 - 99 mg/dL       CT BRain :  Subarachnoid hemorrhage over the left frontal and  parietal lobes and probable small subdural hematoma over the anterior  left frontal lobe are unchanged in extent since head CT 8/17/2017. No  new intracranial hemorrhage.    Xray right shoulder: 1. No acute osseous abnormality.  2. Widening of acromioclavicular joint space, measuring 11 mm,  previously widened but slightly increased from prior study         Assessment and Plan:   80yo male with history of CVA, A. fib, who had a fall 2 days ago showing multiple subarachnoid  presenting with episodes of arm shaking   Right arm and right leg  Shaking: worrisome for partial seizures . Patient is already on Keppra 1000 mg twice a day. Neurologist suggests  - EEG   - TRileptal 150 mg BID (ordered for you)  - Keppra level.   -consider speech pathology consult due to mild dysarthria  - consider trial  ativan 2mg IV for a longer episode of R arm twitching      Attestation:  Patient discussed over the phone with  Dr. Linares and Dr. Davy Lopez  PGY4 Neurology  [id number]    ATTENDING ADDENDUM: Pt discussed with resident Dr Lopez today, but not seen. Agree with assessment and recs as above. 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 Agree with choice of anticonvulsants, and would recommend video EEG monitoring. Patient to be seen by my colleague who will be covering the General Neurology/Consult service on 8/21/2017. Brice Linares MD

## 2017-08-21 NOTE — PROGRESS NOTES
Pt had event. Leads intact.  Event consisted of RUE shaking. Pt A&Ox4 and able to complete ROAR through all events.  No post-ictal events. No medication given, shaking stopped on own. Seizure pads placed and suction set up. Cont to monitor.      Time/length of seizure event: 1-2 minutes  Movements (head, eyes, extremities): shaking in trunk, RUE.  Orientation during seizure: A&Ox4.  After seizure, remembers the unique phrase given during seizure: Yes  After seizure, remembers an unnamed visual object shown during seizure: Yes  Able to identify/name aloud item during seizure: Yes  Able to follow command during seizure: Yes  Able to read test sentence aloud during seizure: Yes  Able to read test sentence aloud again after the seizure: Yes  After seizure, remembers name of object shown during seizure: Yes  Orientation and level of consciousness after seizure: A&Ox4  VS and oxygen saturation during/after seizure: VSS, 02 sats in mid-high 90's Room Air  Recall of the event?: Yes  Was this a typical seizure/event?: Yes. However, only RUE movement.  Presence of aura or pre-seizure activity: No  Incontinence: No

## 2017-08-21 NOTE — TELEPHONE ENCOUNTER
Brittany from [facility] called. Pt has an Xray on 8/18.  Findings: mild widening of AC joint space.  Brittany [phone]

## 2017-08-21 NOTE — PROGRESS NOTES
[facility], [place]  Trauma Service Progress Note    Date of Service (when I saw the patient): 08/21/2017     Assessment & Plan     Trauma mechanism:Fall down 6 stairs on plavix  Time/date of injury: 8/17/2017  Known Injuries:  1. Left Subarachnoid Hemorrhage  2. Left Subdural Hematoma  3. Left temporal/parietal scalp abrasion   4. Left elbow abrasion  5. Right knee abrasion   Other diagnoses:   1. Acute pain   2. Acute on chronic renal failure  3. Systolic heart failure   4. A.fib   5. DM Type II  6. HTN  7. HLD  8. Leukocytosis   9. Iatrogenic coagulopathy   10. Chronic normocytic anemia   11. HX CVA-1998     Procedure:  None      Plan:  1. Tertiary exam completed. No additional injuries noted   2. SAH/SDH- NSG consulted. No surgical intervention need. Repeat head CT stable. No interval increase in bleed. Continue Keppra for 7 days. SBP <140. Hold keppra for 2 doses given keppra level 72 this AM.  Repeat keppra level in AM.   3. Right shoulder injury- Noted widening of acromioclavicular joint space. C/o pain with movement. ROM relatively intact. Has baseline weakness.  PMR consulted. MRI confirming ligamentous injury and severe tendinosis.  Ortho consulted.  Recommend WBAT RUE; continue PT/OT for strengthening, f/u in ortho clnic PRN.    4. Tremors- Had an episode of tremor-like RUE shaking in EMS. Overnight and continues into this AM, has intermittent tremors that start in the RUE.  No altered LOC during or after occurrences. Pt is on keppra. Neurology consulted. EEG demonstrates NO electrographic seizure activity and no interictal epileptiform abnormalities.  Generalized tremor was noted.  Ok with low dose ativan per Neuro request for sustained tremor/partial seizure.  Per Neuro, will order CTA head/neck to assess for vascular component.    5. A.Fib/HTN/CHF- Resume home medications. PRN hydralazine and labetalol available for HTN. Hold ASA and plavix given bleed.   6. DM- Hold home  lantus for now given poor PO intake. Medium SSI.  BG 150s-170s  7. JUVENTINO/CKD- Baseline Crt ~1.5. 1.61 on admission. Made NPO today.  Will resume gentle MIVF for now.  Recheck daily. Mild anion gap metabolic acidosis resolving.    8. Anemia- Baseline Hgb ~10.5. Admission hgb 10.2>9.9 this AM. Will continue to monitor. Transfuse for hgb <7.0.   9. Leukocytosis- Resolved.   10. UA on admission marginal indicative of infection. Cultures show <10K of GPC. Likely contamination. No indication for ABX    11. Bactroban to wounds   12. PT/OT consult   13. PMR consult   14. SW consult   15. Speech consult for apparent aspiration.  NPO pending evaluation      Code status: Full code confirmed with patient.       General Cares:  GI Prophylaxis: Home PPI   DVT Prophylaxis: [facility] only   Date of last stool/Bowel Regimen:PTA, Senokot BID and miralax. Dulcolax suppository today. No BM since admission   Pulmonary toilet:IS  Interval History   Course reviewed. More seizure like activity overnight and given ativan. Lethargic and confused on exam this AM.  Please avoid giving further ativan unless emergent.  No seizure activity noted on EEG.  Per nursing, patient not demonstrating safe swallow.  Will make NPO; speech eval pending.   Pain well controlled. Working with therapies. Denies dyspnea, chest pain, palpitations, dizziness, vomiting.   ROS x 8 negative with exception of those things listed in interval hx    Physical Exam   Temp: 96  F (35.6  C) Temp src: Axillary BP: 128/73 Pulse: 55 Heart Rate: 62 Resp: 16 SpO2: 92 % O2 Device: None (Room air)    Vitals:    08/18/17 0400 08/19/17 0600 08/20/17 0500   Weight: 83.5 kg (184 lb 1.4 oz) 82.4 kg (181 lb 10.5 oz) 83.1 kg (183 lb 3.2 oz)     Vital Signs with Ranges  Temp:  [95.7  F (35.4  C)-97.3  F (36.3  C)] 96  F (35.6  C)  Pulse:  [52-57] 55  Heart Rate:  [53-69] 62  Resp:  [16-18] 16  BP: (112-135)/(47-73) 128/73  SpO2:  [92 %-99 %] 92 %  I/O last 3 completed shifts:  In: 500  [P.O.:500]  Out: 475 [Urine:475]    Eric Coma Scale - Total 15/15    Constitutional: Awake, lethargic, confused.   Eyes: Lids and lashes normal, pupils equal, round and reactive to light, extra ocular muscles intact, sclera clear, conjunctiva normal.  HENT: Normocephalic, left scalp abrasion healing well.   Respiratory: No increased work of breathing, good air exchange, clear to auscultation bilaterally, no crackles or wheezing.  Cardiovascular:  regular rate and rhythm, normal S1 and S2, no S3 or S4, and no murmur noted.  GI: Normal bowel sounds, soft, non-distended, non-tender, no guarding  Genitourinary:  No urine assessed   Skin:  Normal skin color, no redness, warmth, or swelling, and no jaundice. Left elbow abrasion and right shin abrasion healing well.   Musculoskeletal: There is no redness, warmth, or swelling of the joints.  Pedal pulse palpated  Neurologic: Awake, alert, oriented.  Cranial nerves II-XII are grossly intact.  2/5 strength of RUE. Baseline numbness of right wrist to elbow.   Neuropsychiatric: Calm, normal eye contact, lethargic, affect appropriate to situation, unable to complete orientation questions due to lethargy.     SHAYY Hitchcock CNP  To contact the trauma service use job code pager [phone],   Numeric texts or alpha text through [facility]

## 2017-08-21 NOTE — PROGRESS NOTES
Pt had 3-4 seizures from coming onto the floor at [id number]. Leads intact. Most sz's consisted of RUE shaking. Pt A&Ox4 and able to complete ROAR through all events. Largest events included shaking starting in trunk then moved to RUE and RLE shaking last about a minute. No post-ictal events. IV Ativan 2mg given as well as oral Trileptal. 1L of 02 NC placed and made pt lethargic. Seizure pads placed and suction set up. Cont to monitor.     Time/length of seizure event: 1-2 minutes  Movements (head, eyes, extremities): shaking in trunk, RUE and RLE  Orientation during seizure: A&Ox4.  After seizure, remembers the unique phrase given during seizure: N/A  After seizure, remembers an unnamed visual object shown during seizure: Yes  Able to identify/name aloud item during seizure: Yes  Able to follow command during seizure: Yes  Able to read test sentence aloud during seizure: Yes  Able to read test sentence aloud again after the seizure: Yes  After seizure, remembers name of object shown during seizure: Yes  Orientation and level of consciousness after seizure: A&Ox4  VS and oxygen saturation during/after seizure: VSS, 02 sats in mid-high 90's Room Air  Recall of the event?: Yes  Was this a typical seizure/event?: Yes, but trunk movement was newer  Presence of aura or pre-seizure activity: Yes  Incontinence: No

## 2017-08-21 NOTE — PROGRESS NOTES
Problem: Goal Outcome Summary  Goal: Goal Outcome Summary  Outcome: No Change  Hx/o of CAD and CVA, DM2, HTN . Admitted s/p after fall.  Injuries include traumatic L SAH and L SDH, abrasion on L temple, L elbow and R knee. HR brianna 54-57, on room air, afebrile, other VSS. Keep SBP <140. Still lethargic and garbled speech after Ativan given from evening shift.  A&Ox 4 when fully awake ,other Neuro s intact ex baseline RUE tingling and weakness 2/5 with minimum movement. 8/20 MRI right shoulder shows tearing of ligaments/tendons. Other extremities 4-5/5.  RUE  event of jerking, twitching noticed at 0230; VEEG leads in place and well as sz precautions.  A&Ox4 throughout event and able to complete ROAR during and after events. Increased weakness and trouble coordinating movements over the past couple days; now Ax2 with lift, T&RQ2hrs. PIV x2 SL.  Reg diet. Incont. Of urine x 2 since being lethargic after Ativan, brief in place, otherwise was able to use urinal prior to Ativan. 0200 . Cont to monitor and follow POC.

## 2017-08-21 NOTE — PROGRESS NOTES
Trauma paged and MD to see pt at 2300 due to RN noticing garbled speech and difficulty swallowing. Slight confusion in orientation noted and possible R sided facial droop all new from arrival to floor. D/t baseline R-sided deficits hard to assess stroke like symptoms. MD bedside stating pt does not have droop (did resolve per writers observations as well). Pt lethargic. MD confirmed still from Ativan. Cont to monitor.

## 2017-08-21 NOTE — PLAN OF CARE
Problem: Goal Outcome Summary  Goal: Goal Outcome Summary  Outcome: Therapy, unable to show any progress toward functional goals  SLP: Clinical swallow evaluation completed per MD order.  Patient presents with moderate-severe oropharyngeal dysphagia characterized by poor oral bolus control, delayed swallow initiation and signs of aspiration across consistencies. Lethargy, generalized weakness and weak hoarse vocal quality and cough/throat clear effort further compromise safe oral intake. Recommend remain NPO at this time with thorough oral cares. Patient may have oral swabs with excess moisture squeezed out for comfort. Will assess daily for p.o. Readiness. Consulted with RN. Recommend acute rehab at discharge.

## 2017-08-21 NOTE — TELEPHONE ENCOUNTER
Left message for Brittany to call back to obtain more information about reason for call (can see X-ray results in patient's chart).   Vani LOCKHART RN

## 2017-08-21 NOTE — PLAN OF CARE
Problem: Brain Injury, Moderate Traumatic (GCS 9-12) (Adult)  Goal: Signs and Symptoms of Listed Potential Problems Will be Absent or Manageable (Brain Injury, Moderate Traumatic)  Signs and symptoms of listed potential problems will be absent or manageable by discharge/transition of care (reference Brain Injury, Moderate Traumatic (GCS 9-12) (Adult) CPG).   Outcome: No Change  Pt admitted due to fall, injuries sustained include: L SAH, L SDH, L temporal/parietal abrasion, L elbow abrasion, & R knee abrasion. VEEG leads intact. One event of R arm twitching lasting one minute w/ SLP. Pt denied pain. VSS w/ soft BP s at times. Neuros include: lethargic, disoriented to place, RUE 2/5 w/ tingling, RUE 4/5, and BLE 4/5. Pt having difficulty swallowing w/ morning medications and breakfast. SLP consulted and recommended pt to be NPO until further evaluation. PIV infusing NS at 75mL/hr. Pt voiding spontaneously in urinal. NO BM. Plan for discharge to ARU when medically stable. Continue to monitor and follow POC.

## 2017-08-21 NOTE — PROGRESS NOTES
INITIAL REPORT of Video-EEG Monitoring              DATE OF RECORDIN2017         I reviewed the first 1 hour of video-EEG monitoring of Kelechi Coleman.           The EEG during waking, drowsiness and stage II sleep was normal.   No electrographic seizures and no interictal epileptiform abnormalities were observed.  An intermittent, generalized 2-3 Hz axial-appendicular tremor caused focal EEG artifacts but did not significantly obscure the normal waking and drowsy EEG activities; the tremor was absent during sleep.         Screening for intermittent seizures will require a longer period of recording.   Wayne Bhatti M.D., [facility] [phone]

## 2017-08-21 NOTE — PLAN OF CARE
Problem: Goal Outcome Summary  Goal: Goal Outcome Summary  OT 6A: Facilitated RUE neuromuscular reeducation exercises, [id number] PROM RUE and AROM LUE. Pt engaged in session, but lethargic.      REC: ARU to address R side weakness to increase IND with ADLs

## 2017-08-21 NOTE — CONSULTS
[facility]   PM&R CONSULT    Consulting Provider: Immanuel Lakhani   Reason for Consult: Assessment of rehabilitation   Location of Patient: 6A  Date of Encounter: 8/21/2017       ASSESSMENT/PLAN:    Mr. Kelechi Coleman is a Right handed 81 year old yo male who presents with Right upper extremity weakness, Much worse than baseline prior to baseline secondary to previous stroke. He presents following a mechanical fall resulting in SAH and SDH.   Prior is admitted he was Independent with his mobility and ADL's.  He concurs to recent weight loss, which may be indicated of poor functioning premorbid. Though he had a stroke back in 1997 he states  that he did not have any weakness secondary to the stroke, that limited his function.  Some mild right sided weakness specifically in the upper extremity is endorsed.   During my encounter he has decreased meditation, he is able to answer questions correctly but for the sleep doing the conversation. He has flaccid RUE.   I question the ability of this gentleman to return to his previous evel of function. At present, he has RUE weakness, but the lethargy, suspect deconditioning could easily creep in. Due to the lethargy, he is not a candidate for an ARU setting.   Recommend TCU on discharge unless there is significant improvement in his mentation and he has 24 hrs support on discharge.       HPI:    Kelechi Coleman is a 80yo male with history of CVA, A. fib, who had a fall, presenting with episodes of arm shaking on 8/18/2017    He was noted to have Right arm and right leg shaking: worrisome for partial seizures . Patient was already on Keppra 1000 mg twice a day. Seen by Neurology and started on EEG. He is currently on TRileptal 150 mg BID and Keppra  Seen by speech pathology in consult due to mild dysarthria    Diagnosed with SAH and L SDH, abrasion on L temple, L elbow and R knee. He has been bradycardic at 54-57, on room air,  afebrile, other VSS. Continues to be lethargic and garbled speech    EEG   No electrographic seizures and no interictal epileptiform abnormalities were observed.  8/20 MRI right shoulder shows tearing of ligaments/tendons.  Required transfers of Ax2 with lift  On Reg diet.   He has been Incont. of urine x 2 since being lethargic after Ativan, brief in place, otherwise was able to use urinal prior to Ativan.    PREVIOUS LEVEL OF FUNCTION   He was independent with basic mobility and basic ADL's prior to admit.   He was independent with grocery shopping and basic adl's. Denies any recurrent falls.   He has  Cleaning once a week.   One fall a year back.       CURRENT FUNCTION   PT  In PT sessions, he is requiring assist x 2 to stand from chair.  Needed mod - max assist to maintain standing balance due to R lean.  Unable to move feet in standing today.  Required assist x 2 to perform standing pivot transfer chair > bed.  Appears to have motor planning deficits which significantly limit mobility.  Patient requiring assist x 2 to maintain balance with wide ALICIA, flexed knees and difficulty moving feet despite  OT  Pt reporting more fatigue today. No RUE movement today, decreased from last session, but pt does seem to wax and wane. R-sided tremors at rest and during activity. Increased difficulty standing, needing mod Ax2 initially, and unable to perform stepping to get to chair, needing lift for transfer this session. Difficulty performing simple L/R stepping sequence while seated EOB.       SOCIAL HISTORY/Home Setting/Support:  Lives alone. Endorses a sister in [place] for support.  Lives in a town house with many steps.      Social History     Social History     Marital status: Single     Spouse name: N/A     Number of children: N/A     Years of education: N/A     Social History Main Topics     Smoking status: Former Smoker     Years: 40.00     Types: Pipe     Quit date: 12/18/1998     Smokeless tobacco: Never Used      Alcohol use 0.0 oz/week     0 Standard drinks or equivalent per week      Comment: 3 hard liquor drinks daily     Drug use: No     Sexual activity: Not Currently     Partners: Female     Other Topics Concern     Parent/Sibling W/ Cabg, Mi Or Angioplasty Before 65f 55m? No     Caffeine Concern Yes     decaff only      Stress Concern No     Special Diet Yes     low sodium      Exercise Yes     walking, rehab 3 times a week      Seat Belt Yes     Social History Narrative                   Past Medical History:  Past Medical History:   Diagnosis Date     Acute exacerbation of CHF (congestive heart failure) (H) 9/2/2016     Atrial fibrillation (H)      Cerebral infarction (H) 12/13/2013, 9/2016     Diagnosis updated by automated process. Provider to review and confirm.     Coronary artery disease      Diabetes (H)      Gastroesophageal reflux disease with esophagitis 5/8/2017     Hyperlipidaemia      Hypertension      Myocardial infarction (H)      PAD (peripheral artery disease) (H)      Stented coronary artery     CABG 2002 4 VESSEL     Type 2 diabetes mellitus with stage 3 chronic kidney disease, with long-term current use of insulin (H) 11/4/2016     Unspecified cerebral artery occlusion with cerebral infarction            Current Medications:  Current Facility-Administered Medications   Medication     [Rx hold ] levETIRAcetam (KEPPRA) tablet 1,000 mg     senna-docusate (SENOKOT-S;PERICOLACE) 8.6-50 MG per tablet 1-2 tablet     polyethylene glycol (MIRALAX/GLYCOLAX) Packet 17 g     OXcarbazepine (TRILEPTAL) tablet 150 mg     amLODIPine (NORVASC) tablet 10 mg     atorvastatin (LIPITOR) tablet 40 mg     carvedilol (COREG) tablet 12.5 mg     isosorbide mononitrate (IMDUR) 24 hr tablet 120 mg     pantoprazole (PROTONIX) EC tablet 40 mg     spironolactone (ALDACTONE) tablet 50 mg     naloxone (NARCAN) injection 0.1-0.4 mg     acetaminophen (TYLENOL) tablet 650 mg    Or     acetaminophen (TYLENOL) solution 650 mg      ondansetron (ZOFRAN-ODT) ODT tab 4 mg    Or     ondansetron (ZOFRAN) injection 4 mg     prochlorperazine (COMPAZINE) injection 5 mg    Or     prochlorperazine (COMPAZINE) tablet 5 mg    Or     prochlorperazine (COMPAZINE) Suppository 12.5 mg     senna-docusate (SENOKOT-S;PERICOLACE) 8.6-50 MG per tablet 1-2 tablet     polyethylene glycol (MIRALAX/GLYCOLAX) Packet 17 g     bisacodyl (DULCOLAX) Suppository 10 mg     glucose 40 % gel 15-30 g    Or     dextrose 50 % injection 25-50 mL    Or     glucagon injection 1 mg     labetalol (NORMODYNE/TRANDATE) injection 20 mg     calcium carb 1250 mg (500 mg [place])/vitamin D 200 units (OSCAL with D) per tablet 1 tablet     potassium chloride SA (K-DUR/KLOR-CON M) CR tablet 20-40 mEq     potassium chloride (KLOR-CON) Packet 20-40 mEq     potassium chloride 10 mEq in 100 mL intermittent infusion     potassium chloride 10 mEq in 100 mL intermittent infusion with 10 mg lidocaine     potassium chloride 20 mEq in 50 mL intermittent infusion     magnesium sulfate 2 g in NS intermittent infusion (PharMEDium or FV Cmpd)     magnesium sulfate 4 g in 100 mL sterile water (premade)     sodium phosphate 10 mmol in D5W intermittent infusion     sodium phosphate 15 mmol in D5W intermittent infusion     sodium phosphate 20 mmol in D5W intermittent infusion     sodium phosphate 25 mmol in D5W intermittent infusion     mupirocin (BACTROBAN) 2 % ointment     torsemide (DEMADEX) tablet 10 mg     hydrALAZINE (APRESOLINE) injection 10 mg     insulin aspart (NovoLOG) inj (RAPID ACTING)     insulin aspart (NovoLOG) inj (RAPID ACTING)     hydrALAZINE (APRESOLINE) tablet 50 mg         Review of Systems:  Total of ten systems reviewed, pertinent positives and negatives as follows  Instability with standing and walking.  RUE WEAKNESS     Feels generally fatigued  Denies any tingling or numbness  No problems with bladder prior but incontinent now   No chest pain. No cough or SOB.  No visual changes.   No  "headache or photophobia.   No nausea, or abdominal pain.  Slept poorly last night  No joint pain, muscle pain or swelling.  Remainder of the review of the systems was negative.          Labs   Lab Results   Component Value Date    WBC 8.2 08/21/2017    HGB 9.9 (L) 08/21/2017    HCT 30.5 (L) 08/21/2017    MCV 90 08/21/2017     (L) 08/21/2017     Lab Results   Component Value Date     08/21/2017    POTASSIUM 4.4 08/21/2017    CHLORIDE 108 08/21/2017    CO2 19 (L) 08/21/2017     (H) 08/21/2017     Lab Results   Component Value Date    GFRESTIMATED 48 (L) 08/21/2017    GFRESTBLACK 58 (L) 08/21/2017     Lab Results   Component Value Date    AST 22 08/17/2017    ALT 22 08/17/2017    ALKPHOS 259 (H) 08/17/2017    BILITOTAL 1.3 08/17/2017     Lab Results   Component Value Date    INR 1.32 (H) 08/18/2017     Lab Results   Component Value Date    BUN 40 (H) 08/21/2017    CR 1.42 (H) 08/21/2017         ON EXAMINATION:  Vitals:    08/20/17 2329 08/21/17 0500 08/21/17 0718 08/21/17 0831   BP: 114/62 112/47 135/68 128/73   BP Location:       Pulse: 57 54 55    Resp: 16 16 16    Temp: 96.4  F (35.8  C) 96.3  F (35.7  C) 96  F (35.6  C)    TempSrc: Axillary Axillary Axillary    SpO2: 94% 92% 92%    Weight:       Height:           Physical Exam:  Blood pressure 128/73, pulse 55, temperature 96  F (35.6  C), temperature source Axillary, resp. rate 16, height 1.88 m (6' 2\"), weight 83.1 kg (183 lb 3.2 oz), SpO2 92 %.    GEN: NAD, lying comfortably in bed  He is very lethargic, falls asleep while in conversation, however remarkable ability to tell his story   He is appropriate, cooperative  Speech is dysarthric  Comprehension is intermittent   MSK:   Mild  muscle atrophy noted  ABD: soft, non tender, pos BS  NEURO:   CRANIAL NERVES: Discs flat. Pupils equal, round and reactive to light.  Extraocular movements full. Visual fields full. Face moves symmetrically.  Tongue midline. Hearing intact to finger rubbing. "    Strength: RUE flaccid, RLE has antigravity strength. No episodes of jerking was noted during  my encounter.    Cognition: fund of knowledge and train of thought appropriate  SKIN: ecchymosis of the LUE   EXT: mild edema bilaterally, chronic stasis changes, no ulcers.         Thank you for the consult. Please call for any questions. Pager number [phone]     Caprice Gonsales       Total of 70 min spent in this encounter, more than 50% in counseling and coordination.

## 2017-08-21 NOTE — PROGRESS NOTES
Social Work Services Progress Note    Hospital Day: 4  Date of Initial Social Work Evaluation:  8/18/17  Collaborated with:  Ban Sneed (Trauma)    Data:  OT and Physical Therapy are recommending ARU placement upon discharge from acute hospitalization. The PMR eval is pending.    Intervention:  Phoned Ban Sneed, Gertrude, to inquire about anticipated discharge date.  Per Ban, pt has a ligament tear in his shoulder and needs to be seen by Ortho.  A MRI was completed.  Per Ban, pt is not yet ready for discharge.    Assessment:  Pt is not ready for discharge    Plan:    Anticipated Disposition:  Rehab placement is anticipated    Barriers to d/c plan:  Await ortho consult    Follow Up:  SW will follow for discharge planning.    SOL Valles  Social Work, 6A  Phone:  [phone]  Pager:  [phone]  8/21/2017

## 2017-08-21 NOTE — PROGRESS NOTES
Brief neurology update note    Patient seen and examined this morning. Patient reports continued slow but steady improvement in overall status.  Does appear to be nearing baseline. No outward signs of seizures overnight. Exam today mildly improved from the weekend.  No new focal deficits noted. Patient is now on keppra and Trileptal which which was recently started. EEG noted no new seizure activity this afternoon. 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 Following assessment of his history and physical,especially his history of CVA and A. Fib, we also believe limb shaking TIA to be in the differential; would thus recommend a CTA head and neck. Also would order p.r.n. Ativan for any new evidence of focal seizures.    -Ativan 2 mg p.r.n. For new outward signs of seizure  -CTA head and neck when able to evaluate status of patient's carotids and thus the possibility of limb shaking TIA to be in the differential    Neurology will continue to follow and reassess tomorrow in the a.m.    Patient was seen  And discussed with attending neurologist Dr. Oliavs feel who agrees with my assessment and plan.    Norris Garland MD  Neurology PGY3  P: [phone]

## 2017-08-21 NOTE — PROGRESS NOTES
08/21/17 1215   General Information   Onset Date 08/18/17   Start of Care Date 08/21/17   Referring Physician Ban Gonzalez CNP   Patient Profile Review/OT: Additional Occupational Profile Info See Profile for full history and prior level of function   Patient/Family Goals Statement Patient did not state.   Swallowing Evaluation Bedside swallow evaluation   Behaviorial Observations Lethargic;Initiation problems  (right arm twitching x1 lasting about 15 seconds; RN notified)   Mode of current nutrition NPO   Respiratory Status Room air   Comments Kelechi Coleman is a very pleasant 81 year old right-hand dominant male on plavix with a history of CAD, MI, PAD, HTN, hyperlipidemia, atrial fibrillation, DMII, cerebral infarction, and CKD here s/p mechanical fall down 6 steps 8/17. During fall patient hit head but denies LOC. Subsequent imaging with evidence of subdural hematoma and SAH. 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 HCT revealed SAH left frontal and parietal lobes and probable small SDH anterior left frontal lobe. CXR revealed left retrocardiac opacity and hazy left basilar opacities. Patient had been on regular diet but made NPO today due to difficulty swallowing pills and breakfast today. Patient more lethargic with garbled speech.    Clinical Swallow Evaluation   Dentition (dentition in poor repair)   Mucosal Quality dry   Mandibular Strength and Mobility impaired   Oral Labial Strength and Mobility impaired retraction;impaired pursing;impaired coordination   Lingual Strength and Mobility impaired protrusion;impaired left lateral movement;impaired right lateral movement;impaired coordination   Velar Elevation (not able to visualize fully)   Laryngeal Function Cough;Throat clear;Swallow;Voicing initiated  (weak cough/throat clear effort; hoarse weak vocal quality)   Clinical Swallow Eval: Thin Liquid Texture Trial   Mode of Presentation, Thin Liquids  cup;spoon;self-fed;fed by clinician   Volume of Liquid or Food Presented 2 ice chips; 2 sips water by spoon; 1 sip water by cup   Oral Phase of Swallow Poor AP movement;Premature pharyngeal entry  (suspected)   Pharyngeal Phase of Swallow repeated swallows;wet vocal quality after swallow;throat clearing   Diagnostic Statement High aspiration risk   Clinical Swallow Eval: Nectar Thick Liquid Texture Trial   Mode of Presentation, Nectar spoon;fed by clinician   Volume of Nectar Presented 2 sips   Oral Phase, Nectar Poor AP movement;Premature pharyngeal entry  (suspected)   Pharyngeal Phase, Nectar repeated swallows;wet vocal quality after swallow;throat clearing   Diagnostic Statement High risk for aspiration   Clinical Swallow Eval: Puree Solid Texture Trial   Mode of Presentation, Puree spoon;fed by clinician   Volume of Puree Presented 3 small bites applesauce   Oral Phase, Puree Poor AP movement   Pharyngeal Phase, Puree repeated swallows   Diagnostic Statement Risk for aspiration   Swallow Eval: Clinical Impressions   Skilled Criteria for Therapy Intervention Skilled criteria met.  Treatment indicated.   Functional Assessment Scale (FAS) 2   Treatment Diagnosis moderate-severe oropharyngeal dysphagia   Diet texture recommendations NPO   Demonstrates Need for Referral to Another Service (involved)   Therapy Frequency daily   Predicted Duration of Therapy Intervention (days/wks) 2 weeks   Anticipated Discharge Disposition inpatient rehabilitation facility   Risks and Benefits of Treatment have been explained. Yes   Patient, family and/or staff in agreement with Plan of Care Yes   Clinical Impression Comments Patient presents with moderate-severe oropharyngeal dysphagia characterized by poor oral bolus control, delayed swallow initiation and signs of aspiration across consistencies. Lethargy, generalized weakness and weak hoarse vocal quality and cough/throat clear effort further compromise safe oral intake.  Recommend remain NPO at this time with thorough oral cares. Patient may have oral swabs with excess moisture squeezed out for comfort.   Total Evaluation Time   Total Evaluation Time (Minutes) 20

## 2017-08-21 NOTE — PLAN OF CARE
Problem: Goal Outcome Summary  Goal: Goal Outcome Summary  PT 6A: PT very lethargic today, difficult to arouse from sleep. Pt requires Mod A x 1 for sidelying > sitting; Mod A to maintain seated EOB balance 2/2 to strong right lean and worsened by fatigue in session. Min-Mod A x 2 for sit <> stand, continues to demonstrate strong right lean. Upon sitting, pt's arm begins to tremor - RN notified and seizure monitoring alerted. Mod A x 2 to return to bed.     Recommend TCU once medically appropriate to improve functional mobility and activity tolerance. Pt would benefit from ARU setting pending increased activity tolerance.

## 2017-08-21 NOTE — CONSULTS
[facility]  ORTHOPAEDIC SURGERY CONSULT - HISTORY AND PHYSICAL    DATE OF CONSULT: 8/21/2017 9:29 AM    REQUESTING PROVIDER: Josefa Rodarte MD, MD - N Staff.    CC: R shoulder pain    DATE OF INJURY: 8/17/17    HISTORY OF PRESENT ILLNESS:   The orthopaedic surgery service was consulted by Josefa Lutz MD for evaluation and treatment recommendations of R shoulder pain.    Kelechi Coleman is a very pleasant 81 year old right-hand dominant male on plavix with a history of CAD, MI, PAD, HTN, hyperlipidemia, atrial fibrillation, DMII, cerebral infarction, and CKD here s/p mechanical fall down 6 steps 8/17. During fall patient hit head but denies LOC. Subsequent imaging with evidence of subdural hematoma and SAH. Patient also complained of R shoulder pain. Imaging (XR and MRI) without evidence of fractures but with AC joint widening and rotator cuff partial-thickness tears/tendinosis.    Today patient denies R shoulder pain. Denies pain elsewhere. Complains of difficulty with R hand movement and coordination. Denies numbness or tingling. Denies fever, chills, nausea, vomiting, chest pain, SOB. No prior surgery to RUE or problems with this extremity. Was functional with RUE before fall.      PAST MEDICAL HISTORY:   Past Medical History:   Diagnosis Date     Acute exacerbation of CHF (congestive heart failure) (H) 9/2/2016     Atrial fibrillation (H)      Cerebral infarction (H) 12/13/2013, 9/2016     Diagnosis updated by automated process. Provider to review and confirm.     Coronary artery disease      Diabetes (H)      Gastroesophageal reflux disease with esophagitis 5/8/2017     Hyperlipidaemia      Hypertension      Myocardial infarction (H)      PAD (peripheral artery disease) (H)      Stented coronary artery     CABG 2002 4 VESSEL     Type 2 diabetes mellitus with stage 3 chronic kidney disease, with long-term current use of insulin (H) 11/4/2016     Unspecified cerebral artery  occlusion with cerebral infarction      Patient denies any personal history of bleeding disorders, clotting disorders, or adverse reactions to anesthesia.    PAST SURGICAL HISTORY:    Past Surgical History:   Procedure Laterality Date     C MRA UPPER EXTREMITY WO&W CONT       CORONARY ARTERY BYPASS  2002    LIMA-LAD, SVG-OM1, [id number]-DDQR     ENT SURGERY      VOCAL CORD LESION REMOVED     GENITOURINARY SURGERY      ANGIOPLASTY FOR RENAL ARTERT STENOSIS     HC LEFT HEART CATHETERIZATION  9/7/2016 09/07/2016: CHUCHO x2 to distal and proximal SVG to to RPDA     HEART CATH LEFT HEART CATH  11/30/2001     PHACOEMULSIFICATION CLEAR CORNEA WITH STANDARD INTRAOCULAR LENS IMPLANT Left 1/22/2015    Procedure: PHACOEMULSIFICATION CLEAR CORNEA WITH STANDARD INTRAOCULAR LENS IMPLANT;  Surgeon: Bart Johnson MD;  Location: [facility]     VASCULAR SURGERY      ANGIOPLASTY LEFT LEG FOR pvd       MEDICATIONS:   Prior to Admission medications    Medication Sig Last Dose Taking? Auth Provider   carvedilol (COREG) 25 MG tablet Take 0.5 tablets (12.5 mg) by mouth every morning 8/17/2017 at AM Yes Etienne Gee MD   LANTUS SOLOSTAR 100 UNIT/ML soln ADMINISTER 35 TO 40 UNITS UNDER THE SKIN AT BEDTIME  Patient taking differently: ADMINISTERS 20 TO 35 UNITS UNDER THE SKIN AT BEDTIME DEPENDING ON BG READING 8/16/2017 at PM Yes Etienne Gee MD   pantoprazole (PROTONIX) 40 MG EC tablet TAKE 1 TABLET BY MOUTH EVERY DAY  Patient taking differently: TAKE 1 TABLET BY MOUTH EVERY EVENING 8/16/2017 at PM Yes Etienne Gee MD   torsemide (DEMADEX) 10 MG tablet Take 1 tablet (10 mg) by mouth daily 8/17/2017 at AM Yes Isaiah Charles MD   amLODIPine (NORVASC) 10 MG tablet Take 1 tablet (10 mg) by mouth daily 8/17/2017 at AM Yes Isaiah Charles MD   atorvastatin (LIPITOR) 40 MG tablet Take 1 tablet (40 mg) by mouth daily 8/17/2017 at AM Yes Isaiah Charles MD   clopidogrel (PLAVIX) 75 MG tablet Take 1 tablet (75 mg) by mouth daily  8/17/2017 at AM Yes Isaiah Charles MD   hydrALAZINE (APRESOLINE) 50 MG tablet Take 1 tablet (50 mg) by mouth 3 times daily  Patient taking differently: Take 50 mg by mouth 2 times daily  8/17/2017 at AM Yes Isaiah Charles MD   isosorbide mononitrate (IMDUR) 60 MG 24 hr tablet Take 2 tablets (120 mg) by mouth daily 8/17/2017 at AM Yes Isaiah Charles MD   spironolactone (ALDACTONE) 50 MG tablet Take 1 tablet (50 mg) by mouth daily 8/17/2017 at AM Yes Isaiah Charles MD   ASPIRIN NOT PRESCRIBED (INTENTIONAL) Please choose reason not prescribed, below  Yes Etienne Gee MD   ACCU-CHEK MARY PLUS test strip TEST THREE TIMES DAILY  Yes Etienne Gee MD   B-D U/F 31G X 8 MM insulin pen needle   Yes Reported, Patient   Multiple Vitamins-Minerals (CENTRUM SILVER) per tablet Take 1 tablet by mouth daily. 8/17/2017 at AM Yes Reported, Patient   Calcium Carb-Cholecalciferol (CALTRATE 600+D) 600-800 MG-UNIT TABS Take 1 tablet by mouth daily. 8/17/2017 at AM Yes Reported, Patient       ALLERGIES:   Aspirin; Penicillins; and Contrast dye    SOCIAL HISTORY:   Social History     Social History     Marital status: Single     Spouse name: N/A     Number of children: N/A     Years of education: N/A     Occupational History     Not on file.     Social History Main Topics     Smoking status: Former Smoker     Years: 40.00     Types: Pipe     Quit date: 12/18/1998     Smokeless tobacco: Never Used     Alcohol use 0.0 oz/week     0 Standard drinks or equivalent per week      Comment: 3 hard liquor drinks daily     Drug use: No     Sexual activity: Not Currently     Partners: Female     Other Topics Concern     Parent/Sibling W/ Cabg, Mi Or Angioplasty Before 65f 55m? No     Caffeine Concern Yes     decaff only      Stress Concern No     Special Diet Yes     low sodium      Exercise Yes     walking, rehab 3 times a week      Seat Belt Yes     Social History Narrative     Living situation: Patient lives in a townhouse by  himself.  Tobacco: quit in 1998  Alcohol: Drinks socially    FAMILY HISTORY:  Family History   Problem Relation Age of Onset     DIABETES Mother      Family History Negative Father      CANCER Maternal Grandmother        Patient denies known family history of bleeding, clotting, or anesthesia related complications.     REVIEW OF SYSTEMS:   10-point reviews of systems was negative except as noted above in the HPI.     PHYSICAL EXAM:   Vitals:    08/20/17 2329 08/21/17 0500 08/21/17 0718 08/21/17 0831   BP: 114/62 112/47 135/68 128/73   BP Location:       Pulse: 57 54 55    Resp: 16 16 16    Temp: 96.4  F (35.8  C) 96.3  F (35.7  C) 96  F (35.6  C)    TempSrc: Axillary Axillary Axillary    SpO2: 94% 92% 92%    Weight:       Height:         General: Awake, alert and oriented x3, but voice a little muffled and groggy  Lungs: Breathing comfortably and nonlabored, no wheezes or stridor noted.  Heart/Cardiovascular: Regular pulse, no peripheral cyanosis.  Abdomen: Soft, non-tender, non-distended.   Pelvis: Stable to AP and Lateral compression, non-tender.  Right Upper Extremity: Scattered small hematomas on arm. Skin intact. No significant tenderness to palpation over clavicle, AC joint, shoulder, arm, elbow, forearm, wrist. Unable to move shoulder joint. Able to flex elbow but spastic movements. Pincer-like movement between thumb and index finger when patient prompted to move fingers. Unable to give thumbs up, 'A-okay' sign, or make fist on command. Motion in hand very uncoordinated. SILT ax/m/r/u nerve distributions. Radial pulse palpable, 2+.   Left Upper Extremity: Abrasion on left elbow. No significant tenderness to palpation over clavicle, AC joint, shoulder, arm, elbow, forearm, wrist. Normal ROM shoulder, elbow, wrist without pain. Motor intact distally with finger flexion/extension/intrinsics/EPL, OK sign 5/5 strength. SILT ax/m/r/u nerve distributions. Radial pulse palpable, 2+.   Right Lower Extremity: Small R  knee abrasion. No significant tenderness to palpation over thigh, knee, leg, ankle/foot. Motor intact distally TA/GSC/EHL/FHL with 5/5 strength. SILT sp/dp/tibial/saph/sural nerves. DP/PT pulses palpable, 2+, toes warm and well perfused.   Left Lower Extremity: No deformity, skin intact. No significant tenderness to palpation over thigh, knee, leg, ankle/foot. Motor intact distally TA/GSC/EHL/FHL with 5/5 strength. SILT sp/dp/tibial/saph/sural nerves. DP/PT pulses palpable, 2+, toes warm and well perfused.     LABS:  Hemoglobin   Date Value Ref Range Status   08/21/2017 9.9 (L) 13.3 - 17.7 g/dL Final   08/20/2017 9.1 (L) 13.3 - 17.7 g/dL Final     WBC   Date Value Ref Range Status   08/21/2017 8.2 4.0 - 11.0 10e9/L Final     Platelet Count   Date Value Ref Range Status   08/21/2017 134 (L) 150 - 450 10e9/L Final     INR   Date Value Ref Range Status   08/18/2017 1.32 (H) 0.86 - 1.14 Final     Creatinine   Date Value Ref Range Status   08/21/2017 1.42 (H) 0.66 - 1.25 mg/dL Final     Glucose   Date Value Ref Range Status   08/21/2017 154 (H) 70 - 99 mg/dL Final       IMAGING:  Reviewed - no fractures. AC joint space widening. Rotator cuff partial tears/tendinopathy.    IMPRESSION:   Kelechi Coleman is a 81 year old R-hand dominant male status-post fall on 8/17 with increased widening at R AC joint and tendinopathy of the rotator cuff. AC joint widening could be a chronic finding that may have been exacerbated with the fall. The tendinopathy and soft tissue wear are common findings given patient's age. With no acute fracture and all patient's comorbidities, there is no urgent/emergent indication for surgical intervention.    RECOMMENDATIONS:   - Weight bearing: WBAT RUE  - Recommend continued PT/OT work for strength and control of RUE  - Can f/u PRN to ortho clinic    Assessment and Plan discussed with Dr. Elam, Orthopaedic Surgery.     Shanda Sullivan MD 8/21/2017 9:29 AM  Orthopaedic Surgery Resident,  PGY-1  Pager: [phone]    For questions about this patient, please contact me at my pager.

## 2017-08-21 NOTE — PLAN OF CARE
Problem: Goal Outcome Summary  Goal: Goal Outcome Summary  Outcome: No Change  Pt transferred from  around 1800. Hx/o of CAD and CVA, DM2, HTN . Admitted s/p after fall.  Injuries include traumatic L SAH and L SDH, abrasion on L temple, L elbow and R knee. VSS, RA. Keep SBP <140. A&Ox4 at 1800. Neuro s intact ex baseline RUE tingling and weakness 2/5 with minimum movement. Other extremities 4-5/5. New RUE and RLE jerking noticed overnight and increasing in frequency throughout the day; VEEG leads in place and well as sz precautions. Suction set up. Multiple events upon arriving to - A&Ox4 throughout events and able to complete ROAR during and after events. IV Ativan and PO Trieptal given. Pt lethargic for hours after, able to arouse with repeated stimuli @2230. Unable to given evening Keppra and BP med. Increased weakness and trouble coordinating movements over the past couple days; now Ax2 with lift, T&RQ2hrs. PIV x2 SL.  Reg diet. Voiding via urinal. Cont to monitor and follow POC.

## 2017-08-22 NOTE — MR AVS SNAPSHOT
After Visit Summary   8/22/2017    Kelechi Coleman    MRN: [id number]           Patient Information     Date Of Birth          1935        Visit Information        Provider Department      8/22/2017 7:00 AM [facility] EEG TECH 4 [facility] EEG        Today's Diagnoses     Cerebral infarction, unspecified mechanism (H)    -  1       Follow-ups after your visit        Your next 10 appointments already scheduled     Aug 28, 2017 12:50 PM CDT   LAB with [facility]   [facility] ([facility])    [address]  [place] [address]   [phone]           Patient must bring picture ID. Patient should be prepared to give a urine specimen  Please do not eat 10-12 hours before your appointment if you are coming in fasting for labs on lipids, cholesterol, or glucose (sugar). Pregnant women should follow their Care Team instructions. Water with medications is okay. Do not drink coffee or other fluids. If you have concerns about taking  your medications, please ask at office or if scheduling via [employer], send a message by clicking on Secure Messaging, Message Your Care Team.            Aug 28, 2017  1:50 PM CDT   Core Return with Erica Beach PA-C   [facility] ([facility])    [address]  [place] [address] [phone]            Sep 05, 2017 10:15 AM CDT   (Arrive by 10:00 AM)   New Patient Visit with Milad Romero MD   [facility] ([facility])    [address]  5th Floor  [place] [address]   [phone]            Sep 14, 2017   Procedure with Gigi Wheat MD   [facility] (--)    [address]  [place] [address]   [phone]            Feb 09, 2018 11:30 AM CST   Office Visit with Etienne Gee MD   [facility]    [address] [place] [address] [phone]           Bring a current list of meds and any records pertaining to this visit. For Physicals, please bring immunization records and any forms needing to be filled out. Please arrive 10 minutes early to complete paperwork.              Who to contact     Please call your clinic at [phone] to:    Ask questions about your health    Make or cancel appointments    Discuss your medicines    Learn about your test results    Speak to your doctor   If you have compliments or concerns about an experience at your clinic, or if you wish to file a complaint, please contact [facility] Patient Relations at [phone] or email us at [email or web address]         Additional Information About Your Visit        [employer] Information     My[employer] gives you secure access to your electronic health record. If you see a primary care provider, you can also send messages to your care team and make appointments. If you have questions, please call your primary care clinic.  If you do not have a primary care provider, please call [phone] and they will assist you.      [employer] is an electronic gateway that provides easy, online access to your medical records. With My[employer], you can request a clinic appointment, read your test results, renew a prescription or communicate with your care team.     To access your existing account, please contact your [facility] Clinic or call [phone] for assistance.        Care EveryWhere ID     This is your Care EveryWhere ID. This could be used by other organizations to access your [facility] medical records  [id number]         Blood Pressure from Last 3 Encounters:   08/22/17 125/66   08/18/17 (!) 110/91   08/09/17 107/60    Weight from Last 3 Encounters:   08/20/17 83.1 kg (183 lb 3.2 oz)   08/17/17 83.9 kg (185 lb)   08/09/17 82.8 kg (182 lb 8 oz)              We Performed the  Following     Glucose by meter          Today's Medication Changes      Notice     This visit is during an admission. Changes to the med list made in this visit will be reflected in the After Visit Summary of the admission.             Primary Care Provider Office Phone # Fax #    Etienne Gee [phone] [address] [place] [address]        Goals        Exercise    I will exercise 2x per week (15 min per time) , for the next 6 weeks.     Notes - Note created  12/22/2016 12:14 PM by Ting Bullard, RN    As of today's date 12/22/2016 goal is met at 0 - 25%.   Goal Status:  Active           General    I will not add any salt to my food and will limit my salt intake to 2,000 mg of sodium per day. (pt-stated)     Notes - Note created  9/26/2016  3:53 PM by Ting Bullard RN    As of today's date 9/26/2016 goal is met at 26 - 50%.   Goal Status:  Active        I will weigh myself every day and call my clinic if I am more than 2 pounds heavier in a day or 5 pounds heavier in a week.  (pt-stated)     Notes - Note created  9/26/2016  3:52 PM by Ting Bullard, RN    As of today's date 9/26/2016 goal is met at 26 - 50%.   Goal Status:  Active          Equal Access to Services     [facility]: Hadii aad ku hadasho Soomaali, waaxda luqadaha, qaybta kaalmada adeegyada, nenita reynoso . So [facility] [phone].    ATENCIÓN: Si habla español, tiene a conteh disposición servicios gratuitos de asistencia lingüística. Llame al [phone].    We comply with applicable federal civil rights laws and [place] laws. We do not discriminate on the basis of race, color, national origin, age, disability sex, sexual orientation or gender identity.            Thank you!     Thank you for choosing [facility]  for your care. Our goal is always to provide you with excellent care. Hearing back from our patients is one way we can continue to improve our services. Please take a few  minutes to complete the written survey that you may receive in the mail after your visit with us. Thank you!             Your Updated Medication List - Protect others around you: Learn how to safely use, store and throw away your medicines at www.disposemymeds.org.      Notice     This visit is during an admission. Changes to the med list made in this visit will be reflected in the After Visit Summary of the admission.

## 2017-08-22 NOTE — PLAN OF CARE
Problem: Brain Injury, Moderate Traumatic (GCS 9-12) (Adult)  Goal: Signs and Symptoms of Listed Potential Problems Will be Absent or Manageable (Brain Injury, Moderate Traumatic)  Signs and symptoms of listed potential problems will be absent or manageable by discharge/transition of care (reference Brain Injury, Moderate Traumatic (GCS 9-12) (Adult) CPG).   Outcome: No Change  Pt admitted due to fall, injuries sustained include: L SAH, L SDH, L temporal/parietal abrasion, L elbow abrasion, & R knee abrasion. VEEG leads removed. Pt denied pain. VSS w/ soft BP s at times. Neuros include: A&Ox4, RUE 2/5 w/ tingling, RUE 4/5, and BLE 4/5.  SLP consulted and recommended pt to be full liquid diet/nectar thickened liquids, pt tolerated lunch poorly, coughed for one hour after eating SLP contacted. PIV infusing NS at 10mL/hr. Pt voiding spontaneously in urinal, incontinent 2x. No BM. Plan for discharge to ARU when medically stable. Continue to monitor and follow POC.

## 2017-08-22 NOTE — PLAN OF CARE
Problem: Goal Outcome Summary  Goal: Goal Outcome Summary  SLP: Pt seen bedside for dysphagia f/u.  Pt alerted with verbal and tactile cues.  Pt demonstrated functional tolerance of modified textures.  Recommend advance diet to full liquids with nectar thick liquids.  Pt will need to be upright for intake and will need supervision/assist to take small bites/sips, pace self, and alternate consistencies.  ST to follow.

## 2017-08-22 NOTE — PROGRESS NOTES
Brief Neurology Update Note:    Patient seen and examined this morning. Continued improvement in mental status and ability to follow complex commands. No new seizures overnight and thus we ordered EEG discontinued. No new focal deficits on examination. We were previously considering CTA due to possibility of limb shaking TIA though noted now that patient has a somewhat undefined, but per discussion with him, a likely contrast allergy. Thus, will not pursue this investigation at this time. Given his size, we did discuss with primary team that ativan 1mg prn for outward signs of seizures would be acceptable. No new recommendations at this time.     Patient seen and discussed with attending neurologist Dr. Mio Smith.    Norris Garland MD  Neurology PGY3  P: [phone]

## 2017-08-22 NOTE — PLAN OF CARE
Problem: Goal Outcome Summary  Goal: Goal Outcome Summary  OT 6A: Facilitated sitting EOB ~12 mins CGA-min A to increase strength and balance for participation in ADLs. Pt stood x1 ~2 min with min A x2. Slight R lean noted with sitting and standing balance, however able to self correct with cues this day.      REC: TCU to address R side weakness to increase IND with ADLs

## 2017-08-22 NOTE — PROGRESS NOTES
TRN Rounding: Have stopped twice this AM to see pt and address any questions regarding fall prevention and brain injury instructions. Pt has been sleeping both times and was not disturbed.

## 2017-08-22 NOTE — PROGRESS NOTES
"[facility], [place]   Palliative Care Daily Progress Note          Recommendations, Patient/Family Counseling & Coordination     Pt seen and examined. No family present. Pt with slightly less somnolence. Answers questions coherently. Denies pain/discomfort at rest.   REC: continue present pain mgt.   - will likely need rehab stay and additional assistance at home   - will address code status and goals of care implications when he is consistently more awake.  - notes 25-30 # weight loss \"without trying\" in past several months. Thinks it is \"leveling out\" now. Occ incontinence. Question etiology.   - remote chance of ETOH withdrawal needs to be considered given\"daily\" use. No sx at present. Monitor       POLST - not completed     Thank you for the opportunity to continue to participate in the care of this patient and family.  Please feel free to contact on-call palliative provider with any emergent needs.  We can be reached via team pager [phone] (answered 8-4:30 Monday-Friday); after-hours answering service ([phone])  Josh Medellin, PIETRO LUCAS NP  Nurse Practitioner- Lead Advanced Practice Provider  [facility] Palliative Medicine Consult Service   [phone]    TT spent: 30 minutes of which 20 minutes were spent in direct face to face contact with patient/family. Greater than 50% of time spent counseling and/or coordinating care.         Assessment      1) Diagnoses & symptoms:        Fall- SAH and SDH. R arm injury.  ETOH use- ? Influence on fall  CAD-   Prior CVA with R arm weakness- Question rotator tendon/ ligament injury. Quite limited function compared to baseline     2)  Psychosocial/Spiritual Needs:   Ongoing:Will ask LICSW to follow  New: No        Is new assessment/intervention required by palliative team?:  No         Interval History:   Was seen initially with pt sleeping soundly and not rousable to light tactile stim. Later in am he is more alert. " "Pleased EEG monitoring leads have been removed. Has discomforts and weakness that limit mobility. No shortness of breath. Downplays concern about swallow and cough.   When asked about is ETOH use he tells me he is \"cutting down,\" denies any concern about his 2-3 drinks per day and no hx of DUI.. .etc.  Open to TCU placement as needed with stated goal to return to prior independence.            Review of Systems:   Palliative Symptom Review (0=no symptom/no concern, 1=mild, 2=moderate, 3=severe):      Pain: 1      Fatigue: 1-2      Nausea: 0-1      Constipation: 0      Diarrhea: 0      Depressive Symptoms: 0      Anxiety: 0      Drowsiness: 2      Poor Appetite: 1-2      Shortness of Breath: 0-1      Insomnia: 0               Medications:   I have reviewed this patient's medication profile and medications given in past 24 hours.        {   Physical exam: Vitals: /59 (BP Location: Right arm)  Pulse 55  Temp 95.1  F (35.1  C) (Oral)  Resp 16  Ht 1.88 m (6' 2\")  Wt 83.1 kg (183 lb 3.2 oz)  SpO2 95%  BMI 23.52 kg/m2  BMI= Body mass index is 23.52 kg/(m^2).   Elderly man lying in bed. Sleepy. Able to answer questions with prompting. Notes pain and weakness in R shoulder- more pronounced than before injury. NAD  HEENT: symmetrical features. NT/NC. EOMI. Somewhat slurred speech. Tongue midline. Dentition- moderate disrepair. MM dry.  Lungs: Some accessory use. RR in mid 20's. No cough or wheeze. Lungs anterolaterally clear  HT:  S1 S2 with grade 2/6 NENA consistent with A/S.  RRR HR in 70's at rest.   Abd: soft non tender or distended. BS present/   Neuro: ongoing assessment of cranial nerves grossly intact. Moving all extremities except 1+-2/5 weakness right upper extremity- dominant arm.  strength less on right.   Skin tears on left arm- ecchymosis L arm/elbow  Extremities warm and well perfused               Data Reviewed:   ROUTINE LABS (Last four results)  BMP  Recent Labs  Lab 08/22/17  0806 " 08/21/17  0749 08/20/17  0302 08/19/17  0303    138 137 138   POTASSIUM 4.3 4.4 4.0 4.0   CHLORIDE 110* 108 107 107   MIGUEL 9.5 9.9 9.2 9.1   CO2 18* 19* 18* 15*   BUN 37* 40* 45* 38*   CR 1.27* 1.42* 1.64* 1.40*   * 154* 170* 126*     CBC  Recent Labs  Lab 08/22/17  0806 08/21/17  0749 08/20/17  0302 08/19/17  0303   WBC 7.5 8.2 7.6 8.8   RBC 3.42* 3.38* 3.10* 3.34*   HGB 9.9* 9.9* 9.1* 9.6*   HCT 31.2* 30.5* 27.6* 30.2*   MCV 91 90 89 90   MCH 28.9 29.3 29.4 28.7   MCHC 31.7 32.5 33.0 31.8   RDW 16.1* 16.0* 15.9* 15.7*   * 134* 121* 128*     INR  Recent Labs  Lab 08/18/17  0215   INR 1.32*

## 2017-08-22 NOTE — PROGRESS NOTES
[facility], [place]  Trauma Service Progress Note    Date of Service (when I saw the patient): 08/22/2017     Assessment & Plan     Trauma mechanism:Fall down 6 stairs on plavix  Time/date of injury: 8/17/2017  Known Injuries:  1. Left Subarachnoid Hemorrhage  2. Left Subdural Hematoma  3. Left temporal/parietal scalp abrasion   4. Left elbow abrasion  5. Right knee abrasion   Other diagnoses:   1. Acute pain   2. Acute on chronic renal failure  3. Systolic heart failure   4. A.fib   5. DM Type II  6. HTN  7. HLD  8. Leukocytosis   9. Iatrogenic coagulopathy   10. Chronic normocytic anemia   11. HX CVA-1998     Procedure:  None      Plan:  1. Tertiary exam completed. No additional injuries noted   2. SAH/SDH- NSG consulted. No surgical intervention need. Repeat head CT stable. No interval increase in bleed. Continue Keppra for 7 days. SBP <140. Hold keppra for 2 doses given keppra level 72 yesterday.  Repeat keppra today.   3. Right shoulder injury- Noted widening of acromioclavicular joint space. C/o pain with movement. ROM relatively intact. Has baseline weakness.  PMR consulted. MRI confirming ligamentous injury and severe tendinosis.  Ortho consulted.  Recommend WBAT RUE; continue PT/OT for strengthening, f/u in ortho clnic PRN.    4. Tremors- Had an episode of tremor-like RUE shaking in EMS. Overnight and continues into this AM, has intermittent tremors that start in the RUE.  No altered LOC during or after occurrences. Pt is on keppra; holding for elevated levels. Neurology consulted. EEG demonstrates NO electrographic seizure activity and no interictal epileptiform abnormalities.  Generalized tremor was noted.  Ok with low dose ativan per Neuro request for sustained tremor/partial seizure.  Neuro indicated they would order CTA to assess for vascular component of bleed/seizures. No imaging ordered thus far.   5. A.Fib/HTN/CHF- Resume home medications. PRN hydralazine and labetalol  available for HTN. Hold ASA and plavix given bleed.   6. DM- Hold home lantus for now given poor PO intake. Medium SSI.  BG 140s-150s  7. JUVENTINO/CKD- Baseline Crt ~1.5. 1.61 on admission.  Creatinine at baseline.  Recheck daily. Mild anion gap metabolic acidosis resolving.    8. Anemia- Baseline Hgb ~10.5. Admission hgb 10.2>9.9>9.9 this AM. Will continue to monitor. Transfuse for hgb <7.0.   9. Leukocytosis- Resolved.   10. UA on admission marginal indicative of infection. Cultures show <10K of GPC. Likely contamination. No indication for ABX    11. Bactroban to wounds   12. PT/OT consult   13. PMR consult   14. SW consult   15. Speech consult; cleared for full liquid, nectar thick      Code status: Full code confirmed with patient.   Dispo: ARU vs TCU pending therapies reccs today       General Cares:  GI Prophylaxis: Home PPI   DVT Prophylaxis: [facility] only   Date of last stool/Bowel Regimen:PTA, Senokot BID and miralax. Dulcolax suppository today.  8/21  Pulmonary toilet:IS  Interval History   Course reviewed. No acute events overnight.  More awake and alert today.  Able to participate with speech therapy and pass swallow study.  Denies pain or headache.  Denies dyspnea, chest pain, palpitations, dizziness, vomiting.   ROS x 8 negative with exception of those things listed in interval hx    Physical Exam   Temp: 96.9  F (36.1  C) Temp src: Oral BP: 126/66   Heart Rate: 68 Resp: 16 SpO2: 92 % O2 Device: None (Room air)    Vitals:    08/18/17 0400 08/19/17 0600 08/20/17 0500   Weight: 83.5 kg (184 lb 1.4 oz) 82.4 kg (181 lb 10.5 oz) 83.1 kg (183 lb 3.2 oz)     Vital Signs with Ranges  Temp:  [95.9  F (35.5  C)-97.4  F (36.3  C)] 96.9  F (36.1  C)  Heart Rate:  [51-68] 68  Resp:  [16-20] 16  BP: (104-126)/(50-67) 126/66  SpO2:  [90 %-97 %] 92 %  I/O last 3 completed shifts:  In: 738.75 [I.V.:738.75]  Out: 550 [Urine:550]    Eric Coma Scale - Total 15/15    Constitutional: Awake, lethargic, confused.   Eyes: Lids and  lashes normal, pupils equal, round and reactive to light, extra ocular muscles intact, sclera clear, conjunctiva normal.  HENT: Normocephalic, left scalp abrasion healing well.   Respiratory: No increased work of breathing, good air exchange, clear to auscultation bilaterally, no crackles or wheezing.  Cardiovascular:  regular rate and rhythm, normal S1 and S2, no S3 or S4, and murmur noted.  GI: Normal bowel sounds, soft, non-distended, non-tender, no guarding  Genitourinary:  No urine assessed   Skin:  Normal skin color, no redness, warmth, or swelling, and no jaundice. Left elbow abrasion and right shin abrasion healing well.   Musculoskeletal: There is no redness, warmth, or swelling of the joints.  Pedal pulse palpated  Neurologic: Awake, alert, oriented.  Cranial nerves II-XII are grossly intact.  3/5 strength of RUE. Baseline numbness of right wrist to elbow.   Neuropsychiatric: Calm, normal eye contact, lethargic, affect appropriate to situation, able to complete orientation questions today, oriented x 4.     SHAYY Hitchcock CNP  To contact the trauma service use job code pager [phone],   Numeric texts or alpha text through [facility]

## 2017-08-22 NOTE — PLAN OF CARE
Problem: Goal Outcome Summary  Goal: Goal Outcome Summary  Outcome: No Change  Pt admitted due to fall, injuries sustained include: L SAH, L SDH, L temporal/parietal abrasion, L elbow abrasion, & R knee abrasion. VEEG leads intact. No events witnessed this shift. Pt denied pain. VSS. Neuros include: lethargic, A&Ox4, forgetful, RUE 2/5 w/ tingling, RUE 4/5, and BLE 4/5. Difficulty swallowing; NPO until further evaluation. New PIV infusing NS at 75mL/hr. Pt voiding spontaneously in urinal. Small Bm this shift. Incontinent at times. Plan for discharge to ARU when medically stable. Continue to monitor and follow POC.

## 2017-08-22 NOTE — PLAN OF CARE
Problem: Goal Outcome Summary  Goal: Goal Outcome Summary  PT 6A: Pt is mod A x 1 supine > sit; requires Mod A to maintain sitting 2/2 to strong right sided lean. Min A x 2 sit > stand; Mod A x 2 for stand > sit 2/2 to poor eccentric control. Mod A x 2 to maintain standing due to strong right lean. VSS. Pt more alert today.     Recommend TCU once medically appropriate to improve functional mobility and activity tolerance; pt would benefit from ARU therapeutic intensity but may not be appropriate at this time due to low activity tolerance.

## 2017-08-23 NOTE — PROGRESS NOTES
Social Work Services Progress Note    Hospital Day: 6  Date of Initial Social Work Evaluation:  8/18/17  Collaborated with:  Pt, SNF Admissions Coordinators, Trauma     Data:  Per Trauma, pt is medically ready for discharge.    Intervention:  Spoke with the Admissions Coordinators at [facility] ([place]) and [facility] ([place]).  Both facility's have assessed and approved pt for admit.   Met with pt to provide an update.  Pt selects [facility] as his first choice.    Assessment:  Pt  Is agreeable to rehab stay.    Plan:    Anticipated Disposition: Rehab placement at [facility]    Barriers to d/c plan:  None identified    Follow Up:  SW will coordinate discharge.      SOL Valles  Social Work, 6A  Phone:  [phone]  Pager:  [phone]  8/24/2017

## 2017-08-23 NOTE — PLAN OF CARE
Problem: Goal Outcome Summary  Goal: Goal Outcome Summary  Outcome: Improving  Alert and oriented x4.  Generalized weakness.  Right arm 2/5, remaining extremities 4/5. Nectar thick full liquids with some assist and sitting upright.  Tolerated well.  No coughing.  Large loose stool.  Continent and incontinent of urine.  Turned and repositioned.  Plan TCU.

## 2017-08-23 NOTE — PLAN OF CARE
Problem: Goal Outcome Summary  Goal: Goal Outcome Summary  Outcome: No Change.   VSS. RUE 2/5; 4/5 AOE. Oriented x4. PRN tylenol given for R shoulder pain. Full liquid diet with nectar thick liq. Increased WOB noted upon assessment at 0000 as well as accessory muscle use; LS clear and diminished to LLL; RR hovering around 20; O2 sats low 90's on RA; MD notified and assessed pt; may order CXR this am. NWB RUE; ortho consulting. PIV TKO. Up with heavy A2 or lift. Pt voiding in small amts via urinal; incontinent at times; bladder scanned for 143cc. Continue with POC.

## 2017-08-23 NOTE — PLAN OF CARE
Problem: Goal Outcome Summary  Goal: Goal Outcome Summary  PT 6A: Pt was sitting up in upright chair upon arrival. Increased rest breaks needed due to fatigue. Delay in response time noted. Pt required MODA-MAXA x 2 sit<>stand and to pivot from bedside chair back to bed with support under UEs, with care for R UE (WBAT per MD orders). Pt able to stand x 2.5 min with ALANA-MODA x 2 once standing. Pt sat at edge of bed with initial ALANA to steady due to R lean and progressed SBA x 6 min. MAXA x 2 required for sit>supine.     Discussed need for R UE sling during therapy when transferring to further protect AC jt/shoulder. Pt's nurse Kate agreed to order for pt.     Recommend ARU vs TCU. Activity tolerance yet reduced. Pt progressing slowly in PT, participated in ~ 30 min visit with encouragement needed for participation. Pt however would benefit from ARU stay to maximize functional return and address complex rehab needs as pt is far from functional baseline.

## 2017-08-23 NOTE — PLAN OF CARE
Problem: Goal Outcome Summary  Goal: Goal Outcome Summary     SLP 6A: Pt seen for dysphagia tx. Improved function with improved alertness. Recommend pt advance to dysphagia diet level 2 and thin liquids with assist/supervision as needed due to reduced endurance. Pt should consume small bites/sips and alternate consistencies. SLP to follow per POC. Recommend ongoing SLP services as pt is below baseline diet

## 2017-08-23 NOTE — PROGRESS NOTES
Social Work Services Progress Note    Hospital Day: 6  Date of Initial Social Work Evaluation:  8/18/17  Collaborated with:  Patient    Data:  Per Ban Gonzalez, Trauma, discharge is anticipated today.   Pt was evaluated by PMR and a TCU stay is recommended.      Intervention:  Met with pt to discuss TCU placement and readiness for discharge.  Pt is agreeable to a short term rehab stay.  Pt wants placement near to home but adds that he does not want to be referred to [facility].  Pt states that he wants a well rated facility.  Reviewed list with pt.  Pt has [facility] and [facility] [address].  SW phoned these facilities and referred pt to the Admissions Coordinators. SW faxed assessment materials to these facilities.      Assessment:  Pt is agreeable to rehab placement.      Plan:    Anticipated Disposition: Rehab placement in a community SNF    Barriers to d/c plan:  None identified at this time.    Follow Up:  SW will continue to follow for discharge planning.    SOL Valles  Social Work, 6A  Phone:  [phone]  Pager:  [phone]  8/23/2017

## 2017-08-23 NOTE — PROGRESS NOTES
Brief neurology update note:    Patient seen and examined again this a.m. Shows continued improvement in mental status and ability to follow complex commands. His right arm remains generally plegic, left arm is generally strong. Does appear that he had some motor deficit in relation to his subarachnoid hemorrhages and that this is not a prolonged Calvin paralysis. EEG discontinued yesterday after no new seizures over the previous 2 days. At this point, would continue patient on his current antiepileptic regimen of Trileptal and Keppra, and have him follow-up as an outpatient in neurology roughly one month after discharge at which point we may consider discontinuing at least one of these medications. No other new recommendations from neurology standpoint at this time. We told the patient this morning of this plan to which he was agreeable. Remainder of cares for his primary team.    Neurology team will sign off. Thank you for involving us in the care of this patient. Please call if you have any new questions or concerns.    Patient seen and then discussed with attending neurologist Dr. Mio Smith.     Norris Garland MD  Neurology PGY3  P: [phone]

## 2017-08-23 NOTE — PLAN OF CARE
Problem: Goal Outcome Summary  Goal: Goal Outcome Summary     OT-6a: Pt mod Ax1 and CGA of 2nd person for sit>stand, and min Ax2 for pivot to chair. Ambulated forward approx 5 feet with min Ax2, and tolerating RUE neuro re-ed well without major episodes of tremors. Rec d/c to ARU vs TCU with improved activity tolerance.

## 2017-08-23 NOTE — PLAN OF CARE
Problem: Goal Outcome Summary  Goal: Goal Outcome Summary  Outcome: No Change     VSS. Sating low 90s on RA. RUE 2/5, 4/5 LUE, 3/5 BLE. Oriented x4, sleeping between cares. Continues with garbled speech. Speech advanced diet to DD2 with thin liquids, pt still needs 1:1 supervision with meals. LS clear, diminished in bases. WB as tolerated in RUE, ordered sling for RUE. R posterior upper head slightly swollen, scabbed over abrasion, MAURIZIO. PIV TKO. Magnesium replaced, redraw tomorrow. Up with heavy A2 or lift. Voiding in small amts via urinal, incontinent x1. Up to chair for breakfast. Stood with PT this am, currently working with OT. Continue POC.        When pt up in chair this afternoon, noted to have swollen area in LUE. Reported to Trauma NP, Jarad. Portable shoulder and humerous x-rays ordered. Pt does c/o discomfort in bilateral shoulders, worse in left, declined pain meds.

## 2017-08-24 NOTE — PROGRESS NOTES
Clinic Care Coordination Contact  Care Coordination Transition Communication    Clinical Data: Patient was hospitalized at [facility] from 08/18/17 to 08/24/17 with diagnosis of SDH and SAH secondary to mechanical fall.     Transition to Facility:              [facility]               Contact name and phone number/fax: Fer [phone]    Plan: RN/SW Care Coordinator will await notification from facility staff informing RN/SW Care Coordinator of patient's discharge plans/needs. RN/SW Care Coordinator will review chart and outreach to facility staff every 4 weeks and as needed.     Ting Bullard, GILDARDON, RN, PHN  Clinic Care Coordinator  [facility]  [phone]

## 2017-08-24 NOTE — PLAN OF CARE
Problem: Goal Outcome Summary  Goal: Goal Outcome Summary  Physical Therapy Discharge Summary     Reason for therapy discharge:    Discharged to transitional care facility.     Progress towards therapy goal(s). See goals on [facility] Plan in [facility] electronic health record for goal details.  Goals not met.  Barriers to achieving goals:   discharge from facility.     Therapy recommendation(s):    Continued therapy is recommended.  Rationale/Recommendations:  increase strength, functional mobility and activity tolerance.

## 2017-08-24 NOTE — PLAN OF CARE
Problem: Goal Outcome Summary  Goal: Goal Outcome Summary  Outcome: No Change.   VSS. RUE 2/5; 4/5 AOE. Oriented x4. C/o mild bilat shoulder pain but refused meds; repositioned freq. Advanced to DD2 with thins; tolerating.  WBAT RUE; ortho consulting. PIV TKO. Up with A2+ lift. Pt voiding in small amts via urinal; incontinent at times; bladder scans low. PMR recommends TCU. Continue with POC.

## 2017-08-24 NOTE — PROGRESS NOTES
[facility], [place]  Trauma Service Progress Note    Date of Service (when I saw the patient): 08/23/2017     Assessment & Plan   Trauma mechanism:Fall down 6 stairs on plavix  Time/date of injury: 8/17/2017  Known Injuries:  1. Left Subarachnoid Hemorrhage  2. Left Subdural Hematoma  3. Left temporal/parietal scalp abrasion   4. Left elbow abrasion  5. Right knee abrasion   Other diagnoses:   1. Acute pain   2. Acute on chronic renal failure  3. Systolic heart failure   4. A.fib   5. DM Type II  6. HTN  7. HLD  8. Leukocytosis   9. Iatrogenic coagulopathy   10. Chronic normocytic anemia   11. HX CVA-1998      Procedure:  None      Plan:  1. Tertiary exam completed. No additional injuries noted   2. SAH/SDH- NSG consulted. No surgical intervention need. Repeat head CT stable. No interval increase in bleed. Continue Keppra for 7 days. SBP <140. Hold keppra recheck in process, dosing determined by neurology/ NSG teams  3. Right shoulder injury- Noted widening of acromioclavicular joint space. C/o pain with movement. ROM relatively intact. Has baseline weakness.  PMR consulted. MRI confirming ligamentous injury and severe tendinosis.  Ortho consulted.  Recommend WBAT RUE; continue PT/OT for strengthening, f/u in ortho clnic PRN.    4. Tremors- Had an episode of tremor-like RUE shaking in EMS which has now happened several times. No altered LOC during or after occurrences. Pt is on keppra; holding for elevated levels. Neurology consulted. EEG demonstrates NO electrographic seizure activity and no interictal epileptiform abnormalities.  Generalized tremor was noted.  Ok with low dose ativan per Neuro request for sustained tremor/partial seizure.  Neuro indicated they would order CTA to assess for vascular component of bleed/seizures. No imaging ordered thus far. With contrast allergy and recent JUVENTINO will start with US carotids   5. A.Fib/HTN/CHF- Resume home medications. PRN hydralazine and  labetalol available for HTN. Hold ASA and plavix given bleed.   6. DM- Hold home lantus for now given poor PO intake. Medium SSI.  BG 140s-150s  7. JUVENTINO/CKD- Baseline Crt ~1.5. 1.61 on admission.  Creatinine at baseline at 1.1.  Recheck daily. Mild anion gap metabolic acidosis resolving.    8. Anemia- Baseline Hgb ~10.5.  Transfuse for hgb <7.0.   9. UA on admission marginal indicative of infection. Cultures show <10K of GPC. Likely contamination. No indication for ABX    10. Bactroban to wounds   11. PT/OT consult   12. Speech consult; cleared for DD2@ with thins today      Code status: Full code confirmed with patient.   Dispo: likely TCU tomorrow      General Cares:  GI Prophylaxis: Home PPI   DVT Prophylaxis: mech only   Date of last stool/Bowel Regimen:8/21  Pulmonary toilet:IS  Interval History   No acute events.  Pt still altered.  Perseverating on trying to dial the telephone and not really answering ROS questions    Physical Exam   Temp: 95.3  F (35.2  C) Temp src: Oral BP: 107/52 Pulse: 51 Heart Rate: 68 Resp: 16 SpO2: 97 % O2 Device: None (Room air)    Vitals:    08/18/17 0400 08/19/17 0600 08/20/17 0500   Weight: 184 lb 1.4 oz 181 lb 10.5 oz 183 lb 3.2 oz     Vital Signs with Ranges  Temp:  [95.1  F (35.1  C)-97.7  F (36.5  C)] 95.3  F (35.2  C)  Pulse:  [51-71] 51  Heart Rate:  [68] 68  Resp:  [16] 16  BP: (102-129)/(52-67) 107/52  SpO2:  [93 %-98 %] 97 %  I/O last 3 completed shifts:  In: 1120 [P.O.:1120]  Out: 600 [Urine:600]    Raymond Coma Scale - Total 14/15  Constitutional: Awake, alert, cooperative, no apparent distress  Eyes: Lids and lashes normal, pupils equal, round and reactive to light, extra ocular muscles intact, sclera clear, conjunctiva normal.  ENT: Normocephalic, atraumatic, teeth with poor hygiene, left posterior scalp obsured by dried blood  Respiratory: No increased work of breathing, good air exchange, clear to auscultation bilaterally, no crackles or wheezing.  Cardiovascular:   regular rate and rhythm, normal S1 and S2, and no murmur noted.  GI: Normal bowel sounds, soft, non-distended, non-tender, no guarding  Genitourinary:  No urine to assess  Skin:  Normal skin color pale, warm and dry  Musculoskeletal: There is no redness, warmth, or swelling of the joints.  Pedal pulse palpated  Neurologic: Awake, alert,   Cranial nerves II-XII are grossly intact. RUE weakness  Neuropsychiatric: Calm, perseverating. Difficult to assess orientation    SHAYY Hyman CNP  To contact the trauma service use job code pager [phone],   Numeric texts or alpha text through [facility]

## 2017-08-24 NOTE — PROGRESS NOTES
Pt discharged to [facility] this morning, Up with RUE in sling, A2 w/ GB and pivot to w/c, transport via [facility]. PIV removed, sent with packet and belongings. Report given to TCU nurse.

## 2017-08-24 NOTE — PLAN OF CARE
Problem: Goal Outcome Summary  Goal: Goal Outcome Summary  Occupational Therapy Discharge Summary     Reason for therapy discharge:    Discharged to transitional care facility.     Progress towards therapy goal(s). See goals on Care Plan in [facility] electronic health record for goal details.  Goals partially met.  Barriers to achieving goals:   discharge from facility.     Therapy recommendation(s):    Continued therapy is recommended.  Rationale/Recommendations:  Increase strength and independence in ADLs.

## 2017-08-24 NOTE — PLAN OF CARE
Problem: Goal Outcome Summary  Goal: Goal Outcome Summary  Outcome: No Change  AVSS except for intermittent bradycardia. O2 sats high 90's on room air. RUE 2/5, 4/5 LUE, 3/5 BLE; intermittent tingling right wrist up (baseline per pt); alert and oriented x4; garbled speech. Tolerated DD2 with thin liquids diet in upright position and 1:1 supervision.  RUE in sling when up. Right posterior upper head slightly swollen, scabbed over abrasion, MAURIZIO. PIV TKO. Up with heavy A2 to chair and back to bed. Voided in small amts via urinal x 2 (bladder scan 2nd time 96 ml). Swollen area in LUE. Portable shoulder and humerous x-rays completed. Pt c/o discomfort in bilateral shoulders, declined pain meds. Continue to monitor as ordered and follow POC.

## 2017-08-24 NOTE — PROGRESS NOTES
Social Work Services Discharge Note      Patient Name:  Kelechi Coleman     Anticipated Discharge Date:  8/24/17    Discharge Disposition:   [facility] ([phone])    Following MD:  Facility Assignment     Pre-Admission Screening (PAS) online form has been completed.  The Level of Care (LOC) is:  Determined  Confirmation Code is:  [id number].  Patient/caregiver informed of referral to [facility] for Pre-Admission Screening for skilled nursing facility (SNF) placement and to expect a phone call post discharge from SNF.     Additional Services/Equipment Arranged:  Confirmed acceptance to [facility] with Sonali Admissions Coordinator.  Confirmed readiness for discharge with Rosario Martinez (Trauma). Arranged for [employer] ([facility] [phone]) to provide private pay w/c transport at 11am.     Patient / Family response to discharge plan:  Pt voices understanding of the discharge plan and agreement with the discharge plan.     Persons notified of above discharge plan:  Pt, 6A nursing and Rosario Martinez (Trauma).  Left a message for pt's sister (Samantha) to call (2  Messages left on home voice mail and 1 messaged left on cell phone).    Staff Discharge Instructions:  Please fax discharge orders and signed hard scripts for any controlled substances (SW completed this task).  Please print a packet and send with patient.     CTS Handoff completed:  YES    Medicare Notice of Rights provided to the patient/family:  YES          SOL Valles  Social Work, 6A  Phone:  [phone]  Pager:  [phone]  8/24/2017

## 2017-08-24 NOTE — PLAN OF CARE
Problem: Goal Outcome Summary  Goal: Goal Outcome Summary  Speech Language Therapy Discharge Summary     Reason for therapy discharge:    Discharged to transitional care facility.     Progress towards therapy goal(s). See goals on Care Plan in [facility] electronic health record for goal details.  Goals not met.  Barriers to achieving goals:   discharge from facility.     Therapy recommendation(s):    Continued therapy is recommended.  Rationale/Recommendations:  Pt would benefit from continued ST in order to address dysphagia.  .      Discharge diet: Dysphagia diet level 2 and thin liquids

## 2017-08-24 NOTE — PROGRESS NOTES
"[facility]  \"Hello, my name is Velma Cortes , and I am calling from the [facility].  I want to check in and see how you are doing, after leaving the hospital.  You may also receive a call from your Care Coordinator (care team), but I want to make sure you don t have any urgent needs.  I have a couple questions to review with you:     Post-Discharge Outreach                                                    Kelechi Coleman is a 81 year old male     Follow Up and recommended labs and tests   Follow up with your primary care provider for continued medical care and hospital follow up in 5-10 days.     Trauma Clinic as needed   [facility]  Floor 4  [address] [place] [address]   Appointments: [phone]    Follow up in Orthopaedic Clinic as needed for right shoulder tendinous   [facility]  Floor 4   [address] [place] [address]   Appointments: [phone]     Neurosurgery Clinic--follow up with Ms. Karen Taylor in 4 weeks with repeat CT of head ( no contrast)  Floor 3   [address] [place], [place] [address]   Appointments: [phone]    Follow up if you have persistent headache, nausea, dizziness, or thinking problems.  Concussion Clinic  [facility]  Floor 4   [address] [place] [address]   Appointments/Questions: [phone]    Neurology Clinic--follow up with Dr Smith in epilepsy clinic in one month after discharge   [facility]  Floor 3   [address] [place] [address]   Appointments: [phone]         Care Team:    Patient Care Team       Relationship Specialty Notifications Start End    Etienne Gee MD PCP - General Internal Medicine  4/18/13     Phone: [phone] Pager: [phone] Fax: [phone]        [place] [address] [address]            Transition of Care " Review                                                      Did you have a surgery or procedure during your hospital visit? No   If yes, do you have any of the following:     Signs of infection:  NO    Pain:  Yes     Pain Scale (0-10) 4/10     Location: torso, arms, legs, head    Wound/incision concerns? NO    Do you have all of your medications/refills?  Yes    Are you having any side effects or questions about your medication(s)? No    Do you have any new or worsening symptoms?  No    Do you have any future appointments scheduled?   Yes             Plan                                                      Thanks for your time.  Your Care Coordinator may follow-up within the next couple days.  In the meantime if you have questions, concerns or problems call your care team.        Velma Cortes

## 2017-08-28 NOTE — TELEPHONE ENCOUNTER
Pt was scheduled prior to admit to see [facility] PAC today. Martinez Ware ([phone]) had called scheduling to reschedule to next wk d/t recent hospital dc. He was scheduled w/ a provider unknown to him d/t availability and was accidentally put into a slot that was double booked.    I called TCU and discussed above. We rescheduled him to see Dr. Charles 9/15 d/t no availability w/ [facility]. I instructed Fer RN to weigh pt daily and call CORE RN if wt gain >2# in 24hrs or 5# or more in 1wk. Gave her CORE number.     Venus Hale CORE Clinic RN 10:01 AM 08/28/17  [phone]

## 2017-09-22 NOTE — PROGRESS NOTES
Clinic Care Coordination Contact  Care Team Conversations    LVM for [facility] Fer MCKEON, requesting update regarding patient's discharge disposition.   Ting Bullard, GILDARDON, RN, PHN  Clinic Care Coordinator  [facility]  [phone]

## 2017-09-22 NOTE — PROGRESS NOTES
Neurosurgery clinic progress note  Date of visit: 9/22/2017      Date of Injury:  8/17/17      HPI:  Inpatient: Neurosurgery Inpatient Consult   This gentleman experienced a mechanical fall approximately 6 stairs. He is on Plavix for history of A. fib no loss of consciousness. At [facility] he was seen, had a traumatic left frontoparietal subarachnoid hemorrhage and a left frontal subdural. He had complaints of tingling in his right arm but this is also noted to be baseline following a stroke. He noted at that time that this seems to be worsened. He then noted weakness in the same extremity. He was loaded with Keppra transferred to [facility] for further care. He denied headache nausea vomiting and apart from the right upper extremity tingling and weakness denied any other symptoms. His exam was remarkable for right trapezius weakness, 2/5 but no drift.    His imaging found a left frontoparietal subarachnoid hemorrhage tracking along the central sulcus and a 7 mm left frontal subdural without shift no surgery was recommended repeat CT. Repeat CT was stable. Recommended Keppra 1 g b.i.d. for 1 week, hold Plavix. Follow-up 1 month.     Neurology was consulted for jerking in the right arm brief episodes usually seconds and he would start to have jerking in the right leg which all started since the fall during the episodes of jerking he was unable to move his arm voluntarily he didn't notice change in speech or confusion during these episodes. Recommended EEG, Trileptal added 150 mg b.i.d., recommend continue on these meds until follow-up outpatient in neurology clinic with Dr. OSBORNE in 1 month, that appointment is on 10/13  Outpatient:  He is now about 4 weeks post injury.he remains in a rehabilitation, he is getting his strength back a little bit at a time.  He hopes to return home eventually but knows it will be a long process. He presents for routine follow-up visit. He has had a CT scan today.  He has had no  further seizures.      Current Outpatient Prescriptions:      hydrALAZINE (APRESOLINE) 50 MG tablet, Take 1 tablet (50 mg) by mouth 2 times daily, Disp: , Rfl:      mupirocin (BACTROBAN) 2 % ointment, Apply topically 2 times daily, Disp: 22 g, Rfl:      torsemide (DEMADEX) 10 MG tablet, Take 1 tablet (10 mg) by mouth daily, Disp: 90 tablet, Rfl: 3     spironolactone (ALDACTONE) 50 MG tablet, Take 1 tablet (50 mg) by mouth daily, Disp: 90 tablet, Rfl: 3     pantoprazole (PROTONIX) 40 MG EC tablet, TAKE 1 TABLET BY MOUTH EVERY EVENING, Disp: 90 tablet, Rfl: 2     Multiple Vitamins-Minerals (CENTRUM SILVER) per tablet, Take 1 tablet by mouth daily, Disp: 30 tablet, Rfl:      Calcium Carb-Cholecalciferol (CALTRATE 600+D) 600-800 MG-UNIT TABS, Take 1 tablet by mouth daily, Disp: 30 tablet, Rfl: 0     senna-docusate (SENOKOT-S;PERICOLACE) 8.6-50 MG per tablet, Take 1-2 tablets by mouth 2 times daily, Disp: 60 tablet, Rfl: 0     acetaminophen (TYLENOL) 325 MG tablet, Take 2 tablets (650 mg) by mouth every 4 hours as needed for mild pain, Disp: 100 tablet, Rfl:      OXcarbazepine (TRILEPTAL) 150 MG tablet, Take 1 tablet (150 mg) by mouth 2 times daily, Disp: , Rfl:      insulin aspart (NOVOLOG PEN) 100 UNIT/ML injection, Subcutaneous 3 times daily (before meals).  For Pre-Meal Blood Glucose: Give with prandial insulin,  Do Not give Insulin if Pre-Meal BG < 140.  -189 give 1 unit.  -239 give 2 units.  -289 give 3 units.  -339 give 4 units.  -399 give 5 units.  -449 give 6 units BG = or > 450 give 7 units.  Notify MD if glucose > or = 350 mg/dL and give insulin., Disp: , Rfl:      insulin aspart (NOVOLOG PEN) 100 UNIT/ML injection, Inject 1-5 Units Subcutaneous At Bedtime MEDIUM INSULIN RESISTANCE DOSING   Do Not give Bedtime Correction Insulin if BG less than  200.  For  - 249 give 1 units.  For  - 299 give 2 units.  For  - 349 give 3 units.  For  -399 give 4 units.  " For BG greater than or equal to 400 give 5 units. Notify provider if glucose greater than or equal to 350 mg/dL after administration of correction dose., Disp: , Rfl:      insulin glargine (LANTUS SOLOSTAR) 100 UNIT/ML injection, Inject 10 Units Subcutaneous At Bedtime, Disp: 30 mL, Rfl: 0     levETIRAcetam (KEPPRA) 500 MG tablet, 1 tablet (500 mg) by Oral or Feeding Tube route 2 times daily, Disp: 60 tablet, Rfl:      carvedilol (COREG) 25 MG tablet, Take 0.5 tablets (12.5 mg) by mouth every morning, Disp: 180 tablet, Rfl: 3     amLODIPine (NORVASC) 10 MG tablet, Take 1 tablet (10 mg) by mouth daily, Disp: 90 tablet, Rfl: 4     atorvastatin (LIPITOR) 40 MG tablet, Take 1 tablet (40 mg) by mouth daily, Disp: 90 tablet, Rfl: 3     isosorbide mononitrate (IMDUR) 60 MG 24 hr tablet, Take 2 tablets (120 mg) by mouth daily, Disp: 180 tablet, Rfl: 3     ASPIRIN NOT PRESCRIBED (INTENTIONAL), Please choose reason not prescribed, below, Disp: 0 each, Rfl: 0     ACCU-CHEK MARY PLUS test strip, TEST THREE TIMES DAILY, Disp: 300 strip, Rfl: 1     B-D U/F 31G X 8 MM insulin pen needle, , Disp: , Rfl:   Allergies   Allergen Reactions     Aspirin Hives     Penicillins Hives     Contrast Dye Hives     PMH, FAM HIST, SOC HIST, PROBLEM LIST:  All reviewed in EPIC.    OBJECTIVE:  /57 (BP Location: Right arm, Patient Position: Sitting, Cuff Size: Adult Large)  Pulse 58  Temp 97.4  F (36.3  C) (Oral)  Resp 20  Ht 1.88 m (6' 2\")  Wt 83 kg (182 lb 15.7 oz)  SpO2 95%  BMI 23.49 kg/m2    Imaging:   These are the pertinent radiologist's findings from:  CT head taken today.   .1. No acute intracranial pathology.  2. Near complete resolution of subarachnoid hemorrhage.  3. Moderate parenchymal volume loss and leukoaraiosis.  Please see Epic for the bulk of the report.  I personally reviewed the images with the patient    EXAM:  Well developed well nourished male found seated comfortably in wheelchair.  No apparent distress. "   Exam at discharge:   CRANIAL NERVES: Discs flat. Pupils equal, round and reactive to light.              Extraocular movements full. Visual fields full. Face moves symmetrically.                   Tongue midline. Hearing intact to finger rubbing.                         Strength: RUE flaccid, RLE has antigravity strength. No episodes of jerking was noted during  my encounter.                         Cognition: fund of knowledge and train of thought appropriate  Exam today:  Improved. Cranial nerves are intact as above. Upper extremity strength is improved, he has antigravity strength in all muscles. Right lower extremity has also antigravity strength with some control.    Assessment/Plan:  1. SAH (subarachnoid hemorrhage) (H)    2. Subdural hematoma, post-traumatic, without LOC, subsequent encounter          Kelechi Coleman is doing well after his traumatic subdural hematoma and subarachnoid hemorrhage. All the blood products are resolved. Brain is returned to preinjury configuration. He is working in rehabilitation to getting back to normal functioning. He is not, technically, a fall risk, even though he is a bit weak on the right he is cognizant, aware, not impulsive, and so I would not consider him fall risk at this juncture. As such he could return to anticoagulation for his underlying issues, A. fib and prior stroke, if his medical team felt it necessary    Regarding his seizure episodes. He remains on Keppra and has had no further episodes. I did not see the Trileptal on his nursing home medication list, but it appears on our current list from the hospital. He has a follow-up with Dr Smith in neurology on 13 October. He should stay on his medications until that time.    There is no recommendation for neurosurgical intervention at this juncture, no recommendation for ongoing follow-up. We are comfortable releasing him to p.r.n return. All his questions have been answered. They demonstrated good  understanding of his situation. He knows that he can call if he has any questions, comments, or concerns.    We appreciate the opportunity to be of service in the care of this pleasant patient  Please do call if there is anything more we can do.    Greta Taylor PA-C  [facility]  Department of Neurosurgery  Phone: [phone]  Fax: [phone]      This note was generated using voice recognition software. While edited for content some inaccurate phrasing may be found.

## 2017-09-22 NOTE — MR AVS SNAPSHOT
After Visit Summary   9/22/2017    Kelechi Coleman    MRN: [id number]           Patient Information     Date Of Birth          1935        Visit Information        Provider Department      9/22/2017 1:00 PM Greta Taylor PA-C [facility] Neurosurgery        Today's Diagnoses     SAH (subarachnoid hemorrhage) (H)    -  1    Subdural hematoma, post-traumatic, without LOC, subsequent encounter           Follow-ups after your visit        Follow-up notes from your care team     Return if symptoms worsen or fail to improve.      Your next 10 appointments already scheduled     Sep 27, 2017  1:20 PM CDT   LAB with [facility]   [facility] ([facility])    [address] [place] [id number] [phone]           Patient must bring picture ID. Patient should be prepared to give a urine specimen  Please do not eat 10-12 hours before your appointment if you are coming in fasting for labs on lipids, cholesterol, or glucose (sugar). Pregnant women should follow their Care Team instructions. Water with medications is okay. Do not drink coffee or other fluids. If you have concerns about taking  your medications, please ask at office or if scheduling via Mfuse, send a message by clicking on Secure Messaging, Message Your Care Team.            Sep 27, 2017  2:15 PM CDT   Return Visit with Isaiah Charles MD   [facility] ([facility])    [address] [place] [id number] [phone]            Oct 13, 2017 10:00 AM CDT   (Arrive by 9:45 AM)   Return Seizure with Mio Smith MD   [facility] Neurology ([facility])    [address] [place] [id number] [phone]            Feb 09, 2018 11:30 AM CST   Office Visit with Etienne Gee MD   [facility]    [address]  "[place] [id number] [phone]           Bring a current list of meds and any records pertaining to this visit. For Physicals, please bring immunization records and any forms needing to be filled out. Please arrive 10 minutes early to complete paperwork.              Who to contact     Please call your clinic at [phone] to:    Ask questions about your health    Make or cancel appointments    Discuss your medicines    Learn about your test results    Speak to your doctor   If you have compliments or concerns about an experience at your clinic, or if you wish to file a complaint, please contact [facility] Patient Relations at [phone] or email us at [email or web address]         Additional Information About Your Visit        Mobile Authenticationhart Information     [employer] gives you secure access to your electronic health record. If you see a primary care provider, you can also send messages to your care team and make appointments. If you have questions, please call your primary care clinic.  If you do not have a primary care provider, please call [phone] and they will assist you.      [employer] is an electronic gateway that provides easy, online access to your medical records. With [employer], you can request a clinic appointment, read your test results, renew a prescription or communicate with your care team.     To access your existing account, please contact your [facility] or call [phone] for assistance.        Care EveryWhere ID     This is your Care EveryWhere ID. This could be used by other organizations to access your [place] medical records  [id number]        Your Vitals Were     Pulse Temperature Respirations Height Pulse Oximetry BMI (Body Mass Index)    58 97.4  F (36.3  C) (Oral) 20 1.88 m (6' 2\") 95% 23.49 kg/m2       Blood Pressure from Last 3 Encounters:   09/22/17 121/57   08/24/17 137/75   08/18/17 (!) 110/91    Weight from Last 3 " Encounters:   09/22/17 83 kg (182 lb 15.7 oz)   08/20/17 83.1 kg (183 lb 3.2 oz)   08/17/17 83.9 kg (185 lb)              Today, you had the following     No orders found for display       Primary Care Provider Office Phone # Fax #    Etienne MIKE Gee [phone]       [facility] [address]  [place] [address]        Goals        Exercise    I will exercise 2x per week (15 min per time) , for the next 6 weeks.     Notes - Note created  12/22/2016 12:14 PM by Ting Bullard, RN    As of today's date 12/22/2016 goal is met at 0 - 25%.   Goal Status:  Active           General    I will not add any salt to my food and will limit my salt intake to 2,000 mg of sodium per day. (pt-stated)     Notes - Note created  9/26/2016  3:53 PM by Ting Bullard, RN    As of today's date 9/26/2016 goal is met at 26 - 50%.   Goal Status:  Active        I will weigh myself every day and call my clinic if I am more than 2 pounds heavier in a day or 5 pounds heavier in a week.  (pt-stated)     Notes - Note created  9/26/2016  3:52 PM by Ting Bullard, RN    As of today's date 9/26/2016 goal is met at 26 - 50%.   Goal Status:  Active          Equal Access to Services     [facility] AH: Hadii christine kelsey hadasho Soomaali, waaxda luqadaha, qaybta kaalmada adeegyada, nenita elias adeluis manuel reynoso . So [facility] [phone].    ATENCIÓN: Si habla español, tiene a conteh disposición servicios gratuitos de asistencia lingüística. Llame al [phone].    We comply with applicable federal civil rights laws and [place] laws. We do not discriminate on the basis of race, color, national origin, age, disability sex, sexual orientation or gender identity.            Thank you!     Thank you for choosing [facility]  for your care. Our goal is always to provide you with excellent care. Hearing back from our patients is one way we can continue to improve our services. Please take a few minutes  to complete the written survey that you may receive in the mail after your visit with us. Thank you!             Your Updated Medication List - Protect others around you: Learn how to safely use, store and throw away your medicines at www.disposemymeds.org.          This list is accurate as of: 9/22/17 11:59 PM.  Always use your most recent med list.                   Brand Name Dispense Instructions for use Diagnosis    ACCU-CHEK MARY PLUS test strip   Generic drug:  blood glucose monitoring     300 strip    TEST THREE TIMES DAILY    Type 2 diabetes mellitus with stage 3 chronic kidney disease, with long-term current use of insulin (H)       acetaminophen 325 MG tablet    TYLENOL    100 tablet    Take 2 tablets (650 mg) by mouth every 4 hours as needed for mild pain    Pain       amLODIPine 10 MG tablet    NORVASC    90 tablet    Take 1 tablet (10 mg) by mouth daily    Peripheral artery disease (H), Coronary artery disease involving native coronary artery of native heart without angina pectoris       ASPIRIN NOT PRESCRIBED    INTENTIONAL    0 each    Please choose reason not prescribed, below    Chronic systolic congestive heart failure (H)       atorvastatin 40 MG tablet    LIPITOR    90 tablet    Take 1 tablet (40 mg) by mouth daily    Hyperlipidemia LDL goal <70       B-D U/F 31G X 8 MM   Generic drug:  insulin pen needle           Calcium Carb-Cholecalciferol 600-800 MG-UNIT Tabs    CALTRATE 600+D    30 tablet    Take 1 tablet by mouth daily    Vitamin deficiency       carvedilol 25 MG tablet    COREG    180 tablet    Take 0.5 tablets (12.5 mg) by mouth every morning    Essential hypertension with goal blood pressure less than 130/80       CENTRUM SILVER per tablet     30 tablet    Take 1 tablet by mouth daily    Vitamin deficiency       hydrALAZINE 50 MG tablet    APRESOLINE     Take 1 tablet (50 mg) by mouth 2 times daily    Essential hypertension with goal blood pressure less than 130/80       * insulin  aspart 100 UNIT/ML injection    NovoLOG PEN     Subcutaneous 3 times daily (before meals).  For Pre-Meal Blood Glucose: Give with prandial insulin,  Do Not give Insulin if Pre-Meal BG < 140.  -189 give 1 unit.  -239 give 2 units.  -289 give 3 units.  -339 give 4 units.  -399 give 5 units.  -449 give 6 units BG = or > 450 give 7 units.  Notify MD if glucose > or = 350 mg/dL and give insulin.    Chronic systolic congestive heart failure (H), Type 2 diabetes mellitus with hyperosmolar coma, with long-term current use of insulin (H)       * insulin aspart 100 UNIT/ML injection    NovoLOG PEN     Inject 1-5 Units Subcutaneous At Bedtime MEDIUM INSULIN RESISTANCE DOSING   Do Not give Bedtime Correction Insulin if BG less than  200.  For  - 249 give 1 units.  For  - 299 give 2 units.  For  - 349 give 3 units.  For  -399 give 4 units.  For BG greater than or equal to 400 give 5 units. Notify provider if glucose greater than or equal to 350 mg/dL after administration of correction dose.    Type 2 diabetes mellitus with hyperosmolar coma, with long-term current use of insulin (H)       insulin glargine 100 UNIT/ML injection    LANTUS SOLOSTAR    30 mL    Inject 10 Units Subcutaneous At Bedtime    Type 2 diabetes mellitus with stage 3 chronic kidney disease, with long-term current use of insulin (H)       isosorbide mononitrate 60 MG 24 hr tablet    IMDUR    180 tablet    Take 2 tablets (120 mg) by mouth daily    Coronary artery disease involving native coronary artery of native heart without angina pectoris       levETIRAcetam 500 MG tablet    KEPPRA    60 tablet    1 tablet (500 mg) by Oral or Feeding Tube route 2 times daily    SDH (subdural hematoma) (H), SAH (subarachnoid hemorrhage) (H)       mupirocin 2 % ointment    BACTROBAN    22 g    Apply topically 2 times daily    Abrasion, right knee, initial encounter       OXcarbazepine 150 MG tablet    TRILEPTAL      Take 1 tablet (150 mg) by mouth 2 times daily    History of stroke       pantoprazole 40 MG EC tablet    PROTONIX    90 tablet    TAKE 1 TABLET BY MOUTH EVERY EVENING    Gastroesophageal reflux disease, esophagitis presence not specified       senna-docusate 8.6-50 MG per tablet    SENOKOT-S;PERICOLACE    60 tablet    Take 1-2 tablets by mouth 2 times daily    Drug-induced constipation       spironolactone 50 MG tablet    ALDACTONE    90 tablet    Take 1 tablet (50 mg) by mouth daily    Chronic systolic congestive heart failure (H)       torsemide 10 MG tablet    DEMADEX    90 tablet    Take 1 tablet (10 mg) by mouth daily    Acute on chronic combined systolic and diastolic congestive heart failure (H)       * Notice:  This list has 2 medication(s) that are the same as other medications prescribed for you. Read the directions carefully, and ask your doctor or other care provider to review them with you.

## 2017-09-22 NOTE — LETTER
9/22/2017       RE: Kelechi Coleman  [address]  [facility] [address]     Dear Colleague,    Thank you for referring your patient, Kelechi Coleman, to the [facility] NEUROSURGERY at [facility]. Please see a copy of my visit note below.      Neurosurgery clinic progress note  Date of visit: 9/22/2017      Date of Injury:  8/17/17      HPI:  Inpatient: Neurosurgery Inpatient Consult   This gentleman experienced a mechanical fall approximately 6 stairs. He is on Plavix for history of A. fib no loss of consciousness. At [facility] he was seen, had a traumatic left frontoparietal subarachnoid hemorrhage and a left frontal subdural. He had complaints of tingling in his right arm but this is also noted to be baseline following a stroke. He noted at that time that this seems to be worsened. He then noted weakness in the same extremity. He was loaded with Keppra transferred to [facility] for further care. He denied headache nausea vomiting and apart from the right upper extremity tingling and weakness denied any other symptoms. His exam was remarkable for right trapezius weakness, 2/5 but no drift.    His imaging found a left frontoparietal subarachnoid hemorrhage tracking along the central sulcus and a 7 mm left frontal subdural without shift no surgery was recommended repeat CT. Repeat CT was stable. Recommended Keppra 1 g b.i.d. for 1 week, hold Plavix. Follow-up 1 month.     Neurology was consulted for jerking in the right arm brief episodes usually seconds and he would start to have jerking in the right leg which all started since the fall during the episodes of jerking he was unable to move his arm voluntarily he didn't notice change in speech or confusion during these episodes. Recommended EEG, Trileptal added 150 mg b.i.d., recommend continue on these meds until follow-up outpatient in neurology clinic with Dr. OSBORNE in 1 month, that appointment is on  10/13  Outpatient:  He is now about 4 weeks post injury.he remains in a rehabilitation, he is getting his strength back a little bit at a time.  He hopes to return home eventually but knows it will be a long process. He presents for routine follow-up visit. He has had a CT scan today.  He has had no further seizures.      Current Outpatient Prescriptions:      hydrALAZINE (APRESOLINE) 50 MG tablet, Take 1 tablet (50 mg) by mouth 2 times daily, Disp: , Rfl:      mupirocin (BACTROBAN) 2 % ointment, Apply topically 2 times daily, Disp: 22 g, Rfl:      torsemide (DEMADEX) 10 MG tablet, Take 1 tablet (10 mg) by mouth daily, Disp: 90 tablet, Rfl: 3     spironolactone (ALDACTONE) 50 MG tablet, Take 1 tablet (50 mg) by mouth daily, Disp: 90 tablet, Rfl: 3     pantoprazole (PROTONIX) 40 MG EC tablet, TAKE 1 TABLET BY MOUTH EVERY EVENING, Disp: 90 tablet, Rfl: 2     Multiple Vitamins-Minerals (CENTRUM SILVER) per tablet, Take 1 tablet by mouth daily, Disp: 30 tablet, Rfl:      Calcium Carb-Cholecalciferol (CALTRATE 600+D) 600-800 MG-UNIT TABS, Take 1 tablet by mouth daily, Disp: 30 tablet, Rfl: 0     senna-docusate (SENOKOT-S;PERICOLACE) 8.6-50 MG per tablet, Take 1-2 tablets by mouth 2 times daily, Disp: 60 tablet, Rfl: 0     acetaminophen (TYLENOL) 325 MG tablet, Take 2 tablets (650 mg) by mouth every 4 hours as needed for mild pain, Disp: 100 tablet, Rfl:      OXcarbazepine (TRILEPTAL) 150 MG tablet, Take 1 tablet (150 mg) by mouth 2 times daily, Disp: , Rfl:      insulin aspart (NOVOLOG PEN) 100 UNIT/ML injection, Subcutaneous 3 times daily (before meals).  For Pre-Meal Blood Glucose: Give with prandial insulin,  Do Not give Insulin if Pre-Meal BG < 140.  -189 give 1 unit.  -239 give 2 units.  -289 give 3 units.  -339 give 4 units.  -399 give 5 units.  -449 give 6 units BG = or > 450 give 7 units.  Notify MD if glucose > or = 350 mg/dL and give insulin., Disp: , Rfl:      insulin aspart  "(NOVOLOG PEN) 100 UNIT/ML injection, Inject 1-5 Units Subcutaneous At Bedtime MEDIUM INSULIN RESISTANCE DOSING   Do Not give Bedtime Correction Insulin if BG less than  200.  For  - 249 give 1 units.  For  - 299 give 2 units.  For  - 349 give 3 units.  For  -399 give 4 units.  For BG greater than or equal to 400 give 5 units. Notify provider if glucose greater than or equal to 350 mg/dL after administration of correction dose., Disp: , Rfl:      insulin glargine (LANTUS SOLOSTAR) 100 UNIT/ML injection, Inject 10 Units Subcutaneous At Bedtime, Disp: 30 mL, Rfl: 0     levETIRAcetam (KEPPRA) 500 MG tablet, 1 tablet (500 mg) by Oral or Feeding Tube route 2 times daily, Disp: 60 tablet, Rfl:      carvedilol (COREG) 25 MG tablet, Take 0.5 tablets (12.5 mg) by mouth every morning, Disp: 180 tablet, Rfl: 3     amLODIPine (NORVASC) 10 MG tablet, Take 1 tablet (10 mg) by mouth daily, Disp: 90 tablet, Rfl: 4     atorvastatin (LIPITOR) 40 MG tablet, Take 1 tablet (40 mg) by mouth daily, Disp: 90 tablet, Rfl: 3     isosorbide mononitrate (IMDUR) 60 MG 24 hr tablet, Take 2 tablets (120 mg) by mouth daily, Disp: 180 tablet, Rfl: 3     ASPIRIN NOT PRESCRIBED (INTENTIONAL), Please choose reason not prescribed, below, Disp: 0 each, Rfl: 0     ACCU-CHEK MARY PLUS test strip, TEST THREE TIMES DAILY, Disp: 300 strip, Rfl: 1     B-D U/F 31G X 8 MM insulin pen needle, , Disp: , Rfl:   Allergies   Allergen Reactions     Aspirin Hives     Penicillins Hives     Contrast Dye Hives     PMH, FAM HIST, SOC HIST, PROBLEM LIST:  All reviewed in EPIC.    OBJECTIVE:  /57 (BP Location: Right arm, Patient Position: Sitting, Cuff Size: Adult Large)  Pulse 58  Temp 97.4  F (36.3  C) (Oral)  Resp 20  Ht 1.88 m (6' 2\")  Wt 83 kg (182 lb 15.7 oz)  SpO2 95%  BMI 23.49 kg/m2    Imaging:   These are the pertinent radiologist's findings from:  CT head taken today.   .1. No acute intracranial pathology.  2. Near complete " resolution of subarachnoid hemorrhage.  3. Moderate parenchymal volume loss and leukoaraiosis.  Please see Epic for the bulk of the report.  I personally reviewed the images with the patient    EXAM:  Well developed well nourished male found seated comfortably in wheelchair.  No apparent distress.   Exam at discharge:   CRANIAL NERVES: Discs flat. Pupils equal, round and reactive to light.              Extraocular movements full. Visual fields full. Face moves symmetrically.                   Tongue midline. Hearing intact to finger rubbing.                         Strength: RUE flaccid, RLE has antigravity strength. No episodes of jerking was noted during  my encounter.                         Cognition: fund of knowledge and train of thought appropriate  Exam today:  Improved. Cranial nerves are intact as above. Upper extremity strength is improved, he has antigravity strength in all muscles. Right lower extremity has also antigravity strength with some control.    Assessment/Plan:  1. SAH (subarachnoid hemorrhage) (H)    2. Subdural hematoma, post-traumatic, without LOC, subsequent encounter          Kelechi Coleman is doing well after his traumatic subdural hematoma and subarachnoid hemorrhage. All the blood products are resolved. Brain is returned to preinjury configuration. He is working in rehabilitation to getting back to normal functioning. He is not, technically, a fall risk, even though he is a bit weak on the right he is cognizant, aware, not impulsive, and so I would not consider him fall risk at this juncture. As such he could return to anticoagulation for his underlying issues, A. fib and prior stroke, if his medical team felt it necessary    Regarding his seizure episodes. He remains on Keppra and has had no further episodes. I did not see the Trileptal on his nursing home medication list, but it appears on our current list from the hospital. He has a follow-up with Dr Smith in neurology on 13  October. He should stay on his medications until that time.    There is no recommendation for neurosurgical intervention at this juncture, no recommendation for ongoing follow-up. We are comfortable releasing him to p.r.n return. All his questions have been answered. They demonstrated good understanding of his situation. He knows that he can call if he has any questions, comments, or concerns.    We appreciate the opportunity to be of service in the care of this pleasant patient  Please do call if there is anything more we can do.    Greta Taylor PA-C  [facility]  Department of Neurosurgery  Phone: [phone]  Fax: [phone]      This note was generated using voice recognition software. While edited for content some inaccurate phrasing may be found.    Again, thank you for allowing me to participate in the care of your patient.      Sincerely,    Greta Taylor PA-C

## 2017-09-25 NOTE — PROGRESS NOTES
Clinic Care Coordination Contact  Care Team Conversations    Received call back from Fer Sosa, who reported that patient expected to remain at TCU at least another 2 weeks and likely longer.   He is making very slow, but steady, progress with physical therapy.  At the Care Conference last week, it was suggested by the acre team that patient consider a change of living, to which the patient was amenable.  [facility] is under consideration upon d/c from TCU.       LAUREEN will continue to follow.      GILDARDO RoblesN, RN, PHN  Clinic Care Coordinator  [facility]  [phone]

## 2017-09-27 NOTE — PROGRESS NOTES
2017      Etienne Gee MD   [facility]   [address], [address]   [place] [address]      RE: Kelechi Coleman   MRN: [id number]   : 1935      Dear Etienne:       I saw Merlin today, who appears from his nursing home, having had a devastating fall about a month ago.  He apparently fell over backwards and sustained an injury to his head with subdural hematomas documented.  He had been getting around slowly as it was, but this has really set him back.  He has had previous strokes which have led to some chronic right-sided weakness.  Subdurals were evaluated by Neurosurgery, but he was managed nonoperatively following his CT's.  His right-sided weakness seemed to be a bit worse and overall in the past month, he says his appetite is poor and he has really lost a lot of weight.  He appears cachectic and a bit malnourished, weak, frail, although mentally his mind is intact.      He says he is doing rehab in his assisted living facility, but he is finding it really a tough go and hard work.      PAST MEDICAL HISTORY:   1.  Recent fall with subdural hematomas as noted.   2.  Right shoulder pain which is chronic.   3.  Chronic coronary artery disease with previous coronary artery bypass surgery and coronary stents but no current signs of angina or heart failure.   4.  Diabetes mellitus.   5.  Hypertension.   6.  Atrial fibrillation.      His program medically as listed in Epic includes:   1.  Torsemide 10 mg a day.   2.  Spironolactone 50 mg daily.   3.  Protonix 40 mg daily.   4.  Keppra 500 mg daily.   5.  Imdur 120 mg a day.   6.  Insulin per schedule.   7.  Hydralazine 50 mg b.i.d.   8.  Atorvastatin 40 mg at bedtime.   9.  Amlodipine 10 mg a day.   10.  Carvedilol 25 mg twice a day.   11.  Aspirin has not been prescribed because of the falling risk.   12.  He is on vitamins and several p.r.n. medicines that I will not list.      PHYSICAL EXAMINATION:   GENERAL:  Shows that he is in a wheelchair.  He  looks frail and cachectic.   VITAL SIGNS:  Blood pressure 100/60, heart rate 60 beats a minute.   HEENT:  Head shows poor dentition and facial weight loss.  He had no neck vein distention or bruits, sitting at 45 degrees in the wheelchair.   HEART:  Tones are distant and regular, no gallop or murmur heard.   LUNGS:  Clear but weak inspiration.   ABDOMEN:  Flat, without organomegaly.   EXTREMITIES:  Free of edema.      I told him that I was concerned that he was not eating enough and he agreed that he probably was not.  He really looks sad, disheartened, depressed and overwhelmed.  Nutrition and rehabilitation and doing some muscle building is required for him to escape his current situation with being in a nursing home.  I would hope that he would continue to work as hard as he could to regain his strength and, in theory, some independence.  As it is, he has a long way to go and I am not sure he can make it.      IMPRESSION:   1.  Silent coronary artery disease.   2.  Controlled hypertension, almost perhaps a bit on the hypotensive side.  Should there be any reports the reports of hypotension, I would stop his Apresoline and then the Norvasc if needed.  He denies dizziness, chest pain or shortness of breath.  He will get short of breath if he pushes himself, but currently he is not able to do so.   3.  Insulin-dependent diabetes mellitus.   4.  Chronic renal insufficiency, creatinine 1.2-1.6.   5.  Some prerenal azotemia, on furosemide.      PLAN:  Follow his blood pressures.  Encourage good nutrition and rehabilitation.  Electrolytes and a serum albumin would be useful.  His last serum albumin, however, a month ago was 3.6, but that was prior to his fall and subsequent poor nutrition.      Over 35 minutes was spent reviewing multiple records, H&Ps, discharge summaries, imaging, EKGs, medications, medication side effects, laboratory work, laboratory results, etc.  Greater than 70% of the time was face to face with  education.  Mentally, he is still crisp, but physically he has really taken a beating.      Sincerely,         SARA BISHOP MD, [facility]             D: 2017 14:57   T: 2017 18:54   MT: al      Name:     MARLA VÁZQUEZ   MRN:      [id number]        Account:      [id number]   :      1935           Service Date: 2017      Document: [id number]

## 2017-09-27 NOTE — LETTER
2017      Etienne Gee MD   [facility]   [address]   [place] [address]      RE: Kelechi Coleman   MRN: [id number]   : 1935      Dear Etienne:       I saw Merlin today, who appears from his nursing home, having had a devastating fall about a month ago.  He apparently fell over backwards and sustained an injury to his head with subdural hematomas documented.  He had been getting around slowly as it was, but this has really set him back.  He has had previous strokes which have led to some chronic right-sided weakness.  Subdurals were evaluated by Neurosurgery, but he was managed nonoperatively following his CT's.  His right-sided weakness seemed to be a bit worse and overall in the past month, he says his appetite is poor and he has really lost a lot of weight.  He appears cachectic and a bit malnourished, weak, frail, although mentally his mind is intact.      He says he is doing rehab in his assisted living facility, but he is finding it really a tough go and hard work.      PAST MEDICAL HISTORY:   1.  Recent fall with subdural hematomas as noted.   2.  Right shoulder pain which is chronic.   3.  Chronic coronary artery disease with previous coronary artery bypass surgery and coronary stents but no current signs of angina or heart failure.   4.  Diabetes mellitus.   5.  Hypertension.   6.  Atrial fibrillation.      His program medically as listed in Epic includes:   1.  Torsemide 10 mg a day.   2.  Spironolactone 50 mg daily.   3.  Protonix 40 mg daily.   4.  Keppra 500 mg daily.   5.  Imdur 120 mg a day.   6.  Insulin per schedule.   7.  Hydralazine 50 mg b.i.d.   8.  Atorvastatin 40 mg at bedtime.   9.  Amlodipine 10 mg a day.   10.  Carvedilol 25 mg twice a day.   11.  Aspirin has not been prescribed because of the falling risk.   12.  He is on vitamins and several p.r.n. medicines that I will not list.      PHYSICAL EXAMINATION:   GENERAL:  Shows that he is in a wheelchair.  He  looks frail and cachectic.   VITAL SIGNS:  Blood pressure 100/60, heart rate 60 beats a minute.   HEENT:  Head shows poor dentition and facial weight loss.  He had no neck vein distention or bruits, sitting at 45 degrees in the wheelchair.   HEART:  Tones are distant and regular, no gallop or murmur heard.   LUNGS:  Clear but weak inspiration.   ABDOMEN:  Flat, without organomegaly.   EXTREMITIES:  Free of edema.      I told him that I was concerned that he was not eating enough and he agreed that he probably was not.  He really looks sad, disheartened, depressed and overwhelmed.  Nutrition and rehabilitation and doing some muscle building is required for him to escape his current situation with being in a nursing home.  I would hope that he would continue to work as hard as he could to regain his strength and, in theory, some independence.  As it is, he has a long way to go and I am not sure he can make it.      IMPRESSION:   1.  Silent coronary artery disease.   2.  Controlled hypertension, almost perhaps a bit on the hypotensive side.  Should there be any reports the reports of hypotension, I would stop his Apresoline and then the Norvasc if needed.  He denies dizziness, chest pain or shortness of breath.  He will get short of breath if he pushes himself, but currently he is not able to do so.   3.  Insulin-dependent diabetes mellitus.   4.  Chronic renal insufficiency, creatinine 1.2-1.6.   5.  Some prerenal azotemia, on furosemide.      PLAN:  Follow his blood pressures.  Encourage good nutrition and rehabilitation.  Electrolytes and a serum albumin would be useful.  His last serum albumin, however, a month ago was 3.6, but that was prior to his fall and subsequent poor nutrition.      Over 35 minutes was spent reviewing multiple records, H&Ps, discharge summaries, imaging, EKGs, medications, medication side effects, laboratory work, laboratory results, etc.  Greater than 70% of the time was face to face with  education.  Mentally, he is still crisp, but physically he has really taken a beating.      Sincerely,         SARA BISHOP MD, FACC

## 2017-09-27 NOTE — MR AVS SNAPSHOT
After Visit Summary   9/27/2017    Kelechi Coleman    MRN: [id number]           Patient Information     Date Of Birth          1935        Visit Information        Provider Department      9/27/2017 2:15 PM Isaiah Charles MD [facility] [place]        Today's Diagnoses     Atherosclerosis of native coronary artery of native heart without angina pectoris    -  1    Chronic systolic congestive heart failure (H)        Chronic systolic heart failure (H)        Cerebrovascular accident (CVA), unspecified mechanism (H)        Acute on chronic combined systolic and diastolic congestive heart failure (H)        Paroxysmal atrial fibrillation (H)           Follow-ups after your visit        Your next 10 appointments already scheduled     Oct 13, 2017 10:00 AM CDT   (Arrive by 9:45 AM)   Return Seizure with Mio Smith MD   [facility] ([facility])    [address] [place] [id number] [phone]            Feb 09, 2018 11:30 AM CST   [facility] Visit [facility]ee [facility]    [address] [place] [id number] [phone]           Bring a current list of meds and any records pertaining to this visit. For Physicals, please bring immunization records and any forms needing to be filled out. Please arrive 10 minutes early to complete paperwork.              Who to contact     If you have questions or need follow up information about today's clinic visit or your schedule please contact [facility] [place] directly at [phone].  Normal or non-critical lab and imaging results will be communicated to you by MyChart, letter or phone within 4 business days after the clinic has received the results. If you do not hear from us within 7 days, please contact the clinic through MyChart or phone. If you have a critical or  "abnormal lab result, we will notify you by phone as soon as possible.  Submit refill requests through [employer] or call your pharmacy and they will forward the refill request to us. Please allow 3 business days for your refill to be completed.          Additional Information About Your Visit        [employer] Information     [employer] gives you secure access to your electronic health record. If you see a primary care provider, you can also send messages to your care team and make appointments. If you have questions, please call your primary care clinic.  If you do not have a primary care provider, please call [phone] and they will assist you.        Care EveryWhere ID     This is your Care EveryWhere ID. This could be used by other organizations to access your [facility] medical records  [id number]        Your Vitals Were     Pulse Height                55 1.88 m (6' 2.02\")           Blood Pressure from Last 3 Encounters:   09/27/17 103/62   09/22/17 121/57   08/24/17 137/75    Weight from Last 3 Encounters:   09/22/17 83 kg (182 lb 15.7 oz)   08/20/17 83.1 kg (183 lb 3.2 oz)   08/17/17 83.9 kg (185 lb)              We Performed the Following     Follow-Up with [facility] visit        Primary Care Provider Office Phone # Fax #    Etienne Gee [phone]       [facility] [address]  [place] [address]        Goals        Exercise    I will exercise 2x per week (15 min per time) , for the next 6 weeks.     Notes - Note created  12/22/2016 12:14 PM by Ting Bullard RN    As of today's date 12/22/2016 goal is met at 0 - 25%.   Goal Status:  Active           General    I will not add any salt to my food and will limit my salt intake to 2,000 mg of sodium per day. (pt-stated)     Notes - Note created  9/26/2016  3:53 PM by Ting Bullard RN    As of today's date 9/26/2016 goal is met at 26 - 50%.   Goal Status:  Active        I will weigh myself every day and call " my clinic if I am more than 2 pounds heavier in a day or 5 pounds heavier in a week.  (pt-stated)     Notes - Note created  9/26/2016  3:52 PM by Ting Bullard RN    As of today's date 9/26/2016 goal is met at 26 - 50%.   Goal Status:  Active          Equal Access to Services     ART DEL RIO AH: Hadii aad ku hadasho Soomaali, waaxda luqadaha, qaybta kaalmada adeegyada, waxay chasin hayaan adeeg leenagriffin maysdavidaruth . So [facility] [phone].    ATENCIÓN: Si habla español, tiene a conteh disposición servicios gratuitos de asistencia lingüística. Llame al [phone].    We comply with applicable federal civil rights laws and [place] laws. We do not discriminate on the basis of race, color, national origin, age, disability sex, sexual orientation or gender identity.            Thank you!     Thank you for choosing [facility] [place]  for your care. Our goal is always to provide you with excellent care. Hearing back from our patients is one way we can continue to improve our services. Please take a few minutes to complete the written survey that you may receive in the mail after your visit with us. Thank you!             Your Updated Medication List - Protect others around you: Learn how to safely use, store and throw away your medicines at www.disposemymeds.org.          This list is accurate as of: 9/27/17  2:57 PM.  Always use your most recent med list.                   Brand Name Dispense Instructions for use Diagnosis    ACCU-CHEK MARY PLUS test strip   Generic drug:  blood glucose monitoring     300 strip    TEST THREE TIMES DAILY    Type 2 diabetes mellitus with stage 3 chronic kidney disease, with long-term current use of insulin (H)       acetaminophen 325 MG tablet    TYLENOL    100 tablet    Take 2 tablets (650 mg) by mouth every 4 hours as needed for mild pain    Pain       amLODIPine 10 MG tablet    NORVASC    90 tablet    Take 1 tablet (10 mg) by mouth daily    Peripheral  artery disease (H), Coronary artery disease involving native coronary artery of native heart without angina pectoris       ASPIRIN NOT PRESCRIBED    INTENTIONAL    0 each    Please choose reason not prescribed, below    Chronic systolic congestive heart failure (H)       atorvastatin 40 MG tablet    LIPITOR    90 tablet    Take 1 tablet (40 mg) by mouth daily    Hyperlipidemia LDL goal <70       B-D U/F 31G X 8 MM   Generic drug:  insulin pen needle           Calcium Carb-Cholecalciferol 600-800 MG-UNIT Tabs    CALTRATE 600+D    30 tablet    Take 1 tablet by mouth daily    Vitamin deficiency       carvedilol 25 MG tablet    COREG    180 tablet    Take 0.5 tablets (12.5 mg) by mouth every morning    Essential hypertension with goal blood pressure less than 130/80       CENTRUM SILVER per tablet     30 tablet    Take 1 tablet by mouth daily    Vitamin deficiency       hydrALAZINE 50 MG tablet    APRESOLINE     Take 1 tablet (50 mg) by mouth 2 times daily    Essential hypertension with goal blood pressure less than 130/80       * insulin aspart 100 UNIT/ML injection    NovoLOG PEN     Subcutaneous 3 times daily (before meals).  For Pre-Meal Blood Glucose: Give with prandial insulin,  Do Not give Insulin if Pre-Meal BG < 140.  -189 give 1 unit.  -239 give 2 units.  -289 give 3 units.  -339 give 4 units.  -399 give 5 units.  -449 give 6 units BG = or > 450 give 7 units.  Notify MD if glucose > or = 350 mg/dL and give insulin.    Chronic systolic congestive heart failure (H), Type 2 diabetes mellitus with hyperosmolar coma, with long-term current use of insulin (H)       * insulin aspart 100 UNIT/ML injection    NovoLOG PEN     Inject 1-5 Units Subcutaneous At Bedtime MEDIUM INSULIN RESISTANCE DOSING   Do Not give Bedtime Correction Insulin if BG less than  200.  For  - 249 give 1 units.  For  - 299 give 2 units.  For  - 349 give 3 units.  For  -399 give 4 units.   For BG greater than or equal to 400 give 5 units. Notify provider if glucose greater than or equal to 350 mg/dL after administration of correction dose.    Type 2 diabetes mellitus with hyperosmolar coma, with long-term current use of insulin (H)       insulin glargine 100 UNIT/ML injection    LANTUS SOLOSTAR    30 mL    Inject 10 Units Subcutaneous At Bedtime    Type 2 diabetes mellitus with stage 3 chronic kidney disease, with long-term current use of insulin (H)       isosorbide mononitrate 60 MG 24 hr tablet    IMDUR    180 tablet    Take 2 tablets (120 mg) by mouth daily    Coronary artery disease involving native coronary artery of native heart without angina pectoris       levETIRAcetam 500 MG tablet    KEPPRA    60 tablet    1 tablet (500 mg) by Oral or Feeding Tube route 2 times daily    SDH (subdural hematoma) (H), SAH (subarachnoid hemorrhage) (H)       mupirocin 2 % ointment    BACTROBAN    22 g    Apply topically 2 times daily    Abrasion, right knee, initial encounter       OXcarbazepine 150 MG tablet    TRILEPTAL     Take 1 tablet (150 mg) by mouth 2 times daily    History of stroke       pantoprazole 40 MG EC tablet    PROTONIX    90 tablet    TAKE 1 TABLET BY MOUTH EVERY EVENING    Gastroesophageal reflux disease, esophagitis presence not specified       spironolactone 50 MG tablet    ALDACTONE    90 tablet    Take 1 tablet (50 mg) by mouth daily    Chronic systolic congestive heart failure (H)       thiamine 100 MG tablet      Take 100 mg by mouth daily        torsemide 10 MG tablet    DEMADEX    90 tablet    Take 1 tablet (10 mg) by mouth daily    Acute on chronic combined systolic and diastolic congestive heart failure (H)       * Notice:  This list has 2 medication(s) that are the same as other medications prescribed for you. Read the directions carefully, and ask your doctor or other care provider to review them with you.

## 2017-09-27 NOTE — PROGRESS NOTES
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 He noted bleeding from head following fall but no new neurological deficits and denied any loss of consciousness.  Has noted right  shoulder pain and right upper extremity weakness which he has had intermittently from his strokes. It is worse than baseline and has some weakeness in right upper extremity. He was brought to [facility] where he  was found to have a left subdural and subarachnoid bleeds along with a scalp laceration. He was loaded with Keppra, had the left parietal laceration repaired. He was then transferred to [facility] for trauma admission and speciality cares.          1.  Coronary artery disease with history of CAB in 2000.  This was a LIMA to the LAD, saphenous vein graft to the OM, and saphenous vein graft to the RPDA and RPLA sequentially.  He suffered an NSTEMI in 09/2016, he had drug-eluting stent to the proximal and distal portions of the vein graft to the RPDA and RPLA.  He had an occluded graft to the OM1 but patent LIMA to the LAD.   2.  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   3.  Hypertension.   4.  Hyperlipidemia.   5.  CKD.   6.  Possible CVA post-MI when he presented in September with left arm and hand weakness.   7.  Mild to moderate mitral regurgitation and tricuspid regurgitation.

## 2017-10-13 NOTE — LETTER
[place] [address] [facility]  [address]  [place]  [address]  Tel: [phone]    October 16, 2017    Kelechi Coleman  [address] [place] [id number]        Dear Mr. Coleman,    The following letter pertains to your most recent diagnostic tests:    Your hemoglobin has remained stable since last check.  You have mild anemia.  We can discuss further evaluation for this problem when I see you in my clinic.          Sincerely,    Dr. Gee /charlie Serrano Orders   Hemoglobin   Result Value Ref Range    Hemoglobin 10.5 (L) 13.3 - 17.7 g/dL

## 2017-10-13 NOTE — TELEPHONE ENCOUNTER
----- Message from Ramona Escobedo LPN sent at 10/13/2017  2:04 PM CDT -----  Regarding: [facility] has different med list than ours  Caller name:  Alexandro (Res[facility]) at [facility] [phone]  fax [phone]    Treating provider/specialty: Luis    Nurse:    Best time to return call:    Message left?     Description of issue/question:  Patient seen Luis today but per AFTER VISIT SUMMARY, they have different medications, insulin dosing. etc on their medication list.  They are faxing their current medication list to you.    Also nursing needs to know if there are any orders and what the lab results are.  Please call them back.

## 2017-10-13 NOTE — LETTER
10/13/2017       RE: Kelechi Coleman  [address]  [facility] [address]     Dear Colleague,    Thank you for referring your patient, Kelechi Coleman, to the [facility] NEUROLOGY at [facility]. Please see a copy of my visit note below.    2017        Etienne Gee MD   [address], [address]   [place] [address]      RE: Kelechi Coleman    MRN: [id number]    : 1935              Dear Dr. Gee:        This pleasant 81-year-old male who is convalescent at [facility] after a recent head injury to his head at home as he was going up stairs.  There was a left subarachnoid traumatic hemorrhage for which he was evaluated and treated conservatively by Neurosurgery.      Sometime when his was diagnosed and not precisely stated, he had episodes of jerking of the right arm which he said was followed by weakness, which has persisted for which he is getting therapy.  He was treated as if these were seizures and has been on levetiracetam since that time at a dose of 500 b.i.d. with a concentration of 45 done subsequent.  He is also on Trileptal 150 twice a day.      During the subarachnoid hemorrhage he was admitted on  and he was on an anticoagulant then because of a history of coronary artery disease, atrial fibrillation, diabetes, chronic kidney disease.  The patient fell while Plavix and hit his head and sustained a laceration of the lateral occipital region, but he did not lose consciousness.  There was a significant amount of bleeding in his head and left occipital region laceration was documented.  There was no weakness or motor deficits at the time of admission, his EKG showed premature ventricular contractions.  Since discharge, he has been getting physical therapy as I say and it is in [facility] which he likes.  He says his arm is still weak and he is getting stronger.  His right leg  to sit occasionally will jerk as well as his left.      His past medical history shows that he carries a diagnosis of seizure, stroke, cerebral infarction, coronary artery disease, gastroesophageal reflux, chronic systolic failure, type 2 diabetes, congestive failure,  and hyperlipidemia.  He is allergic to aspirin, penicillin and contrast dye.        SOCIAL HISTORY:  Lives alone.  He says he likes it that way.      His GFR is noted to be 38 with a creatinine of 1.74.  His review of symptoms is as given.        His exam shows a very pleasant man who looks a little bit disheveld.  His mental status exam is pretty good.  His cranial nerves are normal.  No weakness.  He does have weakness of the right upper extremity, mild and question related perhaps to the right proximal leg.  There is no sensory loss.  Reflexes are not particularly increased.        In summary, this man had possibly some focal motor seizures of the right upper extremity related to a traumatic subarachnoid hemorrhage.  He has been treated with both levetiracetam and Trileptal.  His last CT scan showed resolution of these.  His concentration of the levetiracetam as he does have some renal failure, we will recheck it today.  We will also obtain an EEG and see him for followup.  We might discontinue the Trileptal then gradually.  He has had a panel on the  which showed his sodium to be okay at 137.  We will see him again.  In the meantime, we will keep him on both medications.        Sincerely,        Mio Smith MD     D: 10/13/2017 12:52   T: 10/13/2017 14:37   MT: tb      Name:     MARLA VÁZQUEZ   MRN:      [id number]        Account:      [id number]   :      1935           Service Date: 10/13/2017      Document: [id number]

## 2017-10-13 NOTE — PROGRESS NOTES
2017        Etienne Gee MD   [address], [address]   [place] [address]      RE: Kelechi Coleman    MRN: [id number]    : 1935              Dear Dr. Gee:        This pleasant 81-year-old male who is convalescent at [facility] after a recent head injury to his head at home as he was going up stairs.  There was a left subarachnoid traumatic hemorrhage for which he was evaluated and treated conservatively by Neurosurgery.      Sometime when his was diagnosed and not precisely stated, he had episodes of jerking of the right arm which he said was followed by weakness, which has persisted for which he is getting therapy.  He was treated as if these were seizures and has been on levetiracetam since that time at a dose of 500 b.i.d. with a concentration of 45 done subsequent.  He is also on Trileptal 150 twice a day.      During the subarachnoid hemorrhage he was admitted on  and he was on an anticoagulant then because of a history of coronary artery disease, atrial fibrillation, diabetes, chronic kidney disease.  The patient fell while Plavix and hit his head and sustained a laceration of the lateral occipital region, but he did not lose consciousness.  There was a significant amount of bleeding in his head and left occipital region laceration was documented.  There was no weakness or motor deficits at the time of admission, his EKG showed premature ventricular contractions.  Since discharge, he has been getting physical therapy as I say and it is in [facility] which he likes.  He says his arm is still weak and he is getting stronger.  His right leg to sit occasionally will jerk as well as his left.      His past medical history shows that he carries a diagnosis of seizure, stroke, cerebral infarction, coronary artery disease, gastroesophageal reflux, chronic systolic failure, type 2 diabetes, congestive failure,  and hyperlipidemia.  He is  allergic to aspirin, penicillin and contrast dye.        SOCIAL HISTORY:  Lives alone.  He says he likes it that way.      His GFR is noted to be 38 with a creatinine of 1.74.  His review of symptoms is as given.        His exam shows a very pleasant man who looks a little bit disheveld.  His mental status exam is pretty good.  His cranial nerves are normal.  No weakness.  He does have weakness of the right upper extremity, mild and question related perhaps to the right proximal leg.  There is no sensory loss.  Reflexes are not particularly increased.        In summary, this man had possibly some focal motor seizures of the right upper extremity related to a traumatic subarachnoid hemorrhage.  He has been treated with both levetiracetam and Trileptal.  His last CT scan showed resolution of these.  His concentration of the levetiracetam as he does have some renal failure, we will recheck it today.  We will also obtain an EEG and see him for followup.  We might discontinue the Trileptal then gradually.  He has had a panel on the  which showed his sodium to be okay at 137.  We will see him again.  In the meantime, we will keep him on both medications.        Sincerely,        MD CHANNING López MD             D: 10/13/2017 12:52   T: 10/13/2017 14:37   MT: tb      Name:     MARLA VÁZQUEZ   MRN:      [id number]        Account:      [id number]   :      1935           Service Date: 10/13/2017      Document: [id number]

## 2017-10-13 NOTE — NURSING NOTE
Chief Complaint   Patient presents with     RECHECK     UMP-SEIZURE DISORDER F/U     Severo Clifford, CMA

## 2017-10-13 NOTE — TELEPHONE ENCOUNTER
Nurse recieved In-Basket message as below:    Nurse reviewed chart notes, which indicated no changes in meds.  Nurse returned call, was distracted to a voicemail, and left a detailed message indicating that there was no indication of an intent to dill any medications. Left name and call back number.

## 2017-10-13 NOTE — MR AVS SNAPSHOT
After Visit Summary   10/13/2017    Kelechi Coleman    MRN: [id number]           Patient Information     Date Of Birth          1935        Visit Information        Provider Department      10/13/2017 10:00 AM Moi Smith MD [facility] Neurology        Today's Diagnoses     Seizure disorder, focal motor (H)    -  1       Follow-ups after your visit        Follow-up notes from your care team     Return in about 3 months (around 1/13/2018).      Your next 10 appointments already scheduled     Oct 13, 2017 11:00 AM CDT   LAB with  LAB   [facility] ([facility])    [address] [place] [id number] [phone]           Patient must bring picture ID. Patient should be prepared to give a urine specimen  Please do not eat 10-12 hours before your appointment if you are coming in fasting for labs on lipids, cholesterol, or glucose (sugar). Pregnant women should follow their Care Team instructions. Water with medications is okay. Do not drink coffee or other fluids. If you have concerns about taking  your medications, please ask at office or if scheduling via [employer], send a message by clicking on Secure Messaging, Message Your Care Team.            Jan 09, 2018 11:30 AM CST   (Arrive by 11:15 AM)   Return Seizure with Mio Smith MD   [facility] Neurology ([facility])    [address] [place] [id number] [phone]            Feb 09, 2018 11:30 AM CST   Office Visit with Etienne Gee MD   [facility]    [address] [facility] [id number] [phone]           Bring a current list of meds and any records pertaining to this visit. For Physicals, please bring immunization records and any forms needing to be filled out. Please arrive 10 minutes early to complete paperwork.              Who to contact     Please call your clinic at  "[phone] to:    Ask questions about your health    Make or cancel appointments    Discuss your medicines    Learn about your test results    Speak to your doctor   If you have compliments or concerns about an experience at your clinic, or if you wish to file a complaint, please contact [facility] Patient Relations at [phone] or email us at [email or web address]         Additional Information About Your Visit        [employer] Information     [employer] gives you secure access to your electronic health record. If you see a primary care provider, you can also send messages to your care team and make appointments. If you have questions, please call your primary care clinic.  If you do not have a primary care provider, please call [phone] and they will assist you.      [employer] is an electronic gateway that provides easy, online access to your medical records. With [employer], you can request a clinic appointment, read your test results, renew a prescription or communicate with your care team.     To access your existing account, please contact your [facility] or call [phone] for assistance.        Care EveryWhere ID     This is your Care EveryWhere ID. This could be used by other organizations to access your [facility] medical records  [id number]        Your Vitals Were     Pulse Temperature Respirations Height Pulse Oximetry BMI (Body Mass Index)    66 98.3  F (36.8  C) 20 1.88 m (6' 2\") 99% 22.73 kg/m2       Blood Pressure from Last 3 Encounters:   10/13/17 113/57   09/27/17 103/62   09/22/17 121/57    Weight from Last 3 Encounters:   10/13/17 80.3 kg (177 lb)   09/22/17 83 kg (182 lb 15.7 oz)   08/20/17 83.1 kg (183 lb 3.2 oz)              We Performed the Following     Keppra (Levetiracetam) Level        Primary Care Provider Office Phone # Fax #    Etienne Gee [phone]       [facility] [address]  [facility] [address]        Goals        Exercise    I will exercise 2x per week (15 min per time) , for the next 6 weeks.     Notes - Note created  12/22/2016 12:14 PM by Ting Bullard, RN    As of today's date 12/22/2016 goal is met at 0 - 25%.   Goal Status:  Active           General    I will not add any salt to my food and will limit my salt intake to 2,000 mg of sodium per day. (pt-stated)     Notes - Note created  9/26/2016  3:53 PM by Ting Bullard, RN    As of today's date 9/26/2016 goal is met at 26 - 50%.   Goal Status:  Active        I will weigh myself every day and call my clinic if I am more than 2 pounds heavier in a day or 5 pounds heavier in a week.  (pt-stated)     Notes - Note created  9/26/2016  3:52 PM by Ting Bullard RN    As of today's date 9/26/2016 goal is met at 26 - 50%.   Goal Status:  Active          Equal Access to Services     [facility]: Hadii aad ku hadasho Soomaali, waaxda luqadaha, qaybta kaalmada adeegyareji, nenita reynoso . So [facility] [phone].    ATENCIÓN: Si habla español, tiene a conteh disposición servicios gratuitos de asistencia lingüística. Llame al [phone].    We comply with applicable federal civil rights laws and [place] laws. We do not discriminate on the basis of race, color, national origin, age, disability, sex, sexual orientation, or gender identity.            Thank you!     Thank you for choosing [facility] NEUROLOGY  for your care. Our goal is always to provide you with excellent care. Hearing back from our patients is one way we can continue to improve our services. Please take a few minutes to complete the written survey that you may receive in the mail after your visit with us. Thank you!             Your Updated Medication List - Protect others around you: Learn how to safely use, store and throw away your medicines at www.disposemymeds.org.          This list is accurate as of: 10/13/17 10:30 AM.  Always use  your most recent med list.                   Brand Name Dispense Instructions for use Diagnosis    ACCU-CHEK MARY PLUS test strip   Generic drug:  blood glucose monitoring     300 strip    TEST THREE TIMES DAILY    Type 2 diabetes mellitus with stage 3 chronic kidney disease, with long-term current use of insulin (H)       acetaminophen 325 MG tablet    TYLENOL    100 tablet    Take 2 tablets (650 mg) by mouth every 4 hours as needed for mild pain    Pain       amLODIPine 10 MG tablet    NORVASC    90 tablet    Take 1 tablet (10 mg) by mouth daily    Peripheral artery disease (H), Coronary artery disease involving native coronary artery of native heart without angina pectoris       ASPIRIN NOT PRESCRIBED    INTENTIONAL    0 each    Please choose reason not prescribed, below    Chronic systolic congestive heart failure (H)       atorvastatin 40 MG tablet    LIPITOR    90 tablet    Take 1 tablet (40 mg) by mouth daily    Hyperlipidemia LDL goal <70       B-D U/F 31G X 8 MM   Generic drug:  insulin pen needle           Calcium Carb-Cholecalciferol 600-800 MG-UNIT Tabs    CALTRATE 600+D    30 tablet    Take 1 tablet by mouth daily    Vitamin deficiency       carvedilol 25 MG tablet    COREG    180 tablet    Take 0.5 tablets (12.5 mg) by mouth every morning    Essential hypertension with goal blood pressure less than 130/80       CENTRUM SILVER per tablet     30 tablet    Take 1 tablet by mouth daily    Vitamin deficiency       hydrALAZINE 50 MG tablet    APRESOLINE     Take 1 tablet (50 mg) by mouth 2 times daily    Essential hypertension with goal blood pressure less than 130/80       * insulin aspart 100 UNIT/ML injection    NovoLOG PEN     Subcutaneous 3 times daily (before meals).  For Pre-Meal Blood Glucose: Give with prandial insulin,  Do Not give Insulin if Pre-Meal BG < 140.  -189 give 1 unit.  -239 give 2 units.  -289 give 3 units.  -339 give 4 units.  -399 give 5 units.  BG  400-449 give 6 units BG = or > 450 give 7 units.  Notify MD if glucose > or = 350 mg/dL and give insulin.    Chronic systolic congestive heart failure (H), Type 2 diabetes mellitus with hyperosmolar coma, with long-term current use of insulin (H)       * insulin aspart 100 UNIT/ML injection    NovoLOG PEN     Inject 1-5 Units Subcutaneous At Bedtime MEDIUM INSULIN RESISTANCE DOSING   Do Not give Bedtime Correction Insulin if BG less than  200.  For  - 249 give 1 units.  For  - 299 give 2 units.  For  - 349 give 3 units.  For  -399 give 4 units.  For BG greater than or equal to 400 give 5 units. Notify provider if glucose greater than or equal to 350 mg/dL after administration of correction dose.    Type 2 diabetes mellitus with hyperosmolar coma, with long-term current use of insulin (H)       insulin glargine 100 UNIT/ML injection    LANTUS SOLOSTAR    30 mL    Inject 10 Units Subcutaneous At Bedtime    Type 2 diabetes mellitus with stage 3 chronic kidney disease, with long-term current use of insulin (H)       isosorbide mononitrate 60 MG 24 hr tablet    IMDUR    180 tablet    Take 2 tablets (120 mg) by mouth daily    Coronary artery disease involving native coronary artery of native heart without angina pectoris       levETIRAcetam 500 MG tablet    KEPPRA    60 tablet    1 tablet (500 mg) by Oral or Feeding Tube route 2 times daily    SDH (subdural hematoma) (H), SAH (subarachnoid hemorrhage) (H)       mupirocin 2 % ointment    BACTROBAN    22 g    Apply topically 2 times daily    Abrasion, right knee, initial encounter       OXcarbazepine 150 MG tablet    TRILEPTAL     Take 1 tablet (150 mg) by mouth 2 times daily    History of stroke       pantoprazole 40 MG EC tablet    PROTONIX    90 tablet    TAKE 1 TABLET BY MOUTH EVERY EVENING    Gastroesophageal reflux disease, esophagitis presence not specified       spironolactone 50 MG tablet    ALDACTONE    90 tablet    Take 1 tablet (50 mg) by  mouth daily    Chronic systolic congestive heart failure (H)       thiamine 100 MG tablet      Take 100 mg by mouth daily        torsemide 10 MG tablet    DEMADEX    90 tablet    Take 1 tablet (10 mg) by mouth daily    Acute on chronic combined systolic and diastolic congestive heart failure (H)       * Notice:  This list has 2 medication(s) that are the same as other medications prescribed for you. Read the directions carefully, and ask your doctor or other care provider to review them with you.

## 2017-10-15 NOTE — PROGRESS NOTES
The following letter pertains to your most recent diagnostic tests:    Your hemoglobin has remained stable since last check.  You have mild anemia.  We can discuss further evaluation for this problem when I see you in my clinic.          Sincerely,    Dr. Gee

## 2017-10-30 NOTE — TELEPHONE ENCOUNTER
Leora from [facility] calling to ask if patient still needs PFT and 6 min walk test?     Tests ordered in Aug, however patient didn't have them done due to other health problems/issues.    Patient current at [facility]?     Are these tests still needed?  If so, how soon--or OK to wait for now.     Return info to Leora---[phone]    Vanessa EDWARDS RN,BSN

## 2017-12-05 NOTE — PROGRESS NOTES
Clinic Care Coordination Contact  Care Team Conversations    Patient discharged from [facility] to [facility] and care yesterday.   He will transition his PCP to  [facility].    Care team tab has been updated.    Will close to clinic care coordination at this time.     Ting Bullard, GILDARDON, RN, PHN  Clinic Care Coordinator  [facility]  [phone]

## 2017-12-07 NOTE — PROGRESS NOTES
[facility]    PRIMARY CARE PROVIDER AND CLINIC:  Laquita Deal [address] / [place] [address]    Chief Complaint   Patient presents with     hospitals Care       HPI:    Kelechi Coleman is a 82 year old  (1935),admitted to the [facility] and Care from TCU.  Admitted to this facility for  rehab, medical management and nursing care.  HPI information obtained from: facility chart records and facility staff    Current issues are:      Subarachnoid hemorrhage (H)  This gentleman experienced a mechanical fall approximately 6 stairs. He is on Plavix for history of A. fib no loss of consciousness. At [facility] he was seen, had a traumatic left frontoparietal subarachnoid hemorrhage and a left frontal subdural. He had complaints of tingling in his right arm but this is also noted to be baseline following a stroke. He noted at that time that this seems to be worsened. He then noted weakness in the same extremity. He was loaded with Keppra transferred to [facility] for further care. He denied headache nausea vomiting and apart from the right upper extremity tingling and weakness denied any other symptoms. His exam was remarkable for right trapezius weakness, 2/5 but no drift.     His imaging found a left frontoparietal subarachnoid hemorrhage tracking along the central sulcus and a 7 mm left frontal subdural without shift no surgery was recommended repeat CT. Repeat CT was stable. Recommended Keppra 1 g b.i.d. for 1 week, hold Plavix. Follow-up 1 month.      Neurology was consulted for jerking in the right arm brief episodes usually seconds and he would start to have jerking in the right leg which all started since the fall during the episodes of jerking he was unable to move his arm voluntarily he didn't notice change in speech or confusion during these episodes.     EEG performed in 10/2017. Results not found upon chart review. No recent seizure activity.     Hemiplegia,  unspecified etiology, unspecified hemiplegia laterality, unspecified hemiplegia type (H)  Improved with rehab in TCU. Has possible CVA post MI when he presented in September with left arm and hand weakness, this has resolved. Resident however does have chronic right arm weakness.     Type 2 diabetes mellitus with stage 3 chronic kidney disease, with long-term current use of insulin (H)  Blood sugars stable between 100-200  Lantus recently increased to 12 units at HS  SSI discontinued at this time.   Lab Results   Component Value Date    A1C 7.4 08/19/2017    A1C 7.0 08/09/2017    A1C 7.2 02/06/2017    A1C 7.1 08/11/2016    A1C 7.5 04/06/2016       Chronic atrial fibrillation (H)  Heart rate stable 80-90. Currently on corg  On Plavix     Acute on chronic systolic congestive heart failure (H)  C-V status stale. Denies SOB, lightheadedness and CP. Torsemide recently increased 30 mg/qd.   -Last Basic Metabolic Panel:  Lab Results   Component Value Date     09/27/2017      Lab Results   Component Value Date    POTASSIUM 3.7 09/27/2017     Lab Results   Component Value Date    CHLORIDE 104 09/27/2017     Lab Results   Component Value Date    MIGUEL 8.8 09/27/2017     Lab Results   Component Value Date    CO2 23 09/27/2017     Lab Results   Component Value Date    BUN 41 09/27/2017     Lab Results   Component Value Date    CR 1.61 09/27/2017     Lab Results   Component Value Date     09/27/2017       History of CVA (cerebrovascular accident)  1998.     History of alcohol abuse  Has long term use of alcohol use. Reports he has no desire to quit. Did not have ETOH withdrawal during hospitalization or rehab. Currently taking thiamine.      CODE STATUS/ADVANCE DIRECTIVES DISCUSSION:   Do Not Attempt Resuscitation/DNR (Allow Natural Death) if patient has no pulse & is not breathing., POLST: Provider Orders for Life Sustaining Treatment. These medical orders are based on the patient's current medical condition &  preferences. ANY section not completed implies the most aggressive treatment for that section. Patient's should always be treated with dignity and respect.    Patient's living condition: lives alone    ALLERGIES:Aspirin; Penicillins; and Contrast dye  PAST MEDICAL HISTORY:  has a past medical history of Acute exacerbation of CHF (congestive heart failure) (H) (9/2/2016); Atrial fibrillation (H); Cerebral infarction (H) (12/13/2013, 9/2016); Coronary artery disease; Diabetes (H); Gastroesophageal reflux disease with esophagitis (5/8/2017); Hyperlipidaemia; Hypertension; Myocardial infarction; PAD (peripheral artery disease) (H); Stented coronary artery; Type 2 diabetes mellitus with stage 3 chronic kidney disease, with long-term current use of insulin (H) (11/4/2016); and Unspecified cerebral artery occlusion with cerebral infarction. He also has no past medical history of Bleeding disorder (H); Blood clotting disorder (H); Cancer (H); COPD (chronic obstructive pulmonary disease) (H); History of blood transfusion; Sleep apnea; or Thyroid disease.  PAST SURGICAL HISTORY:  has a past surgical history that includes coronary artery bypass (2002); MRA UPPER EXTREMITY WO&W CONT; ENT surgery; vascular surgery; Abdomen surgery; Phacoemulsification clear cornea with standard intraocular lens implant (Left, 1/22/2015); Heart Cath Left heart cath (11/30/2001); and LEFT HEART CATHETERIZATION (9/7/2016).  FAMILY HISTORY: family history includes CANCER in his maternal grandmother; DIABETES in his mother; Family History Negative in his father.  SOCIAL HISTORY:  reports that he quit smoking about 18 years ago. His smoking use included Pipe. He quit after 40.00 years of use. He has never used smokeless tobacco. He reports that he drinks alcohol. He reports that he does not use illicit drugs.    Post Discharge Medication Reconciliation Status: discharge medications reconciled and changed, per note/orders (see AVS).  Current Outpatient  Prescriptions   Medication Sig Dispense Refill     acetaminophen (TYLENOL) 325 MG tablet Take 650 mg by mouth every 6 hours as needed for mild pain       atorvastatin (LIPITOR) 40 MG tablet Take 40 mg by mouth At Bedtime       Calcium Carb-Cholecalciferol 600-800 MG-UNIT TABS Take 1 tablet by mouth 3 times daily       levETIRAcetam (KEPPRA) 500 MG tablet Take 500 mg by mouth 2 times daily       clopidogrel (PLAVIX) 75 MG tablet Take 75 mg by mouth daily       potassium chloride SA (K-DUR/KLOR-CON M) 10 MEQ CR tablet Take 10 mEq by mouth daily       mirtazapine (REMERON) 15 MG tablet Take 15 mg by mouth At Bedtime       torsemide (DEMADEX) 10 MG tablet Take 30 mg by mouth daily       blood glucose monitoring (NO BRAND SPECIFIED) test strip Use to test blood sugar 4 times daily or as directed.       insulin aspart (NOVOLOG PEN) 100 UNIT/ML injection Inject 8 Units Subcutaneous 3 times daily       thiamine 100 MG tablet Take 100 mg by mouth daily       hydrALAZINE (APRESOLINE) 50 MG tablet Take 1 tablet (50 mg) by mouth 2 times daily       spironolactone (ALDACTONE) 50 MG tablet Take 1 tablet (50 mg) by mouth daily 90 tablet 3     pantoprazole (PROTONIX) 40 MG EC tablet TAKE 1 TABLET BY MOUTH EVERY EVENING 90 tablet 2     Multiple Vitamins-Minerals (CENTRUM SILVER) per tablet Take 1 tablet by mouth daily 30 tablet      OXcarbazepine (TRILEPTAL) 150 MG tablet Take 1 tablet (150 mg) by mouth 2 times daily       insulin glargine (LANTUS SOLOSTAR) 100 UNIT/ML injection Inject 10 Units Subcutaneous At Bedtime 30 mL 0     carvedilol (COREG) 25 MG tablet Take 0.5 tablets (12.5 mg) by mouth every morning 180 tablet 3     amLODIPine (NORVASC) 10 MG tablet Take 1 tablet (10 mg) by mouth daily 90 tablet 4     isosorbide mononitrate (IMDUR) 60 MG 24 hr tablet Take 2 tablets (120 mg) by mouth daily 180 tablet 3     [DISCONTINUED] Calcium Carb-Cholecalciferol (CALTRATE 600+D) 600-800 MG-UNIT TABS Take 1 tablet by mouth daily  (Patient taking differently: Take 1 tablet by mouth 3 times daily ) 30 tablet 0     [DISCONTINUED] insulin aspart (NOVOLOG PEN) 100 UNIT/ML injection Subcutaneous 3 times daily (before meals).   For Pre-Meal Blood Glucose: Give with prandial insulin,   Do Not give Insulin if Pre-Meal BG < 140.   -189 give 1 unit.   -239 give 2 units.   -289 give 3 units.   -339 give 4 units.   -399 give 5 units.   -449 give 6 units  BG = or > 450 give 7 units.   Notify MD if glucose > or = 350 mg/dL and give insulin.       [DISCONTINUED] insulin aspart (NOVOLOG PEN) 100 UNIT/ML injection Inject 1-5 Units Subcutaneous At Bedtime MEDIUM INSULIN RESISTANCE DOSING    Do Not give Bedtime Correction Insulin if BG less than  200.   For  - 249 give 1 units.   For  - 299 give 2 units.   For  - 349 give 3 units.   For  -399 give 4 units.   For BG greater than or equal to 400 give 5 units.  Notify provider if glucose greater than or equal to 350 mg/dL after administration of correction dose.       [DISCONTINUED] levETIRAcetam (KEPPRA) 500 MG tablet 1 tablet (500 mg) by Oral or Feeding Tube route 2 times daily (Patient taking differently: Take 500 mg by mouth 2 times daily ) 60 tablet      [DISCONTINUED] atorvastatin (LIPITOR) 40 MG tablet Take 1 tablet (40 mg) by mouth daily (Patient taking differently: Take 40 mg by mouth At Bedtime ) 90 tablet 3       ROS:  4 point ROS including Respiratory, CV, GI and , other than that noted in the HPI,  is negative    Exam:  /64  Pulse 60  Temp 96.6  F (35.9  C)  Resp 18  Wt 196 lb 9.6 oz (89.2 kg)  SpO2 97%  BMI 25.24 kg/m2  GENERAL APPEARANCE:  Alert, in no distress  ENT:  Mouth and posterior oropharynx normal, moist mucous membranes, [place]  RESP:  respiratory effort and palpation of chest normal, lungs clear to auscultation , no respiratory distress  CV:  Palpation and auscultation of heart done , regular rate and rhythm, no murmur,  rub, or gallop  SKIN:  Inspection of skin and subcutaneous tissue baseline, Palpation of skin and subcutaneous tissue baseline  NEURO:   Cranial nerves 2-12 are normal tested and grossly at patient's baseline  PSYCH:  oriented X 3, affect and mood normal     BP Readin/68, 121/66        HR:  60-68    Last BG Levels:    AM:  82, 129, 209    Noon:  211, 166    Dinner:  114, 126    HS:  212, 210, 246, 168      Lab/Diagnostic data:  CBC RESULTS:   Recent Labs   Lab Test  10/13/17   1059  17   0743  17   0840   WBC   --   7.6  6.5   RBC   --   3.58*  3.47*   HGB  10.5*  10.5*  10.1*   HCT   --   32.1*  31.7*   MCV   --   90  91   MCH   --   29.3  29.1   MCHC   --   32.7  31.9   RDW   --   16.3*  16.2*   PLT   --   184  144*       Last Basic Metabolic Panel:  Recent Labs   Lab Test  17   1319  17   0743   NA  137  136   POTASSIUM  3.7  4.7   CHLORIDE  104  109   MIGUEL  8.8  9.4   CO2  23  20   BUN  41*  38*   CR  1.61*  1.17   GLC  100  184*       Liver Function Studies -   Recent Labs   Lab Test  17   2322  16   0651   PROTTOTAL  7.6  6.8   ALBUMIN  3.6  3.3*   BILITOTAL  1.3  0.7   ALKPHOS  259*  107   AST  22  15   ALT  22  20       TSH   Date Value Ref Range Status   10/26/2016 5.55 (H) 0.40 - 4.00 mU/L Final   2016 4.41 (H) 0.40 - 4.00 mU/L Final       Lab Results   Component Value Date    A1C 7.4 2017    A1C 7.0 2017       ASSESSMENT/PLAN:  (I60.9) Subarachnoid hemorrhage (H)  (primary encounter diagnosis)  (G81.90) Hemiplegia, unspecified etiology, unspecified hemiplegia laterality, unspecified hemiplegia type (H)  Improved with OT/PT. Continue plavix. Follow up with neurology and neurosurgery as planned.     (E11.22,  N18.3,  Z79.4) Type 2 diabetes mellitus with stage 3 chronic kidney disease, with long-term current use of insulin (H)  Stable on current medication regimen. Had single episode of hypoglycemia during TCU stay. Monitor closely and notify NP  with changes.   Avoid nephrotoxic mediations and renal dose when appropriate.   -Last A1c 7.2     (I48.2) Chronic atrial fibrillation (H)  Stable on rate controller. Continue coreg as ordered.     (I50.23) Acute on chronic systolic congestive heart failure (H)  Continue to monitor C-V status and watch for s/sx of CHF. Continue demadex and spironolactone as ordered.   BMP stable. Will follow.     (Z86.73) History of CVA (cerebrovascular accident)  1998.     (Z87.898) History of alcohol abuse  No ETOH. Continue thiamine as ordered.     (I10) Benign essential hypertension  BP stable on hydralazine, norvasc and coreg.     (I25.10) CAD  With history of Cab in 2000. Suffered a NSTEMI in 9/2016 , he had drug-eluting stent. Mild ischemic cardiomyopathy with EF about 40-45% with CHF.     Orders:  The current medical regimen is effective; continue present plan and medications.      Total time spent with patient visit at the skilled nursing facility was 45 min including patient visit, review of past records and phone call to patient contact. Greater than 50% of total time spent with counseling and coordinating care due to establishing care    Electronically signed by:  SHAYY Chandler CNP

## 2017-12-12 PROBLEM — I21.4 NSTEMI (NON-ST ELEVATED MYOCARDIAL INFARCTION) (H): Status: ACTIVE | Noted: 2017-01-01

## 2017-12-12 NOTE — IP AVS SNAPSHOT
` `     [place] [address] [facility]: [phone]            Medication Administration Report for Kelechi Coleman as of 12/19/17 1420   Legend:    Given Hold Not Given Due Canceled Entry Other Actions    Time Time (Time) Time  Time-Action       Inactive    Active    Linked        Medications 12/13/17 12/14/17 12/15/17 12/16/17 12/17/17 12/18/17 12/19/17    acetaminophen (TYLENOL) tablet 650 mg  Dose: 650 mg Freq: EVERY 4 HOURS PRN Route: PO  PRN Reason: mild pain  Start: 12/12/17 2105   Admin Instructions: Alternate ibuprofen (if ordered) with acetaminophen.  Maximum acetaminophen dose from all sources = 75 mg/kg/day not to exceed 4 grams/day.     0058 (650 mg)-Given       1112 (650 mg)-Given        1106 (650 mg)-Given          0519 (650 mg)-Given [C]        0933 (650 mg)-Given            atorvastatin (LIPITOR) tablet 40 mg  Dose: 40 mg Freq: AT BEDTIME Route: PO  Start: 12/12/17 2200    2130 (40 mg)-Given        (2237)-Not Given        2219 (40 mg)-Given        2118 (40 mg)-Given        2130 (40 mg)-Given        2113 (40 mg)-Given        [ ] 2200           bisacodyl (DULCOLAX) Suppository 10 mg  Dose: 10 mg Freq: DAILY PRN Route: RE  PRN Reason: constipation  Start: 12/12/17 2105   Admin Instructions: Hold for loose stools.  This is the third step of a three step constipation treatment.               carvedilol (COREG) tablet 12.5 mg  Dose: 12.5 mg Freq: EVERY MORNING Route: PO  Start: 12/13/17 0900   Admin Instructions: Hold for SBP <100, HR <60     0820 (12.5 mg)-Given        1108 (12.5 mg)-Given        0901 (12.5 mg)-Given        1024 (12.5 mg)-Given        0845 (12.5 mg)-Given        0931 (12.5 mg)-Given        0919 (12.5 mg)-Given           clopidogrel (PLAVIX) tablet 75 mg  Dose: 75 mg Freq: DAILY Route: PO  Start: 12/13/17 0900    0820 (75 mg)-Given        1108 (75 mg)-Given        0902 (75 mg)-Given        1022 (75 mg)-Given        0845 (75 mg)-Given        0931 (75 mg)-Given         0918 (75 mg)-Given           glucose 40 % gel 15-30 g  Dose: 15-30 g Freq: EVERY 15 MIN PRN Route: PO  PRN Reason: low blood sugar  Start: 12/12/17 2105   Admin Instructions: Give 15 g for BG 51 to 69 mg/dL IF patient is conscious and able to swallow. Give 30 g for BG less than or equal to 50 mg/dL IF patient is conscious and able to swallow. Do NOT give glucose gel via enteral tube.  IF patient has enteral tube: give apple juice 120 mL (4 oz or 15 g of CHO) via enteral tube for BG 51 to 69 mg/dL.  Give apple juice 240 mL (8 oz or 30 g of CHO) via enteral tube for BG less than or equal to 50 mg/dL.    ~Oral gel is preferable for conscious and able to swallow patient.   ~IF gel unavailable or patient refuses may provide apple juice 120 mL (4 oz or 15 g of CHO). Document juice on I and O flowsheet.              Or  dextrose 50 % injection 25-50 mL  Dose: 25-50 mL Freq: EVERY 15 MIN PRN Route: IV  PRN Reason: low blood sugar  Start: 12/12/17 2105   Admin Instructions: Use if have IV access, BG less than 70 mg/dL and meet dose criteria below:  Dose if conscious and alert (or disorientated) and NPO = 25 mL  Dose if unconscious / not alert = 50 mL  Vesicant. For ordered doses up to 25 mg, give IV Push undiluted. Give each 5g over 1 minute.              Or  glucagon injection 1 mg  Dose: 1 mg Freq: EVERY 15 MIN PRN Route: SC  PRN Reason: low blood sugar  PRN Comment: May repeat x 1 only  Start: 12/12/17 2105   Admin Instructions: May give SQ or IM. ONLY use glucagon IF patient has NO IV access AND is UNABLE to swallow AND blood glucose is LESS than or EQUAL to 50 mg/dL.  Give IV Push over 1 minute. Reconstitute with 1mL sterile water.               hydrALAZINE (APRESOLINE) tablet 25 mg  Dose: 25 mg Freq: EVERY 8 HOURS SCHEDULED Route: PO  Start: 12/13/17 2200   Admin Instructions: Hold for SBP <100.     2130 (25 mg)-Given        0617 (25 mg)-Given       1545 (25 mg)-Given       (2238)-Not Given [C]        0611 (25  mg)-Given       (1353)-Not Given       2219 (25 mg)-Given        0606 (25 mg)-Given       1505 (25 mg)-Given       (2122)-Not Given        (0526)-Not Given       (1442)-Not Given       2132 (25 mg)-Given        0539 (25 mg)-Given       1351 (25 mg)-Given       2113 (25 mg)-Given        0526 (25 mg)-Given       1411 (25 mg)-Given       [ ] 2200           insulin aspart (NovoLOG) inj (RAPID ACTING)  Dose: 1-5 Units Freq: AT BEDTIME Route: SC  Start: 12/12/17 2200   Admin Instructions: MEDIUM INSULIN RESISTANCE DOSING    Do Not give Bedtime Correction Insulin if BG less than  200.   For  - 249 give 1 units.   For  - 299 give 2 units.   For  - 349 give 3 units.   For  -399 give 4 units.   For BG greater than or equal to 400 give 5 units.  Notify provider if glucose greater than or equal to 350 mg/dL after administration of correction dose.  If given at mealtime, must be administered 5 min before meal or immediately after.     (2129)-Not Given [C]        (2326)-Not Given        (2224)-Not Given [C]        (2119)-Not Given        (2143)-Not Given        (2114)-Not Given        [ ] 2200           insulin aspart (NovoLOG) inj (RAPID ACTING)  Dose: 1-7 Units Freq: 3 TIMES DAILY BEFORE MEALS Route: SC  Start: 12/13/17 0730   Admin Instructions: Correction Scale - MEDIUM INSULIN RESISTANCE DOSING     Do Not give Correction Insulin if Pre-Meal BG less than 140.   For Pre-Meal  - 189 give 1 unit.   For Pre-Meal  - 239 give 2 units.   For Pre-Meal  - 289 give 3 units.   For Pre-Meal  - 339 give 4 units.   For Pre-Meal - 399 give 5 units.   For Pre-Meal -449 give 6 units  For Pre-Meal BG greater than or equal to 450 give 7 units.   To be given with prandial insulin, and based on pre-meal blood glucose.    Notify provider if glucose greater than or equal to 350 mg/dL after administration of correction dose.  If given at mealtime, must be administered 5 min before meal or  "immediately after.     (0820)-Not Given       1225 (1 Units)-Given       (1715)-Not Given        (0848)-Not Given [C]       1837 (1 Units)-Given               (0856)-Not Given       1245 (1 Units)-Given       1810 (1 Units)-Given        (0821)-Not Given       1339 (1 Units)-Given       1833 (1 Units)-Given        (0810)-Not Given [C]       1216 (2 Units)-Given [C]       1841 (1 Units)-Given        (0831)-Not Given [C]       1220 (1 Units)-Given [C]       1750 (2 Units)-Given        (0748)-Not Given       1223 (1 Units)-Given       [ ] 1700           insulin glargine (LANTUS) injection 6 Units  Dose: 6 Units Freq: AT BEDTIME Route: SC  Start: 12/18/17 2200         2114 (6 Units)-Given        [ ] 2200           isosorbide mononitrate (IMDUR) 24 hr tablet 90 mg  Dose: 90 mg Freq: DAILY Route: PO  Start: 12/15/17 0900   Admin Instructions: DO NOT CRUSH. Can split tablet in half along score rama.    Hold for SBP <100.       1040 (90 mg)-Given        1028 (90 mg)-Given        0845 (90 mg)-Given        0931 (90 mg)-Given        0919 (90 mg)-Given           levETIRAcetam (KEPPRA) solution 500 mg  Dose: 500 mg Freq: 2 TIMES DAILY Route: PO  Start: 12/14/17 2230      0147 (500 mg)-Given       0910 (500 mg)-Given       2021 (500 mg)-Given        1020 (500 mg)-Given       2118 (500 mg)-Given        0851 (500 mg)-Given       2138 (500 mg)-Given        0931 (500 mg)-Given       2102 (500 mg)-Given        0918 (500 mg)-Given       [ ] 2100           lidocaine (LMX4) cream  Freq: EVERY 1 HOUR PRN Route: Top  PRN Reason: pain  PRN Comment: with VAD insertion or accessing implanted port.  Start: 12/12/17 2105   Admin Instructions: Do NOT give if patient has a history of allergy to any local anesthetic or any \"guillermo\" product.   Apply 30 minutes prior to VAD insertion or port access.  MAX Dose:  2.5 g (  of 5 g tube)               lidocaine 1 % 1 mL  Dose: 1 mL Freq: EVERY 1 HOUR PRN Route: OTHER  PRN Comment: mild pain with VAD " "insertion or accessing implanted port  Start: 12/12/17 2105   Admin Instructions: Do NOT give if patient has a history of allergy to any local anesthetic or any \"guillermo\" product. MAX dose 1 mL subcutaneous OR intradermal in divided doses.               naloxone (NARCAN) injection 0.1-0.4 mg  Dose: 0.1-0.4 mg Freq: EVERY 2 MIN PRN Route: IV  PRN Reason: opioid reversal  Start: 12/12/17 2105   Admin Instructions: For respiratory rate LESS than or EQUAL to 8.  Partial reversal dose:  0.1 mg titrated q 2 minutes for Analgesia Side Effects Monitoring Sedation Level of 3 (frequently drowsy, arousable, drifts to sleep during conversation).Full reversal dose:  0.4 mg bolus for Analgesia Side Effects Monitoring Sedation Level of 4 (somnolent, minimal or no response to stimulation).  For ordered doses up to 2mg give IVP. Give each 0.4mg over 15 seconds in emergency situations. For non-emergent situations further dilute in 9mL of NS to facilitate titration of response.               ondansetron (ZOFRAN-ODT) ODT tab 4 mg  Dose: 4 mg Freq: EVERY 6 HOURS PRN Route: PO  PRN Reasons: nausea,vomiting  Start: 12/12/17 2105   Admin Instructions: This is Step 1 of nausea and vomiting management.  If nausea not resolved in 15 minutes, go to Step 2 prochlorperazine (COMPAZINE). Do not push through foil backing. Peel back foil and gently remove. Place on tongue immediately. Administration with liquid unnecessary     2127 (4 mg)-Given                Or  ondansetron (ZOFRAN) injection 4 mg  Dose: 4 mg Freq: EVERY 6 HOURS PRN Route: IV  PRN Reasons: nausea,vomiting  Start: 12/12/17 2105   Admin Instructions: This is Step 1 of nausea and vomiting management.  If nausea not resolved in 15 minutes, go to Step 2 prochlorperazine (COMPAZINE).  Irritant. For ordered doses up to 4 mg, give IV Push undiluted over 2-5 minutes.                      OXcarbazepine (TRILEPTAL) tablet 150 mg  Dose: 150 mg Freq: 2 TIMES DAILY Route: PO  Start: 12/12/17 2115 "    0820 (150 mg)-Given       2049 (150 mg)-Given        1108 (150 mg)-Given       2237 (150 mg)-Given        0902 (150 mg)-Given       2019 (150 mg)-Given        1024 (150 mg)-Given       2118 (150 mg)-Given        0845 (150 mg)-Given       2130 (150 mg)-Given        0931 (150 mg)-Given       2102 (150 mg)-Given        0919 (150 mg)-Given       [ ] 2100           pantoprazole (PROTONIX) EC tablet 40 mg  Dose: 40 mg Freq: EVERY EVENING Route: PO  Start: 12/12/17 2115   Admin Instructions: DO NOT CRUSH.     2049 (40 mg)-Given        (2238)-Not Given        2019 (40 mg)-Given        2118 (40 mg)-Given        2130 (40 mg)-Given        2004 (40 mg)-Given        [ ] 2000           prochlorperazine (COMPAZINE) injection 5 mg  Dose: 5 mg Freq: EVERY 6 HOURS PRN Route: IV  PRN Reasons: nausea,vomiting  Start: 12/12/17 2105   Admin Instructions: This is Step 2 of nausea and vomiting management. Give if nausea not resolved 15 minutes after giving ondansetron (ZOFRAN). If nausea not resolved in 15 minutes, go to Step 3 metoclopramide (REGLAN), if ordered.  For ordered doses up to 10 mg, give IV Push undiluted. Each 5mg over 1 minute.              Or  prochlorperazine (COMPAZINE) tablet 5 mg  Dose: 5 mg Freq: EVERY 6 HOURS PRN Route: PO  PRN Reason: vomiting  Start: 12/12/17 2105   Admin Instructions: This is Step 2 of nausea and vomiting management. Give if nausea not resolved 15 minutes after giving ondansetron (ZOFRAN). If nausea not resolved in 15 minutes, go to Step 3 metoclopramide (REGLAN), if ordered.              Or  prochlorperazine (COMPAZINE) Suppository 12.5 mg  Dose: 12.5 mg Freq: EVERY 12 HOURS PRN Route: RE  PRN Reasons: nausea,vomiting  Start: 12/12/17 2105   Admin Instructions: This is Step 2 of nausea and vomiting management. Give if nausea not resolved 15 minutes after giving ondansetron (ZOFRAN). If nausea not resolved in 15 minutes, go to Step 3 metoclopramide (REGLAN), if ordered.                senna-docusate (SENOKOT-S;PERICOLACE) 8.6-50 MG per tablet 1 tablet  Dose: 1 tablet Freq: 2 TIMES DAILY PRN Route: PO  PRN Reason: constipation  Start: 12/12/17 2105   Admin Instructions: If no bowel movement in 24 hours, increase to 2 tablets PO.  Hold for loose stools.  This is the first step of a three step constipation treatment.              Or  senna-docusate (SENOKOT-S;PERICOLACE) 8.6-50 MG per tablet 2 tablet  Dose: 2 tablet Freq: 2 TIMES DAILY PRN Route: PO  PRN Reason: constipation  Start: 12/12/17 2105   Admin Instructions: Hold for loose stools.  This is the first step of a three step constipation treatment.               sodium chloride (PF) 0.9% PF flush 3 mL  Dose: 3 mL Freq: EVERY 8 HOURS Route: IK  Start: 12/12/17 2115   Admin Instructions: And Q1H PRN, to lock peripheral IV dormant line.     0637 (3 mL)-Given       1225 (3 mL)-Given       2129 (3 mL)-Given        0614 (3 mL)-Given       1544 (3 mL)-Given       2239 (3 mL)-Given        0911 (3 mL)-Given       1349 (3 mL)-Given       2025 (3 mL)-Given        0606 (3 mL)-Given       1342 (3 mL)-Given       2124 (3 mL)-Given        0528 (3 mL)-Given       1443 (3 mL)-Given       2131 (3 mL)-Given        0605 (3 mL)-Given       1351 (3 mL)-Given       2103 (3 mL)-Given        0526 (3 mL)-Given       1411 (3 mL)-Given       [ ] 2115           sodium chloride (PF) 0.9% PF flush 3 mL  Dose: 3 mL Freq: EVERY 1 HOUR PRN Route: IK  PRN Reason: line flush  PRN Comment: for peripheral IV flush post IV meds  Start: 12/12/17 2105              torsemide (DEMADEX) tablet 20 mg  Dose: 20 mg Freq: DAILY Route: PO  Start: 12/13/17 0930    1111 (20 mg)-Given        1108 (20 mg)-Given        0902 (20 mg)-Given        1023 (20 mg)-Given        0844 (20 mg)-Given        0931 (20 mg)-Given        0919 (20 mg)-Given        Medications 12/13/17 12/14/17 12/15/17 12/16/17 12/17/17 12/18/17 12/19/17

## 2017-12-12 NOTE — ED NOTES
Bed: ED02  Expected date: 12/12/17  Expected time: 1:28 PM  Means of arrival: Ambulance  Comments:  417 81M fall head injury ETA 1331

## 2017-12-12 NOTE — IP AVS SNAPSHOT
` ` Patient Information     Patient Name Sex     Kelechi Coleman ([id number]) Male 1935       Room Bed    [facility]5 6605-02      Patient Demographics     Address Phone E-mail Address    [address] [facility] [address] [phone] (Home) *Preferred*  [phone] (Mobile) [email or web address]      Patient Ethnicity & Race     Ethnic Group Patient Race    American White      Emergency Contact(s)     Name Relation Home Work Mobile    Samantha Fenton [phone]    Nick Johnson  [phone]        Documents on File        Status Date Received Description       Documents for the Patient    Face Sheet  08     Consent Form  08     Insurance Card  08 Empty    Privacy Notice - [place] Received 07/12/10     External Medication Information Consent Accepted 14     Patient ID Received 12     Consent for Services - Hospital/Clinic       Insurance Card Received 12     Consent for Services - Hospital/Clinic Received () 01/10/13     Privacy Notice - [place]  13 ACKNOWLEDGMENT OF RECEIPT OF NOTICE OF PRIVACY PRACTICE    Consent for Services - Hospital/Clinic  () 13 CONSENT FOR SERVICES    Consent for EHR Access  13 Copied from existing Consent for services - C/HOD collected on 2013    [facility] Specified Other       Patient ID Received 13 Exp: 10/2013    Insurance Card Received 13 Medicare    Patient ID Received 13 Exp: 10/2017    Insurance Card Received 13 BCBS - Nico Rausch    Consent to Communicate Received 13     Consent for Services - Hospital/Clinic  () 14 CONSENT FOR SERVICE    Consent for Services - Hospital/Clinic Received () 14     Insurance Card Received 09/10/15     Consent for Services - Hospital/Clinic Received () 09/10/15     Consent for Services/Privacy Notice - Hospital/Clinic Received () 16     Insurance Card Received  09/12/16 BC    Consent for Services - Informed Received 09/27/16 [facility]    HIM JOHN Authorization - File Only Received 09/27/16 Electronic Comm.    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 REPORT 11/30/16    Insurance Card Received 01/30/17 bcbs 2017    Consent for Services/Privacy Notice - Hospital/Clinic Received 08/18/17     Care Everywhere Prospective Auth Received 12/04/17     Consent for Services - Geriatrics Received 12/04/17        Documents for the Encounter    CMS IM for Patient Signature Received 12/14/17   1MM    EMS/Ambulance Record  12/12/17 [facility]    CMS IM for Patient Signature Received 12/18/17   2MM      Admission Information     Attending Provider Admitting Provider Admission Type Admission Date/Time    Yovana Rothman MD White, Melissa Elizabeth, MD Emergency 12/12/17  1344    Discharge Date Hospital Service Auth/Cert Status Service Area     Hospitalist [facility] SERVICES    Unit Room/Bed Admission Status       [address] UNIT 6605/6605-02 Admission (Confirmed)       Admission     Complaint    NSTEMI (non-ST elevated myocardial infarction) (H)      Hospital Account     Name Acct ID Class Status Primary Coverage    Kelechi Coleman [id number] Inpatient Open MEDICARE - MEDICARE FOR HB SUPPLEMENT            Guarantor Account (for Hospital Account #[id number])     Name Relation to Pt Service Area Active? Acct Type    Kelechi Coleman  FCS Yes Personal/Family    Address Phone          [address] [place] [address] [phone](H)  none(O)              Coverage Information (for Hospital Account #[id number])     1. MEDICARE/MEDICARE FOR HB SUPPLEMENT     F/O Payor/Plan Precert #    MEDICARE/MEDICARE FOR HB SUPPLEMENT     Subscriber Subscriber #    Kelechi Coleman [id number]    Address Phone    ATTN  [employer]  [address]  [place] [address] [phone]          2. BCBS/BCBS PLATINUM BLUE     F/O Payor/Plan Precert #    BCBS/BCBS PLATINUM BLUE     Subscriber Subscriber #    Kelechi Coleman [id number]    Address Phone    [address] [address]  [place] [address] [phone]

## 2017-12-12 NOTE — IP AVS SNAPSHOT
"          [place] [address] [facility]: [phone]                                              INTERAGENCY TRANSFER FORM - LAB / IMAGING / EKG / EMG RESULTS   2017                    Hospital Admission Date: 2017  MARLA VÁZQUEZ   : 1935  Sex: Male        Attending Provider: Yovana Rothman MD     Allergies:  Aspirin, Penicillins, Contrast Dye    Infection:  None   Service:  HOSPITALIST    Ht:  1.88 m (6' 2\")   Wt:  89.9 kg (198 lb 3.1 oz)   Admission Wt:  87.1 kg (192 lb)    BMI:  25.45 kg/m 2   BSA:  2.17 m 2            Patient PCP Information     Provider PCP Type    Laquita Deal MD General         Lab Results - 3 Days      Glucose by meter [[id number]] (Abnormal)  Resulted: 17 1206, Result status: Final result    Ordering provider: Yovana Rothman MD  17 1156 Resulting lab: POINT OF CARE TEST, GLUCOSE    Specimen Information    Type Source Collected On     17 1156          Components       Value Reference Range Flag Lab   Glucose 174 70 - 99 mg/dL H 170            Basic metabolic panel [[id number]] (Abnormal)  Resulted: 17 0916, Result status: Final result    Ordering provider: Vani De Los Santos MD  17 0004 Resulting lab: [facility]    Specimen Information    Type Source Collected On   Blood  17 0846          Components       Value Reference Range Flag Lab   Sodium 138 133 - 144 mmol/L  FrStHsLb   Potassium 3.6 3.4 - 5.3 mmol/L  FrStHsLb   Chloride 105 94 - 109 mmol/L  FrStHsLb   Carbon Dioxide 26 20 - 32 mmol/L  FrStHsLb   Anion Gap 7 3 - 14 mmol/L  FrStHsLb   Glucose 167 70 - 99 mg/dL H FrStHsLb   Urea Nitrogen 40 7 - 30 mg/dL H FrStHsLb   Creatinine 1.15 0.66 - 1.25 mg/dL  FrStHsLb   GFR Estimate 61 >60 mL/min/1.7m2  FrStHsLb   Comment:  Non  GFR Calc   GFR Estimate If Black 74 >60 mL/min/1.7m2  FrStHsLb   Comment:  African American GFR Calc   Calcium 8.1 8.5 - 10.1 " mg/dL L FrStHsLb            Magnesium [[id number]] (Abnormal)  Resulted: 12/19/17 0916, Result status: Final result    Ordering provider: Vani De Los Santos MD  12/19/17 0004 Resulting lab: [facility]    Specimen Information    Type Source Collected On   Blood  12/19/17 0846          Components       Value Reference Range Flag Lab   Magnesium 1.2 1.6 - 2.3 mg/dL L FrStHsLb            Phosphorus [[id number]] (Abnormal)  Resulted: 12/19/17 0916, Result status: Final result    Ordering provider: Vani De Los Santos MD  12/19/17 0004 Resulting lab: [facility]    Specimen Information    Type Source Collected On   Blood  12/19/17 0846          Components       Value Reference Range Flag Lab   Phosphorus 2.1 2.5 - 4.5 mg/dL L FrStHsLb            CBC with platelets [[id number]] (Abnormal)  Resulted: 12/19/17 0901, Result status: Final result    Ordering provider: Vani De Los Santos MD  12/19/17 0004 Resulting lab: [facility]    Specimen Information    Type Source Collected On   Blood  12/19/17 0846          Components       Value Reference Range Flag Lab   WBC 5.8 4.0 - 11.0 10e9/L  FrStHsLb   RBC Count 3.50 4.4 - 5.9 10e12/L L FrStHsLb   Hemoglobin 10.3 13.3 - 17.7 g/dL L FrStHsLb   Hematocrit 31.0 40.0 - 53.0 % L FrStHsLb   MCV 89 78 - 100 fl  FrStHsLb   MCH 29.4 26.5 - 33.0 pg  FrStHsLb   MCHC 33.2 31.5 - 36.5 g/dL  FrStHsLb   RDW 17.1 10.0 - 15.0 % H FrStHsLb   Platelet Count 158 150 - 450 10e9/L  FrStHsLb            Glucose by meter [[id number]] (Abnormal)  Resulted: 12/19/17 0736, Result status: Final result    Ordering provider: Yovana Rothman MD  12/19/17 0784 Resulting lab: POINT OF CARE TEST, GLUCOSE    Specimen Information    Type Source Collected On     12/19/17 0725          Components       Value Reference Range Flag Lab   Glucose 114 70 - 99 mg/dL H 170            Blood culture [[id number]]  Resulted: 12/19/17 0708, Result status: Preliminary  result    Ordering provider: Boaz Champion MD  12/14/17 1316 Resulting lab: [facility]    Specimen Information    Type Source Collected On   Blood  12/14/17 1342   Comment:  Right Arm          Components       Value Reference Range Flag Lab   Specimen Description Blood Right Arm      Special Requests Aerobic and anaerobic bottles received   FrStHsLb   Culture Micro No growth after 5 days   75            Glucose by meter [[id number]] (Abnormal)  Resulted: 12/19/17 0221, Result status: Final result    Ordering provider: Yovana Rothman MD  12/19/17 0148 Resulting lab: POINT OF CARE TEST, GLUCOSE    Specimen Information    Type Source Collected On     12/19/17 0148          Components       Value Reference Range Flag Lab   Glucose 104 70 - 99 mg/dL H 170            Glucose by meter [[id number]] (Abnormal)  Resulted: 12/18/17 2121, Result status: Final result    Ordering provider: Yovana Rothman MD  12/18/17 2110 Resulting lab: POINT OF CARE TEST, GLUCOSE    Specimen Information    Type Source Collected On     12/18/17 2110          Components       Value Reference Range Flag Lab   Glucose 121 70 - 99 mg/dL H 170            Glucose by meter [[id number]] (Abnormal)  Resulted: 12/18/17 1721, Result status: Final result    Ordering provider: Yovana Rothman MD  12/18/17 1718 Resulting lab: POINT OF CARE TEST, GLUCOSE    Specimen Information    Type Source Collected On     12/18/17 1718          Components       Value Reference Range Flag Lab   Glucose 219 70 - 99 mg/dL H 170            Glucose by meter [[id number]] (Abnormal)  Resulted: 12/18/17 1146, Result status: Final result    Ordering provider: Yovana Rothman MD  12/18/17 1140 Resulting lab: POINT OF CARE TEST, GLUCOSE    Specimen Information    Type Source Collected On     12/18/17 1140          Components       Value Reference Range Flag Lab   Glucose 146 70 - 99 mg/dL H 170             Basic metabolic panel [[id number]] (Abnormal)  Resulted: 12/18/17 0854, Result status: Final result    Ordering provider: Boaz Champion MD  12/18/17 0000 Resulting lab: [facility]    Specimen Information    Type Source Collected On   Blood  12/18/17 0818          Components       Value Reference Range Flag Lab   Sodium 139 133 - 144 mmol/L  FrStHsLb   Potassium 3.7 3.4 - 5.3 mmol/L  FrStHsLb   Chloride 106 94 - 109 mmol/L  FrStHsLb   Carbon Dioxide 26 20 - 32 mmol/L  FrStHsLb   Anion Gap 7 3 - 14 mmol/L  FrStHsLb   Glucose 106 70 - 99 mg/dL H FrStHsLb   Urea Nitrogen 41 7 - 30 mg/dL H FrStHsLb   Creatinine 1.19 0.66 - 1.25 mg/dL  FrStHsLb   GFR Estimate 59 >60 mL/min/1.7m2 L FrStHsLb   Comment:  Non  GFR Calc   GFR Estimate If Black 71 >60 mL/min/1.7m2  FrStHsLb   Comment:  African American GFR Calc   Calcium 8.0 8.5 - 10.1 mg/dL L FrStHsLb            CBC with platelets [[id number]] (Abnormal)  Resulted: 12/18/17 0840, Result status: Final result    Ordering provider: Boaz Champion MD  12/18/17 0000 Resulting lab: [facility]    Specimen Information    Type Source Collected On   Blood  12/18/17 0818          Components       Value Reference Range Flag Lab   WBC 6.1 4.0 - 11.0 10e9/L  FrStHsLb   RBC Count 3.50 4.4 - 5.9 10e12/L L FrStHsLb   Hemoglobin 10.4 13.3 - 17.7 g/dL L FrStHsLb   Hematocrit 30.9 40.0 - 53.0 % L FrStHsLb   MCV 88 78 - 100 fl  FrStHsLb   MCH 29.7 26.5 - 33.0 pg  FrStHsLb   MCHC 33.7 31.5 - 36.5 g/dL  FrStHsLb   RDW 17.0 10.0 - 15.0 % H FrStHsLb   Platelet Count 157 150 - 450 10e9/L  FrStHsLb            Glucose by meter [[id number]] (Abnormal)  Resulted: 12/18/17 0820, Result status: Final result    Ordering provider: Yovana Rothman MD  12/18/17 0816 Resulting lab: POINT OF CARE TEST, GLUCOSE    Specimen Information    Type Source Collected On     12/18/17 0816          Components       Value Reference Range Flag Lab    Glucose 109 70 - 99 mg/dL H 170            Glucose by meter [[id number]]  Resulted: 12/18/17 0222, Result status: Final result    Ordering provider: Yovana Rothman MD  12/18/17 0209 Resulting lab: POINT OF CARE TEST, GLUCOSE    Specimen Information    Type Source Collected On     12/18/17 0209          Components       Value Reference Range Flag Lab   Glucose 92 70 - 99 mg/dL  170            Glucose by meter [[id number]] (Abnormal)  Resulted: 12/17/17 2116, Result status: Final result    Ordering provider: Yovana Rothman MD  12/17/17 2104 Resulting lab: POINT OF CARE TEST, GLUCOSE    Specimen Information    Type Source Collected On     12/17/17 2104          Components       Value Reference Range Flag Lab   Glucose 130 70 - 99 mg/dL H 170            Glucose by meter [[id number]] (Abnormal)  Resulted: 12/17/17 1706, Result status: Final result    Ordering provider: Yovana Rothman MD  12/17/17 1658 Resulting lab: POINT OF CARE TEST, GLUCOSE    Specimen Information    Type Source Collected On     12/17/17 1658          Components       Value Reference Range Flag Lab   Glucose 165 70 - 99 mg/dL H 170            Glucose by meter [[id number]] (Abnormal)  Resulted: 12/17/17 1206, Result status: Final result    Ordering provider: Yovana Rothman MD  12/17/17 1200 Resulting lab: POINT OF CARE TEST, GLUCOSE    Specimen Information    Type Source Collected On     12/17/17 1200          Components       Value Reference Range Flag Lab   Glucose 191 70 - 99 mg/dL H 170            Basic metabolic panel [[id number]] (Abnormal)  Resulted: 12/17/17 1022, Result status: Final result    Ordering provider: Boaz Champion MD  12/17/17 0000 Resulting lab: [facility]    Specimen Information    Type Source Collected On   Blood  12/17/17 0945          Components       Value Reference Range Flag Lab   Sodium 140 133 - 144 mmol/L  FrStHsLb   Potassium 3.4 3.4 - 5.3 mmol/L  FrStHsLb    Chloride 104 94 - 109 mmol/L  FrStHsLb   Carbon Dioxide 26 20 - 32 mmol/L  FrStHsLb   Anion Gap 10 3 - 14 mmol/L  FrStHsLb   Glucose 175 70 - 99 mg/dL H FrStHsLb   Urea Nitrogen 43 7 - 30 mg/dL H FrStHsLb   Creatinine 1.29 0.66 - 1.25 mg/dL H FrStHsLb   GFR Estimate 53 >60 mL/min/1.7m2 L FrStHsLb   Comment:  Non  GFR Calc   GFR Estimate If Black 65 >60 mL/min/1.7m2  FrStHsLb   Comment:  African American GFR Calc   Calcium 7.7 8.5 - 10.1 mg/dL L FrStHsLb            Blood culture [[id number]] (Abnormal)  Resulted: 12/17/17 0814, Result status: Edited Result - FINAL    Ordering provider: Boaz Champion MD  12/14/17 1316 Resulting lab: [facility]    Specimen Information    Type Source Collected On   Blood  12/14/17 1345   Comment:  Left Arm          Components       Value Reference Range Flag Lab   Specimen Description Blood Left Arm      Special Requests Aerobic and anaerobic bottles received   FrStHsLb   Culture Micro --  A 225   Result:         Cultured on the 1st day of incubation:  Staphylococcus epidermidis     Culture Micro --   225   Result:         Critical Value/Significant Value, preliminary result only, called to and read back by  Cleo Brady RN, @5045 12/15/17..     Culture Micro --   225   Result:         (Note)  POSITIVE for STAPHYLOCOCCUS EPIDERMIDIS and POSITIVE for the mecA  gene (resistant to methicillin) by Verigene multiplex nucleic acid  test. Final identification and antimicrobial susceptibility testing  will be verified by standard methods.    Specimen tested with Verigene multiplex, gram-positive blood culture  nucleic acid test for the following targets: Staph aureus, Staph  epidermidis, Staph lugdunensis, other Staph species, Enterococcus  faecalis, Enterococcus faecium, Streptococcus species, S. agalactiae,  S. anginosus grp., S. pneumoniae, S. pyogenes, Listeria sp., mecA  (methicillin resistance) and Gilson/B (vancomycin  resistance).    Critical Value/Significant Value called to and read back by Lisbet Linn RN from [facility]. 12.15.17 at 2158. GR.              Glucose by meter [[id number]] (Abnormal)  Resulted: 12/17/17 0811, Result status: Final result    Ordering provider: Yovana Rothman MD  12/17/17 0802 Resulting lab: POINT OF CARE TEST, GLUCOSE    Specimen Information    Type Source Collected On     12/17/17 0802          Components       Value Reference Range Flag Lab   Glucose 121 70 - 99 mg/dL H 170            Glucose by meter [[id number]] (Abnormal)  Resulted: 12/17/17 0330, Result status: Final result    Ordering provider: Yovana Rothman MD  12/17/17 0251 Resulting lab: POINT OF CARE TEST, GLUCOSE    Specimen Information    Type Source Collected On     12/17/17 0251          Components       Value Reference Range Flag Lab   Glucose 115 70 - 99 mg/dL H 170            Glucose by meter [[id number]] (Abnormal)  Resulted: 12/16/17 2116, Result status: Final result    Ordering provider: Yovana Rothman MD  12/16/17 2111 Resulting lab: POINT OF CARE TEST, GLUCOSE    Specimen Information    Type Source Collected On     12/16/17 2111          Components       Value Reference Range Flag Lab   Glucose 145 70 - 99 mg/dL H 170            Glucose by meter [[id number]] (Abnormal)  Resulted: 12/16/17 1720, Result status: Final result    Ordering provider: Yovana Rothman MD  12/16/17 1715 Resulting lab: POINT OF CARE TEST, GLUCOSE    Specimen Information    Type Source Collected On     12/16/17 1715          Components       Value Reference Range Flag Lab   Glucose 161 70 - 99 mg/dL H 170            Glucose by meter [[id number]] (Abnormal)  Resulted: 12/16/17 1335, Result status: Final result    Ordering provider: Yovana Rothman MD  12/16/17 1329 Resulting lab: POINT OF CARE TEST, GLUCOSE    Specimen Information    Type Source Collected On     12/16/17 1329          Components       Value  Reference Range Flag Lab   Glucose 166 70 - 99 mg/dL H 170            Glucose by meter [[id number]] (Abnormal)  Resulted: 12/16/17 0817, Result status: Final result    Ordering provider: Yovana Rothman MD  12/16/17 0810 Resulting lab: POINT OF CARE TEST, GLUCOSE    Specimen Information    Type Source Collected On     12/16/17 0810          Components       Value Reference Range Flag Lab   Glucose 107 70 - 99 mg/dL H 170            Basic metabolic panel [[id number]] (Abnormal)  Resulted: 12/16/17 0640, Result status: Final result    Ordering provider: Boaz Champion MD  12/16/17 0001 Resulting lab: [facility]    Specimen Information    Type Source Collected On   Blood  12/16/17 0600          Components       Value Reference Range Flag Lab   Sodium 140 133 - 144 mmol/L  FrStHsLb   Potassium 3.6 3.4 - 5.3 mmol/L  FrStHsLb   Chloride 107 94 - 109 mmol/L  FrStHsLb   Carbon Dioxide 26 20 - 32 mmol/L  FrStHsLb   Anion Gap 7 3 - 14 mmol/L  FrStHsLb   Glucose 112 70 - 99 mg/dL H FrStHsLb   Urea Nitrogen 47 7 - 30 mg/dL H FrStHsLb   Creatinine 1.52 0.66 - 1.25 mg/dL H FrStHsLb   GFR Estimate 44 >60 mL/min/1.7m2 L FrStHsLb   Comment:  Non  GFR Calc   GFR Estimate If Black 54 >60 mL/min/1.7m2 L FrStHsLb   Comment:  African American GFR Calc   Calcium 7.6 8.5 - 10.1 mg/dL L FrStHsLb            CBC with platelets [[id number]] (Abnormal)  Resulted: 12/16/17 0622, Result status: Final result    Ordering provider: Boaz Champion MD  12/16/17 0001 Resulting lab: [facility]    Specimen Information    Type Source Collected On   Blood  12/16/17 0600          Components       Value Reference Range Flag Lab   WBC 5.3 4.0 - 11.0 10e9/L  FrStHsLb   RBC Count 3.29 4.4 - 5.9 10e12/L L FrStHsLb   Hemoglobin 9.6 13.3 - 17.7 g/dL L FrStHsLb   Hematocrit 29.2 40.0 - 53.0 % L FrStHsLb   MCV 89 78 - 100 fl  FrStHsLb   MCH 29.2 26.5 - 33.0 pg  FrStHsLb   MCHC 32.9 31.5 - 36.5 g/dL   FrStHsLb   RDW 16.9 10.0 - 15.0 % H FrStHsLb   Platelet Count 125 150 - 450 10e9/L L FrStHsLb            Testing Performed By     Lab - Abbreviation Name Director Address Valid Date Range    14 - FrStHsLb [facility] Unknown [address] [place] [address] 05/08/15 1057 - Present    75 - Unknown [facility] Unknown [address] [facility] [address] 01/15/15 1019 - Present    170 - Unknown POINT OF CARE TEST, GLUCOSE Unknown Unknown 10/31/11 1114 - Present    225 - Unknown [facility] Unknown [address] [facility] [address] 14 0954 - Present            Unresulted Labs (24h ago through future)    Start       Ordered    17 0000  Basic metabolic panel  Routine      17 1224         ECG/EMG Results      Echo Limited with Lumason [[id number]]  Resulted: 17 1012, Result status: Edited Result - FINAL    Ordering provider: Karin Ko MD  17 0916 Resulted by: Vani Potts MD    Performed: 17 1042 - 17 1044 Resulting lab: RADIOLOGY RESULTS    Narrative:       [id number]  ECH  [id number]  [id number]^KRUPA^KARIN^ANABEL           [facility]  Echocardiography Laboratory  [address] [place] [address]        Name: MARLA VÁZQUEZ  MRN: [id number]  : 1935  Study Date: 2017 10:12 AM  Age: 82 yrs  Gender: Male  Patient Location: [facility]  Reason For Study: CHF  Ordering Physician: KARIN KO  Referring Physician: TRISH MARY  Performed By: Ashley Espinoza     BSA: 2.1 m2  Height: 74 in  Weight: 194 lb  HR: 63  BP: 101/70 mmHg  _____________________________________________________________________________  __        Procedure  Limited Portable Echo Adult. Contrast Lumason.  _____________________________________________________________________________  __        Interpretation Summary     Left ventricular systolic function is severely  reduced.  There are regional wall motion abnormalities as specified.  The right ventricle is severely dilated.  There is mild to moderate (1-2+) mitral regurgitation.  Right ventricular systolic pressure is elevated, consistent with severe  pulmonary hypertension.  2D images suggest some degree of aortic stenosis however severity was not  adequately assessed on present study. Limited additional images at no charge  should be performed for further evaluation.  Limited views were obtained.  _____________________________________________________________________________  __        Left Ventricle  The left ventricle is normal in size. There is mild concentric left  ventricular hypertrophy. Left ventricular systolic function is severely  reduced. The visual ejection fraction is estimated at 30-35%. Diastology was  not evaluated. There are regional wall motion abnormalities as specified.  There is moderate-severe global hypokinesis with anteroseptal apical akinesis  and inferior basal-midventricular akinesis. Flattened septum is consistent  with RV pressure/volume overload. There is prominent apical trabeculation. No  definitive thrombus is seen on contrast images.     Right Ventricle  The right ventricle is severely dilated. The right ventricular systolic  function is severely reduced.     Atria  The left atrium is mild to moderately dilated. The right atrium is moderate to  severely dilated. There is no atrial shunt seen.     Mitral Valve  There is mild to moderate mitral annular calcification. The mitral valve  leaflets appear thickened, but open well. There is mild to moderate (1-2+)  mitral regurgitation. The regurgitant jet is not well visualized and may be  underestimated.        Tricuspid Valve  The tricuspid valve is not well visualized, but is grossly normal. There is  mild to moderate (1-2+) tricuspid regurgitation. The right ventricular  systolic pressure is approximated at 50.0 mmHg plus the right atrial  pressure.  Right ventricular systolic pressure is elevated, consistent with severe  pulmonary hypertension. Dilated IVC (>2.5cm) with no respiratory collapse;  right atrial pressure is estimated at >20mmHg.     Aortic Valve  The aortic valve is not well visualized. No aortic regurgitation is present.  2D images suggest some degree of aortic stenosis however severity was not  adequately assessed on present study. Limited additional images at no charge  should be performed for further evaluation.     Pulmonic Valve  The pulmonic valve is not well visualized. There is trace pulmonic valvular  regurgitation. Normal pulmonic valve velocity.     Vessels  The aortic root is not well visualized. The IVC is dilated and fails to change  with respiration, suggesting elevated central venous pressure.     Pericardium  There is no pericardial effusion. A significant pleural effusion is seen.        Rhythm  There is both a regular bradycardic rhythm and occasionally irregular rhythm  seen. Presence of atrial contractile activity was not assessed (no filling  parameters were evaluated). Rhythm is indeterminate.  _____________________________________________________________________________  __  MMode/2D Measurements & Calculations  IVSd: 1.2 cm     LVIDd: 5.3 cm  LVIDs: 4.5 cm  LVPWd: 1.2 cm  FS: 14.4 %  EDV(Teich): 135.4 ml  ESV(Teich): 94.4 ml  LV mass(C)d: 253.8 grams  LV mass(C)dI: 118.3 grams/m2  LVOT diam: 2.3 cm  LVOT area: 4.0 cm2  RWT: 0.44        Doppler Measurements & Calculations  MV max P.6 mmHg  MV mean P.3 mmHg  MV V2 VTI: 28.5 cm  TR max amado: 352.5 cm/sec  TR max P.0 mmHg           _____________________________________________________________________________  __           Report approved by: Bassem Moseley 2017 04:53 PM       1    Type Source Collected On     17 1012          View Image (below)        Echocardiogram Limited [[id number]]  Resulted: 17 1044, Result status: In  process    Ordering provider: Jasmyn Ko MD  12/13/17 0916 Performed: 12/13/17 1042 - 12/13/17 1042    Resulting lab: [facility]                   Encounter-Level Documents:     There are no encounter-level documents.      Order-Level Documents:     There are no order-level documents.

## 2017-12-12 NOTE — IP AVS SNAPSHOT
` `     [place] [address] [facility]: [phone]                 INTERAGENCY TRANSFER FORM - NOTES (H&P, Discharge Summary, Consults, Procedures, Therapies)   2017                    Hospital Admission Date: 2017  MARLA VÁZQUEZ   : 1935  Sex: Male        Patient PCP Information     Provider PCP Type    Laquita Deal MD General         History & Physicals      H&P signed by Alonso Mustafa APRN CNP at 2017 12:18 PM  Also signed by Yovana Rothman MD at 2017 10:29 PM      Author:  Alonso Mustafa APRN CNP Service:  Hospitalist Author Type:  Nurse Practitioner    Filed:  2017 12:18 PM Date of Service:  2017  9:49 PM Creation Time:  2017 11:25 PM    Status:  Attested :  Alonso Mustafa APRN CNP (Nurse Practitioner)    Cosigner:  Yovana Rothman MD at 2017 10:29 PM        Attestation signed by Yovana Rothman MD at 2017 10:29 PM        Physician Attestation   I, Yovana Rothman, saw and evaluated Marla Vázquez as part of a shared visit.  I have reviewed and discussed with the advanced practice provider their history, physical and plan.    Please see my separate note from this date of service.     Yovana Rothman MD  Date of Service (when I saw the patient): 2017                               DATE OF SERVICE: 2017    PRIMARY CARE PROVIDER:  Laquita Deal MD      CHIEF COMPLAINT:  Fall.      HISTORY OF PRESENT ILLNESS:  Mr. Marla Vázquez is an 82-year-old male with an extensive past medical history pertinent for recent subdural hematoma and subarachnoid hematoma in 2017 with subsequent seizures, chronic kidney disease stage III, heart failure with reduced ejection fraction of 40-45%, peripheral arterial disease, paroxysmal atrial fibrillation on chronic anticoagulation with Plavix, diabetes mellitus type 2, on longstanding insulin, essential hypertension,  "hyperlipidemia, multiple cerebrovascular accidents, most recent in 09/2016, coronary artery disease, status post CABG x4 and drug-eluting stent x2, most recently in 2016, gastroesophageal reflux disease with esophagitis, renal artery stenosis, and paroxysmal ventricular tachycardia, who presents today from [facility], status post fall.  The patient reports that while at the nursing facility, he was sitting in his lift chair and needed to utilize the urinal.  The patient states that the urinal was approximately 10 feet away, and he was able to independently ambulate to utilize the urinal.  While utilizing the urinal, the patient stated, he lost his balance and fell backward and hit his head on either the ground or the wall.  He confirms he did not have loss of consciousness.  At that time, EMS was notified, and the patient was brought to the emergency department with a stellate laceration.  Of note, the patient is on chronic anticoagulation and had received his dose of Plavix today.      Presently, the patient is seen in the emergency department with his friend at the bedside.  While in the emergency department, the patient received a 1 liter fluid bolus for an elevated lactate of 2.2.  Of note, the patient's troponin was noted to be 0.701 and creatinine was 1.57.  The patient's subsequent hemoglobin has remained stable at 9.8, and was 10.0 on admission.  The patient reports that over the past few weeks, he has gained \"25-30 pounds\" of fluid weight.  The patient reports that his diuretic dose was increased by cardiology in the past approximately 1 month.  Currently, the patient reports no chest pain, shortness of breath, nausea, vomiting or diarrhea.  The patient reports intermittent dyspnea on exertion and at rest, frequent urination, and resolving constipation.  The patient reports no recent fevers, chills, or upper respiratory illness-like symptoms.  Of note, the patient has been in a " rehabilitation TCU since his discharge in 08/2017.      PAST MEDICAL HISTORY:   1.  Mitral valve and tricuspid valve, mild to moderate regurgitation.   2.  Recent subdural hematoma and subarachnoid hematoma in 08/2017.   3.  Seizures since 08/2017.   4.  Chronic kidney disease, stage III.   5.  Chronic systolic heart failure, ejection fraction 40-45%.   6.  Peripheral arterial disease.   7.  Chronic atrial fibrillation, on anticoagulation with Plavix.   8.  Diabetes mellitus type 2, on longstanding insulin, recent hemoglobin A1c 7.4.   9.  Essential hypertension.   10.  Hyperlipidemia.   11.  Multiple cerebrovascular accidents, most recently in 09/2017, on chronic anticoagulation with Plavix.   12.  Coronary artery disease, status post coronary artery bypass grafting x4 and drug-eluting stents x2.   13.  Gastroesophageal reflux disease with esophagitis.   14.  Renal artery stenosis.   15.  Paroxysmal ventricular tachycardia.   16.  Multiple myocardial infarctions.      PAST SURGICAL HISTORY:   1.  Four-vessel CABG in 2002.   2.  PCI with drug-eluting stent x2 in 2016.   3.  Vocal cord lesion removal.   4.  Angioplasty for renal artery stenosis.   5.  Left intraocular lens implant.   6.  Angioplasty for left lower extremity peripheral vascular disease.      SOCIAL HISTORY:   1.  Tobacco:  Quit smoking a pipe approximately 18 years ago.   2.  Alcohol:  The patient stated that he has had two beers since his recent hospital admission in 08/2017.  Prior to that, the patient was drinking alcohol daily.   3.  Illicit drugs:  None.   4.  Currently resides at [facility].  Prior to recent subdural hematoma and subarachnoid hematoma in 08/2017, the patient was residing independently.      FAMILY HISTORY:  Mother with diabetes mellitus.      ALLERGIES:  Aspirin, penicillin, contrast dye.      MEDICATIONS:[CL1.1]    Prior to Admission medications    Medication Sig Last Dose Taking? Auth Provider   acetaminophen  (TYLENOL) 325 MG tablet Take 650 mg by mouth every 6 hours as needed for mild pain 12/4/2017 at 1813 Yes Reported, Patient   atorvastatin (LIPITOR) 40 MG tablet Take 40 mg by mouth At Bedtime 12/11/2017 at 2000 Yes Reported, Patient   Calcium Carb-Cholecalciferol 600-800 MG-UNIT TABS Take 1 tablet by mouth 3 times daily (with meals)  12/12/2017 at 0800 Yes Reported, Patient   levETIRAcetam (KEPPRA) 500 MG tablet Take 500 mg by mouth 2 times daily 12/12/2017 at 0800 Yes Reported, Patient   clopidogrel (PLAVIX) 75 MG tablet Take 75 mg by mouth daily 12/12/2017 at 0800 Yes Reported, Patient   potassium chloride SA (K-DUR/KLOR-CON M) 10 MEQ CR tablet Take 10 mEq by mouth daily 12/12/2017 at 0800 Yes Reported, Patient   mirtazapine (REMERON) 15 MG tablet Take 15 mg by mouth At Bedtime 12/11/2017 at 2000 Yes Reported, Patient   torsemide (DEMADEX) 10 MG tablet Take 30 mg by mouth daily 12/12/2017 at 0800 Yes Reported, Patient   insulin aspart (NOVOLOG PEN) 100 UNIT/ML injection Inject 8 Units Subcutaneous 3 times daily (with meals)  12/12/2017 at am Yes Reported, Patient   thiamine 100 MG tablet Take 100 mg by mouth daily 12/12/2017 at 0800 Yes Reported, Patient   hydrALAZINE (APRESOLINE) 50 MG tablet Take 1 tablet (50 mg) by mouth 2 times daily 12/12/2017 at 0800 Yes Jarad Ga PA-C   spironolactone (ALDACTONE) 50 MG tablet Take 1 tablet (50 mg) by mouth daily 12/12/2017 at 0800 Yes Jarad Ga PA-C   pantoprazole (PROTONIX) 40 MG EC tablet TAKE 1 TABLET BY MOUTH EVERY EVENING 12/11/2017 at 1700 Yes Jarad Ga PA-C   Multiple Vitamins-Minerals (CENTRUM SILVER) per tablet Take 1 tablet by mouth daily 12/12/2017 at 0800 Yes Jarad Ga PA-C   OXcarbazepine (TRILEPTAL) 150 MG tablet Take 1 tablet (150 mg) by mouth 2 times daily 12/12/2017 at 0800 Yes Jarad Ga PA-C   insulin glargine (LANTUS SOLOSTAR) 100 UNIT/ML injection Inject 10 Units Subcutaneous At Bedtime 12/11/2017 at 2000 Yes Jarad Ga PA-C    carvedilol (COREG) 25 MG tablet Take 0.5 tablets (12.5 mg) by mouth every morning 12/12/2017 at 0800 Yes Etienne Gee MD   amLODIPine (NORVASC) 10 MG tablet Take 1 tablet (10 mg) by mouth daily 12/12/2017 at 0800 Yes Isaiah Charles MD   isosorbide mononitrate (IMDUR) 60 MG 24 hr tablet Take 2 tablets (120 mg) by mouth daily 12/12/2017 at 0800 Yes Isaiah hCarles MD   blood glucose monitoring (NO BRAND SPECIFIED) test strip Use to test blood sugar 4 times daily or as directed.   Reported, Patient[CL1.2]     REVIEW OF SYSTEMS:  A 10-point review of systems was completed.  All pertinent positives are noted in the HPI.  All other systems negative.      PHYSICAL EXAMINATION:   VITAL SIGNS:  Reviewed and are as follows.  Temperature 97.4, blood pressure 102/68, heart rate 55, respiratory rate 20, O2 sats 95% on room air.   CONSTITUTIONAL:  The patient is lying in bed, awake, alert, cooperative, in mild distress, appears stated age.   HEENT:  Pupils equal, round and reactive to light.  Extraocular muscles are intact.  Sclerae are clear.  Normocephalic, atraumatic.  Oropharynx with very dry mucous membranes.  Tongue noted with ecchymosis on the front aspect/tip of tongue.  Laceration with staples noted on posterior stellate.   NECK:  Supple.  No adenopathy.  Normal range of motion.  No nuchal rigidity noted.   RESPIRATORY:  No increased work of breathing.  Clear to auscultation bilaterally.  No crackles or wheezing noted.   CARDIOVASCULAR:  Normal apical pulse, regular/bradycardic rate and rhythm.  Normal S1 and S2.  No murmur, rub or gallop noted.   ABDOMEN:  Normal bowel sounds.  Soft, nondistended, nontender.  No guarding or rebound tenderness noted.  No masses palpated.   EXTREMITIES:  Moves all four extremities weakly.  Dorsalis pedis and radial pulses faint to palpation.  CMS intact in lower extremities with trace edema bilaterally.   NEUROLOGIC:  Awake, alert, oriented.  Cranial nerves II-XII are grossly  intact.  Sensory is intact.  Intermittent garbled speech, at baseline per friend report.   SKIN:  Warm and dry.  No lesions or rash noted.  No erythema. Laceration with staples noted on posterior aspect of scalp, CDI.  PSYCHIATRIC:  Mentation appears normal and affect normal.      LABORATORY DATA:  Reviewed in Epic.      ASSESSMENT AND PLAN:  Mr. Kelechi Coleman is an 82-year-old male with extensive past medical history significant for recent subdural hematoma and subarachnoid hematoma in 08/2017, with subsequent seizure activity, chronic kidney disease stage III, chronic systolic heart failure, chronic atrial fibrillation, diabetes mellitus type 2, on insulin, essential hypertension, hyperlipidemia, multiple cerebrovascular accidents, multiple myocardial infarctions, coronary artery disease, status post CABG and PCI with CHUCHO x2, gastroesophageal reflux disease with esophagitis, who presents today from [place], status post fall and stellate laceration.  The patient is being admitted for further evaluation and management.     Status post fall, likely secondary to hypotension secondary to dehydration (recent increase in diuretics) and recent subdural hematoma and subarachnoid hematoma, altering balance.   Stellate laceration 2/2 fall, status post repair.[CL1.1]   Chronic anemia.[CL1.3]  Elevated lactic acid likely 2/2 dehydration, no acute signs/symptoms of infection at this time.  -- The patient received a 1 liter normal saline bolus while in the emergency department.  We will hold additional fluids at this time due to heart failure, pending cardiology workup.   -- Continuous telemetry monitoring to rule out cardiac etiology.   -- Lactic acid on admission 2.2, subsequently 1.7 after fluid bolus.  -- Repeat BMP and CBC in a.m.  Hemoglobin stable, 10.0 on admission, currently 9.8[CL1.1] (baseline hgb 10).[CL1.3]  -- FeNa ordered, pending.  -- Fall precautions.   -- Up with assist.   -- Wound care per protocol/PRN.      Non-ST elevation myocardial infarction:  Likely secondary to dehydration.    History of coronary artery disease, status post 4-vessel CABG and CHUCHO x2.   -- Cardiology consult placed.  I appreciate recommendations.   -- Initial troponin on admission 0.629, with repeat 0.701.  We will trend troponins until peak every 4 hours.   -- Continuous telemetry monitoring.   -- We will defer initiation of heparin infusion with recent subdural hematoma and subarachnoid hematoma and fall risk.   -- Continue prior to admission calcium channel blocker, statin, beta blocker, nitrate and Plavix.      Acute on chronic systolic heart failure with reduced ejection fraction of 40-45%.   -- Patient reports recent weight gain of 25-30 lbs over past few weeks, increased dyspnea at rest and with activity.  Cardiology recently increased diuretic dose.  -- Continue prior to admission beta blocker.   -- Hold prior to admission spironolactone and torsemide, as noted acute kidney injury on chronic kidney disease.   -- Repeat BMP in a.m.   -- We will hold maintenance IV fluids at this time.      Essential hypertension.   -- We will continue prior to admission amlodipine, carvedilol, hydralazine, isosorbide mononitrate, with hold parameters.   -- PRN hydralazine ordered for systolic blood pressure greater than 180.      Hyperlipidemia.   -- Continue prior to admission atorvastatin.      Chronic atrial fibrillation, paroxysmal.  Multiple cerebrovascular accidents, most recent 09/2016.   -- Continuous telemetry monitoring.   -- Continue prior to admission anticoagulation: Plavix.   -- Continue prior to admission carvedilol.      Diabetes mellitus type 2, hemoglobin A1c 7.4.   -- Holding prior to admission Lantus and premeal insulin at this time, as the patient is noted to be hypoglycemic while at nursing facility.   -- Medium correctional sliding scale insulin ordered with meals and at bedtime.   -- Point of Care glucose testing before meals and at  "bedtime and PRN.   -- Hypoglycemia protocol has been ordered.      Acute kidney injury on chronic kidney disease, stage III likely 2/2 dehydration.  -- Baseline creatinine approximately 1.2-1.3.  The patient's creatinine on admission noted to be 1.57.   -- We will repeat creatinine/BMP in a.m.   -- Avoid nephrotoxic agents if possible.  Holding prior to admission spironolactone and torsemide at this time.      Gastroesophageal reflux disease with esophagitis.   -- Continue prior to admission pantoprazole.      Recent subarachnoid hemorrhage/subdural hemorrhage, with subsequent seizures.   -- Continue prior to admission Keppra and Trileptal.   -- Seizure precautions.      DEEP VEIN THROMBOSIS PROPHYLAXIS:  Mechanical SCDs and Plavix.      CODE STATUS:  After extensive discussion with the patient, the patient requests to be DNR; requests to be intubated if \"heart is still working.\"  POLST in patient chart as DNR.      DISPOSITION:  Discharge back to [facility] pending further workup for NSTEMI and once hemodynamically stable.      This patient was discussed with Dr. Yovana Ramos of the Hospitalist Service, who agrees with the current plans as outlined above.         YOVANA RAMOS MD       As dictated by YAYA MUSTAFA NP            D: 2017 21:49   T: 2017 23:25   MT: #[id number]      Name:     MARLA VÁZQUEZ   MRN:      [id number]        Account:      [id number]   :      1935           Admitted:     [id number]      Document: [id number]       cc: Laquita Deal MD[CL1.1]        Revision History        User Key Date/Time User Provider Type Action    > CL1.3 2017 12:18 PM Yaya Mustafa APRN CNP Nurse Practitioner Sign     CL1.1 2017 11:56 AM Yaya Mustafa APRN CNP Nurse Practitioner Sign     CL1.2 2017 11:44 AM Yaya Mustafa APRN CNP Nurse Practitioner      [N/A] 2017 11:25 PM Yaya Mustafa APRN CNP Nurse Practitioner Edit            H&P by " "Yovana Rothman MD at 12/12/2017 10:06 PM     Author:  Yovana Rothman MD Service:  Hospitalist Author Type:  Physician    Filed:  12/13/2017 12:09 AM Date of Service:  12/12/2017 10:06 PM Creation Time:  12/13/2017 12:06 AM    Status:  Signed :  Yovana Rothman MD (Physician)         Physician Attestation[MW1.1]   I,[MW1.2] Yovana Rothman[MW1.1], saw and evaluated Kelechi Coleman as part of a shared visit.  I have reviewed and discussed with the advanced practice provider their history, physical and plan.    I personally reviewed the vital signs, medications, labs and imaging.    My key history or physical exam findings:[MW1.2] /64  Pulse 66  Temp 97.4  F (36.3  C) (Temporal)  Resp 18  Ht 1.88 m (6' 2\")  Wt 89.2 kg (196 lb 9.6 oz)  SpO2 97%  BMI 25.24 kg/m2[MW1.1]  Clinically dehydrated, had laceration noted    Key management decisions made by me: Non-ST elevation MI.  Favor avoiding aggressive IV fluid rehydration given his heart failure.  Difficult choice on anticoagulation given his recent subdural hematomas and subarachnoid hemorrhage and subacute rehab stay for which his Coumadin was on hold.  He remains on Plavix and now has a new head injury.  Cardiology consultation recommendations appreciated.[MW1.2]    Yovana Rothman[MW1.1], MD  Date of Service (when I saw the patient):[MW1.2] 12/12/17[MW1.3]       Revision History        User Key Date/Time User Provider Type Action    > MW1.3 12/13/2017 12:09 AM Yovana Rothman MD Physician Sign     MW1.1 12/13/2017 12:07 AM Yovana Rothman MD Physician      MW1.2 12/13/2017 12:06 AM Yovana Rtohman MD Physician                      Discharge Summaries      Discharge Summaries by Brandon Deras MD at 12/19/2017 12:35 PM     Author:  Brandon Deras MD Service:  Hospitalist Author Type:  Physician    Filed:  12/19/2017 12:37 PM Date of Service:  12/19/2017 12:35 PM " Creation Time:  12/19/2017 12:26 PM    Status:  Addendum :  Brandon Deras MD (Physician)         [facility]  Discharge Summary        Kelechi Coleman MRN# [id number]   YOB: 1935 Age: 82 year old     Date of Admission: 12/12/2017  Date of Discharge: 12/19/2017  Admitting Physician: Yovana Rothman MD  Discharge Physician:[GC1.1] Brandon Deras MD[GC1.2]     Primary Provider: Laquita Deal  Primary Care Physician Phone Number: [phone]         Discharge Diagnoses:   1. Fall with occipital laceration.  2. CAD with demand NSTEMI.  3. JUVENTINO, prerenal.  4. Dysphagia, mild-moderate, oral and pharyngeal.  5. Malnutrition, non-severe due to inadequate oral intake.  6. Metabolic encephalopathy .  7. Physical deconditioning.            Other Chronic Medical Problems:      1. Chronic systolic heart failure.  2. Hypertension (benign essential).  3. DM2 w/o complications.  4. Seizures.  5. Dyslipidemia.  6. GERD.       Allergies:[GC1.1]         Allergies   Allergen Reactions     Aspirin Hives     Penicillins Hives     Contrast Dye Hives[GC1.2]           Discharge Medications:[GC1.1]        Current Discharge Medication List      CONTINUE these medications which have CHANGED    Details   isosorbide mononitrate (IMDUR) 30 MG 24 hr tablet Take 3 tablets (90 mg) by mouth daily    Comments: Hold for SBP<100.  Associated Diagnoses: Coronary artery disease involving native coronary artery of native heart without angina pectoris      insulin glargine (LANTUS SOLOSTAR) 100 UNIT/ML injection Inject 6 Units Subcutaneous At Bedtime  Qty: 30 mL, Refills: 0    Associated Diagnoses: Type 2 diabetes mellitus with stage 3 chronic kidney disease, with long-term current use of insulin (H)      insulin aspart (NOVOLOG PEN) 100 UNIT/ML injection Inject 5 Units Subcutaneous 3 times daily (with meals)    Associated Diagnoses: Type 2 diabetes mellitus with stage 3 chronic kidney  disease, with long-term current use of insulin (H)      hydrALAZINE (APRESOLINE) 50 MG tablet Take 0.5 tablets (25 mg) by mouth 2 times daily  Qty: 60 tablet    Comments: Hold for SBP<100.  Associated Diagnoses: Essential hypertension with goal blood pressure less than 130/80      torsemide (DEMADEX) 10 MG tablet Take 2 tablets (20 mg) by mouth daily    Associated Diagnoses: Chronic systolic congestive heart failure (H)      spironolactone (ALDACTONE) 25 MG tablet Take 1 tablet (25 mg) by mouth daily    Associated Diagnoses: Chronic systolic congestive heart failure (H)         CONTINUE these medications which have NOT CHANGED    Details   acetaminophen (TYLENOL) 325 MG tablet Take 650 mg by mouth every 6 hours as needed for mild pain      atorvastatin (LIPITOR) 40 MG tablet Take 40 mg by mouth At Bedtime      Calcium Carb-Cholecalciferol 600-800 MG-UNIT TABS Take 1 tablet by mouth 3 times daily (with meals)       levETIRAcetam (KEPPRA) 500 MG tablet Take 500 mg by mouth 2 times daily      clopidogrel (PLAVIX) 75 MG tablet Take 75 mg by mouth daily      mirtazapine (REMERON) 15 MG tablet Take 15 mg by mouth At Bedtime      thiamine 100 MG tablet Take 100 mg by mouth daily      pantoprazole (PROTONIX) 40 MG EC tablet TAKE 1 TABLET BY MOUTH EVERY EVENING  Qty: 90 tablet, Refills: 2    Associated Diagnoses: Gastroesophageal reflux disease, esophagitis presence not specified      Multiple Vitamins-Minerals (CENTRUM SILVER) per tablet Take 1 tablet by mouth daily  Qty: 30 tablet    Associated Diagnoses: Vitamin deficiency      OXcarbazepine (TRILEPTAL) 150 MG tablet Take 1 tablet (150 mg) by mouth 2 times daily    Associated Diagnoses: History of stroke      carvedilol (COREG) 25 MG tablet Take 0.5 tablets (12.5 mg) by mouth every morning  Qty: 180 tablet, Refills: 3    Associated Diagnoses: Essential hypertension with goal blood pressure less than 130/80      blood glucose monitoring (NO BRAND SPECIFIED) test strip Use  to test blood sugar 4 times daily or as directed.         STOP taking these medications       potassium chloride SA (K-DUR/KLOR-CON M) 10 MEQ CR tablet Comments:   Reason for Stopping:         amLODIPine (NORVASC) 10 MG tablet Comments:   Reason for Stopping:[GC1.2]                   Discharge Instructions and Follow-Up:[GC1.1]      Follow-up Appointments     Follow Up and recommended labs and tests       Follow up with shelter physician.  The following labs/tests are   recommended: BMP 12/22.  Follow up with primary care provider after TCU discharge.  Follow up with [facility] Cardiology NP 1-2 weeks with BMP.  Consider removing scalp staples 12/22 at earliest.[GC1.2]                          Discharge Orders for Skilled Facility (from Discharge Orders):[GC1.1]        After Care Instructions     Activity - Up with nursing assistance           Advance Diet as Tolerated       Follow this diet upon discharge: Orders Placed This Encounter      Room Service      Snacks/Supplements Adult: Other - Please comment; Vanilla Plus2 Shake; Between Meals      Combination Diet [id number] Calories: Moderate Consistent CHO (4-6 CHO units/meal); Dysphagia Diet Level 3: Advanced; Thin Liquids (water, ice chips, juice, milk gelatin, ice cream, etc); 2 gm NA Diet; Low Fat Diet              General info for SNF       Length of Stay Estimate: Short Term Care: Estimated # of Days <30  Condition at Discharge: Improving  Level of care:skilled   Rehabilitation Potential: Good  Admission H&P remains valid and up-to-date: Yes  Recent Chemotherapy: N/A  Use Nursing Home Standing Orders: Yes            Glucose monitor nursing POCT       Before meals and at bedtime            Intake and output       Every shift            Mantoux instructions       Give two-step Mantoux (PPD) Per Facility Policy Yes                    Future tests that were ordered for you     Basic metabolic panel[GC1.2]                          Rehab orders for Skilled Facility  (from Discharge Orders):[GC1.1]      Referrals     Future Labs/Procedures    Follow-Up with Cardiac Advanced Practice Provider     Occupational Therapy Adult Consult     Comments:    Evaluate and treat as clinically indicated.    Reason:  Weakness/deconditioning    Physical Therapy Adult Consult     Comments:    Evaluate and treat as clinically indicated.    Reason:  weakness/deconditioning    Speech Language Path Adult Consult     Comments:    Evaluate and treat as clinically indicated.    Reason:  Dysphagia[GC1.2]              Consultations This Hospital Stay:      Cardiology.        Admission History:      Please see the H&P by Yovana Rothman MD on 12/12/2017 for complete details. Briefly, Mr. Kelechi Coleman is a 82 year old male with PMH significant for DM2, HTN, CAD, CHF, CVI's, pVT, CARLOS,  CKD and recent subdural hematoma and subarachnoid hematoma (8/2017) with subsequent seizures, who presented 12/12 with fall.        Problem Oriented Hospital Course:      1. Fall with occipital laceration.  Head CT 12/12 no acute findings. Laceration stapled. Had low/borderline BP's and JUVENTINO this stay, suspect may have contributed to pt's fall; multiple med adjustments made as below.  - Staples to be removed no sooner than 10 days than 12/22.    - Continue PT, OT.       2. CAD with demand NSTEMI.      Chronic systolic heart failure.      Hypertension (benign essential).  [PTA: amlodipine 10 mg daily, atorvastatin 40 mg qHS, Coreg 12.5 mg qam Plavix 75 mg daily, hydralazine 50 mg BID, Imdur 120 mg daily, spironolactone 50 mg daily, torsemide 30 mg daily.]  H/o CAD with prior 4v CABG 2002 and s/p stents to proximal and distal SVG (RPDA/RPLA) in 9/2016. CHF with echo 9/2016 showing LVEF 40-45% with WMA's, RV normal in size and function. Trop elevated on admission to 0.701 with subsequent flat trops. Seen by Cardiology and felt to be demand MI. Discontinued amlodipine and reduced hydralazine to 25 mg tid due low  BP on 12/13. Reduced Imdur to 90 mg daily on 12/15. Echo 12/13 demonstrated slightly decreased EF from previous echo, now 30-35%, RV severely dilated.    WEIGHT (196 lbs on admission): 198 lbs 12/19 <-- 194 lbs 12/18 < 194 lbs 12/17.  - Continue Plavix, atorvastatin, Coreg, hydralazine, Imdur, torsemide as above.  - Resume spironolactone on d/c at 1/2 prior dose.  - Continue Ace wraps to help with BLE edema.  - Monitor daily wts.  - F/u with Cardiology.     3. JUVENTINO, prerenal.  Cr 1.57 on admission with max 1.76 on 12/15. Improved to 1.52 after 1 L NS bolus. Spironolactone held on admission.   Recent Labs  Lab 12/19/17  0846 12/18/17  0818 12/17/17  0945 12/16/17  0600 12/15/17  0535 12/14/17  0555   CR 1.15 1.19 1.29* 1.52* 1.76* 1.52*   - Monitor BMP outpatient.     4. Malnutrition, non-severe due to inadequate oral intake.      Dysphagia, mild-moderate, oral and pharyngeal.  Consulted Nutrition and Speech this stay.  - Continue DD3 with thin liquids.  - Continue BID oral supplements.       5. Metabolic encephalopathy - resolved.    UA, CXR, C diff, stool THIAGO, blood culture unrevealing. Likely due to hospital delirium, concomitant JUVENTINO.       6. Physical deconditioning.  - Continue PT, OT.     7. DM2 w/o complications.  [PTA: Lantus 10U qHS, aspart 8U TID with meals.]  - Continue Lantus 6U qHS and aspart 5U TID with meals.      8. Seizures.  H/o subdural hematoma and subarachnoid hematoma (8/2017) with subsequent seizures/  - Continue PTA Keppra and PTA Trileptal.     9. Dyslipidemia.  - Continue atorvastatin.     10. GERD.  - Continue pantoprazole.         Code Status:      DNR / DNI        Pending Results:[GC1.1]        Unresulted Labs Ordered in the Past 30 Days of this Admission     Date and Time Order Name Status Description    12/14/2017 1316 Blood culture Preliminary[GC1.2]               Discharge Disposition:      Discharged to TCU.        Discharge Time:      Greater than 30 minutes.        Key Imaging  Studies, Lab Findings and Procedures/Surgeries:      Echo 12/13:  Interpretation Summary     Left ventricular systolic function is severely reduced.  There are regional wall motion abnormalities as specified.  The right ventricle is severely dilated.  There is mild to moderate (1-2+) mitral regurgitation.  Right ventricular systolic pressure is elevated, consistent with severe  pulmonary hypertension.  2D images suggest some degree of aortic stenosis however severity was not  adequately assessed on present study. Limited additional images at no charge  should be performed for further evaluation.  Limited views were obtained.    Results for orders placed or performed during the hospital encounter of 12/12/17   Head CT w/o contrast    Narrative    CT OF THE HEAD WITHOUT CONTRAST  12/12/2017 2:15 PM     COMPARISON: Head CT 9/22/2017.    HISTORY: Fall head injury.    TECHNIQUE: 5 mm thick axial CT images of the head were acquired  without IV contrast material.    FINDINGS: A tiny chronic right basal ganglia infarct is again noted  without change. A small chronic infarct at the high medial  frontoparietal junction on the left is again noted without change.  There is mild diffuse cerebral volume loss. There are subtle patchy  areas of decreased density in the cerebral white matter bilaterally  that are consistent with sequela of chronic small vessel ischemic  disease.     The ventricles and basal cisterns are within normal limits in  configuration given the degree of cerebral volume loss.  There is no  midline shift. There are no extra-axial fluid collections.     No intracranial hemorrhage, mass or recent infarct.    The visualized paranasal sinuses are well aerated. There is no  mastoiditis. There are no fractures of the visualized bones.       Impression    IMPRESSION: Small chronic right basal ganglia and high left  frontoparietal infarcts again noted without change. No new infarcts.  Diffuse cerebral volume loss and  cerebral white matter changes  consistent with chronic small vessel ischemic disease. No evidence for  acute intracranial pathology.     Radiation dose for this scan was reduced using automated exposure  control, adjustment of the mA and/or kV according to patient size, or  iterative reconstruction technique.    LUÍS BUCKNER MD   XR Chest 2 Views    Narrative    XR CHEST 2 VW 12/14/2017 3:00 PM    COMPARISON: 8/18/2017    HISTORY: Altered mental status.      Impression    IMPRESSION: Enlarged cardiac silhouette is again seen and unchanged.  Small left pleural effusion and possible trace right pleural effusion,  stable to slightly increased since comparison study. There is  associated bibasilar atelectasis. No pneumothorax seen on either side.  Median sternotomy wires appear intact.    FELICIA LE MD[GC1.1]              Revision History        User Key Date/Time User Provider Type Action    > GC1.2 12/19/2017 12:37 PM Brandon Deras MD Physician Addend     GC1.1 12/19/2017 12:35 PM Brandon Deras MD Physician Sign                     Consult Notes      Consults by Giovanni Escalante at 12/14/2017  2:05 PM     Author:  Giovanni Escalante Service:  Nutrition Author Type:  Dietetic Intern    Filed:  12/14/2017  2:05 PM Date of Service:  12/14/2017  2:05 PM Creation Time:  12/14/2017  1:37 PM    Status:  Attested :  Giovanni Escalante (Dietetic Intern)    Cosigner:  Mary Beltran RD, LD at 12/14/2017  3:19 PM         Consult Orders:    1. Nutrition Services Adult IP Consult [[id number]] ordered by Boaz Champion MD at 12/13/17 1104           Attestation signed by Mary Beltran RD, LD at 12/14/2017  3:19 PM        I have reviewed and agree with the following note.    Mary Beltran RD, LD                                 CLINICAL NUTRITION SERVICES  -  ASSESSMENT NOTE      Recommendations Ordered by Registered Dietitian (RD): Continue with current diet-modcarb,LSF,Low Na, no caffeine.   Oral  "supplements BID- Plus2 shake BID   Malnutrition: Non-Severe malnutrition  In Context of:  Acute illness or injury  Chronic illness or disease        REASON FOR ASSESSMENT  Kelechi Coleman is a 82 year old male seen by Registered Dietitian for Provider Order -Malnutrition.     NUTRITION HISTORY  Visited with pt this afternoon. Pt was lying in bed with both eyes closed. responded to his name but kept eyes closed. Pt was able to answer my questions but limited.   unable to get nutrition history.      CURRENT NUTRITION ORDERS  Diet Order:  Combination Diet 4737-6329 Calories: Moderate Consistent CHO (4-6 CHO units/meal); Low Saturated Fat Na <2400mg Diet, No Caffeine Diet       Current Intake/Tolerance:  Pt says his appetite is not good and only eating BID ( breakfast and diner) both meals he says are small.   Upon admission pt was eating 100% but in last 24 hrs he is eating 0%  BM- x 6 - 12/13-14)-pt on DULCOLAX    PHYSICAL FINDINGS  Observed  Muscle Wasting moderate clavicle and temporal  Obtained from Chart/Interdisciplinary Team  Edema -bilateral LE 3+ moderate    ANTHROPOMETRICS  Height: 6' 2\"  Weight: 88.1 (194 lbs 7.13 oz)  Body mass index is 24.97 kg/(m^2).  Weight Status:  Normal BMI  IBW: 80.9kg (178lb)  % IBW: 108%  Weight History: Pt has gained weight o this admission. Per chart he had moderate 3+ edema in LE, pt is on dieretics.[RL1.1]   Vitals:    12/12/17 1347 12/12/17 2100 12/13/17 0500   Weight: 87.1 kg (192 lb) 89.2 kg (196 lb 9.6 oz) 88.2 kg (194 lb 7.1 oz)[RL1.2]         LABS  BG-    MEDICATIONS  Medications reviewed    Dosing Weight 88.1kg -actual weight    ASSESSED NUTRITION NEEDS PER APPROVED PRACTICE GUIDELINES:  Estimated Energy Needs: 8362-6520 kcals (25-30 Kcal/Kg)  Justification: maintenance  Estimated Protein Needs: 70-88 grams protein (0.8-1 g pro/Kg)  Justification: CKD  Estimated Fluid Needs: 8862-3999  mL (1 mL/Kcal)  Justification: maintenance    MALNUTRITION:  % Weight " Loss:  Weight loss does not meet criteria for malnutrition   % Intake:  <75% for > 7 days (non-severe malnutrition)  Subcutaneous Fat Loss:  Orbital region mild depletion  Muscle Loss:  Clavicle bone region moderate depletion  Fluid Retention:  Moderate -Edema -bilateral LE 3+ moderate    Malnutrition Diagnosis: Non-Severe malnutrition  In Context of:  Acute illness or injury  Chronic illness or disease    NUTRITION DIAGNOSIS:  Inadequate oral intake related to decreased appetite as evidenced by pt reporting two small meals daily and eating 0% per chart last 24 hrs.      NUTRITION INTERVENTIONS  Recommendations / Nutrition Prescription  Continue with current diet-modcarb,LSF,Low Na, no caffeine.   Oral supplements BID- Plus2 shake BID    Implementation  Nutrition education: Not appropriate at this time due to patient condition  Medical Food Supplement-Plus2 shake BID antony.      Nutrition Goals  Pt will eat at least 25% of his meals BID    MONITORING AND EVALUATION:  Progress towards goals will be monitored and evaluated per protocol and Practice Guidelines    Giovanni Page (Dietitic Intern)[RL1.1]                  Revision History        User Key Date/Time User Provider Type Action    > RL1.2 12/14/2017  2:05 PM Giovanni Escalante Dietetic Intern Sign     RL1.1 12/14/2017  1:37 PM Giovanni Escalante Dietetic Intern             Consults by Jasmyn Ko MD at 12/13/2017  9:03 AM     Author:  Jasmyn Ko MD Service:  Cardiology Author Type:  Physician    Filed:  12/13/2017  9:16 AM Date of Service:  12/13/2017  9:03 AM Creation Time:  12/13/2017  9:00 AM    Status:  Addendum :  Jasmyn Ko MD (Physician)     Consult Orders:    1. Cardiology IP Consult: Patient to be seen: Routine - within 24 hours; NSTEMI; Consultant may enter orders: Yes [[id number]] ordered by Alonso Mustafa APRN CNP at 12/12/17 2041                Cardiology Consultation      Kelechi Efrain Coleman MRN# [id number]   Date of Birth:  1935 Age: 82 year old   Date of Admission: 12/12/2017     Reason for consult:[BA1.1]  coronary artery disease, abnormal troponins, recent fall[BA1.2]            Assessment and Plan:[BA1.1]     1. Fall secondary to loss of balance  2. Mildly elevated troponins that have been flat in trajectory. He has no symptoms suggestive of angina  3. Recent fall resulting in traumatic SAH and SDH  4. CAD s/p CABG 2000 and CHUCHO to vein graft 2016  5. History of AF post CABG  6. History of multiple CVAs in the past    PLAN  - His mild troponin elevation is probably secondary to demand ischemia in the setting of his fall.  Troponins have been flat and he has no chest discomfort.  He has also recently sustained intracranial injuries status post a recent fall.  Therefore at this point in time I would favor treating his coronary artery disease medically.  - An echocardiogram will be obtained to reassess his left ventricular systolic function.  He is known to have a mild ischemic cardiomyopathy.  - I will also resume his diuretic therapy given that he has noted weight gain and lower extremity edema over the past few weeks.[BA1.2]             Chief Complaint:   Fall (patient states he stumbled and fell backwards striking his head on ground. ) and Head Laceration (large lac to back of head. on blood thinner. no loc)           History of Present Illness:   This patient is a 82 year old male[BA1.1] who is normally followed by Dr. Charles in our cardiology clinic.  He has a history of coronary disease and is status post coronary artery bypass grafting in 2000.  He is also status post drug-eluting stent placement to a vein graft to the PDA in 2016.  He is known to have a mild ischemic cardiomyopathy with an LVEF of 40-45%.  Most recently he was admitted to [facility] in August after he fell down some stairs and sustained intracranial injuries that included subarachnoid and subdural hemorrhages.  These were treated conservatively  "and the patient was discharged to a transitional care facility.  He has been participating in rehabilitation and has not noted any chest discomfort or shortness of breath while doing so.  Unfortunately he fell yesterday while trying to use the urinal and again struck his head.  Fortunately his CT of the head demonstrated no new intracranial injuries.  His troponins however were noted to be mildly elevated and cardiology consultation was requested.  He denies any chest discomfort or shortness of breath this morning.  He has however noted increased lower extremity edema and weight gain over the past few weeks.[BA1.2]         Physical Exam:   Vitals were reviewed  Blood pressure 101/70, pulse 63, temperature 97.6  F (36.4  C), temperature source Axillary, resp. rate 18, height 1.88 m (6' 2\"), weight 88.2 kg (194 lb 7.1 oz), SpO2 95 %.  Temperatures:  Current - Temp: 97.6  F (36.4  C); Max - Temp  Av.6  F (36.4  C)  Min: 97.4  F (36.3  C)  Max: 97.8  F (36.6  C)  Respiration range: Resp  Av.3  Min: 10  Max: 43  Pulse range: Pulse  Av.5  Min: 63  Max: 66  Blood pressure range: Systolic (24hrs), Av , Min:72 , Max:119   ; Diastolic (24hrs), Av, Min:37, Max:82    Pulse oximetry range: SpO2  Av.9 %  Min: 93 %  Max: 98 %    Intake/Output Summary (Last 24 hours) at 17 0900  Last data filed at 17 0637   Gross per 24 hour   Intake              746 ml   Output              375 ml   Net              371 ml     Constitutional:   awake, alert, cooperative, no apparent distress, and appears stated age     Eyes:   Lids and lashes normal, pupils equal, round and reactive to light, extra ocular muscles intact, sclera clear, conjunctiva normal     Neck:   supple, symmetrical, trachea midline, no JVD     Back:   symmetric     Lungs:   Decreased BS with some crackles at bases     Cardiovascular:   RRR, 2/6 NENA     Abdomen:   non-tender     Musculoskeletal:   motor strength is 5 out of 5 all " extremities bilaterally     Neurologic:   Grossly nonfocal     Skin:   no bruising or bleeding     Additional findings:   Edema 2+               Past Medical History:   I have reviewed this patient's past medical history  Past Medical History:   Diagnosis Date     Acute exacerbation of CHF (congestive heart failure) (H) 9/2/2016     Atrial fibrillation (H)      Cerebral infarction (H) 12/13/2013, 9/2016     Diagnosis updated by automated process. Provider to review and confirm.     Coronary artery disease      Diabetes (H)      Gastroesophageal reflux disease with esophagitis 5/8/2017     Hyperlipidaemia      Hypertension      Myocardial infarction      PAD (peripheral artery disease) (H)      Stented coronary artery     CABG 2002 4 VESSEL     Type 2 diabetes mellitus with stage 3 chronic kidney disease, with long-term current use of insulin (H) 11/4/2016     Unspecified cerebral artery occlusion with cerebral infarction              Past Surgical History:   I have reviewed this patient's past surgical history  Past Surgical History:   Procedure Laterality Date     C MRA UPPER EXTREMITY WO&W CONT       CORONARY ARTERY BYPASS  2002    LIMA-LAD, SVG-OM1, [id number]     ENT SURGERY      VOCAL CORD LESION REMOVED     GENITOURINARY SURGERY      ANGIOPLASTY FOR RENAL ARTERT STENOSIS     HC LEFT HEART CATHETERIZATION  9/7/2016 09/07/2016: CHUCHO x2 to distal and proximal SVG to to RPDA     HEART CATH LEFT HEART CATH  11/30/2001     PHACOEMULSIFICATION CLEAR CORNEA WITH STANDARD INTRAOCULAR LENS IMPLANT Left 1/22/2015    Procedure: PHACOEMULSIFICATION CLEAR CORNEA WITH STANDARD INTRAOCULAR LENS IMPLANT;  Surgeon: Bart Johnson MD;  [facility]: [facility]     VASCULAR SURGERY      ANGIOPLASTY LEFT LEG FOR pvd               Social History:   I have reviewed this patient's social history  Social History   Substance Use Topics     Smoking status: Former Smoker     Years: 40.00     Types: Pipe     Quit date: 12/18/1998      Smokeless tobacco: Never Used     Alcohol use 0.0 oz/week     0 Standard drinks or equivalent per week      Comment: 3 hard liquor drinks daily             Family History:   I have reviewed this patient's family history  Family History   Problem Relation Age of Onset     DIABETES Mother      Family History Negative Father      CANCER Maternal Grandmother              Allergies:     Allergies   Allergen Reactions     Aspirin Hives     Penicillins Hives     Contrast Dye Hives             Medications:   I have reviewed this patient's current medications  Prescriptions Prior to Admission   Medication Sig Dispense Refill Last Dose     acetaminophen (TYLENOL) 325 MG tablet Take 650 mg by mouth every 6 hours as needed for mild pain   12/4/2017 at 1813     atorvastatin (LIPITOR) 40 MG tablet Take 40 mg by mouth At Bedtime   12/11/2017 at 2000     Calcium Carb-Cholecalciferol 600-800 MG-UNIT TABS Take 1 tablet by mouth 3 times daily (with meals)    12/12/2017 at 0800     levETIRAcetam (KEPPRA) 500 MG tablet Take 500 mg by mouth 2 times daily   12/12/2017 at 0800     clopidogrel (PLAVIX) 75 MG tablet Take 75 mg by mouth daily   12/12/2017 at 0800     potassium chloride SA (K-DUR/KLOR-CON M) 10 MEQ CR tablet Take 10 mEq by mouth daily   12/12/2017 at 0800     mirtazapine (REMERON) 15 MG tablet Take 15 mg by mouth At Bedtime   12/11/2017 at 2000     torsemide (DEMADEX) 10 MG tablet Take 30 mg by mouth daily   12/12/2017 at 0800     insulin aspart (NOVOLOG PEN) 100 UNIT/ML injection Inject 8 Units Subcutaneous 3 times daily (with meals)    12/12/2017 at am     thiamine 100 MG tablet Take 100 mg by mouth daily   12/12/2017 at 0800     hydrALAZINE (APRESOLINE) 50 MG tablet Take 1 tablet (50 mg) by mouth 2 times daily   12/12/2017 at 0800     spironolactone (ALDACTONE) 50 MG tablet Take 1 tablet (50 mg) by mouth daily 90 tablet 3 12/12/2017 at 0800     pantoprazole (PROTONIX) 40 MG EC tablet TAKE 1 TABLET BY MOUTH EVERY EVENING 90  tablet 2 12/11/2017 at 1700     Multiple Vitamins-Minerals (CENTRUM SILVER) per tablet Take 1 tablet by mouth daily 30 tablet  12/12/2017 at 0800     OXcarbazepine (TRILEPTAL) 150 MG tablet Take 1 tablet (150 mg) by mouth 2 times daily   12/12/2017 at 0800     insulin glargine (LANTUS SOLOSTAR) 100 UNIT/ML injection Inject 10 Units Subcutaneous At Bedtime 30 mL 0 12/11/2017 at 2000     carvedilol (COREG) 25 MG tablet Take 0.5 tablets (12.5 mg) by mouth every morning 180 tablet 3 12/12/2017 at 0800     amLODIPine (NORVASC) 10 MG tablet Take 1 tablet (10 mg) by mouth daily 90 tablet 4 12/12/2017 at 0800     isosorbide mononitrate (IMDUR) 60 MG 24 hr tablet Take 2 tablets (120 mg) by mouth daily 180 tablet 3 12/12/2017 at 0800     blood glucose monitoring (NO BRAND SPECIFIED) test strip Use to test blood sugar 4 times daily or as directed.   Taking     Current Facility-Administered Medications Ordered in Epic   Medication Dose Route Frequency Last Rate Last Dose     amLODIPine (NORVASC) tablet 10 mg  10 mg Oral Daily   10 mg at 12/13/17 0820     atorvastatin (LIPITOR) tablet 40 mg  40 mg Oral At Bedtime   40 mg at 12/12/17 2220     carvedilol (COREG) tablet 12.5 mg  12.5 mg Oral QAM   12.5 mg at 12/13/17 0820     hydrALAZINE (APRESOLINE) tablet 50 mg  50 mg Oral BID   50 mg at 12/13/17 0820     isosorbide mononitrate (IMDUR) 24 hr tablet 120 mg  120 mg Oral Daily   120 mg at 12/13/17 0819     levETIRAcetam (KEPPRA) tablet 500 mg  500 mg Oral BID   500 mg at 12/13/17 0819     OXcarbazepine (TRILEPTAL) tablet 150 mg  150 mg Oral BID   150 mg at 12/13/17 0820     pantoprazole (PROTONIX) EC tablet 40 mg  40 mg Oral QPM   40 mg at 12/12/17 2220     clopidogrel (PLAVIX) tablet 75 mg  75 mg Oral Daily   75 mg at 12/13/17 0820     glucose 40 % gel 15-30 g  15-30 g Oral Q15 Min PRN        Or     dextrose 50 % injection 25-50 mL  25-50 mL Intravenous Q15 Min PRN        Or     glucagon injection 1 mg  1 mg Subcutaneous Q15 Min  PRN         naloxone (NARCAN) injection 0.1-0.4 mg  0.1-0.4 mg Intravenous Q2 Min PRN         lidocaine 1 % 1 mL  1 mL Other Q1H PRN         lidocaine (LMX4) cream   Topical Q1H PRN         sodium chloride (PF) 0.9% PF flush 3 mL  3 mL Intracatheter Q1H PRN         sodium chloride (PF) 0.9% PF flush 3 mL  3 mL Intracatheter Q8H   3 mL at 12/13/17 0637     acetaminophen (TYLENOL) tablet 650 mg  650 mg Oral Q4H PRN   650 mg at 12/13/17 0058     senna-docusate (SENOKOT-S;PERICOLACE) 8.6-50 MG per tablet 1 tablet  1 tablet Oral BID PRN        Or     senna-docusate (SENOKOT-S;PERICOLACE) 8.6-50 MG per tablet 2 tablet  2 tablet Oral BID PRN         bisacodyl (DULCOLAX) Suppository 10 mg  10 mg Rectal Daily PRN         ondansetron (ZOFRAN-ODT) ODT tab 4 mg  4 mg Oral Q6H PRN        Or     ondansetron (ZOFRAN) injection 4 mg  4 mg Intravenous Q6H PRN         prochlorperazine (COMPAZINE) injection 5 mg  5 mg Intravenous Q6H PRN        Or     prochlorperazine (COMPAZINE) tablet 5 mg  5 mg Oral Q6H PRN        Or     prochlorperazine (COMPAZINE) Suppository 12.5 mg  12.5 mg Rectal Q12H PRN         insulin aspart (NovoLOG) inj (RAPID ACTING)  1-7 Units Subcutaneous TID AC         insulin aspart (NovoLOG) inj (RAPID ACTING)  1-5 Units Subcutaneous At Bedtime         No current [facility]-ordered outpatient prescriptions on file.             Review of Systems:   The 10 point Review of Systems is negative other than noted in the HPI            Data:   All laboratory data reviewed  Results for orders placed or performed during the hospital encounter of 12/12/17 (from the past 24 hour(s))   CBC with platelets + differential   Result Value Ref Range    WBC 7.3 4.0 - 11.0 10e9/L    RBC Count 3.38 (L) 4.4 - 5.9 10e12/L    Hemoglobin 10.0 (L) 13.3 - 17.7 g/dL    Hematocrit 30.1 (L) 40.0 - 53.0 %    MCV 89 78 - 100 fl    MCH 29.6 26.5 - 33.0 pg    MCHC 33.2 31.5 - 36.5 g/dL    RDW 17.1 (H) 10.0 - 15.0 %    Platelet Count 148 (L) 150 - 450  10e9/L    Diff Method Automated Method     % Neutrophils 81.5 %    % Lymphocytes 7.0 %    % Monocytes 9.6 %    % Eosinophils 1.1 %    % Basophils 0.4 %    % Immature Granulocytes 0.4 %    Nucleated RBCs 0 0 /100    Absolute Neutrophil 5.9 1.6 - 8.3 10e9/L    Absolute Lymphocytes 0.5 (L) 0.8 - 5.3 10e9/L    Absolute Monocytes 0.7 0.0 - 1.3 10e9/L    Absolute Eosinophils 0.1 0.0 - 0.7 10e9/L    Absolute Basophils 0.0 0.0 - 0.2 10e9/L    Abs Immature Granulocytes 0.0 0 - 0.4 10e9/L    Absolute Nucleated RBC 0.0    Comprehensive metabolic panel   Result Value Ref Range    Sodium 141 133 - 144 mmol/L    Potassium 3.9 3.4 - 5.3 mmol/L    Chloride 106 94 - 109 mmol/L    Carbon Dioxide 26 20 - 32 mmol/L    Anion Gap 9 3 - 14 mmol/L    Glucose 131 (H) 70 - 99 mg/dL    Urea Nitrogen 39 (H) 7 - 30 mg/dL    Creatinine 1.57 (H) 0.66 - 1.25 mg/dL    GFR Estimate 43 (L) >60 mL/min/1.7m2    GFR Estimate If Black 52 (L) >60 mL/min/1.7m2    Calcium 8.7 8.5 - 10.1 mg/dL    Bilirubin Total 0.8 0.2 - 1.3 mg/dL    Albumin 2.8 (L) 3.4 - 5.0 g/dL    Protein Total 6.6 (L) 6.8 - 8.8 g/dL    Alkaline Phosphatase 221 (H) 40 - 150 U/L    ALT 20 0 - 70 U/L    AST 21 0 - 45 U/L   INR   Result Value Ref Range    INR 1.32 (H) 0.86 - 1.14   CK total   Result Value Ref Range    CK Total 109 30 - 300 U/L   Troponin I   Result Value Ref Range    Troponin I ES 0.629 (HH) 0.000 - 0.045 ug/L   Head CT w/o contrast    Narrative    CT OF THE HEAD WITHOUT CONTRAST  12/12/2017 2:15 PM     COMPARISON: Head CT 9/22/2017.    HISTORY: Fall head injury.    TECHNIQUE: 5 mm thick axial CT images of the head were acquired  without IV contrast material.    FINDINGS: A tiny chronic right basal ganglia infarct is again noted  without change. A small chronic infarct at the high medial  frontoparietal junction on the left is again noted without change.  There is mild diffuse cerebral volume loss. There are subtle patchy  areas of decreased density in the cerebral white  matter bilaterally  that are consistent with sequela of chronic small vessel ischemic  disease.     The ventricles and basal cisterns are within normal limits in  configuration given the degree of cerebral volume loss.  There is no  midline shift. There are no extra-axial fluid collections.     No intracranial hemorrhage, mass or recent infarct.    The visualized paranasal sinuses are well aerated. There is no  mastoiditis. There are no fractures of the visualized bones.       Impression    IMPRESSION: Small chronic right basal ganglia and high left  frontoparietal infarcts again noted without change. No new infarcts.  Diffuse cerebral volume loss and cerebral white matter changes  consistent with chronic small vessel ischemic disease. No evidence for  acute intracranial pathology.     Radiation dose for this scan was reduced using automated exposure  control, adjustment of the mA and/or kV according to patient size, or  iterative reconstruction technique.    LUÍS BUCKNER MD   UA with Microscopic   Result Value Ref Range    Color Urine Yellow     Appearance Urine Clear     Glucose Urine Negative NEG^Negative mg/dL    Bilirubin Urine Negative NEG^Negative    Ketones Urine Negative NEG^Negative mg/dL    Specific Gravity Urine 1.008 1.003 - 1.035    Blood Urine Negative NEG^Negative    pH Urine 5.0 5.0 - 7.0 pH    Protein Albumin Urine Negative NEG^Negative mg/dL    Urobilinogen mg/dL Normal 0.0 - 2.0 mg/dL    Nitrite Urine Negative NEG^Negative    Leukocyte Esterase Urine Negative NEG^Negative    Source Midstream Urine     WBC Urine 1 0 - 2 /HPF    RBC Urine 0 0 - 2 /HPF    Squamous Epithelial /HPF Urine <1 0 - 1 /HPF    Mucous Urine Present (A) NEG^Negative /LPF    Hyaline Casts 21 (H) 0 - 2 /LPF   CK total   Result Value Ref Range    CK Total Canceled, Test credited 30 - 300 U/L   Troponin I   Result Value Ref Range    Troponin I ES Canceled, Test credited 0.000 - 0.045 ug/L   EKG 12 lead   Result Value Ref Range     Interpretation ECG Click View Image link to view waveform and result    Hemoglobin   Result Value Ref Range    Hemoglobin 9.5 (L) 13.3 - 17.7 g/dL   ABO/Rh type and screen   Result Value Ref Range    ABO O     RH(D) Pos     Antibody Screen Neg     Test Valid Only At [facility]        Specimen Expires 12/15/2017    ISTAT gases lactate stephanie POCT   Result Value Ref Range    Ph Venous 7.42 7.32 - 7.43 pH    PCO2 Venous 37 (L) 40 - 50 mm Hg    PO2 Venous 41 25 - 47 mm Hg    Bicarbonate Venous 24 21 - 28 mmol/L    O2 Sat Venous 77 %    Lactic Acid 2.2 (H) 0.7 - 2.1 mmol/L   Hemoglobin   Result Value Ref Range    Hemoglobin 9.8 (L) 13.3 - 17.7 g/dL   Troponin I   Result Value Ref Range    Troponin I ES 0.701 (HH) 0.000 - 0.045 ug/L   ISTAT gases lactate stephanie POCT   Result Value Ref Range    Ph Venous 7.44 (H) 7.32 - 7.43 pH    PCO2 Venous 38 (L) 40 - 50 mm Hg    PO2 Venous 32 25 - 47 mm Hg    Bicarbonate Venous 26 21 - 28 mmol/L    O2 Sat Venous 65 %    Lactic Acid 1.7 0.7 - 2.1 mmol/L   Troponin I   Result Value Ref Range    Troponin I ES 0.709 (HH) 0.000 - 0.045 ug/L   Glucose by meter   Result Value Ref Range    Glucose 115 (H) 70 - 99 mg/dL   Troponin I   Result Value Ref Range    Troponin I ES 0.714 (HH) 0.000 - 0.045 ug/L   Glucose by meter   Result Value Ref Range    Glucose 91 70 - 99 mg/dL   Basic metabolic panel   Result Value Ref Range    Sodium 141 133 - 144 mmol/L    Potassium 3.8 3.4 - 5.3 mmol/L    Chloride 106 94 - 109 mmol/L    Carbon Dioxide 25 20 - 32 mmol/L    Anion Gap 10 3 - 14 mmol/L    Glucose 85 70 - 99 mg/dL    Urea Nitrogen 39 (H) 7 - 30 mg/dL    Creatinine 1.44 (H) 0.66 - 1.25 mg/dL    GFR Estimate 47 (L) >60 mL/min/1.7m2    GFR Estimate If Black 57 (L) >60 mL/min/1.7m2    Calcium 8.4 (L) 8.5 - 10.1 mg/dL   CBC with platelets   Result Value Ref Range    WBC 5.9 4.0 - 11.0 10e9/L    RBC Count 3.24 (L) 4.4 - 5.9 10e12/L    Hemoglobin 9.6 (L) 13.3 - 17.7 g/dL    Hematocrit 28.5  (L) 40.0 - 53.0 %    MCV 88 78 - 100 fl    MCH 29.6 26.5 - 33.0 pg    MCHC 33.7 31.5 - 36.5 g/dL    RDW 17.1 (H) 10.0 - 15.0 %    Platelet Count 145 (L) 150 - 450 10e9/L   Troponin I   Result Value Ref Range    Troponin I ES 0.714 (HH) 0.000 - 0.045 ug/L   Glucose by meter   Result Value Ref Range    Glucose 88 70 - 99 mg/dL     EKG results: Sinus rhythm with first degree AVB and NS St T wave abnormalities[BA1.1]          Revision History        User Key Date/Time User Provider Type Action    > [N/A] 12/13/2017  9:16 AM Jasmyn Ko MD Physician Addend     BA1.2 12/13/2017  9:15 AM Jasmyn Ko MD Physician Sign     BA1.1 12/13/2017  9:00 AM Jasmyn Ko MD Physician                      Progress Notes - Physician (Notes from 12/16/17 through 12/19/17)      Progress Notes by Brandon Deras MD at 12/19/2017 11:21 AM     Author:  Brandon Deras MD Service:  Hospitalist Author Type:  Physician    Filed:  12/19/2017 12:16 PM Date of Service:  12/19/2017 11:21 AM Creation Time:  12/19/2017 11:21 AM    Status:  Signed :  Brandon Deras MD (Physician)         [facility]    Internal Medicine Hospitalist Progress Note  12/19/2017  I evaluated patient on the above date.    Brandon Deras Jr., MD  [phone] (p)  Text Page (7 am to 6 pm)      Assessment & Plan   Mr. Kelechi Coleman is a 82 year old male with PMH significant for DM2, HTN, CAD, CHF, CVI's, pVT, CARLOS,  CKD and recent subdural hematoma and subarachnoid hematoma (8/2017) with subsequent seizures, who presented 12/12 with fall.     1. Fall with occipital laceration.  Head CT 12/12 no acute findings. Laceration stapled.  - Staples to be removed no sooner than 10 days than 12/22.    - Continue PT, OT.      2. CAD with demand NSTEMI.      Chronic systolic heart failure.      Hypertension (benign essential).  [PTA: amlodipine 10 mg daily, atorvastatin 40 mg qHS, Coreg 12.5 mg qam Plavix 75 mg daily,  hydralazine 50 mg BID, Imdur 120 mg daily, spironolactone 50 mg daily, torsemide 30 mg daily.]  H/o CAD with prior 4v CABG 2002 and s/p stents to proximal and distal SVG (RPDA/RPLA) in 9/2016. CHF with echo 9/2016 showing LVEF 40-45% with WMA's, RV normal in size and function. Trop elevated on admission to 0.701 with subsequent flat trops. Seen by Cardiology and felt to be demand MI. Discontinued amlodipine and reduced hydralazine to 25 mg tid due low BP on 12/13. Reduced Imdur to 90 mg daily on 12/15. Echo 12/13 demonstrated slightly decreased EF from previous echo, now 30-35%.  [GC1.1]  WEIGHT (196 lbs on admission): 198 lbs 12/19 <-- 194 lbs 12/18 < 194 lbs 12/17.[GC1.2]  - Continue Plavix, atorvastatin, Coreg, hydralazine, Imdur, spironolactone.  - Continue torsemide 20 mg daily.  - Resume spironolactone on d/c.  - Continue Ace wraps to help with BLE edema.  - Monitor i/o's, daily wts.[GC1.1]  - F/u with Cardiology.[GC1.2]    3. JUVENTINO, prerenal.  Cr 1.57 on admission with max 1.76 on 12/15. Improved to 1.52 after 1 L NS bolus. Spironolactone held on admission.[GC1.1]   Recent Labs  Lab 12/19/17  0846 12/18/17  0818 12/17/17  0945 12/16/17  0600 12/15/17  0535 12/14/17  0555   CR 1.15 1.19 1.29* 1.52* 1.76* 1.52*[GC1.3]   - Monitor BMP.  - Avoid nephrotoxic medications.    4. Malnutrition, non-severe due to inadequate oral intake.      Dysphagia, mild-moderate, oral and pharyngeal.  Consulted Nutrition and Speech this stay.  - Continue DD3 with thin liquids.  - Continue BID oral supplements.       5. Metabolic encephalopathy - resolved.    UA, CXR, C diff, stool THIAGO, blood culture unrevealing. Likely due to hospital delirium, concomitant JUVENTINO.       6. Physical deconditioning.  - Continue PT, OT.    7. DM2 w/o complications.  [PTA: Lantus 10U qHS, aspart 8U TID with meals.]  - Continue Lantus 6U qHS.  - Continue ISS.      8. Seizures.  H/o subdural hematoma and subarachnoid hematoma (8/2017) with subsequent  "seizures/  - Continue PTA Keppra and PTA Trileptal.    9. Dyslipidemia.  - Continue atorvastatin.    10. GERD.  - Continue pantoprazole.    Prophylaxis.  - PCD's, ambulation.    CODE STATUS: DNR/DNI.    Dispo.  -[GC1.1] D/C to TCU[GC1.2].    Interval History[GC1.1]   Doing OK overall. Tolerating diet.[GC1.2]    -Data reviewed today: I reviewed all new labs and imaging over the last 24 hours. I personally reviewed[GC1.1] no images or EKG's today[GC1.2].    Physical Exam   Heart Rate: 64, Blood pressure 125/65, pulse 64, temperature 97.8  F (36.6  C), temperature source Oral, resp. rate 18, height 1.88 m (6' 2\"), weight 89.9 kg (198 lb 3.1 oz), SpO2 96 %.  Vitals:    12/17/17 0519 12/18/17 0700 12/19/17 0624   Weight: 88.3 kg (194 lb 10.7 oz) 88.2 kg (194 lb 7.1 oz) 89.9 kg (198 lb 3.1 oz)     Vital Signs with Ranges  Temp:  [97.3  F (36.3  C)-98.1  F (36.7  C)] 97.8  F (36.6  C)  Pulse:  [64] 64  Heart Rate:  [63-64] 64  Resp:  [16-20] 18  BP: (101-155)/(54-93) 125/65  SpO2:  [96 %-99 %] 96 %  Patient Vitals for the past 24 hrs:   BP Temp Temp src Pulse Heart Rate Resp SpO2 Weight   12/19/17 0720 125/65 97.8  F (36.6  C) Oral - 64 18 96 % -   12/19/17 0624 - - - - - - - 89.9 kg (198 lb 3.1 oz)   12/19/17 0520 104/54 - - - - - - -   12/19/17 0119 (!) 155/93 97.3  F (36.3  C) Oral - 64 16 96 % -   12/18/17 2113 101/55 97.5  F (36.4  C) Oral 64 - 20 99 % -   12/18/17 1515 116/57 98.1  F (36.7  C) Oral - 63 18 99 % -   12/18/17 1351 135/70 - - - - - - -     I/O's Last 24 hours  I/O last 3 completed shifts:  In: 280 [P.O.:280]  Out: 975 [Urine:975]    Constitutional:[GC1.1] Alert, oriented, pleasant.[GC1.2]  Respiratory:[GC1.1] Diminished in bases. No crackles or wheezes.[GC1.2]  Cardiovascular:[GC1.1] RRR.[GC1.2]  GI:[GC1.1] Soft, nd, +BS.[GC1.2]  Skin/Integumen:[GC1.1] Bilateral legs extremity ace wrapped trace to 1+ edema into thighs.[GC1.2]  Other:        Data     Recent Labs  Lab 12/19/17  0846 12/18/17  0818 " 12/17/17  0945 12/16/17  0600  12/13/17  0548 12/13/17  0200 12/12/17  2130  12/12/17  1400   WBC 5.8 6.1  --  5.3  < > 5.9  --   --   --  7.3   HGB 10.3* 10.4*  --  9.6*  < > 9.6*  --   --   < > 10.0*   MCV 89 88  --  89  < > 88  --   --   --  89    157  --  125*  < > 145*  --   --   --  148*   INR  --   --   --   --   --   --   --   --   --  1.32*    139 140 140  < > 141  --   --   --  141   POTASSIUM 3.6 3.7 3.4 3.6  < > 3.8  --   --   --  3.9   CHLORIDE 105 106 104 107  < > 106  --   --   --  106   CO2 26 26 26 26  < > 25  --   --   --  26   BUN 40* 41* 43* 47*  < > 39*  --   --   --  39*   CR 1.15 1.19 1.29* 1.52*  < > 1.44*  --   --   --  1.57*   ANIONGAP 7 7 10 7  < > 10  --   --   --  9   MIGUEL 8.1* 8.0* 7.7* 7.6*  < > 8.4*  --   --   --  8.7   * 106* 175* 112*  < > 85  --   --   --  131*   ALBUMIN  --   --   --   --   --   --   --   --   --  2.8*   PROTTOTAL  --   --   --   --   --   --   --   --   --  6.6*   BILITOTAL  --   --   --   --   --   --   --   --   --  0.8   ALKPHOS  --   --   --   --   --   --   --   --   --  221*   ALT  --   --   --   --   --   --   --   --   --  20   AST  --   --   --   --   --   --   --   --   --  21   TROPI  --   --   --   --   --  0.714* 0.714* 0.709*  < > 0.629*   < > = values in this interval not displayed.  Recent Labs   Lab Test  12/19/17   0846  12/19/17   0725  12/19/17   0148  12/18/17   2110  12/18/17   1718  12/18/17   1140  12/18/17   0818   12/17/17   0945   12/16/17   0600   12/15/17   0535   GLC  167*   --    --    --    --    --   106*   --   175*   --   112*   --   99   BGM   --   114*  104*  121*  219*  146*   --    < >   --    < >   --    < >   --     < > = values in this interval not displayed.         No results found for this or any previous visit (from the past 24 hour(s)).    Medications   All medications were reviewed.       insulin glargine  6 Units Subcutaneous At Bedtime     isosorbide mononitrate  90 mg Oral Daily      levETIRAcetam  500 mg Oral BID     torsemide  20 mg Oral Daily     hydrALAZINE  25 mg Oral Q8H MAR     atorvastatin  40 mg Oral At Bedtime     carvedilol  12.5 mg Oral QAM     OXcarbazepine  150 mg Oral BID     pantoprazole  40 mg Oral QPM     clopidogrel  75 mg Oral Daily     sodium chloride (PF)  3 mL Intracatheter Q8H     insulin aspart  1-7 Units Subcutaneous TID AC     insulin aspart  1-5 Units Subcutaneous At Bedtime[GC1.1]          Revision History        User Key Date/Time User Provider Type Action    > GC1.2 12/19/2017 12:16 PM Brandon Deras MD Physician Sign     GC1.3 12/19/2017 11:40 AM Brandon Deras MD Physician      GC1.1 12/19/2017 11:21 AM Brandon Deras MD Physician             Progress Notes by Deyanira Aguilera RD, LD at 12/19/2017 11:47 AM     Author:  Deyanira Aguilera RD, LD Service:  Nutrition Author Type:  Registered Dietitian    Filed:  12/19/2017 11:54 AM Date of Service:  12/19/2017 11:47 AM Creation Time:  12/19/2017 11:47 AM    Status:  Signed :  Deyanira Aguilera RD, LD (Registered Dietitian)         CLINICAL NUTRITION SERVICES - REASSESSMENT NOTE      Recommendations Ordered by Registered Dietitian (RD):   Plus2 Shakes for PM snack         EVALUATION OF PROGRESS TOWARD GOALS   Diet:  Mod carb, DD3, thin liquids.   Intake:  Pt states his appetite is a bit better; eating % per flow sheets.  He's mad that he can't have crackers with his soup.  He also wanted raisins with his oatmeal, which MD has allowed.  In the RD note from 12/14, Plus2 Shakes were mentioned but never got ordered. Pt states he would like a shake between meals.      NEW FINDINGS:   Weight is trending up, likely fluid. Weights have been inconsistent, however.[CM1.1]    Vitals:    12/12/17 1347 12/12/17 2100 12/13/17 0500 12/15/17 0423   Weight: 87.1 kg (192 lb) 89.2 kg (196 lb 9.6 oz) 88.2 kg (194 lb 7.1 oz) 88.9 kg (195 lb 15.8 oz)    12/15/17 1100 12/16/17 0914 12/17/17 0519 12/18/17 0700    Weight: 87 kg (191 lb 12.8 oz) 86.8 kg (191 lb 6.4 oz) 88.3 kg (194 lb 10.7 oz) 88.2 kg (194 lb 7.1 oz)    12/19/17 0624   Weight: 89.9 kg (198 lb 3.1 oz)[CM1.2]         Previous Goals:   Pt will eat at least 25% of his meals BID  Evaluation: Met    Previous Nutrition Diagnosis:   Inadequate oral intake related to decreased appetite as evidenced by pt reporting two small meals daily and eating 0% per chart last 24 hrs.  Evaluation: Improving      CURRENT NUTRITION DIAGNOSIS  No nutrition diagnosis identified at this time     INTERVENTIONS  Recommendations / Nutrition Prescription  Diet per SLP  Plus2 Shakes for PM snacks    Implementation  Medical Food Supplement - ordered the above    Goals  Pt will consume at least 75% of meals      MONITORING AND EVALUATION:  Progress towards goals will be monitored and evaluated per protocol and Practice Guidelines    Deyanira Aguilera RD  Pager [phone] (M-F)            [phone] (W/E & Hol)[CM1.1]             Revision History        User Key Date/Time User Provider Type Action    > CM1.2 12/19/2017 11:54 AM Deyanira Aguilera RD, СЕРГЕЙ Registered Dietitian Sign     CM1.1 12/19/2017 11:47 AM Deyanira Aguilera RD, СЕРГЕЙ Registered Dietitian             Progress Notes by Lauren De La Cruz LICSW at 12/19/2017 10:01 AM     Author:  Lauren De La Cruz LICSW Service:  (none) Author Type:      Filed:  12/19/2017 10:02 AM Date of Service:  12/19/2017 10:01 AM Creation Time:  12/19/2017 10:01 AM    Status:  Signed :  Lauren De La Cruz LICSW ()         SW  Sister has consented to patient returning to [facility] and admitting to their long term care unit.  Writer will speak with patient.[KH1.1]     Revision History        User Key Date/Time User Provider Type Action    > KH1.1 12/19/2017 10:02 AM Lauren De La Cruz LICSW  Sign            Progress Notes by Vani De Los Santos MD at 12/18/2017  4:34 PM     Author:  Vani De Los Santos MD  Service:  Hospitalist Author Type:  Physician    Filed:  12/18/2017  5:15 PM Date of Service:  12/18/2017  4:34 PM Creation Time:  12/18/2017  4:34 PM    Status:  Signed :  Vani De Los Santos MD (Physician)         [facility]    Hospitalist Progress Note    Date of Service (when I saw the patient): 12/18/2017    Assessment & Plan   Kelechi Coleman is a 82 year old male who was admitted on 12/12/2017. PMH significant for recent subdural hematoma and subarachnoid hematoma in 08/2017 with subsequent seizures, chronic kidney disease stage III, heart failure with reduced ejection fraction of 40-45%, peripheral arterial disease, paroxysmal atrial fibrillation, insulin-dependent diabetes mellitus type 2, essential hypertension, hyperlipidemia, multiple cerebrovascular accidents most recent in 09/2016, coronary artery disease, status post CABG x4 and drug-eluting stent x2, most recently in 2016 and on chronic treatment with plavix, gastroesophageal reflux disease with esophagitis, renal artery stenosis, and paroxysmal ventricular tachycardia. Admitted 12/12/17 after a fall.    1. Fall with occipital laceration.  Staples to be removed[LD1.1] no sooner than 1[LD1.2]0 days from 12/12.  PT/OT consulted for physical deconditioning.      2. Acute on chronic heart failure exacerbation.  Now appears euvolemic.  Continue torsemide 20 mg daily.  Continue Coreg and Imdur.     Discontinued amlodipine and reduced hydralazine to 25 mg tid due low BP on 12/13.    Reduce Imdur to 90 mg daily on 12/15.    Cardiology consult appreciated.    Echo demonstrates slightly decreased EF from previous echo in Sept 2017, now 30-35%.    Ace wraps to help with BLE edema.     2. NSTEMI, type 2.  Likely stress induced from volume depletion.  Continue plavix.    4. DM2, insulin-dependent.  PTA Lantus 10 u HS, held on admission.  Diet advancing. Will start patient back on lantus 6 u HS with continued SSI.   Continue AC/HS  POC glucose checks.     5. Seizures.  Continue PTA Keppra and Trileptal.     6. GERD.   Continue protonix.     7. JUVENTINO.  Cr 1.57 on admission with max 1.76 on 12/15. Improved to 1.52 after 1 L NS bolus.    Spironolactone held on admission.  Cr now down to 1.19, which is improved from baseline.   Recheck BMP in AM and consider resuming spironolactone either at discharge or with PCP.     8. HL.  Continue atorvastatin.     9. Malnutrition, non-severe. Inadequate oral intake.[LD1.1]  Dysphagia, mild-moderate, oral and pharyngeal.[LD1.2]  Consulted Nutrition. Continue current diet with BID oral supplements.[LD1.1]   Diet recommendations per speech. Continue ST to advance diet as tolerated.[LD1.2]     10. Metabolic encephalopathy, resolved.    UA, CXR, C diff, stool THIAGO, blood culture unrevealing.  Likely due to hospital delirium. Previous hospitalist discussed [facility] with patient on 12/15.  Patient wants to pursue therapies with the goal of returning to independent living.     11. Physical deconditioning.  PT/OT consults.  Likely discharge soon to TCU / LTC facility as available.    DVT Prophylaxis: Pneumatic Compression Devices  Code Status: DNR/DNI    Disposition: Expected discharge pending placement. Care coordinator and social work involved in planning.    Vani Barboza MD    [phone] (P)  Text page (7am to 6pm)    Interval History[LD1.1]   No acute events. Patient improving to 1-person assist today. ACE wraps to legs.[LD1.2]     -Data reviewed today: I reviewed all new labs and imaging results over the last 24 hours. I personally reviewed no images or EKG's today.    Physical Exam   Temp: 98.1  F (36.7  C) Temp src: Oral BP: 116/57   Heart Rate: 63 Resp: 18 SpO2: 99 % O2 Device: None (Room air)    Vitals:    12/16/17 0914 12/17/17 0519 12/18/17 0700   Weight: 86.8 kg (191 lb 6.4 oz) 88.3 kg (194 lb 10.7 oz) 88.2 kg (194 lb 7.1 oz)     Vital Signs with Ranges  Temp:  [97.4  F (36.3  C)-98.1  F (36.7   C)] 98.1  F (36.7  C)  Heart Rate:  [58-64] 63  Resp:  [16-18] 18  BP: (111-135)/(56-70) 116/57  SpO2:  [95 %-100 %] 99 %  I/O last 3 completed shifts:  In: 440 [P.O.:440]  Out: 1000 [Urine:1000]    Constitutional:[LD1.1] Pleasant elderly gentleman in no acute distress. Alert and oriented.[LD1.3] Able to transfer with walker and assist of 1.  HEENT:  Normocephalic. Laceration with staples on occiput.[LD1.2]   Respiratory:[LD1.1] Clear to auscultation bilaterally. Good breath sounds at bases. No crackles or wheezes.[LD1.2]   Cardiovascular:[LD1.1] Regular rate and rhythm. 2/6 NENA, noted previously.[LD1.2]   GI:[LD1.1] Soft, nontender. Normoactive bowel sounds.  Extremities[LD1.2]:[LD1.1] Patient with ACE wraps to bilateral LE.[LD1.2]    Medications        isosorbide mononitrate  90 mg Oral Daily     levETIRAcetam  500 mg Oral BID     torsemide  20 mg Oral Daily     hydrALAZINE  25 mg Oral Q8H MAR     atorvastatin  40 mg Oral At Bedtime     carvedilol  12.5 mg Oral QAM     OXcarbazepine  150 mg Oral BID     pantoprazole  40 mg Oral QPM     clopidogrel  75 mg Oral Daily     sodium chloride (PF)  3 mL Intracatheter Q8H     insulin aspart  1-7 Units Subcutaneous TID AC     insulin aspart  1-5 Units Subcutaneous At Bedtime       Data     Recent Labs  Lab 12/18/17  0818 12/17/17  0945 12/16/17  0600 12/15/17  0535  12/13/17  0548 12/13/17  0200 12/12/17  2130  12/12/17  1400   WBC 6.1  --  5.3 3.3*  < > 5.9  --   --   --  7.3   HGB 10.4*  --  9.6* 9.5*  < > 9.6*  --   --   < > 10.0*   MCV 88  --  89 90  < > 88  --   --   --  89     --  125* 132*  < > 145*  --   --   --  148*   INR  --   --   --   --   --   --   --   --   --  1.32*    140 140 142  < > 141  --   --   --  141   POTASSIUM 3.7 3.4 3.6 3.9  < > 3.8  --   --   --  3.9   CHLORIDE 106 104 107 107  < > 106  --   --   --  106   CO2 26 26 26 27  < > 25  --   --   --  26   BUN 41* 43* 47* 46*  < > 39*  --   --   --  39*   CR 1.19 1.29* 1.52* 1.76*  < >  1.44*  --   --   --  1.57*   ANIONGAP 7 10 7 8  < > 10  --   --   --  9   MIGUEL 8.0* 7.7* 7.6* 7.7*  < > 8.4*  --   --   --  8.7   * 175* 112* 99  < > 85  --   --   --  131*   ALBUMIN  --   --   --   --   --   --   --   --   --  2.8*   PROTTOTAL  --   --   --   --   --   --   --   --   --  6.6*   BILITOTAL  --   --   --   --   --   --   --   --   --  0.8   ALKPHOS  --   --   --   --   --   --   --   --   --  221*   ALT  --   --   --   --   --   --   --   --   --  20   AST  --   --   --   --   --   --   --   --   --  21   TROPI  --   --   --   --   --  0.714* 0.714* 0.709*  < > 0.629*   < > = values in this interval not displayed.    Imaging:  No results found for this or any previous visit (from the past 24 hour(s)).[LD1.1]     Revision History        User Key Date/Time User Provider Type Action    > LD1.2 12/18/2017  5:15 PM Vani De Los Santos MD Physician Sign     LD1.3 12/18/2017  5:02 PM Vani De Los Santos MD Physician      LD1.1 12/18/2017  4:34 PM Vani De Los Santos MD Physician             Progress Notes by Lauren De La Cruz LICSW at 12/18/2017  3:43 PM     Author:  Lauren De La Cruz LICSW Service:  (none) Author Type:      Filed:  12/18/2017  4:29 PM Date of Service:  12/18/2017  3:43 PM Creation Time:  12/18/2017  3:43 PM    Status:  Addendum :  Lauren De La Cruz LICSW ()         SW  D:  Working with sister Samantha regarding d/c plan.  She has found out that the long term care room patient would be not accommodate a lift chair she had purchase for patient to use in his board and care room.  She has now asked writer to have patient assessed by [facility] which has assisted living at the [place] location and care suites at the [facility] location.  Writer has spoken with Kate at [facility] [phone] and faxed information to [phone].  Explained to sister that patient is likely ready for d/c on Tuesday and he may need to return to [facility] until he can  move into [facility].  Samantha, who lives in [place], has not spoken with her brother about her exploration of [facility].    Samantha acknowledges she has no care concerns with [facility] but is extremely unhappy if patient cannot have the chair she purchased for him.  Patient's close friend Nick Johnson, who is listed as an Emergency Contact, has expressed to Samantha that he recommends patient return to [facility] [place] [facility].    A:  Patient needs to be involved in the d/c discussion.   Based on his care needs he either needs the skilled care center or a care suite.    P: Will know tomorrow if [facility] is an option.[KH1.1]     Update at 1630.  [facility] is not able to accept patient at this time, Kate will update sister.[KH1.2]       Revision History        User Key Date/Time User Provider Type Action    > KH1.2 12/18/2017  4:29 PM Lauren De La Cruz LICSW  Addend     KH1.1 12/18/2017  3:55 PM Lauren De La Cruz LICSW  Sign            Progress Notes by Lauren De La Cruz LICSW at 12/18/2017 11:00 AM     Author:  Lauren De La Cruz LICSW Service:  (none) Author Type:      Filed:  12/18/2017 11:05 AM Date of Service:  12/18/2017 11:00 AM Creation Time:  12/18/2017 11:00 AM    Status:  Signed :  Lauren De La Cruz LICSW ()         SW  D:  [facility] patient back however they feel he needs to admit to their long term care unit.  He can receive therapies on the LTC however he may have some out of pocket expense.  He has exhausted his SNF benefit so his room and board charges will be private pay.  Writer spoke with his sister Samantha who lives in [place].  She was hoping patient could go to the TCU with the goal of returning to board and care eventually but she accepts the recommendation of [facility].  Writer is waiting to speak with patient regarding the plan.  He has been on the phone when writer attempted to visit.[KH1.1]      "Revision History        User Key Date/Time User Provider Type Action    > KH1.1 12/18/2017 11:05 AM Lauren De La Cruz [facility]  Sign            Progress Notes by Tacho Rojas at 12/18/2017 10:25 AM     Author:  Tacho Rojas Service:  Spiritual Health Author Type:      Filed:  12/18/2017 10:28 AM Date of Service:  12/18/2017 10:25 AM Creation Time:  12/18/2017 10:25 AM    Status:  Signed :  Tacho Rojas ()         SPIRITUAL HEALTH SERVICES Progress Note  [facility]    SH, LOS visit. The patient reported being tired of sitting in the hospital room and was hopeful for d/c today or tomorrow. In addition, the patient reported that he was feeling allright \"hanging loose\" here at [facility].       provided care in presence and listening.      SH has no further plans.        Tacho Rojas  Chaplain Resident[DC1.1]     Revision History        User Key Date/Time User Provider Type Action    > DC1.1 12/18/2017 10:28 AM Tacho Rojas Sign            Progress Notes by Lacie Cervantes OT at 12/17/2017 10:03 AM     Author:  Lacie Cervantes OT Service:  Acute IP Rehab Author Type:  Occupational Therapist    Filed:  12/17/2017 12:30 PM Date of Service:  12/17/2017 10:03 AM Creation Time:  12/17/2017 12:30 PM    Status:  Signed :  Lacie Cervantes OT (Occupational Therapist)          12/17/17 0800   Quick Adds   Type of Visit Initial Occupational Therapy Evaluation   Living Environment   Lives With facility resident   Living Arrangements extended care facility   Home Accessibility no concerns   Living Environment Comment Pt is living at [facility], [facility] just a few days prior to most recent fall   Self-Care   Dominant Hand right   Usual Activity Tolerance moderate   Current Activity Tolerance fair   Regular Exercise no   Equipment Currently Used at Home walker, rolling   Functional Level Prior   Ambulation 3-->assistive equipment and person   Transferring " 3-->assistive equipment and person   Toileting 3-->assistive equipment and person   Bathing 3-->assistive equipment and person   Dressing 2-->assistive person   Eating 0-->independent   Communication 2-->difficulty speaking (not related to language barrier)   Swallowing 0-->swallows foods/liquids without difficulty   Cognition 0 - no cognition issues reported   Fall history within last six months yes   Number of times patient has fallen within last six months 4   Which of the above functional risks had a recent onset or change? ambulation;transferring;toileting;bathing;dressing;communication/speech;fall history  (since subdural/subarachnoid hematoma August 2017)   Prior Functional Level Comment Pt was needing assist of 1 for all ADLs per pt report, states he req assist for toilet hygine, and all mobility   General Information   Onset of Illness/Injury or Date of Surgery - Date 12/12/17   Referring Physician Boaz Champion MD   Additional Occupational Profile Info/Pertinent History of Current Problem Pt is a 83 yo male who was admitted following a fall with occipital laceration, NSTEMI type 2, and history of seizure.  See chart for full history.    Precautions/Limitations fall precautions;seizure precautions   Cognitive Status Examination   Orientation orientation to person, place and time   Level of Consciousness alert   Able to Follow Commands WNL/WFL   Visual Perception   Visual Perception Wears glasses  (reading)   Pain Assessment   Patient Currently in Pain No   Range of Motion (ROM)   ROM Comment LUE impairment, WFL for ADLs; at baseline   Strength   Strength Comments LUE 3+/5, at baseline, WFL for ADLs with A   Hand Strength   Hand Strength Comments BUE WFL for ADLs   Coordination   Coordination Comments reduced speed, WFL for ADLs   Mobility   Bed Mobility Comments MOD A of 2   Transfer Skills   Transfer Comments A of 2   Transfer Skill: Bed to Chair/Chair to Bed   Level of [place]: Bed to Chair  moderate assist (50% patients effort)   Physical Assist/Nonphysical Assist: Bed to Chair 2 persons;verbal cues;supervision   Weight-Bearing Restrictions full weight-bearing   Assistive Device - Transfer Skill Bed to Chair Chair to Bed Rehab Eval rolling walker   Transfer Skill: Sit to Stand   Level of [place]: Sit/Stand moderate assist (50% patients effort)   Physical Assist/Nonphysical Assist: Sit/Stand 2 persons;supervision;verbal cues   Transfer Skill: Sit to Stand full weight-bearing   Assistive Device for Transfer: Sit/Stand rolling walker   Transfer Skill: Toilet Transfer   Level of [place]: Toilet moderate assist (50% patients effort)   Physical Assist/Nonphysical Assist: Toilet 2 persons   Assistive Device rolling walker;seat riser;grab bars   Bathing   Level of [place] moderate assist (50% patients effort)   Upper Body Dressing   Level of [place]: Dress Upper Body stand-by assist   Lower Body Dressing   Level of [place]: Dress Lower Body moderate assist (50% patients effort)   Toileting   Level of [place]: Toilet moderate assist (50% patients effort)   Grooming   Level of [place]: Grooming stand-by assist   Eating/Self Feeding   Level of [place]: Eating independent   Activities of Daily Living Analysis   Impairments Contributing to Impaired Activities of Daily Living balance impaired;coordination impaired;fear and anxiety;strength decreased   General Therapy Interventions   Planned Therapy Interventions ADL retraining   Clinical Impression   Criteria for Skilled Therapeutic Interventions Met yes, treatment indicated   OT Diagnosis Reduced IND in ADLs and functional mobility   Influenced by the following impairments Weakness, reduced activity tolerance   Assessment of Occupational Performance 1-3 Performance Deficits   Identified Performance Deficits Transfers/ambulation, toileting, bed mobility   Clinical Decision Making (Complexity) Low complexity   Therapy Frequency  "5 times/wk   Predicted Duration of Therapy Intervention (days/wks) 3 days   Anticipated Discharge Disposition Transitional Care Facility   Risks and Benefits of Treatment have been explained. Yes   Patient, Family & other staff in agreement with plan of care Yes   [facility] AM-PAC  \"6 Clicks\" Daily Activity Inpatient Short Form   1. Putting on and taking off regular lower body clothing? 2 - A Lot   2. Bathing (including washing, rinsing, drying)? 2 - A Lot   3. Toileting, which includes using toilet, bedpan or urinal? 2 - A Lot   4. Putting on and taking off regular upper body clothing? 3 - A Little   5. Taking care of personal grooming such as brushing teeth? 3 - A Little   6. Eating meals? 4 - None   Daily Activity Raw Score (Score out of 24.Lower scores equate to lower levels of function) 16   Total Evaluation Time   Total Evaluation Time (Minutes) 15[ES1.1]        Revision History        User Key Date/Time User Provider Type Action    > ES1.1 12/17/2017 12:30 PM Lacie Cervantes, OT Occupational Therapist Sign            Progress Notes by Danyelle Reed LSW at 12/17/2017 12:23 PM     Author:  Danyelle Reed LSW Service:  Social Work Author Type:      Filed:  12/17/2017 12:25 PM Date of Service:  12/17/2017 12:23 PM Creation Time:  12/17/2017 12:23 PM    Status:  Signed :  Danyelle Reed LSW ()         D: SW following for DC planning. MIKE reviewed chart. Pt is from [facility] board and care. Per MD, anticipate DC in 1-2 days.   A: Anticipate return to [facility] for PT/OT and to assist with determining appropriate living, B&C vs LTC.   P: Referral sent via DOD to [facility].     ELENITA Onofre, [facility]  [id number][JJ1.1]       Revision History        User Key Date/Time User Provider Type Action    > JJ1.1 12/17/2017 12:25 PM Danyelle Reed LSW  Sign            Progress Notes by Hilda Hunt, PT at " 12/17/2017 11:59 AM     Author:  Hilda Hunt PT Service:  (none) Author Type:  Physical Therapist    Filed:  12/17/2017 11:59 AM Date of Service:  12/17/2017 11:59 AM Creation Time:  12/17/2017 11:59 AM    Status:  Signed :  Hilda Hunt PT (Physical Therapist)            12/17/17 1010   Quick Adds   Type of Visit Initial PT Evaluation   Living Environment   Lives With facility resident   Living Arrangements extended care facility   Home Accessibility no concerns   Living Environment Comment Patient is a resident at [facility] and just recently transitioned from [facility] prior to fall/hospitalization    Self-Care   Dominant Hand right   Usual Activity Tolerance moderate   Current Activity Tolerance fair   Regular Exercise no   Equipment Currently Used at Home walker, rolling   Functional Level Prior   Ambulation 3-->assistive equipment and person   Transferring 3-->assistive equipment and person   Toileting 3-->assistive equipment and person   Bathing 3-->assistive equipment and person   Dressing 2-->assistive person   Eating 0-->independent   Communication 0-->understands/communicates without difficulty   Swallowing 0-->swallows foods/liquids without difficulty   Cognition 0 - no cognition issues reported   Fall history within last six months yes   Number of times patient has fallen within last six months 4   Which of the above functional risks had a recent onset or change? ambulation;transferring;toileting;bathing;fall history   Prior Functional Level Comment A x 1 for ADLs/amb using FWW per patient report   General Information   Onset of Illness/Injury or Date of Surgery - Date 12/12/17   Referring Physician Boaz Champion MD   Patient/Family Goals Statement none stated   Pertinent History of Current Problem (include personal factors and/or comorbidities that impact the POC) Patient is an 83 yo male admitted on 12/12/17 after a fall at facility with occipital laceration   Precautions/Limitations fall  "precautions   Cognitive Status Examination   Orientation orientation to person, place and time   Level of Consciousness alert   Follows Commands and Answers Questions 100% of the time;able to follow multistep instructions   Personal Safety and Judgment intact   Memory intact   Integumentary/Edema   Integumentary/Edema Comments head incision intact   Range of Motion (ROM)   ROM Comment B LE grossly WNL for PROM   Strength   Strength Comments B LE grossly 2+/5 during MMT, but able to sustain weight without buckling when standing   Bed Mobility   Bed Mobility Comments Mod A x 2   Transfer Skills   Transfer Comments sit <> stand Mod x 2   Gait   Gait Comments CGA with FWW   Balance   Balance Comments fair/poor   General Therapy Interventions   Planned Therapy Interventions bed mobility training;gait training;strengthening;ROM;transfer training;home program guidelines;progressive activity/exercise   Clinical Impression   Criteria for Skilled Therapeutic Intervention yes, treatment indicated   PT Diagnosis weakness; deconditioning   Influenced by the following impairments weakness, imbalance   Functional limitations due to impairments difficulty with gait/transfers, increased fall risk   Clinical Presentation Stable/Uncomplicated   Clinical Presentation Rationale Patient is requiring increased assist from baseline to perform amb, transfers and ADLs with increased fall risk   Clinical Decision Making (Complexity) Low complexity   Therapy Frequency` daily   Predicted Duration of Therapy Intervention (days/wks) 4-5 days   Anticipated Discharge Disposition Long Term Care Facility;Transitional Care Facility   Risk & Benefits of therapy have been explained Yes   Patient, Family & other staff in agreement with plan of care Yes   [facility] AM-PAC  \"6 Clicks\" V.2 Basic Mobility Inpatient Short Form   1. Turning from your back to your side while in a flat bed without using bedrails? 2 - A Lot   2. Moving from lying on your " back to sitting on the side of a flat bed without using bedrails? 2 - A Lot   3. Moving to and from a bed to a chair (including a wheelchair)? 2 - A Lot   4. Standing up from a chair using your arms (e.g., wheelchair, or bedside chair)? 2 - A Lot   5. To walk in hospital room? 3 - A Little   6. Climbing 3-5 steps with a railing? 1 - Total   Basic Mobility Raw Score (Score out of 24.Lower scores equate to lower levels of function) 12   Total Evaluation Time   Total Evaluation Time (Minutes) 15[KN1.1]        Revision History        User Key Date/Time User Provider Type Action    > KN1.1 12/17/2017 11:59 AM Hilda Hunt, PT Physical Therapist Sign            Progress Notes by Boaz Champion MD at 12/17/2017 10:41 AM     Author:  Boaz Champion MD Service:  Hospitalist Author Type:  Physician    Filed:  12/17/2017 10:59 AM Date of Service:  12/17/2017 10:41 AM Creation Time:  12/17/2017 10:41 AM    Status:  Signed :  Boaz Champion MD (Physician)         [facility]    Hospitalist Progress Note    Date of Service (when I saw the patient): 12/17/2017    Assessment & Plan   Kelechi Coleman is a 82 year old male who was admitted on 12/12/2017 after a fall.    1. Acute on chronic heart failure exacerbation - Appears euvolemic.  Continue torsemide 20 mg daily.  Continue Coreg and Imdur.   Discontinue amlodipine and reduce hydralazine to 25 mg tid due low BP on 12/13.  Reduce Imdur to 90 mg daily on 12/15.  Cardiology consult appreciated.  Echo demonstrates slightly decreased EF from previous echo in Sept 2017, now 30-35%.  Discontinue telemetry and transfer to med floor.  Ace wraps to help with BLE edema.    2. NSTEMI, type 2 - Likely stress induced from volume depletion.  Continue plavix.    3. Fall with occipital laceration - Staples to be removed in 10 days from 12/12.  Reconsult PT/OT consult for physical deconditioning.  Patient initially unable to participate due to  lethargy and fatigue, but now wanting therapies.    4. DM2 - Lantus on hold, continue ISS    5. Seizures - Continue Keppra and Trileptal.    6. GERD - Continue protonix.    7. JUVENTINO - Spironolactone on hold.  Cr 1.57 on admission, improved to 1.52 after 1 L NS bolus.  Now 1.29.  Baseline Cr 1.2-1.3.  Max Cr 1.76 on 12/15.    8. HL - Continue atorvastatin.    9. Malnutrition - Consult Nutrition.    10. Metabolic encephalopathy - Resolved.  UA, CXR, C diff, stool THIAGO, blood culture unrevealing.  Likely due to hospital delirium.  Discussed Comfort Cares with patient on 12/15.  Patient wants to pursue therapies with the goal of returning to independent living.    11. Physical deconditioning - PT/OT consults.  Likely discharge soon to TCU.    DVT Prophylaxis: Pneumatic Compression Devices  Code Status: DNR/DNI/DNI, discussed with sister, Samantha Fenton on 12/14/17.    Disposition: Expected discharge in 1-2 days.    Boaz Champion  Text Page (7 am to 6 pm)    Interval History   The patient is sitting in a chair, eating breakfast.  He is awake, alert, pleasant, and joking with staff.    -Data reviewed today: I reviewed all new labs and imaging results over the last 24 hours. I personally reviewed no images or EKG's today.    Physical Exam   Temp: 97.5  F (36.4  C) Temp src: Axillary BP: 120/61 Pulse: 62 Heart Rate: 54 Resp: 16 SpO2: 99 % O2 Device: None (Room air)    Vitals:    12/15/17 1100 12/16/17 0914 12/17/17 0519   Weight: 87 kg (191 lb 12.8 oz) 86.8 kg (191 lb 6.4 oz) 88.3 kg (194 lb 10.7 oz)     Vital Signs with Ranges  Temp:  [97.5  F (36.4  C)-97.8  F (36.6  C)] 97.5  F (36.4  C)  Pulse:  [62] 62  Heart Rate:  [54-68] 54  Resp:  [16] 16  BP: ()/(41-64) 120/61  SpO2:  [99 %-100 %] 99 %  I/O last 3 completed shifts:  In: 260 [P.O.:260]  Out: 925 [Urine:925]    Gen: Debilitated deconditioned, thin elderly male, awake and alert, no acute distressed  HEENT: Normocephalic, stellate laceration occiput.  Lungs:  Clear to ausculation without wheezes, rhonchi, or rales  Heart: Regular rate and rhythm, no gallops or rubs, 2/6 NENA  GI: Bowel sound normal, no hepatosplenomegaly or masses  Lymph: No lymphadenopathy, 2 + BLE edema to sacrum  Skin: No rashes     Medications        isosorbide mononitrate  90 mg Oral Daily     levETIRAcetam  500 mg Oral BID     torsemide  20 mg Oral Daily     hydrALAZINE  25 mg Oral Q8H MAR     atorvastatin  40 mg Oral At Bedtime     carvedilol  12.5 mg Oral QAM     OXcarbazepine  150 mg Oral BID     pantoprazole  40 mg Oral QPM     clopidogrel  75 mg Oral Daily     sodium chloride (PF)  3 mL Intracatheter Q8H     insulin aspart  1-7 Units Subcutaneous TID AC     insulin aspart  1-5 Units Subcutaneous At Bedtime       Data     Recent Labs  Lab 12/17/17  0945 12/16/17  0600 12/15/17  0535 12/14/17  0555 12/13/17  0548 12/13/17  0200 12/12/17  2130  12/12/17  1400   WBC  --  5.3 3.3* 4.4 5.9  --   --   --  7.3   HGB  --  9.6* 9.5* 10.2* 9.6*  --   --   < > 10.0*   MCV  --  89 90 88 88  --   --   --  89   PLT  --  125* 132* 131* 145*  --   --   --  148*   INR  --   --   --   --   --   --   --   --  1.32*    140 142 139 141  --   --   --  141   POTASSIUM 3.4 3.6 3.9 4.0 3.8  --   --   --  3.9   CHLORIDE 104 107 107 106 106  --   --   --  106   CO2 26 26 27 26 25  --   --   --  26   BUN 43* 47* 46* 42* 39*  --   --   --  39*   CR 1.29* 1.52* 1.76* 1.52* 1.44*  --   --   --  1.57*   ANIONGAP 10 7 8 7 10  --   --   --  9   MIGUEL 7.7* 7.6* 7.7* 8.2* 8.4*  --   --   --  8.7   * 112* 99 151* 85  --   --   --  131*   ALBUMIN  --   --   --   --   --   --   --   --  2.8*   PROTTOTAL  --   --   --   --   --   --   --   --  6.6*   BILITOTAL  --   --   --   --   --   --   --   --  0.8   ALKPHOS  --   --   --   --   --   --   --   --  221*   ALT  --   --   --   --   --   --   --   --  20   AST  --   --   --   --   --   --   --   --  21   TROPI  --   --   --   --  0.714* 0.714* 0.709*  < > 0.629*   < > =  values in this interval not displayed.    No results found for this or any previous visit (from the past 24 hour(s)).[MC1.1]       Revision History        User Key Date/Time User Provider Type Action    > MC1.1 12/17/2017 10:59 AM Boaz Champion MD Physician Sign            Progress Notes by Ashwini Elliott RN at 12/16/2017  3:45 PM     Author:  Ashwini Elliott RN Service:  Skilled Nursing Author Type:  Registered Nurse    Filed:  12/16/2017  3:49 PM Date of Service:  12/16/2017  3:45 PM Creation Time:  12/16/2017  3:45 PM    Status:  Signed :  Ashwini Elliott RN (Registered Nurse)         Report called in to 66 floor nurse. Pt transferred to station 66 around 1330, left with his belongings and meds, family aware of the transfer.[HD1.1]     Revision History        User Key Date/Time User Provider Type Action    > HD1.1 12/16/2017  3:49 PM Ashwini Elliott RN Registered Nurse Sign            Progress Notes by Boaz Champion MD at 12/16/2017  8:45 AM     Author:  Boaz Champion MD Service:  Hospitalist Author Type:  Physician    Filed:  12/16/2017  8:52 AM Date of Service:  12/16/2017  8:45 AM Creation Time:  12/16/2017  8:45 AM    Status:  Signed :  Boaz Champion MD (Physician)         [facility]    Hospitalist Progress Note    Date of Service (when I saw the patient): 12/16/2017    Assessment & Plan   Kelechi Coleman is a 82 year old male who was admitted on 12/12/2017 after a fall.    1. Acute on chronic heart failure exacerbation - Appears euvolemic.  Continue torsemide 20 mg daily.  Continue Coreg and Imdur.   Discontinue amlodipine and reduce hydralazine to 25 mg tid due low BP on 12/13.  Reduce Imdur to 90 mg daily on 12/15.  Cardiology consult appreciated.  Echo demonstrates slightly decreased EF from previous echo in Sept 2017, now 30-35%.  Discontinue telemetry and transfer to [facility] floor.    2. NSTEMI, type 2 - Likely stress induced  "from volume depletion.  Continue plavix.    3. Fall with occipital laceration - Staples to be removed in 10 days from 12/12.  Reconsult PT/OT consult for physical deconditioning.  Patient initially unable to participate due to lethargy and fatigue, but now wanting therapies.    4. DM2 - Lantus on hold, continue ISS    5. Seizures - Continue Keppra and Trileptal.    6. GERD - Continue protonix.    7. JUVENTINO - Spironolactone on hold.  Cr 1.57 on admission, improved to 1.52 after 1 L NS bolus.  Baseline Cr 1.2-1.3.  Max Cr 1.76 on 12/15.    8. HL - Continue atorvastatin.    9. Malnutrition - Consult Nutrition.    10. Metabolic encephalopathy - UA, CXR, C diff, stool THIAGO, blood culture unrevealing.  Likely due to delirium.  Discussed Comfort Cares with patient on 12/15.  Patient wants to pursue therapies with the goal of returning to independent living.    DVT Prophylaxis: Pneumatic Compression Devices  Code Status: DNR/DNI/DNI, discussed with sister, Samantha Fenton on 12/14/17.    Disposition: Expected discharge in 1-3 days.    Boaz Champion  Text Page (7 am to 6 pm)    Interval History   The patient is sitting on the commode.  \"I don't want to give up!\"  He states that he wants to participate in therapy to return to independent living.    -Data reviewed today: I reviewed all new labs and imaging results over the last 24 hours. I personally reviewed no images or EKG's today.    Physical Exam   Temp: 97.1  F (36.2  C) Temp src: Oral BP: 105/69 Pulse: 62 Heart Rate: 64 Resp: 16 SpO2: 95 % O2 Device: None (Room air)    Vitals:    12/13/17 0500 12/15/17 0423 12/15/17 1100   Weight: 88.2 kg (194 lb 7.1 oz) 88.9 kg (195 lb 15.8 oz) 87 kg (191 lb 12.8 oz)     Vital Signs with Ranges  Temp:  [93.3  F (34.1  C)-98.1  F (36.7  C)] 97.1  F (36.2  C)  Pulse:  [56-62] 62  Heart Rate:  [53-68] 64  Resp:  [16-18] 16  BP: ()/(39-69) 105/69  SpO2:  [91 %-98 %] 95 %  I/O last 3 completed shifts:  In: 1476 [P.O.:1470; " I.V.:6]  Out: 650 [Urine:650]    Gen: Debilitated deconditioned, thin elderly male, awake and alert, no acute distressed  HEENT: Normocephalic, stellate laceration occiput.  Lungs: Clear to ausculation without wheezes, rhonchi, or rales  Heart: Regular rate and rhythm, no gallops or rubs, 2/6 NENA  GI: Bowel sound normal, no hepatosplenomegaly or masses  Lymph: No lymphadenopathy, 2 + BLE edema to sacrum  Skin: No rashes     Medications        isosorbide mononitrate  90 mg Oral Daily     levETIRAcetam  500 mg Oral BID     torsemide  20 mg Oral Daily     hydrALAZINE  25 mg Oral Q8H MAR     atorvastatin  40 mg Oral At Bedtime     carvedilol  12.5 mg Oral QAM     OXcarbazepine  150 mg Oral BID     pantoprazole  40 mg Oral QPM     clopidogrel  75 mg Oral Daily     sodium chloride (PF)  3 mL Intracatheter Q8H     insulin aspart  1-7 Units Subcutaneous TID AC     insulin aspart  1-5 Units Subcutaneous At Bedtime       Data     Recent Labs  Lab 12/16/17  0600 12/15/17  0535 12/14/17  0555 12/13/17  0548 12/13/17  0200 12/12/17  2130  12/12/17  1400   WBC 5.3 3.3* 4.4 5.9  --   --   --  7.3   HGB 9.6* 9.5* 10.2* 9.6*  --   --   < > 10.0*   MCV 89 90 88 88  --   --   --  89   * 132* 131* 145*  --   --   --  148*   INR  --   --   --   --   --   --   --  1.32*    142 139 141  --   --   --  141   POTASSIUM 3.6 3.9 4.0 3.8  --   --   --  3.9   CHLORIDE 107 107 106 106  --   --   --  106   CO2 26 27 26 25  --   --   --  26   BUN 47* 46* 42* 39*  --   --   --  39*   CR 1.52* 1.76* 1.52* 1.44*  --   --   --  1.57*   ANIONGAP 7 8 7 10  --   --   --  9   MIGUEL 7.6* 7.7* 8.2* 8.4*  --   --   --  8.7   * 99 151* 85  --   --   --  131*   ALBUMIN  --   --   --   --   --   --   --  2.8*   PROTTOTAL  --   --   --   --   --   --   --  6.6*   BILITOTAL  --   --   --   --   --   --   --  0.8   ALKPHOS  --   --   --   --   --   --   --  221*   ALT  --   --   --   --   --   --   --  20   AST  --   --   --   --   --   --   --   21   TROPI  --   --   --  0.714* 0.714* 0.709*  < > 0.629*   < > = values in this interval not displayed.    No results found for this or any previous visit (from the past 24 hour(s)).[MC1.1]       Revision History        User Key Date/Time User Provider Type Action    > MC1.1 12/16/2017  8:52 AM Boaz Champion MD Physician Sign                  Procedure Notes     No notes of this type exist for this encounter.         Progress Notes - Therapies (Notes from 12/16/17 through 12/19/17)      Progress Notes by Lacie Cervantes OT at 12/17/2017 10:03 AM     Author:  Lacie Cervantes OT Service:  Acute IP Rehab Author Type:  Occupational Therapist    Filed:  12/17/2017 12:30 PM Date of Service:  12/17/2017 10:03 AM Creation Time:  12/17/2017 12:30 PM    Status:  Signed :  Lacie Cervantes OT (Occupational Therapist)          12/17/17 0800   Quick Adds   Type of Visit Initial Occupational Therapy Evaluation   Living Environment   Lives With facility resident   Living Arrangements extended care facility   Home Accessibility no concerns   Living Environment Comment Pt is living at [facility], pt had transitioned from [facility] just a few days prior to most recent fall   Self-Care   Dominant Hand right   Usual Activity Tolerance moderate   Current Activity Tolerance fair   Regular Exercise no   Equipment Currently Used at Home walker, rolling   Functional Level Prior   Ambulation 3-->assistive equipment and person   Transferring 3-->assistive equipment and person   Toileting 3-->assistive equipment and person   Bathing 3-->assistive equipment and person   Dressing 2-->assistive person   Eating 0-->independent   Communication 2-->difficulty speaking (not related to language barrier)   Swallowing 0-->swallows foods/liquids without difficulty   Cognition 0 - no cognition issues reported   Fall history within last six months yes   Number of times patient has fallen within last six months 4   Which of the above functional risks  had a recent onset or change? ambulation;transferring;toileting;bathing;dressing;communication/speech;fall history  (since subdural/subarachnoid hematoma August 2017)   Prior Functional Level Comment Pt was needing assist of 1 for all ADLs per pt report, states he req assist for toilet hygine, and all mobility   General Information   Onset of Illness/Injury or Date of Surgery - Date 12/12/17   Referring Physician Boaz Champion MD   Additional Occupational Profile Info/Pertinent History of Current Problem Pt is a 83 yo male who was admitted following a fall with occipital laceration, NSTEMI type 2, and history of seizure.  See chart for full history.    Precautions/Limitations fall precautions;seizure precautions   Cognitive Status Examination   Orientation orientation to person, place and time   Level of Consciousness alert   Able to Follow Commands WNL/WFL   Visual Perception   Visual Perception Wears glasses  (reading)   Pain Assessment   Patient Currently in Pain No   Range of Motion (ROM)   ROM Comment LUE impairment, WFL for ADLs; at baseline   Strength   Strength Comments LUE 3+/5, at baseline, WFL for ADLs with A   Hand Strength   Hand Strength Comments BUE WFL for ADLs   Coordination   Coordination Comments reduced speed, WFL for ADLs   Mobility   Bed Mobility Comments MOD A of 2   Transfer Skills   Transfer Comments A of 2   Transfer Skill: Bed to Chair/Chair to Bed   Level of Couch: Bed to Chair moderate assist (50% patients effort)   Physical Assist/Nonphysical Assist: Bed to Chair 2 persons;verbal cues;supervision   Weight-Bearing Restrictions full weight-bearing   Assistive Device - Transfer Skill Bed to Chair Chair to Bed Rehab Eval rolling walker   Transfer Skill: Sit to Stand   Level of Couch: Sit/Stand moderate assist (50% patients effort)   Physical Assist/Nonphysical Assist: Sit/Stand 2 persons;supervision;verbal cues   Transfer Skill: Sit to Stand full weight-bearing  "  Assistive Device for Transfer: Sit/Stand rolling walker   Transfer Skill: Toilet Transfer   Level of [place]: Toilet moderate assist (50% patients effort)   Physical Assist/Nonphysical Assist: Toilet 2 persons   Assistive Device rolling walker;seat riser;grab bars   Bathing   Level of [place] moderate assist (50% patients effort)   Upper Body Dressing   Level of [place]: Dress Upper Body stand-by assist   Lower Body Dressing   Level of [place]: Dress Lower Body moderate assist (50% patients effort)   Toileting   Level of [place]: Toilet moderate assist (50% patients effort)   Grooming   Level of [place]: Grooming stand-by assist   Eating/Self Feeding   Level of [place]: Eating independent   Activities of Daily Living Analysis   Impairments Contributing to Impaired Activities of Daily Living balance impaired;coordination impaired;fear and anxiety;strength decreased   General Therapy Interventions   Planned Therapy Interventions ADL retraining   Clinical Impression   Criteria for Skilled Therapeutic Interventions Met yes, treatment indicated   OT Diagnosis Reduced IND in ADLs and functional mobility   Influenced by the following impairments Weakness, reduced activity tolerance   Assessment of Occupational Performance 1-3 Performance Deficits   Identified Performance Deficits Transfers/ambulation, toileting, bed mobility   Clinical Decision Making (Complexity) Low complexity   Therapy Frequency 5 times/wk   Predicted Duration of Therapy Intervention (days/wks) 3 days   Anticipated Discharge Disposition Transitional Care Facility   Risks and Benefits of Treatment have been explained. Yes   Patient, Family & other staff in agreement with plan of care Yes   [facility] AM-PAC  \"6 Clicks\" Daily Activity Inpatient Short Form   1. Putting on and taking off regular lower body clothing? 2 - A Lot   2. Bathing (including washing, rinsing, drying)? 2 - A Lot   3. Toileting, which " includes using toilet, bedpan or urinal? 2 - A Lot   4. Putting on and taking off regular upper body clothing? 3 - A Little   5. Taking care of personal grooming such as brushing teeth? 3 - A Little   6. Eating meals? 4 - None   Daily Activity Raw Score (Score out of 24.Lower scores equate to lower levels of function) 16   Total Evaluation Time   Total Evaluation Time (Minutes) 15[ES1.1]        Revision History        User Key Date/Time User Provider Type Action    > ES1.1 12/17/2017 12:30 PM Lacie Cervantes OT Occupational Therapist Sign            Progress Notes by Hilda Hunt PT at 12/17/2017 11:59 AM     Author:  Hilda Hunt PT Service:  (none) Author Type:  Physical Therapist    Filed:  12/17/2017 11:59 AM Date of Service:  12/17/2017 11:59 AM Creation Time:  12/17/2017 11:59 AM    Status:  Signed :  Hilda Hunt PT (Physical Therapist)            12/17/17 1010   Quick Adds   Type of Visit Initial PT Evaluation   Living Environment   Lives With facility resident   Living Arrangements extended care facility   Home Accessibility no concerns   Living Environment Comment Patient is a resident at [facility] and just recently transitioned from [facility] prior to fall/hospitalization    Self-Care   Dominant Hand right   Usual Activity Tolerance moderate   Current Activity Tolerance fair   Regular Exercise no   Equipment Currently Used at Home walker, rolling   Functional Level Prior   Ambulation 3-->assistive equipment and person   Transferring 3-->assistive equipment and person   Toileting 3-->assistive equipment and person   Bathing 3-->assistive equipment and person   Dressing 2-->assistive person   Eating 0-->independent   Communication 0-->understands/communicates without difficulty   Swallowing 0-->swallows foods/liquids without difficulty   Cognition 0 - no cognition issues reported   Fall history within last six months yes   Number of times patient has fallen within last six months 4   Which of the above  functional risks had a recent onset or change? ambulation;transferring;toileting;bathing;fall history   Prior Functional Level Comment A x 1 for ADLs/amb using FWW per patient report   General Information   Onset of Illness/Injury or Date of Surgery - Date 12/12/17   Referring Physician Boaz Champion MD   Patient/Family Goals Statement none stated   Pertinent History of Current Problem (include personal factors and/or comorbidities that impact the POC) Patient is an 81 yo male admitted on 12/12/17 after a fall at facility with occipital laceration   Precautions/Limitations fall precautions   Cognitive Status Examination   Orientation orientation to person, place and time   Level of Consciousness alert   Follows Commands and Answers Questions 100% of the time;able to follow multistep instructions   Personal Safety and Judgment intact   Memory intact   Integumentary/Edema   Integumentary/Edema Comments head incision intact   Range of Motion (ROM)   ROM Comment B LE grossly WNL for PROM   Strength   Strength Comments B LE grossly 2+/5 during MMT, but able to sustain weight without buckling when standing   Bed Mobility   Bed Mobility Comments Mod A x 2   Transfer Skills   Transfer Comments sit <> stand Mod x 2   Gait   Gait Comments CGA with FWW   Balance   Balance Comments fair/poor   General Therapy Interventions   Planned Therapy Interventions bed mobility training;gait training;strengthening;ROM;transfer training;home program guidelines;progressive activity/exercise   Clinical Impression   Criteria for Skilled Therapeutic Intervention yes, treatment indicated   PT Diagnosis weakness; deconditioning   Influenced by the following impairments weakness, imbalance   Functional limitations due to impairments difficulty with gait/transfers, increased fall risk   Clinical Presentation Stable/Uncomplicated   Clinical Presentation Rationale Patient is requiring increased assist from baseline to perform amb, transfers  "and ADLs with increased fall risk   Clinical Decision Making (Complexity) Low complexity   Therapy Frequency` daily   Predicted Duration of Therapy Intervention (days/wks) 4-5 days   Anticipated Discharge Disposition Long Term Care Facility;Transitional Care Facility   Risk & Benefits of therapy have been explained Yes   Patient, Family & other staff in agreement with plan of care Yes   [facility] AM-PAC  \"6 Clicks\" V.2 Basic Mobility Inpatient Short Form   1. Turning from your back to your side while in a flat bed without using bedrails? 2 - A Lot   2. Moving from lying on your back to sitting on the side of a flat bed without using bedrails? 2 - A Lot   3. Moving to and from a bed to a chair (including a wheelchair)? 2 - A Lot   4. Standing up from a chair using your arms (e.g., wheelchair, or bedside chair)? 2 - A Lot   5. To walk in hospital room? 3 - A Little   6. Climbing 3-5 steps with a railing? 1 - Total   Basic Mobility Raw Score (Score out of 24.Lower scores equate to lower levels of function) 12   Total Evaluation Time   Total Evaluation Time (Minutes) 15[KN1.1]        Revision History        User Key Date/Time User Provider Type Action    > KN1.1 12/17/2017 11:59 AM Hilda Hunt PT Physical Therapist Sign            "

## 2017-12-12 NOTE — IP AVS SNAPSHOT
"` `           [place] [address] [facility]: [phone]                                              INTERAGENCY TRANSFER FORM - NURSING   2017                    Hospital Admission Date: 2017  MARLA VÁZQUEZ   : 1935  Sex: Male        Attending Provider: Yovana Rothman MD     Allergies:  Aspirin, Penicillins, Contrast Dye    Infection:  None   Service:  HOSPITALIST    Ht:  1.88 m (6' 2\")   Wt:  89.9 kg (198 lb 3.1 oz)   Admission Wt:  87.1 kg (192 lb)    BMI:  25.45 kg/m 2   BSA:  2.17 m 2            Patient PCP Information     Provider PCP Type    Laquita Deal MD General      Current Code Status     Date Active Code Status Order ID Comments User Context       Prior      Code Status History     Date Active Date Inactive Code Status Order ID Comments User Context    2017 12:26 PM  DNR/DNI [id number]  Brandon Deras MD Outpatient    2017  2:05 PM 2017 12:26 PM DNR/DNI [id number]  Boaz Champion MD Inpatient    2017  9:18 PM 2017  2:05 PM DNR [id number]  Alonso Mustafa APRN CNP Inpatient    2017  9:05 PM 2017  9:18 PM Full Code [id number]  Alosno Mustafa APRN [facility] Inpatient    2017  1:50 PM 2017  9:05 PM Full Code [id number]  Jarad Ga PA-C Outpatient    2017  9:53 AM 2017  1:50 PM Full Code [id number]  Rosario Martinez APRN [facility] Inpatient    2016 10:37 AM 2017  9:53 AM Full Code [id number]  Lul Boss MD Outpatient    2016  9:36 AM 2016 10:37 AM Full Code [id number]  Fracisco Hernandez MD Inpatient    2016  3:28 PM 2016  9:36 AM Full Code [id number]  Lul Boss MD Outpatient    2016  2:49 PM 2016  3:28 PM Full Code [id number]  Ned Wood DO Inpatient    2016  4:42 PM 2016  2:49 PM Full Code [id number]  Samson Nelson MD Outpatient    2016 12:02 PM 2016  4:42 PM Full Code [id number]  Liana Thorpe " MD [facility] Inpatient      Advance Directives        Does patient have a scanned Advance Directive/ACP document in EPIC?           Yes        Hospital Problems as of 12/19/2017              Priority Class Noted POA    NSTEMI (non-ST elevated myocardial infarction) (H) Medium  12/12/2017 Yes      Non-Hospital Problems as of 12/19/2017              Priority Class Noted    Atherosclerosis of native coronary artery of native heart without angina pectoris High  12/13/2013    Paroxysmal atrial fibrillation (H) Medium  12/13/2013    Peripheral artery disease (H) Medium  12/13/2013    Hyperlipidemia LDL goal <70 Medium  12/13/2013    Benign essential hypertension Medium  12/13/2013    Paroxysmal ventricular tachycardia (H) Medium  12/13/2013    Cerebral infarction (H) Medium  12/13/2013    Acute exacerbation of CHF (congestive heart failure) (H) Medium  9/2/2016    Stroke (cerebrum) (H) Medium  9/16/2016    Type 2 diabetes mellitus with stage 3 chronic kidney disease, with long-term current use of insulin (H) Medium  11/4/2016    Chronic systolic heart failure (H) Medium  1/30/2017    Gastroesophageal reflux disease with esophagitis Medium  5/8/2017    Fall Medium  8/18/2017    Seizures (H) Medium  8/20/2017      Immunizations     Name Date      Influenza (High Dose) 3 valent vaccine 10/02/17     Influenza (High Dose) 3 valent vaccine 09/22/16     Influenza (High Dose) 3 valent vaccine 12/16/14     Pneumo Conj 13-V (2010&after) 06/19/14     Pneumococcal 23 valent 09/03/16     TDAP Vaccine (Adacel) 08/27/12     Zoster vaccine, live 11/10/11          END      ASSESSMENT     Discharge Profile Flowsheet     EXPECTED DISCHARGE     F/U Appointment Brochure Provided  -- (n/a) 12/05/17 1218    Expected Discharge Date  12/19/17 (tcu) 12/17/17 1453   Referrals Placed  -- (n/a) 12/05/17 1218    DISCHARGE NEEDS ASSESSMENT     SKIN      Concerns To Be Addressed  no discharge needs identified 12/05/17 1218   Skin WDL  ex 12/19/17 3444     "Equipment Currently Used at Home  walker, rolling 12/17/17 1153   Skin Color/Characteristics  bruised (ecchymotic);redness blanchable 12/19/17 0943    # of Referrals Placed by CTS  Post Acute Facilities 12/17/17 1227   Skin Temperature  warm 12/19/17 0131    GASTROINTESTINAL (ADULT,PEDIATRIC,OB)     Skin Integrity  bruise(s);scar(s);wound(s) 12/19/17 0943    GI WDL  WDL 12/19/17 0943   Inspection of bony prominences  Full 12/19/17 0943    All Quadrants Bowel Sounds  audible and active in all quadrants 12/19/17 0131   Full except areas not inspected   Hip, left;Hip, right;Buttock, left;Buttock, right;Sacrum;Coccyx 12/16/17 1622    Last Bowel Movement  12/16/17 12/17/17 0952   Inspection under devices  Full 12/19/17 0943    GI Signs/Symptoms  fecal incontinence 12/16/17 0114   Skin Moisture  flaky;dry 12/19/17 0131    Passing flatus  yes 12/19/17 0943   Skin Elasticity  slow return to original state 12/19/17 0131    COMMUNICATION ASSESSMENT     Additional Documentation  -- (staples on scalp) 12/19/17 0131    Patient's communication style  spoken language (English or Bilingual) 12/12/17 1346   SAFETY      FINAL RESOURCES     Safety WDL  WDL 12/19/17 1406    Resources List  Other (Comments) ([place] Board and Care) 12/05/17 1218   All Alarms  alarm(s) activated and audible 12/19/17 1406    Other Resources  -- (n/a) 12/05/17 1218   Safety Factors  bed in position other than low;bed in low position 12/19/17 0131    PAS Number  -- (n/a) 12/05/17 1218   Aspiration Risk Screen  poor oral care;reduced alertness 12/14/17 1930    Senior Linkage Line Referral Placed  -- (n/a) 12/05/17 1218                      Assessment WDL (Within Defined Limits) Definitions           Safety WDL     Effective: 09/28/15    Row Information: <b>WDL Definition:</b> Bed in low position, wheels locked; call light in reach; upper side rails up x 2; ID band on<br> <font color=\"gray\"><i>Item=AS safety wdl>>List=AS safety " "wdl>>Version=F14</i></font>      Skin WDL     Effective: 09/28/15    Row Information: <b>WDL Definition:</b> Warm; dry; intact; elastic; without discoloration; pressure points without redness<br> <font color=\"gray\"><i>Item=AS skin wdl>>List=AS skin wdl>>Version=F14</i></font>      Vitals     Vital Signs Flowsheet     VITAL SIGNS     Height Method  Stated 12/12/17 2116    Temp  97.8  F (36.6  C) 12/19/17 0723   Height Method  Stated 12/12/17 2116    Temp src  Oral 12/19/17 0723   Weight  89.9 kg (198 lb 3.1 oz) 12/19/17 0624    Resp  18 12/19/17 0723   Weight Method  Bed scale 12/19/17 0624    Pulse  64 12/18/17 2205   Bed Scale  Standard (fitted sheet, draw sheet/ pad, cover/flat sheet, blanket, two pillows) 12/12/17 2116    Heart Rate  64 12/19/17 0723   BSA (Calculated - sq m)  2.16 12/12/17 2116    Pulse/Heart Rate Source  Monitor 12/19/17 0723   BMI (Calculated)  25.29 12/12/17 2116    BP  122/65 12/19/17 1411   POSITIONING      BP Location  Left arm 12/19/17 0723   Body Position  supine, legs elevated;supine, head elevated 12/19/17 1406    OXYGEN THERAPY     Head of Bed (HOB)  HOB at 30-45 degrees 12/19/17 1406    SpO2  96 % 12/19/17 0723   Positioning/Transfer Devices  pillows;in use 12/19/17 1406    O2 Device  None (Room air) 12/19/17 0723   Chair  Upright in chair 12/19/17 0826    PAIN/COMFORT     DAILY CARE      Patient Currently in Pain  denies 12/18/17 2205   Activity Management  activity adjusted per tolerance 12/19/17 1406    Preferred Pain Scale  number (Numeric Rating Pain Scale) 12/13/17 1637   Activity Assistance Provided  assistance, 1 person 12/19/17 1406    0-10 Pain Scale  3 12/17/17 0523   Assistive Device Utilized  gait belt;walker 12/19/17 1406    Pain Location  Head 12/17/17 0546   Additional Documentation  Activity Device Assistance (Row) 12/17/17 0952    Pain Orientation  Posterior 12/17/17 0546   EKG MONITORING      Pain Descriptors  -- (unable to describe) 12/14/17 1212   Cardiac " "Regularity  Regular 12/16/17 2359    Pain Intervention(s)  Medication (See eMAR) 12/17/17 0546   SENTHIL COMA SCALE      Response to Interventions  Absence of nonverbal indicators of pain 12/15/17 1348   Best Eye Response  4-->(E4) spontaneous 12/17/17 0338    ANALGESIA SIDE EFFECTS MONITORING     Best Motor Response  6-->(M6) obeys commands 12/17/17 0338    Side Effects Monitoring: Respiratory Quality  R 12/17/17 0546   Best Verbal Response  5-->(V5) oriented 12/17/17 0338    Side Effects Monitoring: Respiratory Depth  N 12/17/17 0546   Coulters Coma Scale Score  15 12/17/17 0338    Side Effects Monitoring: Sedation Level  S 12/17/17 0546   ECG      HEIGHT AND WEIGHT     Equipment  electrodes changed 12/16/17 1027    Height  1.88 m (6' 2\") 12/12/17 2116                 Patient Lines/Drains/Airways Status    Active LINES/DRAINS/AIRWAYS     Name: Placement date: Placement time: Site: Days: Last dressing change:    Peripheral IV 12/12/17 Right Lower forearm 12/12/17   1412   Lower forearm   7     Pressure Injury 12/14/17 Right Other (Comment) small open blister 12/14/17   1100    5     Wound 12/12/17 Midline Occipital region Laceration large laceration from falling back and hitting head on ground at [facility].  12/12/17   1408   Occipital region   7             Patient Lines/Drains/Airways Status    Active PICC/CVC     None            Intake/Output Detail Report     Date Intake     Output Net    Shift P.O. I.V. IV Piggyback Total Urine Total       Noc 12/17/17 2300 - 12/18/17 0659 200 -- -- 200 400 400 -200    Day 12/18/17 0700 - 12/18/17 1459 -- -- -- -- 175 175 -175    Juliane 12/18/17 1500 - 12/18/17 2259 280 -- -- 280 550 550 -270    Noc 12/18/17 2300 - 12/19/17 0659 -- -- -- -- 250 250 -250    Day 12/19/17 0700 - 12/19/17 1459 480 -- -- 480 150 150 330      Last Void/BM       Most Recent Value    Urine Occurrence 0 at 12/18/2017 1351    Stool Occurrence 0 at 12/18/2017 1351      Case Management/Discharge Planning     Case " Management/Discharge Planning Flowsheet     REFERRAL INFORMATION     Equipment Currently Used at Home  walker, rolling 12/17/17 1153    # of Referrals Placed by CTS  Post Acute Facilities 12/17/17 1227   Resources List  Other (Comments) ([place] Board and Care) 12/05/17 1218    Post Acute Facilities  TCU 12/17/17 1227   Other Resources  -- (n/a) 12/05/17 1218    CTS Assigned to Case  Danyelle Reed 12/17/17 1227   PAS Number  -- (n/a) 12/05/17 1218    Primary Care MD Name  Dr. Gee 09/21/16 1244   Senior Linkage Line Referral Placed  -- (n/a) 12/05/17 1218    LIVING ENVIRONMENT     F/U Appointment Brochure Provided  -- (n/a) 12/05/17 1218    Lives With  facility resident 12/17/17 1153   Referrals Placed  -- (n/a) 12/05/17 1218    Living Arrangements  extended care facility 12/17/17 1153   ABUSE RISK SCREEN      COPING/STRESS     QUESTION TO PATIENT:  Has a member of your family or a partner(now or in the past) intimidated, hurt, manipulated, or controlled you in any way?  no 12/12/17 1349    Major Change/Loss/Stressor  hospitalization;residence change 12/12/17 7819   QUESTION TO PATIENT: Do you feel safe going back to the place where you are living?  yes 12/12/17 1349    EXPECTED DISCHARGE     OBSERVATION: Is there reason to believe there has been maltreatment of a vulnerable adult (ie. Physical/Sexual/Emotional abuse, self neglect, lack of adequate food, shelter, medical care, or financial exploitation)?  no 12/12/17 1349    Expected Discharge Date  12/19/17 (tcu) 12/17/17 1453   (R) MENTAL HEALTH SUICIDE RISK      ASSESSMENT/CONCERNS TO BE ADDRESSED     Are you depressed or being treated for depression?  No 12/13/17 0517    Concerns To Be Addressed  no discharge needs identified 12/05/17 1218   HOMICIDE RISK      FINAL RESOURCES     Feels Like Hurting Others  no 12/12/17 1349

## 2017-12-12 NOTE — ED PROVIDER NOTES
History     Chief Complaint:  Fall    HPI   Kelechi Coleman is a 82 year old male on Plavix who presents after falling backwards, striking his head on his new lift chair and sustaining a large laceration to the back of his head. He denies loss of consciousness with this fall. Per EMS, there was reportedly a large amount of blood at the scene. The patient states that his speech has been abnormal since the fall due to a an extremely dry mouth, but he denies chest pain, palpitations, lightheadedness, shortness of breath, neck pain, pain elsewhere, fever and recent illness. He lives in a nursing facility since he suffered a brain bleed after a fall.      Abnormal Lab Results from [facility]  8/31/2017  Hemoglobin: 10.1 (L)  10/3/2017  Hemoglobin: 10.3 (L)    Allergies:  Aspirin  Penicillins  Contrast Dye    Medications:    acetaminophen (TYLENOL) 325 MG tablet  atorvastatin (LIPITOR) 40 MG tablet  Calcium Carb-Cholecalciferol 600-800 MG-UNIT TABS  levETIRAcetam (KEPPRA) 500 MG tablet  clopidogrel (PLAVIX) 75 MG tablet  potassium chloride SA (K-DUR/KLOR-CON M) 10 MEQ CR tablet  mirtazapine (REMERON) 15 MG tablet  torsemide (DEMADEX) 10 MG tablet  insulin aspart (NOVOLOG PEN) 100 UNIT/ML injection  thiamine 100 MG tablet  hydrALAZINE (APRESOLINE) 50 MG tablet  spironolactone (ALDACTONE) 50 MG tablet  pantoprazole (PROTONIX) 40 MG EC tablet  Multiple Vitamins-Minerals (CENTRUM SILVER) per tablet  OXcarbazepine (TRILEPTAL) 150 MG tablet  insulin glargine (LANTUS SOLOSTAR) 100 UNIT/ML injection  carvedilol (COREG) 25 MG tablet  amLODIPine (NORVASC) 10 MG tablet  isosorbide mononitrate (IMDUR) 60 MG 24 hr tablet     Past Medical History:    CHF (congestive heart failure)  Atrial fibrillation   Cerebral infarction   Coronary artery disease (CAD)  Diabetes  Gastroesophageal reflux disease with esophagitis   Hyperlipidaemia  Hypertension  Myocardial infarction  PAD (peripheral artery disease)   Stented  coronary artery  Type 2 diabetes mellitus with stage 3 chronic kidney disease  Cerebral artery occlusion with cerebral infarction  Atherosclerosis of native coronary artery  Paroxysmal atrial fibrillation  Peripheral artery disease  Paroxysmal ventricular tachycardia  Stroke  Chronic systolic heart failureSeizures    Past Surgical History:    Coronary artery bypass  Vocal cord lesion Removed  Angioplasty for renal artery stenosis  Left heart catheterization  Phacoemulsification clear cornea with standard intraocular lens implant  Angioplasty left leg for PVD    Family History:    Mother: Diabetes    Social History:  Former Smoker: Quit 1998  Alcohol Use: 3 Hard liquor drinks daily  Marital Status:  Single     Review of Systems   Constitutional: Negative for fever.   Musculoskeletal: Negative for neck pain.   Skin: Positive for wound.   Neurological: Positive for speech difficulty. Negative for syncope.   All other systems reviewed and are negative.      Physical Exam     Patient Vitals for the past 24 hrs:   BP Temp Temp src Pulse Heart Rate Resp SpO2 Height Weight   12/12/17 1915 103/53 - - - 58 27 - - -   12/12/17 1900 102/68 - - - 55 28 - - -   12/12/17 1845 95/59 - - - 57 20 - - -   12/12/17 1830 93/70 - - - 57 20 - - -   12/12/17 1815 93/64 - - - 58 (!) 43 - - -   12/12/17 1800 99/64 - - - 51 17 - - -   12/12/17 1745 92/60 - - - 60 19 - - -   12/12/17 1730 105/82 - - - 56 15 95 % - -   12/12/17 1715 99/72 - - - 55 19 96 % - -   12/12/17 1700 98/61 - - - 53 22 - - -   12/12/17 1645 97/54 - - - 53 12 - - -   12/12/17 1630 97/56 - - - 55 13 96 % - -   12/12/17 1615 95/58 - - - 59 10 95 % - -   12/12/17 1600 98/59 - - - 53 11 97 % - -   12/12/17 1545 94/60 - - - 60 14 - - -   12/12/17 1530 (!) 83/60 - - - 60 11 98 % - -   12/12/17 1515 (!) 86/56 - - - 52 13 97 % - -   12/12/17 1500 95/61 - - - 51 18 - - -   12/12/17 1452 - - - - - - 97 % - -   12/12/17 1451 94/58 - - - 54 - - - -   12/12/17 1450 - - - - 57 - 96 % -  "-   12/12/17 1449 (!) 88/52 - - - 53 - 93 % - -   12/12/17 1445 (!) 72/37 - - - - - 94 % - -   12/12/17 1400 97/55 - - - - - 98 % - -   12/12/17 1347 97/60 97.4  F (36.3  C) Temporal 66 - 18 96 % 1.892 m (6' 2.5\") 87.1 kg (192 lb)   12/12/17 1345 97/60 - - - - - 95 % - -       Physical Exam  General: Thin, elderly gentleman  Eyes: PERRL, conjunctivae pink no scleral icterus or conjunctival injection  ENT:  Moist mucus membranes, posterior oropharynx clear without erythema or exudates, no hemotympanum  Respiratory:  Lungs clear to auscultation bilaterally, no crackles/rubs/wheezes.  Good air movement.  No chest wall bruising   CV: Normal rate and irregularly irregular rhythm  GI:  Abdomen soft and non-distended.  Normoactive BS.  No tenderness, guarding or rebound  Skin: Warm, dry.  Large 10cm total stellate wound over occiput with cross shaped pattern with oozing and surrounding bogginess.  No arterial bleeding.  Musculoskeletal: No peripheral edema or calf tenderness.  No midline tenderness of the cervical/thoracic/lumbar spine.  No tenderness/crepitus/bony stepoffs over the clavicles, chest wall, pelvis, arms or legs.  Neuro: Alert and oriented to person/place/time.  PERRL, EOMI no nystagmus, no aphasia/facial droop/dysarthria, tongue midline, symmetric palatal elevation  Psychiatric: Normal affect    Emergency Department Course   ECG:  Indication: Fall  Time: 1453  Vent. Rate 62 bpm. MS interval 384. QRS duration 92. QT/QTc 452/458. P-R-T axis 51 41 205. Atrial fibrillation. Low voltage QRS. T wave abnormality. Abnormal ECG. Read time: 1454    Time: 1511  Vent. Rate 56 bpm. MS interval *. QRS duration 84. QT/QTc 454/438. P-R-T axis * 53 204. Atrial fibrillation with slow ventricular response. Low voltage QRS. ST&T wave abnormality. Abnormal ECG. Read time: 1513    Date: 8/20/17  Vent. Rate 69 bpm. MS interval 378. QRS duration 90. QT/QTc 420/450. P-R-T axis 34 -34 165. Sinus rhythm with 1st degree AV block. Left " axis deviation. Cannot rule out anterior infarct. T wave abnormality. Late transition. Abnormal ECG.    Imaging:  Head CT w/o Contrast  Small chronic right basal ganglia and high left frontoparietal infarcts again noted without change. No new infarcts. Diffuse cerebral volume loss and cerebral white matter changes consistent with chronic small vessel ischemic disease. No evidence for acute intracranial pathology.   As per radiology.    Laboratory:  UA with micro: mucous present, hyaline casts 21 (H) o/w negative  ABO/RH TNS: O pos, antibody screen negative  17:43 ISTAT Gases Lactate: Ph 7.44 (H), PCO2 38 (L), PO2 32, Bicarbonate 26, O2 Sat 65, Lactic Acid 1.7  16:01 ISTAT Gases Lactate: Ph 7.42, PCO2 37 (L), PO2 41, Bicarbonate 24, O2 Sat 77, Lactic Acid 2.2 (H)  17:31 Hemoglobin: 9.8 (L)  14:55 Hemoglobin: 9.5 (L)  CBC: WBC: 7.3, HGB: 10.0 (L), PLT: 148 (L)  CMP: Glucose 131 (H), BUN 39(H), Creatinine 1.57 (H), GFR 43 (L), Albumin 2.8 (L), Protein Total 6.6 (L) Alkphos 221 (H), o/w WNL   INR: 1.32 (H)  CK Total: 109  17:31 Troponin: 0.701 (HH)  14:00 Troponin: 0.629 (HH)    Procedures:     Laceration Repair      LACERATION:  A simple clean 10 cm laceration.    LOCATION:  Back of head.    FUNCTION:  Distally sensation and circulation are intact.    ANESTHESIA:  Local using 0.5% Bupivacaine with Epinephrine total of 7 mLs.    PREPARATION:  Irrigation and Scrubbing with Normal Saline and Shur Clens.    DEBRIDEMENT:  no debridement.    CLOSURE:  Wound was closed with 17 Staples.    Interventions:  14:54 0.9% Sodium Chloride 1,000 mL IV    Emergency Department Course:  Arrived via EMS.  Nursing notes and vitals reviewed.  I performed an exam of the patient as documented above.   Blood drawn. This was sent to the lab for further testing, results above.  The patient was sent for a Head CT while in the emergency department, findings above.     14:36 I rechecked the patient and completed my physical exam, utilizing a nurse  to help lift the patient so I could exam the laceration to the back of his head.  14:57 I numbed around the patient's laceration.  15:30 I performed the laceration repair as documented above.  19:02 I consulted with hospitalist Dr. Rothman about the patient's symptoms, history and workup here in the emergency department. Dr. Rothman agrees to accept the patient.  19:12 I rechecked the patient and discussed admission.    Findings and plan explained to the Patient who consents to admission. Discussed the patient with Dr. Rothman, who will admit the patient to a cardiac telemetry bed for further monitoring, evaluation, and treatment.      Impression & Plan      Medical Decision Making:  Kelechi Coleman is a 82 year old male who had lost his balance while standing and fell backwards onto his head.   He clearly described a mechanical fall.  The remainder of his trauma exam was normal.  Head CT fortunately showed no acute bleed.  We repaired his large stellate laceration.  There had been report of hypotension at the scene but he was normotensive on arrival.  Subsequently he did become hypotensive.  The cause of the hypotension is unclear.  Labs showed a stable hemoglobin.  A repeat dropped slightly but still within lab error.  He has no fever or other signs of sepsis or infection with a normal WBC.  EKG was not normal but he had no chest pain, shortness of breath or other symptoms during the episodes.  Troponin was elevated slightly but I suspect this is a chronic elevation for him in light of his CHF.  It will need to be trended. He has had some bradycardia and so it is possible his low blood pressure corresponds to this.  He has not been symptomatic while laying. His creatinine is slightly increased as is lactate and so we treated with a fluid bolus.  Lactate and blood pressure improved with fluids.  At this point, I am not certain as to the cause of the hypotension but I would like him to be observed for recurrent  hypotension and for a formal cardiac rule out.  I spoke with Dr. Rothman who graciously agreed to admit him, he will go to a tele [facility] bed. He and his friend were in agreement with this plan.    Diagnosis:    ICD-10-CM    1. Fall, initial encounter W19.XXXA Hemoglobin     Lactic acid whole blood     ISTAT gases lactate stephanie POCT     ISTAT gases lactate stephanie POCT     Troponin I     CANCELED: Lactic acid whole blood   2. Laceration of scalp, initial encounter S01.01XA    3. Contusion of face, scalp and neck, initial encounter S00.83XA     S00.03XA     S10.93XA    4. Anemia, unspecified type D64.9    5. Troponin level elevated R74.8    6. Other specified hypotension I95.89    7. Chronic atrial fibrillation (H) I48.2        Disposition:  Admitted to hospital    Discharge Medications:  New Prescriptions    No medications on file       I, Wayne Delgado, am serving as a scribe on 12/12/2017 at 2:16 PM to personally document services performed by Alfreda Boss MD based on my observations and the provider's statements to me.     12/12/2017    EMERGENCY DEPARTMENT       Alfreda Boss MD  12/12/17 [id number]

## 2017-12-12 NOTE — IP AVS SNAPSHOT
MRN:[id number]                      After Visit Summary   12/12/2017    Kelechi Coleman    MRN: [id number]           Thank you!     Thank you for choosing [facility] for your care. Our goal is always to provide you with excellent care. Hearing back from our patients is one way we can continue to improve our services. Please take a few minutes to complete the written survey that you may receive in the mail after you visit with us. Thank you!        Patient Information     Date Of Birth          1935        Designated Caregiver       Most Recent Value    Caregiver    Will someone help with your care after discharge? no      About your hospital stay     You were admitted on:  December 12, 2017 You last received care in the:  [facility] [address] [facility]    You were discharged on:  December 19, 2017       Who to Call     For medical emergencies, please call 911.  For non-urgent questions about your medical care, please call your primary care provider or clinic, [phone]          Attending Provider     Provider Specialty    Bronson Nichols MD Emergency Medicine    Gera, Alfreda MCKEON MD Emergency Medicine    Stephan, Yovana Connelly MD Internal Medicine       Primary Care Provider Office Phone # Fax #    Laquita Deal [phone]      After Care Instructions     Activity - Up with nursing assistance           Advance Diet as Tolerated       Follow this diet upon discharge: Orders Placed This Encounter      Room Service      Snacks/Supplements Adult: Other - Please comment; Vanilla Plus2 Shake; Between Meals      Combination Diet 3057-4490 Calories: Moderate Consistent CHO (4-6 CHO units/meal); Dysphagia Diet Level 3: Advanced; Thin Liquids (water, ice chips, juice, milk gelatin, ice cream, etc); 2 gm NA Diet; Low Fat Diet              General info for SNF       Length of Stay Estimate: Short Term Care: Estimated # of Days <30  Condition at Discharge:  Improving  Level of care:skilled   Rehabilitation Potential: Good  Admission H&P remains valid and up-to-date: Yes  Recent Chemotherapy: N/A  Use Nursing Home Standing Orders: Yes            Glucose monitor nursing POCT       Before meals and at bedtime            Intake and output       Every shift            Mantoux instructions       Give two-step Mantoux (PPD) Per Facility Policy Yes                  Follow-up Appointments     Follow Up and recommended labs and tests       Follow up with [facility] physician.  The following labs/tests are recommended: BMP 12/22.  Follow up with primary care provider after TCU discharge.  Follow up with [facility] Cardiology NP 1-2 weeks with BMP.  Consider removing scalp staples 12/22 at earliest.                  Your next 10 appointments already scheduled     Dec 19, 2017  3:30 PM CST   Inpatient Meal Follow Up Therapy with Bernadette De Oliveira, SLP   [facility] ([facility])    [address], [address]  [facility] [address]   [phone]            Dec 21, 2017 12:00 PM CST   Nursing Home with Laquita Deal MD   [facility]    [address] [place] [address] [address]  [facility] [address]   [phone]            Jan 09, 2018 11:30 AM CST   (Arrive by 11:15 AM)   Return Seizure with Mio Smith MD   [facility] Neurology ([facility])    [address]  3rd [address]  [facility] [address] [phone]            Feb 09, 2018 11:30 AM CST   [facility]    [address] [facility] [address] [phone]           Bring a current list of meds and any records pertaining to this visit. For Physicals, please bring immunization records and any forms needing to be filled out. Please arrive 10 minutes early to complete paperwork.              Additional Services     Follow-Up with [facility] Provider           Occupational Therapy Adult Consult       Evaluate and treat as clinically indicated.    Reason:  Weakness/deconditioning            Physical Therapy Adult Consult       Evaluate and treat as clinically indicated.    Reason:  weakness/deconditioning            Speech Language Path Adult Consult       Evaluate and treat as clinically indicated.    Reason:  Dysphagia                  Future tests that were ordered for you     Basic metabolic panel                 General Recommendations To Control Heart Failure When You Get Home       Heart Failure Instructions for Patients and Families: Please read and check off each of these important instructions as you do them when you get home.          Weight and Symptoms       ___ Put a scale in your bathroom.    ___ Post a weight chart or calendar next to your scale.    ___ Weigh yourself everyday as soon as you get up in the morning (before breakfast).  You should only be wearing your pajamas.  Write your weight on the chart/calendar.  ___ Bring your weight chart/calendar with you to all appointments.  ___ Call your doctor or nurse practitioner if you gain 2 pounds (in 1 day) or 5 pounds in (1 week) from your goal  good  weight.  Your good weight is also called your  dry  weight.  Your doctor or nurse will tell you what your good weight should be.    ___ Call your doctor or nurse practitioner if you have shortness of breath that gets worse over time, leg swelling or fatigue.       Medications and Diet       ___ Make sure to take your medication as prescribed.    ___ Bring a current list of your medication and all of your medicine bottles with you to all appointments.    ___ Limit fluids if you still have swelling or shortness of breath, or if your doctor tells you to do so.    ___ Eat less than 2000 mg of sodium (salt) every day. Read food labels, and do not add salt to meals. Remember, if you eat less salt you retain less fluid.  ___ Follow a heart  healthy diet that is low in saturated fat.        Activity and Suggested Lifestyle Changes      ___ Stay active. Talk to your doctor about an exercise program that is safe for your heart.  ___ Stop smoking. Reduce alcohol use.      ___ Lose weight if you are overweight. Extra weight puts a lot of stress on the heart.                 Control for Leg Swelling     ___ Keep your legs elevated (raised) as needed for swelling. If swelling is uncomfortable or elevation doesn t help, ask your doctor about using ACE wraps or support stockings.        What is the [facility]?  Cardiomyopathy  Optimization  Rehabilitation  Education    The [facility] is a heart failure specialty clinic within [facility].  It is an outpatient disease management program that is based on a phase-by-phase approach, which is tailored to each patient s individual needs.  The cardiologist, nurse practitioner, physician assistant and nurses provide an ongoing outpatient care and treatment plan that guides heart failure and cardiomyopathy patients from evaluation and education to stabilization. This team works with your current primary care doctor and cardiologist to help you:    - Avoid hospitalizations  - Slow the progression of the disease  - Improve length and quality of life  - Know who and when to call if heart failure symptoms appear  - Receive easy access to quality health care and advice  - Better understand your condition and treatment  - Decrease the tremendous cost burden of heart failure care  - Detect future heart problems before they become life threatening                                 Follow-up Appointments     ___ An appointment with the [facility] may already have been made for you (see section   Your next appointments already scheduled ).  If you have a question about your appointment, would like to speak with a C.O.R.E. nurse, or would like to become a [facility] Clinic patient, please call one of the  "following locations:     - [facility] ([place]):                                             [phone]  - [facility] ([place]):                                            [phone]  - [facility] ([place]):                 [phone]      ___ Your C.O.R.E. Clinic Team will continue to educate you on your heart failure and may adjust medications based on your vital signs, lab work, and how you are feeling.  Therefore, it is very important to bring the following to all C.O.R.E. appointments:    - An accurate list of your medications  - Your medicine bottles  - Your weight chart/calendar                   Pending Results     Date and Time Order Name Status Description    12/14/2017 1316 Blood culture Preliminary             Statement of Approval     Ordered          12/19/17 1238  I have reviewed and agree with all the recommendations and orders detailed in this document.  EFFECTIVE NOW     Approved and electronically signed by:  Brandon Deras MD             Admission Information     Date & Time Provider Department Dept. Phone    12/12/2017 Yovana Rothman MD [facility] [place] [address] [facility] [phone]      Your Vitals Were     Blood Pressure Pulse Temperature Respirations Height Weight    122/65 64 97.8  F (36.6  C) (Oral) 18 1.88 m (6' 2\") 89.9 kg (198 lb 3.1 oz)    Pulse Oximetry BMI (Body Mass Index)                96% 25.45 kg/m2          MyChart Information     [facility] gives you secure access to your electronic health record. If you see a primary care provider, you can also send messages to your care team and make appointments. If you have questions, please call your primary care clinic.  If you do not have a primary care provider, please call [phone] and they will assist you.        Care EveryWhere ID     This is your Care EveryWhere ID. This could be used by other organizations to access your [place] " medical records  [id number]        Equal Access to Services     ART DEL RIO : Hadii christine Becerra, wairish fletcher, qaibeth reagan, nenita corona. So [facility] [phone].    ATENCIÓN: Si habla español, tiene a conteh disposición servicios gratuitos de asistencia lingüística. Llame al [phone].    We comply with applicable federal civil rights laws and [place] laws. We do not discriminate on the basis of race, color, national origin, age, disability, sex, sexual orientation, or gender identity.               Review of your medicines      CONTINUE these medicines which may have CHANGED, or have new prescriptions. If we are uncertain of the size of tablets/capsules you have at home, strength may be listed as something that might have changed.        Dose / Directions    hydrALAZINE 50 MG tablet   Commonly known as:  APRESOLINE   This may have changed:  how much to take   Used for:  Essential hypertension with goal blood pressure less than 130/80        Dose:  25 mg   Take 0.5 tablets (25 mg) by mouth 2 times daily   Quantity:  60 tablet   Refills:  0       insulin aspart 100 UNIT/ML injection   Commonly known as:  NovoLOG PEN   This may have changed:  how much to take   Used for:  Type 2 diabetes mellitus with stage 3 chronic kidney disease, with long-term current use of insulin (H)        Dose:  5 Units   Inject 5 Units Subcutaneous 3 times daily (with meals)   Refills:  0       insulin glargine 100 UNIT/ML injection   Commonly known as:  LANTUS SOLOSTAR   This may have changed:  how much to take   Used for:  Type 2 diabetes mellitus with stage 3 chronic kidney disease, with long-term current use of insulin (H)        Dose:  6 Units   Inject 6 Units Subcutaneous At Bedtime   Quantity:  30 mL   Refills:  0       isosorbide mononitrate 30 MG 24 hr tablet   Commonly known as:  IMDUR   This may have changed:    - medication strength  - how much to take   Used for:   Coronary artery disease involving native coronary artery of native heart without angina pectoris        Dose:  90 mg   Take 3 tablets (90 mg) by mouth daily   Refills:  0       spironolactone 25 MG tablet   Commonly known as:  ALDACTONE   This may have changed:    - medication strength  - how much to take   Used for:  Chronic systolic congestive heart failure (H)        Dose:  25 mg   Take 1 tablet (25 mg) by mouth daily   Refills:  0       torsemide 10 MG tablet   Commonly known as:  DEMADEX   This may have changed:  how much to take   Used for:  Chronic systolic congestive heart failure (H)        Dose:  20 mg   Take 2 tablets (20 mg) by mouth daily   Refills:  0         CONTINUE these medicines which have NOT CHANGED        Dose / Directions    acetaminophen 325 MG tablet   Commonly known as:  TYLENOL        Dose:  650 mg   Take 650 mg by mouth every 6 hours as needed for mild pain   Refills:  0       blood glucose monitoring test strip   Commonly known as:  no brand specified        Use to test blood sugar 4 times daily or as directed.   Refills:  0       Calcium Carb-Cholecalciferol 600-800 MG-UNIT Tabs        Dose:  1 tablet   Take 1 tablet by mouth 3 times daily (with meals)   Refills:  0       carvedilol 25 MG tablet   Commonly known as:  COREG   Used for:  Essential hypertension with goal blood pressure less than 130/80        Dose:  12.5 mg   Take 0.5 tablets (12.5 mg) by mouth every morning   Quantity:  180 tablet   Refills:  3       CENTRUM SILVER per tablet   Used for:  Vitamin deficiency        Dose:  1 tablet   Take 1 tablet by mouth daily   Quantity:  30 tablet   Refills:  0       clopidogrel 75 MG tablet   Commonly known as:  PLAVIX        Dose:  75 mg   Take 75 mg by mouth daily   Refills:  0       KEPPRA 500 MG tablet   Generic drug:  levETIRAcetam        Dose:  500 mg   Take 500 mg by mouth 2 times daily   Refills:  0       LIPITOR 40 MG tablet   Generic drug:  atorvastatin        Dose:  40 mg    Take 40 mg by mouth At Bedtime   Refills:  0       mirtazapine 15 MG tablet   Commonly known as:  REMERON        Dose:  15 mg   Take 15 mg by mouth At Bedtime   Refills:  0       OXcarbazepine 150 MG tablet   Commonly known as:  TRILEPTAL   Used for:  History of stroke        Dose:  150 mg   Take 1 tablet (150 mg) by mouth 2 times daily   Refills:  0       pantoprazole 40 MG EC tablet   Commonly known as:  PROTONIX   Used for:  Gastroesophageal reflux disease, esophagitis presence not specified        TAKE 1 TABLET BY MOUTH EVERY EVENING   Quantity:  90 tablet   Refills:  2       thiamine 100 MG tablet        Dose:  100 mg   Take 100 mg by mouth daily   Refills:  0         STOP taking     amLODIPine 10 MG tablet   Commonly known as:  NORVASC           potassium chloride SA 10 MEQ CR tablet   Commonly known as:  K-DUR/KLOR-CON M                Where to get your medicines      Some of these will need a paper prescription and others can be bought over the counter. Ask your nurse if you have questions.     You don't need a prescription for these medications     hydrALAZINE 50 MG tablet    insulin aspart 100 UNIT/ML injection    insulin glargine 100 UNIT/ML injection    isosorbide mononitrate 30 MG 24 hr tablet    spironolactone 25 MG tablet    torsemide 10 MG tablet                Protect others around you: Learn how to safely use, store and throw away your medicines at www.disposemymeds.org.             Medication List: This is a list of all your medications and when to take them. Check marks below indicate your daily home schedule. Keep this list as a reference.      Medications           Morning Afternoon Evening Bedtime As Needed    acetaminophen 325 MG tablet   Commonly known as:  TYLENOL   Take 650 mg by mouth every 6 hours as needed for mild pain   Last time this was given:  650 mg on 12/18/2017  9:33 AM                                blood glucose monitoring test strip   Commonly known as:  no brand specified    Use to test blood sugar 4 times daily or as directed.                                Calcium Carb-Cholecalciferol 600-800 MG-UNIT Tabs   Take 1 tablet by mouth 3 times daily (with meals)                                carvedilol 25 MG tablet   Commonly known as:  COREG   Take 0.5 tablets (12.5 mg) by mouth every morning   Last time this was given:  12.5 mg on 12/19/2017  9:19 AM                                CENTRUM SILVER per tablet   Take 1 tablet by mouth daily                                clopidogrel 75 MG tablet   Commonly known as:  PLAVIX   Take 75 mg by mouth daily   Last time this was given:  75 mg on 12/19/2017  9:18 AM                                hydrALAZINE 50 MG tablet   Commonly known as:  APRESOLINE   Take 0.5 tablets (25 mg) by mouth 2 times daily   Last time this was given:  25 mg on 12/19/2017  2:11 PM                                insulin aspart 100 UNIT/ML injection   Commonly known as:  NovoLOG PEN   Inject 5 Units Subcutaneous 3 times daily (with meals)   Last time this was given:  1 Units on 12/19/2017 12:23 PM                                insulin glargine 100 UNIT/ML injection   Commonly known as:  LANTUS SOLOSTAR   Inject 6 Units Subcutaneous At Bedtime   Last time this was given:  6 Units on 12/18/2017  9:14 PM                                isosorbide mononitrate 30 MG 24 hr tablet   Commonly known as:  IMDUR   Take 3 tablets (90 mg) by mouth daily   Last time this was given:  90 mg on 12/19/2017  9:19 AM                                KEPPRA 500 MG tablet   Take 500 mg by mouth 2 times daily   Last time this was given:  500 mg on 12/14/2017 11:08 AM   Generic drug:  levETIRAcetam                                LIPITOR 40 MG tablet   Take 40 mg by mouth At Bedtime   Last time this was given:  40 mg on 12/18/2017  9:13 PM   Generic drug:  atorvastatin                                mirtazapine 15 MG tablet   Commonly known as:  REMERON   Take 15 mg by mouth At Bedtime                                 OXcarbazepine 150 MG tablet   Commonly known as:  TRILEPTAL   Take 1 tablet (150 mg) by mouth 2 times daily   Last time this was given:  150 mg on 12/19/2017  9:19 AM                                pantoprazole 40 MG EC tablet   Commonly known as:  PROTONIX   TAKE 1 TABLET BY MOUTH EVERY EVENING   Last time this was given:  40 mg on 12/18/2017  8:04 PM                                spironolactone 25 MG tablet   Commonly known as:  ALDACTONE   Take 1 tablet (25 mg) by mouth daily                                thiamine 100 MG tablet   Take 100 mg by mouth daily                                torsemide 10 MG tablet   Commonly known as:  DEMADEX   Take 2 tablets (20 mg) by mouth daily   Last time this was given:  20 mg on 12/19/2017  9:19 AM

## 2017-12-12 NOTE — IP AVS SNAPSHOT
[facility] [address] [facility]    [address] [place] [address]    Phone:  [phone]                                       After Visit Summary   12/12/2017    Kelechi Coleman    MRN: [id number]           After Visit Summary Signature Page     I have received my discharge instructions, and my questions have been answered. I have discussed any challenges I see with this plan with the nurse or doctor.    ..........................................................................................................................................  Patient/Patient Representative Signature      ..........................................................................................................................................  Patient Representative Print Name and Relationship to Patient    ..................................................               ................................................  Date                                            Time    ..........................................................................................................................................  Reviewed by Signature/Title    ...................................................              ..............................................  Date                                                            Time

## 2017-12-12 NOTE — IP AVS SNAPSHOT
[place] [address] [facility] UNIT: [phone]                                              INTERAGENCY TRANSFER FORM - PHYSICIAN ORDERS   12/12/2017                   CHF Orders/Instructions for Nursing Home/TCU     CHF Orders and Instructions for Nursing Home/TCU  1.  Have the patient get a scale to put in their room and then use at home.  2.  Post a weight chart or calendar in room next to the scale.  3.  Ask patient to weigh themselves daily first thing when they get up in the morning, before breakfast, with only their night gown on and record on the chart/calendar (if able).  4.  Record NH/TCU admission weight.  5.  Record daily am weight, with same clothes every day. If patient is in a wheelchair, note weight of wheel chair.   6.  Provide patient CHF education booklet and review with patient and family.  7.  Vital signs twice daily and prn. Check oxygen sats prn dyspnea.  8.  Apply Oxygen 2 or 3 liters/min by nasal cannula prn O2 Sat < 90%.   9.  Notify NP/MD:   1. If HR <50 bpm, SBP <90mmHg, or O2 Sat < 90%.   2. If weight is up more than 2 lbs. in one day or 5 lbs. in one week from their admission weight OR if patient has signs or symptoms of CHF, such as worsening dyspnea or leg edema.  10.  ACE-wraps or support stockings (knee high) to bilateral lower extremities prn edema. On in am and off at HS.   11.  Diet: 2 gm sodium cardiac diet, with additional diet modifications if ordered elsewhere.  12.  Fluid restriction:  1500cc/day, if hospital discharge sodium < 134.  13.  Dietician: CHF education  14.  PT/OT to Evaluate and Treat for deconditioning and CHF.  12.  Activity as tolerated   13.  PT to notify RN if wheelchair equipment has been modified (change in weight should also be noted on the patients weight calendar).    Heart Failure Follow-up Appointments and Instructions for TCU Discharge   _____An appointment to enroll the patient into [facility] may already have been made (see  "section  Your next appointments already scheduled ).  If there is a question about the appointment or you would like to speak with a C.O.R.E. nurse, please call one of the following locations:     [facility]):                                                    [phone]    [facility] ([place]):                                                   [phone]    [facility] ([place]):                   [phone]    _____If no C.O.R.E. appointment was made, please call one of the locations and schedule:    NP/PA appointment 14 days after hospital discharge if still in TCU    NP/PA appointment for 3-5 days after discharge from TCU    _____Have patient bring their med list and daily vitals/weight chart with them to appointment.    _____At discharge from the TCU give the patient the \"General Recommendations to Control Heart Failure When You Get Home  information for them to take home and their scale.  _____Upon discharge from the TCU reinforce the importance of home management of CHF including follow up in [facility].  What is the [facility] Clinic?  Cardiomyopathy  Optimization  Rehabilitation  Education  The [facility] Clinic is a heart failure specialty clinic within [facility].  It is an outpatient disease management program that is based on a phase-by-phase approach, which is tailored to each patient s individual needs.  The cardiologist, nurse practitioner, physician assistant and nurses provide an ongoing outpatient care and treatment plan that guides heart failure and cardiomyopathy patients from evaluation and education to stabilization. This team works with the current primary care doctor and cardiologist to help patients:    Avoid hospitalizations    Slow the progression of the disease    Improve length and quality of life    Know who and when to call if heart failure symptoms appear    Receive easy access to quality health " "care and advice    Better understand your condition and treatment    Decrease the tremendous cost burden of heart failure care    Detect future heart problems before they become life threatening         Hospital Admission Date: 2017  MARLA VÁZQUEZ   : 1935  Sex: Male        Attending Provider: Yovana Rothman MD     Allergies:  Aspirin, Penicillins, Contrast Dye    Infection:  None   Service:  HOSPITALIST    Ht:  1.88 m (6' 2\")   Wt:  89.9 kg (198 lb 3.1 oz)   Admission Wt:  87.1 kg (192 lb)    BMI:  25.45 kg/m 2   BSA:  2.17 m 2            Patient PCP Information     Provider PCP Type    Laquita Deal MD General      ED Clinical Impression     Diagnosis Description Comment Added By Time Added    , initial encounter [W19.XXXA] , initial encounter [W19.XXXA]  Alfreda Boss MD 2017  5:45 PM    Laceration of scalp, initial encounter [S01.01XA] Laceration of scalp, initial encounter [S01.01XA]  Alfreda Boss MD 2017  5:45 PM    Contusion of face, scalp and neck, initial encounter [S00.83XA, S00.03XA, S10.93XA] Contusion of face, scalp and neck, initial encounter [S00.83XA, S00.03XA, S10.93XA]  Alfreda Boss MD 2017  5:45 PM    Anemia, unspecified type [D64.9] Anemia, unspecified type [D64.9]  Alfreda Boss MD 2017  5:45 PM    Troponin level elevated [R74.8] Troponin level elevated [R74.8]  Alfreda Boss MD 2017  5:46 PM    Other specified hypotension [I95.89] Other specified hypotension [I95.89]  Alfreda Boss MD 2017  5:46 PM    Chronic atrial fibrillation (H) [I48.2] Chronic atrial fibrillation (H) [I48.2]  Alfreda Boss MD 2017  5:47 PM      Hospital Problems as of 2017              Priority Class Noted POA    NSTEMI (non-ST elevated myocardial infarction) (H) Medium  2017 Yes      Non-Hospital Problems as of 2017              Priority Class Noted    Atherosclerosis of native coronary artery of native heart without angina " pectoris High  12/13/2013    Paroxysmal atrial fibrillation (H) Medium  12/13/2013    Peripheral artery disease (H) Medium  12/13/2013    Hyperlipidemia LDL goal <70 Medium  12/13/2013    Benign essential hypertension Medium  12/13/2013    Paroxysmal ventricular tachycardia (H) Medium  12/13/2013    Cerebral infarction (H) Medium  12/13/2013    Acute exacerbation of CHF (congestive heart failure) (H) Medium  9/2/2016    Stroke (cerebrum) (H) Medium  9/16/2016    Type 2 diabetes mellitus with stage 3 chronic kidney disease, with long-term current use of insulin (H) Medium  11/4/2016    Chronic systolic heart failure (H) Medium  1/30/2017    Gastroesophageal reflux disease with esophagitis Medium  5/8/2017    Fall Medium  8/18/2017    Seizures (H) Medium  8/20/2017      Code Status History     Date Active Date Inactive Code Status Order ID Comments User Context    12/19/2017 12:26 PM  DNR/DNI [id number]  Brandon Deras MD Outpatient    12/14/2017  2:05 PM 12/19/2017 12:26 PM DNR/DNI [id number]  Boaz Champion MD Inpatient    12/12/2017  9:18 PM 12/14/2017  2:05 PM DNR [id number]  Alonso Mustafa APRN CNP Inpatient    12/12/2017  9:05 PM 12/12/2017  9:18 PM Full Code [id number]  Alonso Mustafa APRN [facility] Inpatient    8/23/2017  1:50 PM 12/12/2017  9:05 PM Full Code [id number]  Jarad Ga PA-C Outpatient    8/23/2017  9:53 AM 8/23/2017  1:50 PM Full Code [id number]  Rosario Martinez APRN [facility] Inpatient    9/22/2016 10:37 AM 8/23/2017  9:53 AM Full Code [id number]  Lul Boss MD Outpatient    9/19/2016  9:36 AM 9/22/2016 10:37 AM Full Code [id number]  Fracisco Hernandez MD Inpatient    9/18/2016  3:28 PM 9/19/2016  9:36 AM Full Code [id number]  Lul Boss MD Outpatient    9/16/2016  2:49 PM 9/18/2016  3:28 PM Full Code [id number]  Ned Wood DO Inpatient    9/8/2016  4:42 PM 9/16/2016  2:49 PM Full Code [id number]  Samson Nelson MD Outpatient    9/2/2016 12:02 PM 9/8/2016  4:42  PM Full Code [id number]  Liana Thorpe MD Inpatient         Medication Review      CONTINUE these medications which may have CHANGED, or have new prescriptions. If we are uncertain of the size of tablets/capsules you have at home, strength may be listed as something that might have changed.        Dose / Directions Comments    hydrALAZINE 50 MG tablet   Commonly known as:  APRESOLINE   This may have changed:  how much to take   Used for:  Essential hypertension with goal blood pressure less than 130/80        Dose:  25 mg   Take 0.5 tablets (25 mg) by mouth 2 times daily   Quantity:  60 tablet   Refills:  0    Hold for SBP<100.       insulin aspart 100 UNIT/ML injection   Commonly known as:  NovoLOG PEN   This may have changed:  how much to take   Used for:  Type 2 diabetes mellitus with stage 3 chronic kidney disease, with long-term current use of insulin (H)        Dose:  5 Units   Inject 5 Units Subcutaneous 3 times daily (with meals)   Refills:  0        insulin glargine 100 UNIT/ML injection   Commonly known as:  LANTUS SOLOSTAR   This may have changed:  how much to take   Used for:  Type 2 diabetes mellitus with stage 3 chronic kidney disease, with long-term current use of insulin (H)        Dose:  6 Units   Inject 6 Units Subcutaneous At Bedtime   Quantity:  30 mL   Refills:  0        isosorbide mononitrate 30 MG 24 hr tablet   Commonly known as:  IMDUR   This may have changed:    - medication strength  - how much to take   Used for:  Coronary artery disease involving native coronary artery of native heart without angina pectoris        Dose:  90 mg   Take 3 tablets (90 mg) by mouth daily   Refills:  0    Hold for SBP<100.       spironolactone 25 MG tablet   Commonly known as:  ALDACTONE   This may have changed:    - medication strength  - how much to take   Used for:  Chronic systolic congestive heart failure (H)        Dose:  25 mg   Take 1 tablet (25 mg) by mouth daily   Refills:  0        torsemide 10  MG tablet   Commonly known as:  DEMADEX   This may have changed:  how much to take   Used for:  Chronic systolic congestive heart failure (H)        Dose:  20 mg   Take 2 tablets (20 mg) by mouth daily   Refills:  0          CONTINUE these medications which have NOT CHANGED        Dose / Directions Comments    acetaminophen 325 MG tablet   Commonly known as:  TYLENOL        Dose:  650 mg   Take 650 mg by mouth every 6 hours as needed for mild pain   Refills:  0        blood glucose monitoring test strip   Commonly known as:  no brand specified        Use to test blood sugar 4 times daily or as directed.   Refills:  0        Calcium Carb-Cholecalciferol 600-800 MG-UNIT Tabs        Dose:  1 tablet   Take 1 tablet by mouth 3 times daily (with meals)   Refills:  0        carvedilol 25 MG tablet   Commonly known as:  COREG   Used for:  Essential hypertension with goal blood pressure less than 130/80        Dose:  12.5 mg   Take 0.5 tablets (12.5 mg) by mouth every morning   Quantity:  180 tablet   Refills:  3        CENTRUM SILVER per tablet   Used for:  Vitamin deficiency        Dose:  1 tablet   Take 1 tablet by mouth daily   Quantity:  30 tablet   Refills:  0        clopidogrel 75 MG tablet   Commonly known as:  PLAVIX        Dose:  75 mg   Take 75 mg by mouth daily   Refills:  0        KEPPRA 500 MG tablet   Generic drug:  levETIRAcetam        Dose:  500 mg   Take 500 mg by mouth 2 times daily   Refills:  0        LIPITOR 40 MG tablet   Generic drug:  atorvastatin        Dose:  40 mg   Take 40 mg by mouth At Bedtime   Refills:  0        mirtazapine 15 MG tablet   Commonly known as:  REMERON        Dose:  15 mg   Take 15 mg by mouth At Bedtime   Refills:  0        OXcarbazepine 150 MG tablet   Commonly known as:  TRILEPTAL   Used for:  History of stroke        Dose:  150 mg   Take 1 tablet (150 mg) by mouth 2 times daily   Refills:  0        pantoprazole 40 MG EC tablet   Commonly known as:  PROTONIX   Used for:   Gastroesophageal reflux disease, esophagitis presence not specified        TAKE 1 TABLET BY MOUTH EVERY EVENING   Quantity:  90 tablet   Refills:  2        thiamine 100 MG tablet        Dose:  100 mg   Take 100 mg by mouth daily   Refills:  0          STOP taking     amLODIPine 10 MG tablet   Commonly known as:  NORVASC           potassium chloride SA 10 MEQ CR tablet   Commonly known as:  K-DUR/KLOR-CON M                   After Care     Activity - Up with nursing assistance           Advance Diet as Tolerated       Follow this diet upon discharge: Orders Placed This Encounter      Room Service      Snacks/Supplements Adult: Other - Please comment; Vanilla Plus2 Shake; Between Meals      Combination Diet 8539-6602 Calories: Moderate Consistent CHO (4-6 CHO units/meal); Dysphagia Diet Level 3: Advanced; Thin Liquids (water, ice chips, juice, milk gelatin, ice cream, etc); 2 gm NA Diet; Low Fat Diet         General info for SNF       Length of Stay Estimate: Short Term Care: Estimated # of Days <30  Condition at Discharge: Improving  Level of care:skilled   Rehabilitation Potential: Good  Admission H&P remains valid and up-to-date: Yes  Recent Chemotherapy: N/A  Use Nursing Home Standing Orders: Yes       Glucose monitor nursing POCT       Before meals and at bedtime       Intake and output       Every shift       Mantoux instructions       Give two-step Mantoux (PPD) Per Facility Policy Yes             Referrals     Follow-Up with Cardiac Advanced Practice Provider           Occupational Therapy Adult Consult       Evaluate and treat as clinically indicated.    Reason:  Weakness/deconditioning       Physical Therapy Adult Consult       Evaluate and treat as clinically indicated.    Reason:  weakness/deconditioning       Speech Language Path Adult Consult       Evaluate and treat as clinically indicated.    Reason:  Dysphagia             Lab Orders     Basic metabolic panel                 Your next 10 appointments  already scheduled     Dec 19, 2017  3:30 PM CST   Inpatient Meal Follow Up Therapy with Bernadette De Oliveira, SLP   [facility] ([facility])    [address]  [facility] [address] [phone]            Dec 21, 2017 12:00 PM CST   Nursing Home with Laquita Deal MD   [facility] ([facility])    [address]  [facility] [address] [phone]            Jan 09, 2018 11:30 AM CST   (Arrive by 11:15 AM)   Return Seizure with Mio Smith MD   [facility] ([facility])    [address]  [address]  [facility] [address] [phone]            Feb 09, 2018 11:30 AM CST   Office Visit with Etienne Gee MD   [facility] ([facility])    [address] [facility] [address] [phone]           Bring a current list of meds and any records pertaining to this visit. For Physicals, please bring immunization records and any forms needing to be filled out. Please arrive 10 minutes early to complete paperwork.              Follow-Up Appointment Instructions     Future Labs/Procedures    Follow Up and recommended labs and tests     Comments:    Follow up with [facility] physician.  The following labs/tests are recommended: BMP 12/22.  Follow up with primary care provider after TCU discharge.  Follow up with [facility] NP 1-2 weeks with BMP.  Consider removing scalp staples 12/22 at earliest.      Follow-Up Appointment Instructions     Follow Up and recommended labs and tests       Follow up with [facility] physician.  The following labs/tests are recommended: BMP 12/22.  Follow up with primary care provider after TCU discharge.  Follow up with [facility] Cardiology NP 1-2 weeks with BMP.  Consider removing scalp staples 12/22 at earliest.             Statement of Approval     Ordered          12/19/17 1238  I have reviewed and agree with all the  recommendations and orders detailed in this document.  EFFECTIVE NOW     Approved and electronically signed by:  Brandon Deras MD               General Recommendations To Control Heart Failure When You Get Home (Give at DC from U)       Heart Failure Instructions for Patients and Families: Please read and check off each of these important instructions as you do them when you get home.          Weight and Symptoms       ___ Put a scale in your bathroom.    ___ Post a weight chart or calendar next to your scale.    ___ Weigh yourself everyday as soon as you get up in the morning (before breakfast).  You should only be wearing your pajamas.  Write your weight on the chart/calendar.  ___ Bring your weight chart/calendar with you to all appointments.  ___ Call your doctor or nurse practitioner if you gain 2 pounds (in 1 day) or 5 pounds in (1 week) from your goal  good  weight.  Your good weight is also called your  dry  weight.  Your doctor or nurse will tell you what your good weight should be.    ___ Call your doctor or nurse practitioner if you have shortness of breath that gets worse over time, leg swelling or fatigue.       Medications and Diet       ___ Make sure to take your medication as prescribed.    ___ Bring a current list of your medication and all of your medicine bottles with you to all appointments.    ___ Limit fluids if you still have swelling or shortness of breath, or if your doctor tells you to do so.    ___ Eat less than 2000 mg of sodium (salt) every day. Read food labels, and do not add salt to meals. Remember, if you eat less salt you retain less fluid.  ___ Follow a heart healthy diet that is low in saturated fat.        Activity and Suggested Lifestyle Changes      ___ Stay active. Talk to your doctor about an exercise program that is safe for your heart.  ___ Stop smoking. Reduce alcohol use.      ___ Lose weight if you are overweight. Extra weight puts a lot of stress on the heart.                  Control for Leg Swelling     ___ Keep your legs elevated (raised) as needed for swelling. If swelling is uncomfortable or elevation doesn t help, ask your doctor about using ACE wraps or support stockings.        What is the [facility] Clinic?  Cardiomyopathy  Optimization  Rehabilitation  Education    The [facility] Clinic is a heart failure specialty clinic within [facility].  It is an outpatient disease management program that is based on a phase-by-phase approach, which is tailored to each patient s individual needs.  The cardiologist, nurse practitioner, physician assistant and nurses provide an ongoing outpatient care and treatment plan that guides heart failure and cardiomyopathy patients from evaluation and education to stabilization. This team works with your current primary care doctor and cardiologist to help you:    - Avoid hospitalizations  - Slow the progression of the disease  - Improve length and quality of life  - Know who and when to call if heart failure symptoms appear  - Receive easy access to quality health care and advice  - Better understand your condition and treatment  - Decrease the tremendous cost burden of heart failure care  - Detect future heart problems before they become life threatening                                 Follow-up Appointments     ___ An appointment with the [facility] may already have been made for you (see section   Your next appointments already scheduled ).  If you have a question about your appointment, would like to speak with a C.O.R.E. nurse, or would like to become a [facility] Clinic patient, please call one of the following locations:     - [facility]):                                             [phone]  - [facility]):                                            [phone]  - [facility] ([place]):                 [phone]      ___ Your  [facility] Clinic Team will continue to educate you on your heart failure and may adjust medications based on your vital signs, lab work, and how you are feeling.  Therefore, it is very important to bring the following to all C.O.R.E. appointments:    - An accurate list of your medications  - Your medicine bottles  - Your weight chart/calendar

## 2017-12-13 NOTE — PROGRESS NOTES
[facility]    Hospitalist Progress Note    Date of Service (when I saw the patient): 12/13/2017    Assessment & Plan   Kelechi Coleman is a 82 year old male who was admitted on 12/12/2017 after a fall.    1. Acute on chronic heart failure exacerbation - Continue torsemide 20 mg daily.  Continue Coreg and Imdur.   Discontinue amlodipine and reduce hydralazine to 25 mg tid.  Cardiology consult appreciated.  Echo pending.    2. NSTEMI, type 2 - Echo pending.  Likely due to volume depletion.  Continue plavix.    3. Fall with occipital laceration - Staples to be removed in 10 days from 12/12.  PT/OT consult for physical deconditioning.    4. DM2 - Lantus on hold, continue ISS    5. Seizures - Continue Keppra and Trileptal.    6. GERD - Continue protonix.    7. JUVENTINO - Spironolactone on hold.  Cr 1.57 on admission, improved to 1.44 today after 1 L NS bolus.  Baseline Cr 1.2-1.3.    8. HL - Continue atorvastatin.    9. Malnutrition - Consult Nutrition.    DVT Prophylaxis: Pneumatic Compression Devices  Code Status: DNR    Disposition: Expected discharge in 1-3 days.    Boaz Champion  Text Page (7 am to 6 pm)    Interval History   The patient is resting in bed.  He is frustrated and embarrassed about his fall.    -Data reviewed today: I reviewed all new labs and imaging results over the last 24 hours. I personally reviewed no images or EKG's today.    Physical Exam   Temp: 97.6  F (36.4  C) Temp src: Axillary BP: 101/70 Pulse: 63 Heart Rate: 60 Resp: 18 SpO2: 95 % O2 Device: None (Room air)    Vitals:    12/12/17 1347 12/12/17 2100 12/13/17 0500   Weight: 87.1 kg (192 lb) 89.2 kg (196 lb 9.6 oz) 88.2 kg (194 lb 7.1 oz)     Vital Signs with Ranges  Temp:  [97.4  F (36.3  C)-97.8  F (36.6  C)] 97.6  F (36.4  C)  Pulse:  [63-66] 63  Heart Rate:  [51-60] 60  Resp:  [10-43] 18  BP: ()/(37-82) 101/70  SpO2:  [93 %-98 %] 95 %  I/O last 3 completed shifts:  In: 746 [P.O.:740; I.V.:6]  Out: 375  [Urine:375]    Gen: Debilitated deconditioned, thin elderly male, no acute distressed  HEENT: Normocephalic, stellate laceration occiput.  Lungs: Clear to ausculation without wheezes, rhonchi, or rales  Heart: Regular rate and rhythm, no gallops or rubs, 2/6 NENA  GI: Bowel sound normal, no hepatosplenomegaly or masses  Lymph: No lymphadenopathy, 3 + BLE edema to sacrum  Skin: No rashes     Medications        torsemide  20 mg Oral Daily     hydrALAZINE  25 mg Oral Q8H MAR     atorvastatin  40 mg Oral At Bedtime     carvedilol  12.5 mg Oral QAM     isosorbide mononitrate  120 mg Oral Daily     levETIRAcetam  500 mg Oral BID     OXcarbazepine  150 mg Oral BID     pantoprazole  40 mg Oral QPM     clopidogrel  75 mg Oral Daily     sodium chloride (PF)  3 mL Intracatheter Q8H     insulin aspart  1-7 Units Subcutaneous TID AC     insulin aspart  1-5 Units Subcutaneous At Bedtime       Data     Recent Labs  Lab 12/13/17  0548 12/13/17  0200 12/12/17  2130 12/12/17  1731 12/12/17  1455  12/12/17  1400   WBC 5.9  --   --   --   --   --  7.3   HGB 9.6*  --   --  9.8* 9.5*  --  10.0*   MCV 88  --   --   --   --   --  89   *  --   --   --   --   --  148*   INR  --   --   --   --   --   --  1.32*     --   --   --   --   --  141   POTASSIUM 3.8  --   --   --   --   --  3.9   CHLORIDE 106  --   --   --   --   --  106   CO2 25  --   --   --   --   --  26   BUN 39*  --   --   --   --   --  39*   CR 1.44*  --   --   --   --   --  1.57*   ANIONGAP 10  --   --   --   --   --  9   MIGUEL 8.4*  --   --   --   --   --  8.7   GLC 85  --   --   --   --   --  131*   ALBUMIN  --   --   --   --   --   --  2.8*   PROTTOTAL  --   --   --   --   --   --  6.6*   BILITOTAL  --   --   --   --   --   --  0.8   ALKPHOS  --   --   --   --   --   --  221*   ALT  --   --   --   --   --   --  20   AST  --   --   --   --   --   --  21   TROPI 0.714* 0.714* 0.709* 0.701*  --   < > 0.629*   < > = values in this interval not displayed.    Recent  Results (from the past 24 hour(s))   Head CT w/o contrast    Narrative    CT OF THE HEAD WITHOUT CONTRAST  12/12/2017 2:15 PM     COMPARISON: Head CT 9/22/2017.    HISTORY: Fall head injury.    TECHNIQUE: 5 mm thick axial CT images of the head were acquired  without IV contrast material.    FINDINGS: A tiny chronic right basal ganglia infarct is again noted  without change. A small chronic infarct at the high medial  frontoparietal junction on the left is again noted without change.  There is mild diffuse cerebral volume loss. There are subtle patchy  areas of decreased density in the cerebral white matter bilaterally  that are consistent with sequela of chronic small vessel ischemic  disease.     The ventricles and basal cisterns are within normal limits in  configuration given the degree of cerebral volume loss.  There is no  midline shift. There are no extra-axial fluid collections.     No intracranial hemorrhage, mass or recent infarct.    The visualized paranasal sinuses are well aerated. There is no  mastoiditis. There are no fractures of the visualized bones.       Impression    IMPRESSION: Small chronic right basal ganglia and high left  frontoparietal infarcts again noted without change. No new infarcts.  Diffuse cerebral volume loss and cerebral white matter changes  consistent with chronic small vessel ischemic disease. No evidence for  acute intracranial pathology.     Radiation dose for this scan was reduced using automated exposure  control, adjustment of the mA and/or kV according to patient size, or  iterative reconstruction technique.    LUÍS BUCKNER MD

## 2017-12-13 NOTE — PLAN OF CARE
Problem: Patient Care Overview  Goal: Plan of Care/Patient Progress Review  PT: Orders received and chart reviewed. Attempted to see for PT eval, pt just starting with lunch. Will attempt to check back as schedule allows or r/s for 12/14

## 2017-12-13 NOTE — H&P
"Physician Attestation   I, Yovana Rothman, saw and evaluated Kelechi Coleman as part of a shared visit.  I have reviewed and discussed with the advanced practice provider their history, physical and plan.    I personally reviewed the vital signs, medications, labs and imaging.    My key history or physical exam findings: /64  Pulse 66  Temp 97.4  F (36.3  C) (Temporal)  Resp 18  Ht 1.88 m (6' 2\")  Wt 89.2 kg (196 lb 9.6 oz)  SpO2 97%  BMI 25.24 kg/m2  Clinically dehydrated, had laceration noted    Key management decisions made by me: Non-ST elevation MI.  Favor avoiding aggressive IV fluid rehydration given his heart failure.  Difficult choice on anticoagulation given his recent subdural hematomas and subarachnoid hemorrhage and subacute rehab stay for which his Coumadin was on hold.  He remains on Plavix and now has a new head injury.  Cardiology consultation recommendations appreciated.    Yovana Rothman MD  Date of Service (when I saw the patient): 12/12/17    "

## 2017-12-13 NOTE — H&P
DATE OF SERVICE: 12/12/2017    PRIMARY CARE PROVIDER:  Laquita Deal MD      CHIEF COMPLAINT:  Fall.      HISTORY OF PRESENT ILLNESS:  Mr. Kelechi Coleman is an 82-year-old male with an extensive past medical history pertinent for recent subdural hematoma and subarachnoid hematoma in 08/2017 with subsequent seizures, chronic kidney disease stage III, heart failure with reduced ejection fraction of 40-45%, peripheral arterial disease, paroxysmal atrial fibrillation on chronic anticoagulation with Plavix, diabetes mellitus type 2, on longstanding insulin, essential hypertension, hyperlipidemia, multiple cerebrovascular accidents, most recent in 09/2016, coronary artery disease, status post CABG x4 and drug-eluting stent x2, most recently in 2016, gastroesophageal reflux disease with esophagitis, renal artery stenosis, and paroxysmal ventricular tachycardia, who presents today from [facility], status post fall.  The patient reports that while at the nursing facility, he was sitting in his lift chair and needed to utilize the urinal.  The patient states that the urinal was approximately 10 feet away, and he was able to independently ambulate to utilize the urinal.  While utilizing the urinal, the patient stated, he lost his balance and fell backward and hit his head on either the ground or the wall.  He confirms he did not have loss of consciousness.  At that time, EMS was notified, and the patient was brought to the emergency department with a stellate laceration.  Of note, the patient is on chronic anticoagulation and had received his dose of Plavix today.      Presently, the patient is seen in the emergency department with his friend at the bedside.  While in the emergency department, the patient received a 1 liter fluid bolus for an elevated lactate of 2.2.  Of note, the patient's troponin was noted to be 0.701 and creatinine was 1.57.  The patient's subsequent hemoglobin has remained stable at 9.8,  "and was 10.0 on admission.  The patient reports that over the past few weeks, he has gained \"25-30 pounds\" of fluid weight.  The patient reports that his diuretic dose was increased by cardiology in the past approximately 1 month.  Currently, the patient reports no chest pain, shortness of breath, nausea, vomiting or diarrhea.  The patient reports intermittent dyspnea on exertion and at rest, frequent urination, and resolving constipation.  The patient reports no recent fevers, chills, or upper respiratory illness-like symptoms.  Of note, the patient has been in a rehabilitation TCU since his discharge in 08/2017.      PAST MEDICAL HISTORY:   1.  Mitral valve and tricuspid valve, mild to moderate regurgitation.   2.  Recent subdural hematoma and subarachnoid hematoma in 08/2017.   3.  Seizures since 08/2017.   4.  Chronic kidney disease, stage III.   5.  Chronic systolic heart failure, ejection fraction 40-45%.   6.  Peripheral arterial disease.   7.  Chronic atrial fibrillation, on anticoagulation with Plavix.   8.  Diabetes mellitus type 2, on longstanding insulin, recent hemoglobin A1c 7.4.   9.  Essential hypertension.   10.  Hyperlipidemia.   11.  Multiple cerebrovascular accidents, most recently in 09/2017, on chronic anticoagulation with Plavix.   12.  Coronary artery disease, status post coronary artery bypass grafting x4 and drug-eluting stents x2.   13.  Gastroesophageal reflux disease with esophagitis.   14.  Renal artery stenosis.   15.  Paroxysmal ventricular tachycardia.   16.  Multiple myocardial infarctions.      PAST SURGICAL HISTORY:   1.  Four-vessel CABG in 2002.   2.  PCI with drug-eluting stent x2 in 2016.   3.  Vocal cord lesion removal.   4.  Angioplasty for renal artery stenosis.   5.  Left intraocular lens implant.   6.  Angioplasty for left lower extremity peripheral vascular disease.      SOCIAL HISTORY:   1.  Tobacco:  Quit smoking a pipe approximately 18 years ago.   2.  Alcohol:  The " patient stated that he has had two beers since his recent hospital admission in 08/2017.  Prior to that, the patient was drinking alcohol daily.   3.  Illicit drugs:  None.   4.  Currently resides [facility].  Prior to recent subdural hematoma and subarachnoid hematoma in 08/2017, the patient was residing independently.      FAMILY HISTORY:  Mother with diabetes mellitus.      ALLERGIES:  Aspirin, penicillin, contrast dye.      MEDICATIONS:    Prior to Admission medications    Medication Sig Last Dose Taking? Auth Provider   acetaminophen (TYLENOL) 325 MG tablet Take 650 mg by mouth every 6 hours as needed for mild pain 12/4/2017 at 1813 Yes Reported, Patient   atorvastatin (LIPITOR) 40 MG tablet Take 40 mg by mouth At Bedtime 12/11/2017 at 2000 Yes Reported, Patient   Calcium Carb-Cholecalciferol 600-800 MG-UNIT TABS Take 1 tablet by mouth 3 times daily (with meals)  12/12/2017 at 0800 Yes Reported, Patient   levETIRAcetam (KEPPRA) 500 MG tablet Take 500 mg by mouth 2 times daily 12/12/2017 at 0800 Yes Reported, Patient   clopidogrel (PLAVIX) 75 MG tablet Take 75 mg by mouth daily 12/12/2017 at 0800 Yes Reported, Patient   potassium chloride SA (K-DUR/KLOR-CON M) 10 MEQ CR tablet Take 10 mEq by mouth daily 12/12/2017 at 0800 Yes Reported, Patient   mirtazapine (REMERON) 15 MG tablet Take 15 mg by mouth At Bedtime 12/11/2017 at 2000 Yes Reported, Patient   torsemide (DEMADEX) 10 MG tablet Take 30 mg by mouth daily 12/12/2017 at 0800 Yes Reported, Patient   insulin aspart (NOVOLOG PEN) 100 UNIT/ML injection Inject 8 Units Subcutaneous 3 times daily (with meals)  12/12/2017 at am Yes Reported, Patient   thiamine 100 MG tablet Take 100 mg by mouth daily 12/12/2017 at 0800 Yes Reported, Patient   hydrALAZINE (APRESOLINE) 50 MG tablet Take 1 tablet (50 mg) by mouth 2 times daily 12/12/2017 at 0800 Yes Her-Jarad Perez PA-C   spironolactone (ALDACTONE) 50 MG tablet Take 1 tablet (50 mg) by mouth daily  12/12/2017 at 0800 Yes Jarad Ga PA-C   pantoprazole (PROTONIX) 40 MG EC tablet TAKE 1 TABLET BY MOUTH EVERY EVENING 12/11/2017 at 1700 Yes Jarad Ga PA-C   Multiple Vitamins-Minerals (CENTRUM SILVER) per tablet Take 1 tablet by mouth daily 12/12/2017 at 0800 Yes Jarad Ga PA-C   OXcarbazepine (TRILEPTAL) 150 MG tablet Take 1 tablet (150 mg) by mouth 2 times daily 12/12/2017 at 0800 Yes Jarad Ga PA-C   insulin glargine (LANTUS SOLOSTAR) 100 UNIT/ML injection Inject 10 Units Subcutaneous At Bedtime 12/11/2017 at 2000 Yes Jarad Ga PA-C   carvedilol (COREG) 25 MG tablet Take 0.5 tablets (12.5 mg) by mouth every morning 12/12/2017 at 0800 Yes Etienne Gee MD   amLODIPine (NORVASC) 10 MG tablet Take 1 tablet (10 mg) by mouth daily 12/12/2017 at 0800 Yes Isaiah Charles MD   isosorbide mononitrate (IMDUR) 60 MG 24 hr tablet Take 2 tablets (120 mg) by mouth daily 12/12/2017 at 0800 Yes Isaiah Charles MD   blood glucose monitoring (NO BRAND SPECIFIED) test strip Use to test blood sugar 4 times daily or as directed.   Reported, Patient     REVIEW OF SYSTEMS:  A 10-point review of systems was completed.  All pertinent positives are noted in the HPI.  All other systems negative.      PHYSICAL EXAMINATION:   VITAL SIGNS:  Reviewed and are as follows.  Temperature 97.4, blood pressure 102/68, heart rate 55, respiratory rate 20, O2 sats 95% on room air.   CONSTITUTIONAL:  The patient is lying in bed, awake, alert, cooperative, in mild distress, appears stated age.   HEENT:  Pupils equal, round and reactive to light.  Extraocular muscles are intact.  Sclerae are clear.  Normocephalic, atraumatic.  Oropharynx with very dry mucous membranes.  Tongue noted with ecchymosis on the front aspect/tip of tongue.  Laceration with staples noted on posterior stellate.   NECK:  Supple.  No adenopathy.  Normal range of motion.  No nuchal rigidity noted.   RESPIRATORY:  No increased work of breathing.  Clear to  auscultation bilaterally.  No crackles or wheezing noted.   CARDIOVASCULAR:  Normal apical pulse, regular/bradycardic rate and rhythm.  Normal S1 and S2.  No murmur, rub or gallop noted.   ABDOMEN:  Normal bowel sounds.  Soft, nondistended, nontender.  No guarding or rebound tenderness noted.  No masses palpated.   EXTREMITIES:  Moves all four extremities weakly.  Dorsalis pedis and radial pulses faint to palpation.  CMS intact in lower extremities with trace edema bilaterally.   NEUROLOGIC:  Awake, alert, oriented.  Cranial nerves II-XII are grossly intact.  Sensory is intact.  Intermittent garbled speech, at baseline per friend report.   SKIN:  Warm and dry.  No lesions or rash noted.  No erythema. Laceration with staples noted on posterior aspect of scalp, CDI.  PSYCHIATRIC:  Mentation appears normal and affect normal.      LABORATORY DATA:  Reviewed in Epic.      ASSESSMENT AND PLAN:  Mr. Kelechi Coleman is an 82-year-old male with extensive past medical history significant for recent subdural hematoma and subarachnoid hematoma in 08/2017, with subsequent seizure activity, chronic kidney disease stage III, chronic systolic heart failure, chronic atrial fibrillation, diabetes mellitus type 2, on insulin, essential hypertension, hyperlipidemia, multiple cerebrovascular accidents, multiple myocardial infarctions, coronary artery disease, status post CABG and PCI with CHUCHO x2, gastroesophageal reflux disease with esophagitis, who presents today from [place], status post fall and stellate laceration.  The patient is being admitted for further evaluation and management.     Status post fall, likely secondary to hypotension secondary to dehydration (recent increase in diuretics) and recent subdural hematoma and subarachnoid hematoma, altering balance.   Stellate laceration 2/2 fall, status post repair.   Chronic anemia.  Elevated lactic acid likely 2/2 dehydration, no acute signs/symptoms of infection at this  time.  -- The patient received a 1 liter normal saline bolus while in the emergency department.  We will hold additional fluids at this time due to heart failure, pending cardiology workup.   -- Continuous telemetry monitoring to rule out cardiac etiology.   -- Lactic acid on admission 2.2, subsequently 1.7 after fluid bolus.  -- Repeat BMP and CBC in a.m.  Hemoglobin stable, 10.0 on admission, currently 9.8 (baseline hgb 10).  -- FeNa ordered, pending.  -- Fall precautions.   -- Up with assist.   -- Wound care per protocol/PRN.     Non-ST elevation myocardial infarction:  Likely secondary to dehydration.    History of coronary artery disease, status post 4-vessel CABG and CHUCHO x2.   -- Cardiology consult placed.  I appreciate recommendations.   -- Initial troponin on admission 0.629, with repeat 0.701.  We will trend troponins until peak every 4 hours.   -- Continuous telemetry monitoring.   -- We will defer initiation of heparin infusion with recent subdural hematoma and subarachnoid hematoma and fall risk.   -- Continue prior to admission calcium channel blocker, statin, beta blocker, nitrate and Plavix.      Acute on chronic systolic heart failure with reduced ejection fraction of 40-45%.   -- Patient reports recent weight gain of 25-30 lbs over past few weeks, increased dyspnea at rest and with activity.  Cardiology recently increased diuretic dose.  -- Continue prior to admission beta blocker.   -- Hold prior to admission spironolactone and torsemide, as noted acute kidney injury on chronic kidney disease.   -- Repeat BMP in a.m.   -- We will hold maintenance IV fluids at this time.      Essential hypertension.   -- We will continue prior to admission amlodipine, carvedilol, hydralazine, isosorbide mononitrate, with hold parameters.   -- PRN hydralazine ordered for systolic blood pressure greater than 180.      Hyperlipidemia.   -- Continue prior to admission atorvastatin.      Chronic atrial fibrillation,  "paroxysmal.  Multiple cerebrovascular accidents, most recent 09/2016.   -- Continuous telemetry monitoring.   -- Continue prior to admission anticoagulation: Plavix.   -- Continue prior to admission carvedilol.      Diabetes mellitus type 2, hemoglobin A1c 7.4.   -- Holding prior to admission Lantus and premeal insulin at this time, as the patient is noted to be hypoglycemic while at nursing facility.   -- Medium correctional sliding scale insulin ordered with meals and at bedtime.   -- Point of Care glucose testing before meals and at bedtime and PRN.   -- Hypoglycemia protocol has been ordered.      Acute kidney injury on chronic kidney disease, stage III likely 2/2 dehydration.  -- Baseline creatinine approximately 1.2-1.3.  The patient's creatinine on admission noted to be 1.57.   -- We will repeat creatinine/BMP in a.m.   -- Avoid nephrotoxic agents if possible.  Holding prior to admission spironolactone and torsemide at this time.      Gastroesophageal reflux disease with esophagitis.   -- Continue prior to admission pantoprazole.      Recent subarachnoid hemorrhage/subdural hemorrhage, with subsequent seizures.   -- Continue prior to admission Keppra and Trileptal.   -- Seizure precautions.      DEEP VEIN THROMBOSIS PROPHYLAXIS:  Mechanical SCDs and Plavix.      CODE STATUS:  After extensive discussion with the patient, the patient requests to be DNR; requests to be intubated if \"heart is still working.\"  POLST in patient chart as DNR.      DISPOSITION:  Discharge back to [facility], pending further workup for NSTEMI and once hemodynamically stable.      This patient was discussed with Dr. Brian Ramos of the Hospitalist Service, who agrees with the current plans as outlined above.         BRIAN RAMOS MD       As dictated by YAYA BHAT NP            D: 12/12/2017 21:49   T: 12/12/2017 23:25   MT: [id number]      Name:     MARLA VÁZQUEZ   MRN:      [id number]        Account:      " [id number]   :      1935           Admitted:     [id number]      Document: [id number]       cc: Laquita Deal MD

## 2017-12-13 NOTE — CONSULTS
Cardiology Consultation      Kelechi Coleman MRN# [id number]   YOB: 1935 Age: 82 year old   Date of Admission: 12/12/2017     Reason for consult:  coronary artery disease, abnormal troponins, recent fall            Assessment and Plan:     1. Fall secondary to loss of balance  2. Mildly elevated troponins that have been flat in trajectory. He has no symptoms suggestive of angina  3. Recent fall resulting in traumatic SAH and SDH  4. CAD s/p CABG 2000 and CHUCHO to vein graft 2016  5. History of AF post CABG  6. History of multiple CVAs in the past    PLAN  - His mild troponin elevation is probably secondary to demand ischemia in the setting of his fall.  Troponins have been flat and he has no chest discomfort.  He has also recently sustained intracranial injuries status post a recent fall.  Therefore at this point in time I would favor treating his coronary artery disease medically.  - An echocardiogram will be obtained to reassess his left ventricular systolic function.  He is known to have a mild ischemic cardiomyopathy.  - I will also resume his diuretic therapy given that he has noted weight gain and lower extremity edema over the past few weeks.             Chief Complaint:   Fall (patient states he stumbled and fell backwards striking his head on ground. ) and Head Laceration (large lac to back of head. on blood thinner. no loc)           History of Present Illness:   This patient is a 82 year old male who is normally followed by Dr. Charles in our cardiology clinic.  He has a history of coronary disease and is status post coronary artery bypass grafting in 2000.  He is also status post drug-eluting stent placement to a vein graft to the PDA in 2016.  He is known to have a mild ischemic cardiomyopathy with an LVEF of 40-45%.  Most recently he was admitted to [facility] in August after he fell down some stairs and sustained intracranial injuries that included subarachnoid and subdural  "hemorrhages.  These were treated conservatively and the patient was discharged to a transitional care facility.  He has been participating in rehabilitation and has not noted any chest discomfort or shortness of breath while doing so.  Unfortunately he fell yesterday while trying to use the urinal and again struck his head.  Fortunately his CT of the head demonstrated no new intracranial injuries.  His troponins however were noted to be mildly elevated and cardiology consultation was requested.  He denies any chest discomfort or shortness of breath this morning.  He has however noted increased lower extremity edema and weight gain over the past few weeks.         Physical Exam:   Vitals were reviewed  Blood pressure 101/70, pulse 63, temperature 97.6  F (36.4  C), temperature source Axillary, resp. rate 18, height 1.88 m (6' 2\"), weight 88.2 kg (194 lb 7.1 oz), SpO2 95 %.  Temperatures:  Current - Temp: 97.6  F (36.4  C); Max - Temp  Av.6  F (36.4  C)  Min: 97.4  F (36.3  C)  Max: 97.8  F (36.6  C)  Respiration range: Resp  Av.3  Min: 10  Max: 43  Pulse range: Pulse  Av.5  Min: 63  Max: 66  Blood pressure range: Systolic (24hrs), Av , Min:72 , Max:119   ; Diastolic (24hrs), Av, Min:37, Max:82    Pulse oximetry range: SpO2  Av.9 %  Min: 93 %  Max: 98 %    Intake/Output Summary (Last 24 hours) at 17 0900  Last data filed at 17 0637   Gross per 24 hour   Intake              746 ml   Output              375 ml   Net              371 ml     Constitutional:   awake, alert, cooperative, no apparent distress, and appears stated age     Eyes:   Lids and lashes normal, pupils equal, round and reactive to light, extra ocular muscles intact, sclera clear, conjunctiva normal     Neck:   supple, symmetrical, trachea midline, no JVD     Back:   symmetric     Lungs:   Decreased BS with some crackles at bases     Cardiovascular:   RRR, 2/6 NENA     Abdomen:   non-tender     Musculoskeletal:   " motor strength is 5 out of 5 all extremities bilaterally     Neurologic:   Grossly nonfocal     Skin:   no bruising or bleeding     Additional findings:   Edema 2+               Past Medical History:   I have reviewed this patient's past medical history  Past Medical History:   Diagnosis Date     Acute exacerbation of CHF (congestive heart failure) (H) 9/2/2016     Atrial fibrillation (H)      Cerebral infarction (H) 12/13/2013, 9/2016     Diagnosis updated by automated process. Provider to review and confirm.     Coronary artery disease      Diabetes (H)      Gastroesophageal reflux disease with esophagitis 5/8/2017     Hyperlipidaemia      Hypertension      Myocardial infarction      PAD (peripheral artery disease) (H)      Stented coronary artery     CABG 2002 4 VESSEL     Type 2 diabetes mellitus with stage 3 chronic kidney disease, with long-term current use of insulin (H) 11/4/2016     Unspecified cerebral artery occlusion with cerebral infarction              Past Surgical History:   I have reviewed this patient's past surgical history  Past Surgical History:   Procedure Laterality Date     C MRA UPPER EXTREMITY WO&W CONT       CORONARY ARTERY BYPASS  2002    LIMA-LAD, SVG-OM1, [id number]     ENT SURGERY      VOCAL CORD LESION REMOVED     GENITOURINARY SURGERY      ANGIOPLASTY FOR RENAL ARTERT STENOSIS     HC LEFT HEART CATHETERIZATION  9/7/2016 09/07/2016: CHUCHO x2 to distal and proximal SVG to to RPDA     HEART CATH LEFT HEART CATH  11/30/2001     PHACOEMULSIFICATION CLEAR CORNEA WITH STANDARD INTRAOCULAR LENS IMPLANT Left 1/22/2015    Procedure: PHACOEMULSIFICATION CLEAR CORNEA WITH STANDARD INTRAOCULAR LENS IMPLANT;  Surgeon: Bart Johnson MD;  Location: [facility]     VASCULAR SURGERY      ANGIOPLASTY LEFT LEG FOR pvd               Social History:   I have reviewed this patient's social history  Social History   Substance Use Topics     Smoking status: Former Smoker     Years: 40.00     Types: Pipe      Quit date: 12/18/1998     Smokeless tobacco: Never Used     Alcohol use 0.0 oz/week     0 Standard drinks or equivalent per week      Comment: 3 hard liquor drinks daily             Family History:   I have reviewed this patient's family history  Family History   Problem Relation Age of Onset     DIABETES Mother      Family History Negative Father      CANCER Maternal Grandmother              Allergies:     Allergies   Allergen Reactions     Aspirin Hives     Penicillins Hives     Contrast Dye Hives             Medications:   I have reviewed this patient's current medications  Prescriptions Prior to Admission   Medication Sig Dispense Refill Last Dose     acetaminophen (TYLENOL) 325 MG tablet Take 650 mg by mouth every 6 hours as needed for mild pain   12/4/2017 at 1813     atorvastatin (LIPITOR) 40 MG tablet Take 40 mg by mouth At Bedtime   12/11/2017 at 2000     Calcium Carb-Cholecalciferol 600-800 MG-UNIT TABS Take 1 tablet by mouth 3 times daily (with meals)    12/12/2017 at 0800     levETIRAcetam (KEPPRA) 500 MG tablet Take 500 mg by mouth 2 times daily   12/12/2017 at 0800     clopidogrel (PLAVIX) 75 MG tablet Take 75 mg by mouth daily   12/12/2017 at 0800     potassium chloride SA (K-DUR/KLOR-CON M) 10 MEQ CR tablet Take 10 mEq by mouth daily   12/12/2017 at 0800     mirtazapine (REMERON) 15 MG tablet Take 15 mg by mouth At Bedtime   12/11/2017 at 2000     torsemide (DEMADEX) 10 MG tablet Take 30 mg by mouth daily   12/12/2017 at 0800     insulin aspart (NOVOLOG PEN) 100 UNIT/ML injection Inject 8 Units Subcutaneous 3 times daily (with meals)    12/12/2017 at am     thiamine 100 MG tablet Take 100 mg by mouth daily   12/12/2017 at 0800     hydrALAZINE (APRESOLINE) 50 MG tablet Take 1 tablet (50 mg) by mouth 2 times daily   12/12/2017 at 0800     spironolactone (ALDACTONE) 50 MG tablet Take 1 tablet (50 mg) by mouth daily 90 tablet 3 12/12/2017 at 0800     pantoprazole (PROTONIX) 40 MG EC tablet TAKE 1  TABLET BY MOUTH EVERY EVENING 90 tablet 2 12/11/2017 at 1700     Multiple Vitamins-Minerals (CENTRUM SILVER) per tablet Take 1 tablet by mouth daily 30 tablet  12/12/2017 at 0800     OXcarbazepine (TRILEPTAL) 150 MG tablet Take 1 tablet (150 mg) by mouth 2 times daily   12/12/2017 at 0800     insulin glargine (LANTUS SOLOSTAR) 100 UNIT/ML injection Inject 10 Units Subcutaneous At Bedtime 30 mL 0 12/11/2017 at 2000     carvedilol (COREG) 25 MG tablet Take 0.5 tablets (12.5 mg) by mouth every morning 180 tablet 3 12/12/2017 at 0800     amLODIPine (NORVASC) 10 MG tablet Take 1 tablet (10 mg) by mouth daily 90 tablet 4 12/12/2017 at 0800     isosorbide mononitrate (IMDUR) 60 MG 24 hr tablet Take 2 tablets (120 mg) by mouth daily 180 tablet 3 12/12/2017 at 0800     blood glucose monitoring (NO BRAND SPECIFIED) test strip Use to test blood sugar 4 times daily or as directed.   Taking     Current Facility-Administered Medications Ordered in Epic   Medication Dose Route Frequency Last Rate Last Dose     amLODIPine (NORVASC) tablet 10 mg  10 mg Oral Daily   10 mg at 12/13/17 0820     atorvastatin (LIPITOR) tablet 40 mg  40 mg Oral At Bedtime   40 mg at 12/12/17 2220     carvedilol (COREG) tablet 12.5 mg  12.5 mg Oral QAM   12.5 mg at 12/13/17 0820     hydrALAZINE (APRESOLINE) tablet 50 mg  50 mg Oral BID   50 mg at 12/13/17 0820     isosorbide mononitrate (IMDUR) 24 hr tablet 120 mg  120 mg Oral Daily   120 mg at 12/13/17 0819     levETIRAcetam (KEPPRA) tablet 500 mg  500 mg Oral BID   500 mg at 12/13/17 0819     OXcarbazepine (TRILEPTAL) tablet 150 mg  150 mg Oral BID   150 mg at 12/13/17 0820     pantoprazole (PROTONIX) EC tablet 40 mg  40 mg Oral QPM   40 mg at 12/12/17 2220     clopidogrel (PLAVIX) tablet 75 mg  75 mg Oral Daily   75 mg at 12/13/17 0820     glucose 40 % gel 15-30 g  15-30 g Oral Q15 Min PRN        Or     dextrose 50 % injection 25-50 mL  25-50 mL Intravenous Q15 Min PRN        Or     glucagon injection 1  mg  1 mg Subcutaneous Q15 Min PRN         naloxone (NARCAN) injection 0.1-0.4 mg  0.1-0.4 mg Intravenous Q2 Min PRN         lidocaine 1 % 1 mL  1 mL Other Q1H PRN         lidocaine (LMX4) cream   Topical Q1H PRN         sodium chloride (PF) 0.9% PF flush 3 mL  3 mL Intracatheter Q1H PRN         sodium chloride (PF) 0.9% PF flush 3 mL  3 mL Intracatheter Q8H   3 mL at 12/13/17 0637     acetaminophen (TYLENOL) tablet 650 mg  650 mg Oral Q4H PRN   650 mg at 12/13/17 0058     senna-docusate (SENOKOT-S;PERICOLACE) 8.6-50 MG per tablet 1 tablet  1 tablet Oral BID PRN        Or     senna-docusate (SENOKOT-S;PERICOLACE) 8.6-50 MG per tablet 2 tablet  2 tablet Oral BID PRN         bisacodyl (DULCOLAX) Suppository 10 mg  10 mg Rectal Daily PRN         ondansetron (ZOFRAN-ODT) ODT tab 4 mg  4 mg Oral Q6H PRN        Or     ondansetron (ZOFRAN) injection 4 mg  4 mg Intravenous Q6H PRN         prochlorperazine (COMPAZINE) injection 5 mg  5 mg Intravenous Q6H PRN        Or     prochlorperazine (COMPAZINE) tablet 5 mg  5 mg Oral Q6H PRN        Or     prochlorperazine (COMPAZINE) Suppository 12.5 mg  12.5 mg Rectal Q12H PRN         insulin aspart (NovoLOG) inj (RAPID ACTING)  1-7 Units Subcutaneous TID AC         insulin aspart (NovoLOG) inj (RAPID ACTING)  1-5 Units Subcutaneous At Bedtime         No current [facility]-ordered outpatient prescriptions on file.             Review of Systems:   The 10 point Review of Systems is negative other than noted in the HPI            Data:   All laboratory data reviewed  Results for orders placed or performed during the hospital encounter of 12/12/17 (from the past 24 hour(s))   CBC with platelets + differential   Result Value Ref Range    WBC 7.3 4.0 - 11.0 10e9/L    RBC Count 3.38 (L) 4.4 - 5.9 10e12/L    Hemoglobin 10.0 (L) 13.3 - 17.7 g/dL    Hematocrit 30.1 (L) 40.0 - 53.0 %    MCV 89 78 - 100 fl    MCH 29.6 26.5 - 33.0 pg    MCHC 33.2 31.5 - 36.5 g/dL    RDW 17.1 (H) 10.0 - 15.0 %    Platelet  Count 148 (L) 150 - 450 10e9/L    Diff Method Automated Method     % Neutrophils 81.5 %    % Lymphocytes 7.0 %    % Monocytes 9.6 %    % Eosinophils 1.1 %    % Basophils 0.4 %    % Immature Granulocytes 0.4 %    Nucleated RBCs 0 0 /100    Absolute Neutrophil 5.9 1.6 - 8.3 10e9/L    Absolute Lymphocytes 0.5 (L) 0.8 - 5.3 10e9/L    Absolute Monocytes 0.7 0.0 - 1.3 10e9/L    Absolute Eosinophils 0.1 0.0 - 0.7 10e9/L    Absolute Basophils 0.0 0.0 - 0.2 10e9/L    Abs Immature Granulocytes 0.0 0 - 0.4 10e9/L    Absolute Nucleated RBC 0.0    Comprehensive metabolic panel   Result Value Ref Range    Sodium 141 133 - 144 mmol/L    Potassium 3.9 3.4 - 5.3 mmol/L    Chloride 106 94 - 109 mmol/L    Carbon Dioxide 26 20 - 32 mmol/L    Anion Gap 9 3 - 14 mmol/L    Glucose 131 (H) 70 - 99 mg/dL    Urea Nitrogen 39 (H) 7 - 30 mg/dL    Creatinine 1.57 (H) 0.66 - 1.25 mg/dL    GFR Estimate 43 (L) >60 mL/min/1.7m2    GFR Estimate If Black 52 (L) >60 mL/min/1.7m2    Calcium 8.7 8.5 - 10.1 mg/dL    Bilirubin Total 0.8 0.2 - 1.3 mg/dL    Albumin 2.8 (L) 3.4 - 5.0 g/dL    Protein Total 6.6 (L) 6.8 - 8.8 g/dL    Alkaline Phosphatase 221 (H) 40 - 150 U/L    ALT 20 0 - 70 U/L    AST 21 0 - 45 U/L   INR   Result Value Ref Range    INR 1.32 (H) 0.86 - 1.14   CK total   Result Value Ref Range    CK Total 109 30 - 300 U/L   Troponin I   Result Value Ref Range    Troponin I ES 0.629 (HH) 0.000 - 0.045 ug/L   Head CT w/o contrast    Narrative    CT OF THE HEAD WITHOUT CONTRAST  12/12/2017 2:15 PM     COMPARISON: Head CT 9/22/2017.    HISTORY: Fall head injury.    TECHNIQUE: 5 mm thick axial CT images of the head were acquired  without IV contrast material.    FINDINGS: A tiny chronic right basal ganglia infarct is again noted  without change. A small chronic infarct at the high medial  frontoparietal junction on the left is again noted without change.  There is mild diffuse cerebral volume loss. There are subtle patchy  areas of decreased density  in the cerebral white matter bilaterally  that are consistent with sequela of chronic small vessel ischemic  disease.     The ventricles and basal cisterns are within normal limits in  configuration given the degree of cerebral volume loss.  There is no  midline shift. There are no extra-axial fluid collections.     No intracranial hemorrhage, mass or recent infarct.    The visualized paranasal sinuses are well aerated. There is no  mastoiditis. There are no fractures of the visualized bones.       Impression    IMPRESSION: Small chronic right basal ganglia and high left  frontoparietal infarcts again noted without change. No new infarcts.  Diffuse cerebral volume loss and cerebral white matter changes  consistent with chronic small vessel ischemic disease. No evidence for  acute intracranial pathology.     Radiation dose for this scan was reduced using automated exposure  control, adjustment of the mA and/or kV according to patient size, or  iterative reconstruction technique.    LUÍS BUCKNER MD   UA with Microscopic   Result Value Ref Range    Color Urine Yellow     Appearance Urine Clear     Glucose Urine Negative NEG^Negative mg/dL    Bilirubin Urine Negative NEG^Negative    Ketones Urine Negative NEG^Negative mg/dL    Specific Gravity Urine 1.008 1.003 - 1.035    Blood Urine Negative NEG^Negative    pH Urine 5.0 5.0 - 7.0 pH    Protein Albumin Urine Negative NEG^Negative mg/dL    Urobilinogen mg/dL Normal 0.0 - 2.0 mg/dL    Nitrite Urine Negative NEG^Negative    Leukocyte Esterase Urine Negative NEG^Negative    Source Midstream Urine     WBC Urine 1 0 - 2 /HPF    RBC Urine 0 0 - 2 /HPF    Squamous Epithelial /HPF Urine <1 0 - 1 /HPF    Mucous Urine Present (A) NEG^Negative /LPF    Hyaline Casts 21 (H) 0 - 2 /LPF   CK total   Result Value Ref Range    CK Total Canceled, Test credited 30 - 300 U/L   Troponin I   Result Value Ref Range    Troponin I ES Canceled, Test credited 0.000 - 0.045 ug/L   EKG 12 lead    Result Value Ref Range    Interpretation ECG Click View Image link to view waveform and result    Hemoglobin   Result Value Ref Range    Hemoglobin 9.5 (L) 13.3 - 17.7 g/dL   ABO/Rh type and screen   Result Value Ref Range    ABO O     RH(D) Pos     Antibody Screen Neg     Test Valid Only At [facility]        Specimen Expires 12/15/2017    ISTAT gases lactate stephanie POCT   Result Value Ref Range    Ph Venous 7.42 7.32 - 7.43 pH    PCO2 Venous 37 (L) 40 - 50 mm Hg    PO2 Venous 41 25 - 47 mm Hg    Bicarbonate Venous 24 21 - 28 mmol/L    O2 Sat Venous 77 %    Lactic Acid 2.2 (H) 0.7 - 2.1 mmol/L   Hemoglobin   Result Value Ref Range    Hemoglobin 9.8 (L) 13.3 - 17.7 g/dL   Troponin I   Result Value Ref Range    Troponin I ES 0.701 (HH) 0.000 - 0.045 ug/L   ISTAT gases lactate stephanie POCT   Result Value Ref Range    Ph Venous 7.44 (H) 7.32 - 7.43 pH    PCO2 Venous 38 (L) 40 - 50 mm Hg    PO2 Venous 32 25 - 47 mm Hg    Bicarbonate Venous 26 21 - 28 mmol/L    O2 Sat Venous 65 %    Lactic Acid 1.7 0.7 - 2.1 mmol/L   Troponin I   Result Value Ref Range    Troponin I ES 0.709 (HH) 0.000 - 0.045 ug/L   Glucose by meter   Result Value Ref Range    Glucose 115 (H) 70 - 99 mg/dL   Troponin I   Result Value Ref Range    Troponin I ES 0.714 (HH) 0.000 - 0.045 ug/L   Glucose by meter   Result Value Ref Range    Glucose 91 70 - 99 mg/dL   Basic metabolic panel   Result Value Ref Range    Sodium 141 133 - 144 mmol/L    Potassium 3.8 3.4 - 5.3 mmol/L    Chloride 106 94 - 109 mmol/L    Carbon Dioxide 25 20 - 32 mmol/L    Anion Gap 10 3 - 14 mmol/L    Glucose 85 70 - 99 mg/dL    Urea Nitrogen 39 (H) 7 - 30 mg/dL    Creatinine 1.44 (H) 0.66 - 1.25 mg/dL    GFR Estimate 47 (L) >60 mL/min/1.7m2    GFR Estimate If Black 57 (L) >60 mL/min/1.7m2    Calcium 8.4 (L) 8.5 - 10.1 mg/dL   CBC with platelets   Result Value Ref Range    WBC 5.9 4.0 - 11.0 10e9/L    RBC Count 3.24 (L) 4.4 - 5.9 10e12/L    Hemoglobin 9.6 (L) 13.3 - 17.7  g/dL    Hematocrit 28.5 (L) 40.0 - 53.0 %    MCV 88 78 - 100 fl    MCH 29.6 26.5 - 33.0 pg    MCHC 33.7 31.5 - 36.5 g/dL    RDW 17.1 (H) 10.0 - 15.0 %    Platelet Count 145 (L) 150 - 450 10e9/L   Troponin I   Result Value Ref Range    Troponin I ES 0.714 (HH) 0.000 - 0.045 ug/L   Glucose by meter   Result Value Ref Range    Glucose 88 70 - 99 mg/dL     EKG results: Sinus rhythm with first degree AVB and NS St T wave abnormalities

## 2017-12-13 NOTE — ED NOTES
[facility]  ED Nurse Handoff Report    ED Chief complaint: Fall (patient states he stumbled and fell backwards striking his head on ground. ) and Head Laceration (large lac to back of head. on blood thinner. no loc)      ED Diagnosis:   Final diagnoses:   Fall, initial encounter   Laceration of scalp, initial encounter   Contusion of face, scalp and neck, initial encounter   Anemia, unspecified type   Troponin level elevated   Other specified hypotension   Chronic atrial fibrillation (H)   Full Code    Code Status:     Allergies:   Allergies   Allergen Reactions     Aspirin Hives     Penicillins Hives     Contrast Dye Hives       Activity level - Baseline/Home:  Total Care    Activity Level - Current:   Total Care     Needed?: No    Isolation: No  Infection: Not Applicable    Bariatric?: No    Vital Signs:   Vitals:    12/12/17 1830 12/12/17 1845 12/12/17 1900 12/12/17 1915   BP: 93/70 95/59 102/68 103/53   Pulse:       Resp: 20 20 28 27   Temp:       TempSrc:       SpO2:       Weight:       Height:           Cardiac Rhythm: ,        Pain level:      Is this patient confused?: No    Patient Report: Initial Complaint: Kelechi Coleman is a 82 year old male on blood thinners who presents after falling backwards, striking his head on his new lift chair and sustaining a large laceration to the back of his head. He denies loss of consciousness with this fall. The patient states that his speech has been abnormal since the fall, but he did manage to deny any neck pain or recent illness.    Focused Assessment: large lac to the back of head, now closed with staples  Tests Performed: labs, CT, ecg  Abnormal Results: trop  Treatments provided: 1L NS    Family Comments: friend at bedside    OBS brochure/video discussed/provided to patient: Yes    ED Medications:   Medications   0.9% sodium chloride BOLUS (0 mLs Intravenous Stopped 12/12/17 6391)       Drips infusing?:  No      ED NURSE PHONE  NUMBER: [phone]

## 2017-12-13 NOTE — PLAN OF CARE
Problem: Patient Care Overview  Goal: Plan of Care/Patient Progress Review  OT: Orders rec'd. Patient will have PT due to increased falls at home and OT will return if indicated.

## 2017-12-13 NOTE — PHARMACY-ADMISSION MEDICATION HISTORY
Admission medication history interview status for the 12/12/2017  admission is complete. See EPIC admission navigator for prior to admission medications     Medication history source reliability:Good, MAR from [facility]    Actions taken by pharmacist (provider contacted, etc):None     Additional medication history information not noted on PTA med list :None    Medication reconciliation/reorder completed by provider prior to medication history? No    Time spent in this activity: 15 minutes    Prior to Admission medications    Medication Sig Last Dose Taking? Auth Provider   acetaminophen (TYLENOL) 325 MG tablet Take 650 mg by mouth every 6 hours as needed for mild pain 12/4/2017 at 1813 Yes Reported, Patient   atorvastatin (LIPITOR) 40 MG tablet Take 40 mg by mouth At Bedtime 12/11/2017 at 2000 Yes Reported, Patient   Calcium Carb-Cholecalciferol 600-800 MG-UNIT TABS Take 1 tablet by mouth 3 times daily (with meals)  12/12/2017 at 0800 Yes Reported, Patient   levETIRAcetam (KEPPRA) 500 MG tablet Take 500 mg by mouth 2 times daily 12/12/2017 at 0800 Yes Reported, Patient   clopidogrel (PLAVIX) 75 MG tablet Take 75 mg by mouth daily 12/12/2017 at 0800 Yes Reported, Patient   potassium chloride SA (K-DUR/KLOR-CON M) 10 MEQ CR tablet Take 10 mEq by mouth daily 12/12/2017 at 0800 Yes Reported, Patient   mirtazapine (REMERON) 15 MG tablet Take 15 mg by mouth At Bedtime 12/11/2017 at 2000 Yes Reported, Patient   torsemide (DEMADEX) 10 MG tablet Take 30 mg by mouth daily 12/12/2017 at 0800 Yes Reported, Patient   insulin aspart (NOVOLOG PEN) 100 UNIT/ML injection Inject 8 Units Subcutaneous 3 times daily (with meals)  12/12/2017 at am Yes Reported, Patient   thiamine 100 MG tablet Take 100 mg by mouth daily 12/12/2017 at 0800 Yes Reported, Patient   hydrALAZINE (APRESOLINE) 50 MG tablet Take 1 tablet (50 mg) by mouth 2 times daily 12/12/2017 at 0800 Yes Her-Jarad Perez PA-C   spironolactone (ALDACTONE) 50 MG tablet Take  1 tablet (50 mg) by mouth daily 12/12/2017 at 0800 Yes Jarad Ga PA-C   pantoprazole (PROTONIX) 40 MG EC tablet TAKE 1 TABLET BY MOUTH EVERY EVENING 12/11/2017 at 1700 Yes Jarad Ga PA-C   Multiple Vitamins-Minerals (CENTRUM SILVER) per tablet Take 1 tablet by mouth daily 12/12/2017 at 0800 Yes Jarad Ga PA-C   OXcarbazepine (TRILEPTAL) 150 MG tablet Take 1 tablet (150 mg) by mouth 2 times daily 12/12/2017 at 0800 Yes Jarad Ga PA-C   insulin glargine (LANTUS SOLOSTAR) 100 UNIT/ML injection Inject 10 Units Subcutaneous At Bedtime 12/11/2017 at 2000 Yes Jarad Ga PA-C   carvedilol (COREG) 25 MG tablet Take 0.5 tablets (12.5 mg) by mouth every morning 12/12/2017 at 0800 Yes Etienne Gee MD   amLODIPine (NORVASC) 10 MG tablet Take 1 tablet (10 mg) by mouth daily 12/12/2017 at 0800 Yes Isaiah Charles MD   isosorbide mononitrate (IMDUR) 60 MG 24 hr tablet Take 2 tablets (120 mg) by mouth daily 12/12/2017 at 0800 Yes Isaiah Charles MD

## 2017-12-13 NOTE — PLAN OF CARE
Problem: Patient Care Overview  Goal: Plan of Care/Patient Progress Review  Outcome: No Change  A/O. Neuros intact ex: speech slurred. Rt sided weakness, baseline per pt. VSS on RA. Tele: SB with 1st degree AVB, HR 50's. PRN Tylenol given for pain at head laceration with reported relief. Laceration closed with staples, dried bloody drainage. Denies CP/SOB. Plan for cards consult.

## 2017-12-14 NOTE — PROGRESS NOTES
[facility]    Hospitalist Progress Note    Date of Service (when I saw the patient): 12/14/2017    Assessment & Plan   Kelechi Coleman is a 82 year old male who was admitted on 12/12/2017 after a fall.    1. Acute on chronic heart failure exacerbation - Continue torsemide 20 mg daily.  Continue Coreg and Imdur.   Discontinue amlodipine and reduce hydralazine to 25 mg tid due low BP on 12/13.  Cardiology consult appreciated.  Echo demonstrates slightly decreased EF from previous echo in Sept 2017, now 30-35%.    2. NSTEMI, type 2 - Likely stress induced from volume depletion.  Continue plavix.    3. Fall with occipital laceration - Staples to be removed in 10 days from 12/12.  PT/OT consult for physical deconditioning.    4. DM2 - Lantus on hold, continue ISS    5. Seizures - Continue Keppra and Trileptal.    6. GERD - Continue protonix.    7. JUVENTINO - Spironolactone on hold.  Cr 1.57 on admission, improved to 1.52 today after 1 L NS bolus.  Baseline Cr 1.2-1.3.    8. HL - Continue atorvastatin.    9. Malnutrition - Consult Nutrition.    10. Metabolic encephalopathy - Check UA, CXR, C diff, stool THIAGO, blood culture.  May be due to delirium.  May need to consider Comfort Cares.    DVT Prophylaxis: Pneumatic Compression Devices  Code Status: DNR/DNI, discussed with sister, Samantha Fenton on 12/14/17.    Disposition: Expected discharge in 1-3 days.    Boaz Champion  Text Page (7 am to 6 pm)    Interval History   The patient is resting in bed.  He somnolent and difficult to awaken.  Nurses note frequent diarrhea.    -Data reviewed today: I reviewed all new labs and imaging results over the last 24 hours. I personally reviewed no images or EKG's today.    Physical Exam   Temp: 98.1  F (36.7  C) Temp src: Oral BP: 108/62   Heart Rate: 74 Resp: 18 SpO2: 95 % O2 Device: None (Room air)    Vitals:    12/12/17 1347 12/12/17 2100 12/13/17 0500   Weight: 87.1 kg (192 lb) 89.2 kg (196 lb 9.6 oz) 88.2 kg  (194 lb 7.1 oz)     Vital Signs with Ranges  Temp:  [97.3  F (36.3  C)-99  F (37.2  C)] 98.1  F (36.7  C)  Heart Rate:  [66-79] 74  Resp:  [16-18] 18  BP: ()/(51-73) 108/62  SpO2:  [90 %-95 %] 95 %  I/O last 3 completed shifts:  In: 863 [P.O.:860; I.V.:3]  Out: 250 [Urine:250]    Gen: Debilitated deconditioned, thin elderly male, no acute distressed  HEENT: Normocephalic, stellate laceration occiput.  Lungs: Clear to ausculation without wheezes, rhonchi, or rales  Heart: Regular rate and rhythm, no gallops or rubs, 2/6 NENA  GI: Bowel sound normal, no hepatosplenomegaly or masses  Lymph: No lymphadenopathy, 3 + BLE edema to sacrum  Skin: No rashes     Medications        torsemide  20 mg Oral Daily     hydrALAZINE  25 mg Oral Q8H MAR     atorvastatin  40 mg Oral At Bedtime     carvedilol  12.5 mg Oral QAM     isosorbide mononitrate  120 mg Oral Daily     levETIRAcetam  500 mg Oral BID     OXcarbazepine  150 mg Oral BID     pantoprazole  40 mg Oral QPM     clopidogrel  75 mg Oral Daily     sodium chloride (PF)  3 mL Intracatheter Q8H     insulin aspart  1-7 Units Subcutaneous TID AC     insulin aspart  1-5 Units Subcutaneous At Bedtime       Data     Recent Labs  Lab 12/14/17  0555 12/13/17  0548 12/13/17  0200 12/12/17  2130 12/12/17  1731  12/12/17  1400   WBC 4.4 5.9  --   --   --   --  7.3   HGB 10.2* 9.6*  --   --  9.8*  < > 10.0*   MCV 88 88  --   --   --   --  89   * 145*  --   --   --   --  148*   INR  --   --   --   --   --   --  1.32*    141  --   --   --   --  141   POTASSIUM 4.0 3.8  --   --   --   --  3.9   CHLORIDE 106 106  --   --   --   --  106   CO2 26 25  --   --   --   --  26   BUN 42* 39*  --   --   --   --  39*   CR 1.52* 1.44*  --   --   --   --  1.57*   ANIONGAP 7 10  --   --   --   --  9   MIGUEL 8.2* 8.4*  --   --   --   --  8.7   * 85  --   --   --   --  131*   ALBUMIN  --   --   --   --   --   --  2.8*   PROTTOTAL  --   --   --   --   --   --  6.6*   BILITOTAL  --   --    --   --   --   --  0.8   ALKPHOS  --   --   --   --   --   --  221*   ALT  --   --   --   --   --   --  20   AST  --   --   --   --   --   --  21   TROPI  --  0.714* 0.714* 0.709* 0.701*  < > 0.629*   < > = values in this interval not displayed.    No results found for this or any previous visit (from the past 24 hour(s)).

## 2017-12-14 NOTE — PROGRESS NOTES
"More responsive later in the day but still falls asleep if not stimulated.  Did recognize Breonna, friend who is his POA.  Refuses to eat.  Bedside swallow eval ordered as pt coughs and occasionally chokes on liquids.  WOC nurse consult also ordered as small ulceration noted on right inner anal area.  Skin prep and Mepilex applied.  Involuntary of more liquid but also some soft stool.  Sample sent to lab.  Dr. Champion notified that pt's friend and POA, Breonna, called to tell this RN that there was a \"flu\" epidemic (nausea, vomiting and diarrhea) at his place of residence.    "

## 2017-12-14 NOTE — PLAN OF CARE
[id number]: Alert and oriented x4. VSS. Denies pain. Up with two and walker. Blood glucose was 138, not hungary for food at the moment but a tray is here for him. Tele A fib CVR. Very lethargic this evening. Plan to continue to monitor.

## 2017-12-14 NOTE — PLAN OF CARE
Problem: Patient Care Overview  Goal: Plan of Care/Patient Progress Review  PT: Cancel- attempted eval however pt very lethargic, somnolent and difficult to rouse. Wakes to voice and touch only momentarily then falls asleep. Will briefly open eyes when asked but doesn't remain awake to command follow for LE AROM assessment in supine. Unsafe to attempt mobility at this time due to lethargy.

## 2017-12-14 NOTE — PLAN OF CARE
Problem: Patient Care Overview  Goal: Plan of Care/Patient Progress Review  OT: Orders received, per discussion with interdisciplinary team, anticipate no IP skilled OT needs at this time. PT to continue to follow while IP and can request new OT orders if needs arise. Will complete orders.

## 2017-12-14 NOTE — PROGRESS NOTES
"Cardiology Progress Note          Assessment and Plan:           1) Fall secondary to loss of balance.  The patient does not appear to have had a syncopal episode.  2) Mildly elevated troponins which have been flat and are probably secondary to demand ischemia.  He has no symptoms suggestive of angina.  3) Recent admission for a fall that resulted in traumatic subarachnoid and subdural hemorrhages.  4) Coronary artery disease status post CABG in  and drug-eluting stent placement to a vein graft in 2016  5) Severe ischemic cardiomyopathy with probable at least moderate aortic stenosis on echocardiogram yesterday  6) History of atrial fibrillation status post CABG  7) History of multiple CVAs in the past    PLAN  - I reviewed his echocardiogram from 2016 as well as the one obtained yesterday.  Per my review the left ventricular ejection fraction was probably in the 35% range last year, and appears to have deteriorated mildly since then.  In addition, the aortic valve appears heavily calcified and although the transaortic gradients are not high, this may be reflective of his low output state.  This is a frail individual with multiple comorbidities, and I think the decision regarding whether to pursue any further therapies for his aortic stenosis should be deferred to his primary cardiologist Dr. Charles.  -Torsemide therapy has been resumed appropriately.  - No further recommendations at this point.        Interval History:     No CP or SOB this AM.              Review of Systems:   As per subjective, otherwise 5 systems reviewed and negative.           Physical Exam:   Blood pressure 99/61, pulse 54, temperature 97.8  F (36.6  C), temperature source Axillary, resp. rate 18, height 1.88 m (6' 2\"), weight 88.2 kg (194 lb 7.1 oz), SpO2 90 %.      Vital Sign Ranges  Temperature Temp  Av.2  F (36.8  C)  Min: 97.3  F (36.3  C)  Max: 99  F (37.2  C)   Blood pressure Systolic (24hrs), Av , Min:85 , Max:123        " Diastolic (24hrs), Av, Min:51, Max:73      Pulse Pulse  Av  Min: 54  Max: 54   Respirations Resp  Av.8  Min: 15  Max: 18   Pulse oximetry SpO2  Av.2 %  Min: 90 %  Max: 96 %         Intake/Output Summary (Last 24 hours) at 17 1037  Last data filed at 17 0614   Gross per 24 hour   Intake              513 ml   Output              150 ml   Net              363 ml       Constitutional: NAD  Skin: Warm and dry  Neck: No JVD  Lungs: crackles at bases  Cardiovascular: RRR, 3/6 NENA  Abdomen: Soft, non tender.  Extremities and Back: +1 BLE edema  Neurological: Nonfocal           Medications:     I have reviewed this patient's current medications.              Data:     Labs reviewed.     Tele: SANTHOSH Ko M.D.

## 2017-12-14 NOTE — PLAN OF CARE
Problem: Patient Care Overview  Goal: Plan of Care/Patient Progress Review  Heart Failure Care Pathway  GOALS TO BE MET BEFORE DISCHARGE:    1. Decrease congestion and/or edema with diuretic therapy to achieve near      optimal volume status.            Dyspnea improved:  Yes            Edema improved:     Yes        Net I/O and Weights since admission:          12/08 2300 - 12/13 2259  In: 1456 [P.O.:1450; I.V.:6]  Out: 625 [Urine:625]  Net: 831            Vitals:    12/12/17 1347 12/12/17 2100 12/13/17 0500   Weight: 87.1 kg (192 lb) 89.2 kg (196 lb 9.6 oz) 88.2 kg (194 lb 7.1 oz)       2.  O2 sats > 92% on RA or at prior home O2 therapy level.          Current oxygenation status:       SpO2: 92 %         O2 Device: None (Room air),            Able to wean O2 this shift to keep sats > 92%:  Yes       Does patient use Home O2? No    3.  Tolerates ambulation and mobility near baseline: Yes        How many times did the patient ambulate with nursing staff this shift? 0    Please review the Heart Failure Care Pathway for additional HF goal outcomes.    VSS, incontinent of B and B over night. Repositioned from side to side d/t blanchable red coccyx. mepilex applied to coccyx for further protection. Plan for further diuresing. CTM    0662 addendum: Medium watery incontinent  stool upon last repositioning of shift. Pt difficult to waken but was awoken with verbal cues and gentle shake.      Brooklynn Banegas RN  12/14/2017

## 2017-12-14 NOTE — CONSULTS
"CLINICAL NUTRITION SERVICES  -  ASSESSMENT NOTE      Recommendations Ordered by Registered Dietitian (RD): Continue with current diet-modcarb,LSF,Low Na, no caffeine.   Oral supplements BID- Plus2 shake BID   Malnutrition: Non-Severe malnutrition  In Context of:  Acute illness or injury  Chronic illness or disease        REASON FOR ASSESSMENT  Kelechi Coleman is a 82 year old male seen by Registered Dietitian for Provider Order -Malnutrition.     NUTRITION HISTORY  Visited with pt this afternoon. Pt was lying in bed with both eyes closed. responded to his name but kept eyes closed. Pt was able to answer my questions but limited.   unable to get nutrition history.      CURRENT NUTRITION ORDERS  Diet Order:  Combination Diet 4162-6137 Calories: Moderate Consistent CHO (4-6 CHO units/meal); Low Saturated Fat Na <2400mg Diet, No Caffeine Diet       Current Intake/Tolerance:  Pt says his appetite is not good and only eating BID ( breakfast and diner) both meals he says are small.   Upon admission pt was eating 100% but in last 24 hrs he is eating 0%  BM- x 6 - 12/13-14)-pt on DULCOLAX    PHYSICAL FINDINGS  Observed  Muscle Wasting moderate clavicle and temporal  Obtained from Chart/Interdisciplinary Team  Edema -bilateral LE 3+ moderate    ANTHROPOMETRICS  Height: 6' 2\"  Weight: 88.1 (194 lbs 7.13 oz)  Body mass index is 24.97 kg/(m^2).  Weight Status:  Normal BMI  IBW: 80.9kg (178lb)  % IBW: 108%  Weight History: Pt has gained weight o this admission. Per chart he had moderate 3+ edema in LE, pt is on dieretics.   Vitals:    12/12/17 1347 12/12/17 2100 12/13/17 0500   Weight: 87.1 kg (192 lb) 89.2 kg (196 lb 9.6 oz) 88.2 kg (194 lb 7.1 oz)         LABS  BG-    MEDICATIONS  Medications reviewed    Dosing Weight 88.1kg -actual weight    ASSESSED NUTRITION NEEDS PER APPROVED PRACTICE GUIDELINES:  Estimated Energy Needs: 9737-6172 kcals (25-30 Kcal/Kg)  Justification: maintenance  Estimated Protein Needs: 70-88 " grams protein (0.8-1 g pro/Kg)  Justification: CKD  Estimated Fluid Needs: 7188-6913  mL (1 mL/Kcal)  Justification: maintenance    MALNUTRITION:  % Weight Loss:  Weight loss does not meet criteria for malnutrition   % Intake:  <75% for > 7 days (non-severe malnutrition)  Subcutaneous Fat Loss:  Orbital region mild depletion  Muscle Loss:  Clavicle bone region moderate depletion  Fluid Retention:  Moderate -Edema -bilateral LE 3+ moderate    Malnutrition Diagnosis: Non-Severe malnutrition  In Context of:  Acute illness or injury  Chronic illness or disease    NUTRITION DIAGNOSIS:  Inadequate oral intake related to decreased appetite as evidenced by pt reporting two small meals daily and eating 0% per chart last 24 hrs.      NUTRITION INTERVENTIONS  Recommendations / Nutrition Prescription  Continue with current diet-modcarb,LSF,Low Na, no caffeine.   Oral supplements BID- Plus2 shake BID    Implementation  Nutrition education: Not appropriate at this time due to patient condition  Medical Food Supplement-Plus2 shake BID antony.      Nutrition Goals  Pt will eat at least 25% of his meals BID    MONITORING AND EVALUATION:  Progress towards goals will be monitored and evaluated per protocol and Practice Guidelines    Giovanni Page (Dietitic Intern)

## 2017-12-15 NOTE — PLAN OF CARE
Problem: Patient Care Overview  Goal: Plan of Care/Patient Progress Review  Discharge Planner SLP     Patient plan for discharge: Not stated  Current status: Clinical swallow evaluation and treatment completed today.  PT presents with oral weakness, moderate oropharyngeal dysphagia characterized by tongue pumping with difficult A-P bolus propulsion, oral residue not eliminated with liquid wash, and throat clear with liquids by straw/consecutive drinks by cup.  Recommend DYSPHAGIA DIET LEVEL 1 WITH THIN LIQUIDS, only when alert and upright in chair as able, double swallow to reduce pharyngeal residue.    Barriers to return to prior living situation: Weakness.  Recommendations for discharge: TCU  Rationale for recommendations: Continued SLP services are indicated to safely progress to the least restrictive diet with need for training of compensatory swallow strategies.        Entered by: Dominique Henriquez 12/15/2017 9:50 AM

## 2017-12-15 NOTE — PLAN OF CARE
Problem: Patient Care Overview  Goal: Plan of Care/Patient Progress Review  Outcome: Declining  Heart Failure Care Pathway  GOALS TO BE MET BEFORE DISCHARGE:    1. Decrease congestion and/or edema with diuretic therapy to achieve near      optimal volume status.            Dyspnea improved:  NA            Edema improved:     No, please explain: Pitting edema present from feet to flank bilaterally.         Net I/O and Weights since admission:          12/09 2300 - 12/14 2259  In: 1762 [P.O.:1750; I.V.:12]  Out: 950 [Urine:950]  Net: 812            Vitals:    12/12/17 1347 12/12/17 2100 12/13/17 0500   Weight: 87.1 kg (192 lb) 89.2 kg (196 lb 9.6 oz) 88.2 kg (194 lb 7.1 oz)       2.  O2 sats > 92% on RA or at prior home O2 therapy level.          Current oxygenation status:       SpO2: 94 %         O2 Device: None (Room air),            Able to wean O2 this shift to keep sats > 92%:  NA       Does patient use Home O2? No    3.  Tolerates ambulation and mobility near baseline: NA        How many times did the patient ambulate with nursing staff this shift? Pt is total care, non-ambulatory    Pt somnolent t/o shift. Arouses to voice and gentle touch. Unable to open eyes completely. Speech is difficult to understand. Monitor shows SR with long 1st degree AVB and PACs. LS diminished t/o with wheezes present in L field at beginning of shift. LS diminished and clear at end of shift after repositioning. Two loose stools this shift. C diff negative. Barrier ointment applied to reddened coccyx. Pt stated he did not want to take his medications this evening. Agreed to take Trileptal and tolerated tablet with apple sauce. Pt declines to eat reporting he has no appetite. MD updated regarding SBPs in 80s. Will continue to monitor, hold HS hydralazine, and call for IVF bolus if no improvement. Plan is to d/c back to [facility] when stable.    Please review the Heart Failure Care Pathway for additional HF goal outcomes.    Lashanda  Maximino LE  12/14/2017

## 2017-12-15 NOTE — PLAN OF CARE
"Problem: Patient Care Overview  Goal: Plan of Care/Patient Progress Review  PT: Cancel and defer PT eval. Attempted mobility assessment this am however pt again very lethargic, somnolent and unable to remain awake to follow commands or participate safely in EOB or OOB activity. Wakes momentarily to voice and touch but refused offer to assist with transfer to chair to eat breakfast stating \"I'm not hungry\" or sitting EOB stating \"sounds like a lot of work.\" Discussed with interdisciplinary team and will defer evaluation at this time as considering long term care placement and possible comfort cares to be discussed with pt's family/friends. Please reorder should pt experience a change in alertness or if pt demonstrating ability to participate in skilled therapeutic interventions.       "

## 2017-12-15 NOTE — PROGRESS NOTES
[facility] Nurse Inpatient Skin Assessment     Initial Assessment of:   Coccyx/perineal area        Data:   Patient History:      per MD note(s):  Kelechi Coleman is a 82 year old male who was admitted on 2017 after a fall.       Darian Assessment and sub scores:   Darian Score  Av.5  Min: 12  Max: 20    Positioning: pillows    Mattress:  Standard , Atmos Air       Moisture Management:  Incontinence Protocol, diaper      Current Diet / Nutrition:       Active Diet Order      Combination Diet 3359-1749 Calories: Moderate Consistent CHO (4-6 CHO units/meal); Dysphagia Diet Level 1: Pureed; Thin Liquids (water, ice chips, juice, milk gelatin, ice cream, etc); 2 gm NA Diet; Low Fat Diet    Labs:   Recent Labs   Lab Test  12/15/17   0535   17   1400   17   0303   16   1559   ALBUMIN   --    --   2.8*   --    --    < >   --    HGB  9.5*   < >  10.0*   < >  9.6*   < >   --    INR   --    --   1.32*   --    --    < >   --    WBC  3.3*   < >  7.3   < >  8.8   < >   --    A1C   --    --    --    --   7.4*   < >   --    CRP   --    --    --    --    --    --   4.0    < > = values in this interval not displayed.         Skin Assessment (location):   Coccyx, perineal area  History:  Pt has fragile skin and was admitted with scattered bruising and scrapes all over from recent fall.  Is incontinent of urine and stool.  Very limited mobility and needs assist of 2 for repositioning, but does lie on his side well.      Skin:  Intact throughout.  Coccyx crease and inner buttocks deep pink, mostly easily blanchable, with a few small areas that are slow/difficult to jeri, where the upper buttocks come together and rub on each other. Perianal area appears intact.  Sacrum has scattered dark maroon bruises, up to 3-4cm diameter.      Drainage:  n/a    Odor: none    Pain:  denies          Intervention:     Patient's chart evaluated.      Assessed: coccyx, buttocks, perineal  area    Orders  Written    Supplies  Reviewed    Discussed plan of care with Patient and Nurse          Assessment:      Coccyx, buttocks, perineal area all appear intact, no obvious pressure injuries.   There is some deep pink erythema but remains blanchable.  Dark purplish areas to sacrum appear to be bruises rather than DTIs, but will monitor.  Pt at risk for further breakdown, very limited mobility, Dairan only 15.  However he turns well with assist, so diligent PIP measures by nursing staff should help keep skin intact.  Will continue barrier paste instead of dressings to avoid trapping moisture.        Plan:     Nursing to notify the Provider(s) and re-consult the WOC Nurse if skin deteriorate(s).      PIP and skin care plan to coccyx/buttocks/perineal area:  BID and prn:  - cleanse gently with wipes or preferably Eduardo perineal lotion  - apply thin layer of Critic-Aid barrier paste to perianal area and inner buttocks  - reposition pt every 1-2 hrs, side to side ONLY, supine only briefly for meals etc  - HOB as low as tolerated to minimize shear  - frequent timely pericares to prevent incontinence-related skin damage  - float heels at all times  - notify WOC if any concerns       WOC Nurse will return: weekly and prn  Face to face time: 15 min

## 2017-12-15 NOTE — PLAN OF CARE
Problem: Patient Care Overview  Goal: Plan of Care/Patient Progress Review  Outcome: No Change  Pt somnolent and lethargic most part of the night. Pt appears more awake and alert, disoriented to time, denies any complaints at this time. Being repositioned in bed q2h. Pt has orders for speech and swallow eval. SR w/ 1st degree AVB on tele. Last /63. Will continue to monitor pt.

## 2017-12-15 NOTE — PROGRESS NOTES
SW:  D:  Patient was admitted from [facility].  Call placed to check which area of [place] patient is from.  Per Sonali, patient was initially admitted to the tcu and when his Medicare days ran out he transitioned to the [facility] unit.  They were talking about patient transitioning to the long term care unit when patient was admitted.  They are asking for us to update Mary or [place] once we have a timeline on discharge.  P:  Will continue to follow.

## 2017-12-15 NOTE — PROGRESS NOTES
CLINICAL SWALLOW EVALUATION       12/15/17 0900   General Information   Onset Date 12/12/17   Start of Care Date 12/15/17   Referring Physician Dr. Boaz Champion   Patient Profile Review/OT: Additional Occupational Profile Info See Profile for full history and prior level of function   Patient/Family Goals Statement Patient would like to eat orally.    Swallowing Evaluation Bedside swallow evaluation   Behaviorial Observations Alert   Mode of current nutrition Oral diet   Type of oral diet Regular;Thin liquid   Respiratory Status Room air   Comments Patient admitted with MI, fall at home on 12/12/17.  Patient's PMH is significant for multiple CVAs, DM2, seizures, GERD, CAD, MI hx, and dysphagia hx s/p recent falls this year.     Clinical Swallow Evaluation   Oral Musculature anomalies present   Structural Abnormalities none present   Dentition present and adequate   Mucosal Quality dry   Mandibular Strength and Mobility intact   Oral Labial Strength and Mobility impaired retraction;impaired seal;impaired pursing   Lingual Strength and Mobility impaired protrusion;impaired anterior elevation;impaired left lateral movement;impaired right lateral movement   Velar Elevation intact   Buccal Strength and Mobility intact   Laryngeal Function Cough;Throat clear;Swallow;Voicing initiated;Dry swallow palpated   Oral Musculature Comments Moderate weakness, generalized   Additional Documentation Yes   Additional evaluation(s) completed today No   Clinical Swallow Eval: Thin Liquid Texture Trial   Mode of Presentation, Thin Liquids cup;self-fed;straw   Volume of Liquid or Food Presented 5 oz   Oral Phase of Swallow Premature pharyngeal entry;Poor AP movement   Pharyngeal Phase of Swallow impaired;repeated swallows;throat clearing   Successful Strategies Trialed During Procedure other (see comments)  (Double swallow)   Diagnostic Statement Patient exhibited throat clear response with liquids by straw, wet vocal quality with  consecutive drinks by cup.    Clinical Swallow Eval: Puree Solid Texture Trial   Mode of Presentation, Puree spoon;self-fed   Volume of Puree Presented 3 oz   Oral Phase, Puree Poor AP movement;Residue in oral cavity   Oral Residue, Puree mid posterior tongue   Pharyngeal Phase, Puree intact   Diagnostic Statement Mild oral residue, tongue pumping prior to swallow initiation.    Clinical Swallow Eval: Semisolid Texture Trial   Mode of Presentation, Semisolid spoon;self-fed   Volume of Semisolid Food Presented 3 oz scrambled eggs   Oral Phase, Semisolid Poor AP movement;Residue in oral cavity   Oral Residue, Semisolid mid posterior tongue;left anterior lateral sulci;right anterior lateral sulci   Pharyngeal Phase, Semisolid impaired;repeated swallows   Diagnostic Statement Moderate oral residue even after liquid wash, tongue pumping prior to swallow initiation.    Swallow Eval: Clinical Impressions   Skilled Criteria for Therapy Intervention Skilled criteria met.  Treatment indicated.   Functional Assessment Scale (FAS) 3   Dysphagia Outcome Severity Scale (HENRY) Level 3 - HENRY   Treatment Diagnosis Moderate oropharyngeal dysphagia   Diet texture recommendations Thin liquids;Dysphagia diet level 1   Recommended Feeding/Eating Techniques small sips/bites;hard swallow w/ each bite or sip;other (see comments);maintain upright posture during/after eating for 30 mins;no straws  (Double swallow)   Demonstrates Need for Referral to Another Service dietitian;social work   Therapy Frequency daily   Predicted Duration of Therapy Intervention (days/wks) 1 week   Anticipated Discharge Disposition inpatient rehabilitation facility   Risks and Benefits of Treatment have been explained. Yes   Patient, family and/or staff in agreement with Plan of Care Yes   Clinical Impression Comments Patient presents with oral weakness, moderate oropharyngeal dysphagia characterized by tongue pumping with difficult A-P bolus propulsion, oral  residue not eliminated with liquid wash, and throat clear with liquids by straw/consecutive drinks by cup.  Recommend DYSPHAGIA DIET LEVEL 1 WITH THIN LIQUIDS, only when alert and upright in chair as able, double swallow to reduce pharyngeal residue.     Total Evaluation Time   Total Evaluation Time (Minutes) 25

## 2017-12-16 NOTE — PLAN OF CARE
Problem: Cardiac: Heart Failure (Adult)  Goal: Signs and Symptoms of Listed Potential Problems Will be Absent, Minimized or Managed (Cardiac: Heart Failure)  Signs and symptoms of listed potential problems will be absent, minimized or managed by discharge/transition of care (reference Cardiac: Heart Failure (Adult) CPG).   Outcome: No Change  Heart Failure Care Pathway  GOALS TO BE MET BEFORE DISCHARGE:    1. Decrease congestion and/or edema with diuretic therapy to achieve near      optimal volume status.            Dyspnea improved:  Yes            Edema improved:     No, stable at 1-2+        Net I/O and Weights since admission:          12/10 2300 - 12/15 2259  In: 3238 [P.O.:3220; I.V.:18]  Out: 1475 [Urine:1475]  Net: 1763            Vitals:    12/12/17 1347 12/12/17 2100 12/13/17 0500 12/15/17 0423   Weight: 87.1 kg (192 lb) 89.2 kg (196 lb 9.6 oz) 88.2 kg (194 lb 7.1 oz) 88.9 kg (195 lb 15.8 oz)    12/15/17 1100   Weight: 87 kg (191 lb 12.8 oz)       2.  O2 sats > 92% on RA or at prior home O2 therapy level.          Current oxygenation status:       SpO2: 95 %         O2 Device: None (Room air),            Able to wean O2 this shift to keep sats > 92%:  Yes       Does patient use Home O2? No    3.  Tolerates ambulation and mobility near baseline: No, please explain: pt needing strong assist of 2 to pivot to chair        How many times did the patient ambulate with nursing staff this shift? 0    Please review the Heart Failure Care Pathway for additional HF goal outcomes.    Pt A&Ox4, VSS. Pt denies pain, sob, dizziness, numbness and tingling. LS clear but dim throughout. Pt SR w 1st degree AV block. Pt tolerating mod/carb puree diet w/ thin liquids. Pt appears to be resting comfortably at this time, will continue to monitor.    Johnson Ren RN  12/16/2017

## 2017-12-16 NOTE — PLAN OF CARE
Problem: Cardiac: Heart Failure (Adult)  Goal: Signs and Symptoms of Listed Potential Problems Will be Absent, Minimized or Managed (Cardiac: Heart Failure)  Signs and symptoms of listed potential problems will be absent, minimized or managed by discharge/transition of care (reference Cardiac: Heart Failure (Adult) CPG).   Outcome: No Change  Heart Failure Care Pathway  GOALS TO BE MET BEFORE DISCHARGE:    1. Decrease congestion and/or edema with diuretic therapy to achieve near      optimal volume status.            Dyspnea improved:  Yes            Edema improved:     Yes, a little bit better after pt back to bed from the chair        Net I/O and Weights since admission:          12/10 2300 - 12/15 2259  In: 3238 [P.O.:3220; I.V.:18]  Out: 1475 [Urine:1475]  Net: 1763            Vitals:    12/12/17 1347 12/12/17 2100 12/13/17 0500 12/15/17 0423   Weight: 87.1 kg (192 lb) 89.2 kg (196 lb 9.6 oz) 88.2 kg (194 lb 7.1 oz) 88.9 kg (195 lb 15.8 oz)    12/15/17 1100   Weight: 87 kg (191 lb 12.8 oz)       2.  O2 sats > 92% on RA or at prior home O2 therapy level.          Current oxygenation status:       SpO2: 95 %         O2 Device: None (Room air),            Able to wean O2 this shift to keep sats > 92%:  NA; pt on room air       Does patient use Home O2? No    3.  Tolerates ambulation and mobility near baseline: No; pt up with strong assist of 2 and a walker to chair        How many times did the patient ambulate with nursing staff this shift? 0    Please review the Heart Failure Care Pathway for additional HF goal outcomes.    Tele SR with 1st degree AVB; pt denied pain; voided/incontinent x 1 in a large amount on the pm shift; lung sounds diminished; pt denied feeling short of breath at rest; continue to monitor.     Lisbet Linn RN  12/16/2017

## 2017-12-16 NOTE — PLAN OF CARE
"Problem: Patient Care Overview  Goal: Plan of Care/Patient Progress Review    SLP: Cancel. Pt somnolent upon arrival. Voiced \"hello\" but did not open eyes. Educated of goals for dysphagia tx session, pt unable to remain alert for safe participation in dysphagia tx. Will follow per POC.       "

## 2017-12-16 NOTE — PROGRESS NOTES
X-cover note:    1/2 blood culture positive for coag neg staph, likely contaminant.  No antibiotics indicated at this time.     Heena Vale MD  Hospitalist

## 2017-12-16 NOTE — PROGRESS NOTES
Report called in to 66 floor nurse. Pt transferred to station 66 around 1330, left with his belongings and meds, family aware of the transfer.

## 2017-12-16 NOTE — PLAN OF CARE
Problem: Cardiac: Heart Failure (Adult)  Goal: Signs and Symptoms of Listed Potential Problems Will be Absent, Minimized or Managed (Cardiac: Heart Failure)  Signs and symptoms of listed potential problems will be absent, minimized or managed by discharge/transition of care (reference Cardiac: Heart Failure (Adult) CPG).   Outcome: Improving  Heart Failure Care Pathway  GOALS TO BE MET BEFORE DISCHARGE:    1. Decrease congestion and/or edema with diuretic therapy to achieve near      optimal volume status.            Dyspnea improved:  Yes            Edema improved:     No, please explain: BLE and sacrum edema unchanged          Net I/O and Weights since admission:          12/10 1500 - 12/15 1459  In: 2738 [P.O.:2720; I.V.:18]  Out: 1375 [Urine:1375]  Net: 1363            Vitals:    12/12/17 1347 12/12/17 2100 12/13/17 0500 12/15/17 0423   Weight: 87.1 kg (192 lb) 89.2 kg (196 lb 9.6 oz) 88.2 kg (194 lb 7.1 oz) 88.9 kg (195 lb 15.8 oz)    12/15/17 1100   Weight: 87 kg (191 lb 12.8 oz)       2.  O2 sats > 92% on RA or at prior home O2 therapy level.          Current oxygenation status:       SpO2: 94 %         O2 Device: None (Room air),            Able to wean O2 this shift to keep sats > 92%:  Yes       Does patient use Home O2? No    3.  Tolerates ambulation and mobility near baseline: No, please explain: Generalized weakness, unable to get up without walker, A2 with lift          How many times did the patient ambulate with nursing staff this shift? X1 from bed to chair  Pt more awake, alert and oriented x 3, disoriented to time. Diet changed to dysphagia level one with thin liquids. Scheduled hydralazine not given due to soft BP.MD to continue discussion with pt regarding comfort cares.    Please review the Heart Failure Care Pathway for additional HF goal outcomes.    Ashwini Elliott RN  12/15/2017

## 2017-12-16 NOTE — PLAN OF CARE
Problem: Patient Care Overview  Goal: Plan of Care/Patient Progress Review  Outcome: No Change  Arrived on Station 66 at about 1400. A&Ox4. VSS on RA. No complaints of pain, SOB. Tele. Moderate carb/DD1 pureed, thin liquid/low sodium/low fat diet. Family at bedside. IV SL. Voiding via bedside urinal. Up with assist of 1, walker, and gait belt. Nursing will continue to monitor.

## 2017-12-16 NOTE — PROVIDER NOTIFICATION
MD Notification    Notified Person:  MD     Notified Persons Name: Kavin    Notification Date/Time:1850    Notification Interaction:  Talked with Physician    Purpose of Notification: gm + cocci in clusters from blood culture 12/14/17 1345, L arm    Orders Received: No new orders at this time    Comments:Call back when rapid ID of bacteria comes back in 2-3 hours.

## 2017-12-16 NOTE — PROGRESS NOTES
"[facility]    Hospitalist Progress Note    Date of Service (when I saw the patient): 12/16/2017    Assessment & Plan   Kelechi Coleman is a 82 year old male who was admitted on 12/12/2017 after a fall.    1. Acute on chronic heart failure exacerbation - Appears euvolemic.  Continue torsemide 20 mg daily.  Continue Coreg and Imdur.   Discontinue amlodipine and reduce hydralazine to 25 mg tid due low BP on 12/13.  Reduce Imdur to 90 mg daily on 12/15.  Cardiology consult appreciated.  Echo demonstrates slightly decreased EF from previous echo in Sept 2017, now 30-35%.  Discontinue telemetry and transfer to med floor.    2. NSTEMI, type 2 - Likely stress induced from volume depletion.  Continue plavix.    3. Fall with occipital laceration - Staples to be removed in 10 days from 12/12.  Reconsult PT/OT consult for physical deconditioning.  Patient initially unable to participate due to lethargy and fatigue, but now wanting therapies.    4. DM2 - Lantus on hold, continue ISS    5. Seizures - Continue Keppra and Trileptal.    6. GERD - Continue protonix.    7. JUVENTINO - Spironolactone on hold.  Cr 1.57 on admission, improved to 1.52 after 1 L NS bolus.  Baseline Cr 1.2-1.3.  Max Cr 1.76 on 12/15.    8. HL - Continue atorvastatin.    9. Malnutrition - Consult Nutrition.    10. Metabolic encephalopathy - UA, CXR, C diff, stool THIAGO, blood culture unrevealing.  Likely due to delirium.  Discussed Comfort Cares with patient on 12/15.  Patient wants to pursue therapies with the goal of returning to independent living.    DVT Prophylaxis: Pneumatic Compression Devices  Code Status: DNR/DNI/DNI, discussed with sister, Samantha Fenton on 12/14/17.    Disposition: Expected discharge in 1-3 days.    Boaz Champion  Text Page (7 am to 6 pm)    Interval History   The patient is sitting on the commode.  \"I don't want to give up!\"  He states that he wants to participate in therapy to return to independent " living.    -Data reviewed today: I reviewed all new labs and imaging results over the last 24 hours. I personally reviewed no images or EKG's today.    Physical Exam   Temp: 97.1  F (36.2  C) Temp src: Oral BP: 105/69 Pulse: 62 Heart Rate: 64 Resp: 16 SpO2: 95 % O2 Device: None (Room air)    Vitals:    12/13/17 0500 12/15/17 0423 12/15/17 1100   Weight: 88.2 kg (194 lb 7.1 oz) 88.9 kg (195 lb 15.8 oz) 87 kg (191 lb 12.8 oz)     Vital Signs with Ranges  Temp:  [93.3  F (34.1  C)-98.1  F (36.7  C)] 97.1  F (36.2  C)  Pulse:  [56-62] 62  Heart Rate:  [53-68] 64  Resp:  [16-18] 16  BP: ()/(39-69) 105/69  SpO2:  [91 %-98 %] 95 %  I/O last 3 completed shifts:  In: 1476 [P.O.:1470; I.V.:6]  Out: 650 [Urine:650]    Gen: Debilitated deconditioned, thin elderly male, awake and alert, no acute distressed  HEENT: Normocephalic, stellate laceration occiput.  Lungs: Clear to ausculation without wheezes, rhonchi, or rales  Heart: Regular rate and rhythm, no gallops or rubs, 2/6 NENA  GI: Bowel sound normal, no hepatosplenomegaly or masses  Lymph: No lymphadenopathy, 2 + BLE edema to sacrum  Skin: No rashes     Medications        isosorbide mononitrate  90 mg Oral Daily     levETIRAcetam  500 mg Oral BID     torsemide  20 mg Oral Daily     hydrALAZINE  25 mg Oral Q8H MAR     atorvastatin  40 mg Oral At Bedtime     carvedilol  12.5 mg Oral QAM     OXcarbazepine  150 mg Oral BID     pantoprazole  40 mg Oral QPM     clopidogrel  75 mg Oral Daily     sodium chloride (PF)  3 mL Intracatheter Q8H     insulin aspart  1-7 Units Subcutaneous TID AC     insulin aspart  1-5 Units Subcutaneous At Bedtime       Data     Recent Labs  Lab 12/16/17  0600 12/15/17  0535 12/14/17  0555 12/13/17  0548 12/13/17  0200 12/12/17  2130  12/12/17  1400   WBC 5.3 3.3* 4.4 5.9  --   --   --  7.3   HGB 9.6* 9.5* 10.2* 9.6*  --   --   < > 10.0*   MCV 89 90 88 88  --   --   --  89   * 132* 131* 145*  --   --   --  148*   INR  --   --   --   --   --    --   --  1.32*    142 139 141  --   --   --  141   POTASSIUM 3.6 3.9 4.0 3.8  --   --   --  3.9   CHLORIDE 107 107 106 106  --   --   --  106   CO2 26 27 26 25  --   --   --  26   BUN 47* 46* 42* 39*  --   --   --  39*   CR 1.52* 1.76* 1.52* 1.44*  --   --   --  1.57*   ANIONGAP 7 8 7 10  --   --   --  9   MIGUEL 7.6* 7.7* 8.2* 8.4*  --   --   --  8.7   * 99 151* 85  --   --   --  131*   ALBUMIN  --   --   --   --   --   --   --  2.8*   PROTTOTAL  --   --   --   --   --   --   --  6.6*   BILITOTAL  --   --   --   --   --   --   --  0.8   ALKPHOS  --   --   --   --   --   --   --  221*   ALT  --   --   --   --   --   --   --  20   AST  --   --   --   --   --   --   --  21   TROPI  --   --   --  0.714* 0.714* 0.709*  < > 0.629*   < > = values in this interval not displayed.    No results found for this or any previous visit (from the past 24 hour(s)).

## 2017-12-16 NOTE — PROVIDER NOTIFICATION
Dr. Jackson notified of pt's blood culture result of + staphylococcus epidermidis and + mecA gene. No further orders at this time.

## 2017-12-17 NOTE — PROGRESS NOTES
12/17/17 1010   Quick Adds   Type of Visit Initial PT Evaluation   Living Environment   Lives With facility resident   Living Arrangements extended care facility   Home Accessibility no concerns   Living Environment Comment Patient is a resident at [facility] and just recently transitioned from [facility] prior to fall/hospitalization    Self-Care   Dominant Hand right   Usual Activity Tolerance moderate   Current Activity Tolerance fair   Regular Exercise no   Equipment Currently Used at Home walker, rolling   Functional Level Prior   Ambulation 3-->assistive equipment and person   Transferring 3-->assistive equipment and person   Toileting 3-->assistive equipment and person   Bathing 3-->assistive equipment and person   Dressing 2-->assistive person   Eating 0-->independent   Communication 0-->understands/communicates without difficulty   Swallowing 0-->swallows foods/liquids without difficulty   Cognition 0 - no cognition issues reported   Fall history within last six months yes   Number of times patient has fallen within last six months 4   Which of the above functional risks had a recent onset or change? ambulation;transferring;toileting;bathing;fall history   Prior Functional Level Comment A x 1 for ADLs/amb using FWW per patient report   General Information   Onset of Illness/Injury or Date of Surgery - Date 12/12/17   Referring Physician Boaz Champion MD   Patient/Family Goals Statement none stated   Pertinent History of Current Problem (include personal factors and/or comorbidities that impact the POC) Patient is an 81 yo male admitted on 12/12/17 after a fall at facility with occipital laceration   Precautions/Limitations fall precautions   Cognitive Status Examination   Orientation orientation to person, place and time   Level of Consciousness alert   Follows Commands and Answers Questions 100% of the time;able to follow multistep instructions   Personal Safety and Judgment intact   Memory intact  "  Integumentary/Edema   Integumentary/Edema Comments head incision intact   Range of Motion (ROM)   ROM Comment B LE grossly WNL for PROM   Strength   Strength Comments B LE grossly 2+/5 during MMT, but able to sustain weight without buckling when standing   Bed Mobility   Bed Mobility Comments Mod A x 2   Transfer Skills   Transfer Comments sit <> stand Mod x 2   Gait   Gait Comments CGA with FWW   Balance   Balance Comments fair/poor   General Therapy Interventions   Planned Therapy Interventions bed mobility training;gait training;strengthening;ROM;transfer training;home program guidelines;progressive activity/exercise   Clinical Impression   Criteria for Skilled Therapeutic Intervention yes, treatment indicated   PT Diagnosis weakness; deconditioning   Influenced by the following impairments weakness, imbalance   Functional limitations due to impairments difficulty with gait/transfers, increased fall risk   Clinical Presentation Stable/Uncomplicated   Clinical Presentation Rationale Patient is requiring increased assist from baseline to perform amb, transfers and ADLs with increased fall risk   Clinical Decision Making (Complexity) Low complexity   Therapy Frequency` daily   Predicted Duration of Therapy Intervention (days/wks) 4-5 days   Anticipated Discharge Disposition Long Term Care Facility;Transitional Care Facility   Risk & Benefits of therapy have been explained Yes   Patient, Family & other staff in agreement with plan of care Yes   [facility] AM-PAC  \"6 Clicks\" V.2 Basic Mobility Inpatient Short Form   1. Turning from your back to your side while in a flat bed without using bedrails? 2 - A Lot   2. Moving from lying on your back to sitting on the side of a flat bed without using bedrails? 2 - A Lot   3. Moving to and from a bed to a chair (including a wheelchair)? 2 - A Lot   4. Standing up from a chair using your arms (e.g., wheelchair, or bedside chair)? 2 - A Lot   5. To walk in hospital " room? 3 - A Little   6. Climbing 3-5 steps with a railing? 1 - Total   Basic Mobility Raw Score (Score out of 24.Lower scores equate to lower levels of function) 12   Total Evaluation Time   Total Evaluation Time (Minutes) 15

## 2017-12-17 NOTE — PLAN OF CARE
Problem: Patient Care Overview  Goal: Plan of Care/Patient Progress Review  Outcome: No Change  Heart Failure Care Pathway  GOALS TO BE MET BEFORE DISCHARGE:    1. Decrease congestion and/or edema with diuretic therapy to achieve near      optimal volume status.            Dyspnea improved:  Yes            Edema improved:     Yes        Net I/O and Weights since admission:          12/11 1500 - 12/16 1459  In: 3438 [P.O.:3420; I.V.:18]  Out: 2025 [Urine:2025]  Net: 1413            Vitals:    12/12/17 1347 12/12/17 2100 12/13/17 0500 12/15/17 0423   Weight: 87.1 kg (192 lb) 89.2 kg (196 lb 9.6 oz) 88.2 kg (194 lb 7.1 oz) 88.9 kg (195 lb 15.8 oz)    12/15/17 1100 12/16/17 0914   Weight: 87 kg (191 lb 12.8 oz) 86.8 kg (191 lb 6.4 oz)       2.  O2 sats > 92% on RA or at prior home O2 therapy level.          Current oxygenation status:       SpO2: 100 %         O2 Device: None (Room air),            Able to wean O2 this shift to keep sats > 92%:  Yes       Does patient use Home O2? No    3.  Tolerates ambulation and mobility near baseline: Yes        How many times did the patient ambulate with nursing staff this shift? 1    Please review the Heart Failure Care Pathway for additional HF goal outcomes.      Pt A+Oxe, forgetful. pt denied pain; voided/incontinent x 1; lung sounds diminished; pt denied feeling short of breath at rest; continue to monitor.      Danyelle Rangel RN  12/16/2017

## 2017-12-17 NOTE — PLAN OF CARE
Problem: Patient Care Overview  Goal: Plan of Care/Patient Progress Review  Outcome: No Change  Pt is A&Ox3, Forgetful. VSS on RA, denied pain, up with 2 assist walker/gaitbelt. Up in chair for meals. Tolerated new DD3/thin liquids. Incision on back of head CDI open to air staples intact. +2/+3 edema in bilat lower extremities. Ace wraps on, elevated in bed. /191. Plan to possibly d/c tomorrow back to [facility].

## 2017-12-17 NOTE — PLAN OF CARE
Problem: Patient Care Overview  Goal: Plan of Care/Patient Progress Review  Outcome: Improving  Pt A&Ox4. Forgetful . VSS on RA. No complaints of pain, SOB.  Moderate carb/DD1 pureed, thin liquid/low  IV SL. Voiding via urinal. Up with assist of 2, lift, generalized edema noted. Pt has multiple bruises on the body . Wound on the head staples clean dry intact. seizure precaution maintained  continue to monitor.

## 2017-12-17 NOTE — PROGRESS NOTES
12/17/17 0800   Quick Adds   Type of Visit Initial Occupational Therapy Evaluation   Living Environment   Lives With facility resident   Living Arrangements extended care facility   Home Accessibility no concerns   Living Environment Comment Pt is living at [facility], pt had transitioned from TCU just a few days prior to most recent fall   Self-Care   Dominant Hand right   Usual Activity Tolerance moderate   Current Activity Tolerance fair   Regular Exercise no   Equipment Currently Used at Home walker, rolling   Functional Level Prior   Ambulation 3-->assistive equipment and person   Transferring 3-->assistive equipment and person   Toileting 3-->assistive equipment and person   Bathing 3-->assistive equipment and person   Dressing 2-->assistive person   Eating 0-->independent   Communication 2-->difficulty speaking (not related to language barrier)   Swallowing 0-->swallows foods/liquids without difficulty   Cognition 0 - no cognition issues reported   Fall history within last six months yes   Number of times patient has fallen within last six months 4   Which of the above functional risks had a recent onset or change? ambulation;transferring;toileting;bathing;dressing;communication/speech;fall history  (since subdural/subarachnoid hematoma August 2017)   Prior Functional Level Comment Pt was needing assist of 1 for all ADLs per pt report, states he req assist for toilet hygine, and all mobility   General Information   Onset of Illness/Injury or Date of Surgery - Date 12/12/17   Referring Physician Boaz Champion MD   Additional Occupational Profile Info/Pertinent History of Current Problem Pt is a 81 yo male who was admitted following a fall with occipital laceration, NSTEMI type 2, and history of seizure.  See chart for full history.    Precautions/Limitations fall precautions;seizure precautions   Cognitive Status Examination   Orientation orientation to person, place and time   Level of  Consciousness alert   Able to Follow Commands WNL/WFL   Visual Perception   Visual Perception Wears glasses  (reading)   Pain Assessment   Patient Currently in Pain No   Range of Motion (ROM)   ROM Comment LUE impairment, WFL for ADLs; at baseline   Strength   Strength Comments LUE 3+/5, at baseline, WFL for ADLs with A   Hand Strength   Hand Strength Comments BUE WFL for ADLs   Coordination   Coordination Comments reduced speed, WFL for ADLs   Mobility   Bed Mobility Comments MOD A of 2   Transfer Skills   Transfer Comments A of 2   Transfer Skill: Bed to Chair/Chair to Bed   Level of Walsh: Bed to Chair moderate assist (50% patients effort)   Physical Assist/Nonphysical Assist: Bed to Chair 2 persons;verbal cues;supervision   Weight-Bearing Restrictions full weight-bearing   Assistive Device - Transfer Skill Bed to Chair Chair to Bed Rehab Eval rolling walker   Transfer Skill: Sit to Stand   Level of Walsh: Sit/Stand moderate assist (50% patients effort)   Physical Assist/Nonphysical Assist: Sit/Stand 2 persons;supervision;verbal cues   Transfer Skill: Sit to Stand full weight-bearing   Assistive Device for Transfer: Sit/Stand rolling walker   Transfer Skill: Toilet Transfer   Level of Walsh: Toilet moderate assist (50% patients effort)   Physical Assist/Nonphysical Assist: Toilet 2 persons   Assistive Device rolling walker;seat riser;grab bars   Bathing   Level of Walsh moderate assist (50% patients effort)   Upper Body Dressing   Level of Walsh: Dress Upper Body stand-by assist   Lower Body Dressing   Level of Walsh: Dress Lower Body moderate assist (50% patients effort)   Toileting   Level of Walsh: Toilet moderate assist (50% patients effort)   Grooming   Level of Walsh: Grooming stand-by assist   Eating/Self Feeding   Level of Walsh: Eating independent   Activities of Daily Living Analysis   Impairments Contributing to Impaired Activities of Daily  "Living balance impaired;coordination impaired;fear and anxiety;strength decreased   General Therapy Interventions   Planned Therapy Interventions ADL retraining   Clinical Impression   Criteria for Skilled Therapeutic Interventions Met yes, treatment indicated   OT Diagnosis Reduced IND in ADLs and functional mobility   Influenced by the following impairments Weakness, reduced activity tolerance   Assessment of Occupational Performance 1-3 Performance Deficits   Identified Performance Deficits Transfers/ambulation, toileting, bed mobility   Clinical Decision Making (Complexity) Low complexity   Therapy Frequency 5 times/wk   Predicted Duration of Therapy Intervention (days/wks) 3 days   Anticipated Discharge Disposition Transitional Care Facility   Risks and Benefits of Treatment have been explained. Yes   Patient, Family & other staff in agreement with plan of care Yes   [facility] AM-PAC  \"6 Clicks\" Daily Activity Inpatient Short Form   1. Putting on and taking off regular lower body clothing? 2 - A Lot   2. Bathing (including washing, rinsing, drying)? 2 - A Lot   3. Toileting, which includes using toilet, bedpan or urinal? 2 - A Lot   4. Putting on and taking off regular upper body clothing? 3 - A Little   5. Taking care of personal grooming such as brushing teeth? 3 - A Little   6. Eating meals? 4 - None   Daily Activity Raw Score (Score out of 24.Lower scores equate to lower levels of function) 16   Total Evaluation Time   Total Evaluation Time (Minutes) 15     "

## 2017-12-17 NOTE — PLAN OF CARE
PT orders received, chart reviewed. PT eval completed and treatment initiated. Patient is an 83 yo male admitted to ED on 12/12/17 after fall in his facility resulting in occipital laceration. Patient recently transitioned from TCU to Board/Care at [facility]. PMH: CHF, h/o seizure d/o, multiple falls, A-fib, CAD, DM II, GERD with esophagitis, hyperlipidemia, HTN, h/o MI, PAD, stented coronary artery, CKD III, h/o cerebral infarction, and h/o CVA. PLOF: patient is a resident at [facility] and received assist for ADLs. Patient reports using 2WW for ambulation.    Discharge Planner PT   Patient plan for discharge: none stated  Current status: Patient denies pain. Bed mobility and transfers with Mod A x 2 using FWW. Once patient is standing, he is CGA with FWW. Able to ambulate 30 ft with FWW and CGA.  Barriers to return to prior living situation: none anticipated to return to [facility]  Recommendations for discharge: return to [facility]  Rationale for recommendations: requires assist for functional mobility and ADLs with increased fall risk       Entered by: Hilda Hunt 12/17/2017 12:08 PM

## 2017-12-17 NOTE — PROGRESS NOTES
[facility]    Hospitalist Progress Note    Date of Service (when I saw the patient): 12/17/2017    Assessment & Plan   Kelechi Coleman is a 82 year old male who was admitted on 12/12/2017 after a fall.    1. Acute on chronic heart failure exacerbation - Appears euvolemic.  Continue torsemide 20 mg daily.  Continue Coreg and Imdur.   Discontinue amlodipine and reduce hydralazine to 25 mg tid due low BP on 12/13.  Reduce Imdur to 90 mg daily on 12/15.  Cardiology consult appreciated.  Echo demonstrates slightly decreased EF from previous echo in Sept 2017, now 30-35%.  Discontinue telemetry and transfer to med floor.  Ace wraps to help with BLE edema.    2. NSTEMI, type 2 - Likely stress induced from volume depletion.  Continue plavix.    3. Fall with occipital laceration - Staples to be removed in 10 days from 12/12.  Reconsult PT/OT consult for physical deconditioning.  Patient initially unable to participate due to lethargy and fatigue, but now wanting therapies.    4. DM2 - Lantus on hold, continue ISS    5. Seizures - Continue Keppra and Trileptal.    6. GERD - Continue protonix.    7. JUVENTINO - Spironolactone on hold.  Cr 1.57 on admission, improved to 1.52 after 1 L NS bolus.  Now 1.29.  Baseline Cr 1.2-1.3.  Max Cr 1.76 on 12/15.    8. HL - Continue atorvastatin.    9. Malnutrition - Consult Nutrition.    10. Metabolic encephalopathy - Resolved.  UA, CXR, C diff, stool THIAGO, blood culture unrevealing.  Likely due to hospital delirium.  Discussed Comfort Cares with patient on 12/15.  Patient wants to pursue therapies with the goal of returning to independent living.    11. Physical deconditioning - PT/OT consults.  Likely discharge soon to TCU.    DVT Prophylaxis: Pneumatic Compression Devices  Code Status: DNR/DNI/DNI, discussed with sister, Samantha Fenton on 12/14/17.    Disposition: Expected discharge in 1-2 days.    Boaz Champion  Text Page (7 am to 6 pm)    Interval History   The  patient is sitting in a chair, eating breakfast.  He is awake, alert, pleasant, and joking with staff.    -Data reviewed today: I reviewed all new labs and imaging results over the last 24 hours. I personally reviewed no images or EKG's today.    Physical Exam   Temp: 97.5  F (36.4  C) Temp src: Axillary BP: 120/61 Pulse: 62 Heart Rate: 54 Resp: 16 SpO2: 99 % O2 Device: None (Room air)    Vitals:    12/15/17 1100 12/16/17 0914 12/17/17 0519   Weight: 87 kg (191 lb 12.8 oz) 86.8 kg (191 lb 6.4 oz) 88.3 kg (194 lb 10.7 oz)     Vital Signs with Ranges  Temp:  [97.5  F (36.4  C)-97.8  F (36.6  C)] 97.5  F (36.4  C)  Pulse:  [62] 62  Heart Rate:  [54-68] 54  Resp:  [16] 16  BP: ()/(41-64) 120/61  SpO2:  [99 %-100 %] 99 %  I/O last 3 completed shifts:  In: 260 [P.O.:260]  Out: 925 [Urine:925]    Gen: Debilitated deconditioned, thin elderly male, awake and alert, no acute distressed  HEENT: Normocephalic, stellate laceration occiput.  Lungs: Clear to ausculation without wheezes, rhonchi, or rales  Heart: Regular rate and rhythm, no gallops or rubs, 2/6 NENA  GI: Bowel sound normal, no hepatosplenomegaly or masses  Lymph: No lymphadenopathy, 2 + BLE edema to sacrum  Skin: No rashes     Medications        isosorbide mononitrate  90 mg Oral Daily     levETIRAcetam  500 mg Oral BID     torsemide  20 mg Oral Daily     hydrALAZINE  25 mg Oral Q8H MAR     atorvastatin  40 mg Oral At Bedtime     carvedilol  12.5 mg Oral QAM     OXcarbazepine  150 mg Oral BID     pantoprazole  40 mg Oral QPM     clopidogrel  75 mg Oral Daily     sodium chloride (PF)  3 mL Intracatheter Q8H     insulin aspart  1-7 Units Subcutaneous TID AC     insulin aspart  1-5 Units Subcutaneous At Bedtime       Data     Recent Labs  Lab 12/17/17  0945 12/16/17  0600 12/15/17  0535 12/14/17  0555 12/13/17  0548 12/13/17  0200 12/12/17  2130  12/12/17  1400   WBC  --  5.3 3.3* 4.4 5.9  --   --   --  7.3   HGB  --  9.6* 9.5* 10.2* 9.6*  --   --   < > 10.0*    MCV  --  89 90 88 88  --   --   --  89   PLT  --  125* 132* 131* 145*  --   --   --  148*   INR  --   --   --   --   --   --   --   --  1.32*    140 142 139 141  --   --   --  141   POTASSIUM 3.4 3.6 3.9 4.0 3.8  --   --   --  3.9   CHLORIDE 104 107 107 106 106  --   --   --  106   CO2 26 26 27 26 25  --   --   --  26   BUN 43* 47* 46* 42* 39*  --   --   --  39*   CR 1.29* 1.52* 1.76* 1.52* 1.44*  --   --   --  1.57*   ANIONGAP 10 7 8 7 10  --   --   --  9   MIGUEL 7.7* 7.6* 7.7* 8.2* 8.4*  --   --   --  8.7   * 112* 99 151* 85  --   --   --  131*   ALBUMIN  --   --   --   --   --   --   --   --  2.8*   PROTTOTAL  --   --   --   --   --   --   --   --  6.6*   BILITOTAL  --   --   --   --   --   --   --   --  0.8   ALKPHOS  --   --   --   --   --   --   --   --  221*   ALT  --   --   --   --   --   --   --   --  20   AST  --   --   --   --   --   --   --   --  21   TROPI  --   --   --   --  0.714* 0.714* 0.709*  < > 0.629*   < > = values in this interval not displayed.    No results found for this or any previous visit (from the past 24 hour(s)).

## 2017-12-17 NOTE — PLAN OF CARE
Problem: Patient Care Overview  Goal: Plan of Care/Patient Progress Review    Discharge Planner SLP   Patient plan for discharge: did not state  Current status: Pt seen for dysphagia tx. Pt demonstrating improvement in mental status and swallow skills. Recommending advance to dysphagia diet level 3 and thin liquids. Discussed pt request of being okay for destiny with MD, orders modified. Pt should reduce distractions during intake, consume slow pace, alternate liquids/solids to promote oral clearance  Barriers to return to prior living situation: weakness  Recommendations for discharge: TCU  Rationale for recommendations: short course of SLP to maximize safety with oral intake       Entered by: Cecy Weber 12/17/2017 10:35 AM

## 2017-12-17 NOTE — PLAN OF CARE
Problem: Patient Care Overview  Goal: Plan of Care/Patient Progress Review  OT: Orders received, eval completed, treatment initiated. Pt is a 83 yo male who was admitted following a fall with occipital laceration, pt has acute on chronic heart failure with NSTEMI type 2, and history of seizures. Pt reports he is a resident at [facility], he just transitioned to LTC following a stay in TCU. Pt reports he had A of 1 for all ADLs and functional mobility with 2WW and other AE.     Discharge Planner OT   Patient plan for discharge: TCU is what pt stated as preference   Current status: Pt completed sit to stand with MOD A of 2, once standing pt was CGA of 2 for ambulation, pt had no LOB, appeared steady for short distances, but reports fatigue. Would recommend to continue A of 2 due to history of multiple recent falls, and possibly inconsistent performance. Pt was alert and oriented, appropriate throughout session.   Barriers to return to prior living situation: None/ TCU/[facility]  Recommendations for discharge: TCU or return to LTC/[facility] with Home OT  Rationale for recommendations: Pt would benefit from skilled services to increase IND in ADLs and functional mobility to return to [facility] with decreased level of assist.        Entered by: Lacie Cervantes 12/17/2017 10:01 AM

## 2017-12-17 NOTE — PROGRESS NOTES
D: SW following for DC planning. MIKE reviewed chart. Pt is from [facility] board and care. Per MD, anticipate DC in 1-2 days.   A: Anticipate return to [facility] for PT/OT and to assist with determining appropriate living, B&C vs LTC.   P: Referral sent via DOD to [facility].     ELENITA Onofre, LGSW  [id number]

## 2017-12-18 NOTE — PLAN OF CARE
Problem: Patient Care Overview  Goal: Plan of Care/Patient Progress Review  Discharge Planner SLP   Patient plan for discharge: Patient did not state.   Current status: Patient continues to present with mild to moderate oral and pharyngeal dysphagia at bedside characterized by prolonged mastication of solids with mild oral residue that cleared with a liquid rinse. No overt Sx of aspiration with thin liquids via the cup or straw. Recommend: 1. Continue on the DDL 3 with thin liquids. 2. Upright, small bites/sips and alternate liquids/solids. 3. SLP will f/u at a meal and advanced textures.   Barriers to return to prior living situation: Generalized weakness.   Recommendations for discharge: TCU  Rationale for recommendations: May need short term ST for dysphagia to advance diet to regular as tolerated.        Entered by: Bernadette De Oliveira 12/18/2017 1:32 PM

## 2017-12-18 NOTE — PROGRESS NOTES
"SPIRITUAL HEALTH SERVICES Progress Note  [facility]    , LOS visit. The patient reported being tired of sitting in the hospital room and was hopeful for d/c today or tomorrow. In addition, the patient reported that he was feeling allright \"hanging loose\" here at [facility].       provided care in presence and listening.       has no further plans.        Tacho Rojas  Chaplain Resident  "

## 2017-12-18 NOTE — PLAN OF CARE
Problem: Patient Care Overview  Goal: Plan of Care/Patient Progress Review  Outcome: No Change  VSS & at RA. Pt is A&O x3-4 with intermittent forgetfulness. Denies pain when asked. Pt refused to get out the bed this shift and wanted ace wraps be removed before HS, incontinent of urine. Bilat LE+2-3 edema. Tolerated DD3/thin liquids. Incision on head CDI with staples intact.  & 130. Anticipate d/c to [facility] in 1-2 days.

## 2017-12-18 NOTE — PROGRESS NOTES
MIKE ORDONEZ:  [facility] however they feel he needs to admit to their long term care unit.  He can receive therapies on the LTC however he may have some out of pocket expense.  He has exhausted his SNF benefit so his room and board charges will be private pay.   spoke with his sister Samantha who lives in [place].  She was hoping patient could go to the TCU with the goal of returning to board and care eventually but she accepts the recommendation of [facility].  Writer is waiting to speak with patient regarding the plan.  He has been on the phone when anushar attempted to visit.

## 2017-12-18 NOTE — PLAN OF CARE
Problem: Patient Care Overview  Goal: Plan of Care/Patient Progress Review  Outcome: No Change  VSS on room air.  A&O x4 but forgetful.  Mod cho diet, 2 g na, DD3 thin liquids, low fat.  Up with 1-2 +gb/walker.  Denies pain. PIV saline locked.  BG 92.  Incontinent of urine at times.  Turned and repositioned q 2 hrs.  Incision on head CDI with staples intact.  LS diminished.  BLE 2+ edema.  Nursing will continue to monitor.

## 2017-12-18 NOTE — PLAN OF CARE
Problem: Patient Care Overview  Goal: Plan of Care/Patient Progress Review  Outcome: No Change  Pt is A&Ox4 but forgetful. VSS on RA, c/o mild back pain when up in chair. Tylenol x1 given and helpful. Up with one assist walker/gaitbelt. Uses bedside urinal, no incontinence today. Incision on head CDI/staples intact. LS diminished. Bilat LE +2/+3 edema, ace wraps on. Plan to d/c back to [facility] 1-2 days.

## 2017-12-18 NOTE — PROGRESS NOTES
[facility]    Hospitalist Progress Note    Date of Service (when I saw the patient): 12/18/2017    Assessment & Plan   Kelechi Coleman is a 82 year old male who was admitted on 12/12/2017. PMH significant for recent subdural hematoma and subarachnoid hematoma in 08/2017 with subsequent seizures, chronic kidney disease stage III, heart failure with reduced ejection fraction of 40-45%, peripheral arterial disease, paroxysmal atrial fibrillation, insulin-dependent diabetes mellitus type 2, essential hypertension, hyperlipidemia, multiple cerebrovascular accidents most recent in 09/2016, coronary artery disease, status post CABG x4 and drug-eluting stent x2, most recently in 2016 and on chronic treatment with plavix, gastroesophageal reflux disease with esophagitis, renal artery stenosis, and paroxysmal ventricular tachycardia. Admitted 12/12/17 after a fall.    1. Fall with occipital laceration.  Staples to be removed no sooner than 10 days from 12/12.  PT/OT consulted for physical deconditioning.      2. Acute on chronic heart failure exacerbation.  Now appears euvolemic.  Continue torsemide 20 mg daily.  Continue Coreg and Imdur.     Discontinued amlodipine and reduced hydralazine to 25 mg tid due low BP on 12/13.    Reduce Imdur to 90 mg daily on 12/15.    Cardiology consult appreciated.    Echo demonstrates slightly decreased EF from previous echo in Sept 2017, now 30-35%.    Ace wraps to help with BLE edema.     2. NSTEMI, type 2.  Likely stress induced from volume depletion.  Continue plavix.    4. DM2, insulin-dependent.  PTA Lantus 10 u HS, held on admission.  Diet advancing. Will start patient back on lantus 6 u HS with continued SSI.   Continue AC/HS POC glucose checks.     5. Seizures.  Continue PTA Keppra and Trileptal.     6. GERD.   Continue protonix.     7. JUVENTINO.  Cr 1.57 on admission with max 1.76 on 12/15. Improved to 1.52 after 1 L NS bolus.    Spironolactone held on  admission.  Cr now down to 1.19, which is improved from baseline.   Recheck BMP in AM and consider resuming spironolactone either at discharge or with PCP.     8. HL.  Continue atorvastatin.     9. Malnutrition, non-severe. Inadequate oral intake.  Dysphagia, mild-moderate, oral and pharyngeal.  Consulted Nutrition. Continue current diet with BID oral supplements.   Diet recommendations per speech. Continue ST to advance diet as tolerated.     10. Metabolic encephalopathy, resolved.    UA, CXR, C diff, stool THIAGO, blood culture unrevealing.  Likely due to hospital delirium. Previous hospitalist discussed Comfort Cares with patient on 12/15.  Patient wants to pursue therapies with the goal of returning to independent living.     11. Physical deconditioning.  PT/OT consults.  Likely discharge soon to TCU / LTC facility as available.    DVT Prophylaxis: Pneumatic Compression Devices  Code Status: DNR/DNI    Disposition: Expected discharge pending placement. Care coordinator and social work involved in planning.    Vani Barboza MD    [phone] (P)  Text page (7am to 6pm)    Interval History   No acute events. Patient improving to 1-person assist today. ACE wraps to legs.     -Data reviewed today: I reviewed all new labs and imaging results over the last 24 hours. I personally reviewed no images or EKG's today.    Physical Exam   Temp: 98.1  F (36.7  C) Temp src: Oral BP: 116/57   Heart Rate: 63 Resp: 18 SpO2: 99 % O2 Device: None (Room air)    Vitals:    12/16/17 0914 12/17/17 0519 12/18/17 0700   Weight: 86.8 kg (191 lb 6.4 oz) 88.3 kg (194 lb 10.7 oz) 88.2 kg (194 lb 7.1 oz)     Vital Signs with Ranges  Temp:  [97.4  F (36.3  C)-98.1  F (36.7  C)] 98.1  F (36.7  C)  Heart Rate:  [58-64] 63  Resp:  [16-18] 18  BP: (111-135)/(56-70) 116/57  SpO2:  [95 %-100 %] 99 %  I/O last 3 completed shifts:  In: 440 [P.O.:440]  Out: 1000 [Urine:1000]    Constitutional: Pleasant elderly gentleman in no acute distress. Alert  and oriented. Able to transfer with walker and assist of 1.  HEENT:  Normocephalic. Laceration with staples on occiput.   Respiratory: Clear to auscultation bilaterally. Good breath sounds at bases. No crackles or wheezes.   Cardiovascular: Regular rate and rhythm. 2/6 NENA, noted previously.   GI: Soft, nontender. Normoactive bowel sounds.  Extremities: Patient with ACE wraps to bilateral LE.    Medications        isosorbide mononitrate  90 mg Oral Daily     levETIRAcetam  500 mg Oral BID     torsemide  20 mg Oral Daily     hydrALAZINE  25 mg Oral Q8H MAR     atorvastatin  40 mg Oral At Bedtime     carvedilol  12.5 mg Oral QAM     OXcarbazepine  150 mg Oral BID     pantoprazole  40 mg Oral QPM     clopidogrel  75 mg Oral Daily     sodium chloride (PF)  3 mL Intracatheter Q8H     insulin aspart  1-7 Units Subcutaneous TID AC     insulin aspart  1-5 Units Subcutaneous At Bedtime       Data     Recent Labs  Lab 12/18/17  0818 12/17/17  0945 12/16/17  0600 12/15/17  0535  12/13/17  0548 12/13/17  0200 12/12/17  2130  12/12/17  1400   WBC 6.1  --  5.3 3.3*  < > 5.9  --   --   --  7.3   HGB 10.4*  --  9.6* 9.5*  < > 9.6*  --   --   < > 10.0*   MCV 88  --  89 90  < > 88  --   --   --  89     --  125* 132*  < > 145*  --   --   --  148*   INR  --   --   --   --   --   --   --   --   --  1.32*    140 140 142  < > 141  --   --   --  141   POTASSIUM 3.7 3.4 3.6 3.9  < > 3.8  --   --   --  3.9   CHLORIDE 106 104 107 107  < > 106  --   --   --  106   CO2 26 26 26 27  < > 25  --   --   --  26   BUN 41* 43* 47* 46*  < > 39*  --   --   --  39*   CR 1.19 1.29* 1.52* 1.76*  < > 1.44*  --   --   --  1.57*   ANIONGAP 7 10 7 8  < > 10  --   --   --  9   MIGUEL 8.0* 7.7* 7.6* 7.7*  < > 8.4*  --   --   --  8.7   * 175* 112* 99  < > 85  --   --   --  131*   ALBUMIN  --   --   --   --   --   --   --   --   --  2.8*   PROTTOTAL  --   --   --   --   --   --   --   --   --  6.6*   BILITOTAL  --   --   --   --   --   --   --    --   --  0.8   ALKPHOS  --   --   --   --   --   --   --   --   --  221*   ALT  --   --   --   --   --   --   --   --   --  20   AST  --   --   --   --   --   --   --   --   --  21   TROPI  --   --   --   --   --  0.714* 0.714* 0.709*  < > 0.629*   < > = values in this interval not displayed.    Imaging:  No results found for this or any previous visit (from the past 24 hour(s)).

## 2017-12-18 NOTE — PLAN OF CARE
Problem: Patient Care Overview  Goal: Plan of Care/Patient Progress Review  PT-  Pt sleeping upon arrival.  Able to waken eventually however pt is unable to stay awake during conversation.  Unable to participate in PT at this time.  Spoke with nurse.

## 2017-12-18 NOTE — PROGRESS NOTES
MIKE  D:  Working with sister Samantha regarding d/c plan.  She has found out that the long term care room patient would be not accommodate a lift chair she had purchase for patient to use in his board and care room.  She has now asked writer to have patient assessed by [facility] which has assisted living at the [place] location and care suites at the [facility] location.  Writer has spoken with Kate at [facility] [phone] and faxed information to [phone].  Explained to sister that patient is likely ready for d/c on Tuesday and he may need to return to [facility] until he can move into [facility].  Samantha, who lives in [place], has not spoken with her brother about her exploration of [facility].    Samantha acknowledges she has no care concerns with [facility] but is extremely unhappy if patient cannot have the chair she purchased for him.  Patient's close friend Nick Johnson, who is listed as an Emergency Contact, has expressed to Samantha that he recommends patient return to [facility].    A:  Patient needs to be involved in the d/c discussion.   Based on his care needs he either needs the skilled care center or a care suite.    P: Will know tomorrow if [facility] is an option.     Update at 1630.  [facility] is not able to accept patient at this time, Kate will update sister.

## 2017-12-19 NOTE — PLAN OF CARE
Problem: Patient Care Overview  Goal: Plan of Care/Patient Progress Review  Outcome: No Change  A&O x4, up with 1 +gb/walker.  Mod cho diet, DD3 thin liquids.  VSS on room air except for HTN.  PIV saline locked.  Incision on head CDI and staple intact.  .  Turn and reposition q 2hrs.  Nursing will continue to monitor.

## 2017-12-19 NOTE — PROGRESS NOTES
[facility]    Internal Medicine Hospitalist Progress Note  12/19/2017  I evaluated patient on the above date.    Brandon Deras Jr., MD  [phone] (p)  Text Page (7 am to 6 pm)      Assessment & Plan   Mr. Kelechi Coleman is a 82 year old male with PMH significant for DM2, HTN, CAD, CHF, CVI's, pVT, CARLOS,  CKD and recent subdural hematoma and subarachnoid hematoma (8/2017) with subsequent seizures, who presented 12/12 with fall.     1. Fall with occipital laceration.  Head CT 12/12 no acute findings. Laceration stapled.  - Staples to be removed no sooner than 10 days than 12/22.    - Continue PT, OT.      2. CAD with demand NSTEMI.      Chronic systolic heart failure.      Hypertension (benign essential).  [PTA: amlodipine 10 mg daily, atorvastatin 40 mg qHS, Coreg 12.5 mg qam Plavix 75 mg daily, hydralazine 50 mg BID, Imdur 120 mg daily, spironolactone 50 mg daily, torsemide 30 mg daily.]  H/o CAD with prior 4v CABG 2002 and s/p stents to proximal and distal SVG (RPDA/RPLA) in 9/2016. CHF with echo 9/2016 showing LVEF 40-45% with WMA's, RV normal in size and function. Trop elevated on admission to 0.701 with subsequent flat trops. Seen by Cardiology and felt to be demand MI. Discontinued amlodipine and reduced hydralazine to 25 mg tid due low BP on 12/13. Reduced Imdur to 90 mg daily on 12/15. Echo 12/13 demonstrated slightly decreased EF from previous echo, now 30-35%.    WEIGHT (196 lbs on admission): 198 lbs 12/19 <-- 194 lbs 12/18 < 194 lbs 12/17.  - Continue Plavix, atorvastatin, Coreg, hydralazine, Imdur, spironolactone.  - Continue torsemide 20 mg daily.  - Resume spironolactone on d/c.  - Continue Ace wraps to help with BLE edema.  - Monitor i/o's, daily wts.  - F/u with Cardiology.    3. JUVENTINO, prerenal.  Cr 1.57 on admission with max 1.76 on 12/15. Improved to 1.52 after 1 L NS bolus. Spironolactone held on admission.   Recent Labs  Lab 12/19/17  0846 12/18/17  0818 12/17/17  0945  "12/16/17  0600 12/15/17  0535 12/14/17  0555   CR 1.15 1.19 1.29* 1.52* 1.76* 1.52*   - Monitor BMP.  - Avoid nephrotoxic medications.    4. Malnutrition, non-severe due to inadequate oral intake.      Dysphagia, mild-moderate, oral and pharyngeal.  Consulted Nutrition and Speech this stay.  - Continue DD3 with thin liquids.  - Continue BID oral supplements.       5. Metabolic encephalopathy - resolved.    UA, CXR, C diff, stool THIAGO, blood culture unrevealing. Likely due to hospital delirium, concomitant JUVENTINO.       6. Physical deconditioning.  - Continue PT, OT.    7. DM2 w/o complications.  [PTA: Lantus 10U qHS, aspart 8U TID with meals.]  - Continue Lantus 6U qHS.  - Continue ISS.      8. Seizures.  H/o subdural hematoma and subarachnoid hematoma (8/2017) with subsequent seizures/  - Continue PTA Keppra and PTA Trileptal.    9. Dyslipidemia.  - Continue atorvastatin.    10. GERD.  - Continue pantoprazole.    Prophylaxis.  - PCD's, ambulation.    CODE STATUS: DNR/DNI.    Dispo.  - D/C to TCU.    Interval History   Doing OK overall. Tolerating diet.    -Data reviewed today: I reviewed all new labs and imaging over the last 24 hours. I personally reviewed no images or EKG's today.    Physical Exam   Heart Rate: 64, Blood pressure 125/65, pulse 64, temperature 97.8  F (36.6  C), temperature source Oral, resp. rate 18, height 1.88 m (6' 2\"), weight 89.9 kg (198 lb 3.1 oz), SpO2 96 %.  Vitals:    12/17/17 0519 12/18/17 0700 12/19/17 0624   Weight: 88.3 kg (194 lb 10.7 oz) 88.2 kg (194 lb 7.1 oz) 89.9 kg (198 lb 3.1 oz)     Vital Signs with Ranges  Temp:  [97.3  F (36.3  C)-98.1  F (36.7  C)] 97.8  F (36.6  C)  Pulse:  [64] 64  Heart Rate:  [63-64] 64  Resp:  [16-20] 18  BP: (101-155)/(54-93) 125/65  SpO2:  [96 %-99 %] 96 %  Patient Vitals for the past 24 hrs:   BP Temp Temp src Pulse Heart Rate Resp SpO2 Weight   12/19/17 0720 125/65 97.8  F (36.6  C) Oral - 64 18 96 % -   12/19/17 0624 - - - - - - - 89.9 kg (198 lb 3.1 " oz)   12/19/17 0520 104/54 - - - - - - -   12/19/17 0119 (!) 155/93 97.3  F (36.3  C) Oral - 64 16 96 % -   12/18/17 2113 101/55 97.5  F (36.4  C) Oral 64 - 20 99 % -   12/18/17 1515 116/57 98.1  F (36.7  C) Oral - 63 18 99 % -   12/18/17 1351 135/70 - - - - - - -     I/O's Last 24 hours  I/O last 3 completed shifts:  In: 280 [P.O.:280]  Out: 975 [Urine:975]    Constitutional: Alert, oriented, pleasant.  Respiratory: Diminished in bases. No crackles or wheezes.  Cardiovascular: RRR.  GI: Soft, nd, +BS.  Skin/Integumen: Bilateral legs extremity ace wrapped trace to 1+ edema into thighs.  Other:        Data     Recent Labs  Lab 12/19/17  0846 12/18/17  0818 12/17/17  0945 12/16/17  0600  12/13/17  0548 12/13/17  0200 12/12/17  2130  12/12/17  1400   WBC 5.8 6.1  --  5.3  < > 5.9  --   --   --  7.3   HGB 10.3* 10.4*  --  9.6*  < > 9.6*  --   --   < > 10.0*   MCV 89 88  --  89  < > 88  --   --   --  89    157  --  125*  < > 145*  --   --   --  148*   INR  --   --   --   --   --   --   --   --   --  1.32*    139 140 140  < > 141  --   --   --  141   POTASSIUM 3.6 3.7 3.4 3.6  < > 3.8  --   --   --  3.9   CHLORIDE 105 106 104 107  < > 106  --   --   --  106   CO2 26 26 26 26  < > 25  --   --   --  26   BUN 40* 41* 43* 47*  < > 39*  --   --   --  39*   CR 1.15 1.19 1.29* 1.52*  < > 1.44*  --   --   --  1.57*   ANIONGAP 7 7 10 7  < > 10  --   --   --  9   MIGUEL 8.1* 8.0* 7.7* 7.6*  < > 8.4*  --   --   --  8.7   * 106* 175* 112*  < > 85  --   --   --  131*   ALBUMIN  --   --   --   --   --   --   --   --   --  2.8*   PROTTOTAL  --   --   --   --   --   --   --   --   --  6.6*   BILITOTAL  --   --   --   --   --   --   --   --   --  0.8   ALKPHOS  --   --   --   --   --   --   --   --   --  221*   ALT  --   --   --   --   --   --   --   --   --  20   AST  --   --   --   --   --   --   --   --   --  21   TROPI  --   --   --   --   --  0.714* 0.714* 0.709*  < > 0.629*   < > = values in this interval not  displayed.  Recent Labs   Lab Test  12/19/17   0846  12/19/17   0725  12/19/17   0148  12/18/17   2110  12/18/17   1718  12/18/17   1140  12/18/17   0818   12/17/17   0945   12/16/17   0600   12/15/17   0535   GLC  167*   --    --    --    --    --   106*   --   175*   --   112*   --   99   BGM   --   114*  104*  121*  219*  146*   --    < >   --    < >   --    < >   --     < > = values in this interval not displayed.         No results found for this or any previous visit (from the past 24 hour(s)).    Medications   All medications were reviewed.       insulin glargine  6 Units Subcutaneous At Bedtime     isosorbide mononitrate  90 mg Oral Daily     levETIRAcetam  500 mg Oral BID     torsemide  20 mg Oral Daily     hydrALAZINE  25 mg Oral Q8H MAR     atorvastatin  40 mg Oral At Bedtime     carvedilol  12.5 mg Oral QAM     OXcarbazepine  150 mg Oral BID     pantoprazole  40 mg Oral QPM     clopidogrel  75 mg Oral Daily     sodium chloride (PF)  3 mL Intracatheter Q8H     insulin aspart  1-7 Units Subcutaneous TID AC     insulin aspart  1-5 Units Subcutaneous At Bedtime

## 2017-12-19 NOTE — DISCHARGE SUMMARY
[facility]  Discharge Summary        Kelechi Coleman MRN# [id number]   YOB: 1935 Age: 82 year old     Date of Admission: 12/12/2017  Date of Discharge: 12/19/2017  Admitting Physician: Yovana Rothman MD  Discharge Physician: Branodn Deras MD     Primary Provider: Laquita Deal  Primary Care Physician Phone Number: [phone]         Discharge Diagnoses:   1. Fall with occipital laceration.  2. CAD with demand NSTEMI.  3. JUVENTINO, prerenal.  4. Dysphagia, mild-moderate, oral and pharyngeal.  5. Malnutrition, non-severe due to inadequate oral intake.  6. Metabolic encephalopathy .  7. Physical deconditioning.            Other Chronic Medical Problems:      1. Chronic systolic heart failure.  2. Hypertension (benign essential).  3. DM2 w/o complications.  4. Seizures.  5. Dyslipidemia.  6. GERD.       Allergies:         Allergies   Allergen Reactions     Aspirin Hives     Penicillins Hives     Contrast Dye Hives           Discharge Medications:        Current Discharge Medication List      CONTINUE these medications which have CHANGED    Details   isosorbide mononitrate (IMDUR) 30 MG 24 hr tablet Take 3 tablets (90 mg) by mouth daily    Comments: Hold for SBP<100.  Associated Diagnoses: Coronary artery disease involving native coronary artery of native heart without angina pectoris      insulin glargine (LANTUS SOLOSTAR) 100 UNIT/ML injection Inject 6 Units Subcutaneous At Bedtime  Qty: 30 mL, Refills: 0    Associated Diagnoses: Type 2 diabetes mellitus with stage 3 chronic kidney disease, with long-term current use of insulin (H)      insulin aspart (NOVOLOG PEN) 100 UNIT/ML injection Inject 5 Units Subcutaneous 3 times daily (with meals)    Associated Diagnoses: Type 2 diabetes mellitus with stage 3 chronic kidney disease, with long-term current use of insulin (H)      hydrALAZINE (APRESOLINE) 50 MG tablet Take 0.5 tablets (25 mg) by mouth 2 times daily  Qty: 60  tablet    Comments: Hold for SBP<100.  Associated Diagnoses: Essential hypertension with goal blood pressure less than 130/80      torsemide (DEMADEX) 10 MG tablet Take 2 tablets (20 mg) by mouth daily    Associated Diagnoses: Chronic systolic congestive heart failure (H)      spironolactone (ALDACTONE) 25 MG tablet Take 1 tablet (25 mg) by mouth daily    Associated Diagnoses: Chronic systolic congestive heart failure (H)         CONTINUE these medications which have NOT CHANGED    Details   acetaminophen (TYLENOL) 325 MG tablet Take 650 mg by mouth every 6 hours as needed for mild pain      atorvastatin (LIPITOR) 40 MG tablet Take 40 mg by mouth At Bedtime      Calcium Carb-Cholecalciferol 600-800 MG-UNIT TABS Take 1 tablet by mouth 3 times daily (with meals)       levETIRAcetam (KEPPRA) 500 MG tablet Take 500 mg by mouth 2 times daily      clopidogrel (PLAVIX) 75 MG tablet Take 75 mg by mouth daily      mirtazapine (REMERON) 15 MG tablet Take 15 mg by mouth At Bedtime      thiamine 100 MG tablet Take 100 mg by mouth daily      pantoprazole (PROTONIX) 40 MG EC tablet TAKE 1 TABLET BY MOUTH EVERY EVENING  Qty: 90 tablet, Refills: 2    Associated Diagnoses: Gastroesophageal reflux disease, esophagitis presence not specified      Multiple Vitamins-Minerals (CENTRUM SILVER) per tablet Take 1 tablet by mouth daily  Qty: 30 tablet    Associated Diagnoses: Vitamin deficiency      OXcarbazepine (TRILEPTAL) 150 MG tablet Take 1 tablet (150 mg) by mouth 2 times daily    Associated Diagnoses: History of stroke      carvedilol (COREG) 25 MG tablet Take 0.5 tablets (12.5 mg) by mouth every morning  Qty: 180 tablet, Refills: 3    Associated Diagnoses: Essential hypertension with goal blood pressure less than 130/80      blood glucose monitoring (NO BRAND SPECIFIED) test strip Use to test blood sugar 4 times daily or as directed.         STOP taking these medications       potassium chloride SA (K-DUR/KLOR-CON M) 10 MEQ CR tablet  Comments:   Reason for Stopping:         amLODIPine (NORVASC) 10 MG tablet Comments:   Reason for Stopping:                   Discharge Instructions and Follow-Up:      Follow-up Appointments     Follow Up and recommended labs and tests       Follow up with halfway physician.  The following labs/tests are   recommended: BMP 12/22.  Follow up with primary care provider after TCU discharge.  Follow up with [facility] NP 1-2 weeks with BMP.  Consider removing scalp staples 12/22 at earliest.                          Discharge Orders for Skilled Facility (from Discharge Orders):        After Care Instructions     Activity - Up with nursing assistance           Advance Diet as Tolerated       Follow this diet upon discharge: Orders Placed This Encounter      Room Service      Snacks/Supplements Adult: Other - Please comment; Vanilla Plus2 Shake; Between Meals      Combination Diet 2713-4409 Calories: Moderate Consistent CHO (4-6 CHO units/meal); Dysphagia Diet Level 3: Advanced; Thin Liquids (water, ice chips, juice, milk gelatin, ice cream, etc); 2 gm NA Diet; Low Fat Diet              General info for SNF       Length of Stay Estimate: Short Term Care: Estimated # of Days <30  Condition at Discharge: Improving  Level of care:skilled   Rehabilitation Potential: Good  Admission H&P remains valid and up-to-date: Yes  Recent Chemotherapy: N/A  Use Nursing Home Standing Orders: Yes            Glucose monitor nursing POCT       Before meals and at bedtime            Intake and output       Every shift            Mantoux instructions       Give two-step Mantoux (PPD) Per Facility Policy Yes                    Future tests that were ordered for you     Basic metabolic panel                          Rehab orders for Skilled Facility (from Discharge Orders):      Referrals     Future Labs/Procedures    Follow-Up with Cardiac Advanced Practice Provider     Occupational Therapy Adult Consult     Comments:    Evaluate and  treat as clinically indicated.    Reason:  Weakness/deconditioning    Physical Therapy Adult Consult     Comments:    Evaluate and treat as clinically indicated.    Reason:  weakness/deconditioning    Speech Language Path Adult Consult     Comments:    Evaluate and treat as clinically indicated.    Reason:  Dysphagia              Consultations This Hospital Stay:      Cardiology.        Admission History:      Please see the H&P by Yovana Rothman MD on 12/12/2017 for complete details. Briefly, Mr. Kelechi Coleman is a 82 year old male with PMH significant for DM2, HTN, CAD, CHF, CVI's, pVT, CARLOS,  CKD and recent subdural hematoma and subarachnoid hematoma (8/2017) with subsequent seizures, who presented 12/12 with fall.        Problem Oriented Hospital Course:      1. Fall with occipital laceration.  Head CT 12/12 no acute findings. Laceration stapled. Had low/borderline BP's and JUVENTINO this stay, suspect may have contributed to pt's fall; multiple med adjustments made as below.  - Staples to be removed no sooner than 10 days than 12/22.    - Continue PT, OT.       2. CAD with demand NSTEMI.      Chronic systolic heart failure.      Hypertension (benign essential).  [PTA: amlodipine 10 mg daily, atorvastatin 40 mg qHS, Coreg 12.5 mg qam Plavix 75 mg daily, hydralazine 50 mg BID, Imdur 120 mg daily, spironolactone 50 mg daily, torsemide 30 mg daily.]  H/o CAD with prior 4v CABG 2002 and s/p stents to proximal and distal SVG (RPDA/RPLA) in 9/2016. CHF with echo 9/2016 showing LVEF 40-45% with WMA's, RV normal in size and function. Trop elevated on admission to 0.701 with subsequent flat trops. Seen by Cardiology and felt to be demand MI. Discontinued amlodipine and reduced hydralazine to 25 mg tid due low BP on 12/13. Reduced Imdur to 90 mg daily on 12/15. Echo 12/13 demonstrated slightly decreased EF from previous echo, now 30-35%, RV severely dilated.    WEIGHT (196 lbs on admission): 198 lbs 12/19 <--  194 lbs 12/18 < 194 lbs 12/17.  - Continue Plavix, atorvastatin, Coreg, hydralazine, Imdur, torsemide as above.  - Resume spironolactone on d/c at 1/2 prior dose.  - Continue Ace wraps to help with BLE edema.  - Monitor daily wts.  - F/u with Cardiology.     3. JUVENTINO, prerenal.  Cr 1.57 on admission with max 1.76 on 12/15. Improved to 1.52 after 1 L NS bolus. Spironolactone held on admission.   Recent Labs  Lab 12/19/17  0846 12/18/17  0818 12/17/17  0945 12/16/17  0600 12/15/17  0535 12/14/17  0555   CR 1.15 1.19 1.29* 1.52* 1.76* 1.52*   - Monitor BMP outpatient.     4. Malnutrition, non-severe due to inadequate oral intake.      Dysphagia, mild-moderate, oral and pharyngeal.  Consulted Nutrition and Speech this stay.  - Continue DD3 with thin liquids.  - Continue BID oral supplements.       5. Metabolic encephalopathy - resolved.    UA, CXR, C diff, stool THIAGO, blood culture unrevealing. Likely due to hospital delirium, concomitant JUVENTINO.       6. Physical deconditioning.  - Continue PT, OT.     7. DM2 w/o complications.  [PTA: Lantus 10U qHS, aspart 8U TID with meals.]  - Continue Lantus 6U qHS and aspart 5U TID with meals.      8. Seizures.  H/o subdural hematoma and subarachnoid hematoma (8/2017) with subsequent seizures/  - Continue PTA Keppra and PTA Trileptal.     9. Dyslipidemia.  - Continue atorvastatin.     10. GERD.  - Continue pantoprazole.         Code Status:      DNR / DNI        Pending Results:        Unresulted Labs Ordered in the Past 30 Days of this Admission     Date and Time Order Name Status Description    12/14/2017 1316 Blood culture Preliminary               Discharge Disposition:      Discharged to TCU.        Discharge Time:      Greater than 30 minutes.        Key Imaging Studies, Lab Findings and Procedures/Surgeries:      Echo 12/13:  Interpretation Summary     Left ventricular systolic function is severely reduced.  There are regional wall motion abnormalities as specified.  The right  ventricle is severely dilated.  There is mild to moderate (1-2+) mitral regurgitation.  Right ventricular systolic pressure is elevated, consistent with severe  pulmonary hypertension.  2D images suggest some degree of aortic stenosis however severity was not  adequately assessed on present study. Limited additional images at no charge  should be performed for further evaluation.  Limited views were obtained.    Results for orders placed or performed during the hospital encounter of 12/12/17   Head CT w/o contrast    Narrative    CT OF THE HEAD WITHOUT CONTRAST  12/12/2017 2:15 PM     COMPARISON: Head CT 9/22/2017.    HISTORY: Fall head injury.    TECHNIQUE: 5 mm thick axial CT images of the head were acquired  without IV contrast material.    FINDINGS: A tiny chronic right basal ganglia infarct is again noted  without change. A small chronic infarct at the high medial  frontoparietal junction on the left is again noted without change.  There is mild diffuse cerebral volume loss. There are subtle patchy  areas of decreased density in the cerebral white matter bilaterally  that are consistent with sequela of chronic small vessel ischemic  disease.     The ventricles and basal cisterns are within normal limits in  configuration given the degree of cerebral volume loss.  There is no  midline shift. There are no extra-axial fluid collections.     No intracranial hemorrhage, mass or recent infarct.    The visualized paranasal sinuses are well aerated. There is no  mastoiditis. There are no fractures of the visualized bones.       Impression    IMPRESSION: Small chronic right basal ganglia and high left  frontoparietal infarcts again noted without change. No new infarcts.  Diffuse cerebral volume loss and cerebral white matter changes  consistent with chronic small vessel ischemic disease. No evidence for  acute intracranial pathology.     Radiation dose for this scan was reduced using automated exposure  control,  adjustment of the mA and/or kV according to patient size, or  iterative reconstruction technique.    LUÍS BUCKNER MD   XR Chest 2 Views    Narrative    XR CHEST 2 VW 12/14/2017 3:00 PM    COMPARISON: 8/18/2017    HISTORY: Altered mental status.      Impression    IMPRESSION: Enlarged cardiac silhouette is again seen and unchanged.  Small left pleural effusion and possible trace right pleural effusion,  stable to slightly increased since comparison study. There is  associated bibasilar atelectasis. No pneumothorax seen on either side.  Median sternotomy wires appear intact.    FELICIA LE MD

## 2017-12-19 NOTE — PLAN OF CARE
Problem: Patient Care Overview  Goal: Plan of Care/Patient Progress Review  Speech Language Therapy Discharge Summary    Reason for therapy discharge:    Discharged to transitional care facility.    Progress towards therapy goal(s). See [facility] on [facility] record for goal details.  Goals partially met.  Barriers to achieving goals:   discharge from facility.    Therapy recommendation(s):    Continued therapy is recommended.  Rationale/Recommendations:  Continue short term ST for swallowing to insure diet tolerance of a DDL 3 with thin liquids across and meal. Advancement as indicated. .

## 2017-12-19 NOTE — PROGRESS NOTES
CLINICAL NUTRITION SERVICES - REASSESSMENT NOTE      Recommendations Ordered by Registered Dietitian (RD):   Plus2 Shakes for PM snack         EVALUATION OF PROGRESS TOWARD GOALS   Diet:  Mod carb, DD3, thin liquids.   Intake:  Pt states his appetite is a bit better; eating % per flow sheets.  He's mad that he can't have crackers with his soup.  He also wanted raisins with his oatmeal, which MD has allowed.  In the RD note from 12/14, Plus2 Shakes were mentioned but never got ordered. Pt states he would like a shake between meals.      NEW FINDINGS:   Weight is trending up, likely fluid. Weights have been inconsistent, however.    Vitals:    12/12/17 1347 12/12/17 2100 12/13/17 0500 12/15/17 0423   Weight: 87.1 kg (192 lb) 89.2 kg (196 lb 9.6 oz) 88.2 kg (194 lb 7.1 oz) 88.9 kg (195 lb 15.8 oz)    12/15/17 1100 12/16/17 0914 12/17/17 0519 12/18/17 0700   Weight: 87 kg (191 lb 12.8 oz) 86.8 kg (191 lb 6.4 oz) 88.3 kg (194 lb 10.7 oz) 88.2 kg (194 lb 7.1 oz)    12/19/17 0624   Weight: 89.9 kg (198 lb 3.1 oz)         Previous Goals:   Pt will eat at least 25% of his meals BID  Evaluation: Met    Previous Nutrition Diagnosis:   Inadequate oral intake related to decreased appetite as evidenced by pt reporting two small meals daily and eating 0% per chart last 24 hrs.  Evaluation: Improving      CURRENT NUTRITION DIAGNOSIS  No nutrition diagnosis identified at this time     INTERVENTIONS  Recommendations / Nutrition Prescription  Diet per SLP  Plus2 Shakes for PM snacks    Implementation  Medical Food Supplement - ordered the above    Goals  Pt will consume at least 75% of meals      MONITORING AND EVALUATION:  Progress towards goals will be monitored and evaluated per protocol and Practice Guidelines    Deyanria Aguilera RD  Pager [phone] (M-F)            [phone] ([facility] Hol)

## 2017-12-19 NOTE — PROGRESS NOTES
MIKE  D:  Met with patient, explained he will be returning to [facility].  Explained he is not eligible for Medicare benefits as he is in the same benefit period when he was at [facility] TCU and used the 100SNF days and has not been out of the hospital or rehab for 60 consecutive days.  He confirms Breonna Mcconnell handles his finances and requested writer speak with Breonna regarding the private pay cost.  Writer spoke with Breonna at [phone] and reviewed the estimated cost given by Mary Iraheta,  at [facility].  Breonna accepted the information and agree's with proceeding with Kelechi returning to [facility]. She is aware of the private fee for the medivan.   Breonna has POA for Finances.  Patient's friend Judit was present in the room.  Writer also called and updated patient's sister Samantha and Nick and Sona Johnson.   Orders were faxed.  No PAS was needed per Mary Iraheta.  Medivan transported at 1500.

## 2017-12-19 NOTE — PLAN OF CARE
Problem: Patient Care Overview  Goal: Plan of Care/Patient Progress Review  Discharge Planner OT   Patient plan for discharge: TCU  Current status: Pt completed supine to sit EOB with Kaylene and HOB elevated, max A sit to stand on 2nd attempt, pt amb slowly with FWW Kaylene around bed over to chair, max A to slow descent down into chair with transfer. Pt c/o increase fatigue and fear of falling during transfers.   Barriers to return to prior living situation: current level of assist  Recommendations for discharge: TCU per plan established by the Occupational Therapist  Rationale for recommendations: Pt would benefit from daily therapy to return to PLOF       Entered by: Domenica Sewell 12/19/2017 11:18 AM

## 2017-12-19 NOTE — PLAN OF CARE
Problem: Patient Care Overview  Goal: Plan of Care/Patient Progress Review  Outcome: No Change  Pt was up in the chair for dinner and tolerated well. Pt offered compliants that the ace wraps bothered his legs. The ace wraps were removed. Pt offered no other complaints. VSS. Pt up with assistance of 1 and walker and gait belt.  and 121

## 2017-12-19 NOTE — PROGRESS NOTES
SW  Sister has consented to patient returning to [facility] and admitting to their long term care unit.  Writer will speak with patient.

## 2017-12-19 NOTE — PLAN OF CARE
Problem: Patient Care Overview  Goal: Plan of Care/Patient Progress Review  Physical Therapy Discharge Summary    Reason for therapy discharge:    Discharged to transitional care facility.    Progress towards therapy goal(s). See goals on Care Plan in [facility] electronic health record for goal details.  Goals not met.  Barriers to achieving goals:   discharge from facility.    Therapy recommendation(s):    Continued therapy is recommended.  Rationale/Recommendations:  To further increase independence with mobility.

## 2017-12-19 NOTE — PLAN OF CARE
Problem: Patient Care Overview  Goal: Plan of Care/Patient Progress Review  Pt A&Ox4, denies pain. CMS intact. T&R, up in chair and in bed with pillows. Continent of urine. Buttocks blanchable. Ace wraps applied @ 0930. Scalp incision c/d/i, MAURIZIO. Good appetite. Up with 1, walker, gb.  Plan for dc to [facility] rehab today at 1500. Belongings with pt. Paperwork printed and ready to go.

## 2017-12-20 NOTE — PROCEDURES
EEG #:  -3      DAY #3 OF 24-HOUR VIDEO EEG      DATE OF RECORDIN2017.      SOURCE FILE DURATION:  11 hours 2 minutes 7 seconds.      CLINICAL SUMMARY:  Kelechi Coleman is an 81-year-old male with history of stroke, subarachnoid hemorrhage and subdural hematoma who presented with right upper extremity tremors and weakness.  EEG was performed to evaluate for seizures.     TECHNICAL SUMMARY: This continuous video- EEG monitoring procedure was performed with 23 scalp electrodes in 10-20 electrode system placement, and additional scalp, precordial and other surface electrodes used for electrical referencing and artifact detection.  Video monitoring was utilized and periodically reviewed by EEG technologists and the physician for electroclinical correlations.     INTERICTAL ACTIVITY:  During wakefulness, background was desynchronized.  Electorcerebral activity was of very  low amplitude throughout the recording.  There was diffuse low amplitude polymorphic theta delta slowing during waking.  Slowing increased during drowsiness.  Stage II sleep was manifested as vertex waves, symmetric sleep spindles.      CLINICAL AND ICTAL EVENTS:  Kelechi Coleman had one focal seizure on this date of monitoring.  This started at 02:32:14.  The patient was lying down.  His arms were shaking under the bedsheets.  The patient was not tested during this.  EEG showed rhythmic delta activity which started over the left centroparietal head region and then spread to the left temporal region.  It ended at 02:33:39.      IMPRESSION:  This is an abnormal video EEG due to the presence of moderate diffuse nonspecific encephalopathy.  One focal seizure of left centroparietal onset was recorded consistent with localization-related epilepsy.      SUMMARY OF 3 DAYS OF VIDEO EEG MONITORING:  The video EEG was abnormal due to the presence of moderate diffuse nonspecific encephalopathy.  The patient had several simple partial seizures  characterized by right arm shaking with left centroparietal onset on the first 2 days of recording and 1 on the last day.  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         MILO QUIROS MD             D: 2017 11:59   T: 2017 12:22   MT: CG      Name:     MARLA VÁZQUEZ   MRN:      [id number]        Account:        [id number]   :      1935           Procedure Date: 2017      Document: [id number]

## 2017-12-20 NOTE — PROCEDURES
EEG #: -1      DAY 1 OF 24-HOUR VIDEO EEG      DATE OF RECORDIN2017.      SOURCE FILE DURATION:  5 hours 37 minutes 7 seconds.      CLINICAL SUMMARY:  Kelechi Coleman is an 81-year-old male with history of stroke, subarachnoid hemorrhage and subdural hematoma who presented with right upper extremity tremors and weakness.  EEG was performed to evaluate for seizures.     TECHNICAL SUMMARY: This continuous video- EEG monitoring procedure was performed with 23 scalp electrodes in 10-20 electrode system placement, and additional scalp, precordial and other surface electrodes used for electrical referencing and artifact detection.  Video monitoring was utilized and periodically reviewed by EEG technologists and the physician for electroclinical correlations.     INTERICTAL ACTIVITY:  During quiet wakefulness, background was desynchronized.  Electorcerebral activity was of very  low amplitude throughout the recording.  There was diffuse low amplitude polymorphic theta delta slowing during waking.  Slowing increased during drowsiness.  Stage II sleep was manifested as vertex waves, symmetric sleep spindles.      CLINICAL AND ICTAL EVENTS:  During the monitoring, the patient had several episodes of right arm shaking.  These were reported by the patient on most occasions as the patient felt it was going to start.  There was usually a 2-3 Hz  tremor in the right arm which lasted approximately 1-2 minutes.  The patient was able to talk and follow commands during these when he was tested.  Sometimes these occurred out of sleep.  At times the myogenic and movement artifact obscured the electrocerebral activity, but at times EEG showed rhythmic delta activity which started over the left parasagittal region, maximal at C3, P3 and spread to the left temporal region.   Examples of these can be seen at 18:25:04, 18:35:00, 19:35:24, 20:11:46, 21:06:31, and 22:01:11.      IMPRESSION:  This is an abnormal video EEG due to  the presence of moderate diffuse nonspecific encephalopathy.  There were also at least 9 simple partial seizures with right arm shaking arising out of the left central parietal region.  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         MILO QUIROS MD             D: 2017 11:47   T: 2017 12:31   MT: CG      Name:     MARLA VÁZQUEZ   MRN:      [id number]        Account:        [id number]   :      1935           Procedure Date: 2017      Document: [id number]

## 2017-12-20 NOTE — PLAN OF CARE
Problem: Patient Care Overview  Goal: Plan of Care/Patient Progress Review  Occupational Therapy Discharge Summary    Reason for therapy discharge:    Discharged to transitional care facility.    Progress towards therapy goal(s). See goals on Care Plan in [facility] electronic health record for goal details.  Goals not met.  Barriers to achieving goals:   discharge from facility.    Therapy recommendation(s):    Continued therapy is recommended.  Rationale/Recommendations:  Continued skilled OT in TCU setting to increase functional IND and safety.

## 2017-12-20 NOTE — PROGRESS NOTES
Patient was evaluated by cardiology while inpatient for mildly elevated troponins, CAD, and moderate AS. RN called [facility] ([facility]) TCU to confirm the follow up plan for patient with Care Coordinator. RN confirmed with Care Coordinator that we would like patient to f/u with [facility] within the next few weeks to review therapy options for aortic stenosis (Dr. Charles patient). RN confirmed with Care Coordinator that patient will bring the last progress note to appt.

## 2017-12-20 NOTE — PROCEDURES
EEG #:  -2      DAY 2 OF 24-HOUR VIDEO EEG      DATE OF RECORDIN2017.      SOURCE FILE DURATION:  23 hours, 56 minutes.      CLINICAL SUMMARY:  Marla Coleman is an 81-year-old male with history of stroke, subarachnoid hemorrhage and subdural hematoma who presented with right upper extremity tremors and weakness.  EEG was performed to evaluate for seizures.     TECHNICAL SUMMARY: This continuous video- EEG monitoring procedure was performed with 23 scalp electrodes in 10-20 electrode system placement, and additional scalp, precordial and other surface electrodes used for electrical referencing and artifact detection.  Video monitoring was utilized and periodically reviewed by EEG technologists and the physician for electroclinical correlations.     INTERICTAL ACTIVITY:  During wakefulness, background was desynchronized.  Electorcerebral activity was of very  low amplitude throughout the recording.  There was diffuse low amplitude polymorphic theta delta slowing during waking.  Slowing increased during drowsiness.  Stage II sleep was manifested as vertex waves, symmetric sleep spindles.      CLINICAL AND ICTAL EVENTS:  Marla Coleman had several focal seizures with right arm shaking.  These had left centroparietal onset.  When he was tested during these, he was able to talk and follow commands.  These typically lasted 40 seconds to a minute.  Examples of these can be seen at 00:28, 01:13:45, 01:27:34, 02:28:42 and 12:20:40.      IMPRESSION:  This is an abnormal video EEG due to the presence of moderate diffuse nonspecific encephalopathy.  Simple partial seizures of left central parietal region were recorded.  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         MILO QUIROS MD             D: 2017 11:53   T: 2017 12:19   MT: ELIZABETH      Name:     MARLA COLEMAN   MRN:      [id number]        Account:        [id number]   :      1935           Procedure Date:  08/21/2017      Document: [id number]

## 2017-12-26 NOTE — PROGRESS NOTES
ROCKY Lyon from [facility] called to clarify lab orders that she received from pt. Pt was scheduled for bmp today there and for bmp in one week.  updated on bmp and CBC results that pt had after OV today. She said pt should still increase torsemide to 30 mg daily with BMP lab in one week. She provided written bmp order to pt. I clarified this with Sonali. She requested that CBC and bmp results be faxed to [phone] so their [facility] can review too. Per , pt needs to follow up in [facility] clinic in 2-3 weeks, most likely with another bmp at that time, but that can be decided after bmp results in one week. Pt is typically a C[facility] pt but  saw pt today as Cristina Plummer was out sick.

## 2017-12-26 NOTE — LETTER
12/26/2017      Laquita Deal MD  [address] [facility] [address]  [place] [address]      RE: Kelechi Coleman       Dear Colleague,    I had the pleasure of seeing Kelechi Coleman in the [facility].    REFERRING PHYSICIAN:  Dr. Laquita Deal      HISTORY OF PRESENT ILLNESS:  Mr. Coleman is a pleasant 82-year-old gentleman, a patient of Dr. Ayala, who I am seeing today for a followup from discharge from the hospital.  He was scheduled to see one of our nurse practitioners for followup and a basic metabolic panel.  He did show up today from his transitional care unit from [facility].  He did not have his blood work performed prior to the appointment and he was alone for the appointment today.  I did review his paperwork from his discharge.  He had a fall with an occipital laceration that was stapled.  He had a mild troponin elevation and was seen by Cardiology.  This was felt to be demand ischemia.  He had an echocardiogram which showed deterioration in his overall LV function.  His EF was baseline 40%-45%, appeared to be 30-35%.  It was a limited echo and severe calcification of the aortic valve was noted but felt to be an adequate interrogation.  Mild-to-moderate mitral regurgitation was noted.  He was noted to have sinus bradycardia with first-degree AV block on telemetry but no other arrhythmias are documented in the discharge.  He has an underlying history of coronary artery disease, 4-vessel CABG in 2002 with multiple revascularizations with stent placements, history of multiple CVAs, type 2 diabetes on insulin and chronic kidney disease as well as peripheral arterial disease.  Since his discharge, he has been mainly wheelchair bound but he does attend physical therapy 2-3 times per week and has been walking with them.  He feels like he is getting stronger and has not had any recurrent falling episodes; however, I should note that he had a pretty significant fall in  September when he had seen Dr. Charles, so he does have a history of frequent falling.  Mr. Coleman denies any chest pain symptoms or difficulty breathing; however, I do find him to be mildly tachypneic with conversation today.  His weight was reported by the TCU at 191 today.  His previous weight in October was 177.      PHYSICAL EXAMINATION:     HEART:  Cardiovascular tones are regular and slow.  He has a systolic ejection murmur heard throughout the precordium, most prominent at the right upper sternal border.     LUNGS:  He has markedly diminished breath sounds in the left base, some fine crackles in the right base.  I do note that he had evidence of a left pleural effusion during his admission seen on echocardiogram.     EXTREMITIES:  He has 2+ pitting edema to the knees bilateral.    VITALS SIGNS:  Blood pressure today was 117/61, pulse was 61.      SUMMARY:  Mr. Bro is a pleasant 82-year-old male with coronary artery disease, previous 4-vessel CABG and multiple revascularizations, severe ischemic cardiomyopathy, his EF now estimated at 30%-35%, suspected significant valvular disease with severe calcification of the aortic valve, sinus bradycardia with first-degree AV block and recurrent falling.  He has evidence of decompensated heart failure on exam today.  I will recommend escalating his diuretic.  He is currently on 20 mg of torsemide.  I will recommend to increase this to 30 mg as instructed per heart failure.  He is enrolled in [facility] and should follow up in [facility] with reevaluation in 2-3 weeks with a basic metabolic panel in 1 week.  His basic metabolic panel today is pending.  Given his history of frequent falling with sinus bradycardia and first-degree AV block, I will recommend evaluation for arrhythmia including heart block.  He has worn an event monitor in the past back in 11/2016.  At that time, he did have some evidence of Wenckebach or second-degree heart block.  I do feel  it is important to evaluate for progression of AV honorio disease given his frequent falling.  I will have him follow up with the [facility] and Dr. Charles as above.  Please feel free to contact me with any questions you have in regards to his care.     Outpatient Encounter Prescriptions as of 12/26/2017   Medication Sig Dispense Refill     carvedilol (COREG) 12.5 MG tablet Take 12.5 mg by mouth daily       atorvastatin (LIPITOR) 40 MG tablet Take 40 mg by mouth daily       isosorbide mononitrate (IMDUR) 30 MG 24 hr tablet Take 3 tablets (90 mg) by mouth daily       insulin glargine (LANTUS SOLOSTAR) 100 UNIT/ML injection Inject 6 Units Subcutaneous At Bedtime 30 mL 0     insulin aspart (NOVOLOG PEN) 100 UNIT/ML injection Inject 5 Units Subcutaneous 3 times daily (with meals)       hydrALAZINE (APRESOLINE) 50 MG tablet Take 0.5 tablets (25 mg) by mouth 2 times daily 60 tablet      torsemide (DEMADEX) 10 MG tablet Take 2 tablets (20 mg) by mouth daily       spironolactone (ALDACTONE) 25 MG tablet Take 1 tablet (25 mg) by mouth daily       acetaminophen (TYLENOL) 325 MG tablet Take 650 mg by mouth every 6 hours as needed        Calcium Carb-Cholecalciferol 600-800 MG-UNIT TABS Take 1 tablet by mouth 3 times daily (with meals)        levETIRAcetam (KEPPRA) 500 MG tablet Take 500 mg by mouth 2 times daily       clopidogrel (PLAVIX) 75 MG tablet Take 75 mg by mouth daily       mirtazapine (REMERON) 15 MG tablet Take 15 mg by mouth At Bedtime       blood glucose monitoring (NO BRAND SPECIFIED) test strip Use to test blood sugar 4 times daily or as directed.       thiamine 100 MG tablet Take 100 mg by mouth daily       pantoprazole (PROTONIX) 40 MG EC tablet TAKE 1 TABLET BY MOUTH EVERY EVENING 90 tablet 2     Multiple Vitamins-Minerals (CENTRUM SILVER) per tablet Take 1 tablet by mouth daily 30 tablet      OXcarbazepine (TRILEPTAL) 150 MG tablet Take 1 tablet (150 mg) by mouth 2 times daily       [DISCONTINUED]  atorvastatin (LIPITOR) 40 MG tablet Take 40 mg by mouth At Bedtime       [DISCONTINUED] carvedilol (COREG) 25 MG tablet Take 0.5 tablets (12.5 mg) by mouth every morning 180 tablet 3     No facility-administered encounter medications on file as of 12/26/2017.        Again, thank you for allowing me to participate in the care of your patient.      Sincerely,    Kiera Nielsen,      [facility]

## 2017-12-26 NOTE — MR AVS SNAPSHOT
After Visit Summary   12/26/2017    Kelechi Coleman    MRN: [id number]           Patient Information     Date Of Birth          1935        Visit Information        Provider Department      12/26/2017 9:45 AM Kiera Nielsen DO [facility]        Today's Diagnoses     Atherosclerosis of native coronary artery of native heart without angina pectoris    -  1    Paroxysmal atrial fibrillation (H)        Peripheral artery disease (H)        Benign essential hypertension        Paroxysmal ventricular tachycardia (H)        Other iron deficiency anemia        Acute systolic congestive heart failure (H)        Chronic systolic congestive heart failure (H)        Nonrheumatic aortic valve stenosis        Syncope, unspecified syncope type        First degree heart block           Follow-ups after your visit        Your next 10 appointments already scheduled     Dec 26, 2017 11:30 AM CST   ZIOPATCH MONITOR with [facility] - [facility] ([facility])    [address] [facility] [address]  [facility] [address]   [phone]            Jan 09, 2018 11:30 AM CST   (Arrive by 11:15 AM)   Return Seizure with Mio Smith MD   [facility] Neurology ([facility])    [address] [address] [phone]            Jan 18, 2018 12:00 PM CST   Nursing Home with Laquita Deal MD   [facility]    [address] [place] [address] [address]  [facility] [address]   [phone]            Feb 09, 2018 11:30 AM CST   [facility]    [address] [facility] [address] [phone]           Bring a current list of meds and any records pertaining to this visit. For Physicals, please bring immunization records and any forms needing to be filled out. Please  "arrive 10 minutes early to complete paperwork.              Future tests that were ordered for you today     Open Future Orders        Priority Expected Expires Ordered    Zio Patch Monitor Routine 1/2/2018 12/26/2018 12/26/2017            Who to contact     If you have questions or need follow up information about today's clinic visit or your schedule please contact [facility] directly at [phone].  Normal or non-critical lab and imaging results will be communicated to you by OneAwayhart, letter or phone within 4 business days after the clinic has received the results. If you do not hear from us within 7 days, please contact the clinic through Mountvacationt or phone. If you have a critical or abnormal lab result, we will notify you by phone as soon as possible.  Submit refill requests through MakeGamesWithUs or call your pharmacy and they will forward the refill request to us. Please allow 3 business days for your refill to be completed.          Additional Information About Your Visit        OneAwayharTwylah Information     MakeGamesWithUs gives you secure access to your electronic health record. If you see a primary care provider, you can also send messages to your care team and make appointments. If you have questions, please call your primary care clinic.  If you do not have a primary care provider, please call [phone] and they will assist you.        Care EveryWhere ID     This is your Care EveryWhere ID. This could be used by other organizations to access your [place] medical records  [id number]        Your Vitals Were     Pulse Height                61 1.88 m (6' 2\")           Blood Pressure from Last 3 Encounters:   12/26/17 117/61   12/19/17 122/65   12/07/17 108/64    Weight from Last 3 Encounters:   12/19/17 89.9 kg (198 lb 3.1 oz)   12/07/17 89.2 kg (196 lb 9.6 oz)   10/13/17 80.3 kg (177 lb)              We Performed the Following     Follow-Up with Cardiac Advanced Practice Provider  "       Primary Care Provider Office Phone # Fax #    Laquita Deal [phone]       [address] [place] [address]        Goals        Exercise    I will exercise 2x per week (15 min per time) , for the next 6 weeks.     Notes - Note created  12/22/2016 12:14 PM by Ting Bullard, RN    As of today's date 12/22/2016 goal is met at 0 - 25%.   Goal Status:  Active           General    I will not add any salt to my food and will limit my salt intake to 2,000 mg of sodium per day. (pt-stated)     Notes - Note created  9/26/2016  3:53 PM by Ting Bullard, RN    As of today's date 9/26/2016 goal is met at 26 - 50%.   Goal Status:  Active        I will weigh myself every day and call my clinic if I am more than 2 pounds heavier in a day or 5 pounds heavier in a week.  (pt-stated)     Notes - Note created  9/26/2016  3:52 PM by Ting Bullard, RN    As of today's date 9/26/2016 goal is met at 26 - 50%.   Goal Status:  Active          Equal Access to Services     ART DEL RIO AH: Hadii christine kelsey hadnaomyo Soemilyali, waaxda luqadaha, qaybta kaalmada adeluis manuelyada, nenita corona. So [facility] [phone].    ATENCIÓN: Si habla español, tiene a conteh disposición servicios gratuitos de asistencia lingüística. Llame al [phone].    We comply with applicable federal civil rights laws and [place] laws. We do not discriminate on the basis of race, color, national origin, age, disability, sex, sexual orientation, or gender identity.            Thank you!     Thank you for choosing [facility]  for your care. Our goal is always to provide you with excellent care. Hearing back from our patients is one way we can continue to improve our services. Please take a few minutes to complete the written survey that you may receive in the mail after your visit with us. Thank you!             Your Updated Medication List - Protect others around you: Learn how  to safely use, store and throw away your medicines at www.disposemymeds.org.          This list is accurate as of: 12/26/17 10:49 AM.  Always use your most recent med list.                   Brand Name Dispense Instructions for use Diagnosis    acetaminophen 325 MG tablet    TYLENOL     Take 650 mg by mouth every 6 hours as needed        blood glucose monitoring test strip    no brand specified     Use to test blood sugar 4 times daily or as directed.        Calcium Carb-Cholecalciferol 600-800 MG-UNIT Tabs      Take 1 tablet by mouth 3 times daily (with meals)        carvedilol 12.5 MG tablet    COREG     Take 12.5 mg by mouth daily        CENTRUM SILVER per tablet     30 tablet    Take 1 tablet by mouth daily    Vitamin deficiency       clopidogrel 75 MG tablet    PLAVIX     Take 75 mg by mouth daily        hydrALAZINE 50 MG tablet    APRESOLINE    60 tablet    Take 0.5 tablets (25 mg) by mouth 2 times daily    Essential hypertension with goal blood pressure less than 130/80       insulin aspart 100 UNIT/ML injection    NovoLOG PEN     Inject 5 Units Subcutaneous 3 times daily (with meals)    Type 2 diabetes mellitus with stage 3 chronic kidney disease, with long-term current use of insulin (H)       insulin glargine 100 UNIT/ML injection    LANTUS SOLOSTAR    30 mL    Inject 6 Units Subcutaneous At Bedtime    Type 2 diabetes mellitus with stage 3 chronic kidney disease, with long-term current use of insulin (H)       isosorbide mononitrate 30 MG 24 hr tablet    IMDUR     Take 3 tablets (90 mg) by mouth daily    Coronary artery disease involving native coronary artery of native heart without angina pectoris       KEPPRA 500 MG tablet   Generic drug:  levETIRAcetam      Take 500 mg by mouth 2 times daily        LIPITOR 40 MG tablet   Generic drug:  atorvastatin      Take 40 mg by mouth daily        mirtazapine 15 MG tablet    REMERON     Take 15 mg by mouth At Bedtime        OXcarbazepine 150 MG tablet    TRILEPTAL      Take 1 tablet (150 mg) by mouth 2 times daily    History of stroke       pantoprazole 40 MG EC tablet    PROTONIX    90 tablet    TAKE 1 TABLET BY MOUTH EVERY EVENING    Gastroesophageal reflux disease, esophagitis presence not specified       spironolactone 25 MG tablet    ALDACTONE     Take 1 tablet (25 mg) by mouth daily    Chronic systolic congestive heart failure (H)       thiamine 100 MG tablet      Take 100 mg by mouth daily        torsemide 10 MG tablet    DEMADEX     Take 2 tablets (20 mg) by mouth daily    Chronic systolic congestive heart failure (H)

## 2017-12-26 NOTE — PROGRESS NOTES
REFERRING PHYSICIAN:  Dr. Laquita Deal      HISTORY OF PRESENT ILLNESS:  Mr. Coleman is a pleasant 82-year-old gentleman, a patient of Dr. Charles's, who I am seeing today for a followup from discharge from the hospital.  He was scheduled to see one of our nurse practitioners for followup and a basic metabolic panel.  He did show up today from his transitional care unit from [facility].  He did not have his blood work performed prior to the appointment and he was alone for the appointment today.  I did review his paperwork from his discharge.  He had a fall with an occipital laceration that was stapled.  He had a mild troponin elevation and was seen by Cardiology.  This was felt to be demand ischemia.  He had an echocardiogram which showed deterioration in his overall LV function.  His EF was baseline 40%-45%, appeared to be 30-35%.  It was a limited echo and severe calcification of the aortic valve was noted but felt to be an adequate interrogation.  Mild-to-moderate mitral regurgitation was noted.  He was noted to have sinus bradycardia with first-degree AV block on telemetry but no other arrhythmias are documented in the discharge.  He has an underlying history of coronary artery disease, 4-vessel CABG in 2002 with multiple revascularizations with stent placements, history of multiple CVAs, type 2 diabetes on insulin and chronic kidney disease as well as peripheral arterial disease.  Since his discharge, he has been mainly wheelchair bound but he does attend physical therapy 2-3 times per week and has been walking with them.  He feels like he is getting stronger and has not had any recurrent falling episodes; however, I should note that he had a pretty significant fall in September when he had seen Dr. Charles, so he does have a history of frequent falling.  Mr. Coleman denies any chest pain symptoms or difficulty breathing; however, I do find him to be mildly tachypneic with conversation today.  His weight  was reported by the TCU at 191 today.  His previous weight in October was 177.      PHYSICAL EXAMINATION:     HEART:  Cardiovascular tones are regular and slow.  He has a systolic ejection murmur heard throughout the precordium, most prominent at the right upper sternal border.     LUNGS:  He has markedly diminished breath sounds in the left base, some fine crackles in the right base.  I do note that he had evidence of a left pleural effusion during his admission seen on echocardiogram.     EXTREMITIES:  He has 2+ pitting edema to the knees bilateral.    VITALS SIGNS:  Blood pressure today was 117/61, pulse was 61.      SUMMARY:  Mr. Bro is a pleasant 82-year-old male with coronary artery disease, previous 4-vessel CABG and multiple revascularizations, severe ischemic cardiomyopathy, his EF now estimated at 30%-35%, suspected significant valvular disease with severe calcification of the aortic valve, sinus bradycardia with first-degree AV block and recurrent falling.  He has evidence of decompensated heart failure on exam today.  I will recommend escalating his diuretic.  He is currently on 20 mg of torsemide.  I will recommend to increase this to 30 mg as instructed per heart failure.  [facility] and should follow up in [facility] with reevaluation in 2-3 weeks with a basic metabolic panel in 1 week.  His basic metabolic panel today is pending.  Given his history of frequent falling with sinus bradycardia and first-degree AV block, I will recommend evaluation for arrhythmia including heart block.  He has worn an event monitor in the past back in 11/2016.  At that time, he did have some evidence of Wenckebach or second-degree heart block.  I do feel it is important to evaluate for progression of AV honorio disease given his frequent falling.  I will have him follow up with the [facility] and Dr. Charles as above.  Please feel free to contact me with any questions you have in regards  to his care.      cc:   Laquita Deal MD    [facility]    [address], [facility] [address]   [place] [address]         JOSH WINN DO             D: 2017 10:47   T: 2017 11:30   MT: CARLOS      Name:     MARLA VÁZQUEZ   MRN:      [id number]        Account:      [id number]   :      1935           Service Date: 2017      Document: [id number]

## 2017-12-31 NOTE — PROGRESS NOTES
Received a call from nursing concerned that the area on Kelechi's scalp that had adrian removed with one still being intact? Was showing pussy drainage.  Yesterday Bacitracin was ordered but they felt an oral ABX was needed.    PLAN:    CIpro 500mg BID for 7 days  Allergic to PCN so did not use Keflex    Electronically signed by Pat Morrison RN, CNP

## 2018-01-01 ENCOUNTER — APPOINTMENT (OUTPATIENT)
Dept: SPEECH THERAPY | Facility: CLINIC | Age: 83
DRG: 291 | End: 2018-01-01
Attending: INTERNAL MEDICINE
Payer: MEDICARE

## 2018-01-01 ENCOUNTER — CARE COORDINATION (OUTPATIENT)
Dept: CARDIOLOGY | Facility: CLINIC | Age: 83
End: 2018-01-01

## 2018-01-01 ENCOUNTER — TRANSFERRED RECORDS (OUTPATIENT)
Dept: HEALTH INFORMATION MANAGEMENT | Facility: CLINIC | Age: 83
End: 2018-01-01

## 2018-01-01 ENCOUNTER — NURSING HOME VISIT (OUTPATIENT)
Dept: GERIATRICS | Facility: CLINIC | Age: 83
End: 2018-01-01
Payer: COMMERCIAL

## 2018-01-01 ENCOUNTER — APPOINTMENT (OUTPATIENT)
Dept: GENERAL RADIOLOGY | Facility: CLINIC | Age: 83
DRG: 291 | End: 2018-01-01
Attending: NURSE PRACTITIONER
Payer: MEDICARE

## 2018-01-01 ENCOUNTER — APPOINTMENT (OUTPATIENT)
Dept: CARDIOLOGY | Facility: CLINIC | Age: 83
DRG: 291 | End: 2018-01-01
Attending: INTERNAL MEDICINE
Payer: MEDICARE

## 2018-01-01 ENCOUNTER — MEDICAL CORRESPONDENCE (OUTPATIENT)
Dept: HEALTH INFORMATION MANAGEMENT | Facility: CLINIC | Age: 83
End: 2018-01-01

## 2018-01-01 ENCOUNTER — OFFICE VISIT (OUTPATIENT)
Dept: NEUROLOGY | Facility: CLINIC | Age: 83
End: 2018-01-01
Payer: COMMERCIAL

## 2018-01-01 ENCOUNTER — HOSPITAL ENCOUNTER (INPATIENT)
Facility: CLINIC | Age: 83
LOS: 6 days | DRG: 291 | End: 2018-01-22
Attending: HOSPITALIST | Admitting: HOSPITALIST
Payer: MEDICARE

## 2018-01-01 ENCOUNTER — APPOINTMENT (OUTPATIENT)
Dept: CARDIOLOGY | Facility: CLINIC | Age: 83
DRG: 291 | End: 2018-01-01
Attending: NURSE PRACTITIONER
Payer: MEDICARE

## 2018-01-01 ENCOUNTER — OFFICE VISIT (OUTPATIENT)
Dept: CARDIOLOGY | Facility: CLINIC | Age: 83
End: 2018-01-01
Attending: INTERNAL MEDICINE
Payer: COMMERCIAL

## 2018-01-01 VITALS
DIASTOLIC BLOOD PRESSURE: 68 MMHG | SYSTOLIC BLOOD PRESSURE: 100 MMHG | WEIGHT: 205.5 LBS | HEIGHT: 74 IN | BODY MASS INDEX: 26.37 KG/M2

## 2018-01-01 VITALS
HEIGHT: 73 IN | SYSTOLIC BLOOD PRESSURE: 131 MMHG | HEART RATE: 72 BPM | OXYGEN SATURATION: 97 % | WEIGHT: 236.33 LBS | TEMPERATURE: 97.6 F | RESPIRATION RATE: 22 BRPM | DIASTOLIC BLOOD PRESSURE: 68 MMHG | BODY MASS INDEX: 31.32 KG/M2

## 2018-01-01 VITALS
WEIGHT: 190 LBS | HEIGHT: 74 IN | BODY MASS INDEX: 24.38 KG/M2 | HEART RATE: 55 BPM | SYSTOLIC BLOOD PRESSURE: 129 MMHG | DIASTOLIC BLOOD PRESSURE: 78 MMHG

## 2018-01-01 VITALS
DIASTOLIC BLOOD PRESSURE: 52 MMHG | TEMPERATURE: 96.9 F | SYSTOLIC BLOOD PRESSURE: 113 MMHG | BODY MASS INDEX: 24.47 KG/M2 | RESPIRATION RATE: 18 BRPM | WEIGHT: 190.6 LBS | HEART RATE: 64 BPM | OXYGEN SATURATION: 97 %

## 2018-01-01 DIAGNOSIS — I73.9 PERIPHERAL ARTERY DISEASE (H): Primary | ICD-10-CM

## 2018-01-01 DIAGNOSIS — I25.5 ISCHEMIC CARDIOMYOPATHY: ICD-10-CM

## 2018-01-01 DIAGNOSIS — G40.109 SEIZURE DISORDER, FOCAL MOTOR (H): Primary | ICD-10-CM

## 2018-01-01 DIAGNOSIS — I25.10 ATHEROSCLEROSIS OF NATIVE CORONARY ARTERY OF NATIVE HEART WITHOUT ANGINA PECTORIS: ICD-10-CM

## 2018-01-01 DIAGNOSIS — S01.01XD LACERATION OF SCALP WITHOUT FOREIGN BODY, SUBSEQUENT ENCOUNTER: ICD-10-CM

## 2018-01-01 DIAGNOSIS — L03.811 CELLULITIS OF HEAD EXCEPT FACE: ICD-10-CM

## 2018-01-01 DIAGNOSIS — I10 BENIGN ESSENTIAL HYPERTENSION: ICD-10-CM

## 2018-01-01 DIAGNOSIS — N18.30 TYPE 2 DIABETES MELLITUS WITH STAGE 3 CHRONIC KIDNEY DISEASE, WITH LONG-TERM CURRENT USE OF INSULIN (H): ICD-10-CM

## 2018-01-01 DIAGNOSIS — I47.29 PAROXYSMAL VENTRICULAR TACHYCARDIA (H): ICD-10-CM

## 2018-01-01 DIAGNOSIS — E78.5 HYPERLIPIDEMIA LDL GOAL <70: ICD-10-CM

## 2018-01-01 DIAGNOSIS — I50.22 CHRONIC SYSTOLIC CONGESTIVE HEART FAILURE (H): ICD-10-CM

## 2018-01-01 DIAGNOSIS — R63.5 WEIGHT GAIN: Primary | ICD-10-CM

## 2018-01-01 DIAGNOSIS — Z79.4 TYPE 2 DIABETES MELLITUS WITH STAGE 3 CHRONIC KIDNEY DISEASE, WITH LONG-TERM CURRENT USE OF INSULIN (H): ICD-10-CM

## 2018-01-01 DIAGNOSIS — I48.0 PAROXYSMAL ATRIAL FIBRILLATION (H): ICD-10-CM

## 2018-01-01 DIAGNOSIS — E11.22 TYPE 2 DIABETES MELLITUS WITH STAGE 3 CHRONIC KIDNEY DISEASE, WITH LONG-TERM CURRENT USE OF INSULIN (H): ICD-10-CM

## 2018-01-01 DIAGNOSIS — I50.22 CHRONIC SYSTOLIC HEART FAILURE (H): Primary | ICD-10-CM

## 2018-01-01 DIAGNOSIS — I50.22 CHRONIC SYSTOLIC HEART FAILURE (H): ICD-10-CM

## 2018-01-01 PROBLEM — G40.909 SEIZURE DISORDER (H): Status: ACTIVE | Noted: 2018-01-01

## 2018-01-01 LAB
ALBUMIN SERPL-MCNC: 2.8 G/DL (ref 3.4–5)
ALBUMIN SERPL-MCNC: 2.9 G/DL (ref 3.4–5)
ALP SERPL-CCNC: 257 U/L (ref 40–150)
ALP SERPL-CCNC: 265 U/L (ref 40–150)
ALT SERPL W P-5'-P-CCNC: 44 U/L (ref 0–70)
ALT SERPL W P-5'-P-CCNC: 47 U/L (ref 0–70)
ANION GAP SERPL CALCULATED.3IONS-SCNC: 13 MMOL/L (ref 5–18)
ANION GAP SERPL CALCULATED.3IONS-SCNC: 13.3 MMOL/L (ref 6–17)
ANION GAP SERPL CALCULATED.3IONS-SCNC: 6 MMOL/L (ref 3–14)
ANION GAP SERPL CALCULATED.3IONS-SCNC: 7 MMOL/L (ref 3–14)
ANION GAP SERPL CALCULATED.3IONS-SCNC: 7 MMOL/L (ref 3–14)
ANION GAP SERPL CALCULATED.3IONS-SCNC: 8 MMOL/L (ref 3–14)
ANION GAP SERPL CALCULATED.3IONS-SCNC: 9 MMOL/L (ref 3–14)
ANION GAP SERPL CALCULATED.3IONS-SCNC: 9 MMOL/L (ref 3–14)
AST SERPL W P-5'-P-CCNC: 42 U/L (ref 0–45)
AST SERPL W P-5'-P-CCNC: 48 U/L (ref 0–45)
BILIRUB SERPL-MCNC: 0.8 MG/DL (ref 0.2–1.3)
BILIRUB SERPL-MCNC: 1.5 MG/DL (ref 0.2–1.3)
BUN SERPL-MCNC: 46 MG/DL (ref 7–30)
BUN SERPL-MCNC: 50 MG/DL (ref 8–28)
BUN SERPL-MCNC: 61 MG/DL (ref 7–30)
BUN SERPL-MCNC: 64 MG/DL (ref 7–30)
BUN SERPL-MCNC: 64 MG/DL (ref 7–30)
BUN SERPL-MCNC: 65 MG/DL (ref 7–30)
BUN SERPL-MCNC: 67 MG/DL (ref 7–30)
BUN SERPL-MCNC: 67 MG/DL (ref 7–30)
BUN SERPL-MCNC: 68 MG/DL (ref 7–30)
BUN SERPL-MCNC: 70 MG/DL (ref 7–30)
CALCIUM SERPL-MCNC: 8.4 MG/DL (ref 8.5–10.1)
CALCIUM SERPL-MCNC: 8.6 MG/DL (ref 8.5–10.1)
CALCIUM SERPL-MCNC: 8.6 MG/DL (ref 8.5–10.1)
CALCIUM SERPL-MCNC: 8.8 MG/DL (ref 8.5–10.1)
CALCIUM SERPL-MCNC: 8.9 MG/DL (ref 8.5–10.1)
CALCIUM SERPL-MCNC: 8.9 MG/DL (ref 8.5–10.1)
CALCIUM SERPL-MCNC: 8.9 MG/DL (ref 8.5–10.5)
CALCIUM SERPL-MCNC: 9 MG/DL (ref 8.5–10.1)
CALCIUM SERPL-MCNC: 9 MG/DL (ref 8.5–10.1)
CALCIUM SERPL-MCNC: 9.2 MG/DL (ref 8.5–10.5)
CHLORIDE SERPL-SCNC: 101 MMOL/L (ref 94–109)
CHLORIDE SERPL-SCNC: 101 MMOL/L (ref 98–107)
CHLORIDE SERPL-SCNC: 102 MMOL/L (ref 94–109)
CHLORIDE SERPL-SCNC: 102 MMOL/L (ref 94–109)
CHLORIDE SERPL-SCNC: 103 MMOL/L (ref 94–109)
CHLORIDE SERPL-SCNC: 103 MMOL/L (ref 94–109)
CHLORIDE SERPL-SCNC: 104 MMOL/L (ref 94–109)
CHLORIDE SERPL-SCNC: 105 MMOL/L (ref 94–109)
CHLORIDE SERPL-SCNC: 105 MMOL/L (ref 94–109)
CHLORIDE SERPLBLD-SCNC: 105 MMOL/L (ref 98–107)
CO2 SERPL-SCNC: 24 MMOL/L (ref 22–31)
CO2 SERPL-SCNC: 26 MMOL/L (ref 23–29)
CO2 SERPL-SCNC: 27 MMOL/L (ref 20–32)
CO2 SERPL-SCNC: 28 MMOL/L (ref 20–32)
CO2 SERPL-SCNC: 29 MMOL/L (ref 20–32)
CREAT SERPL-MCNC: 1.41 MG/DL (ref 0.66–1.25)
CREAT SERPL-MCNC: 1.43 MG/DL (ref 0.7–1.3)
CREAT SERPL-MCNC: 1.74 MG/DL (ref 0.66–1.25)
CREAT SERPL-MCNC: 1.77 MG/DL (ref 0.66–1.25)
CREAT SERPL-MCNC: 1.81 MG/DL (ref 0.66–1.25)
CREAT SERPL-MCNC: 1.81 MG/DL (ref 0.66–1.25)
CREAT SERPL-MCNC: 1.96 MG/DL (ref 0.66–1.25)
CREAT SERPL-MCNC: 1.97 MG/DL (ref 0.66–1.25)
CREAT SERPL-MCNC: 1.98 MG/DL (ref 0.66–1.25)
CREAT SERPL-MCNC: 2.03 MG/DL (ref 0.7–1.3)
ERYTHROCYTE [DISTWIDTH] IN BLOOD BY AUTOMATED COUNT: 17.6 % (ref 10–15)
ERYTHROCYTE [DISTWIDTH] IN BLOOD BY AUTOMATED COUNT: 17.9 % (ref 10–15)
GFR SERPL CREATININE-BSD FRML MDRD: 32 ML/MIN/1.7M2
GFR SERPL CREATININE-BSD FRML MDRD: 33 ML/MIN/1.7M2
GFR SERPL CREATININE-BSD FRML MDRD: 36 ML/MIN/1.7M2
GFR SERPL CREATININE-BSD FRML MDRD: 36 ML/MIN/1.7M2
GFR SERPL CREATININE-BSD FRML MDRD: 37 ML/MIN/1.7M2
GFR SERPL CREATININE-BSD FRML MDRD: 38 ML/MIN/1.7M2
GFR SERPL CREATININE-BSD FRML MDRD: 47 ML/MIN/1.73M2
GFR SERPL CREATININE-BSD FRML MDRD: 48 ML/MIN/1.7M2
GLUCOSE BLDC GLUCOMTR-MCNC: 105 MG/DL (ref 70–99)
GLUCOSE BLDC GLUCOMTR-MCNC: 108 MG/DL (ref 70–99)
GLUCOSE BLDC GLUCOMTR-MCNC: 108 MG/DL (ref 70–99)
GLUCOSE BLDC GLUCOMTR-MCNC: 112 MG/DL (ref 70–99)
GLUCOSE BLDC GLUCOMTR-MCNC: 120 MG/DL (ref 70–99)
GLUCOSE BLDC GLUCOMTR-MCNC: 133 MG/DL (ref 70–99)
GLUCOSE BLDC GLUCOMTR-MCNC: 136 MG/DL (ref 70–99)
GLUCOSE BLDC GLUCOMTR-MCNC: 139 MG/DL (ref 70–99)
GLUCOSE BLDC GLUCOMTR-MCNC: 143 MG/DL (ref 70–99)
GLUCOSE BLDC GLUCOMTR-MCNC: 144 MG/DL (ref 70–99)
GLUCOSE BLDC GLUCOMTR-MCNC: 144 MG/DL (ref 70–99)
GLUCOSE BLDC GLUCOMTR-MCNC: 145 MG/DL (ref 70–99)
GLUCOSE BLDC GLUCOMTR-MCNC: 150 MG/DL (ref 70–99)
GLUCOSE BLDC GLUCOMTR-MCNC: 154 MG/DL (ref 70–99)
GLUCOSE BLDC GLUCOMTR-MCNC: 52 MG/DL (ref 70–99)
GLUCOSE BLDC GLUCOMTR-MCNC: 65 MG/DL (ref 70–99)
GLUCOSE BLDC GLUCOMTR-MCNC: 74 MG/DL (ref 70–99)
GLUCOSE BLDC GLUCOMTR-MCNC: 74 MG/DL (ref 70–99)
GLUCOSE BLDC GLUCOMTR-MCNC: 75 MG/DL (ref 70–99)
GLUCOSE BLDC GLUCOMTR-MCNC: 85 MG/DL (ref 70–99)
GLUCOSE BLDC GLUCOMTR-MCNC: 87 MG/DL (ref 70–99)
GLUCOSE BLDC GLUCOMTR-MCNC: 88 MG/DL (ref 70–99)
GLUCOSE SERPL-MCNC: 103 MG/DL (ref 70–125)
GLUCOSE SERPL-MCNC: 107 MG/DL (ref 70–99)
GLUCOSE SERPL-MCNC: 131 MG/DL (ref 70–99)
GLUCOSE SERPL-MCNC: 141 MG/DL (ref 70–99)
GLUCOSE SERPL-MCNC: 182 MG/DL (ref 70–105)
GLUCOSE SERPL-MCNC: 60 MG/DL (ref 70–99)
GLUCOSE SERPL-MCNC: 69 MG/DL (ref 70–99)
GLUCOSE SERPL-MCNC: 72 MG/DL (ref 70–99)
GLUCOSE SERPL-MCNC: 73 MG/DL (ref 70–99)
GLUCOSE SERPL-MCNC: 77 MG/DL (ref 70–99)
HBA1C MFR BLD: 6.9 % (ref 4.3–6)
HCT VFR BLD AUTO: 33.5 % (ref 40–53)
HCT VFR BLD AUTO: 33.8 % (ref 40–53)
HGB BLD-MCNC: 10.9 G/DL (ref 13.3–17.7)
HGB BLD-MCNC: 11.1 G/DL (ref 13.3–17.7)
INR PPP: 1.54 (ref 0.86–1.14)
INTERPRETATION ECG - MUSE: NORMAL
LACTATE BLD-SCNC: 1.4 MMOL/L (ref 0.7–2)
LEVETIRACETAM SERPL-MCNC: 51 UG/ML (ref 12–46)
MAGNESIUM SERPL-MCNC: 1.6 MG/DL (ref 1.6–2.3)
MCH RBC QN AUTO: 28.8 PG (ref 26.5–33)
MCH RBC QN AUTO: 29.5 PG (ref 26.5–33)
MCHC RBC AUTO-ENTMCNC: 32.2 G/DL (ref 31.5–36.5)
MCHC RBC AUTO-ENTMCNC: 33.1 G/DL (ref 31.5–36.5)
MCV RBC AUTO: 89 FL (ref 78–100)
MCV RBC AUTO: 89 FL (ref 78–100)
PHOSPHATE SERPL-MCNC: 4 MG/DL (ref 2.5–4.5)
PLATELET # BLD AUTO: 112 10E9/L (ref 150–450)
PLATELET # BLD AUTO: 96 10E9/L (ref 150–450)
POTASSIUM SERPL-SCNC: 4 MMOL/L (ref 3.4–5.3)
POTASSIUM SERPL-SCNC: 4 MMOL/L (ref 3.5–5)
POTASSIUM SERPL-SCNC: 4.3 MMOL/L (ref 3.4–5.3)
POTASSIUM SERPL-SCNC: 4.5 MMOL/L (ref 3.4–5.3)
POTASSIUM SERPL-SCNC: 4.6 MMOL/L (ref 3.4–5.3)
POTASSIUM SERPL-SCNC: 4.8 MMOL/L (ref 3.4–5.3)
POTASSIUM SERPL-SCNC: 5 MMOL/L (ref 3.4–5.3)
POTASSIUM SERPL-SCNC: 5 MMOL/L (ref 3.4–5.3)
POTASSIUM SERPL-SCNC: 5.3 MMOL/L (ref 3.4–5.3)
POTASSIUM SERPL-SCNC: 5.3 MMOL/L (ref 3.5–5.1)
POTASSIUM SERPL-SCNC: 5.4 MMOL/L (ref 3.4–5.3)
POTASSIUM SERPL-SCNC: 5.8 MMOL/L (ref 3.4–5.3)
POTASSIUM SERPL-SCNC: 6 MMOL/L (ref 3.4–5.3)
PROT SERPL-MCNC: 6.4 G/DL (ref 6.8–8.8)
PROT SERPL-MCNC: 6.4 G/DL (ref 6.8–8.8)
RBC # BLD AUTO: 3.76 10E12/L (ref 4.4–5.9)
RBC # BLD AUTO: 3.78 10E12/L (ref 4.4–5.9)
SODIUM SERPL-SCNC: 135 MMOL/L (ref 136–145)
SODIUM SERPL-SCNC: 138 MMOL/L (ref 133–144)
SODIUM SERPL-SCNC: 139 MMOL/L (ref 133–144)
SODIUM SERPL-SCNC: 139 MMOL/L (ref 133–144)
SODIUM SERPL-SCNC: 140 MMOL/L (ref 133–144)
SODIUM SERPL-SCNC: 140 MMOL/L (ref 133–144)
SODIUM SERPL-SCNC: 142 MMOL/L (ref 136–145)
T4 FREE SERPL-MCNC: 0.67 NG/DL (ref 0.76–1.46)
TROPONIN I SERPL-MCNC: 0.45 UG/L (ref 0–0.04)
TROPONIN I SERPL-MCNC: 0.46 UG/L (ref 0–0.04)
TROPONIN I SERPL-MCNC: 0.48 UG/L (ref 0–0.04)
TSH SERPL DL<=0.005 MIU/L-ACNC: 37.07 MU/L (ref 0.4–4)
WBC # BLD AUTO: 5.7 10E9/L (ref 4–11)
WBC # BLD AUTO: 5.8 10E9/L (ref 4–11)

## 2018-01-01 PROCEDURE — 84484 ASSAY OF TROPONIN QUANT: CPT | Performed by: INTERNAL MEDICINE

## 2018-01-01 PROCEDURE — 27210219 ZZH KIT SHRLOCK 5FR DBL LUM PWR P

## 2018-01-01 PROCEDURE — 25000125 ZZHC RX 250: Performed by: HOSPITALIST

## 2018-01-01 PROCEDURE — 93325 DOPPLER ECHO COLOR FLOW MAPG: CPT | Mod: 26 | Performed by: INTERNAL MEDICINE

## 2018-01-01 PROCEDURE — 25000132 ZZH RX MED GY IP 250 OP 250 PS 637: Mod: GY | Performed by: HOSPITALIST

## 2018-01-01 PROCEDURE — 21000001 ZZH R&B HEART CARE

## 2018-01-01 PROCEDURE — 27210946 ZZH KIT HC TOTES DISP CR8

## 2018-01-01 PROCEDURE — 80053 COMPREHEN METABOLIC PANEL: CPT | Performed by: INTERNAL MEDICINE

## 2018-01-01 PROCEDURE — 36415 COLL VENOUS BLD VENIPUNCTURE: CPT | Performed by: INTERNAL MEDICINE

## 2018-01-01 PROCEDURE — 99238 HOSP IP/OBS DSCHRG MGMT 30/<: CPT | Performed by: HOSPITALIST

## 2018-01-01 PROCEDURE — 80048 BASIC METABOLIC PNL TOTAL CA: CPT | Performed by: NURSE PRACTITIONER

## 2018-01-01 PROCEDURE — 40000986 XR CHEST PORT 1 VW

## 2018-01-01 PROCEDURE — 25000128 H RX IP 250 OP 636: Performed by: HOSPITALIST

## 2018-01-01 PROCEDURE — A9270 NON-COVERED ITEM OR SERVICE: HCPCS | Mod: GY | Performed by: HOSPITALIST

## 2018-01-01 PROCEDURE — 25000132 ZZH RX MED GY IP 250 OP 250 PS 637: Mod: GY | Performed by: INTERNAL MEDICINE

## 2018-01-01 PROCEDURE — 21000000 ZZH R&B IMCU HEART CARE

## 2018-01-01 PROCEDURE — 99233 SBSQ HOSP IP/OBS HIGH 50: CPT | Performed by: INTERNAL MEDICINE

## 2018-01-01 PROCEDURE — 99291 CRITICAL CARE FIRST HOUR: CPT | Performed by: INTERNAL MEDICINE

## 2018-01-01 PROCEDURE — A9270 NON-COVERED ITEM OR SERVICE: HCPCS | Mod: GY | Performed by: NURSE PRACTITIONER

## 2018-01-01 PROCEDURE — 36415 COLL VENOUS BLD VENIPUNCTURE: CPT | Performed by: HOSPITALIST

## 2018-01-01 PROCEDURE — 33210 INSERT ELECTRD/PM CATH SNGL: CPT | Performed by: INTERNAL MEDICINE

## 2018-01-01 PROCEDURE — 27210856 ZZH ACCESS HEART CATH CR2

## 2018-01-01 PROCEDURE — 25000128 H RX IP 250 OP 636: Performed by: INTERNAL MEDICINE

## 2018-01-01 PROCEDURE — 93010 ELECTROCARDIOGRAM REPORT: CPT | Performed by: INTERNAL MEDICINE

## 2018-01-01 PROCEDURE — 84132 ASSAY OF SERUM POTASSIUM: CPT | Performed by: INTERNAL MEDICINE

## 2018-01-01 PROCEDURE — 99233 SBSQ HOSP IP/OBS HIGH 50: CPT | Performed by: NURSE PRACTITIONER

## 2018-01-01 PROCEDURE — 25000132 ZZH RX MED GY IP 250 OP 250 PS 637: Mod: GY | Performed by: NURSE PRACTITIONER

## 2018-01-01 PROCEDURE — 99223 1ST HOSP IP/OBS HIGH 75: CPT | Performed by: NURSE PRACTITIONER

## 2018-01-01 PROCEDURE — 80048 BASIC METABOLIC PNL TOTAL CA: CPT | Performed by: INTERNAL MEDICINE

## 2018-01-01 PROCEDURE — 33210 INSERT ELECTRD/PM CATH SNGL: CPT

## 2018-01-01 PROCEDURE — 99223 1ST HOSP IP/OBS HIGH 75: CPT | Performed by: INTERNAL MEDICINE

## 2018-01-01 PROCEDURE — 25000128 H RX IP 250 OP 636: Performed by: NURSE PRACTITIONER

## 2018-01-01 PROCEDURE — 5A1223Z PERFORMANCE OF CARDIAC PACING, CONTINUOUS: ICD-10-PCS | Performed by: INTERNAL MEDICINE

## 2018-01-01 PROCEDURE — 99152 MOD SED SAME PHYS/QHP 5/>YRS: CPT | Performed by: INTERNAL MEDICINE

## 2018-01-01 PROCEDURE — 00000146 ZZHCL STATISTIC GLUCOSE BY METER IP

## 2018-01-01 PROCEDURE — 93000 ELECTROCARDIOGRAM COMPLETE: CPT | Performed by: NURSE PRACTITIONER

## 2018-01-01 PROCEDURE — 25800025 ZZH RX 258: Performed by: INTERNAL MEDICINE

## 2018-01-01 PROCEDURE — 25000131 ZZH RX MED GY IP 250 OP 636 PS 637: Mod: GY | Performed by: NURSE PRACTITIONER

## 2018-01-01 PROCEDURE — 99310 SBSQ NF CARE HIGH MDM 45: CPT | Performed by: NURSE PRACTITIONER

## 2018-01-01 PROCEDURE — 40000257 ZZH STATISTIC CONSULT NO CHARGE VASC ACCESS

## 2018-01-01 PROCEDURE — 84484 ASSAY OF TROPONIN QUANT: CPT | Performed by: NURSE PRACTITIONER

## 2018-01-01 PROCEDURE — 12000000 ZZH R&B MED SURG/OB

## 2018-01-01 PROCEDURE — 84439 ASSAY OF FREE THYROXINE: CPT | Performed by: NURSE PRACTITIONER

## 2018-01-01 PROCEDURE — 85027 COMPLETE CBC AUTOMATED: CPT | Performed by: INTERNAL MEDICINE

## 2018-01-01 PROCEDURE — 83735 ASSAY OF MAGNESIUM: CPT | Performed by: NURSE PRACTITIONER

## 2018-01-01 PROCEDURE — 83036 HEMOGLOBIN GLYCOSYLATED A1C: CPT | Performed by: NURSE PRACTITIONER

## 2018-01-01 PROCEDURE — 84132 ASSAY OF SERUM POTASSIUM: CPT | Performed by: HOSPITALIST

## 2018-01-01 PROCEDURE — 40000239 ZZH STATISTIC VAT ROUNDS

## 2018-01-01 PROCEDURE — 85027 COMPLETE CBC AUTOMATED: CPT | Performed by: NURSE PRACTITIONER

## 2018-01-01 PROCEDURE — 36415 COLL VENOUS BLD VENIPUNCTURE: CPT | Performed by: NURSE PRACTITIONER

## 2018-01-01 PROCEDURE — 25000125 ZZHC RX 250: Performed by: INTERNAL MEDICINE

## 2018-01-01 PROCEDURE — 71045 X-RAY EXAM CHEST 1 VIEW: CPT

## 2018-01-01 PROCEDURE — 85610 PROTHROMBIN TIME: CPT | Performed by: INTERNAL MEDICINE

## 2018-01-01 PROCEDURE — 92610 EVALUATE SWALLOWING FUNCTION: CPT | Mod: GN | Performed by: SPEECH-LANGUAGE PATHOLOGIST

## 2018-01-01 PROCEDURE — 99233 SBSQ HOSP IP/OBS HIGH 50: CPT | Performed by: HOSPITALIST

## 2018-01-01 PROCEDURE — 27210795 ZZH PAD DEFIB QUICK CR4

## 2018-01-01 PROCEDURE — 84100 ASSAY OF PHOSPHORUS: CPT | Performed by: NURSE PRACTITIONER

## 2018-01-01 PROCEDURE — 25000131 ZZH RX MED GY IP 250 OP 636 PS 637: Mod: GY | Performed by: INTERNAL MEDICINE

## 2018-01-01 PROCEDURE — 27210757 ZZH CATH TEMP PACING HEART CR8

## 2018-01-01 PROCEDURE — A9270 NON-COVERED ITEM OR SERVICE: HCPCS | Mod: GY | Performed by: INTERNAL MEDICINE

## 2018-01-01 PROCEDURE — 84443 ASSAY THYROID STIM HORMONE: CPT | Performed by: NURSE PRACTITIONER

## 2018-01-01 PROCEDURE — 93005 ELECTROCARDIOGRAM TRACING: CPT

## 2018-01-01 PROCEDURE — 99223 1ST HOSP IP/OBS HIGH 75: CPT | Mod: AI | Performed by: NURSE PRACTITIONER

## 2018-01-01 PROCEDURE — 93308 TTE F-UP OR LMTD: CPT | Mod: 26 | Performed by: INTERNAL MEDICINE

## 2018-01-01 PROCEDURE — 99231 SBSQ HOSP IP/OBS SF/LOW 25: CPT | Performed by: HOSPITALIST

## 2018-01-01 PROCEDURE — 99215 OFFICE O/P EST HI 40 MIN: CPT | Mod: 25 | Performed by: NURSE PRACTITIONER

## 2018-01-01 PROCEDURE — 36569 INSJ PICC 5 YR+ W/O IMAGING: CPT

## 2018-01-01 PROCEDURE — 93321 DOPPLER ECHO F-UP/LMTD STD: CPT | Mod: 26 | Performed by: INTERNAL MEDICINE

## 2018-01-01 PROCEDURE — 99233 SBSQ HOSP IP/OBS HIGH 50: CPT | Mod: 24 | Performed by: INTERNAL MEDICINE

## 2018-01-01 PROCEDURE — 25500064 ZZH RX 255 OP 636: Performed by: HOSPITALIST

## 2018-01-01 PROCEDURE — 40000225 ZZH STATISTIC SLP WARD VISIT: Performed by: SPEECH-LANGUAGE PATHOLOGIST

## 2018-01-01 PROCEDURE — 83605 ASSAY OF LACTIC ACID: CPT | Performed by: NURSE PRACTITIONER

## 2018-01-01 PROCEDURE — 92526 ORAL FUNCTION THERAPY: CPT | Mod: GN | Performed by: SPEECH-LANGUAGE PATHOLOGIST

## 2018-01-01 RX ORDER — FENTANYL CITRATE 50 UG/ML
25-50 INJECTION, SOLUTION INTRAMUSCULAR; INTRAVENOUS
Status: DISCONTINUED | OUTPATIENT
Start: 2018-01-01 | End: 2018-01-01 | Stop reason: HOSPADM

## 2018-01-01 RX ORDER — CLOPIDOGREL 300 MG/1
300-600 TABLET, FILM COATED ORAL
Status: DISCONTINUED | OUTPATIENT
Start: 2018-01-01 | End: 2018-01-01 | Stop reason: HOSPADM

## 2018-01-01 RX ORDER — BUPIVACAINE HYDROCHLORIDE 2.5 MG/ML
1-10 INJECTION, SOLUTION EPIDURAL; INFILTRATION; INTRACAUDAL
Status: DISCONTINUED | OUTPATIENT
Start: 2018-01-01 | End: 2018-01-01 | Stop reason: HOSPADM

## 2018-01-01 RX ORDER — NITROGLYCERIN 5 MG/ML
100-500 VIAL (ML) INTRAVENOUS
Status: DISCONTINUED | OUTPATIENT
Start: 2018-01-01 | End: 2018-01-01 | Stop reason: HOSPADM

## 2018-01-01 RX ORDER — SODIUM POLYSTYRENE SULFONATE 15 G/60ML
30 SUSPENSION ORAL; RECTAL ONCE
Status: COMPLETED | OUTPATIENT
Start: 2018-01-01 | End: 2018-01-01

## 2018-01-01 RX ORDER — MORPHINE SULFATE 2 MG/ML
1-2 INJECTION, SOLUTION INTRAMUSCULAR; INTRAVENOUS EVERY 5 MIN PRN
Status: DISCONTINUED | OUTPATIENT
Start: 2018-01-01 | End: 2018-01-01 | Stop reason: HOSPADM

## 2018-01-01 RX ORDER — CLOPIDOGREL BISULFATE 75 MG/1
75 TABLET ORAL DAILY
Status: DISCONTINUED | OUTPATIENT
Start: 2018-01-01 | End: 2018-01-01

## 2018-01-01 RX ORDER — LIDOCAINE 40 MG/G
CREAM TOPICAL
Status: DISCONTINUED | OUTPATIENT
Start: 2018-01-01 | End: 2018-01-01

## 2018-01-01 RX ORDER — HYDROMORPHONE HYDROCHLORIDE 1 MG/ML
.3-.5 INJECTION, SOLUTION INTRAMUSCULAR; INTRAVENOUS; SUBCUTANEOUS
Status: DISCONTINUED | OUTPATIENT
Start: 2018-01-01 | End: 2018-01-01 | Stop reason: HOSPADM

## 2018-01-01 RX ORDER — LORAZEPAM 2 MG/ML
0.5 INJECTION INTRAMUSCULAR
Status: DISCONTINUED | OUTPATIENT
Start: 2018-01-01 | End: 2018-01-01

## 2018-01-01 RX ORDER — PROTAMINE SULFATE 10 MG/ML
25-100 INJECTION, SOLUTION INTRAVENOUS EVERY 5 MIN PRN
Status: DISCONTINUED | OUTPATIENT
Start: 2018-01-01 | End: 2018-01-01 | Stop reason: HOSPADM

## 2018-01-01 RX ORDER — ONDANSETRON 2 MG/ML
4 INJECTION INTRAMUSCULAR; INTRAVENOUS EVERY 4 HOURS PRN
Status: DISCONTINUED | OUTPATIENT
Start: 2018-01-01 | End: 2018-01-01 | Stop reason: HOSPADM

## 2018-01-01 RX ORDER — ACETAMINOPHEN 650 MG/1
650 SUPPOSITORY RECTAL EVERY 4 HOURS PRN
Status: DISCONTINUED | OUTPATIENT
Start: 2018-01-01 | End: 2018-01-01 | Stop reason: HOSPADM

## 2018-01-01 RX ORDER — ATROPINE SULFATE 0.1 MG/ML
.5-1 INJECTION INTRAVENOUS
Status: DISCONTINUED | OUTPATIENT
Start: 2018-01-01 | End: 2018-01-01 | Stop reason: HOSPADM

## 2018-01-01 RX ORDER — NICARDIPINE HYDROCHLORIDE 2.5 MG/ML
100 INJECTION INTRAVENOUS
Status: DISCONTINUED | OUTPATIENT
Start: 2018-01-01 | End: 2018-01-01 | Stop reason: HOSPADM

## 2018-01-01 RX ORDER — FLUMAZENIL 0.1 MG/ML
0.2 INJECTION, SOLUTION INTRAVENOUS
Status: DISCONTINUED | OUTPATIENT
Start: 2018-01-01 | End: 2018-01-01 | Stop reason: HOSPADM

## 2018-01-01 RX ORDER — IPRATROPIUM BROMIDE AND ALBUTEROL SULFATE 2.5; .5 MG/3ML; MG/3ML
3 SOLUTION RESPIRATORY (INHALATION) EVERY 4 HOURS PRN
Status: DISCONTINUED | OUTPATIENT
Start: 2018-01-01 | End: 2018-01-01

## 2018-01-01 RX ORDER — LEVETIRACETAM 5 MG/ML
500 INJECTION INTRAVASCULAR EVERY 12 HOURS
Status: DISCONTINUED | OUTPATIENT
Start: 2018-01-01 | End: 2018-01-01 | Stop reason: HOSPADM

## 2018-01-01 RX ORDER — FUROSEMIDE 10 MG/ML
40 INJECTION INTRAMUSCULAR; INTRAVENOUS ONCE
Status: COMPLETED | OUTPATIENT
Start: 2018-01-01 | End: 2018-01-01

## 2018-01-01 RX ORDER — DEXTROSE MONOHYDRATE 25 G/50ML
12.5-5 INJECTION, SOLUTION INTRAVENOUS EVERY 30 MIN PRN
Status: DISCONTINUED | OUTPATIENT
Start: 2018-01-01 | End: 2018-01-01 | Stop reason: HOSPADM

## 2018-01-01 RX ORDER — LEVOTHYROXINE SODIUM ANHYDROUS 100 UG/5ML
12.5 INJECTION, POWDER, LYOPHILIZED, FOR SOLUTION INTRAVENOUS DAILY
Status: DISCONTINUED | OUTPATIENT
Start: 2018-01-01 | End: 2018-01-01

## 2018-01-01 RX ORDER — ATORVASTATIN CALCIUM 40 MG/1
40 TABLET, FILM COATED ORAL AT BEDTIME
Status: DISCONTINUED | OUTPATIENT
Start: 2018-01-01 | End: 2018-01-01

## 2018-01-01 RX ORDER — SODIUM NITROPRUSSIDE 25 MG/ML
100-200 INJECTION INTRAVENOUS
Status: DISCONTINUED | OUTPATIENT
Start: 2018-01-01 | End: 2018-01-01 | Stop reason: HOSPADM

## 2018-01-01 RX ORDER — TORSEMIDE 10 MG/1
30 TABLET ORAL DAILY
COMMUNITY

## 2018-01-01 RX ORDER — HYDROCODONE BITARTRATE AND ACETAMINOPHEN 5; 325 MG/1; MG/1
1-2 TABLET ORAL EVERY 4 HOURS PRN
Status: DISCONTINUED | OUTPATIENT
Start: 2018-01-01 | End: 2018-01-01

## 2018-01-01 RX ORDER — OXCARBAZEPINE 150 MG/1
150 TABLET, FILM COATED ORAL AT BEDTIME
Status: DISCONTINUED | OUTPATIENT
Start: 2018-01-01 | End: 2018-01-01

## 2018-01-01 RX ORDER — ADENOSINE 3 MG/ML
12-12000 INJECTION, SOLUTION INTRAVENOUS
Status: DISCONTINUED | OUTPATIENT
Start: 2018-01-01 | End: 2018-01-01 | Stop reason: HOSPADM

## 2018-01-01 RX ORDER — LIDOCAINE HYDROCHLORIDE 10 MG/ML
1-10 INJECTION, SOLUTION EPIDURAL; INFILTRATION; INTRACAUDAL; PERINEURAL
Status: DISCONTINUED | OUTPATIENT
Start: 2018-01-01 | End: 2018-01-01 | Stop reason: HOSPADM

## 2018-01-01 RX ORDER — LORAZEPAM 0.5 MG/1
.5-1 TABLET ORAL
Status: DISCONTINUED | OUTPATIENT
Start: 2018-01-01 | End: 2018-01-01 | Stop reason: HOSPADM

## 2018-01-01 RX ORDER — NITROGLYCERIN 5 MG/ML
100-200 VIAL (ML) INTRAVENOUS
Status: DISCONTINUED | OUTPATIENT
Start: 2018-01-01 | End: 2018-01-01 | Stop reason: HOSPADM

## 2018-01-01 RX ORDER — ASPIRIN 81 MG/1
81 TABLET, CHEWABLE ORAL DAILY
Status: DISCONTINUED | OUTPATIENT
Start: 2018-01-01 | End: 2018-01-01

## 2018-01-01 RX ORDER — POTASSIUM CHLORIDE 1500 MG/1
20 TABLET, EXTENDED RELEASE ORAL
Status: DISCONTINUED | OUTPATIENT
Start: 2018-01-01 | End: 2018-01-01 | Stop reason: HOSPADM

## 2018-01-01 RX ORDER — DOPAMINE HYDROCHLORIDE 160 MG/100ML
2-20 INJECTION, SOLUTION INTRAVENOUS CONTINUOUS PRN
Status: DISCONTINUED | OUTPATIENT
Start: 2018-01-01 | End: 2018-01-01 | Stop reason: HOSPADM

## 2018-01-01 RX ORDER — DOCUSATE SODIUM 100 MG/1
100 CAPSULE, LIQUID FILLED ORAL 2 TIMES DAILY
Status: DISCONTINUED | OUTPATIENT
Start: 2018-01-01 | End: 2018-01-01

## 2018-01-01 RX ORDER — DOBUTAMINE HYDROCHLORIDE 200 MG/100ML
5 INJECTION INTRAVENOUS CONTINUOUS
Status: DISCONTINUED | OUTPATIENT
Start: 2018-01-01 | End: 2018-01-01

## 2018-01-01 RX ORDER — POTASSIUM CHLORIDE 29.8 MG/ML
20 INJECTION INTRAVENOUS
Status: DISCONTINUED | OUTPATIENT
Start: 2018-01-01 | End: 2018-01-01 | Stop reason: HOSPADM

## 2018-01-01 RX ORDER — NITROGLYCERIN 0.4 MG/1
0.4 TABLET SUBLINGUAL EVERY 5 MIN PRN
Status: DISCONTINUED | OUTPATIENT
Start: 2018-01-01 | End: 2018-01-01 | Stop reason: HOSPADM

## 2018-01-01 RX ORDER — AMOXICILLIN 250 MG
1 CAPSULE ORAL 2 TIMES DAILY PRN
Status: DISCONTINUED | OUTPATIENT
Start: 2018-01-01 | End: 2018-01-01 | Stop reason: HOSPADM

## 2018-01-01 RX ORDER — LORAZEPAM 2 MG/ML
.5-2 INJECTION INTRAMUSCULAR EVERY 4 HOURS PRN
Status: DISCONTINUED | OUTPATIENT
Start: 2018-01-01 | End: 2018-01-01 | Stop reason: HOSPADM

## 2018-01-01 RX ORDER — PROMETHAZINE HYDROCHLORIDE 25 MG/ML
6.25-25 INJECTION, SOLUTION INTRAMUSCULAR; INTRAVENOUS EVERY 4 HOURS PRN
Status: DISCONTINUED | OUTPATIENT
Start: 2018-01-01 | End: 2018-01-01 | Stop reason: HOSPADM

## 2018-01-01 RX ORDER — ALBUTEROL SULFATE 0.83 MG/ML
2.5 SOLUTION RESPIRATORY (INHALATION)
Status: DISCONTINUED | OUTPATIENT
Start: 2018-01-01 | End: 2018-01-01 | Stop reason: HOSPADM

## 2018-01-01 RX ORDER — HYDRALAZINE HYDROCHLORIDE 20 MG/ML
10-20 INJECTION INTRAMUSCULAR; INTRAVENOUS
Status: DISCONTINUED | OUTPATIENT
Start: 2018-01-01 | End: 2018-01-01 | Stop reason: HOSPADM

## 2018-01-01 RX ORDER — SODIUM CHLORIDE 9 MG/ML
INJECTION, SOLUTION INTRAVENOUS CONTINUOUS
Status: DISCONTINUED | OUTPATIENT
Start: 2018-01-01 | End: 2018-01-01

## 2018-01-01 RX ORDER — AMOXICILLIN 250 MG
2 CAPSULE ORAL 2 TIMES DAILY PRN
Status: DISCONTINUED | OUTPATIENT
Start: 2018-01-01 | End: 2018-01-01 | Stop reason: HOSPADM

## 2018-01-01 RX ORDER — DEXTROSE MONOHYDRATE 100 MG/ML
INJECTION, SOLUTION INTRAVENOUS CONTINUOUS
Status: DISCONTINUED | OUTPATIENT
Start: 2018-01-01 | End: 2018-01-01 | Stop reason: HOSPADM

## 2018-01-01 RX ORDER — PRASUGREL 10 MG/1
10-60 TABLET, FILM COATED ORAL
Status: DISCONTINUED | OUTPATIENT
Start: 2018-01-01 | End: 2018-01-01 | Stop reason: HOSPADM

## 2018-01-01 RX ORDER — ASPIRIN 81 MG/1
81 TABLET ORAL DAILY
Status: DISCONTINUED | OUTPATIENT
Start: 2018-01-01 | End: 2018-01-01

## 2018-01-01 RX ORDER — LIDOCAINE HYDROCHLORIDE 10 MG/ML
30 INJECTION, SOLUTION EPIDURAL; INFILTRATION; INTRACAUDAL; PERINEURAL
Status: DISCONTINUED | OUTPATIENT
Start: 2018-01-01 | End: 2018-01-01 | Stop reason: HOSPADM

## 2018-01-01 RX ORDER — LORAZEPAM 2 MG/ML
2 INJECTION INTRAMUSCULAR
Status: DISCONTINUED | OUTPATIENT
Start: 2018-01-01 | End: 2018-01-01 | Stop reason: HOSPADM

## 2018-01-01 RX ORDER — DOBUTAMINE HYDROCHLORIDE 200 MG/100ML
2-20 INJECTION INTRAVENOUS CONTINUOUS PRN
Status: DISCONTINUED | OUTPATIENT
Start: 2018-01-01 | End: 2018-01-01 | Stop reason: HOSPADM

## 2018-01-01 RX ORDER — ACETAMINOPHEN 325 MG/1
650 TABLET ORAL EVERY 4 HOURS PRN
Status: DISCONTINUED | OUTPATIENT
Start: 2018-01-01 | End: 2018-01-01

## 2018-01-01 RX ORDER — ONDANSETRON 2 MG/ML
4 INJECTION INTRAMUSCULAR; INTRAVENOUS EVERY 6 HOURS PRN
Status: DISCONTINUED | OUTPATIENT
Start: 2018-01-01 | End: 2018-01-01 | Stop reason: HOSPADM

## 2018-01-01 RX ORDER — CLOPIDOGREL BISULFATE 75 MG/1
75 TABLET ORAL
Status: DISCONTINUED | OUTPATIENT
Start: 2018-01-01 | End: 2018-01-01 | Stop reason: HOSPADM

## 2018-01-01 RX ORDER — LEVETIRACETAM 100 MG/ML
500 SOLUTION ORAL 2 TIMES DAILY
Status: DISCONTINUED | OUTPATIENT
Start: 2018-01-01 | End: 2018-01-01

## 2018-01-01 RX ORDER — PROTAMINE SULFATE 10 MG/ML
1-5 INJECTION, SOLUTION INTRAVENOUS
Status: DISCONTINUED | OUTPATIENT
Start: 2018-01-01 | End: 2018-01-01 | Stop reason: HOSPADM

## 2018-01-01 RX ORDER — HYDROMORPHONE HYDROCHLORIDE 5 MG/5ML
1-2 SOLUTION ORAL
Status: DISCONTINUED | OUTPATIENT
Start: 2018-01-01 | End: 2018-01-01 | Stop reason: HOSPADM

## 2018-01-01 RX ORDER — LIDOCAINE 40 MG/G
CREAM TOPICAL
Status: DISCONTINUED | OUTPATIENT
Start: 2018-01-01 | End: 2018-01-01 | Stop reason: HOSPADM

## 2018-01-01 RX ORDER — LORAZEPAM 2 MG/ML
.5-1 INJECTION INTRAMUSCULAR
Status: DISCONTINUED | OUTPATIENT
Start: 2018-01-01 | End: 2018-01-01 | Stop reason: HOSPADM

## 2018-01-01 RX ORDER — POTASSIUM CHLORIDE 7.45 MG/ML
10 INJECTION INTRAVENOUS
Status: DISCONTINUED | OUTPATIENT
Start: 2018-01-01 | End: 2018-01-01 | Stop reason: HOSPADM

## 2018-01-01 RX ORDER — HEPARIN SODIUM 1000 [USP'U]/ML
1000-10000 INJECTION, SOLUTION INTRAVENOUS; SUBCUTANEOUS EVERY 5 MIN PRN
Status: DISCONTINUED | OUTPATIENT
Start: 2018-01-01 | End: 2018-01-01 | Stop reason: HOSPADM

## 2018-01-01 RX ORDER — NALOXONE HYDROCHLORIDE 0.4 MG/ML
0.4 INJECTION, SOLUTION INTRAMUSCULAR; INTRAVENOUS; SUBCUTANEOUS EVERY 5 MIN PRN
Status: DISCONTINUED | OUTPATIENT
Start: 2018-01-01 | End: 2018-01-01 | Stop reason: HOSPADM

## 2018-01-01 RX ORDER — ATROPINE SULFATE 10 MG/ML
1-2 SOLUTION/ DROPS OPHTHALMIC
Status: DISCONTINUED | OUTPATIENT
Start: 2018-01-01 | End: 2018-01-01 | Stop reason: HOSPADM

## 2018-01-01 RX ORDER — FENTANYL CITRATE 50 UG/ML
25-50 INJECTION, SOLUTION INTRAMUSCULAR; INTRAVENOUS
Status: DISCONTINUED | OUTPATIENT
Start: 2018-01-01 | End: 2018-01-01

## 2018-01-01 RX ORDER — OXYCODONE HYDROCHLORIDE 5 MG/1
5-10 TABLET ORAL
Status: DISCONTINUED | OUTPATIENT
Start: 2018-01-01 | End: 2018-01-01 | Stop reason: ALTCHOICE

## 2018-01-01 RX ORDER — HYDRALAZINE HYDROCHLORIDE 25 MG/1
25 TABLET, FILM COATED ORAL 2 TIMES DAILY
Status: DISCONTINUED | OUTPATIENT
Start: 2018-01-01 | End: 2018-01-01

## 2018-01-01 RX ORDER — ASPIRIN 81 MG/1
81-324 TABLET, CHEWABLE ORAL
Status: DISCONTINUED | OUTPATIENT
Start: 2018-01-01 | End: 2018-01-01 | Stop reason: HOSPADM

## 2018-01-01 RX ORDER — FUROSEMIDE 10 MG/ML
10 INJECTION INTRAMUSCULAR; INTRAVENOUS ONCE
Status: COMPLETED | OUTPATIENT
Start: 2018-01-01 | End: 2018-01-01

## 2018-01-01 RX ORDER — METHYLPREDNISOLONE SODIUM SUCCINATE 125 MG/2ML
125 INJECTION, POWDER, LYOPHILIZED, FOR SOLUTION INTRAMUSCULAR; INTRAVENOUS
Status: DISCONTINUED | OUTPATIENT
Start: 2018-01-01 | End: 2018-01-01 | Stop reason: HOSPADM

## 2018-01-01 RX ORDER — NIFEDIPINE 10 MG/1
10 CAPSULE ORAL
Status: DISCONTINUED | OUTPATIENT
Start: 2018-01-01 | End: 2018-01-01 | Stop reason: HOSPADM

## 2018-01-01 RX ORDER — EPINEPHRINE 1 MG/ML
0.3 INJECTION, SOLUTION, CONCENTRATE INTRAVENOUS
Status: DISCONTINUED | OUTPATIENT
Start: 2018-01-01 | End: 2018-01-01 | Stop reason: HOSPADM

## 2018-01-01 RX ORDER — PANTOPRAZOLE SODIUM 40 MG/1
40 TABLET, DELAYED RELEASE ORAL AT BEDTIME
Status: DISCONTINUED | OUTPATIENT
Start: 2018-01-01 | End: 2018-01-01

## 2018-01-01 RX ORDER — SODIUM POLYSTYRENE SULFONATE 15 G/60ML
60 SUSPENSION ORAL; RECTAL ONCE
Status: DISCONTINUED | OUTPATIENT
Start: 2018-01-01 | End: 2018-01-01

## 2018-01-01 RX ORDER — VERAPAMIL HYDROCHLORIDE 2.5 MG/ML
1-2.5 INJECTION, SOLUTION INTRAVENOUS
Status: DISCONTINUED | OUTPATIENT
Start: 2018-01-01 | End: 2018-01-01 | Stop reason: HOSPADM

## 2018-01-01 RX ORDER — NITROGLYCERIN 20 MG/100ML
.07-2 INJECTION INTRAVENOUS CONTINUOUS PRN
Status: DISCONTINUED | OUTPATIENT
Start: 2018-01-01 | End: 2018-01-01 | Stop reason: HOSPADM

## 2018-01-01 RX ORDER — NALOXONE HYDROCHLORIDE 0.4 MG/ML
.1-.4 INJECTION, SOLUTION INTRAMUSCULAR; INTRAVENOUS; SUBCUTANEOUS
Status: DISCONTINUED | OUTPATIENT
Start: 2018-01-01 | End: 2018-01-01

## 2018-01-01 RX ORDER — ACETAMINOPHEN 325 MG/1
325-650 TABLET ORAL EVERY 4 HOURS PRN
Status: DISCONTINUED | OUTPATIENT
Start: 2018-01-01 | End: 2018-01-01 | Stop reason: HOSPADM

## 2018-01-01 RX ORDER — LEVETIRACETAM 5 MG/ML
500 INJECTION INTRAVASCULAR EVERY 12 HOURS
Status: DISCONTINUED | OUTPATIENT
Start: 2018-01-01 | End: 2018-01-01

## 2018-01-01 RX ORDER — LORAZEPAM 2 MG/ML
0.5 INJECTION INTRAMUSCULAR
Status: DISCONTINUED | OUTPATIENT
Start: 2018-01-01 | End: 2018-01-01 | Stop reason: HOSPADM

## 2018-01-01 RX ORDER — ALBUTEROL SULFATE 0.63 MG/3ML
SOLUTION RESPIRATORY (INHALATION)
Status: ON HOLD | COMMUNITY
End: 2018-01-01

## 2018-01-01 RX ORDER — ENALAPRILAT 1.25 MG/ML
1.25-2.5 INJECTION INTRAVENOUS
Status: DISCONTINUED | OUTPATIENT
Start: 2018-01-01 | End: 2018-01-01 | Stop reason: HOSPADM

## 2018-01-01 RX ORDER — DIPHENHYDRAMINE HYDROCHLORIDE 50 MG/ML
25-50 INJECTION INTRAMUSCULAR; INTRAVENOUS
Status: DISCONTINUED | OUTPATIENT
Start: 2018-01-01 | End: 2018-01-01 | Stop reason: HOSPADM

## 2018-01-01 RX ORDER — PHENYLEPHRINE HCL IN 0.9% NACL 1 MG/10 ML
20-100 SYRINGE (ML) INTRAVENOUS
Status: DISCONTINUED | OUTPATIENT
Start: 2018-01-01 | End: 2018-01-01 | Stop reason: HOSPADM

## 2018-01-01 RX ORDER — FLUMAZENIL 0.1 MG/ML
0.2 INJECTION, SOLUTION INTRAVENOUS
Status: DISCONTINUED | OUTPATIENT
Start: 2018-01-01 | End: 2018-01-01

## 2018-01-01 RX ORDER — LEVETIRACETAM 100 MG/ML
500 SOLUTION ORAL EVERY 12 HOURS
Status: DISCONTINUED | OUTPATIENT
Start: 2018-01-01 | End: 2018-01-01

## 2018-01-01 RX ORDER — LORAZEPAM 0.5 MG/1
0.5 TABLET ORAL
Status: DISCONTINUED | OUTPATIENT
Start: 2018-01-01 | End: 2018-01-01 | Stop reason: HOSPADM

## 2018-01-01 RX ORDER — GINSENG 100 MG
CAPSULE ORAL DAILY
Status: ON HOLD | COMMUNITY
End: 2018-01-01

## 2018-01-01 RX ORDER — ATROPINE SULFATE 0.1 MG/ML
0.5 INJECTION INTRAVENOUS EVERY 5 MIN PRN
Status: DISCONTINUED | OUTPATIENT
Start: 2018-01-01 | End: 2018-01-01

## 2018-01-01 RX ORDER — POLYETHYLENE GLYCOL 3350 17 G/17G
17 POWDER, FOR SOLUTION ORAL DAILY PRN
Status: DISCONTINUED | OUTPATIENT
Start: 2018-01-01 | End: 2018-01-01 | Stop reason: HOSPADM

## 2018-01-01 RX ORDER — ASPIRIN 325 MG
325 TABLET ORAL
Status: DISCONTINUED | OUTPATIENT
Start: 2018-01-01 | End: 2018-01-01 | Stop reason: HOSPADM

## 2018-01-01 RX ORDER — METOPROLOL TARTRATE 1 MG/ML
5 INJECTION, SOLUTION INTRAVENOUS EVERY 5 MIN PRN
Status: DISCONTINUED | OUTPATIENT
Start: 2018-01-01 | End: 2018-01-01 | Stop reason: HOSPADM

## 2018-01-01 RX ORDER — NICOTINE POLACRILEX 4 MG
15-30 LOZENGE BUCCAL
Status: DISCONTINUED | OUTPATIENT
Start: 2018-01-01 | End: 2018-01-01

## 2018-01-01 RX ORDER — DEXTROSE MONOHYDRATE 25 G/50ML
25-50 INJECTION, SOLUTION INTRAVENOUS
Status: DISCONTINUED | OUTPATIENT
Start: 2018-01-01 | End: 2018-01-01

## 2018-01-01 RX ORDER — DEXTROSE MONOHYDRATE 25 G/50ML
25 INJECTION, SOLUTION INTRAVENOUS ONCE
Status: COMPLETED | OUTPATIENT
Start: 2018-01-01 | End: 2018-01-01

## 2018-01-01 RX ORDER — FUROSEMIDE 10 MG/ML
20-100 INJECTION INTRAMUSCULAR; INTRAVENOUS
Status: DISCONTINUED | OUTPATIENT
Start: 2018-01-01 | End: 2018-01-01 | Stop reason: HOSPADM

## 2018-01-01 RX ORDER — NALOXONE HYDROCHLORIDE 0.4 MG/ML
.2-.4 INJECTION, SOLUTION INTRAMUSCULAR; INTRAVENOUS; SUBCUTANEOUS
Status: DISCONTINUED | OUTPATIENT
Start: 2018-01-01 | End: 2018-01-01

## 2018-01-01 RX ORDER — LEVETIRACETAM 500 MG/1
500 TABLET ORAL 2 TIMES DAILY
Status: DISCONTINUED | OUTPATIENT
Start: 2018-01-01 | End: 2018-01-01

## 2018-01-01 RX ORDER — SALIVA STIMULANT COMB. NO.3
2 SPRAY, NON-AEROSOL (ML) MUCOUS MEMBRANE
Status: DISCONTINUED | OUTPATIENT
Start: 2018-01-01 | End: 2018-01-01 | Stop reason: HOSPADM

## 2018-01-01 RX ORDER — PANTOPRAZOLE SODIUM 40 MG/1
40 TABLET, DELAYED RELEASE ORAL EVERY MORNING
Status: DISCONTINUED | OUTPATIENT
Start: 2018-01-01 | End: 2018-01-01

## 2018-01-01 RX ORDER — SPIRONOLACTONE 25 MG/1
25 TABLET ORAL DAILY
Status: DISCONTINUED | OUTPATIENT
Start: 2018-01-01 | End: 2018-01-01

## 2018-01-01 RX ORDER — ONDANSETRON 4 MG/1
4 TABLET, ORALLY DISINTEGRATING ORAL EVERY 6 HOURS PRN
Status: DISCONTINUED | OUTPATIENT
Start: 2018-01-01 | End: 2018-01-01 | Stop reason: HOSPADM

## 2018-01-01 RX ORDER — CIPROFLOXACIN 500 MG/1
500 TABLET, FILM COATED ORAL 2 TIMES DAILY
COMMUNITY
End: 2018-01-01

## 2018-01-01 RX ORDER — HALOPERIDOL 5 MG/ML
.5-1 INJECTION INTRAMUSCULAR
Status: DISCONTINUED | OUTPATIENT
Start: 2018-01-01 | End: 2018-01-01 | Stop reason: HOSPADM

## 2018-01-01 RX ORDER — BISACODYL 10 MG
10 SUPPOSITORY, RECTAL RECTAL
Status: DISCONTINUED | OUTPATIENT
Start: 2018-01-23 | End: 2018-01-01 | Stop reason: HOSPADM

## 2018-01-01 RX ADMIN — LEVETIRACETAM 500 MG: 5 INJECTION INTRAVENOUS at 10:06

## 2018-01-01 RX ADMIN — FUROSEMIDE 40 MG: 10 INJECTION, SOLUTION INTRAVENOUS at 16:08

## 2018-01-01 RX ADMIN — PANTOPRAZOLE SODIUM 40 MG: 40 TABLET, DELAYED RELEASE ORAL at 21:05

## 2018-01-01 RX ADMIN — LEVETIRACETAM 500 MG: 5 INJECTION INTRAVENOUS at 21:59

## 2018-01-01 RX ADMIN — LEVETIRACETAM 500 MG: 500 TABLET, FILM COATED ORAL at 08:37

## 2018-01-01 RX ADMIN — HALOPERIDOL LACTATE 1 MG: 5 INJECTION, SOLUTION INTRAMUSCULAR at 02:53

## 2018-01-01 RX ADMIN — DOBUTAMINE IN DEXTROSE 5 MCG/KG/MIN: 200 INJECTION, SOLUTION INTRAVENOUS at 09:07

## 2018-01-01 RX ADMIN — LEVETIRACETAM 500 MG: 100 SOLUTION ORAL at 09:14

## 2018-01-01 RX ADMIN — DEXTROSE MONOHYDRATE 25 G: 500 INJECTION PARENTERAL at 21:56

## 2018-01-01 RX ADMIN — DOBUTAMINE IN DEXTROSE 5 MCG/KG/MIN: 200 INJECTION, SOLUTION INTRAVENOUS at 13:06

## 2018-01-01 RX ADMIN — SODIUM CHLORIDE 9.5 UNITS: 9 INJECTION, SOLUTION INTRAVENOUS at 22:09

## 2018-01-01 RX ADMIN — DEXTROSE MONOHYDRATE: 100 INJECTION, SOLUTION INTRAVENOUS at 18:35

## 2018-01-01 RX ADMIN — DOCUSATE SODIUM 100 MG: 100 CAPSULE, LIQUID FILLED ORAL at 21:05

## 2018-01-01 RX ADMIN — FUROSEMIDE 10 MG: 10 INJECTION, SOLUTION INTRAVENOUS at 00:10

## 2018-01-01 RX ADMIN — ATORVASTATIN CALCIUM 40 MG: 40 TABLET, FILM COATED ORAL at 20:45

## 2018-01-01 RX ADMIN — DOCUSATE SODIUM 100 MG: 100 CAPSULE, LIQUID FILLED ORAL at 08:37

## 2018-01-01 RX ADMIN — LEVETIRACETAM 500 MG: 5 INJECTION INTRAVENOUS at 08:59

## 2018-01-01 RX ADMIN — OXCARBAZEPINE 150 MG: 150 TABLET ORAL at 21:05

## 2018-01-01 RX ADMIN — MIDAZOLAM 0.5 MG: 1 INJECTION INTRAMUSCULAR; INTRAVENOUS at 14:34

## 2018-01-01 RX ADMIN — POLYETHYLENE GLYCOL 3350 17 G: 17 POWDER, FOR SOLUTION ORAL at 15:01

## 2018-01-01 RX ADMIN — ACETAMINOPHEN 650 MG: 650 SUPPOSITORY RECTAL at 15:00

## 2018-01-01 RX ADMIN — LEVETIRACETAM 500 MG: 5 INJECTION INTRAVENOUS at 20:03

## 2018-01-01 RX ADMIN — SODIUM CHLORIDE: 9 INJECTION, SOLUTION INTRAVENOUS at 16:43

## 2018-01-01 RX ADMIN — CLOPIDOGREL 75 MG: 75 TABLET, FILM COATED ORAL at 09:13

## 2018-01-01 RX ADMIN — PANTOPRAZOLE SODIUM 40 MG: 40 TABLET, DELAYED RELEASE ORAL at 22:09

## 2018-01-01 RX ADMIN — LEVETIRACETAM 500 MG: 100 SOLUTION ORAL at 22:41

## 2018-01-01 RX ADMIN — LEVETIRACETAM 500 MG: 100 SOLUTION ORAL at 22:47

## 2018-01-01 RX ADMIN — HYDROMORPHONE HYDROCHLORIDE 0.5 MG: 1 INJECTION, SOLUTION INTRAMUSCULAR; INTRAVENOUS; SUBCUTANEOUS at 08:57

## 2018-01-01 RX ADMIN — LEVETIRACETAM 500 MG: 500 TABLET, FILM COATED ORAL at 21:05

## 2018-01-01 RX ADMIN — SPIRONOLACTONE 25 MG: 25 TABLET ORAL at 08:36

## 2018-01-01 RX ADMIN — ATROPINE SULFATE 2 DROP: 10 SOLUTION/ DROPS OPHTHALMIC at 08:57

## 2018-01-01 RX ADMIN — LEVETIRACETAM 500 MG: 500 TABLET, FILM COATED ORAL at 22:09

## 2018-01-01 RX ADMIN — SULFUR HEXAFLUORIDE 2 ML: KIT at 08:33

## 2018-01-01 RX ADMIN — INSULIN GLARGINE 6 UNITS: 100 INJECTION, SOLUTION SUBCUTANEOUS at 21:05

## 2018-01-01 RX ADMIN — SODIUM POLYSTYRENE SULFONATE 15 G: 15 SUSPENSION ORAL; RECTAL at 22:10

## 2018-01-01 RX ADMIN — FUROSEMIDE 40 MG: 10 INJECTION, SOLUTION INTRAVENOUS at 22:09

## 2018-01-01 RX ADMIN — DEXTROSE MONOHYDRATE: 100 INJECTION, SOLUTION INTRAVENOUS at 09:14

## 2018-01-01 RX ADMIN — PANTOPRAZOLE SODIUM 40 MG: 40 INJECTION, POWDER, FOR SOLUTION INTRAVENOUS at 07:17

## 2018-01-01 RX ADMIN — CHLOROTHIAZIDE SODIUM 500 MG: 500 INJECTION, POWDER, LYOPHILIZED, FOR SOLUTION INTRAVENOUS at 22:08

## 2018-01-01 RX ADMIN — DOBUTAMINE IN DEXTROSE 5 MCG/KG/MIN: 200 INJECTION, SOLUTION INTRAVENOUS at 02:24

## 2018-01-01 RX ADMIN — LEVETIRACETAM 500 MG: 100 SOLUTION ORAL at 09:48

## 2018-01-01 RX ADMIN — HYDROMORPHONE HYDROCHLORIDE 0.5 MG: 1 INJECTION, SOLUTION INTRAMUSCULAR; INTRAVENOUS; SUBCUTANEOUS at 03:52

## 2018-01-01 RX ADMIN — HALOPERIDOL LACTATE 1 MG: 5 INJECTION, SOLUTION INTRAMUSCULAR at 00:56

## 2018-01-01 RX ADMIN — FUROSEMIDE 40 MG: 10 INJECTION, SOLUTION INTRAVENOUS at 14:57

## 2018-01-01 RX ADMIN — PANTOPRAZOLE SODIUM 40 MG: 40 INJECTION, POWDER, FOR SOLUTION INTRAVENOUS at 22:27

## 2018-01-01 RX ADMIN — CLOPIDOGREL 75 MG: 75 TABLET, FILM COATED ORAL at 08:36

## 2018-01-01 RX ADMIN — Medication 12.5 MCG: at 09:13

## 2018-01-01 RX ADMIN — ATORVASTATIN CALCIUM 40 MG: 40 TABLET, FILM COATED ORAL at 21:05

## 2018-01-01 RX ADMIN — DOBUTAMINE IN DEXTROSE 2.5 MCG/KG/MIN: 200 INJECTION, SOLUTION INTRAVENOUS at 19:27

## 2018-01-01 RX ADMIN — HYDROMORPHONE HYDROCHLORIDE 0.5 MG: 1 INJECTION, SOLUTION INTRAMUSCULAR; INTRAVENOUS; SUBCUTANEOUS at 05:59

## 2018-01-01 RX ADMIN — ATROPINE SULFATE 2 DROP: 10 SOLUTION/ DROPS OPHTHALMIC at 05:59

## 2018-01-01 ASSESSMENT — ACTIVITIES OF DAILY LIVING (ADL)
TRANSFERRING: 3 - ASSISTIVE EQUIPMENT AND PERSON
RETIRED_EATING: 2-->ASSISTIVE PERSON
COGNITION: 0 - NO COGNITION ISSUES REPORTED
COMMUNICATION: 0 - UNDERSTANDS/COMMUNICATES WITHOUT DIFFICULTY
TOILETING: 3 - ASSISTIVE EQUIPMENT AND PERSON
BATHING: 3 - ASSISTIVE EQUIPMENT AND PERSON
TOILETING: 2-->ASSISTIVE PERSON
CHANGE_IN_FUNCTIONAL_STATUS_SINCE_ONSET_OF_CURRENT_ILLNESS/INJURY: YES
SWALLOWING: 0 - SWALLOWS FOODS/LIQUIDS WITHOUT DIFFICULTY
DRESS: 3 - ASSISTIVE EQUIPMENT AND PERSON
DRESS: 2-->ASSISTIVE PERSON
NUMBER_OF_TIMES_PATIENT_HAS_FALLEN_WITHIN_LAST_SIX_MONTHS: 2
AMBULATION: 3 - ASSISTIVE EQUIPMENT AND PERSON
SWALLOWING: 0-->SWALLOWS FOODS/LIQUIDS WITHOUT DIFFICULTY
BATHING: 2-->ASSISTIVE PERSON
TRANSFERRING: 2-->ASSISTIVE PERSON
FALL_HISTORY_WITHIN_LAST_SIX_MONTHS: YES
AMBULATION: 2-->ASSISTIVE PERSON
RETIRED_COMMUNICATION: 0-->UNDERSTANDS/COMMUNICATES WITHOUT DIFFICULTY

## 2018-01-01 ASSESSMENT — PAIN SCALES - GENERAL: PAINLEVEL: NO PAIN (0)

## 2018-01-01 ASSESSMENT — PAIN DESCRIPTION - DESCRIPTORS: DESCRIPTORS: PATIENT UNABLE TO DESCRIBE

## 2018-01-01 NOTE — PROGRESS NOTES
Kelechi Coleman is a 82 year old male seen December 21, 2017 at [facility] where he was admitted 12/19/17 after [facility] hospitalization for fall with occipital laceration.    For some reason, patient was placed in LTC instead of TCU after hospital discharge, but is receivng PHYSICAL THERAPY / OCCUPATIONAL THERAPY   He is very unhappy in this situation, roommate is loud and disruptive, and he has very little space.    Patient was living independently until August 2017 when he fell backward down 6 stairs and sustained left frontoparietal SAH and left frontal SDH.   Was on clopidogrel at the time of his fall, since h/o CVI in 1998.   He was hospitalized at [facility] and discharged to [facility] TCU where he made gradual functional improvement, but not enough to return home.   He transferred to [facility] 12/4, but while there he fell again, falling backward and striking his head on his new lift chair. He was hospitalized 12/12-12/19 with scalp laceration.  During hospitalization he had mild troponin elevation thought to be demand ischemia, and metabolic encephalopathy with dysphagia.     Patient has insulin requiring DM2, and CAD.   Had CABG in 2000 and NSTEMI in 2016 with CHUCHO placement.   Has ischemic cardiomyopathy, with ECHO earlier this month showing EF 30-35%.   Today he denies CP, dyspnea or other CV symptoms.       Past Medical History:   Diagnosis Date     Acute exacerbation of CHF (congestive heart failure) (H) 9/2/2016     Atrial fibrillation (H)      Cerebral infarction (H) 12/13/2013, 9/2016     Diagnosis updated by automated process. Provider to review and confirm.     Coronary artery disease      Diabetes (H)      Gastroesophageal reflux disease with esophagitis 5/8/2017     Hyperlipidaemia      Hypertension      Myocardial infarction      PAD (peripheral artery disease) (H)      Stented coronary artery     CABG 2002 4 VESSEL     Type 2 diabetes mellitus with stage 3 chronic kidney disease,  with long-term current use of insulin (H) 11/4/2016     Unspecified cerebral artery occlusion with cerebral infarction      Past Surgical History:   Procedure Laterality Date     C MRA UPPER EXTREMITY WO&W CONT       CORONARY ARTERY BYPASS  2002    LIMA-LAD, SVG-OM1, [id number]     ENT SURGERY      VOCAL CORD LESION REMOVED     GENITOURINARY SURGERY      ANGIOPLASTY FOR RENAL ARTERT STENOSIS     HC LEFT HEART CATHETERIZATION  9/7/2016 09/07/2016: CHUCHO x2 to distal and proximal SVG to to RPDA     HEART CATH LEFT HEART CATH  11/30/2001     PHACOEMULSIFICATION CLEAR CORNEA WITH STANDARD INTRAOCULAR LENS IMPLANT Left 1/22/2015    Procedure: PHACOEMULSIFICATION CLEAR CORNEA WITH STANDARD INTRAOCULAR LENS IMPLANT;  Surgeon: Bart Johnson MD;  Location: [facility]     VASCULAR SURGERY      ANGIOPLASTY LEFT LEG FOR pvd     Family History   Problem Relation Age of Onset     DIABETES Mother      Family History Negative Father      CANCER Maternal Grandmother        Social History   Substance Use Topics     Smoking status: Former Smoker     Years: 40.00     Types: Pipe     Quit date: 12/18/1998     Smokeless tobacco: Never Used     Alcohol use No      SH:   Single, no children.   Previously lived alone in a house.   His sister Samantha is next of kin, lives in [place].       Review Of Systems  Skin: negative   Eyes: negative  Ears/Nose/Throat: hearing loss  Respiratory: No shortness of breath, dyspnea on exertion, cough, or hemoptysis  Cardiovascular: as above  Wt Readings from Last 5 Encounters:   12/19/17 198 lb 3.1 oz (89.9 kg)   12/07/17 196 lb 9.6 oz (89.2 kg)   10/13/17 177 lb (80.3 kg)   09/22/17 182 lb 15.7 oz (83 kg)   08/20/17 183 lb 3.2 oz (83.1 kg)      Gastrointestinal: negative  Genitourinary: negative  Musculoskeletal: poor balance, falls.  Ambulatory without device PTA, now WC bound, ambulating with PHYSICAL THERAPY assist only.   Neurologic: encephalopathy, no current cognitive scores  available  Psychiatric: situational depression, sleep disturbance  Hematologic/Lymphatic/Immunologic: anemia  Endocrine: diabetes      GENERAL APPEARANCE: alert and no distress  /64  Pulse 60  Temp 96.6  F (35.9  C)  Resp 18  Wt 196 lb 9.6 oz (89.2 kg)  SpO2 97%  BMI 25.24 kg/m2  HEENT: normocephalic, no lesion or abnormalities: healing laceration with staples intact (19 per patient report) no erythema or drainage, some scabbing.     NECK: no adenopathy, no asymmetry, masses, or scars and thyroid normal to palpation  RESP: decreased BS, few basilar rales.    CV: regular rate and rhythm, normal S1 S2, 2/6 NENA  ABDOMEN:  soft, nontender, no HSM or masses and bowel sounds normal  MS: extremities normal- no gross deformities noted, no evidence of inflammation in joints, 1+ LE edema  SKIN: no suspicious lesions or rashes  NEURO: Normal strength and tone, sensory exam grossly normal, and speech normal  PSYCH: affect okay  LYMPHATICS: No cervical,  or supraclavicular nodes     Collected Lab         Sodium 138  133 - 144 mmol/L Final 12/19/2017  8:46 AM FrStHsLb   Potassium 3.6  3.4 - 5.3 mmol/L Final 12/19/2017  8:46 AM FrStHsLb   Chloride 105  94 - 109 mmol/L Final 12/19/2017  8:46 AM FrStHsLb   Carbon Dioxide 26  20 - 32 mmol/L Final 12/19/2017  8:46 AM FrStHsLb   Anion Gap 7  3 - 14 mmol/L Final 12/19/2017  8:46 AM FrStHsLb   Glucose 167 (H) 70 - 99 mg/dL Final 12/19/2017  8:46 AM FrStHsLb   Urea Nitrogen 40 (H) 7 - 30 mg/dL Final 12/19/2017  8:46 AM FrStHsLb   Creatinine 1.15  0.66 - 1.25 mg/dL Final 12/19/2017  8:46 AM FrStHsLb   GFR Estimate 61  >60 mL/min/1.7m2 Final 12/19/2017  8:46 AM FrStHsLb   Mg 1.2    Phos 2.1    Collected Lab         WBC 5.8  4.0 - 11.0 10e9/L Final 12/19/2017  8:46 AM FrStHsLb   RBC Count 3.50 (L) 4.4 - 5.9 10e12/L Final 12/19/2017  8:46 AM FrStHsLb   Hemoglobin 10.3 (L) 13.3 - 17.7 g/dL Final 12/19/2017  8:46 AM FrStHsLb   Hematocrit 31.0 (L) 40.0 - 53.0 % Final 12/19/2017  8:46  AM FrStHsLb   MCV 89  78 - 100 fl Final 12/19/2017  8:46 AM FrStHsLb   MCH 29.4  26.5 - 33.0 pg Final 12/19/2017  8:46 AM FrStHsLb   MCHC 33.2  31.5 - 36.5 g/dL Final 12/19/2017  8:46 AM FrStHsLb   RDW 17.1 (H) 10.0 - 15.0 % Final 12/19/2017  8:46 AM FrStHsLb   Platelet Count 158  150 - 450 10e9/L Final 12/19/2017  8:46 AM FrStHsLb     TSH   Date Value Ref Range Status   10/26/2016 5.55 (H) 0.40 - 4.00 mU/L Final     Lab Results   Component Value Date    A1C 6.6 12/12/2017    A1C 7.4 08/19/2017    A1C 7.0 08/09/2017     Interpretation Summary ECHO 12/12/17:  Left ventricular systolic function is severely reduced.  There are regional wall motion abnormalities as specified.  The right ventricle is severely dilated.  There is mild to moderate (1-2+) mitral regurgitation.  Right ventricular systolic pressure is elevated, consistent with severe  pulmonary hypertension.  2D images suggest some degree of aortic stenosis however severity was not  adequately assessed on present study. Limited additional images at no charge  should be performed for further evaluation.  Limited views were obtained.  _____________________________________________________________________________    Head CT 12/12/17:  No intracranial hemorrhage, mass or recent infarct.  The visualized paranasal sinuses are well aerated. There is no  mastoiditis. There are no fractures of the visualized bones.      IMPRESSION: Small chronic right basal ganglia and high left  frontoparietal infarcts again noted without change. No new infarcts.  Diffuse cerebral volume loss and cerebral white matter changes  consistent with chronic small vessel ischemic disease. No evidence for  acute intracranial pathology.       IMP/PLAN:  (W19.XXXD) Fall, subsequent encounter   (S01.01XD) Laceration of scalp, subsequent encounter  Comment: patient lost his balance while standing to use urinal.     Scalp laceration is healing well, staples can come out.     Plan: PHYSICAL THERAPY /  OCCUPATIONAL THERAPY for balance, strengthening, ADLs.  Patient 's goal is return to Board and Care.     Monitor incision.      (I25.10) Atherosclerosis of native coronary artery of native heart without angina pectoris  Comment: demand ischemia at last hospitalization, h/o CHUCHO 2016   Plan: beta blocker, statin, daily clopidogrel.   Also takes high dose of Imdur, no reported angina.   Follow up with Cardiology pending     (I50.22) Chronic systolic heart failure (H)  Comment: ischemic cardiomyopathy with low EF   Volume status okay today.    Plan: continue current dose of torsemide and spironolactone.   Replace Mg and K+, follow BMP.     (E11.22,  N18.3,  Z79.4) Type 2 diabetes mellitus with stage 3 chronic kidney disease, with long-term current use of insulin (H)  Comment: good control  Plan: continue low dose HS Lantus and short acting insulin before meals.   Hold if not eating well.    Goal is to avoid hypoglycemia.      (G40.909) Seizure disorder (H)  Comment: had partial complex sz in setting of acute injury in August   Plan: Has followed with Neurology; no further sz.  Last note indicated weaning off oxcarbazepine if remains sz -free.  Also on Keppra.      (I67.9) Cerebral vascular disease  Comment: h/o CVI   Plan: statin, clopidogrel, and bp control for secondary prevention.        Reactive depression  Comment: unhappy in current situation  Plan: continue mirtazapine, consider dose increase     Laquita Deal MD

## 2018-01-04 NOTE — PROGRESS NOTES
[facility]    Chief Complaint   Patient presents with     Weight Problem     Cellulitis     Wound Check       HPI:    Kelechi Coleman is a 82 year old  (1935), who is being seen today for an episodic care visit at [facility].  HPI information obtained from: facility chart records, facility staff and patient report.    Today's concern is:     Weight gain  Cellulitis  Open head wound     -Resident's weight was 196.7 on 12/31/17 and 190.6 on 1/1/18. Is followed closely by core clinic. Additionally was seen by cardiology on 12/26/17 in which there was evidence of decompensated heart failure and torsemide was increased to 30 mg/day. Last ECHO showed EF now estimated at 30%-35%. Has appointment to follow up with C.O.R.E clinic in 2 weeks. Reports SOB has improved.     -Resident had mechanical fall and sustained scalp laceration on 12/12/16. Stitches were routinely removed on 12/29/17 per recommendations. Wound was pussy and had some drainage. Was started on Cipro on 12/30/17. Improvements seen today. Denies pain, tenderness. Small amount of drainage noted.     ALLERGIES: Aspirin; Penicillins; and Contrast dye  Past Medical, Surgical, Family and Social History reviewed and updated in [facility].    Current Outpatient Prescriptions   Medication Sig Dispense Refill     albuterol (ACCUNEB) 0.63 MG/3ML nebulizer solution 1 vial via mask three times a day for SOB for 5 Days       ciprofloxacin (CIPRO) 500 MG tablet Take 500 mg by mouth 2 times daily for Infection on scalp for 7 Days       bacitracin 500 UNIT/GM OINT Apply topically daily to sclap       torsemide (DEMADEX) 10 MG tablet Take 30 mg by mouth daily       carvedilol (COREG) 12.5 MG tablet Take 12.5 mg by mouth daily       atorvastatin (LIPITOR) 40 MG tablet Take 40 mg by mouth daily       isosorbide mononitrate (IMDUR) 30 MG 24 hr tablet Take 3 tablets (90 mg) by mouth daily       insulin glargine (LANTUS SOLOSTAR) 100 UNIT/ML  injection Inject 6 Units Subcutaneous At Bedtime 30 mL 0     insulin aspart (NOVOLOG PEN) 100 UNIT/ML injection Inject 5 Units Subcutaneous 3 times daily (with meals)       hydrALAZINE (APRESOLINE) 50 MG tablet Take 0.5 tablets (25 mg) by mouth 2 times daily 60 tablet      spironolactone (ALDACTONE) 25 MG tablet Take 1 tablet (25 mg) by mouth daily       acetaminophen (TYLENOL) 325 MG tablet Take 650 mg by mouth every 6 hours as needed        Calcium Carb-Cholecalciferol 600-800 MG-UNIT TABS Take 1 tablet by mouth 3 times daily (with meals)        levETIRAcetam (KEPPRA) 500 MG tablet Take 500 mg by mouth 2 times daily       clopidogrel (PLAVIX) 75 MG tablet Take 75 mg by mouth daily       mirtazapine (REMERON) 15 MG tablet Take 15 mg by mouth At Bedtime       blood glucose monitoring (NO BRAND SPECIFIED) test strip Use to test blood sugar 4 times daily or as directed.       thiamine 100 MG tablet Take 100 mg by mouth daily       pantoprazole (PROTONIX) 40 MG EC tablet TAKE 1 TABLET BY MOUTH EVERY EVENING 90 tablet 2     Multiple Vitamins-Minerals (CENTRUM SILVER) per tablet Take 1 tablet by mouth daily 30 tablet      OXcarbazepine (TRILEPTAL) 150 MG tablet Take 1 tablet (150 mg) by mouth 2 times daily       [DISCONTINUED] torsemide (DEMADEX) 10 MG tablet Take 2 tablets (20 mg) by mouth daily (Patient taking differently: Take 30 mg by mouth daily )       Medications reviewed:  Medications reconciled to facility chart and changes were made to reflect current medications as identified as above med list. Below are the changes that were made:   Medications stopped since last EPIC medication reconciliation:   Medications Discontinued During This Encounter   Medication Reason     torsemide (DEMADEX) 10 MG tablet Dose adjustment       Medications started since last [facility] medication reconciliation:  Orders Placed This Encounter   Medications     albuterol (ACCUNEB) 0.63 MG/3ML nebulizer solution     Si vial via mask three  times a day for SOB for 5 Days     ciprofloxacin (CIPRO) 500 MG tablet     Sig: Take 500 mg by mouth 2 times daily for Infection on scalp for 7 Days     bacitracin 500 UNIT/GM OINT     Sig: Apply topically daily to sclap     torsemide (DEMADEX) 10 MG tablet     Sig: Take 30 mg by mouth daily     REVIEW OF SYSTEMS:  4 point ROS including Respiratory, CV, GI and , other than that noted in the HPI,  is negative    Physical Exam:  /52  Pulse 64  Temp 96.9  F (36.1  C)  Resp 18  Wt 190 lb 9.6 oz (86.5 kg)  SpO2 97%  BMI 24.47 kg/m2  GENERAL APPEARANCE:  Alert, in no distress  ENT:  Mouth and posterior oropharynx normal, moist mucous membranes  EYES:  EOM, conjunctivae, lids, pupils and irises normal  CV:  Palpation and auscultation of heart done , regular rate and rhythm, no murmur, rub, or gallop  M/S:   Gait and station normal  Digits and nails normal  SKIN:  wound appearance: some drainage noted. Demonstrating normal wound healing at this time   NEURO:   Cranial nerves 2-12 are normal tested and grossly at patient's baseline  PSYCH:  affect and mood normal     BP Readin/60, 104/58, 125/74           HR:  64-70  Weights:  196.7, 190.0, 194.1    Last BG Levels:    AM:  138, 74, 214, 67, 82    Noon:  131, 134, 129, 173, 118    Dinner:  113, 208, 137, 143, 76    HS:  181, 126, 309, 128, 170    Recent Labs:    CBC RESULTS:   Recent Labs   Lab Test  17   0955  17   0846   WBC  9.5  5.8   RBC  3.50*  3.50*   HGB  10.2*  10.3*   HCT  32.1*  31.0*   MCV  92  89   MCH  29.1  29.4   MCHC  31.8  33.2   RDW  17.1*  17.1*   PLT  168  158       Last Basic Metabolic Panel:  Recent Labs   Lab Test 18   0955   NA  142  141   POTASSIUM  4.0  4.1   CHLORIDE  105  103   MIGUEL  8.9  9.4   CO2  24  29   BUN  50*  41*   CR  1.43*  1.53*   GLC  103  158*       Liver Function Studies -   Recent Labs   Lab Test  17   1400  17   2322   PROTTOTAL  6.6*  7.6   ALBUMIN  2.8*  3.6   BILITOTAL   0.8  1.3   ALKPHOS  221*  259*   AST  21  22   ALT  20  22       TSH   Date Value Ref Range Status   10/26/2016 5.55 (H) 0.40 - 4.00 mU/L Final   09/16/2016 4.41 (H) 0.40 - 4.00 mU/L Final       Lab Results   Component Value Date    A1C 6.6 12/12/2017    A1C 7.4 08/19/2017           Assessment/Plan:  (R63.5) Weight gain  (primary encounter diagnosis)  Chronic, compensated heart failure upon today's exam. Follow up with C.O.R.E clinic as scheduled. Continue torsemide 30 mg/day. BMP scheduled for next lab day, follow GFR and creatinine closely.     (L03.90) Cellulitis  (S01.90XA) Open head wound  Acute cellulitis of head laceration. Improvement seen with cipro. Continue antibiotic and monitoring for further or worsening sign and symptoms. Notify NP with changes.     Orders:  The current medical regimen is effective; continue present plan and medications.    Total time spent with patient visit at the skilled nursing facility was 35 min including patient visit and review of past records. Greater than 50% of total time spent with counseling and coordinating care due to recheck head cellulitis     Electronically signed by  SHAYY Chandler CNP

## 2018-01-09 NOTE — LETTER
2018       RE: Kelechi Coleman  [facility]  [address]  [facility] [address]     Dear Colleague,    Thank you for referring your patient, Kelechi Coleman, to the [facility] NEUROLOGY at [facility]. Please see a copy of my visit note below.    2018      RE: Kelechi Coleman   MRN: [id number]   : 1935      Dear Dr. Deal:      Mr. 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  He had a head injury and a traumatic left subarachnoid hemorrhage was detected and around that time an episode of right arm jerking followed by weakness and which have persisted.  These were treated as seizure and he was put on levetiracetam 500 b.i.d. and a concentration of 45 then, and he is also on Trileptal 150 b.i.d.  He had been admitted on  and also was on anticoagulant because of chronic atrial fibrillation.  He fell down and sustained a laceration and he was examined at that time on 10/13 when I saw him and he did have mild weakness of the right upper extremity and the thought was that he should continue on the anticonvulsant regime he was put on in the hospital because of confirmed seizures and he has been on oxcarbazepine 150 b.i.d. and the levetiracetam 1500 b.i.d.  His last levels at that time had been in the therapeutic range I believe and he did have a low sodium which was ascribed perhaps to the carbamazepine but I see a more recent one was normal at 141.  I believe his oxcarbazepine level was around 15 at that time which would be subtherapeutic and the last Keppra level on  was 73.      He returns accompanied by a friend.  Notes from the group home did not indicate he has had any seizures.  The patient does report that he has been very tired and sleeping a lot and this is corroborated by his friend.      He is denying any seizures.  He has been making  progress in physical therapy, becoming more active.      PHYSICAL EXAMINATION:  He looks quite alert.  He has normal eye movements.  No visual field defect.  His face is fairly symmetrical.  Blood pressure 129/78, pulse is 55.        His last CT head on the  was reported as showing small of chronic right basal ganglia and left parietal infarct again noted without change.  No new infarct is seen, compatible with chronic small vessel disease also.        On his examination he has no weakness of the arm.  He is alert, generally speaking, very appropriate, answering questions.  No weakness of the leg.        In summary, he remains seizure free.  He is complaining of excessive sedation and it certainly could be due to the polytherapy for seizures which may not be warranted at this time as his last CT showed on  resolution of the bleeding.      We will go ahead and reduce the Trileptal to 250 at bedtime for 2 weeks and then discontinue.  I will see him in 3 months and decide whether we could take him off the Keppra and we will see how he does as far as sedation.         CHANNING OSBORNE MD          D: 2018 13:24   T: 2018 13:56   MT: AKA      Name:     MARLA VÁZQUEZ   MRN:      [id number]        Account:      [id number]   :      1935           Service Date: 2018      Document: [id number]       Again, thank you for allowing me to participate in the care of your patient.      Sincerely,    Channing Osborne MD    CC:  Laquita Deal MD    [facility]    [address] [place] [address] [address] [place] [id number]

## 2018-01-09 NOTE — PATIENT INSTRUCTIONS
Reduce Trileptal to 150 mg only at  bedtime for 2 weeks and then discontinue the Trilpetal  Blood test done today and RTC in 3 months.  fiol  Neurology

## 2018-01-09 NOTE — PROGRESS NOTES
2018      Laquita Deal MD    [facility]    [address]  [address]   [place] [address]      RE: Kelechi Coleman   MRN: [id number]   : 1935      Dear Dr. Deal:      Mr. 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  He had a head injury and a traumatic left subarachnoid hemorrhage was detected and around that time an episode of right arm jerking followed by weakness and which have persisted.  These were treated as seizure and he was put on levetiracetam 500 b.i.d. and a concentration of 45 then, and he is also on Trileptal 150 b.i.d.  He had been admitted on  and also was on anticoagulant because of chronic atrial fibrillation.  He fell down and sustained a laceration and he was examined at that time on 10/13 when I saw him and he did have mild weakness of the right upper extremity and the thought was that he should continue on the anticonvulsant regime he was put on in the hospital because of confirmed seizures and he has been on oxcarbazepine 150 b.i.d. and the levetiracetam 1500 b.i.d.  His last levels at that time had been in the therapeutic range I believe and he did have a low sodium which was ascribed perhaps to the carbamazepine but I see a more recent one was normal at 141.  I believe his oxcarbazepine level was around 15 at that time which would be subtherapeutic and the last Keppra level on  was 73.      He returns accompanied by a friend.  Notes from the group home did not indicate he has had any seizures.  The patient does report that he has been very tired and sleeping a lot and this is corroborated by his friend.      He is denying any seizures.  He has been making progress in physical therapy, becoming more active.      PHYSICAL EXAMINATION:  He looks quite alert.  He has normal eye movements.  No visual field defect.  His face is fairly symmetrical.  Blood pressure 129/78, pulse is 55.         His last CT head on the  was reported as showing small of chronic right basal ganglia and left parietal infarct again noted without change.  No new infarct is seen, compatible with chronic small vessel disease also.        On his examination he has no weakness of the arm.  He is alert, generally speaking, very appropriate, answering questions.  No weakness of the leg.        In summary, he remains seizure free.  He is complaining of excessive sedation and it certainly could be due to the polytherapy for seizures which may not be warranted at this time as his last CT showed on  resolution of the bleeding.      We will go ahead and reduce the Trileptal to 250 at bedtime for 2 weeks and then discontinue.  I will see him in 3 months and decide whether we could take him off the Keppra and we will see how he does as far as sedation.      Sincerely,       MD CHANNING Cruz MD             D: 2018 13:24   T: 2018 13:56   MT: AKA      Name:     MARLA VÁZQUEZ   MRN:      [id number]        Account:      [id number]   :      1935           Service Date: 2018      Document: [id number]

## 2018-01-09 NOTE — MR AVS SNAPSHOT
After Visit Summary   1/9/2018    Kelechi Coleman    MRN: [id number]           Patient Information     Date Of Birth          1935        Visit Information        Provider Department      1/9/2018 11:30 AM Mio Smith MD [facility] Neurology        Care Instructions    Reduce Trileptal to 150 mg only at  bedtime for 2 weeks and then discontinue the Trilpetal  Blood test done today and RTC in 3 months.  jeison  Neurology          Follow-ups after your visit        Follow-up notes from your care team     Return in about 3 months (around 4/9/2018).      Your next 10 appointments already scheduled     Jan 16, 2018 12:30 PM CST   LAB with [facility]   [facility] ([facility])    [address] [place] [id number] [phone]           Please do not eat 10-12 hours before your appointment if you are coming in fasting for labs on lipids, cholesterol, or glucose (sugar). This does not apply to pregnant women. Water, hot tea and black coffee (with nothing added) are okay. Do not drink other fluids, diet soda or chew gum.            Jan 16, 2018  1:30 PM CST   Core Return with SHAYY Cortes CNP   [facility] ([facility])    [address] [place] [id number] [phone]            Feb 09, 2018 11:30 AM CST   Office Visit with Etienne Gee MD   [facility]    [address] [facility] [id number] [phone]           Bring a current list of meds and any records pertaining to this visit. For Physicals, please bring immunization records and any forms needing to be filled out. Please arrive 10 minutes early to complete paperwork.              Who to contact     Please call your clinic at [phone] to:    Ask questions about your health    Make or cancel appointments    Discuss your medicines    Learn about your  "test results    Speak to your doctor   If you have compliments or concerns about an experience at your clinic, or if you wish to file a complaint, please contact [facility] Patient Relations at [phone] or email us at [email or web address]         Additional Information About Your Visit        [employer] Information     [employer] gives you secure access to your electronic health record. If you see a primary care provider, you can also send messages to your care team and make appointments. If you have questions, please call your primary care clinic.  If you do not have a primary care provider, please call [phone] and they will assist you.      [employer] is an electronic gateway that provides easy, online access to your medical records. With [employer], you can request a clinic appointment, read your test results, renew a prescription or communicate with your care team.     To access your existing account, please contact your [facility] or call [phone] for assistance.        Care EveryWhere ID     This is your Care EveryWhere ID. This could be used by other organizations to access your [facility] medical records  [id number]        Your Vitals Were     Pulse Height BMI (Body Mass Index)             55 1.88 m (6' 2\") 24.39 kg/m2          Blood Pressure from Last 3 Encounters:   01/09/18 129/78   01/04/18 113/52   12/26/17 117/61    Weight from Last 3 Encounters:   01/09/18 86.2 kg (190 lb)   01/04/18 86.5 kg (190 lb 9.6 oz)   12/19/17 89.9 kg (198 lb 3.1 oz)              Today, you had the following     No orders found for display       Primary Care Provider Office Phone # Fax #    Laquita Deal [phone]       [address] [place] [address]        Goals        Exercise    I will exercise 2x per week (15 min per time) , for the next 6 weeks.     Notes - Note created  12/22/2016 12:14 PM by Ting Bullard, RN    As of " today's date 12/22/2016 goal is met at 0 - 25%.   Goal Status:  Active           General    I will not add any salt to my food and will limit my salt intake to 2,000 mg of sodium per day. (pt-stated)     Notes - Note created  9/26/2016  3:53 PM by Ting Bullard RN    As of today's date 9/26/2016 goal is met at 26 - 50%.   Goal Status:  Active        I will weigh myself every day and call my clinic if I am more than 2 pounds heavier in a day or 5 pounds heavier in a week.  (pt-stated)     Notes - Note created  9/26/2016  3:52 PM by Ting Bullard RN    As of today's date 9/26/2016 goal is met at 26 - 50%.   Goal Status:  Active          Equal Access to Services     [facility]: Hadii christine kelsey hadasho Soomaali, waaxda luqadaha, qaybta kaalmada adeluis manuelyareji, nenita reynoso . So [facility] [phone].    ATENCIÓN: Si habla español, tiene a conteh disposición servicios gratuitos de asistencia lingüística. Llame al [phone].    We comply with applicable federal civil rights laws and [place] laws. We do not discriminate on the basis of race, color, national origin, age, disability, sex, sexual orientation, or gender identity.            Thank you!     Thank you for choosing [facility] NEUROLOGY  for your care. Our goal is always to provide you with excellent care. Hearing back from our patients is one way we can continue to improve our services. Please take a few minutes to complete the written survey that you may receive in the mail after your visit with us. Thank you!             Your Updated Medication List - Protect others around you: Learn how to safely use, store and throw away your medicines at www.disposemymeds.org.          This list is accurate as of: 1/9/18 11:39 AM.  Always use your most recent med list.                   Brand Name Dispense Instructions for use Diagnosis    acetaminophen 325 MG tablet    TYLENOL     Take 650 mg by mouth every 6 hours as needed        albuterol 0.63  MG/3ML nebulizer solution    ACCUNEB     1 vial via mask three times a day for SOB for 5 Days        bacitracin 500 UNIT/GM Oint      Apply topically daily to sclap        blood glucose monitoring test strip    no brand specified     Use to test blood sugar 4 times daily or as directed.        Calcium Carb-Cholecalciferol 600-800 MG-UNIT Tabs      Take 1 tablet by mouth 3 times daily (with meals)        carvedilol 12.5 MG tablet    COREG     Take 12.5 mg by mouth daily        CENTRUM SILVER per tablet     30 tablet    Take 1 tablet by mouth daily    Vitamin deficiency       ciprofloxacin 500 MG tablet    CIPRO     Take 500 mg by mouth 2 times daily for Infection on scalp for 7 Days        clopidogrel 75 MG tablet    PLAVIX     Take 75 mg by mouth daily        hydrALAZINE 50 MG tablet    APRESOLINE    60 tablet    Take 0.5 tablets (25 mg) by mouth 2 times daily    Essential hypertension with goal blood pressure less than 130/80       insulin aspart 100 UNIT/ML injection    NovoLOG PEN     Inject 5 Units Subcutaneous 3 times daily (with meals)    Type 2 diabetes mellitus with stage 3 chronic kidney disease, with long-term current use of insulin (H)       insulin glargine 100 UNIT/ML injection    LANTUS SOLOSTAR    30 mL    Inject 6 Units Subcutaneous At Bedtime    Type 2 diabetes mellitus with stage 3 chronic kidney disease, with long-term current use of insulin (H)       isosorbide mononitrate 30 MG 24 hr tablet    IMDUR     Take 3 tablets (90 mg) by mouth daily    Coronary artery disease involving native coronary artery of native heart without angina pectoris       KEPPRA 500 MG tablet   Generic drug:  levETIRAcetam      Take 500 mg by mouth 2 times daily        LIPITOR 40 MG tablet   Generic drug:  atorvastatin      Take 40 mg by mouth daily        mirtazapine 15 MG tablet    REMERON     Take 15 mg by mouth At Bedtime        OXcarbazepine 150 MG tablet    TRILEPTAL     Take 1 tablet (150 mg) by mouth 2 times daily     History of stroke       pantoprazole 40 MG EC tablet    PROTONIX    90 tablet    TAKE 1 TABLET BY MOUTH EVERY EVENING    Gastroesophageal reflux disease, esophagitis presence not specified       spironolactone 25 MG tablet    ALDACTONE     Take 1 tablet (25 mg) by mouth daily    Chronic systolic congestive heart failure (H)       thiamine 100 MG tablet      Take 100 mg by mouth daily        torsemide 10 MG tablet    DEMADEX     Take 30 mg by mouth daily

## 2018-01-15 NOTE — PROGRESS NOTES
"Received VM Brittany at [facility] stating that pt's wt today is 209.8# today and on admit was 183.7#, noting pt has appt tomorrow.     EF had decreased from 40-45% to 30-35% on most recent admit. Pt seen by Dr. Nielsen 12/26 (typically a pt of Dr. Charles and Cristina Plummer CNP) for a post hospital f/u and was in HF, thus torsemide increased from 20mg daily to 30mg daily.    12/26 wt at   1/4 wt at #  Today 1/15 209.8#    Previous dry wt was noted to be 180#.         Spoke w/ nurse Brittany at [facility]. They have not had orders to call provider for a certain wt/wt gain and Brittany was asked by [facility] provider to call CORE today. Brittany confirms pt getting torsemide 30mg daily, spironolactone 25mg daily. Brittany reports pt has increased BATES and LE edema, though not in any distress. Pt has previously scheduled CORE f/u and BMP w/ Cristina Plummer CNP tomorrow. Asked Brittany to fax over wt records to CORE RN, she stated, \"I will try.\"    FYI to Cristina Plummer CNP.    Venus Hale [facility] Clinic RN 3:54 PM 01/15/18  [phone]                "

## 2018-01-16 PROBLEM — I50.9 CHF (CONGESTIVE HEART FAILURE) (H): Status: ACTIVE | Noted: 2018-01-01

## 2018-01-16 NOTE — PROGRESS NOTES
Received wt log from [facility]. Looks like wts have been done weekly. Copy to scan, copy to Cristina Plummer CNP.    Venus Hale C[facility] Clinic RN 9:39 AM 01/16/18  [phone]

## 2018-01-16 NOTE — PROGRESS NOTES
"Primary Provider: Laquita Deal   Primary Cardiology: Dr. Isaiah Charles    REASON FOR VISIT/CHIEF COMPLAINT: Follow-up for decompensated heart failure    HPI:     HISTORY OF PRESENT ILLNESS:  Mr. Coleman is a pleasant 82-year-old gentleman, a patient of Dr. Charles's, who I am seeing today for a followup from a clinic visit he had on 12/26/2017. At that clinic he was evaluated by Dr. Nielsen. He was found to have decompensated heart failure.  His torsemide dose was increased from 20 mg daily to 30 mg daily. His weight was 191 pounds, today it is 205 pounds. His Creatine increased from 1.53 to 2.03. He was hospitalized in December with fall. He had a mild troponin elevation and was seen by Cardiology.  This was felt to be demand ischemia.  He had an echocardiogram which showed deterioration in his overall LV function.  His EF was baseline 40%-45%, appeared to be 30-35%.  It was a limited echo and severe calcification of the aortic valve was noted but felt to be an adequate interrogation.  Mild-to-moderate mitral regurgitation was noted.  He was noted to have sinus bradycardia with first-degree AV block on telemetry but no other arrhythmias are documented in the discharge.  He has an underlying history of coronary artery disease, 4-vessel CABG in 2002 with multiple revascularizations with stent placements, history of multiple CVAs, type 2 diabetes on insulin and chronic kidney disease as well as peripheral arterial disease.  Since his discharge, he has been mainly wheelchair bound but he does attend physical therapy 2-3 times per week. Today he reports he is feeling \"terrible\" he is unable to stand now for physical therapy as his legs feel too weak. Previously he reported he is getting stronger and has not had any recurrent falling episodes. He had a pretty significant fall in September when he had seen Dr. Charles, so he does have a history of frequent falling.  Mr. Coleman shortness of breath with minimal exertion " exertion. He reports occasional PND. He reports increased abdominal swelling and leg swelling.  He denies any chest pain symptoms.     His previous weight in October was 177.   Twelve-lead EKG demonstrates marked bradycardia with prolonged P-R internal fall or second-degree heart block.rate at 39 bpm  Evidence of a left pleural effusion during his last admission seen on echocardiogram.     PHYSICAL EXAMINATION:     HEART:  Cardiovascular tones are regular and slow.  He has a systolic ejection murmur heard throughout the precordium, most prominent at the right upper sternal border.     LUNGS:  He has markedly diminished breath sounds in the left base, some fine crackles in the right base.      EXTREMITIES:  He has 2+ pitting edema to the knees bilateral.    VITALS SIGNS:  Blood pressure today was 117/61, pulse was 61.       SUMMARY:  Mr. Bro is a pleasant 82-year-old male with coronary artery disease, previous 4-vessel CABG and multiple revascularizations, severe ischemic cardiomyopathy, his EF now estimated at 30%-35%, suspected significant valvular disease with severe calcification of the aortic valve, sinus bradycardia with first-degree AV block and recurrent falling.  He has evidence of worsening heart failure on exam in spite of increased torsemide dose made at his last clinic visit. In addition his renal function has worsened. Physical exam also indicates possible worsening left pleural effusion. Finally his heart rate is lower than it was 3 weeks ago. Today I'm recommending admission to [facility] for aggressive inpatient management. I reviewed my physical exam and assessments with his primary cardiologist, Dr. Charles.  He agrees with the plan. Patient will need minimally:  a chest x-ray further lab work to include a pro BNP,   temporary reduction of his AV honorio blocking agent.  IV diuretic therapy  Cardiac consultation, Dr. Ugalde has already been informed of the patient's admission.  Due to his  advanced age and comorbidities I also recommend palliative care consultation.  Been my pleasure to meet Kelechi today.    Results for orders placed or performed in visit on 01/16/18 (from the past 24 hour(s))   Basic metabolic panel   Result Value Ref Range    Sodium 135 (L) 136 - 145 mmol/L    Potassium 5.3 (H) 3.5 - 5.1 mmol/L    Chloride 101 98 - 107 mmol/L    Carbon Dioxide 26 23 - 29 mmol/L    Anion Gap 13.3 6 - 17 mmol/L    Glucose 182 (H) 70 - 105 mg/dL    Urea Nitrogen 64 (H) 7 - 30 mg/dL    Creatinine 2.03 (H) 0.70 - 1.30 mg/dL    GFR Estimate 32 (L) >60 mL/min/1.7m2    GFR Estimate If Black 38 (L) >60 mL/min/1.7m2    Calcium 9.2 8.5 - 10.5 mg/dL       \    Thank you for allowing me to participate in Kelechi's care.          Cristina Plummer. APRN, CNP  [facility]    Orders Placed This Encounter   Procedures     EKG 12-lead complete w/read - Clinics       No orders of the defined types were placed in this encounter.      Medications Discontinued During This Encounter   Medication Reason     ciprofloxacin (CIPRO) 500 MG tablet Stopped by Patient         Encounter Diagnoses   Name Primary?     Ischemic cardiomyopathy      Peripheral artery disease (H) Yes     Paroxysmal atrial fibrillation (H)      Hyperlipidemia LDL goal <70      Benign essential hypertension      Paroxysmal ventricular tachycardia (H)      Atherosclerosis of native coronary artery of native heart without angina pectoris        CURRENT MEDICATIONS:  Current Outpatient Prescriptions   Medication Sig Dispense Refill     albuterol (ACCUNEB) 0.63 MG/3ML nebulizer solution 1 vial via mask three times a day for SOB for 5 Days       bacitracin 500 UNIT/GM OINT Apply topically daily to sclap       torsemide (DEMADEX) 10 MG tablet Take 30 mg by mouth daily       carvedilol (COREG) 12.5 MG tablet Take 12.5 mg by mouth daily       atorvastatin (LIPITOR) 40 MG tablet Take 40 mg by mouth daily       isosorbide mononitrate (IMDUR)  30 MG 24 hr tablet Take 3 tablets (90 mg) by mouth daily       insulin glargine (LANTUS SOLOSTAR) 100 UNIT/ML injection Inject 6 Units Subcutaneous At Bedtime 30 mL 0     insulin aspart (NOVOLOG PEN) 100 UNIT/ML injection Inject 5 Units Subcutaneous 3 times daily (with meals)       hydrALAZINE (APRESOLINE) 50 MG tablet Take 0.5 tablets (25 mg) by mouth 2 times daily 60 tablet      spironolactone (ALDACTONE) 25 MG tablet Take 1 tablet (25 mg) by mouth daily       acetaminophen (TYLENOL) 325 MG tablet Take 650 mg by mouth every 6 hours as needed        Calcium Carb-Cholecalciferol 600-800 MG-UNIT TABS Take 1 tablet by mouth 3 times daily (with meals)        levETIRAcetam (KEPPRA) 500 MG tablet Take 500 mg by mouth 2 times daily       clopidogrel (PLAVIX) 75 MG tablet Take 75 mg by mouth daily       mirtazapine (REMERON) 15 MG tablet Take 15 mg by mouth At Bedtime       blood glucose monitoring (NO BRAND SPECIFIED) test strip Use to test blood sugar 4 times daily or as directed.       thiamine 100 MG tablet Take 100 mg by mouth daily       pantoprazole (PROTONIX) 40 MG EC tablet TAKE 1 TABLET BY MOUTH EVERY EVENING 90 tablet 2     Multiple Vitamins-Minerals (CENTRUM SILVER) per tablet Take 1 tablet by mouth daily 30 tablet      OXcarbazepine (TRILEPTAL) 150 MG tablet Take 1 tablet (150 mg) by mouth 2 times daily         ALLERGIES     Allergies   Allergen Reactions     Aspirin Hives     Penicillins Hives     Contrast Dye Hives       PAST MEDICAL HISTORY:  Past Medical History:   Diagnosis Date     Acute exacerbation of CHF (congestive heart failure) (H) 9/2/2016     Atrial fibrillation (H)      Cerebral infarction (H) 12/13/2013, 9/2016     Diagnosis updated by automated process. Provider to review and confirm.     Coronary artery disease      Diabetes (H)      Gastroesophageal reflux disease with esophagitis 5/8/2017     Hyperlipidaemia      Hypertension      Myocardial infarction      PAD (peripheral artery disease)  (H)      Stented coronary artery     CABG 2002 4 VESSEL     Type 2 diabetes mellitus with stage 3 chronic kidney disease, with long-term current use of insulin (H) 11/4/2016     Unspecified cerebral artery occlusion with cerebral infarction        PAST SURGICAL HISTORY:  Past Surgical History:   Procedure Laterality Date     C MRA UPPER EXTREMITY WO&W CONT       CORONARY ARTERY BYPASS  2002    LIMA-LAD, SVG-OM1, [id number]     ENT SURGERY      VOCAL CORD LESION REMOVED     GENITOURINARY SURGERY      ANGIOPLASTY FOR RENAL ARTERT STENOSIS     HC LEFT HEART CATHETERIZATION  9/7/2016 09/07/2016: CHUCHO x2 to distal and proximal SVG to to RPDA     HEART CATH LEFT HEART CATH  11/30/2001     PHACOEMULSIFICATION CLEAR CORNEA WITH STANDARD INTRAOCULAR LENS IMPLANT Left 1/22/2015    Procedure: PHACOEMULSIFICATION CLEAR CORNEA WITH STANDARD INTRAOCULAR LENS IMPLANT;  Surgeon: Bart Johnson MD;  [facility]     VASCULAR SURGERY      ANGIOPLASTY LEFT LEG FOR pvd       FAMILY HISTORY:  Family History   Problem Relation Age of Onset     DIABETES Mother      Family History Negative Father      CANCER Maternal Grandmother        SOCIAL HISTORY:  Social History     Social History     Marital status: Single     Spouse name: N/A     Number of children: N/A     Years of education: N/A     Social History Main Topics     Smoking status: Former Smoker     Years: 40.00     Types: Pipe     Quit date: 12/18/1998     Smokeless tobacco: Never Used     Alcohol use No     Drug use: No     Sexual activity: Not Currently     Partners: Female     Other Topics Concern     Parent/Sibling W/ Cabg, Mi Or Angioplasty Before 65f 55m? No     Caffeine Concern Yes     decaff only      Stress Concern No     Special Diet Yes     low sodium      Exercise Yes     walking, rehab 3 times a week      Seat Belt Yes     Social History Narrative       Review of Systems:  Skin:  Negative       Eyes:  Positive for glasses reading  ENT:  Negative     "  Respiratory:  Positive for dyspnea on exertion;shortness of breath     Cardiovascular:    fatigue;Positive for    Gastroenterology: Negative      Genitourinary:  Negative      Musculoskeletal:  Negative      Neurologic:  Negative      Psychiatric:  Negative      Heme/Lymph/Imm:  Negative      Endocrine:  Positive for diabetes      Physical Exam:  Vitals: /68  Ht 1.88 m (6' 2\")  Wt 93.2 kg (205 lb 8 oz)  BMI 26.38 kg/m2    Constitutional:  cooperative;in no acute distress chronically ill;frail      Skin:  warm and dry to the touch, no apparent skin lesions or masses noted bruises present        Head:  normocephalic, no masses or lesions        Eyes:  pupils equal and round        Lymph:      ENT:  no pallor or cyanosis, dentition good pallor      Neck:  carotid pulses are full and equal bilaterally;no carotid bruit delayed carotid upstroke;JVP elevated assessed upright in wheelchair-    Respiratory:       no breath sounds left side, right side with crackles in the bases    Cardiac:   bradycardic     systolic murmur;RUSB;grade 2                      right dorsalis pedis artery;1+ 0           left dorsalis pedis artery;1+ 0     pink toes    GI:      abdomen firm    Extremities and Muscular Skeletal:  no deformities, clubbing, cyanosis, erythema observed   2+;pitting;bilateral LE edema     pitting edema past buttocks     Neurological:      slurred speech    Psych:  Alert and Oriented x 3            CC  Kiera Nielsen DO  [id number]  [place] [id number]              "

## 2018-01-16 NOTE — PHARMACY-ADMISSION MEDICATION HISTORY
Admission medication history interview status for the 1/16/2018  admission is complete. See EPIC admission navigator for prior to admission medications     Medication history source reliability:Good    Actions taken by pharmacist (provider contacted, etc):information from nursing home MARs     Additional medication history information not noted on PTA med list :None    Medication reconciliation/reorder completed by provider prior to medication history? Yes    Time spent in this activity: 20 minutes    Prior to Admission medications    Medication Sig Last Dose Taking? Auth Provider   OXCARBAZEPINE PO Take 150 mg by mouth At Bedtime Through 1/23/2018, then stop 1/15/2018 at pm Yes Unknown, Entered By History   torsemide (DEMADEX) 10 MG tablet Take 30 mg by mouth daily 1/16/2018 at am Yes Reported, Patient   carvedilol (COREG) 12.5 MG tablet Take 12.5 mg by mouth daily 1/16/2018 at am Yes Reported, Patient   atorvastatin (LIPITOR) 40 MG tablet Take 40 mg by mouth At Bedtime  1/15/2018 at Unknown time Yes Reported, Patient   isosorbide mononitrate (IMDUR) 30 MG 24 hr tablet Take 3 tablets (90 mg) by mouth daily 1/16/2018 at am Yes Brandon Deras MD   insulin glargine (LANTUS SOLOSTAR) 100 UNIT/ML injection Inject 6 Units Subcutaneous At Bedtime 1/15/2018 at pm Yes Brandon Deras MD   insulin aspart (NOVOLOG PEN) 100 UNIT/ML injection Inject 5 Units Subcutaneous 3 times daily (with meals) 1/16/2018 at noon Yes Brandon Deras MD   hydrALAZINE (APRESOLINE) 50 MG tablet Take 0.5 tablets (25 mg) by mouth 2 times daily 1/16/2018 at am Yes Brandon Deras MD   spironolactone (ALDACTONE) 25 MG tablet Take 1 tablet (25 mg) by mouth daily 1/16/2018 at am Yes Brandon Deras MD   acetaminophen (TYLENOL) 325 MG tablet Take 650 mg by mouth every 6 hours as needed  1/14/2018 at Unknown time Yes Reported, Patient   Calcium Carb-Cholecalciferol 600-800 MG-UNIT TABS Take 1 tablet by mouth 3 times daily  (with meals)  1/16/2018 at noon Yes Reported, Patient   levETIRAcetam (KEPPRA) 500 MG tablet Take 500 mg by mouth 2 times daily 1/16/2018 at am Yes Reported, Patient   clopidogrel (PLAVIX) 75 MG tablet Take 75 mg by mouth daily 1/16/2018 at am Yes Reported, Patient   mirtazapine (REMERON) 15 MG tablet Take 15 mg by mouth At Bedtime 1/15/2018 at pm Yes Reported, Patient   thiamine 100 MG tablet Take 100 mg by mouth daily 1/16/2018 at am Yes Reported, Patient   pantoprazole (PROTONIX) 40 MG EC tablet TAKE 1 TABLET BY MOUTH EVERY EVENING 1/15/2018 at pm Yes HerJarad Webster PA-C   Multiple Vitamins-Minerals (CENTRUM SILVER) per tablet Take 1 tablet by mouth daily 1/16/2018 at am Yes HerJarad Webster PA-C   blood glucose monitoring (NO BRAND SPECIFIED) test strip Use to test blood sugar 4 times daily or as directed.   Reported, Patient

## 2018-01-16 NOTE — PROGRESS NOTES
Received call from patient placement that pt has been assigned to bed 244-2 in [facility]. Provided report to hospital RN accepting pt.  Transported pt to hospital, room 244-2, in wheelchair pt came to clinic in, which is from [facility].  Hospital RN states she will call [facility] in order to have their transport  [facility] wheelchair.  MIMI Castillo 3:20 PM 1/16/2018

## 2018-01-16 NOTE — PATIENT INSTRUCTIONS
Call [facility] nurse for any questions or concerns:  [phone]   *If you have concerns after hours, please call [phone], option 2 to speak with on call Cardiologist.    1. Medication changes from today:  **     2. Weigh yourself daily and write it down.     3. Call [facility] nurse if your weight is up more than 2 pounds in one day or 5 pounds in one week.     4. Call [facility] nurse if you feel more short of breath, have more abdominal bloating, or leg swelling.     5. Continue low sodium diet (less than 2000 mg daily). If you eat less salt, you will retain less fluid.     6. Alcohol can weaken your heart further. You should avoid alcohol or limit its use to special times, such as a holiday or birthday.      7. Do NOT take Aleve or ibuprofen without talking to your doctor first.      8. Lab Results: **     Component      Latest Ref Rng & Units 1/2/2018 1/16/2018   Sodium      136 - 145 mmol/L 142 135 (L)   Potassium      3.5 - 5.1 mmol/L 4.0 5.3 (H)   Chloride      98 - 107 mmol/L 105 101   Carbon Dioxide      23 - 29 mmol/L 24 26   Anion Gap      6 - 17 mmol/L 13 13.3   Glucose      70 - 105 mg/dL 103 182 (H)   Urea Nitrogen      7 - 30 mg/dL 50 (H) 64 (H)   Creatinine      0.70 - 1.30 mg/dL 1.43 (H) 2.03 (H)   Calcium      8.5 - 10.5 mg/dL 8.9 9.2   GFR Estimate      >60 mL/min/1.7m2 47 (L) 32 (L)   GFR Estimate If Black      >60 mL/min/1.7m2 58 (L) 38 (L)     [facility]: Cardiomyopathy, Optimization, Rehabilitation, Education  The [facility] is a heart failure specialty clinic within the [facility] where you will work with specialized nurse practitioners, physician assistants, doctors, and registered nurses. They are dedicated to helping patients with heart failure to carefully adjust medications, receive education, and learn who and when to call if symptoms develop. They specialize in helping you better understand your condition, slow the progression of your disease, improve the length and quality of your  life, help you detect future heart problems before they become life threatening, and avoid hospitalizations.

## 2018-01-16 NOTE — CONSULTS
Cardiology Consultation      Kelechi Coleman MRN# [id number]   YOB: 1935 Age: 82 year old   Date of Admission: 1/16/2018     Reason for consult: Heart failure           Assessment and Plan:     1. Sinus bradycardia with very prolonged 1st degree AVB - the patient was on carvedilol, spironolactone and torsemide prior to admission. His creatinine has risen and he has developed sinus bradycardia with a very prolonged first-degree AV block. Likely, this is a combination of worsening renal function and bradycardia which are contributing to one another.  As such, those medications should be discontinued. Diuretics should be held.  If his heart rate does not increase or if it worsens, he will likely require permanent pacing and I discussed this with Mr. Coleman and with his friends who accompanied him to the hospital.  He may require gentle intravenous hydration to improve his renal function.  2. LV dysfunction - EF 30-35%, he has baseline left ventricular dysfunction which has previously resulted in heart failure controlled with medications. With worsening renal function and slowing heart rate, his heart failure symptoms have come more consistent with forward failure.  3. CKD (Cr 2) - renal dysfunction is likely present at baseline given his prior decreased GFR and has not worsened as a result of the bradycardia. Rising potassium and creatinine will contribute to the bradycardia which has resulted from underlying conduction system disease.  4. CAD, S/P CABG - he is currently asymptomatic.  He is aspirin allergic and is on clopidogrel.  5. Aortic stenosis - he has moderately severe to severe aortic stenosis based on his last echocardiogram which I personally reviewed. His aortic stenosis may contribute in some minor weight to his heart failure and to his present condition but is not likely severe enough to explain his symptoms were for treatment to yet be considered.  6. Diabetes - she is currently  mildly hyperglycemic and may require some intravenous D5W to maintain his blood sugars if they remain low.  7. Recent history of falls - his falls are likely multifactorial but more recently, have probably contributed to by his bradycardia.  8. History left subarachnoid hemorrhage/seizures - he is currently asymptomatic.  9. History of atrial fibrillation - . Current rhythm is sinus bradycardia.         Chief Complaint:   Weakness, confusion.    History is obtained from the patient/friends/chart.         History of Present Illness:     Mr. Kelechi Coleman is an 82 year old man who presents with weakness. He has a history notable for left ventricular dysfunction, chronic kidney disease, prior coronary artery disease and coronary bypass graft surgery, moderately severe to severe aortic stenosis and diabetes mellitus. He has a history of atrial fibrillation but is not on anticoagulation. He has a history of a left subarachnoid hemorrhage and a reported history of seizure as a result of SAH.    His creatinine on 1/4 was 1.4 and his HR was reportedly 64 BPM.  He was seen by [facility]. He has been on carvedilol 12.5 mg BID, spironolactone and torsemide and today, his Cr is 2 and his HR is in the 30's BPM. He was seen by Cristina Plummer NP at [facility] to date. She noted a heart rate of 39 bpm and sinus bradycardia with a very prolonged first-degree AV block. She recommended admission to the hospital and has been admitted to the hospitalist service.    Mr. Coleman is accompanied by friends. They note that he is only family is a sister who lives elsewhere. He does have another friend who has medical power of . She is not present. Mr. Coleman denies worse dyspnea. He has seemed to come somewhat confused to his friends today and one of his friends accompanied him from [facility] as a result.    He is typically followed by Dr. Isiaah Charles.  He denies any angina or worsening of his dyspnea. He does  note that he has had worsening lower extremity edema. He was last seen by Dr. Yamil Deal about 2-1/2 weeks ago and his torsemide dose was increased from 20 mg daily to 30 mg daily. Despite increasing torsemide, his weight increased about 14-15 pounds and his creatinine increased. He was hospitalized last month after a fall and echocardiography suggested a mild decrease in his baseline left ventricular dysfunction with very ejection fraction falling from 40-45% to 30-35%. There was aortic stenosis and mild-to-moderate mitral insufficiency. Given his bradycardia, a 0 patch monitor has previously been ordered but results are not available. He has felt weak and has not had an appetite. He has noted abdominal swelling as well as worsening of his leg edema..           Past Medical History:     Past Medical History:   Diagnosis Date     Acute exacerbation of CHF (congestive heart failure) (H) 9/2/2016     Atrial fibrillation (H)      Cerebral infarction (H) 12/13/2013, 9/2016     Diagnosis updated by automated process. Provider to review and confirm.     Coronary artery disease      Diabetes (H)      Gastroesophageal reflux disease with esophagitis 5/8/2017     Hyperlipidaemia      Hypertension      Myocardial infarction      PAD (peripheral artery disease) (H)      Stented coronary artery     CABG 2002 4 VESSEL     Type 2 diabetes mellitus with stage 3 chronic kidney disease, with long-term current use of insulin (H) 11/4/2016     Unspecified cerebral artery occlusion with cerebral infarction              Past Surgical History:     Past Surgical History:   Procedure Laterality Date     C MRA UPPER EXTREMITY WO&W CONT       CORONARY ARTERY BYPASS  2002    LIMA-LAD, SVG-OM1, [id number]     ENT SURGERY      VOCAL CORD LESION REMOVED     GENITOURINARY SURGERY      ANGIOPLASTY FOR RENAL ARTERT STENOSIS     HC LEFT HEART CATHETERIZATION  9/7/2016 09/07/2016: CHUCHO x2 to distal and proximal SVG to to RPDA     HEART CATH LEFT  HEART CATH  11/30/2001     PHACOEMULSIFICATION CLEAR CORNEA WITH STANDARD INTRAOCULAR LENS IMPLANT Left 1/22/2015    Procedure: PHACOEMULSIFICATION CLEAR CORNEA WITH STANDARD INTRAOCULAR LENS IMPLANT;  Surgeon: Bart Johnson MD;  Location: [facility]     VASCULAR SURGERY      ANGIOPLASTY LEFT LEG FOR pvd               Social History:     Social History   Substance Use Topics     Smoking status: Former Smoker     Years: 40.00     Types: Pipe     Quit date: 12/18/1998     Smokeless tobacco: Never Used     Alcohol use No             Family History:     Family History   Problem Relation Age of Onset     DIABETES Mother      Family History Negative Father      CANCER Maternal Grandmother              Allergies:     Allergies   Allergen Reactions     Aspirin Hives     Penicillins Hives     Contrast Dye Hives             Medications:     Prescriptions Prior to Admission   Medication Sig Dispense Refill Last Dose     albuterol (ACCUNEB) 0.63 MG/3ML nebulizer solution 1 vial via mask three times a day for SOB for 5 Days   Taking     bacitracin 500 UNIT/GM OINT Apply topically daily to sclap   Taking     torsemide (DEMADEX) 10 MG tablet Take 30 mg by mouth daily   Taking     carvedilol (COREG) 12.5 MG tablet Take 12.5 mg by mouth daily   Taking     atorvastatin (LIPITOR) 40 MG tablet Take 40 mg by mouth daily   Taking     isosorbide mononitrate (IMDUR) 30 MG 24 hr tablet Take 3 tablets (90 mg) by mouth daily   Taking     insulin glargine (LANTUS SOLOSTAR) 100 UNIT/ML injection Inject 6 Units Subcutaneous At Bedtime 30 mL 0 Taking     insulin aspart (NOVOLOG PEN) 100 UNIT/ML injection Inject 5 Units Subcutaneous 3 times daily (with meals)   Taking     hydrALAZINE (APRESOLINE) 50 MG tablet Take 0.5 tablets (25 mg) by mouth 2 times daily 60 tablet  Taking     spironolactone (ALDACTONE) 25 MG tablet Take 1 tablet (25 mg) by mouth daily   Taking     acetaminophen (TYLENOL) 325 MG tablet Take 650 mg by mouth every 6 hours as  needed    Taking     Calcium Carb-Cholecalciferol 600-800 MG-UNIT TABS Take 1 tablet by mouth 3 times daily (with meals)    Taking     levETIRAcetam (KEPPRA) 500 MG tablet Take 500 mg by mouth 2 times daily   Taking     clopidogrel (PLAVIX) 75 MG tablet Take 75 mg by mouth daily   Taking     mirtazapine (REMERON) 15 MG tablet Take 15 mg by mouth At Bedtime   Taking     blood glucose monitoring (NO BRAND SPECIFIED) test strip Use to test blood sugar 4 times daily or as directed.   Taking     thiamine 100 MG tablet Take 100 mg by mouth daily   Taking     pantoprazole (PROTONIX) 40 MG EC tablet TAKE 1 TABLET BY MOUTH EVERY EVENING 90 tablet 2 Taking     Multiple Vitamins-Minerals (CENTRUM SILVER) per tablet Take 1 tablet by mouth daily 30 tablet  Taking     OXcarbazepine (TRILEPTAL) 150 MG tablet Take 1 tablet (150 mg) by mouth 2 times daily   Taking             Review of Systems:   The 10 point Review of Systems is negative other than noted in the HPI            Physical Exam:   Vitals were reviewed;  Blood pressure 95/71, SpO2 99 %.  HR is 36 BPM.     Constitutional: awake, alert, cooperative, no apparent distress, and appears stated age  Eyes:  sclera clear, conjunctiva normal  Neck:  thyroid symmetric, not enlarged and no tenderness, skin normal  Lungs:  good air exchange, clear to auscultation bilaterally, no crackles or wheezing  Cardiovascular: slow rate and regular rhythm, normal S1 and S2, no S3 or S4, and 1-2/6 systolic murmur noted  Abdomen: mildly distended, non-tender, no masses palpated, no hepatosplenomegally  Musculoskeletal: moderate lower extremity pitting edema present  Neurologic: Mental Status Exam:  Orientation:   person, place,slurs his speech and seems mildly confused.  Motor Exam:  moves all extremities well and symmetrically  Skin: normal skin color, texture, turgor and no jaundice         Data:        Lab Results   Component Value Date     01/16/2018    Lab Results   Component Value  Date    CHLORIDE 101 01/16/2018    Lab Results   Component Value Date    BUN 64 01/16/2018      Lab Results   Component Value Date    POTASSIUM 5.3 01/16/2018    Lab Results   Component Value Date    CO2 26 01/16/2018    Lab Results   Component Value Date    CR 2.03 01/16/2018        Lab Results   Component Value Date    WBC 9.5 12/26/2017    HGB 10.2 (L) 12/26/2017    HCT 32.1 (L) 12/26/2017    MCV 92 12/26/2017     12/26/2017     Lab Results   Component Value Date    INR 1.32 (H) 12/12/2017     Lab Results   Component Value Date    TSH 5.55 (H) 10/26/2016     Lab Results   Component Value Date    TROPI 0.714 (HH) 12/13/2017     Lab Results   Component Value Date    TROPI 0.714 (HH) 12/13/2017    TROPI 0.714 (HH) 12/13/2017    TROPI 0.709 (HH) 12/12/2017    TROPI 0.701 (HH) 12/12/2017    TROPI Canceled, Test credited 12/12/2017     EKG results:   I have reviewed this patient's EKG with the following interpretation:         Sinus bradycardia with very prolonged first degree AV block.     Chest x-ray:   Only one view: cadiomegaly, bilateral effusions - L>R.

## 2018-01-16 NOTE — MR AVS SNAPSHOT
After Visit Summary   1/16/2018    Kelechi Coleman    MRN: [id number]           Patient Information     Date Of Birth          1935        Visit Information        Provider Department      1/16/2018 1:30 PM Cristina Plummer, [facility]        Today's Diagnoses     Peripheral artery disease (H)    -  1    Ischemic cardiomyopathy        Paroxysmal atrial fibrillation (H)        Hyperlipidemia LDL goal <70        Benign essential hypertension        Paroxysmal ventricular tachycardia (H)        Atherosclerosis of native coronary artery of native heart without angina pectoris          Care Instructions    Call [facility] nurse for any questions or concerns:  [phone]   *If you have concerns after hours, please call [phone], option 2 to speak with on call Cardiologist.    1. Medication changes from today:  **     2. Weigh yourself daily and write it down.     3. Call [facility] nurse if your weight is up more than 2 pounds in one day or 5 pounds in one week.     4. Call [facility] nurse if you feel more short of breath, have more abdominal bloating, or leg swelling.     5. Continue low sodium diet (less than 2000 mg daily). If you eat less salt, you will retain less fluid.     6. Alcohol can weaken your heart further. You should avoid alcohol or limit its use to special times, such as a holiday or birthday.      7. Do NOT take Aleve or ibuprofen without talking to your doctor first.      8. Lab Results: **     Component      Latest Ref Rng & Units 1/2/2018 1/16/2018   Sodium      136 - 145 mmol/L 142 135 (L)   Potassium      3.5 - 5.1 mmol/L 4.0 5.3 (H)   Chloride      98 - 107 mmol/L 105 101   Carbon Dioxide      23 - 29 mmol/L 24 26   Anion Gap      6 - 17 mmol/L 13 13.3   Glucose      70 - 105 mg/dL 103 182 (H)   Urea Nitrogen      7 - 30 mg/dL 50 (H) 64 (H)   Creatinine      0.70 - 1.30 mg/dL 1.43 (H) 2.03 (H)   Calcium      8.5 - 10.5 mg/dL 8.9 9.2    GFR Estimate      >60 mL/min/1.7m2 47 (L) 32 (L)   GFR Estimate If Black      >60 mL/min/1.7m2 58 (L) 38 (L)     [facility]: Cardiomyopathy, Optimization, Rehabilitation, Education  The [facility] is a heart failure specialty clinic within the [facility] where you will work with specialized nurse practitioners, physician assistants, doctors, and registered nurses. They are dedicated to helping patients with heart failure to carefully adjust medications, receive education, and learn who and when to call if symptoms develop. They specialize in helping you better understand your condition, slow the progression of your disease, improve the length and quality of your life, help you detect future heart problems before they become life threatening, and avoid hospitalizations.              Follow-ups after your visit        Your next 10 appointments already scheduled     Feb 09, 2018 11:30 AM CST   Office Visit with Etienne Gee MD   [facility]    [address] [address]   [phone]           Bring a current list of meds and any records pertaining to this visit. For Physicals, please bring immunization records and any forms needing to be filled out. Please arrive 10 minutes early to complete paperwork.              Future tests that were ordered for you today     Open Standing Orders        Priority Remaining Interval Expires Ordered    Notify Provider Routine [id number] PRN  1/16/2018    Glucose monitor nursing POCT- IF PO (eating meals or on bolus enteral feedings), within 30 minutes prior to each meal and at bedtime. Routine 60/62 4 TIMES DAILY BEFORE MEALS & AT BEDTIME  1/16/2018    Glucose monitor nursing POCT Routine [id number] PRN  1/16/2018    Glucose monitor nursing POCT Routine [id number] PRN  1/16/2018    Troponin I Timed 1/3 NOW THEN EVERY 4 HOURS  1/16/2018    Potassium STAT 1/1 CONDITIONAL X 1 STAT  1/16/2018    EKG 12-lead STAT  "100/100 CONDITIONAL (SPECIFY)  1/16/2018    Oxygen: Nasal cannula, Oxygen mask Routine [id number] CONTINUOUS  1/16/2018    Basic metabolic panel Routine 1/1 AM DRAW  1/16/2018    CBC with platelets Routine 1/1 AM DRAW  1/16/2018    Daily standing weights Routine 1/1 DAILY  1/16/2018            Who to contact     If you have questions or need follow up information about today's clinic visit or your schedule please contact [facility] directly at [phone].  Normal or non-critical lab and imaging results will be communicated to you by [employer], letter or phone within 4 business days after the clinic has received the results. If you do not hear from us within 7 days, please contact the clinic through [employer] or phone. If you have a critical or abnormal lab result, we will notify you by phone as soon as possible.  Submit refill requests through [employer] or call your pharmacy and they will forward the refill request to us. Please allow 3 business days for your refill to be completed.          Additional Information About Your Visit        [employer] Information     [employer] gives you secure access to your electronic health record. If you see a primary care provider, you can also send messages to your care team and make appointments. If you have questions, please call your primary care clinic.  If you do not have a primary care provider, please call [phone] and they will assist you.        Care EveryWhere ID     This is your Care EveryWhere ID. This could be used by other organizations to access your [facility] medical records  [id number]        Your Vitals Were     Height BMI (Body Mass Index)                1.88 m (6' 2\") 26.38 kg/m2           Blood Pressure from Last 3 Encounters:   01/16/18 95/71   01/16/18 100/68   01/09/18 129/78    Weight from Last 3 Encounters:   01/16/18 95.8 kg (211 lb 1.6 oz)   01/16/18 93.2 kg (205 lb 8 oz)   01/09/18 86.2 kg (190 lb)              We " Performed the Following     EKG 12-lead complete w/read - Clinics     Follow-Up with [facility] - [facility]VARO visit        Primary Care Provider Office Phone # Fax #    Laquita Deal [phone] [address] [address]        Goals        Exercise    I will exercise 2x per week (15 min per time) , for the next 6 weeks.     Notes - Note created  12/22/2016 12:14 PM by Ting Bullard, RN    As of today's date 12/22/2016 goal is met at 0 - 25%.   Goal Status:  Active           General    I will not add any salt to my food and will limit my salt intake to 2,000 mg of sodium per day. (pt-stated)     Notes - Note created  9/26/2016  3:53 PM by Ting Bullard, RN    As of today's date 9/26/2016 goal is met at 26 - 50%.   Goal Status:  Active        I will weigh myself every day and call my clinic if I am more than 2 pounds heavier in a day or 5 pounds heavier in a week.  (pt-stated)     Notes - Note created  9/26/2016  3:52 PM by Ting Bullard, RN    As of today's date 9/26/2016 goal is met at 26 - 50%.   Goal Status:  Active          Equal Access to Services     ART DEL RIO AH: Hadii christine ku hadasho Soomaali, waaxda luqadaha, qaybta kaalmada adeegyada, nenita weiss haykirk corona. So [facility] [phone].    ATENCIÓN: Si habla español, tiene a conteh disposición servicios gratuitos de asistencia lingüística. Llame al [phone].    We comply with applicable federal civil rights laws and [place] laws. We do not discriminate on the basis of race, color, national origin, age, disability, sex, sexual orientation, or gender identity.            Thank you!     Thank you for choosing [facility]  for your care. Our goal is always to provide you with excellent care. Hearing back from our patients is one way we can continue to improve our services. Please take a few minutes to complete the written survey that you may receive in the mail after  your visit with us. Thank you!             Your Updated Medication List - Protect others around you: Learn how to safely use, store and throw away your medicines at www.disposemymeds.org.          This list is accurate as of: 1/16/18  3:08 PM.  Always use your most recent med list.                   Brand Name Dispense Instructions for use Diagnosis    acetaminophen 325 MG tablet    TYLENOL     Take 650 mg by mouth every 6 hours as needed        albuterol 0.63 MG/3ML nebulizer solution    ACCUNEB     1 vial via mask three times a day for SOB for 5 Days        bacitracin 500 UNIT/GM Oint      Apply topically daily to sclap        blood glucose monitoring test strip    no brand specified     Use to test blood sugar 4 times daily or as directed.        Calcium Carb-Cholecalciferol 600-800 MG-UNIT Tabs      Take 1 tablet by mouth 3 times daily (with meals)        carvedilol 12.5 MG tablet    COREG     Take 12.5 mg by mouth daily        CENTRUM SILVER per tablet     30 tablet    Take 1 tablet by mouth daily    Vitamin deficiency       clopidogrel 75 MG tablet    PLAVIX     Take 75 mg by mouth daily        hydrALAZINE 50 MG tablet    APRESOLINE    60 tablet    Take 0.5 tablets (25 mg) by mouth 2 times daily    Essential hypertension with goal blood pressure less than 130/80       insulin aspart 100 UNIT/ML injection    NovoLOG PEN     Inject 5 Units Subcutaneous 3 times daily (with meals)    Type 2 diabetes mellitus with stage 3 chronic kidney disease, with long-term current use of insulin (H)       insulin glargine 100 UNIT/ML injection    LANTUS SOLOSTAR    30 mL    Inject 6 Units Subcutaneous At Bedtime    Type 2 diabetes mellitus with stage 3 chronic kidney disease, with long-term current use of insulin (H)       isosorbide mononitrate 30 MG 24 hr tablet    IMDUR     Take 3 tablets (90 mg) by mouth daily    Coronary artery disease involving native coronary artery of native heart without angina pectoris       KEPPRA  500 MG tablet   Generic drug:  levETIRAcetam      Take 500 mg by mouth 2 times daily        LIPITOR 40 MG tablet   Generic drug:  atorvastatin      Take 40 mg by mouth daily        mirtazapine 15 MG tablet    REMERON     Take 15 mg by mouth At Bedtime        OXcarbazepine 150 MG tablet    TRILEPTAL     Take 1 tablet (150 mg) by mouth 2 times daily    History of stroke       pantoprazole 40 MG EC tablet    PROTONIX    90 tablet    TAKE 1 TABLET BY MOUTH EVERY EVENING    Gastroesophageal reflux disease, esophagitis presence not specified       spironolactone 25 MG tablet    ALDACTONE     Take 1 tablet (25 mg) by mouth daily    Chronic systolic congestive heart failure (H)       thiamine 100 MG tablet      Take 100 mg by mouth daily        torsemide 10 MG tablet    DEMADEX     Take 30 mg by mouth daily

## 2018-01-16 NOTE — H&P
[facility]    History and Physical  Hospitalist       Date of Admission:  1/16/2018    Assessment & Plan   Kelechi Coleman is a 82 year old male who presents with congestive heart failure exacerbation.     Summary:    Congestive heart failure with reduced EF  -- Lasix IV  -- Echocardiogram ordered  -- Cardiology consult, spoke with Dr. Ugalde, appreciate guidance  -- Moderate left and small right pleural effusions, determine need for thoracentesis in AM    Bradycardia  -- hold PTA coreg for now    Hypertension - currently hypotensive  -- hold PTA coreg in the setting of hypotension and bradycardia  -- continue PTA hydralazine for afterload reduction with heart failure  -- continue PTA spironolactone    Acute kidney injury in the setting of chronic kidney disease - potentially caused by cardiorenal syndrome? Serum creatinine has been elevated since mid-December, approx 1.4; BMP today demonstrated a creatinine of 2.03 and a K+ of 5.3  -- BMP ordered, trend electrolytes    Hyperlipidemia  -- continue PTA atorvastatin     Diabetes mellitus II   -- SSI/prandial while inpatient  -- continue PTA lantus  -- moderate carb diet  -- carb count by nursing    GERD with esophagitis  -- continue PTA omeprazole    Hx of subarachnoid/subdural hemorrhage  -- continue PTA trileptal  -- continue PTA    Hx of atrial fibrillation  -- continue plavix      DVT Prophylaxis: Pneumatic Compression Devices  Code Status: DNR / DNI - consistent with previous admission. Discussed with patient and friend about his current medical condition.     Disposition: Expected discharge in 4 days once euvolemia is achieved.    SHAYY Charles, CNP  Text Page  (12pm to 10pm)  This patient was discussed with OSCAR CHAMPAGNE MD of the Hospitalist Service, who independently examined the patient. He is in agreement with the above plan.    Primary Care Physician   Laquita Deal    Chief Complaint   CHF exacerbation    History is  obtained from the patient, family, and medical record    History of Present Illness   Mr. Kelechi Coleman is an 82-year-old male with an extensive past medical history pertinent for recent subdural hematoma and subarachnoid hematoma in 08/2017 with subsequent seizures, chronic kidney disease stage III, heart failure with reduced ejection fraction of 30-35% which is down from baseline of 30-35%, peripheral arterial disease, paroxysmal atrial fibrillation on chronic anticoagulation with Plavix, diabetes mellitus type 2, on longstanding insulin, essential hypertension, hyperlipidemia, multiple cerebrovascular accidents, most recent in 09/2016, coronary artery disease, status post CABG x4 and drug-eluting stent x2, most recently in 2016, gastroesophageal reflux disease with esophagitis, renal artery stenosis, and paroxysmal ventricular tachycardia, who presents today from the [facility] heart care.    Past Medical History    I have reviewed this patient's medical history and updated it with pertinent information if needed.   Past Medical History:   Diagnosis Date     Acute exacerbation of CHF (congestive heart failure) (H) 9/2/2016     Atrial fibrillation (H)      Cerebral infarction (H) 12/13/2013, 9/2016     Diagnosis updated by automated process. Provider to review and confirm.     Coronary artery disease      Diabetes (H)      Gastroesophageal reflux disease with esophagitis 5/8/2017     Hyperlipidaemia      Hypertension      Myocardial infarction      PAD (peripheral artery disease) (H)      Stented coronary artery     CABG 2002 4 VESSEL     Type 2 diabetes mellitus with stage 3 chronic kidney disease, with long-term current use of insulin (H) 11/4/2016     Unspecified cerebral artery occlusion with cerebral infarction        Past Surgical History   I have reviewed this patient's surgical history and updated it with pertinent information if needed.  Past Surgical History:   Procedure Laterality Date     C MRA UPPER  EXTREMITY WO&W CONT       CORONARY ARTERY BYPASS  2002    LIMA-LAD, SVG-OM1, [id number]     ENT SURGERY      VOCAL CORD LESION REMOVED     GENITOURINARY SURGERY      ANGIOPLASTY FOR RENAL ARTERT STENOSIS     HC LEFT HEART CATHETERIZATION  2016: CHUCHO x2 to distal and proximal SVG to to RPDA     HEART CATH LEFT HEART CATH  2001     PHACOEMULSIFICATION CLEAR CORNEA WITH STANDARD INTRAOCULAR LENS IMPLANT Left 2015    Procedure: PHACOEMULSIFICATION CLEAR CORNEA WITH STANDARD INTRAOCULAR LENS IMPLANT;  Surgeon: Bart Johnson MD;  Location: [facility]     VASCULAR SURGERY      ANGIOPLASTY LEFT LEG FOR pvd       Prior to Admission Medications   Prior to Admission Medications   Prescriptions Last Dose Informant Patient Reported? Taking?   Calcium Carb-Cholecalciferol 600-800 MG-UNIT TABS   Yes No   Sig: Take 1 tablet by mouth 3 times daily (with meals)    Multiple Vitamins-Minerals (CENTRUM SILVER) per tablet   No No   Sig: Take 1 tablet by mouth daily   OXcarbazepine (TRILEPTAL) 150 MG tablet   No No   Sig: Take 1 tablet (150 mg) by mouth 2 times daily   acetaminophen (TYLENOL) 325 MG tablet   Yes No   Sig: Take 650 mg by mouth every 6 hours as needed    albuterol (ACCUNEB) 0.63 MG/3ML nebulizer solution   Yes No   Si vial via mask three times a day for SOB for 5 Days   atorvastatin (LIPITOR) 40 MG tablet   Yes No   Sig: Take 40 mg by mouth daily   bacitracin 500 UNIT/GM OINT   Yes No   Sig: Apply topically daily to sclap   blood glucose monitoring (NO BRAND SPECIFIED) test strip   Yes No   Sig: Use to test blood sugar 4 times daily or as directed.   carvedilol (COREG) 12.5 MG tablet   Yes No   Sig: Take 12.5 mg by mouth daily   clopidogrel (PLAVIX) 75 MG tablet   Yes No   Sig: Take 75 mg by mouth daily   hydrALAZINE (APRESOLINE) 50 MG tablet   No No   Sig: Take 0.5 tablets (25 mg) by mouth 2 times daily   insulin aspart (NOVOLOG PEN) 100 UNIT/ML injection   No No   Sig: Inject 5 Units  Subcutaneous 3 times daily (with meals)   insulin glargine (LANTUS SOLOSTAR) 100 UNIT/ML injection   No No   Sig: Inject 6 Units Subcutaneous At Bedtime   isosorbide mononitrate (IMDUR) 30 MG 24 hr tablet   No No   Sig: Take 3 tablets (90 mg) by mouth daily   levETIRAcetam (KEPPRA) 500 MG tablet   Yes No   Sig: Take 500 mg by mouth 2 times daily   mirtazapine (REMERON) 15 MG tablet   Yes No   Sig: Take 15 mg by mouth At Bedtime   pantoprazole (PROTONIX) 40 MG EC tablet   No No   Sig: TAKE 1 TABLET BY MOUTH EVERY EVENING   spironolactone (ALDACTONE) 25 MG tablet   No No   Sig: Take 1 tablet (25 mg) by mouth daily   thiamine 100 MG tablet   Yes No   Sig: Take 100 mg by mouth daily   torsemide (DEMADEX) 10 MG tablet   Yes No   Sig: Take 30 mg by mouth daily      Facility-Administered Medications: None     Allergies   Allergies   Allergen Reactions     Aspirin Hives     Penicillins Hives     Contrast Dye Hives       Social History   I have reviewed this patient's social history and updated it with pertinent information if needed. Kelechi Coleman  reports that he quit smoking about 19 years ago. His smoking use included Pipe. He quit after 40.00 years of use. He has never used smokeless tobacco. He reports that he does not drink alcohol or use illicit drugs.    Family History   I have reviewed this patient's family history and updated it with pertinent information if needed.   Family History   Problem Relation Age of Onset     DIABETES Mother      Family History Negative Father      CANCER Maternal Grandmother        Review of Systems   The 10 point Review of Systems is negative other than noted in the HPI or here.     Physical Exam               SpO2: 99 % O2 Device: None (Room air)    Vital Signs with Ranges  BP: (100)/(68) 100/68  SpO2:  [99 %] 99 %  0 lbs 0 oz     Constitutional: Awake, alert, cooperative, no apparent distress.  HEENT: Moist mucous membranes, normal dentition, conjunctiva and pupils examined  and normal.  Respiratory: Diminished breath sounds at the bases, fine crackles present on right side, no wheezing.  Cardiovascular: Slow rate and rhythm, normal S1 and S2, and RSB murmur noted.  GI: Soft, non-distended, non-tender, normal bowel sounds.  Lymph/Hematologic: No anterior cervical or supraclavicular adenopathy.  Skin: No rashes, no cyanosis, +2 pitting edema noted to BLE.  Musculoskeletal: No joint swelling, erythema or tenderness.  Neurologic: Cranial nerves 2-12 intact, normal strength and sensation.  Psychiatric: Alert, oriented to person, place and time, no obvious anxiety or depression.    Data   Data reviewed today:  I personally reviewed no images or EKG's today.    Recent Labs  Lab 01/16/18  1211   *   POTASSIUM 5.3*   CHLORIDE 101   CO2 26   BUN 64*   CR 2.03*   ANIONGAP 13.3   MIGUEL 9.2   *       Imaging:  No results found for this or any previous visit (from the past 24 hour(s)).

## 2018-01-16 NOTE — LETTER
"1/16/2018    Laquita Deal MD  [address] [facility] [address]  [facility] [address]    RE: Kelechi Coleman       Dear Colleague,    I had the pleasure of seeing Kelechi Coleman in the [facility].    Primary Provider: Laquita Deal   Primary Cardiology: Dr. Isaiah Charles    REASON FOR VISIT/CHIEF COMPLAINT: Follow-up for decompensated heart failure    HPI:     HISTORY OF PRESENT ILLNESS:  Mr. Coleman is a pleasant 82-year-old gentleman, a patient of Dr. Charles's, who I am seeing today for a followup from a clinic visit he had on 12/26/2017. At that clinic he was evaluated by Dr. Nielsen. He was found to have decompensated heart failure.  His torsemide dose was increased from 20 mg daily to 30 mg daily. His weight was 191 pounds, today it is 205 pounds. His Creatine increased from 1.53 to 2.03. He was hospitalized in December with fall. He had a mild troponin elevation and was seen by Cardiology.  This was felt to be demand ischemia.  He had an echocardiogram which showed deterioration in his overall LV function.  His EF was baseline 40%-45%, appeared to be 30-35%.  It was a limited echo and severe calcification of the aortic valve was noted but felt to be an adequate interrogation.  Mild-to-moderate mitral regurgitation was noted.  He was noted to have sinus bradycardia with first-degree AV block on telemetry but no other arrhythmias are documented in the discharge.  He has an underlying history of coronary artery disease, 4-vessel CABG in 2002 with multiple revascularizations with stent placements, history of multiple CVAs, type 2 diabetes on insulin and chronic kidney disease as well as peripheral arterial disease.  Since his discharge, he has been mainly wheelchair bound but he does attend physical therapy 2-3 times per week. Today he reports he is feeling \"terrible\" he is unable to stand now for physical therapy as his legs feel too weak. Previously he reported he is getting " stronger and has not had any recurrent falling episodes. He had a pretty significant fall in September when he had seen Dr. Charles, so he does have a history of frequent falling.  Mr. Coleman shortness of breath with minimal exertion exertion. He reports occasional PND. He reports increased abdominal swelling and leg swelling.  He denies any chest pain symptoms.     His previous weight in October was 177.   Twelve-lead EKG demonstrates marked bradycardia with prolonged P-R internal fall or second-degree heart block.rate at 39 bpm  Evidence of a left pleural effusion during his last admission seen on echocardiogram.     PHYSICAL EXAMINATION:     HEART:  Cardiovascular tones are regular and slow.  He has a systolic ejection murmur heard throughout the precordium, most prominent at the right upper sternal border.     LUNGS:  He has markedly diminished breath sounds in the left base, some fine crackles in the right base.      EXTREMITIES:  He has 2+ pitting edema to the knees bilateral.    VITALS SIGNS:  Blood pressure today was 117/61, pulse was 61.       SUMMARY:  Mr. Bro is a pleasant 82-year-old male with coronary artery disease, previous 4-vessel CABG and multiple revascularizations, severe ischemic cardiomyopathy, his EF now estimated at 30%-35%, suspected significant valvular disease with severe calcification of the aortic valve, sinus bradycardia with first-degree AV block and recurrent falling.  He has evidence of worsening heart failure on exam in spite of increased torsemide dose made at his last clinic visit. In addition his renal function has worsened. Physical exam also indicates possible worsening left pleural effusion. Finally his heart rate is lower than it was 3 weeks ago. Today I'm recommending admission to [facility] for aggressive inpatient management. I reviewed my physical exam and assessments with his primary cardiologist, Dr. Charles.  He agrees with the plan. Patient will need  minimally:  a chest x-ray further lab work to include a pro BNP,   temporary reduction of his AV honorio blocking agent.  IV diuretic therapy  Cardiac consultation, Dr. Ugalde has already been informed of the patient's admission.  Due to his advanced age and comorbidities I also recommend palliative care consultation.  Been my pleasure to meet Kelechi today.    Results for orders placed or performed in visit on 01/16/18 (from the past 24 hour(s))   Basic metabolic panel   Result Value Ref Range    Sodium 135 (L) 136 - 145 mmol/L    Potassium 5.3 (H) 3.5 - 5.1 mmol/L    Chloride 101 98 - 107 mmol/L    Carbon Dioxide 26 23 - 29 mmol/L    Anion Gap 13.3 6 - 17 mmol/L    Glucose 182 (H) 70 - 105 mg/dL    Urea Nitrogen 64 (H) 7 - 30 mg/dL    Creatinine 2.03 (H) 0.70 - 1.30 mg/dL    GFR Estimate 32 (L) >60 mL/min/1.7m2    GFR Estimate If Black 38 (L) >60 mL/min/1.7m2    Calcium 9.2 8.5 - 10.5 mg/dL       \    Thank you for allowing me to participate in Kelechi's care.          Cristina Plummer. APRN, CNP  [facility]    Orders Placed This Encounter   Procedures     EKG 12-lead complete w/read - Clinics       No orders of the defined types were placed in this encounter.      Medications Discontinued During This Encounter   Medication Reason     ciprofloxacin (CIPRO) 500 MG tablet Stopped by Patient         Encounter Diagnoses   Name Primary?     Ischemic cardiomyopathy      Peripheral artery disease (H) Yes     Paroxysmal atrial fibrillation (H)      Hyperlipidemia LDL goal <70      Benign essential hypertension      Paroxysmal ventricular tachycardia (H)      Atherosclerosis of native coronary artery of native heart without angina pectoris        CURRENT MEDICATIONS:  Current Outpatient Prescriptions   Medication Sig Dispense Refill     albuterol (ACCUNEB) 0.63 MG/3ML nebulizer solution 1 vial via mask three times a day for SOB for 5 Days       bacitracin 500 UNIT/GM OINT Apply topically daily to sclap        torsemide (DEMADEX) 10 MG tablet Take 30 mg by mouth daily       carvedilol (COREG) 12.5 MG tablet Take 12.5 mg by mouth daily       atorvastatin (LIPITOR) 40 MG tablet Take 40 mg by mouth daily       isosorbide mononitrate (IMDUR) 30 MG 24 hr tablet Take 3 tablets (90 mg) by mouth daily       insulin glargine (LANTUS SOLOSTAR) 100 UNIT/ML injection Inject 6 Units Subcutaneous At Bedtime 30 mL 0     insulin aspart (NOVOLOG PEN) 100 UNIT/ML injection Inject 5 Units Subcutaneous 3 times daily (with meals)       hydrALAZINE (APRESOLINE) 50 MG tablet Take 0.5 tablets (25 mg) by mouth 2 times daily 60 tablet      spironolactone (ALDACTONE) 25 MG tablet Take 1 tablet (25 mg) by mouth daily       acetaminophen (TYLENOL) 325 MG tablet Take 650 mg by mouth every 6 hours as needed        Calcium Carb-Cholecalciferol 600-800 MG-UNIT TABS Take 1 tablet by mouth 3 times daily (with meals)        levETIRAcetam (KEPPRA) 500 MG tablet Take 500 mg by mouth 2 times daily       clopidogrel (PLAVIX) 75 MG tablet Take 75 mg by mouth daily       mirtazapine (REMERON) 15 MG tablet Take 15 mg by mouth At Bedtime       blood glucose monitoring (NO BRAND SPECIFIED) test strip Use to test blood sugar 4 times daily or as directed.       thiamine 100 MG tablet Take 100 mg by mouth daily       pantoprazole (PROTONIX) 40 MG EC tablet TAKE 1 TABLET BY MOUTH EVERY EVENING 90 tablet 2     Multiple Vitamins-Minerals (CENTRUM SILVER) per tablet Take 1 tablet by mouth daily 30 tablet      OXcarbazepine (TRILEPTAL) 150 MG tablet Take 1 tablet (150 mg) by mouth 2 times daily         ALLERGIES     Allergies   Allergen Reactions     Aspirin Hives     Penicillins Hives     Contrast Dye Hives       PAST MEDICAL HISTORY:  Past Medical History:   Diagnosis Date     Acute exacerbation of CHF (congestive heart failure) (H) 9/2/2016     Atrial fibrillation (H)      Cerebral infarction (H) 12/13/2013, 9/2016     Diagnosis updated by automated process.  Provider to review and confirm.     Coronary artery disease      Diabetes (H)      Gastroesophageal reflux disease with esophagitis 5/8/2017     Hyperlipidaemia      Hypertension      Myocardial infarction      PAD (peripheral artery disease) (H)      Stented coronary artery     CABG 2002 4 VESSEL     Type 2 diabetes mellitus with stage 3 chronic kidney disease, with long-term current use of insulin (H) 11/4/2016     Unspecified cerebral artery occlusion with cerebral infarction        PAST SURGICAL HISTORY:  Past Surgical History:   Procedure Laterality Date     C MRA UPPER EXTREMITY WO&W CONT       CORONARY ARTERY BYPASS  2002    LIMA-LAD, SVG-OM1, [id number]     ENT SURGERY      VOCAL CORD LESION REMOVED     GENITOURINARY SURGERY      ANGIOPLASTY FOR RENAL ARTERT STENOSIS     HC LEFT HEART CATHETERIZATION  9/7/2016 09/07/2016: CHUCHO x2 to distal and proximal SVG to to RPDA     HEART CATH LEFT HEART CATH  11/30/2001     PHACOEMULSIFICATION CLEAR CORNEA WITH STANDARD INTRAOCULAR LENS IMPLANT Left 1/22/2015    Procedure: PHACOEMULSIFICATION CLEAR CORNEA WITH STANDARD INTRAOCULAR LENS IMPLANT;  Surgeon: Bart Johnson MD;  Location: [facility]     VASCULAR SURGERY      ANGIOPLASTY LEFT LEG FOR pvd       FAMILY HISTORY:  Family History   Problem Relation Age of Onset     DIABETES Mother      Family History Negative Father      CANCER Maternal Grandmother        SOCIAL HISTORY:  Social History     Social History     Marital status: Single     Spouse name: N/A     Number of children: N/A     Years of education: N/A     Social History Main Topics     Smoking status: Former Smoker     Years: 40.00     Types: Pipe     Quit date: 12/18/1998     Smokeless tobacco: Never Used     Alcohol use No     Drug use: No     Sexual activity: Not Currently     Partners: Female     Other Topics Concern     Parent/Sibling W/ Cabg, Mi Or Angioplasty Before 65f 55m? No     Caffeine Concern Yes     decaff only      Stress Concern No      "Special Diet Yes     low sodium      Exercise Yes     walking, rehab 3 times a week      Seat Belt Yes     Social History Narrative       Review of Systems:  Skin:  Negative       Eyes:  Positive for glasses reading  ENT:  Negative      Respiratory:  Positive for dyspnea on exertion;shortness of breath     Cardiovascular:    fatigue;Positive for    Gastroenterology: Negative      Genitourinary:  Negative      Musculoskeletal:  Negative      Neurologic:  Negative      Psychiatric:  Negative      Heme/Lymph/Imm:  Negative      Endocrine:  Positive for diabetes      Physical Exam:  Vitals: /68  Ht 1.88 m (6' 2\")  Wt 93.2 kg (205 lb 8 oz)  BMI 26.38 kg/m2    Constitutional:  cooperative;in no acute distress chronically ill;frail      Skin:  warm and dry to the touch, no apparent skin lesions or masses noted bruises present        Head:  normocephalic, no masses or lesions        Eyes:  pupils equal and round        Lymph:      ENT:  no pallor or cyanosis, dentition good pallor      Neck:  carotid pulses are full and equal bilaterally;no carotid bruit delayed carotid upstroke;JVP elevated assessed upright in wheelchair-    Respiratory:       no breath sounds left side, right side with crackles in the bases    Cardiac:   bradycardic     systolic murmur;RUSB;grade 2                      right dorsalis pedis artery;1+ 0           left dorsalis pedis artery;1+ 0     pink toes    GI:      abdomen firm    Extremities and Muscular Skeletal:  no deformities, clubbing, cyanosis, erythema observed   2+;pitting;bilateral LE edema     pitting edema past buttocks     Neurological:      slurred speech    Psych:  Alert and Oriented x 3      Thank you for allowing me to participate in the care of your patient.    Sincerely,     SHAYY Padron CNP     [facility]    "

## 2018-01-16 NOTE — TELEPHONE ENCOUNTER
This pt is usually followed by Erica [facility] and Dr. Charles, on my schedule for tomorrow, Erica has openings, will see if we can get back to his usual providers as soon as we can.

## 2018-01-17 NOTE — PROGRESS NOTES
[facility]    Hospitalist Progress Note    Assessment & Plan   Kelechi Coleman is a 82 year old male who was admitted on 1/16/2018 for heart failure. 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 Sinus bradycardia with  1st degree AVB  And occasional junctional rythm  - Hold BB   - Monitor on Tele. May have contribute to his falls  - TSH high, T4 low so some element of hypothyroidism present. Start low dose synthroid and increase slowly  - if no improvement as stated by cardiology many need pacemaker  - had hx of a fib in the past. No an issue at this point  2. HfrEF (30-35%) Probably decompensated by slow HR  - Holding BB  - address rate issue  - lasix as tolerated  3.  CAD, S/P CABG: stable no chest pain. Elevated stable trop  - Plavix  - Lipitor  4. Severe AS  - likely contributing to his condition, SANAZ might be helpful to confirm but main  currently likely bradycardia  - will discuss with cardiology  - avoid hypotension, lasix as needed  5. Siddharth and hyperK  - creat was 1.14 on 12/19. Admitted with creat of 2  - likley due to low CO  - than K and creat alone would not explain the bradycardia. More likely the consequence than the cause of the bradycardia  - monitor renal function and lytes, avoid nephrotoxic drug and optimize hemodynamic  - creat trending down. K 5.4 (monitor)  6, Hx of SAH and seizure;  - continue his seizure treatment   7. DM  - running low blood glucose  - D10 drip and adjust as needed  - hold insulin glargine. Will implement ISS if needed  8. Mild anemia and thrombocytopenia  Will monitor  9. Coughing when he drinks  Will ask speech to evaluate for aspiration    DVT Prophylaxis: Pneumatic Compression Devices  Code Status: DNR/DNI    Disposition: Expected discharge a few days    Be Pereira MD  [phone] (P)  Text Page  (7am to 6pm)      Interval History   cpntinues to feel  weak and SOB    -Data reviewed today: I reviewed all new labs and imaging results over the last 24 hours.   Physical Exam   Temp: 93.6  F (34.2  C) Temp src: Oral BP: 106/65   Heart Rate: 51 Resp: 19 SpO2: 100 % O2 Device: None (Room air)    Vitals:    01/16/18 1600 01/17/18 0500   Weight: 95.8 kg (211 lb 1.6 oz) 96.3 kg (212 lb 6.4 oz)     Vital Signs with Ranges  Temp:  [93.6  F (34.2  C)] 93.6  F (34.2  C)  Heart Rate:  [38-51] 51  Resp:  [18-20] 19  BP: ()/(49-77) 106/65  SpO2:  [95 %-100 %] 100 %  I/O last 3 completed shifts:  In: 600 [P.O.:600]  Out: 400 [Urine:400]      Constitutional: looks chronically ill, in NAD  Neuro:   Alert:: Yes   Grossly non focal: Yes   HENT:   Pupils equal and reactive to light : Yes   Neck No obvious mass or swelling  CV:   RRR:   Neck vein: non distended  S1S2 no gallop,  No rub, 1/6 NENA, bradycardia, irreg rythm  LE Edema: 3+  Pulm:  Mild respiratory distress  Bilateral chest symmetrical chest expansion on inspection  Auscultation: decreased at the bases  Abdomen:   distended, non tender, Bowel sound present   Uro-genital:   Mancuso present: yes  Extremities:   Joints: No gross deformities  3+ LE bilateral  Skin:   Warm, normal color and capillary refill.  Decubitus ulcer: No  Other:      Medications     Reason beta blocker order not selected         furosemide  40 mg Intravenous Once     OXcarbazepine (TRILEPTAL) tablet 150 mg  150 mg Oral At Bedtime     docusate sodium  100 mg Oral BID     atorvastatin  40 mg Oral At Bedtime     clopidogrel  75 mg Oral Daily     insulin glargine  6 Units Subcutaneous At Bedtime     levETIRAcetam  500 mg Oral BID     OXcarbazepine  150 mg Oral At Bedtime     pantoprazole  40 mg Oral At Bedtime     insulin aspart  1-7 Units Subcutaneous TID AC     insulin aspart  1-5 Units Subcutaneous At Bedtime       Data     Recent Labs  Lab 01/17/18  0545 01/16/18  2335 01/16/18 2010 01/16/18  1617   WBC 5.8  --   --   --    HGB 11.1*  --   --   --     MCV 89  --   --   --    *  --   --   --     138  --  140   POTASSIUM 5.4* 5.3  --  4.8   CHLORIDE 104 103  --  105   CO2 28 29  --  27   BUN 67* 65*  --  64*   CR 1.81* 1.81*  --  1.74*   ANIONGAP 7 6  --  8   MIGUEL 8.9 8.6  --  8.4*   GLC 77 72  --  60*   TROPI  --  0.476* 0.463* 0.447*       Imaging:  Recent Results (from the past 24 hour(s))   XR Chest Port 1 View    Narrative    XR CHEST PORT 1 VW 1/16/2018 4:13 PM    HISTORY: Short of breath.    COMPARISON: 12/14/2017    FINDINGS: Moderate left and small right pleural effusions, similar to  12/14/2017. No pneumothorax. Cardiomegaly is redemonstrated.      Impression    IMPRESSION: Moderate left and small right pleural effusions.    MANUELITO NAAVRRO MD

## 2018-01-17 NOTE — PLAN OF CARE
Problem: Cardiac: Heart Failure (Adult)  Goal: Signs and Symptoms of Listed Potential Problems Will be Absent, Minimized or Managed (Cardiac: Heart Failure)  Signs and symptoms of listed potential problems will be absent, minimized or managed by discharge/transition of care (reference Cardiac: Heart Failure (Adult) CPG).   Outcome: No Change  Heart Failure Care Pathway  GOALS TO BE MET BEFORE DISCHARGE:    1. Decrease congestion and/or edema with diuretic therapy to achieve near      optimal volume status.            Dyspnea improved: no SOB reported, somewhat dyspneic with repositioning            Edema improved:   +2 edema to feet        Net I/O and Weights since admission:          01/11 2300 - 01/16 2259  In: 600 [P.O.:600]  Out: 175 [Urine:175]  Net: 425            Vitals:    01/16/18 1600   Weight: 95.8 kg (211 lb 1.6 oz)       2.  O2 sats > 92% on RA or at prior home O2 therapy level.          Current oxygenation status:       SpO2: 98 %         O2 Device: None (Room air),            Able to wean O2 this shift to keep sats > 92%:  RA       Does patient use Home O2? no    3.  Tolerates ambulation and mobility near baseline: pt w/c bound at baseline        How many times did the patient ambulate with nursing staff this shift? 0    Please review the Heart Failure Care Pathway for additional HF goal outcomes.    Pt denies pain or SOB. A/O but forgetful. Tele junctional, HR 34-45 throughout shift. Pt w/c bound at baseline, assist-2 and lift. Has fu. Echo, possible PPM and palliative consult. NPO since MN. Continue to monitor.     Shelley Forrest RN  1/17/2018

## 2018-01-17 NOTE — PLAN OF CARE
Problem: Patient Care Overview  Goal: Plan of Care/Patient Progress Review  CR: Orders received, chart reviewed. Evaluation attempted. Pt very lethargic, confused and SOB at rest when attempted. Denies OOB/therapy today. Updated RN.

## 2018-01-17 NOTE — PLAN OF CARE
Problem: Patient Care Overview  Goal: Plan of Care/Patient Progress Review  Outcome: No Change  Awake and alert, disoriented to time and situation. Denies pain, looks dyspneic but pt stated he is comfortable. O2 sat 94-95% RA. TELE Junctional with HR in mid 40s. Blood sugar running in between 65-75. Orange juice with extra sugar given. Blood sugar improved after orange juice. Seems to be coughing while drinking. MD notified. Might benefit from speech consult. Encouraged oral intake, pt refused to eat anything. MD made aware. Refused turning saying he wants to sleep for a while. IV lasix 40 mg given once. Mancuso patent and intact. Up with the lift. Refused getting up. PRN miralax given for constipation. Plan for nephrology consultation. Will continue to monitor.

## 2018-01-17 NOTE — PLAN OF CARE
Problem: Patient Care Overview  Goal: Plan of Care/Patient Progress Review  Outcome: Improving  Pt A/Ox3, w/c bound, clear liquids tonight,  HR down as far as 34 on admission with soft BPs. Now Junctional 40s with improved pressures. Possible pacer tomorrow.

## 2018-01-17 NOTE — PROGRESS NOTES
[facility]    Cardiology Progress Note    Date of Service (when I saw the patient): 01/17/2018     Primary cardiologist: Dr. Charles    Assessment & Plan   Kelechi Coleman is a 82 year old male who was admitted on 1/16/2018 for heart failure. 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 Sinus bradycardia with very prolonged 1st degree AVB - -PTA  carrvedilol, spironolactone and torsemide .   -TSH 37, free T4 0.67, K 5.4  -Creatinine elevation and sinus bradycardia with a very prolonged first-degree AV block.   -BB stopped yesterday  -HR 39-56 today, 40 and junctional on tele strips  Assessement/Plan  -Likely, this is a combination of worsening renal function and bradycardia which are contributing to one another.  -Heart rate modestly improved, may need to involved EP      2. Worsening LV dysfunction and probably severe low flow AS   -EF 30-35% 12/12/17  -EF now <20%, severe global hypokinesis, as well as WMA, RVSF severely reduced.  -mild troponin elevation to 0.476 in the absence of ischemic symptoms likely d/t CHF.  -Echo shows significant, probably severe AS with probably low flow/low gradient d/t poor cardiac output. Mod MR, mild-mo TR  -BB stopped d/t bradycardia, continues on aldactone  -net positive 200 cc, wt up 1 pound  -baseline wt about 185, 212 today  -mod R pleural effusion, small L pleural effusion on CXR  Plan:   Discontinue spironolactone, recheck K  Lasix IV 40 x1  Nephrology consult, concern that bradycardia related to decrease in renal function and vice a versa.    3. AS  -possibly severe and low flow based on this echo, ROSE 0.7 cm2, peak AV gradient 17, mean 10.  Plan: May need further evaluation with SANAZ    4. CKD (Cr 2) -   Creat 2.01->1.81    5. CAD, S/P CABG - he is currently asymptomatic.    -He is aspirin allergic and is on clopidogrel.  -statin    6. Diabetes - she is currently  mildly hyperglycemic and may require some intravenous D5W to maintain his blood sugars if they remain low.    7. Recent history of falls - his falls are likely multifactorial but more recently, have probably contributed to by his bradycardia.    8. History left subarachnoid hemorrhage/seizures - he is currently asymptomatic.    9. History of atrial fibrillation   -Current rhythm is sinus bradycardia.   -Not on ac, hx of subarachnoid bleed.        Interval History   Mildly confused. No CP or SOB. Appears fairly comfortable on RA.    Physical Exam       BP: 103/65   Heart Rate: 48 Resp: 18 SpO2: 95 % O2 Device: None (Room air)    Vitals:    01/16/18 1600 01/17/18 0500   Weight: 95.8 kg (211 lb 1.6 oz) 96.3 kg (212 lb 6.4 oz)     Vital Signs with Ranges  Heart Rate:  [38-48] 48  Resp:  [18-20] 18  BP: ()/(49-77) 103/65  SpO2:  [95 %-100 %] 95 %  I/O last 3 completed shifts:  In: 600 [P.O.:600]  Out: 400 [Urine:400]    Constitutional     alert and oriented, appears comfortable on RA     Skin     warm and dry to touch    ENT     no pallor or cyanosis    Neck    Supple, JVP elevated    Chest     no tenderness to palpation    Lungs  clear to auscultation     Cardiac  regular rhythm, S1 normal, S2 normal, soft murmur    Abdomen     abdomen soft    Extremities and Back     Toes a bit swollen, skinny legs      Neurological      oriented to time, person and place.    Medications     Reason beta blocker order not selected         docusate sodium  100 mg Oral BID     atorvastatin  40 mg Oral At Bedtime     clopidogrel  75 mg Oral Daily     insulin glargine  6 Units Subcutaneous At Bedtime     levETIRAcetam  500 mg Oral BID     OXcarbazepine  150 mg Oral At Bedtime     pantoprazole  40 mg Oral At Bedtime     spironolactone  25 mg Oral Daily     insulin aspart  1-7 Units Subcutaneous TID AC     insulin aspart  1-5 Units Subcutaneous At Bedtime       Data   Results for orders placed or performed during the hospital encounter  of 01/16/18 (from the past 24 hour(s))   Cardiology IP Consult: Patient to be seen: ASAP - within 4 hours; Call back #: [phone]; Congestive Heart Failure; Consultant may enter orders: Yes    Narrative    Cat Ugalde MD     1/16/2018  6:23 PM  Cardiology Consultation      Kelechi Coleman MRN# [id number]   YOB: 1935 Age: 82 year old   Date of Admission: 1/16/2018     Reason for consult: Heart failure           Assessment and Plan:     1. Sinus bradycardia with very prolonged 1st degree AVB - the   patient was on carvedilol, spironolactone and torsemide prior to   admission. His creatinine has risen and he has developed sinus   bradycardia with a very prolonged first-degree AV block. Likely,   this is a combination of worsening renal function and bradycardia   which are contributing to one another.  As such, those medications should be discontinued. Diuretics   should be held.  If his heart rate does not increase or if it worsens, he will   likely require permanent pacing and I discussed this with Mr. Coleman and with his friends who accompanied him to the hospital.  He may require gentle intravenous hydration to improve his renal   function.  2. LV dysfunction - EF 30-35%, he has baseline left ventricular   dysfunction which has previously resulted in heart failure   controlled with medications. With worsening renal function and   slowing heart rate, his heart failure symptoms have come more   consistent with forward failure.  3. CKD (Cr 2) - renal dysfunction is likely present at baseline   given his prior decreased GFR and has not worsened as a result of   the bradycardia. Rising potassium and creatinine will contribute   to the bradycardia which has resulted from underlying conduction   system disease.  4. CAD, S/P CABG - he is currently asymptomatic.  He is aspirin   allergic and is on clopidogrel.  5. Aortic stenosis - he has moderately severe to severe aortic   stenosis based  on his last echocardiogram which I personally   reviewed. His aortic stenosis may contribute in some minor weight   to his heart failure and to his present condition but is not   likely severe enough to explain his symptoms were for treatment   to yet be considered.  6. Diabetes - she is currently mildly hyperglycemic and may   require some intravenous D5W to maintain his blood sugars if they   remain low.  7. Recent history of falls - his falls are likely multifactorial   but more recently, have probably contributed to by his   bradycardia.  8. History left subarachnoid hemorrhage/seizures - he is   currently asymptomatic.  9. History of atrial fibrillation - . Current rhythm is sinus   bradycardia.         Chief Complaint:   Weakness, confusion.    History is obtained from the patient/friends/chart.         History of Present Illness:     Mr. Kelechi Coleman is an 82 year old man who presents with   weakness. He has a history notable for left ventricular   dysfunction, chronic kidney disease, prior coronary artery   disease and coronary bypass graft surgery, moderately severe to   severe aortic stenosis and diabetes mellitus. He has a history of   atrial fibrillation but is not on anticoagulation. He has a   history of a left subarachnoid hemorrhage and a reported history   of seizure as a result of SAH.    His creatinine on 1/4 was 1.4 and his HR was reportedly 64 BPM.    He was seen by [facility]. He has been on carvedilol 12.5   mg BID, spironolactone and torsemide and today, his Cr is 2 and   his HR is in the 30's BPM. He was seen by Cristina Plummer NP at [facility] to date. She noted a heart rate of 39 bpm and   sinus bradycardia with a very prolonged first-degree AV block.   She recommended admission to the hospital and has been admitted   to the hospitalist service.    Mr. Coleman is accompanied by friends. They note that he is only   family is a sister who lives elsewhere. He does have  another   friend who has medical power of . She is not present. Mr. Coleman denies worse dyspnea. He has seemed to come somewhat   confused to his friends today and one of his friends accompanied   him from [facility] as a result.    He is typically followed by Dr. Isaiah Charles.  He denies any   angina or worsening of his dyspnea. He does note that he has had   worsening lower extremity edema. He was last seen by Dr. Yamil Deal about 2-1/2 weeks ago and his torsemide dose was increased   from 20 mg daily to 30 mg daily. Despite increasing torsemide,   his weight increased about 14-15 pounds and his creatinine   increased. He was hospitalized last month after a fall and   echocardiography suggested a mild decrease in his baseline left   ventricular dysfunction with very ejection fraction falling from   40-45% to 30-35%. There was aortic stenosis and mild-to-moderate   mitral insufficiency. Given his bradycardia, a 0 patch monitor   has previously been ordered but results are not available. He has   felt weak and has not had an appetite. He has noted abdominal   swelling as well as worsening of his leg edema..           Past Medical History:     Past Medical History:   Diagnosis Date     Acute exacerbation of CHF (congestive heart failure) (H)   9/2/2016     Atrial fibrillation (H)      Cerebral infarction (H) 12/13/2013, 9/2016     Diagnosis updated by automated process. Provider to review and   confirm.     Coronary artery disease      Diabetes (H)      Gastroesophageal reflux disease with esophagitis 5/8/2017     Hyperlipidaemia      Hypertension      Myocardial infarction      PAD (peripheral artery disease) (H)      Stented coronary artery     CABG 2002 4 VESSEL     Type 2 diabetes mellitus with stage 3 chronic kidney disease,   with long-term current use of insulin (H) 11/4/2016     Unspecified cerebral artery occlusion with cerebral infarction                Past Surgical History:     Past  Surgical History:   Procedure Laterality Date     C MRA UPPER EXTREMITY WO&W CONT       CORONARY ARTERY BYPASS  2002    LIMA-LAD, SVG-OM1, MSY-PHEF-[facility]     ENT SURGERY      VOCAL CORD LESION REMOVED     GENITOURINARY SURGERY      ANGIOPLASTY FOR RENAL ARTERT STENOSIS     HC LEFT HEART CATHETERIZATION  9/7/2016 09/07/2016: CHUCHO x2 to distal and proximal SVG to to RPDA     HEART CATH LEFT HEART CATH  11/30/2001     PHACOEMULSIFICATION CLEAR CORNEA WITH STANDARD INTRAOCULAR LENS   IMPLANT Left 1/22/2015    Procedure: PHACOEMULSIFICATION CLEAR CORNEA WITH STANDARD   INTRAOCULAR LENS IMPLANT;  Surgeon: Bart Johnson MD;    Location: [facility]     VASCULAR SURGERY      ANGIOPLASTY LEFT LEG FOR pvd               Social History:     Social History   Substance Use Topics     Smoking status: Former Smoker     Years: 40.00     Types: Pipe     Quit date: 12/18/1998     Smokeless tobacco: Never Used     Alcohol use No             Family History:     Family History   Problem Relation Age of Onset     DIABETES Mother      Family History Negative Father      CANCER Maternal Grandmother              Allergies:     Allergies   Allergen Reactions     Aspirin Hives     Penicillins Hives     Contrast Dye Hives             Medications:     Prescriptions Prior to Admission   Medication Sig Dispense Refill Last Dose     albuterol (ACCUNEB) 0.63 MG/3ML nebulizer solution 1 vial via   mask three times a day for SOB for 5 Days   Taking     bacitracin 500 UNIT/GM OINT Apply topically daily to sclap     Taking     torsemide (DEMADEX) 10 MG tablet Take 30 mg by mouth daily     Taking     carvedilol (COREG) 12.5 MG tablet Take 12.5 mg by mouth daily     Taking     atorvastatin (LIPITOR) 40 MG tablet Take 40 mg by mouth daily     Taking     isosorbide mononitrate (IMDUR) 30 MG 24 hr tablet Take 3   tablets (90 mg) by mouth daily   Taking     insulin glargine (LANTUS SOLOSTAR) 100 UNIT/ML injection Inject   6 Units Subcutaneous At Bedtime 30  mL 0 Taking     insulin aspart (NOVOLOG PEN) 100 UNIT/ML injection Inject 5   Units Subcutaneous 3 times daily (with meals)   Taking     hydrALAZINE (APRESOLINE) 50 MG tablet Take 0.5 tablets (25 mg)   by mouth 2 times daily 60 tablet  Taking     spironolactone (ALDACTONE) 25 MG tablet Take 1 tablet (25 mg)   by mouth daily   Taking     acetaminophen (TYLENOL) 325 MG tablet Take 650 mg by mouth   every 6 hours as needed    Taking     Calcium Carb-Cholecalciferol 600-800 MG-UNIT TABS Take 1 tablet   by mouth 3 times daily (with meals)    Taking     levETIRAcetam (KEPPRA) 500 MG tablet Take 500 mg by mouth 2   times daily   Taking     clopidogrel (PLAVIX) 75 MG tablet Take 75 mg by mouth daily     Taking     mirtazapine (REMERON) 15 MG tablet Take 15 mg by mouth At   Bedtime   Taking     blood glucose monitoring (NO BRAND SPECIFIED) test strip Use to   test blood sugar 4 times daily or as directed.   Taking     thiamine 100 MG tablet Take 100 mg by mouth daily   Taking     pantoprazole (PROTONIX) 40 MG EC tablet TAKE 1 TABLET BY MOUTH   EVERY EVENING 90 tablet 2 Taking     Multiple Vitamins-Minerals (CENTRUM SILVER) per tablet Take 1   tablet by mouth daily 30 tablet  Taking     OXcarbazepine (TRILEPTAL) 150 MG tablet Take 1 tablet (150 mg)   by mouth 2 times daily   Taking             Review of Systems:   The 10 point Review of Systems is negative other than noted in   the HPI            Physical Exam:   Vitals were reviewed;  Blood pressure 95/71, SpO2 99 %.  HR is 36 BPM.     Constitutional: awake, alert, cooperative, no apparent distress,   and appears stated age  Eyes:  sclera clear, conjunctiva normal  Neck:  thyroid symmetric, not enlarged and no tenderness, skin   normal  Lungs:  good air exchange, clear to auscultation bilaterally, no   crackles or wheezing  Cardiovascular: slow rate and regular rhythm, normal S1 and S2,   no S3 or S4, and 1-2/6 systolic murmur noted  Abdomen: mildly distended, non-tender, no  masses palpated, no   hepatosplenomegally  Musculoskeletal: moderate lower extremity pitting edema present  Neurologic: Mental Status Exam:  Orientation:   person,   place,slurs his speech and seems mildly confused.  Motor Exam:  moves all extremities well and symmetrically  Skin: normal skin color, texture, turgor and no jaundice         Data:        Lab Results   Component Value Date     01/16/2018    Lab Results   Component Value Date    CHLORIDE 101 01/16/2018    Lab Results   Component Value Date    BUN 64 01/16/2018      Lab Results   Component Value Date    POTASSIUM 5.3 01/16/2018    Lab Results   Component Value Date    CO2 26 01/16/2018    Lab Results   Component Value Date    CR 2.03 01/16/2018        Lab Results   Component Value Date    WBC 9.5 12/26/2017    HGB 10.2 (L) 12/26/2017    HCT 32.1 (L) 12/26/2017    MCV 92 12/26/2017     12/26/2017     Lab Results   Component Value Date    INR 1.32 (H) 12/12/2017     Lab Results   Component Value Date    TSH 5.55 (H) 10/26/2016     Lab Results   Component Value Date    TROPI 0.714 (HH) 12/13/2017     Lab Results   Component Value Date    TROPI 0.714 (HH) 12/13/2017    TROPI 0.714 (HH) 12/13/2017    TROPI 0.709 (HH) 12/12/2017    TROPI 0.701 (HH) 12/12/2017    TROPI Canceled, Test credited 12/12/2017     EKG results:   I have reviewed this patient's EKG with the following   interpretation:         Sinus bradycardia with very prolonged first degree AV block.     Chest x-ray:   Only one view: cadiomegaly, bilateral effusions - L>R.      EKG 12-lead, tracing only   Result Value Ref Range    Interpretation ECG Click View Image link to view waveform and result    XR Chest Port 1 View    Narrative    XR CHEST PORT 1 VW 1/16/2018 4:13 PM    HISTORY: Short of breath.    COMPARISON: 12/14/2017    FINDINGS: Moderate left and small right pleural effusions, similar to  12/14/2017. No pneumothorax. Cardiomegaly is redemonstrated.      Impression     IMPRESSION: Moderate left and small right pleural effusions.    MANUELITO NAVARRO MD   Troponin I   Result Value Ref Range    Troponin I ES 0.447 (HH) 0.000 - 0.045 ug/L   Basic metabolic panel   Result Value Ref Range    Sodium 140 133 - 144 mmol/L    Potassium 4.8 3.4 - 5.3 mmol/L    Chloride 105 94 - 109 mmol/L    Carbon Dioxide 27 20 - 32 mmol/L    Anion Gap 8 3 - 14 mmol/L    Glucose 60 (L) 70 - 99 mg/dL    Urea Nitrogen 64 (H) 7 - 30 mg/dL    Creatinine 1.74 (H) 0.66 - 1.25 mg/dL    GFR Estimate 38 (L) >60 mL/min/1.7m2    GFR Estimate If Black 46 (L) >60 mL/min/1.7m2    Calcium 8.4 (L) 8.5 - 10.1 mg/dL   Magnesium   Result Value Ref Range    Magnesium 1.6 1.6 - 2.3 mg/dL   Phosphorus   Result Value Ref Range    Phosphorus 4.0 2.5 - 4.5 mg/dL   Lactic acid whole blood (AM Draw)   Result Value Ref Range    Lactic Acid 1.4 0.7 - 2.0 mmol/L   Glucose by meter   Result Value Ref Range    Glucose 143 (H) 70 - 99 mg/dL   Hemoglobin A1c   Result Value Ref Range    Hemoglobin A1C 6.9 (H) 4.3 - 6.0 %   TSH with free T4 reflex (Add on or recollect)   Result Value Ref Range    TSH 37.07 (H) 0.40 - 4.00 mU/L   Troponin I   Result Value Ref Range    Troponin I ES 0.463 (HH) 0.000 - 0.045 ug/L   T4 free   Result Value Ref Range    T4 Free 0.67 (L) 0.76 - 1.46 ng/dL   Glucose by meter   Result Value Ref Range    Glucose 85 70 - 99 mg/dL   Basic metabolic panel   Result Value Ref Range    Sodium 138 133 - 144 mmol/L    Potassium 5.3 3.4 - 5.3 mmol/L    Chloride 103 94 - 109 mmol/L    Carbon Dioxide 29 20 - 32 mmol/L    Anion Gap 6 3 - 14 mmol/L    Glucose 72 70 - 99 mg/dL    Urea Nitrogen 65 (H) 7 - 30 mg/dL    Creatinine 1.81 (H) 0.66 - 1.25 mg/dL    GFR Estimate 36 (L) >60 mL/min/1.7m2    GFR Estimate If Black 44 (L) >60 mL/min/1.7m2    Calcium 8.6 8.5 - 10.1 mg/dL   Troponin I   Result Value Ref Range    Troponin I ES 0.476 (HH) 0.000 - 0.045 ug/L   Glucose by meter   Result Value Ref Range    Glucose 105 (H) 70 - 99 mg/dL    Basic metabolic panel   Result Value Ref Range    Sodium 139 133 - 144 mmol/L    Potassium 5.4 (H) 3.4 - 5.3 mmol/L    Chloride 104 94 - 109 mmol/L    Carbon Dioxide 28 20 - 32 mmol/L    Anion Gap 7 3 - 14 mmol/L    Glucose 77 70 - 99 mg/dL    Urea Nitrogen 67 (H) 7 - 30 mg/dL    Creatinine 1.81 (H) 0.66 - 1.25 mg/dL    GFR Estimate 36 (L) >60 mL/min/1.7m2    GFR Estimate If Black 44 (L) >60 mL/min/1.7m2    Calcium 8.9 8.5 - 10.1 mg/dL   CBC with platelets   Result Value Ref Range    WBC 5.8 4.0 - 11.0 10e9/L    RBC Count 3.76 (L) 4.4 - 5.9 10e12/L    Hemoglobin 11.1 (L) 13.3 - 17.7 g/dL    Hematocrit 33.5 (L) 40.0 - 53.0 %    MCV 89 78 - 100 fl    MCH 29.5 26.5 - 33.0 pg    MCHC 33.1 31.5 - 36.5 g/dL    RDW 17.6 (H) 10.0 - 15.0 %    Platelet Count 112 (L) 150 - 450 10e9/L   Glucose by meter   Result Value Ref Range    Glucose 65 (L) 70 - 99 mg/dL   Glucose by meter   Result Value Ref Range    Glucose 139 (H) 70 - 99 mg/dL   Echo Limited with Lumason    Narrative    [id number]  [id number]  [id number]  [id number]^SHANT^KEI^           [facility]  Echocardiography Laboratory  [address] [place] [address]        Name: MARLA VÁZQUEZ  MRN: [id number]  : 1935  Study Date: 2018 07:51 AM  Age: 82 yrs  Gender: Male  Patient Location: [facility]  Reason For Study: CHF  Ordering Physician: KEI BRAN  Referring Physician: TRISH MARY  Performed By: Ashley Espinoza     BSA: 2.2 m2  Height: 73 in  Weight: 212 lb  HR: 44  BP: 108/60 mmHg  _____________________________________________________________________________  __        Procedure  Limited Portable Echo Adult. Contrast Lumason.  _____________________________________________________________________________  __        Interpretation Summary     The left ventricle is normal in size.  There is borderline concentric left ventricular hypertrophy.  The visual ejection fraction is estimated at <20%.  There is severe global  hypokinesia of the left ventricle.  There is anterior, septal, and apical wall akinesis.  The right ventricle is moderately dilated.  The right ventricular systolic function is moderate to severely reduced.  The left atrium is moderately dilated.  The right atrium is moderate to severely dilated.  The mitral valve leaflets are moderately thickened.  There is moderate to severe mitral annular calcification.  There is moderate (2+) mitral regurgitation.  There is mild to moderate (1-2+) tricuspid regurgitation.  The right ventricular systolic pressure is approximated at 43.1mmHg plus the  right atrial pressure.  The peak AoV pressure gradient is 17.0mmHg.  The mean AoV pressure gradient is 10.0mmHg.  The calculated aortic valve area is 0.7cm2.  Significant, probably severe, aortic stenosis with probable low flow/low  gradient due to poor cardiac output with severe LV dysfunction.  The rhythm was sinus bradycardia.  The study was technically difficult. Limited views were obtained. Lumason  contrast used without complication.  Since 12/13/2017, persistent severe LV dysfunction with evidence of severe  aortic stenosis(low flow/low gradient).  _____________________________________________________________________________  __        Left Ventricle  The left ventricle is normal in size. There is borderline concentric left  ventricular hypertrophy. Proximal septal thickening is noted. The visual  ejection fraction is estimated at <20%. Left ventricular systolic function is  severely reduced. Left ventricular diastolic function is indeterminate. There  is severe global hypokinesia of the left ventricle. There is anterior, septal,  and apical wall akinesis. There is no thrombus seen in the left ventricle.     Right Ventricle  The right ventricle is moderately dilated. There is normal right ventricular  wall thickness. The right ventricular systolic function is moderate to  severely reduced.     Atria  The left atrium is  moderately dilated. The right atrium is moderate to  severely dilated. Intact atrial septum. Interatrial septal thickening is  noted.     Mitral Valve  The mitral valve leaflets are moderately thickened. There is moderate to  severe mitral annular calcification. There is moderate (2+) mitral  regurgitation.        Tricuspid Valve  Tricuspid leaflets are thickened. There is mild to moderate (1-2+) tricuspid  regurgitation. The right ventricular systolic pressure is approximated at  43.1mmHg plus the right atrial pressure. Right ventricular systolic pressure  is elevated, consistent with moderate pulmonary hypertension.     Aortic Valve  Thickened aortic valve leaflets. The peak AoV pressure gradient is 17.0mmHg.  The mean AoV pressure gradient is 10.0mmHg. The calculated aortic valve area  is 0.7cm2. Significant, probably severe, aortic stenosis with probable low  flow/low gradient due to poor cardiac output with severe LV dysfunction.     Pulmonic Valve  The pulmonic valve is not well visualized. There is no pulmonic valvular  regurgitation.     Vessels  The aortic root is normal size. Normal size ascending aorta. Inferior vena  cava not well visualized for estimation of right atrial pressure. Pulmonary  arteries not well visualized.     Pericardium  The pericardium appears normal. There is no pericardial effusion.        Rhythm  The rhythm was sinus bradycardia.  _____________________________________________________________________________  __  MMode/2D Measurements & Calculations  IVSd: 1.2 cm     LVIDd: 5.1 cm  LVIDs: 4.1 cm  LVPWd: 1.2 cm  FS: 19.2 %  EDV(Teich): 123.6 ml  ESV(Teich): 74.9 ml  LV mass(C)d: 243.4 grams  LV mass(C)dI: 110.4 grams/m2  LVOT diam: 2.2 cm  LVOT area: 3.6 cm2  RWT: 0.47        Doppler Measurements & Calculations  Ao V2 max: 213.1 cm/sec  Ao max P.0 mmHg  Ao V2 mean: 149.9 cm/sec  Ao mean PG: 10.0 mmHg  Ao V2 VTI: 65.6 cm  ROSE(I,D): 0.67 cm2  ROSE(V,D): 0.73 cm2  LV V1 max P.74  mmHg  LV V1 max: 43.1 cm/sec  LV V1 VTI: 12.1 cm  SV(LVOT): 43.8 ml  SI(LVOT): 19.8 ml/m2  TR max amado: 328.2 cm/sec  TR max P.1 mmHg  ROSE Index (cm2/m2): 0.30              _____________________________________________________________________________  __        Report approved by: Bassem Devi 2018 10:26 AM      Glucose by meter   Result Value Ref Range    Glucose 75 70 - 99 mg/dL       SHAYY Nieto, CNP  Cardiology  Pager:  [phone]

## 2018-01-17 NOTE — PROGRESS NOTES
REASON FOR ASSESSMENT:  CHF Consult for 2 gm NA Diet Education    NUTRITION HISTORY:    Information obtained from:  Patient (pt was in and out of sleeping so information was difficult to obtain) and previous RD note in 2016.    Patient is still completely avoiding use of salt shaker, avoiding desserts, limiting starches, buying sugar-free foods, and occ eating a snack of nuts or Fritos a minimum of once per week.     Living situation:   Lives alone     Grocery shopping:  Does his own shopping    Meal preparation:  Mainly frozen [employer] frozen meals  Some fresh fruits (oranges and bananas) and vegetables     Brunch:  Still eating brunch at [facility], however, he will only have crespo at most once per week (previously 2 slices each time).     Dinner:   A chicken [employer] meal   Fresh fruit    Previous diet instructions:  2 gm Na   Avoiding crespo at brunch and finding frozen meals that are <600mg Na  Watching CHOs and fats for CAD       CURRENT DIET:  Moderate Consistent CHO     NUTRITION DIAGNOSIS:  Food- and nutrition-related knowledge deficit R/t ongoing learning AEB above nutrition history.     INTERVENTIONS:    Nutrition Prescription:  2gm Na     Implementation:    Assessed learning needs, learning preferences, and willingness to learn    Nutrition Education (Content):  a) Provided handouts:  1) Tips for Low Na Diet  2) Label Reading  3) Low Na Foods/Drinks  4) Seasoning Your Food Without Salt  5) Low Na Recipe Booklet  b) Discussed rational for limiting Na for CHF and stressed importance of following 2 gm Na guidelines   c) Encouraged patient to keep a daily food record    Nutrition Education (Application):  a) Discussed current eating habits and recommended alternative food choices    Anticipated good compliance (made previous changes)     Diet Education - refer to Education Flowsheet    Goals:    Patient verbalizes understanding of diet by making changes from last RD consult and stating he will continue  to maintain changes.      All of the above goals met during the education session    Follow Up:    Provided RD contact information for future questions.    Recommend Out-Patient Nutrition Referral, if further diet instructions are needed.      Toyin Ellison   Dietetic Intern

## 2018-01-18 NOTE — PROGRESS NOTES
Called pt's sister Samantha, updated her on her brother's condition. She was already aware that he is getting a picc line sometime today.   SHAYY Finley, CNP

## 2018-01-18 NOTE — PROGRESS NOTES
K is 6.0. I review his case. Pt with severe heart failure and aortic stenosis with renal failure. From Dr. Ugalde's note, family did not want more aggressive therapy. Will treat hyperkalemia medically.     Plan.  # Kayexalate 30 grams  # 1 amp of dextrose with insulin  # Lasix 40 mg and Diuril 500 mg IV x 1  # Pt also has d10 at 25 cc/hr  # Recheck K in 1 hr of treatment

## 2018-01-18 NOTE — PROGRESS NOTES
Mr. Coleman's echo report today suggested further deterioration in function and now moderately severe to severe LV dysfunction.*  Holding his beta blocker has resulted in a mild improvement in HR to 44 to 51 BPM with marked first degree heart block (rhythm strip done tonight).    His prognosis is poor - discussed with his sister (Samantha) in [place].  She confirmed that they (Mr. Coleman, his sister and 5 very close friends including one with medical POA) do not want any more aggressive therapy (such as LVAD, uncertain about pacemaker).  I explained that he seems to have been declining for the last 2 months and is now in   She agreed with dobutamine and PICC line - familiar with PICC line.  Agreed with dobutamine after explained that it can limit duration of life but improve quality.  He is very hypothyroid as well and has been initiated on thyroid replacement.    Will start dobutamine tonight for what appears to be predominant forward failure.  If beneficial, will place PICC line in AM.  Will reassess HR and possible benefit of pacemaker in AM.  Greatly appreciate Dr. Melgar's help.      * Reviewed his echo's myself: LV function appears similar on today's echo as compared to that of one month ago though HR slower (40-45 BPM vs.50-60 BPM).  MR may be somewhat worse and LV function has clearly worsened compared to 9/18/2016.

## 2018-01-18 NOTE — CONSULTS
[facility]    Nephrology Consultation     Date of Admission:  1/16/2018    Assessment & Plan      Kelechi Coleman is a 82 year old male who was admitted on 1/16/2018 via the heart clinic.     1) Baseline Stage 3 CDK:  On basis of bilateral renal cortical atrophy.    2) JUVENTINO:  Decline in kidney function may be multifactorial - increased diuresis, decline in LV performance, bradycardia.    3) Decline in LV Performance:  LVEF < 20%.  Cause unclear.    4) Severe Aortic Stenosis    5) Bradycardia:  This may also be multifactorial.  Carvedilol and reduced clearance.  Hypothyroidism. High potassium not yet high enough to be a factor.    Suggest:    No IV fluid  Loop diuretic as tolerated.  He had one dose this afternoon.  Dr. Ugalde has ordered some dobutamine  Dr. Pereira has added thyroid hormone.    I will add a BMP for tonight to follow K and renal fucntion.    He is in a very tight spot with very low LVEF, tight AS, renal failure and hypothyroidism.  He may not survive this combination of problems.          Virgil Melgar MD  [facility] - Nephrology  [phone]    Reason for Consult      I was asked to see the patient for JUVENTINO and hyperkalemia.      Primary Care Physician      Laquita Deal    Chief Complaint      I feel terrible.      History is obtained from the patient and chart review.      History of Present Illness      Kelechi Coleman is a 82 year old male who presents with JUVENTINO.     He saw Dr. Nielsen at the heart clinic on 12/26.  He appeared to have worsening CHF at the time.  His torsemide was increased from 20 mg to 30 mg daily.    He was seen in follow up yesterday at the Heart Clinic by Cristina Plummer NP.  He was found to be in yet worse CHF with increased edema, diminished BS L base, and bradycardia.    After admission he has been found to have:    Further decline in his LVEF to <20%.  Formerly it was 40-45%.  First degree AV block with HR ~40.  Declining renal  function cr/gfr 2/32.  Baseline cr was 1.4/48.   Mild hyperkalemia - K 5.3.  Hypothyroidism - TSH 37.  Free T 0.67.    He has been transferred to CCU for IV dobutamine.    He is unable to talk very much and it is very hard to understand his speech.    He says he is very tired and wants to sleep but he becomes too short of breath to rest.  No chest pain.    He did have a recent fall with scalp laceration.    He has CKD stage 3 cr/gfr 1.4/48.  The only kidney imaging that I could find was CTA from 2013.    At that time he had patent renal arteries and bilateral renal cortical atrophy.    UA last month was bland.    Past Medical History   I have reviewed this patient's medical history and updated it with pertinent information if needed.   Past Medical History:   Diagnosis Date     Acute exacerbation of CHF (congestive heart failure) (H) 9/2/2016     Atrial fibrillation (H)      Cerebral infarction (H) 12/13/2013, 9/2016     Diagnosis updated by automated process. Provider to review and confirm.     Coronary artery disease      Diabetes (H)      Gastroesophageal reflux disease with esophagitis 5/8/2017     Hyperlipidaemia      Hypertension      Myocardial infarction      PAD (peripheral artery disease) (H)      Stented coronary artery     CABG 2002 4 VESSEL     Type 2 diabetes mellitus with stage 3 chronic kidney disease, with long-term current use of insulin (H) 11/4/2016     Unspecified cerebral artery occlusion with cerebral infarction        Past Surgical History   I have reviewed this patient's surgical history and updated it with pertinent information if needed.  Past Surgical History:   Procedure Laterality Date     C MRA UPPER EXTREMITY WO&W CONT       CORONARY ARTERY BYPASS  2002    LIMA-LAD, SVG-OM1, [id number]     ENT SURGERY      VOCAL CORD LESION REMOVED     GENITOURINARY SURGERY      ANGIOPLASTY FOR RENAL ARTERT STENOSIS     HC LEFT HEART CATHETERIZATION  9/7/2016 09/07/2016: CHUCHO x2 to distal and  proximal SVG to to RPDA     HEART CATH LEFT HEART CATH  11/30/2001     PHACOEMULSIFICATION CLEAR CORNEA WITH STANDARD INTRAOCULAR LENS IMPLANT Left 1/22/2015    Procedure: PHACOEMULSIFICATION CLEAR CORNEA WITH STANDARD INTRAOCULAR LENS IMPLANT;  Surgeon: Bart Johnson MD;  Location: [facility]     VASCULAR SURGERY      ANGIOPLASTY LEFT LEG FOR pvd       Prior to Admission Medications   Prior to Admission Medications   Prescriptions Last Dose Informant Patient Reported? Taking?   Calcium Carb-Cholecalciferol 600-800 MG-UNIT TABS 1/16/2018 at noon Nursing Home Yes Yes   Sig: Take 1 tablet by mouth 3 times daily (with meals)    Multiple Vitamins-Minerals (CENTRUM SILVER) per tablet 1/16/2018 at am Nursing Home No Yes   Sig: Take 1 tablet by mouth daily   OXCARBAZEPINE PO 1/15/2018 at pm Nursing Home Yes Yes   Sig: Take 150 mg by mouth At Bedtime Through 1/23/2018, then stop   acetaminophen (TYLENOL) 325 MG tablet 1/14/2018 at Unknown time Nursing Home Yes Yes   Sig: Take 650 mg by mouth every 6 hours as needed    atorvastatin (LIPITOR) 40 MG tablet 1/15/2018 at Unknown time Nursing Home Yes Yes   Sig: Take 40 mg by mouth At Bedtime    blood glucose monitoring (NO BRAND SPECIFIED) test strip  retirement Yes No   Sig: Use to test blood sugar 4 times daily or as directed.   carvedilol (COREG) 12.5 MG tablet 1/16/2018 at am Nursing Home Yes Yes   Sig: Take 12.5 mg by mouth daily   clopidogrel (PLAVIX) 75 MG tablet 1/16/2018 at am Nursing Home Yes Yes   Sig: Take 75 mg by mouth daily   hydrALAZINE (APRESOLINE) 50 MG tablet 1/16/2018 at am Nursing Home No Yes   Sig: Take 0.5 tablets (25 mg) by mouth 2 times daily   insulin aspart (NOVOLOG PEN) 100 UNIT/ML injection 1/16/2018 at noon Nursing Home No Yes   Sig: Inject 5 Units Subcutaneous 3 times daily (with meals)   insulin glargine (LANTUS SOLOSTAR) 100 UNIT/ML injection 1/15/2018 at pm Nursing Home No Yes   Sig: Inject 6 Units Subcutaneous At Bedtime   isosorbide  mononitrate (IMDUR) 30 MG 24 hr tablet 1/16/2018 at am Nursing Home No Yes   Sig: Take 3 tablets (90 mg) by mouth daily   levETIRAcetam (KEPPRA) 500 MG tablet 1/16/2018 at am Nursing Home Yes Yes   Sig: Take 500 mg by mouth 2 times daily   mirtazapine (REMERON) 15 MG tablet 1/15/2018 at pm Nursing Home Yes Yes   Sig: Take 15 mg by mouth At Bedtime   pantoprazole (PROTONIX) 40 MG EC tablet 1/15/2018 at pm Nursing Home No Yes   Sig: TAKE 1 TABLET BY MOUTH EVERY EVENING   spironolactone (ALDACTONE) 25 MG tablet 1/16/2018 at am Nursing Home No Yes   Sig: Take 1 tablet (25 mg) by mouth daily   thiamine 100 MG tablet 1/16/2018 at am Nursing Home Yes Yes   Sig: Take 100 mg by mouth daily   torsemide (DEMADEX) 10 MG tablet 1/16/2018 at am Nursing Home Yes Yes   Sig: Take 30 mg by mouth daily      Facility-Administered Medications: None     Allergies   Allergies   Allergen Reactions     Aspirin Hives     Penicillins Hives     Contrast Dye Hives       Social History   I have reviewed this patient's social history and updated it with pertinent information if needed. Kelechi Coleman  reports that he quit smoking about 19 years ago. His smoking use included Pipe. He quit after 40.00 years of use. He has never used smokeless tobacco. He reports that he does not drink alcohol or use illicit drugs.    Family History   I have reviewed this patient's family history and updated it with pertinent information if needed.   Family History   Problem Relation Age of Onset     DIABETES Mother      Family History Negative Father      CANCER Maternal Grandmother        Review of Systems   The 10 point Review of Systems is not possible.      Physical Exam   Temp: 93.6  F (34.2  C) Temp src: Oral BP: 106/65   Heart Rate: 51 Resp: 19 SpO2: 100 % O2 Device: None (Room air)    Vital Signs with Ranges  Temp:  [93.6  F (34.2  C)] 93.6  F (34.2  C)  Heart Rate:  [38-51] 51  Resp:  [18-20] 19  BP: (103-124)/(60-77) 106/65  SpO2:  [95 %-100 %]  100 %  212 lbs 6.4 oz    GENERAL: ill appearing, pale,   HEENT:  Normocephalic. No gross abnormalities.  Pupils equal.  MMM.  Dentition is poor.     CV: RRR, no murmurs, no clicks, gallops, or rubs, 2+ BLE edema, no carotid bruit, carotid upstroke severely reduced.    RESP: Reduced BS bases L > R.    GI: Abdomen soft/nt/nd, BS normal. No masses, organomegaly  MUSCULOSKELETAL: muscle wasting.    SKIN: no suspicious lesions or rashes, dry to touch  NEURO:  Globally weak.    PSYCH: unable  LYMPH: No palpable ant/post cervical and supraclavicular adenopathy    Data   BMP  Recent Labs  Lab 01/17/18  0545 01/16/18  2335 01/16/18  1617 01/16/18  1211    138 140 135*   POTASSIUM 5.4* 5.3 4.8 5.3*   CHLORIDE 104 103 105 101   MIGUEL 8.9 8.6 8.4* 9.2   CO2 28 29 27 26   BUN 67* 65* 64* 64*   CR 1.81* 1.81* 1.74* 2.03*   GLC 77 72 60* 182*     [email or web address](phos:4)  CBC)  Recent Labs  Lab 01/17/18  0545   WBC 5.8   HGB 11.1*   HCT 33.5*   MCV 89   *     No lab results found in last 7 days.    Invalid input(s): BILIRUBININDIRECT  No lab results found in last 7 days.  No results found for: D2VIT, D3VIT, DTOT    Recent Labs  Lab 01/17/18  0545   HGB 11.1*   HCT 33.5*   MCV 89     No results for input(s): PTHI in the last 168 hours.

## 2018-01-18 NOTE — PROGRESS NOTES
Spoke with Dr. Ugalde this AM, per MD reduce dobutamine drip to 5 mcg/kg/min at noon. PICC line to be placed by IV team.

## 2018-01-18 NOTE — PROGRESS NOTES
[facility]    Nephrology Progress Note     Assessment & Plan     Kelechi Coleman is a 82 year old male who was admitted on 1/16/2018 via the heart clinic.      1) Baseline Stage 3 CKD:  On basis of bilateral renal cortical atrophy.     2) JUVENTINO:  Decline in kidney function may be multifactorial - increased diuresis, decline in LV performance, bradycardia.     3) Decline in LV Performance:  LVEF < 20%.  Cause unclear.     4) Severe Aortic Stenosis     5) Bradycardia:  This may also be multifactorial.  Carvedilol and reduced clearance.  Hypothyroidism. High potassium not yet high enough to be a factor.    6) Hyperkalemia:  Better.    Suggest:    Same Rx  Prognosis is guarded.         Virgil Melgar MD  [facility] - Nephrology  [phone]    Interval History      Trouble with hyperkalemia overnight.  He had kayexalate, insulin, dextrose, furosemide and chlorothiazide.  K is better.    Clinically he looks about the same despite dobutamine infusion.      Physical Exam   Temp: 97.8  F (36.6  C) Temp src: Oral BP: (!) 100/37   Heart Rate: 51 Resp: 9 SpO2: 100 % O2 Device: Nasal cannula Oxygen Delivery: 1 LPM  Vitals:    01/16/18 1600 01/17/18 0500   Weight: 95.8 kg (211 lb 1.6 oz) 96.3 kg (212 lb 6.4 oz)     Vital Signs with Ranges  Temp:  [95.8  F (35.4  C)-97.8  F (36.6  C)] 97.8  F (36.6  C)  Heart Rate:  [43-58] 51  Resp:  [9-18] 9  BP: ()/(37-94) 100/37  SpO2:  [93 %-100 %] 100 %  I/O last 3 completed shifts:  In: 240 [P.O.:240]  Out: 600 [Urine:600]    GENERAL APPEARANCE:    HEENT:  Eyes/ears/nose/neck grossly normal  RESP: lungs cta b c good efforts, no crackles, rhonchi or wheezes  CV: RRR, nl S1/S2, no m/r/g   ABDOMEN: o/s/nt/nd, bs present  EXTREMITIES/SKIN: no rashes/lesions; 2+ BLE edema    Medications     dextrose 25 mL/hr at 01/18/18 0818     DOBUTamine 5 mcg/kg/min (01/18/18 1150)     Reason beta blocker order not selected         sodium chloride (PF)  10 mL  Intracatheter Q7 Days     levothyroxine  12.5 mcg Oral QAM AC     docusate sodium  100 mg Oral BID     atorvastatin  40 mg Oral At Bedtime     clopidogrel  75 mg Oral Daily     levETIRAcetam  500 mg Oral BID     pantoprazole  40 mg Oral At Bedtime     insulin aspart  1-7 Units Subcutaneous TID AC     insulin aspart  1-5 Units Subcutaneous At Bedtime       Data   BMP  Recent Labs  Lab 01/18/18  0532 01/17/18  2335 01/17/18  2110 01/17/18  1945 01/17/18  0545 01/16/18  2335     --   --  138 139 138   POTASSIUM 5.0 5.0 5.8* 6.0* 5.4* 5.3   CHLORIDE 102  --   --  102 104 103   MIGUEL 8.8  --   --  9.0 8.9 8.6   CO2 27  --   --  27 28 29   BUN 67*  --   --  68* 67* 65*   CR 1.98*  --   --  1.97* 1.81* 1.81*   *  --   --  73 77 72     [email or web address](phos:4)  CBC)  Recent Labs  Lab 01/18/18  0532 01/17/18  0545   WBC 5.7 5.8   HGB 10.9* 11.1*   HCT 33.8* 33.5*   MCV 89 89   PLT 96* 112*       Recent Labs  Lab 01/18/18  0532   AST 48*   ALT 47   ALKPHOS 265*   BILITOTAL 0.8       Recent Labs  Lab 01/18/18  0538   INR 1.54*     No results found for: D2VIT, D3VIT, DTOT    Recent Labs  Lab 01/18/18  0532   HGB 10.9*   HCT 33.8*   MCV 89     No results for input(s): PTHI in the last 168 hours.    Attestation:   I have reviewed today's relevant vital signs, notes, medications, labs and imaging.

## 2018-01-18 NOTE — PROGRESS NOTES
[facility]    Hospitalist Progress Note    Assessment & Plan   Kelechi Coleman is a 82 year old male who was admitted on 1/16/2018 for heart failure. 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 Patient is on low CO failure low EF, severe AS and bradycardia with altered MS, SIDDHARTH. Prognosis is grim and comfort care measure would be appropriate. Palliative care has seen patient.      Summary:  1. Sinus bradycardia with  1st degree AVB  And occasional junctional rythm  - Holding BB and on dobutamine  - TSH high, T4 low so some element of hypothyroidism present. Start low dose synthroid and increase slowly  - bradycardia persisting off BB and on dobutamine  2. HfrEF (30-35%) Probably decompensated by slow HR  - Holding BB  - address rate issue  - lasix as tolerated  3.  CAD, S/P CABG: stable no chest pain. Elevated stable trop  - Plavix  - Lipitor  4. Severe AS  - likely contributing to his condition, SANAZ might be helpful to confirm but would unlikely not be tolerated without intubation and would not change outcome  - have to be careful with dobutamine given AS as if CO does not increase BP will drop with dobutamine  - avoid hypotension, lasix as needed  5. Siddharth and hyperK  - creat was 1.14 on 12/19. Admitted with creat of 2 and back around 2  - likley due to low CO  - than K and creat alone would not explain the bradycardia. More likely the consequence than the cause of the bradycardia  - monitor renal function and lytes, avoid nephrotoxic drug and optimize hemodynamic  - trend creat and K  6, Hx of SAH and seizure;  - continue his seizure treatment   7. DM  - running low blood glucose  - D10 drip and adjust as needed  - hold insulin glargine. Will implement ISS if needed  8. Mild anemia and thrombocytopenia  monitor  9. Coughing when he drinks  Asked speech to evaluate for aspiration    DVT Prophylaxis: Pneumatic Compression  Devices  Code Status: DNR/DNI    Disposition: Expected discharge a few days    Be Pereira MD  [phone] (P)  Text Page  (7am to 6pm)      Interval History   cpntinues to feel weak and SOB    -Data reviewed today: I reviewed all new labs and imaging results over the last 24 hours.   Physical Exam   Temp: 97.8  F (36.6  C) Temp src: Oral BP: 99/58   Heart Rate: 52 Resp: 13 SpO2: 98 % O2 Device: Nasal cannula Oxygen Delivery: 2 LPM  Vitals:    01/16/18 1600 01/17/18 0500   Weight: 95.8 kg (211 lb 1.6 oz) 96.3 kg (212 lb 6.4 oz)     Vital Signs with Ranges  Temp:  [95.8  F (35.4  C)-97.8  F (36.6  C)] 97.8  F (36.6  C)  Heart Rate:  [43-58] 52  Resp:  [9-18] 13  BP: ()/(37-94) 99/58  SpO2:  [93 %-100 %] 98 %  I/O last 3 completed shifts:  In: 240 [P.O.:240]  Out: 600 [Urine:600]      Constitutional: looks chronically ill, in NAD  Neuro:   Alert:: No, somnolent and confused  Grossly non focal: Yes   HENT:   Pupils equal and reactive to light : Yes   Neck No obvious mass or swelling  CV:   bradycrdiac HR low 50   Neck vein: distended  S1S2 no gallop,  No rub, 1/6 NENA, bradycardia, irreg rythm  LE Edema: 3+  Pulm:  Mild respiratory distress  Bilateral chest symmetrical chest expansion on inspection  Auscultation: decreased at the bases  Abdomen:   distended, non tender, Bowel sound present   Uro-genital:   Mancuso present: yes  Extremities:   Joints: No gross deformities  3+ LE bilateral  Skin:   NO rash. fair capillary refill.  Decubitus ulcer: No  Other:      Medications     dextrose 25 mL/hr at 01/18/18 0818     DOBUTamine 5 mcg/kg/min (01/18/18 1306)     Reason beta blocker order not selected         sodium chloride (PF)  10 mL Intracatheter Q7 Days     levothyroxine  12.5 mcg Oral QAM AC     docusate sodium  100 mg Oral BID     atorvastatin  40 mg Oral At Bedtime     clopidogrel  75 mg Oral Daily     levETIRAcetam  500 mg Oral BID     pantoprazole  40 mg Oral At Bedtime     insulin aspart  1-7  Units Subcutaneous TID AC     insulin aspart  1-5 Units Subcutaneous At Bedtime       Data     Recent Labs  Lab 01/18/18  0538 01/18/18  0532 01/17/18  2335 01/17/18 2110 01/17/18  1945 01/17/18  0545 01/16/18  2335 01/16/18 2010 01/16/18  1617   WBC  --  5.7  --   --   --  5.8  --   --   --    HGB  --  10.9*  --   --   --  11.1*  --   --   --    MCV  --  89  --   --   --  89  --   --   --    PLT  --  96*  --   --   --  112*  --   --   --    INR 1.54*  --   --   --   --   --   --   --   --    NA  --  138  --   --  138 139 138  --  140   POTASSIUM  --  5.0 5.0 5.8* 6.0* 5.4* 5.3  --  4.8   CHLORIDE  --  102  --   --  102 104 103  --  105   CO2  --  27  --   --  27 28 29  --  27   BUN  --  67*  --   --  68* 67* 65*  --  64*   CR  --  1.98*  --   --  1.97* 1.81* 1.81*  --  1.74*   ANIONGAP  --  9  --   --  9 7 6  --  8   MIGUEL  --  8.8  --   --  9.0 8.9 8.6  --  8.4*   GLC  --  107*  --   --  73 77 72  --  60*   ALBUMIN  --  2.9*  --   --   --   --   --   --   --    PROTTOTAL  --  6.4*  --   --   --   --   --   --   --    BILITOTAL  --  0.8  --   --   --   --   --   --   --    ALKPHOS  --  265*  --   --   --   --   --   --   --    ALT  --  47  --   --   --   --   --   --   --    AST  --  48*  --   --   --   --   --   --   --    TROPI  --   --   --   --   --   --  0.476* 0.463* 0.447*       Imaging:  Recent Results (from the past 24 hour(s))   XR Chest Port 1 View    Narrative    XR CHEST PORT 1 VW 1/18/2018 1:41 PM    HISTORY: PICC line placement.    COMPARISON: 1/16/2018    FINDINGS: Right PICC line tip is in the projection of the SVC/RA  junction. Small right and moderate left pleural effusions are  redemonstrated. No pneumothorax.      Impression    IMPRESSION: Right PICC line appears appropriately positioned.    MANUELITO NAVARRO MD

## 2018-01-18 NOTE — PROVIDER NOTIFICATION
Brief update:    Paged re: hyperkalemia.   patient with poor LV function And severe aortic stenosis, worsening renal function with oliguric renal failure.  Potassium of 6.0 currently, though QRS widening on recent EKG.    Kayexalate 60 g ×1  Lasix 40 mg IV ×1, though patient has been minimally responsive to ongoing diuresis    Insulin and D50 to shift  Nephrlogy to be made aware, though I do not anticipate any emergent initiation on dialysis at this time given poor cardiac function.    Johnson Oscar MD  8:51 PM

## 2018-01-18 NOTE — PROGRESS NOTES
"   01/18/18 1345   General Information   Onset Date 01/16/18   Start of Care Date 01/18/18   Referring Physician Be Pereira MD   Patient Profile Review/OT: Additional Occupational Profile Info See Profile for full history and prior level of function   Patient/Family Goals Statement did not state   Swallowing Evaluation Bedside swallow evaluation   Behaviorial Observations Lethargic   Mode of current nutrition NPO   Respiratory Status O2 Supply   Type of O2 supply Nasal cannula   Comments \"Kelechi Coleman is a 82 year old male who was admitted on 1/16/2018 for heart failure. 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 Restorative goals of care today with plan to reassess tomorrow. Most recent ST discharge with recommended dysphagia diet level 3 and thin liquids. Moderate dysphagia at baseline.    Clinical Swallow Evaluation   Oral Musculature unable to assess due to poor participation/comprehension   Dentition upper dentures   Oral Musculature Comments unable; appeared WFL on PO trials   Additional Documentation Yes   Clinical Swallow Eval: Thin Liquid Texture Trial   Mode of Presentation, Thin Liquids (ice chips x3)   Oral Phase of Swallow WFL   Pharyngeal Phase of Swallow intact   Diagnostic Statement adequate oral phase with no overt s/sx of aspiration   Clinical Swallow Eval: Honey Thick Liquid Texture Trial   Mode of Presentation, Honey spoon;fed by clinician   Volume of Honey Presented 1/2 tsp    Oral Phase, Honey WFL;Poor AP movement   Pharyngeal Phase, Honey intact   Diagnostic Statement no overt s/sx of aspiration   Clinical Swallow Eval: Puree Solid Texture Trial   Mode of Presentation, Puree spoon;fed by clinician   Oral Phase, Puree Poor AP movement;Residue in oral cavity   Oral Residue, Puree mid posterior tongue   Pharyngeal Phase, Puree intact   Diagnostic Statement oral " phase deficits; no overt s/sx of aspiration   Swallow Compensations   Swallow Compensations Alternate viscosity of consistencies;Reduce amounts;Pacing   General Therapy Interventions   Planned Therapy Interventions Dysphagia Treatment   Dysphagia treatment Instruction of safe swallow strategies;Modified diet education   Swallow Eval: Clinical Impressions   Skilled Criteria for Therapy Intervention Skilled criteria met.  Treatment indicated.   Functional Assessment Scale (FAS) 2   Treatment Diagnosis Moderate to severe dysphagia   Diet texture recommendations Clear liquid;Honey thick liquids   Recommended Feeding/Eating Techniques maintain upright posture during/after eating for 30 mins;small sips/bites   Therapy Frequency 5 times/wk   Predicted Duration of Therapy Intervention (days/wks) 1 week   Anticipated Discharge Disposition extended care facility   Risks and Benefits of Treatment have been explained. Yes   Patient, family and/or staff in agreement with Plan of Care Yes   Clinical Impression Comments SLP: Pt able to be aroused with friend present for ST evaluation. Friend reported limited intake at LT with mostly liquid diet. Pt unable to follow commands for oral motor exam, however, responded to yes/no questions and was agreeable to PO trials. Oral cavity clear and moist. Pt tolerated ice chips x3 with prolonged oral phase. Honey thick liquid via   tsp x8 trialed with adequate oral phase and no overt s/sx of aspiration. Severely prolonged oral phase and delayed swallow with puree solids x2 requiring max cues and liquid wash. Suspect pharyngeal retention. Pt presents with moderate to severe dysphagia secondary to fatigue. Pt, friend and RN educated on recommended: cautiously initiate clear liquid, honey thick diet by teaspoon only when pt alert and upright at 90 degrees. Ok for ice chips. Meds crushed in honey thick liquids. Hold PO if decreased level of alertness or overt s/sx of aspiration and continue  excellent oral care.    Total Evaluation Time   Total Evaluation Time (Minutes) 20

## 2018-01-18 NOTE — PROGRESS NOTES
[facility] chart review. Note Dr. Ugalde and palliative care notes. Will follow peripherally for dc needs.      Please call with questions.     Venus Hale RN, BSN  [facility] Care Coordinator  [phone]      [facility]: Cardiomyopathy, Optimization, Rehabilitation, Education   The [facility] is a heart failure specialty clinic within the [facility] where you will work with your cardiologist, nurse practitioners, physician assistants and registered nurses who specialize in heart failure care. They are dedicated to helping patients with heart failure to carefully adjust medications, receive education, and learn who and when to call if symptoms develop. They specialize in helping you better understand your condition, slow the progression of your disease, improve the length and quality of your life, help you detect future heart problems before they become life threatening, and avoid hospitalizations.

## 2018-01-18 NOTE — PROGRESS NOTES
Care Transition Initial Assessment -   Reason For Consult: discharge planning  Met with: Caregiver  ( at [facility] Sharon Desai)  Active Problems:    CHF (congestive heart failure) (H)         DATA  Lives With: facility resident ([facility])  Living Arrangements: extended care facility ([facility])  Description of Support System: Supportive, Involved  Who is your support system?: Sibling(s) (Friend)  Support Assessment: Adequate family and caregiver support.   Identified issues/concerns regarding health management:      Per social service protocol for discharge planning.  Patient was admitted on 1-16-18 CHF exacerbation.  The tentative date of discharge is yet to be determined.  Reviewed chart.  Patient was admitted from [facility].  Call placed to check the bed hold status.  Per Sharon Desai, , [phone], at [facility].  Patient has a bed hold and can return there on discharge.  Sharon also states that patient is not confused at baseline.  Sharon will fax patient's health care directive.  Placed it on the front of chart.    ASSESSMENT  Cognitive Status:  Did not assess  Concerns to be addressed: discharge planning, health care directive.     PLAN  Financial costs for the patient includes N/A  Patient given options and choices for discharge return to [facility] on discharge  Patient/family is agreeable to the plan?  Yes  Patient Goals and Preferences: Return to [facility] on discharge  Patient anticipates discharging to: [facility].    Will continue to follow and assist with a safe discharge plan.    ELENITA Oliver, [facility]  [phone]

## 2018-01-18 NOTE — CONSULTS
[facility]    Palliative Care Consultation     Kelechi Coleman  MRN# [id number]  Date of Admission:  1/16/2018  Date of Service (when I saw the patient): 01/18/18  Reason for consult: Consulted by Dr. Pittman for Goals of care    Assessment & Plan   Kelechi Coleman is a 82 year old male who was admitted on 1/16/2018 for heart failure. 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 Palliative Care was consulted for goals of care discussion.     Symptoms/Recommendations   1. Goals of Care. Met Mr Coleman, he was very lethargic and unable to participate in a conversation. Spoke with his sister Samantha on the phone this morning. She tells me she is #1 HCA and Breonna is #2 HCA (SW attempting to obtain HCD from [facility] to confirm). She is aware that her brother is quite sick and weak. She understands the cardiology team is administering a dobutamine drip to see if he can get better, but understands that he may not. She tells me she spoke with the hospice team at [facility] prior to this hospital stay and feels that would be an appropriate plan of care IF he does not improve. If he shows improvement on the drip and could get back to working with rehab she would go with that path. We made a plan to continue our conversation tomorrow morning once we have a better sense of Kelechi's progress.    Support/Coping  -spoke with pt's sister Samantha on the phone this morning  -Will involve Palliative LICSW, Lashanda Cameron, and/or Palliative , Anna Womack    Decisional Support, Goals of Care, Counseling & Coordination  Decisional Capacity Intact?  -No  Health Care Directive on File?  -Not scanned into EPIC, will attempt to get copy faxed from [facility]  Code Status/Resuscitation Preferences?  -DNR/DNI  Plan of Care?  -continue with current medical cares today, re-evaluate progress tomorrow     Discussion  Introduced the scope  of our practice to Mr Coleman's sister. Discussed our potential roles for symptom management, support/coping, and decisional support (aka goals of care).     Met Mr Coleman this morning. He was very lethargic, but did arouse to my voice. He was able to tell me he was in a hospital, but thought it was 1970[facility]. I asked if he remembered why he came to the hospital, but I was unable to understand him as his speech was garbled. His eyes were closed for the majority of my visit and I was unable to engage in a conversation with him. I spoke to his sister Samantha on the phone. She tells me she spoke with Dr Ugalde last evening and understands that her brother is quite sick and might not get better. She also understands that they have started a dobutamine drip to see how much better he can get. She tells me he has had a rough 6 months. She tells me he fell back in August and hit his head and has had a difficult time recovering since then. She said he was in rehab for 100 days and did progress so was discharged to AL facility. He was there only 2 weeks before he fell again and was hospitalized in December. He was then discharged to LTC facility and has been there since. She tells me Kelechi has 5 very good friends who check on him frequently and keep her updated. He has a friend named Breonna who is another medical POA (Samantha tells me his [facility] lists her first and Breonna 2nd). We do not have a copy of this [facility] on file, SW has contacted [facility] to obtain a copy. I discussed with Samantha that if he doesn't get better in the coming days, we are wondering how aggressively we should treat Kelechi. She tells me they discussed no aggressive treatments and she is unsure if placing a pacemaker makes sense considering how weak he is. I explained that sometimes people consider shifting their care to focus on comfort. Samantha tells me she spoke with the hospice team at [facility] prior to this hospital stay. She feels that if he does not get  better, discharging him back to [facility] with hospice would be the best plan of care. If it looks like he will improve and can again work with physical therapy, then she would go with that plan and see how he does, again acknowledging that he may not progress with therapy and then would transition him to hospice. I made a plan to contact Samantha again tomorrow morning once we have a better sense of how much progress he makes and further discuss plans at that time.     Thank you for involving us in the care of this patient and family. We will continue to follow. Please do not hesitate to contact me with questions or concerns or the on-call provider for our team if evening or weekend.    Nisha LUCAS, [facility]  Palliative Medicine   Pager [phone]    Attestation:  Total time on the floor involved in the patient's care: 70 minutes  Total time spent in counseling/care coordination: >50%    Chief Complaint   Goals of Care    History is obtained from the patient, his sister, staff, and extensive chart review.     Adopted from H&P      Mr. Kelechi Coleman is an 82-year-old male with an extensive past medical history pertinent for recent subdural hematoma and subarachnoid hematoma in 08/2017 with subsequent seizures, chronic kidney disease stage III, heart failure with reduced ejection fraction of 30-35% which is down from baseline of 30-35%, peripheral arterial disease, paroxysmal atrial fibrillation on chronic anticoagulation with Plavix, diabetes mellitus type 2, on longstanding insulin, essential hypertension, hyperlipidemia, multiple cerebrovascular accidents, most recent in 09/2016, coronary artery disease, status post CABG x4 and drug-eluting stent x2, most recently in 2016, gastroesophageal reflux disease with esophagitis, renal artery stenosis, and paroxysmal ventricular tachycardia, who presents today from the [facility] heart care.    Past Medical History    I have reviewed this patient's medical history  and updated it with pertinent information if needed.   Past Medical History:   Diagnosis Date     Acute exacerbation of CHF (congestive heart failure) (H) 9/2/2016     Atrial fibrillation (H)      Cerebral infarction (H) 12/13/2013, 9/2016     Diagnosis updated by automated process. Provider to review and confirm.     Coronary artery disease      Diabetes (H)      Gastroesophageal reflux disease with esophagitis 5/8/2017     Hyperlipidaemia      Hypertension      Myocardial infarction      PAD (peripheral artery disease) (H)      Stented coronary artery     CABG 2002 4 VESSEL     Type 2 diabetes mellitus with stage 3 chronic kidney disease, with long-term current use of insulin (H) 11/4/2016     Unspecified cerebral artery occlusion with cerebral infarction        Past Surgical History   I have reviewed this patient's surgical history and updated it with pertinent information if needed.  Past Surgical History:   Procedure Laterality Date     C MRA UPPER EXTREMITY WO&W CONT       CORONARY ARTERY BYPASS  2002    LIMA-LAD, SVG-OM1, [id number]     ENT SURGERY      VOCAL CORD LESION REMOVED     GENITOURINARY SURGERY      ANGIOPLASTY FOR RENAL ARTERT STENOSIS     HC LEFT HEART CATHETERIZATION  9/7/2016 09/07/2016: CHUCHO x2 to distal and proximal SVG to to RPDA     HEART CATH LEFT HEART CATH  11/30/2001     PHACOEMULSIFICATION CLEAR CORNEA WITH STANDARD INTRAOCULAR LENS IMPLANT Left 1/22/2015    Procedure: PHACOEMULSIFICATION CLEAR CORNEA WITH STANDARD INTRAOCULAR LENS IMPLANT;  Surgeon: Bart Johnson MD;  Location: [facility]     VASCULAR SURGERY      ANGIOPLASTY LEFT LEG FOR pvd       Social History   Living situation: has been residing at [facility] facility since December 2017 (prior to that was in TCU at [place] since August)    Family system: never , no children. Has 1 sister who lives in [place] and 5 very close friends who are very involved in his care.     Self-identified support system: sister  and friends    Employment/education: did not assess    Activities/interests: did not assess    Use of community resources: did not assess    Confucianist affiliation: did not assess    Involvement in toni community: did not assess    Impact of illness on patient: unable to assess     Family History   I have reviewed this patient's family history and updated it with pertinent information if needed.   Family History   Problem Relation Age of Onset     DIABETES Mother      Family History Negative Father      CANCER Maternal Grandmother        Allergies   Allergies   Allergen Reactions     Aspirin Hives     Penicillins Hives     Contrast Dye Hives       Medications   Current Facility-Administered Medications Ordered in Epic   Medication Dose Route Frequency Last Rate Last Dose     levothyroxine (SYNTHROID/LEVOTHROID) half-tab 12.5 mcg  12.5 mcg Oral QAM AC         dextrose 10% infusion   Intravenous Continuous 25 mL/hr at 01/18/18 0818       DOBUTamine 500 mg in dextrose 5% 250 mL (adult std conc) premix  2.5-20 mcg/kg/min Intravenous Continuous 21.7 mL/hr at 01/18/18 0926 7.5 mcg/kg/min at 01/18/18 0926     naloxone (NARCAN) injection 0.1-0.4 mg  0.1-0.4 mg Intravenous Q2 Min PRN         melatonin tablet 1 mg  1 mg Oral At Bedtime PRN         acetaminophen (TYLENOL) tablet 650 mg  650 mg Oral Q4H PRN         acetaminophen (TYLENOL) Suppository 650 mg  650 mg Rectal Q4H PRN         oxyCODONE IR (ROXICODONE) tablet 5-10 mg  5-10 mg Oral Q3H PRN         docusate sodium (COLACE) capsule 100 mg  100 mg Oral BID   100 mg at 01/17/18 0837     senna-docusate (SENOKOT-S;PERICOLACE) 8.6-50 MG per tablet 1 tablet  1 tablet Oral BID PRN        Or     senna-docusate (SENOKOT-S;PERICOLACE) 8.6-50 MG per tablet 2 tablet  2 tablet Oral BID PRN         polyethylene glycol (MIRALAX/GLYCOLAX) Packet 17 g  17 g Oral Daily PRN   17 g at 01/17/18 1501     ondansetron (ZOFRAN-ODT) ODT tab 4 mg  4 mg Oral Q6H PRN        Or     ondansetron  (ZOFRAN) injection 4 mg  4 mg Intravenous Q6H PRN         Reason beta blocker not prescribed   Other DOES NOT GO TO MAR         atorvastatin (LIPITOR) tablet 40 mg  40 mg Oral At Bedtime   40 mg at 01/16/18 2105     clopidogrel (PLAVIX) tablet 75 mg  75 mg Oral Daily   75 mg at 01/17/18 0836     levETIRAcetam (KEPPRA) tablet 500 mg  500 mg Oral BID   500 mg at 01/17/18 2209     pantoprazole (PROTONIX) EC tablet 40 mg  40 mg Oral At Bedtime   40 mg at 01/17/18 2209     glucose 40 % gel 15-30 g  15-30 g Oral Q15 Min PRN        Or     dextrose 50 % injection 25-50 mL  25-50 mL Intravenous Q15 Min PRN        Or     glucagon injection 1 mg  1 mg Subcutaneous Q15 Min PRN         insulin aspart (NovoLOG) inj (RAPID ACTING)  1-7 Units Subcutaneous TID AC         insulin aspart (NovoLOG) inj (RAPID ACTING)  1-5 Units Subcutaneous At Bedtime         ipratropium - albuterol 0.5 mg/2.5 mg/3 mL (DUONEB) neb solution 3 mL  3 mL Nebulization Q4H PRN         No current Epic-ordered outpatient prescriptions on file.       Review of Systems   The comprehensive review of systems is negative other than noted in the assessment/plan.    Physical Exam   Temp: 97.8  F (36.6  C) Temp src: Oral BP: (!) 100/37   Heart Rate: 51 Resp: 9 SpO2: 100 % O2 Device: Nasal cannula Oxygen Delivery: 1 LPM  Vitals:    01/16/18 1600 01/17/18 0500   Weight: 95.8 kg (211 lb 1.6 oz) 96.3 kg (212 lb 6.4 oz)     CONSTITUTIONAL: NAD, A&Ox2, lethargic  HEENT: NCAT, dry MM  NECK: Supple  CARDIOVASCULAR: exam deferred   RESPIRATORY: NL respiratory effort on oxygen via NC  GASTROINTESTINAL: exam deferred   MUSCULOSKELETAL:  Moving freely in bed   SKIN: Warm and intact. No concerning lesions or rashes on exposed skin surfaces   NEUROLOGIC: minimal engagement during our conversation   PSYCH: Affect flat     Data   Results for orders placed or performed during the hospital encounter of 01/16/18 (from the past 24 hour(s))   Glucose by meter   Result Value Ref Range     Glucose 75 70 - 99 mg/dL   Nephrology IP Consult: Patient to be seen: Routine - within 24 hours; JUVENTINO, concern that decreased renal function contributing to bradycardia and LV dysfunction.; Consultant may enter orders: Yes    Narrative    Virgil Melgar MD     1/17/2018  6:54 PM  [facility]    Nephrology Consultation     Date of Admission:  1/16/2018    Assessment & Plan      Kelechi Coleman is a 82 year old male who was admitted on   1/16/2018 via the heart clinic.     1) Baseline Stage 3 CDK:  On basis of bilateral renal cortical   atrophy.    2) JUVENTINO:  Decline in kidney function may be multifactorial -   increased diuresis, decline in LV performance, bradycardia.    3) Decline in LV Performance:  LVEF < 20%.  Cause unclear.    4) Severe Aortic Stenosis    5) Bradycardia:  This may also be multifactorial.  Carvedilol and   reduced clearance.  Hypothyroidism. High potassium not yet high   enough to be a factor.    Suggest:    No IV fluid  Loop diuretic as tolerated.  He had one dose this afternoon.  Dr. Ugalde has ordered some dobutamine  Dr. Pereira has added thyroid hormone.    I will add a BMP for tonight to follow K and renal fucntion.    He is in a very tight spot with very low LVEF, tight AS, renal   failure and hypothyroidism.  He may not survive this combination of problems.          Virgil Melgar MD  [facility] - Nephrology  [phone]    Reason for Consult      I was asked to see the patient for JUVENTINO and hyperkalemia.      Primary Care Physician      Laquita Deal    Chief Complaint      I feel terrible.      History is obtained from the patient and chart review.      History of Present Illness      Kelechi Coleman is a 82 year old male who presents with   JUVENTINO.     He saw Dr. Nielsen at the heart clinic on 12/26.  He appeared to have worsening CHF at the time.  His torsemide was increased from 20 mg to 30 mg daily.    He was seen in follow up yesterday at the  Heart Clinic by Cristina Plummer NP.  He was found to be in yet worse CHF with increased edema,   diminished BS L base, and bradycardia.    After admission he has been found to have:    Further decline in his LVEF to <20%.  Formerly it was 40-45%.  First degree AV block with HR ~40.  Declining renal function cr/gfr 2/32.  Baseline cr was 1.4/48.   Mild hyperkalemia - K 5.3.  Hypothyroidism - TSH 37.  Free T 0.67.    He has been transferred to CCU for IV dobutamine.    He is unable to talk very much and it is very hard to understand   his speech.    He says he is very tired and wants to sleep but he becomes too   short of breath to rest.  No chest pain.    He did have a recent fall with scalp laceration.    He has CKD stage 3 cr/gfr 1.4/48.  The only kidney imaging that I could find was CTA from 2013.    At that time he had patent renal arteries and bilateral renal   cortical atrophy.    UA last month was bland.    Past Medical History   I have reviewed this patient's medical history and updated it   with pertinent information if needed.   Past Medical History:   Diagnosis Date     Acute exacerbation of CHF (congestive heart failure) (H)   9/2/2016     Atrial fibrillation (H)      Cerebral infarction (H) 12/13/2013, 9/2016     Diagnosis updated by automated process. Provider to review and   confirm.     Coronary artery disease      Diabetes (H)      Gastroesophageal reflux disease with esophagitis 5/8/2017     Hyperlipidaemia      Hypertension      Myocardial infarction      PAD (peripheral artery disease) (H)      Stented coronary artery     CABG 2002 4 VESSEL     Type 2 diabetes mellitus with stage 3 chronic kidney disease,   with long-term current use of insulin (H) 11/4/2016     Unspecified cerebral artery occlusion with cerebral infarction          Past Surgical History   I have reviewed this patient's surgical history and updated it   with pertinent information if needed.  Past Surgical History:   Procedure  Laterality Date     C MRA UPPER EXTREMITY WO&W CONT       CORONARY ARTERY BYPASS  2002    LIMA-LAD, SVG-OM1, [id number]     ENT SURGERY      VOCAL CORD LESION REMOVED     GENITOURINARY SURGERY      ANGIOPLASTY FOR RENAL ARTERT STENOSIS     HC LEFT HEART CATHETERIZATION  9/7/2016 09/07/2016: CHUCHO x2 to distal and proximal SVG to to RPDA     HEART CATH LEFT HEART CATH  11/30/2001     PHACOEMULSIFICATION CLEAR CORNEA WITH STANDARD INTRAOCULAR LENS   IMPLANT Left 1/22/2015    Procedure: PHACOEMULSIFICATION CLEAR CORNEA WITH STANDARD   INTRAOCULAR LENS IMPLANT;  Surgeon: Bart Johnson MD;    [facility]     VASCULAR SURGERY      ANGIOPLASTY LEFT LEG FOR pvd       Prior to Admission Medications   Prior to Admission Medications   Prescriptions Last Dose Informant Patient Reported? Taking?   Calcium Carb-Cholecalciferol 600-800 MG-UNIT TABS 1/16/2018 at   noon Nursing Home Yes Yes   Sig: Take 1 tablet by mouth 3 times daily (with meals)    Multiple Vitamins-Minerals (CENTRUM SILVER) per tablet 1/16/2018   at am Nursing Home No Yes   Sig: Take 1 tablet by mouth daily   OXCARBAZEPINE PO 1/15/2018 at pm Nursing Home Yes Yes   Sig: Take 150 mg by mouth At Bedtime Through 1/23/2018, then stop     acetaminophen (TYLENOL) 325 MG tablet 1/14/2018 at Unknown time   Nursing Home Yes Yes   Sig: Take 650 mg by mouth every 6 hours as needed    atorvastatin (LIPITOR) 40 MG tablet 1/15/2018 at Unknown time   Nursing Home Yes Yes   Sig: Take 40 mg by mouth At Bedtime    blood glucose monitoring (NO BRAND SPECIFIED) test strip  Nursing   Home Yes No   Sig: Use to test blood sugar 4 times daily or as directed.   carvedilol (COREG) 12.5 MG tablet 1/16/2018 at am Nursing Home   Yes Yes   Sig: Take 12.5 mg by mouth daily   clopidogrel (PLAVIX) 75 MG tablet 1/16/2018 at am Nursing Home   Yes Yes   Sig: Take 75 mg by mouth daily   hydrALAZINE (APRESOLINE) 50 MG tablet 1/16/2018 at am Nursing   Home No Yes   Sig: Take 0.5 tablets  (25 mg) by mouth 2 times daily   insulin aspart (NOVOLOG PEN) 100 UNIT/ML injection 1/16/2018 at   noon Nursing Home No Yes   Sig: Inject 5 Units Subcutaneous 3 times daily (with meals)   insulin glargine (LANTUS SOLOSTAR) 100 UNIT/ML injection   1/15/2018 at pm Nursing Home No Yes   Sig: Inject 6 Units Subcutaneous At Bedtime   isosorbide mononitrate (IMDUR) 30 MG 24 hr tablet 1/16/2018 at am   Nursing Home No Yes   Sig: Take 3 tablets (90 mg) by mouth daily   levETIRAcetam (KEPPRA) 500 MG tablet 1/16/2018 at am Nursing Home   Yes Yes   Sig: Take 500 mg by mouth 2 times daily   mirtazapine (REMERON) 15 MG tablet 1/15/2018 at pm Nursing Home   Yes Yes   Sig: Take 15 mg by mouth At Bedtime   pantoprazole (PROTONIX) 40 MG EC tablet 1/15/2018 at pm Nursing   Home No Yes   Sig: TAKE 1 TABLET BY MOUTH EVERY EVENING   spironolactone (ALDACTONE) 25 MG tablet 1/16/2018 at am Nursing   Home No Yes   Sig: Take 1 tablet (25 mg) by mouth daily   thiamine 100 MG tablet 1/16/2018 at am Nursing Home Yes Yes   Sig: Take 100 mg by mouth daily   torsemide (DEMADEX) 10 MG tablet 1/16/2018 at am Nursing Home Yes   Yes   Sig: Take 30 mg by mouth daily      Facility-Administered Medications: None     Allergies   Allergies   Allergen Reactions     Aspirin Hives     Penicillins Hives     Contrast Dye Hives       Social History   I have reviewed this patient's social history and updated it with   pertinent information if needed. Kelechi Coleman  reports   that he quit smoking about 19 years ago. His smoking use included   Pipe. He quit after 40.00 years of use. He has never used   smokeless tobacco. He reports that he does not drink alcohol or   use illicit drugs.    Family History   I have reviewed this patient's family history and updated it with   pertinent information if needed.   Family History   Problem Relation Age of Onset     DIABETES Mother      Family History Negative Father      CANCER Maternal Grandmother        Review  of Systems   The 10 point Review of Systems is not possible.      Physical Exam   Temp: 93.6  F (34.2  C) Temp src: Oral BP: 106/65   Heart Rate:   51 Resp: 19 SpO2: 100 % O2 Device: None (Room air)    Vital Signs with Ranges  Temp:  [93.6  F (34.2  C)] 93.6  F (34.2  C)  Heart Rate:  [38-51] 51  Resp:  [18-20] 19  BP: (103-124)/(60-77) 106/65  SpO2:  [95 %-100 %] 100 %  212 lbs 6.4 oz    GENERAL: ill appearing, pale,   HEENT:  Normocephalic. No gross abnormalities.  Pupils equal.    MMM.  Dentition is poor.     CV: RRR, no murmurs, no clicks, gallops, or rubs, 2+ BLE edema,   no carotid bruit, carotid upstroke severely reduced.    RESP: Reduced BS bases L > R.    GI: Abdomen soft/nt/nd, BS normal. No masses, organomegaly  MUSCULOSKELETAL: muscle wasting.    SKIN: no suspicious lesions or rashes, dry to touch  NEURO:  Globally weak.    PSYCH: unable  LYMPH: No palpable ant/post cervical and supraclavicular   adenopathy    Data   BMP  Recent Labs  Lab 01/17/18  0545 01/16/18  2335 01/16/18  1617 01/16/18  1211    138 140 135*   POTASSIUM 5.4* 5.3 4.8 5.3*   CHLORIDE 104 103 105 101   MIGUEL 8.9 8.6 8.4* 9.2   CO2 28 29 27 26   BUN 67* 65* 64* 64*   CR 1.81* 1.81* 1.74* 2.03*   GLC 77 72 60* 182*     Phos@LABRCNTIPR(phos:4)  CBC)  Recent Labs  Lab 01/17/18  0545   WBC 5.8   HGB 11.1*   HCT 33.5*   MCV 89   *     No lab results found in last 7 days.    Invalid input(s): BILIRUBININDIRECT  No lab results found in last 7 days.  No results found for: D2VIT, D3VIT, DTOT    Recent Labs  Lab 01/17/18  0545   HGB 11.1*   HCT 33.5*   MCV 89     No results for input(s): PTHI in the last 168 hours.   Glucose by meter   Result Value Ref Range    Glucose 74 70 - 99 mg/dL   Glucose by meter   Result Value Ref Range    Glucose 87 70 - 99 mg/dL   Glucose by meter   Result Value Ref Range    Glucose 74 70 - 99 mg/dL   EKG 12-lead, tracing only   Result Value Ref Range    Interpretation ECG Click View Image link to view  waveform and result    Basic metabolic panel   Result Value Ref Range    Sodium 138 133 - 144 mmol/L    Potassium 6.0 (H) 3.4 - 5.3 mmol/L    Chloride 102 94 - 109 mmol/L    Carbon Dioxide 27 20 - 32 mmol/L    Anion Gap 9 3 - 14 mmol/L    Glucose 73 70 - 99 mg/dL    Urea Nitrogen 68 (H) 7 - 30 mg/dL    Creatinine 1.97 (H) 0.66 - 1.25 mg/dL    GFR Estimate 33 (L) >60 mL/min/1.7m2    GFR Estimate If Black 40 (L) >60 mL/min/1.7m2    Calcium 9.0 8.5 - 10.1 mg/dL   Glucose by meter   Result Value Ref Range    Glucose 88 70 - 99 mg/dL   Potassium   Result Value Ref Range    Potassium 5.8 (H) 3.4 - 5.3 mmol/L   EKG 12-lead   Result Value Ref Range    Interpretation ECG Click View Image link to view waveform and result    Potassium   Result Value Ref Range    Potassium 5.0 3.4 - 5.3 mmol/L   Glucose by meter   Result Value Ref Range    Glucose 112 (H) 70 - 99 mg/dL   CBC with platelets   Result Value Ref Range    WBC 5.7 4.0 - 11.0 10e9/L    RBC Count 3.78 (L) 4.4 - 5.9 10e12/L    Hemoglobin 10.9 (L) 13.3 - 17.7 g/dL    Hematocrit 33.8 (L) 40.0 - 53.0 %    MCV 89 78 - 100 fl    MCH 28.8 26.5 - 33.0 pg    MCHC 32.2 31.5 - 36.5 g/dL    RDW 17.9 (H) 10.0 - 15.0 %    Platelet Count 96 (L) 150 - 450 10e9/L   Comprehensive metabolic panel   Result Value Ref Range    Sodium 138 133 - 144 mmol/L    Potassium 5.0 3.4 - 5.3 mmol/L    Chloride 102 94 - 109 mmol/L    Carbon Dioxide 27 20 - 32 mmol/L    Anion Gap 9 3 - 14 mmol/L    Glucose 107 (H) 70 - 99 mg/dL    Urea Nitrogen 67 (H) 7 - 30 mg/dL    Creatinine 1.98 (H) 0.66 - 1.25 mg/dL    GFR Estimate 33 (L) >60 mL/min/1.7m2    GFR Estimate If Black 39 (L) >60 mL/min/1.7m2    Calcium 8.8 8.5 - 10.1 mg/dL    Bilirubin Total 0.8 0.2 - 1.3 mg/dL    Albumin 2.9 (L) 3.4 - 5.0 g/dL    Protein Total 6.4 (L) 6.8 - 8.8 g/dL    Alkaline Phosphatase 265 (H) 40 - 150 U/L    ALT 47 0 - 70 U/L    AST 48 (H) 0 - 45 U/L   INR   Result Value Ref Range    INR 1.54 (H) 0.86 - 1.14   Glucose by meter    Result Value Ref Range    Glucose 108 (H) 70 - 99 mg/dL

## 2018-01-18 NOTE — PLAN OF CARE
Problem: Patient Care Overview  Goal: Plan of Care/Patient Progress Review  Discharge Planner SLP   Patient plan for discharge: Did not state; pending progress and will reassess tomorrow per chart review  Current status: SLP: Pt able to be aroused with friend present for ST evaluation. Friend reported limited intake at LTC with mostly liquid diet. Pt unable to follow commands for oral motor exam, however, responded to yes/no questions and was agreeable to PO trials. Oral cavity clear and moist. Pt tolerated ice chips x3 with prolonged oral phase. Honey thick liquid via   tsp x8 trialed with adequate oral phase and no overt s/sx of aspiration. Severely prolonged oral phase and delayed swallow with puree solids x2 requiring max cues and liquid wash. Suspect pharyngeal retention. Pt presents with moderate to severe dysphagia secondary to fatigue and level of alertness. Pt, friend and RN educated on recommended: cautiously initiate clear liquid, honey thick diet by teaspoon only when pt alert and upright at 90 degrees. Ok for ice chips. Meds crushed in honey thick liquids. Hold PO if decreased level of alertness or overt s/sx of aspiration and continue excellent oral care.   Barriers to return to prior living situation: Medical condition  Recommendations for discharge: Pending progress/goals of care  Rationale for recommendations: ST to follow to advance to baseline moderate dysphagia with safest, least restrictive diet       Entered by: Nelia Donahue 01/18/2018 1:49 PM

## 2018-01-18 NOTE — PROVIDER NOTIFICATION
Hospitalist notified of pts potassium level of 6.0. Orders given for lasix, kayexilate, insulin and 50% dextrose.     Paged nephrologist re pts potassium and orders placed by Dr ortiz, added diuril and modified kayexilate order.    Paged Omar re pt choked on medications. MD wants to be notified of next potassium level.     Pt has speech eval scheduled, NPO pending results.

## 2018-01-18 NOTE — PLAN OF CARE
Problem: Cardiac: Heart Failure (Adult)  Goal: Signs and Symptoms of Listed Potential Problems Will be Absent, Minimized or Managed (Cardiac: Heart Failure)  Signs and symptoms of listed potential problems will be absent, minimized or managed by discharge/transition of care (reference Cardiac: Heart Failure (Adult) CPG).   Outcome: Declining  Heart Failure Care Pathway  GOALS TO BE MET BEFORE DISCHARGE: hemodynamically stabilized, edema improved    1. Decrease congestion and/or edema with diuretic therapy to achieve near      optimal volume status.            Dyspnea improved:  Yes            Edema improved:     No, please explain:  Continues to have significant edema with little urinary output        Net I/O and Weights since admission:          01/12 2300 - 01/17 2259  In: 840 [P.O.:840]  Out: 700 [Urine:700]  Net: 140            Vitals:    01/16/18 1600 01/17/18 0500   Weight: 95.8 kg (211 lb 1.6 oz) 96.3 kg (212 lb 6.4 oz)       2.  O2 sats > 92% on RA or at prior home O2 therapy level.          Current oxygenation status:       SpO2: 99 %         O2 Device: None (Room air),  Oxygen Delivery: 2 LPM         Able to wean O2 this shift to keep sats > 92%:  Yes       Does patient use Home O2? No    3.  Tolerates ambulation and mobility near baseline: No, please explain: pt on bedrest        How many times did the patient ambulate with nursing staff this shift? 0, due to bedrest    Please review the Heart Failure Care Pathway for additional HF goal outcomes.    HR improving. Will reevaluate need for PPM,  Blood sugar on lower range. MD notified. Pt refused getting up. Potassium 6.0, MD notified. See MAR for orders received, recheck 5.0. Additional check this AM. Dobutamine gtt started, set at 6.5.  Will pass on and continue to monitor.      Kate Thompson RN  1/18/2018

## 2018-01-18 NOTE — PROGRESS NOTES
Received notification of abnormal ziopatch results. PT had 7.1 second pause. Pt currently hospitalized. Nora and  notified. Ziopatch report faxed to hospital. Jonah LE

## 2018-01-18 NOTE — PROGRESS NOTES
"SPIRITUAL HEALTH SERVICES Progress Note  [facility]    Met with pt's friend, Jorge, who was at pt's bedside.  Pt was having a swallow eval, and briefly opened his eyes during my visit, but did not join in conversation.  Jorge has been a friend of the pt since about 1960.  Pt's only sibling, a sister, lives in [place] and has her own health problems.  Jorge said that pt has many friends, several of whom are listed on the chart as contacts.  Per Jorge, pt's health has been declining for the past few months.  Jorge said, \"He's got lots of things wrong with him right now.\"  Pt has been living in a nursing home in [place], although he still owns his own home.  Per Jorge, pt has a connection with [facility], but isn't a regular [employer] goer.  Provided conversation and support.   team available per request.                                                                                                                                                 Caroline Womack M.A.  Staff   Pager [phone]  Phone [phone]      "

## 2018-01-18 NOTE — PLAN OF CARE
Problem: Patient Care Overview  Goal: Plan of Care/Patient Progress Review  PT/CR: Order received for Cardiac Rehab Evaluation. Per chart review, patient noted to be have Cardiomyopathy with a poor prognosis. Per conversation with nursing, patient is set to meet with palliative to discuss further goals of care and is requesting that we complete current order.

## 2018-01-18 NOTE — PROGRESS NOTES
[facility] Cardiology Progress Note          Assessment and Plan:   Assessment:   1. Cardiomyopathy - Mr. Coleman's echo report yesterday suggested further deterioration in function and now moderately severe to severe LV dysfunction.*  Holding his beta blocker has resulted in a mild improvement in HR to high 40's and low 50's BPM with marked first degree heart block.    His prognosis is poor - discussed with his sister (Samantha) in [place] last mPM.  She confirmed that they (Mr. Coleman, his sister and 5 very close friends including one with medical POA) do not want any more aggressive therapy (such as LVAD, uncertain about pacemaker).  I explained that he seems to have been declining for the last 2 months and is now in   She agreed with dobutamine and PICC line - familiar with PICC line.  Agreed with dobutamine after explained that it can limit duration of life but improve quality.  He is very hypothyroid as well and has been initiated on thyroid replacement.    Started dobutamine last PM for what appears to be predominant forward failure.  And will recommend PICC line this AM to continue dobutamine.  Will reassess HR and possible benefit of pacemaker in AM.  Greatly appreciate Nephrology's help yesterday and overnight (treating hyperkalemia).      * Reviewed his echo's myself: LV function appears similar on yesterday's echo as compared to that of one month ago though HR slower (40-45 BPM vs.50-60 BPM).  MR may be somewhat worse and LV function has clearly worsened compared to 9/18/2016.    He would likely benefit from a temporary pacemaker as it is not clear that he will benefit from a permanent pacemaker yet.    He will need consent for a PICC line.    His medical POA will probably need to be involved as well.    Have spoken to Palliative Care - the Hospitalist Service ordered the consult on admission.  Based on my conversation with his sister, Palliative care sounds very appropriate going forward.    2. Aortic  stenosis.  3. CKD - hyperkalemia has been a problem.  4. Sinus bradycardia - as above.  5. History of PAF - not on anticoagulation due to falls, SAH.  6. Diabetes.  7. Hypothyroidism - very high TSH, now on replacement.      Plan:   As above.            Significant Problems:     Patient Active Problem List    Diagnosis Date Noted     Atherosclerosis of native coronary artery of native heart without angina pectoris 12/13/2013     Priority: High     CABG 2002 with LIMA to OM1, SVG to PDA and R PL.  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       CHF (congestive heart failure) (H) 01/16/2018     Priority: Medium     Seizure disorder (H) 01/01/2018     Priority: Medium     NSTEMI (non-ST elevated myocardial infarction) (H) 12/12/2017     Priority: Medium     Seizures (H) 08/20/2017     Priority: Medium     Fall 08/18/2017     Priority: Medium     Gastroesophageal reflux disease with esophagitis 05/08/2017     Priority: Medium     Chronic systolic heart failure (H) 01/30/2017     Priority: Medium     Type 2 diabetes mellitus with stage 3 chronic kidney disease, with long-term current use of insulin (H) 11/04/2016     Priority: Medium     Stroke (cerebrum) (H) 09/16/2016     Priority: Medium     Acute exacerbation of CHF (congestive heart failure) (H) 09/02/2016     Priority: Medium     Paroxysmal atrial fibrillation (H) 12/13/2013     Priority: Medium     Peripheral artery disease (H) 12/13/2013     Priority: Medium     Previous LLE claudication and reduced DAVI 1/2013 with  distal SFA to pop.  Had CTA and eventual peripheral angio with Dr. Cai 5/2013 with PTCA (no stent) of distal SFA with normalization of DAVI 6/2013.  Still with residual infrapopliteal disease with good collateralization.       Hyperlipidemia LDL goal <70 12/13/2013     Priority: Medium     Benign essential hypertension 12/13/2013     Priority: Medium     Paroxysmal ventricular tachycardia (H) 12/13/2013     Priority: Medium     Cerebral infarction (H) 12/13/2013      Priority: Medium     Diagnosis updated by automated process. Provider to review and confirm.               Subjective:     Feels better.          Medications:   Scheduled:    levothyroxine  12.5 mcg Oral QAM AC     docusate sodium  100 mg Oral BID     atorvastatin  40 mg Oral At Bedtime     clopidogrel  75 mg Oral Daily     levETIRAcetam  500 mg Oral BID     pantoprazole  40 mg Oral At Bedtime     insulin aspart  1-7 Units Subcutaneous TID AC     insulin aspart  1-5 Units Subcutaneous At Bedtime          Physical Exam:   All vitals have been reviewed  Temp:  [93.6  F (34.2  C)-97.8  F (36.6  C)] 97.8  F (36.6  C)  Heart Rate:  [43-58] 46  Resp:  [9-19] 13  BP: ()/(46-94) 90/46  SpO2:  [93 %-100 %] 100 %  Vitals:    01/16/18 1600 01/17/18 0500   Weight: 95.8 kg (211 lb 1.6 oz) 96.3 kg (212 lb 6.4 oz)     I/O last 3 completed shifts:  In: 240 [P.O.:240]  Out: 600 [Urine:600]  NAD, coherent and more responsive today.    Head: normocephalic.    Skin: warm and dry and color improved - now pink.    Neck:   +  JVD.   Lungs:   Clear without wheezes, rhonchi but no BS at left base..     Cardiovascular:   S1, S2, regular, 1/6 late systolic murmur.     Abdomen: distended, decreased BS.     Ext's: 2+ leg edema.          Data:   Last Basic Metabolic Panel:  Lab Results   Component Value Date     01/18/2018      Lab Results   Component Value Date    POTASSIUM 5.0 01/18/2018     Lab Results   Component Value Date    CHLORIDE 102 01/18/2018     Lab Results   Component Value Date    MIGUEL 8.8 01/18/2018     Lab Results   Component Value Date    CO2 27 01/18/2018     Lab Results   Component Value Date    BUN 67 01/18/2018     Lab Results   Component Value Date    CR 1.98 01/18/2018     Lab Results   Component Value Date     01/18/2018          Lab Results   Component Value Date    WBC 5.7 01/18/2018    HGB 10.9 (L) 01/18/2018    HCT 33.8 (L) 01/18/2018    MCV 89 01/18/2018    PLT 96 (L) 01/18/2018     Lab Results    Component Value Date    INR 1.54 (H) 01/18/2018     Lab Results   Component Value Date    TROPI 0.476 (HH) 01/16/2018    TROPI 0.463 (HH) 01/16/2018    TROPI 0.447 (HH) 01/16/2018   One hour critical care time, bedside and care coordination.           Cat Ugalde MD  1/18/2018

## 2018-01-18 NOTE — PLAN OF CARE
Problem: Patient Care Overview  Goal: Plan of Care/Patient Progress Review  SLP: Pt getting PICC line placed and is not available for ST evaluation at this time. RN reported difficulty with meds orally last night and decreased level of alertness today with recommended NPO. Pt familiar with ST dept with most recent evaluation in 12/2017 with mild to moderate dysphagia. Pt discharged with dysphagia diet level 3 and thin liquids. Will re-attempt this pm to determine aspiration risk and safest, least restrictive diet.

## 2018-01-19 NOTE — PLAN OF CARE
Problem: Cardiac: Heart Failure (Adult)  Goal: Signs and Symptoms of Listed Potential Problems Will be Absent, Minimized or Managed (Cardiac: Heart Failure)  Signs and symptoms of listed potential problems will be absent, minimized or managed by discharge/transition of care (reference Cardiac: Heart Failure (Adult) CPG).   Outcome: No Change  Heart Failure Care Pathway  GOALS TO BE MET BEFORE DISCHARGE:    1. Decrease congestion and/or edema with diuretic therapy to achieve near      optimal volume status.            Dyspnea improved:  Yes            Edema improved:     No, please explain: +2-3 LE LE edema        Net I/O and Weights since admission:          01/13 1500 - 01/18 1459  In: 1346 [P.O.:840; I.V.:506]  Out: 1600 [Urine:1600]  Net: -254            Vitals:    01/16/18 1600 01/17/18 0500   Weight: 95.8 kg (211 lb 1.6 oz) 96.3 kg (212 lb 6.4 oz)       2.  O2 sats > 92% on RA or at prior home O2 therapy level.          Current oxygenation status:       SpO2: 96 %         O2 Device: None (Room air),  Oxygen Delivery: 2 LPM         Able to wean O2 this shift to keep sats > 92%:  Yes       Does patient use Home O2? No    3.  Tolerates ambulation and mobility near baseline: No, please explain: pt remained in bed, up with lift        How many times did the patient ambulate with nursing staff this shift? 0    Please review the Heart Failure Care Pathway for additional HF goal outcomes.    Shanice Jesus RN  1/18/2018     Pt responding to verbal commands, but disoriented to time/situation. VSS on RA, SBP 90's-100's. Tele: junctional v. SB with BBB, HR 40's. PICC placed. Dobutamine infusing @5mcg/kg/min. D10 @25mL/hr, BG stable. Mancuso in place. Tolerating clear honey-thick liquids. Continue to monitor and evaluate in AM.

## 2018-01-19 NOTE — PROGRESS NOTES
[facility] Cardiology Progress Note          Assessment and Plan:   Assessment:   1. Cardiomyopathy -   -Recent echo suggested further deterioration in function and now moderately severe to severe LV dysfunction.  -Dobutamine drip  -creat stable, 1.98, net neg 334 L, wt stable, 211  Currently not on diuretic, will defer to nephrology     2. Bradycardia  -Holding his beta blocker has resulted in improvement in HR, 41-61 with first degree heart block.  -As HR is somewhat improved,still considering temporary PM, it is not clear that he will benefit from a permanent pacemaker yet.    He appears to be stable reviewing his vitals, labs, I&O. He continues on dobutamine and is resting comfortably on RA.     His prognosis is poor - have been updating his sister (Samantha) in [place]  She confirmed that they (Mr. Coleman, his sister and 5 very close friends including one with medical POA) do not want any more aggressive therapy (such as LVAD, uncertain about pacemaker).  He seems to have been declining for the last 2 months, they agreed to dobutamine and PICC line.    He has been seen by palliative and POA and sister understand, per note, that hospice would be appropriate if he doesn't improve.    2. Aortic stenosis.  3. CKD - hyperkalemia has been a problem.  4. History of PAF - not on anticoagulation due to falls, SAH.  5. Diabetes.  6. Hypothyroidism - very high TSH, now on replacement.      Plan:   As above.            Significant Problems:     Patient Active Problem List    Diagnosis Date Noted     Atherosclerosis of native coronary artery of native heart without angina pectoris 12/13/2013     Priority: High     CABG 2002 with LIMA to OM1, SVG to PDA and R PL.  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       CHF (congestive heart failure) (H) 01/16/2018     Priority: Medium     Seizure disorder (H) 01/01/2018     Priority: Medium     NSTEMI (non-ST elevated myocardial infarction) (H) 12/12/2017     Priority: Medium     Seizures (H)  08/20/2017     Priority: Medium     Fall 08/18/2017     Priority: Medium     Gastroesophageal reflux disease with esophagitis 05/08/2017     Priority: Medium     Chronic systolic heart failure (H) 01/30/2017     Priority: Medium     Type 2 diabetes mellitus with stage 3 chronic kidney disease, with long-term current use of insulin (H) 11/04/2016     Priority: Medium     Stroke (cerebrum) (H) 09/16/2016     Priority: Medium     Acute exacerbation of CHF (congestive heart failure) (H) 09/02/2016     Priority: Medium     Paroxysmal atrial fibrillation (H) 12/13/2013     Priority: Medium     Peripheral artery disease (H) 12/13/2013     Priority: Medium     Previous LLE claudication and reduced DAVI 1/2013 with  distal SFA to pop.  Had CTA and eventual peripheral angio with Dr. Cai 5/2013 with PTCA (no stent) of distal SFA with normalization of DAVI 6/2013.  Still with residual infrapopliteal disease with good collateralization.       Hyperlipidemia LDL goal <70 12/13/2013     Priority: Medium     Benign essential hypertension 12/13/2013     Priority: Medium     Paroxysmal ventricular tachycardia (H) 12/13/2013     Priority: Medium     Cerebral infarction (H) 12/13/2013     Priority: Medium     Diagnosis updated by automated process. Provider to review and confirm.               Subjective:   Resting comfortably off O2.           Medications:   Scheduled:    sodium chloride (PF)  10 mL Intracatheter Q7 Days     levETIRAcetam  500 mg Oral BID     docusate  100 mg Oral BID     pantoprazole  40 mg Intravenous At Bedtime     levothyroxine  12.5 mcg Oral QAM AC     atorvastatin  40 mg Oral At Bedtime     clopidogrel  75 mg Oral Daily     insulin aspart  1-7 Units Subcutaneous TID AC     insulin aspart  1-5 Units Subcutaneous At Bedtime          Physical Exam:   All vitals have been reviewed  Temp:  [98.3  F (36.8  C)] 98.3  F (36.8  C)  Heart Rate:  [43-61] 52  Resp:  [9-19] 19  BP: ()/(35-94) 100/81  SpO2:  [93 %-100  %] 99 %  Vitals:    01/16/18 1600 01/17/18 0500 01/19/18 0500   Weight: 95.8 kg (211 lb 1.6 oz) 96.3 kg (212 lb 6.4 oz) 96.1 kg (211 lb 13.8 oz)     I/O last 3 completed shifts:  In: 875.7 [P.O.:25; I.V.:850.7]  Out: 1050 [Urine:1050]  General: Asleep, difficult to arouse    Head: normocephalic.    Skin: pink, cool extremeties.    Neck:   +  JVD.   Lungs:   Clear without wheezes anterioroly     Cardiovascular:   S1, S2, regular, 1/6 late systolic murmur.     Abdomen: distended, decreased BS.     Ext's: pneumoboots on legs          Data:   Last Basic Metabolic Panel:  Lab Results   Component Value Date     01/18/2018      Lab Results   Component Value Date    POTASSIUM 5.0 01/18/2018     Lab Results   Component Value Date    CHLORIDE 102 01/18/2018     Lab Results   Component Value Date    MIGUEL 8.8 01/18/2018     Lab Results   Component Value Date    CO2 27 01/18/2018     Lab Results   Component Value Date    BUN 67 01/18/2018     Lab Results   Component Value Date    CR 1.98 01/18/2018     Lab Results   Component Value Date     01/18/2018          Lab Results   Component Value Date    WBC 5.7 01/18/2018    HGB 10.9 (L) 01/18/2018    HCT 33.8 (L) 01/18/2018    MCV 89 01/18/2018    PLT 96 (L) 01/18/2018     Lab Results   Component Value Date    INR 1.54 (H) 01/18/2018     Lab Results   Component Value Date    TROPI 0.476 (HH) 01/16/2018    TROPI 0.463 (HH) 01/16/2018    TROPI 0.447 (HH) 01/16/2018              Vanessa Guadalupe, APRN CNP  1/18/2018

## 2018-01-19 NOTE — PROGRESS NOTES
[facility]    Hospitalist Progress Note    Assessment & Plan   Kelechi Colemna is a 82 year old male who was admitted on 1/16/2018 for heart failure unable to manage with diuretic outpatient. Hx of LV dysfunction, CKD, CAD, CABG, AS, DM, Afib not on anticoagulation, Lt subarachnoid hemorrhage, seizure from SAH. Patient is on low CO failure and low EF, severe AS and bradycardia with altered MS, JUVENTINO. Prognosis is grim and comfort care measure would be appropriate. Palliative care has seen patient.     Acute decompensated HfrEF (30-35%): Patient  with prior ejection fraction of 30-35% about a month ago, was on torsemide as outpatient and managed by cardiology, torsemide dose was increased recently but despite that, leg edema and dyspnea worsened.  Also noted worsened creatinine, and was bradycardic to upper 30s, so he was sent to hospital from cardiology office is a direct admit.   -Repeat echo now shows LVEF less than 20%, severe global hypokinesia of LV, anterior, septal and apical wall akinesis moderately dilated RV with moderate to severely reduced RV systolic function, and possibly severe left ear.  -Decompensation possibly due to bradycardia  -PTA beta-blocker on hold, heart rate has improved to 50s, evaluated by cardiology, considering temporary pacemaker though unclear benefit or cardiology.  -Started on IV Lasix on admission, received 3 doses of 40 mg IV but given worsened creatinine, Lasix discontinued 11/17.  -Also on dobutamine since admission and PICC line placed 1/18, urine output has improved, about 1 L in the last 24 hours without Lasix, creatinine stable at 1.9, likely resume diuretic, defer to cardiology/nephrology.  - PTA aldactone on hold.     Sinus bradycardia with  1st degree AVB and occasional junctional rythm  - Holding BB  - Started on dobutamine, heart rate now is improved to 50s  - TSH high, T4 low so some element of hypothyroidism present. Started low dose synthroid  and increase slowly.  -Cardiology considering temporary pacemaker.    Acute kidney injury on CKD stage III:   Hyperkalemia:  -Creatinine mostly 1.4-1.5 mostly as outpatient though his weight is 1.1 last month.  On torsemide as outpatient which was increased recently.  -Creatinine worsened to 1.7 with above  -Also hyperkalemic with potassium up to 6.  -Diuretic on hold, creatinine worsened to 1.98 but stable, nephrology consulted, Lasix on hold, defer diuretic management to nephrology/cardiology.  -Hypokalemia resolved  -Urine output approximately 1 L in the last 24 hours    CAD, S/P CABG: stable no chest pain. Elevated but flat troponin.  -PTA Plavix and Lipitor resumed  -PTA Aldactone, beta-blocker, Imdur held due to hypotension/AKA  -Troponin elevated but flat likely due to acute decompensation of heart failure as above    Severe AS  - SANAZ might be helpful to confirm but likely won't tolerated without intubation and would not change outcome at this time  - have to be careful with dobutamine given AS    - avoid hypotension     Hx of SAH and seizure;  - continue his seizure treatment     DM, insulin-dependent  -PT is on insulin glargine 6 units daily,  -He was running low blood glucose, likely due to low glucose, and on D10 drip at 25cc/hr  -PTA insulin glargine held, continue ISS if needed  - BS in mid 100s today    8. Mild anemia and thrombocytopenia  - Hb stable, platelet low, chronic.  - Monitor  - Given CKD, if improves, and aggressive management continued, will check anemia labs including epo level prior to dc    9. Dysphagia: noted coughing spells while drinking  - dysphagia due to depressed level of consciousness.  -S/p SLP eval, placed on honey thick diet.    DVT Prophylaxis: Pneumatic Compression Devices  Code Status: DNR/DNI    Disposition: Expected discharge: several days[facility] and based on his clinical course, will review with his sister and palliative care team on  monday.     Liana Thorpe MD  Hospitalist    Interval History   patient was seen and evaluated, chart reviewed, discussed with nursing staff.  Remains lethargic but weaned off oxygen and currently is on room air, denies dyspnea chest pain nausea.  -PICC line placed on right arm 1/18/2018, dobutamine now continued through the PICC line  -Lasix was held since 1/17 night, has 1 L urine output  -Bradycardia slightly better and is mostly in 50s now  -Blood pressure is maintained.  -Remains afebrile.    -Data reviewed today: I reviewed all new labs and imaging results over the last 24 hours.   Physical Exam   Temp: 97.8  F (36.6  C) Temp src: Tympanic BP: 98/63   Heart Rate: 58 Resp: 16 SpO2: 95 % O2 Device: None (Room air) Oxygen Delivery: 2 LPM  Vitals:    01/16/18 1600 01/17/18 0500 01/19/18 0500   Weight: 95.8 kg (211 lb 1.6 oz) 96.3 kg (212 lb 6.4 oz) 96.1 kg (211 lb 13.8 oz)     Vital Signs with Ranges  Temp:  [97.8  F (36.6  C)-98.3  F (36.8  C)] 97.8  F (36.6  C)  Heart Rate:  [44-61] 58  Resp:  [11-19] 16  BP: ()/(35-89) 98/63  SpO2:  [93 %-100 %] 95 %  I/O last 3 completed shifts:  In: 875.7 [P.O.:25; I.V.:850.7]  Out: 1050 [Urine:1050]      Constitutional: looks chronically ill, but not in distress  Neuro: somnolent, but calm, wakes up and answer simple questions, knows he is in hospital.   Moving all extremities, strength symmetrical  Grossly non focal: Yes   HENT: Pupils equal and reactive to light : Yes   Neck No obvious mass or swelling  CV: Bradycrdiac, HR low 50   Neck vein: distended  S1S2 no gallop,  No rub, 1/6 NENA, bradycardia, irreg rhythm  LE Edema: 3+, upto waist, mildly swollen scrotum  Pulm: decreased at the bases, no obvious rales. No wheezing, no resp distress. On room air.  Abdomen: distended, non tender, Bowel sound present. abd wall edema (+)  Uro-genital: Mancuso present: yes  Extremities:  3+ LE bilateral extending to thigh and waist  Skin: No rash. fair capillary  refill.        Medications     dextrose 25 mL/hr at 01/19/18 0914     DOBUTamine 5 mcg/kg/min (01/19/18 0907)     Reason beta blocker order not selected         sodium chloride (PF)  10 mL Intracatheter Q7 Days     levETIRAcetam  500 mg Oral BID     docusate  100 mg Oral BID     pantoprazole  40 mg Intravenous At Bedtime     levothyroxine  12.5 mcg Oral QAM AC     atorvastatin  40 mg Oral At Bedtime     clopidogrel  75 mg Oral Daily     insulin aspart  1-7 Units Subcutaneous TID AC     insulin aspart  1-5 Units Subcutaneous At Bedtime       Data     Recent Labs  Lab 01/19/18  0635 01/18/18  2250 01/18/18  0538 01/18/18  0532  01/17/18  1945 01/17/18  0545 01/16/18  2335 01/16/18 2010 01/16/18  1617   WBC  --   --   --  5.7  --   --  5.8  --   --   --    HGB  --   --   --  10.9*  --   --  11.1*  --   --   --    MCV  --   --   --  89  --   --  89  --   --   --    PLT  --   --   --  96*  --   --  112*  --   --   --    INR  --   --  1.54*  --   --   --   --   --   --   --      --   --  138  --  138 139 138  --  140   POTASSIUM 4.5 4.6  --  5.0  < > 6.0* 5.4* 5.3  --  4.8   CHLORIDE 101  --   --  102  --  102 104 103  --  105   CO2 29  --   --  27  --  27 28 29  --  27   BUN 70*  --   --  67*  --  68* 67* 65*  --  64*   CR 1.96*  --   --  1.98*  --  1.97* 1.81* 1.81*  --  1.74*   ANIONGAP 8  --   --  9  --  9 7 6  --  8   MIGUEL 8.9  --   --  8.8  --  9.0 8.9 8.6  --  8.4*   *  --   --  107*  --  73 77 72  --  60*   ALBUMIN 2.8*  --   --  2.9*  --   --   --   --   --   --    PROTTOTAL 6.4*  --   --  6.4*  --   --   --   --   --   --    BILITOTAL 1.5*  --   --  0.8  --   --   --   --   --   --    ALKPHOS 257*  --   --  265*  --   --   --   --   --   --    ALT 44  --   --  47  --   --   --   --   --   --    AST 42  --   --  48*  --   --   --   --   --   --    TROPI  --   --   --   --   --   --   --  0.476* 0.463* 0.447*   < > = values in this interval not displayed.    Imaging:  Recent Results (from the past 24  hour(s))   XR Chest Port 1 View    Narrative    XR CHEST PORT 1 VW 1/18/2018 1:41 PM    HISTORY: PICC line placement.    COMPARISON: 1/16/2018    FINDINGS: Right PICC line tip is in the projection of the SVC/RA  junction. Small right and moderate left pleural effusions are  redemonstrated. No pneumothorax.      Impression    IMPRESSION: Right PICC line appears appropriately positioned.    MANUELITO NAVARRO MD

## 2018-01-19 NOTE — PROVIDER NOTIFICATION
Notified: Dr Thorpe    01/19/18 3:57 PM    Notification Interaction: Telephone    Purpose of Notification: Pt needing restraints to right wrist and right ankle post temp pacemaker placement to deter from pulling out leads. Pt also having more issues with aspiration per day nurse and nutrition is poor.     Orders Received: MD will review and place orders as necessary. OK with restraints at this time.

## 2018-01-19 NOTE — PROGRESS NOTES
Called and left non detailed voice message for pt's sister Samantha asking to return call. Need to update sister on status and use of restraints. Awaiting callback.       4:32 PM  Pt's sister Samantha returned call. Updated her on pt's status and need for restraint. She verbalized understanding.

## 2018-01-19 NOTE — CONSULTS
Patient with end stage heart failure and bradycardia. He is undergoing evaluation for hospice/palliative care, but might be a permanent pacemaker candidate should he stabilize. His managing physicians have requested a temporary pacemaker.

## 2018-01-19 NOTE — PROGRESS NOTES
[facility]    Nephrology Progress Note     Assessment & Plan     Kelechi Coleman is a 82 year old male who was admitted on 1/16/2018 via the heart clinic.       1) Baseline Stage 3 CKD:  On basis of bilateral renal cortical atrophy.      2) JUVENTINO:  Decline in kidney function may be multifactorial - increased diuresis, decline in LV performance, bradycardia.      3) Decline in LV Performance:  LVEF < 20%.  Cause unclear.      4) Severe Aortic Stenosis      5) Bradycardia:  This may also be multifactorial.  Carvedilol and reduced clearance.  Hypothyroidism. High potassium not yet high enough to be a factor.     6) Hyperkalemia:  Better.     Suggest:     Same Rx  Prognosis is guarded.         Virgil Melgar MD  [facility] - Nephrology  [phone]    Interval History     S/P temporary pacer  Still on dobutamine 5 mcg/kg/min  Still unable to answer any questions.      Physical Exam   Temp: 97.8  F (36.6  C) Temp src: Tympanic BP: 124/76   Heart Rate: 64 Resp: 15 SpO2: 94 % O2 Device: None (Room air)    Vitals:    01/16/18 1600 01/17/18 0500 01/19/18 0500   Weight: 95.8 kg (211 lb 1.6 oz) 96.3 kg (212 lb 6.4 oz) 96.1 kg (211 lb 13.8 oz)     Vital Signs with Ranges  Temp:  [97.8  F (36.6  C)-98.3  F (36.8  C)] 97.8  F (36.6  C)  Heart Rate:  [44-64] 64  Resp:  [11-19] 15  BP: ()/(35-89) 124/76  SpO2:  [93 %-99 %] 94 %  I/O last 3 completed shifts:  In: 684.7 [P.O.:25; I.V.:659.7]  Out: 800 [Urine:800]    GENERAL APPEARANCE: pleasant, NAD, a & o  HEENT:  Eyes/ears/nose/neck grossly normal  RESP: reduced BS globally  CV: RRR, nl S1/S2, no m/r/g   ABDOMEN: o/s/nt/nd, bs present  EXTREMITIES/SKIN: no rashes/lesions; no edema    Medications     NaCl       dextrose 25 mL/hr at 01/19/18 0914     DOBUTamine 5 mcg/kg/min (01/19/18 0907)     Reason beta blocker order not selected         [START ON 1/20/2018] pantoprazole  40 mg Oral QAM     sodium chloride (PF)  3 mL Intracatheter Q8H      [START ON 1/20/2018] aspirin EC  81 mg Oral Daily     sodium chloride (PF)  10 mL Intracatheter Q7 Days     levETIRAcetam  500 mg Oral BID     docusate  100 mg Oral BID     levothyroxine  12.5 mcg Oral QAM AC     atorvastatin  40 mg Oral At Bedtime     clopidogrel  75 mg Oral Daily     insulin aspart  1-7 Units Subcutaneous TID AC     insulin aspart  1-5 Units Subcutaneous At Bedtime       Data   BMP  Recent Labs  Lab 01/19/18  0635 01/18/18  2250 01/18/18  0532 01/17/18  2335  01/17/18  1945 01/17/18  0545     --  138  --   --  138 139   POTASSIUM 4.5 4.6 5.0 5.0  < > 6.0* 5.4*   CHLORIDE 101  --  102  --   --  102 104   MIGUEL 8.9  --  8.8  --   --  9.0 8.9   CO2 29  --  27  --   --  27 28   BUN 70*  --  67*  --   --  68* 67*   CR 1.96*  --  1.98*  --   --  1.97* 1.81*   *  --  107*  --   --  73 77   < > = values in this interval not displayed.  [email or web address](phos:4)  CBC)  Recent Labs  Lab 01/18/18  0532 01/17/18  0545   WBC 5.7 5.8   HGB 10.9* 11.1*   HCT 33.8* 33.5*   MCV 89 89   PLT 96* 112*       Recent Labs  Lab 01/19/18  0635   AST 42   ALT 44   ALKPHOS 257*   BILITOTAL 1.5*       Recent Labs  Lab 01/18/18  0538   INR 1.54*     No results found for: D2VIT, D3VIT, DTOT    Recent Labs  Lab 01/18/18  0532   HGB 10.9*   HCT 33.8*   MCV 89     No results for input(s): PTHI in the last 168 hours.    Attestation:   I have reviewed today's relevant vital signs, notes, medications, labs and imaging.

## 2018-01-19 NOTE — PROGRESS NOTES
[facility]    Palliative Care Progress Note    Kelechi Coleman  MRN# [id number]  Date of Admission:  1/16/2018  Date of Service (when I saw the patient): 01/19/2018    Assessment & Plan   Kelechi Coleman is a 82 year old male who was admitted on 1/16/2018 for heart failure. 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 Palliative Care was consulted for goals of care discussion.      Symptoms/Recommendations   1. Goals of Care. Checked in with Mr Coleman this morning, he is quite lethargic and unable to have conversation with me. I spoke with his sister Samantha (who is his POA) on the phone this afternoon (see details of discussion below). She tells me she is okay with the plan to place a temporary pacemaker. We discussed continuing with current medical cares for the next couple days and re-evaluating how much progress Kelechi has on Monday.      Support/Coping  -spoke with pt's sister Samantha on the phone this morning  -Will involve Palliative LICSW, Lashanda Cameron, and/or Palliative , Anna Womack     Decisional Support, Goals of Care, Counseling & Coordination  Decisional Capacity Intact?  -No  Health Care Directive on File?  -Yes - in paper chart  Code Status/Resuscitation Preferences?  -DNR/DNI  Plan of Care?  -continue with current medical cares, re-evaluate on Monday     Thank you for involving us in the care of this patient and family. We will continue to follow. Please do not hesitate to contact me with questions or concerns or the on-call provider for our team if evening or weekend.    Nisha LUCAS, SOFIA  Palliative Medicine   Pager [phone]    Attestation:  Total time on the floor involved in the patient's care: 50 minutes  Total time spent in counseling/care coordination: >50%    Interval History   Checked in with Mr Coleman this morning, he was resting in bed, RN administering medications. Kelechi  "opened his eyes to acknowledge my presence, but had his eyes closed for the remainder of my visit. I asked if he knew where he was today and he replied, \"of course I do.\" When I asked him to tell me where he was he did not respond. I asked further orientation questions, but Kelechi did not have any further responses. He appeared to be having difficulty with swallowing his medications. I spoke with both the cardiologist (Dr Ugalde) and hospitalist (Dr Thorpe) this morning at length regarding sal's overall prognosis. It is difficult at this time to say how he will do, feel it is reasonable to give him the weekend to see how much progress he makes. I spoke with Kelechi's sister Samantha on the phone. She tells me she had already spoken to Dr Ugalde and Dr Thorpe as well, is agreeable with plan to place a temporary pacemaker and see how Kelechi does. I explain to Samantha that Kelechi is quite sleepy and I haven't gotten much conversation out of him during my 2 visits. Samantha tells me that is pretty normal for him. She tells me when his friends go to visit him at [place], they have a hard time waking up. She then tells me he works with physical therapy, but then spends the rest of the day sleeping. I ask her if she has a sense of how much he is eating and drinking and she tells me she thinks he is eating and drinking well as he has gained about 20lbs recently (wonder if this could be water weight?). I shared with Samantha that Kelechi was evaluated by the speech therapist who recommended a special diet because Kelechi is experiencing some swallowing dysfunction. I explain that it could be due to him being so lethargic, perhaps related to global weakness, some other process or a combination of factors. We agreed to see how he does following placement of the temporary pacemaker (see if he perks up some) and re-evaluate his overall function on Monday before determining what the best plan of care is for Kelechi.     Medications   Current " Facility-Administered Medications Ordered in Epic   Medication Dose Route Frequency Last Rate Last Dose     lidocaine 1 % 1 mL  1 mL Other Q1H PRN         lidocaine (LMX4) cream   Topical Q1H PRN         sodium chloride (PF) 0.9% PF flush 3 mL  3 mL Intracatheter Q1H PRN         sodium chloride (PF) 0.9% PF flush 3 mL  3 mL Intracatheter Q8H         potassium chloride SA (K-DUR/KLOR-CON M) CR tablet 20 mEq  20 mEq Oral Once PRN         LORazepam (ATIVAN) injection 0.5 mg  0.5 mg Intravenous Q2H PRN        Or     LORazepam (ATIVAN) tablet 0.5 mg  0.5 mg Oral Q2H PRN         reason aspirin not prescribed (intentional)   Other Continuous PRN         [START ON 1/20/2018] pantoprazole (PROTONIX) EC tablet 40 mg  40 mg Oral QAM         acetylcholine (MIOCHOL-E) 2,000 mcg in NaCl 0.9 % 100 mL . Bag contains multiple doses. Each dose = 20 mcg  20 mcg INTRACORONARY Once        Followed by     acetylcholine (MIOCHOL-E) 2,000 mcg in NaCl 0.9 % 100 mL . Bag contains multiple doses. Each dose = 50 mcg  50 mcg INTRACORONARY Once        Followed by     acetylcholine (MIOCHOL-E) 2,000 mcg in NaCl 0.9 % 100 mL . Bag contains multiple doses. Each dose = 50 mcg  50 mcg INTRACORONARY Once         acetylcholine (MIOCHOL-E) 2,000 mcg in NaCl 0.9 % 100 mL . Bag contains multiple doses. Each dose = 20 mcg  20 mcg INTRACORONARY Once        Followed by     acetylcholine (MIOCHOL-E) 2,000 mcg in NaCl 0.9 % 100 mL . Bag contains multiple doses. Each dose = 50 mcg  50 mcg INTRACORONARY Once         adenosine (ADENOCARD) injection 12-12,000 mcg  12-12,000 mcg INTRACORONARY q1 min prn         adenosine (diagnostic) (ADENOSCAN) IV infusion - for Cath Lab (FFR)  140 mcg/kg/min Intravenous Continuous PRN         amiodarone (NEXTERONE) injection 150-300 mg  150-300 mg Intravenous Q10 Min PRN         aspirin chewable tablet  mg   mg Oral Once PRN         aspirin tablet 325 mg  325 mg Oral Once PRN         atropine injection 0.5-1 mg  0.5-1  mg Intravenous q1 min prn         bivalirudin (ANGIOMAX) bolus from infusion pump 72.1 mg  0.75 mg/kg Intravenous Once PRN         bivalirudin (ANGIOMAX) 250 mg in NaCl 0.9 % 50 mL infusion  1.75 mg/kg/hr Intravenous Continuous PRN         bupivacaine HCl 0.25 % preservative free injection SOLN  1-10 mL Subcutaneous Once PRN         clopidogrel (PLAVIX) tablet 75 mg  75 mg Oral Once PRN         clopidogrel (PLAVIX) tablet 300-600 mg  300-600 mg Oral Once PRN         dextrose 50 % injection 12.5-50 mL  12.5-50 mL Intravenous Q30 Min PRN         diphenhydrAMINE (BENADRYL) injection 25-50 mg  25-50 mg Intravenous Once PRN         DOBUTamine 500 mg in dextrose 5% 250 mL (adult std conc) premix  2-20 mcg/kg/min Intravenous Continuous PRN         DOPamine 400 mg in dextrose 5% 250 mL (adult std) - premix  2-20 mcg/kg/min Intravenous Continuous PRN         enalaprilat (VASOTEC) injection 1.25-2.5 mg  1.25-2.5 mg Intravenous Once PRN         EPINEPHrine PF (ADRENALIN) injection 0.3 mg  0.3 mg Subcutaneous Q3 Min PRN         EPINEPHrine PF (ADRENALIN) injection 0.3 mg  0.3 mg Intravenous Q5 Min PRN         EPINEPHrine PF (ADRENALIN) injection 1 mg  1 mg Intravenous Q5 Min PRN         fentaNYL (PF) (SUBLIMAZE) injection 25-50 mcg  25-50 mcg Intravenous Q2 Min PRN         flumazenil (ROMAZICON) injection 0.2 mg  0.2 mg Intravenous q1 min prn         furosemide (LASIX) injection  mg   mg Intravenous Once PRN         heparin (porcine) injection 1,000-10,000 Units  1,000-10,000 Units Intravenous Q5 Min PRN         hydrALAZINE (APRESOLINE) injection 10-20 mg  10-20 mg Intravenous Q15 Min PRN         lidocaine (PF) (XYLOCAINE) 1 % injection 300 mg  30 mL Subcutaneous Once PRN         lidocaine (PF) (XYLOCAINE) 1 % injection  mg  1-10 mL Subcutaneous Once PRN         LORazepam (ATIVAN) injection 0.5-2 mg  0.5-2 mg Intravenous Q4H PRN         methylPREDNISolone sodium succinate (solu-MEDROL) injection 125 mg  125 mg  Intravenous Once PRN         metoprolol (LOPRESSOR) injection 5 mg  5 mg Intravenous Q5 Min PRN         midazolam (VERSED) injection 0.5-2 mg  0.5-2 mg Intravenous Q2 Min PRN   0.5 mg at 01/19/18 1434     morphine (PF) injection 1-2 mg  1-2 mg Intravenous Q5 Min PRN         naloxone (NARCAN) injection 0.4 mg  0.4 mg Intravenous Q5 Min PRN         niCARdipine (CARDENE) injection 100 mcg  100 mcg INTRACORONARY q1 min prn         NIFEdipine (PROCARDIA) capsule 10 mg  10 mg Oral Once PRN         nitroGLYcerin (NITROSTAT) sublingual tablet 0.4 mg  0.4 mg Sublingual Q5 Min PRN         nitroGLYcerin injection 100-500 mcg  100-500 mcg INTRA-ARTERIAL Once PRN         nitroGLYcerin injection 100-200 mcg  100-200 mcg INTRACORONARY q1 min prn         nitroGLYcerin 50 mg in D5W 250 mL (adult std) infusion  0.07-2 mcg/kg/min Intravenous Continuous PRN         nitroPRUsside (NIPRIDE) injection 100-200 mcg  100-200 mcg INTRACORONARY Q2 Min PRN         nitroPRUsside (NIPRIDE) 50 mg, sodium thiosulfate 500 mg in D5W 125 mL IV infusion (conc: 0.4mg/mL)  0.25-10 mcg/kg/min Intravenous Continuous PRN         0.9% sodium chloride BOLUS  500-1,000 mL Intravenous Once PRN         ondansetron (ZOFRAN) injection 4 mg  4 mg Intravenous Q4H PRN         phenylephrine (EVERARDO-SYNEPHRINE) injection  mcg   mcg INTRACORONARY Q3 Min PRN         phenylephrine (EVERARDO-SYNEPHRINE) 50 mg in NaCl 0.9 % 250 mL infusion  0.5-6 mcg/kg/min Intravenous Continuous         potassium chloride 10 mEq in 100 mL sterile water intermittent infusion (premix)  10 mEq Intravenous Once PRN         potassium chloride 20 mEq in 50 mL intermittent infusion  20 mEq Intravenous Once PRN         prasugrel (EFFIENT) tablet 10-60 mg  10-60 mg Oral Once PRN         promethazine (PHENERGAN) IV injection 6.25-25 mg  6.25-25 mg Intravenous Q4H PRN         protamine injection 1-5 mg  1-5 mg Intravenous Once PRN         protamine injection  mg   mg Intravenous Q5  Min PRN         sodium bicarbonate 8.4 % injection 50 mEq  50 mEq Intravenous Q5 Min PRN         ticagrelor (BRILINTA) tablet  mg   mg Oral Once PRN         vasopressin (PITRESSIN) injection 40 Units  40 Units Intravenous Once PRN         verapamil (ISOPTIN) injection 1-2.5 mg  1-2.5 mg INTRA-ARTERIAL Once PRN         acetylcholine (MIOCHOL-E) 2,000 mcg in NaCl 0.9 % 100 mL . Bag contains multiple doses. Each dose = 20 mcg  20 mcg INTRACORONARY Once        Followed by     acetylcholine (MIOCHOL-E) 2,000 mcg in NaCl 0.9 % 100 mL . Bag contains multiple doses. Each dose = 50 mcg  50 mcg INTRACORONARY Once        Followed by     acetylcholine (MIOCHOL-E) 2,000 mcg in NaCl 0.9 % 100 mL . Bag contains multiple doses. Each dose = 50 mcg  50 mcg INTRACORONARY Once         acetylcholine (MIOCHOL-E) 2,000 mcg in NaCl 0.9 % 100 mL . Bag contains multiple doses. Each dose = 20 mcg  20 mcg INTRACORONARY Once        Followed by     acetylcholine (MIOCHOL-E) 2,000 mcg in NaCl 0.9 % 100 mL . Bag contains multiple doses. Each dose = 50 mcg  50 mcg INTRACORONARY Once         adenosine (ADENOCARD) injection 12-12,000 mcg  12-12,000 mcg INTRACORONARY q1 min prn         adenosine (diagnostic) (ADENOSCAN) IV infusion - for Cath Lab (FFR)  140 mcg/kg/min Intravenous Continuous PRN         amiodarone (NEXTERONE) injection 150-300 mg  150-300 mg Intravenous Q10 Min PRN         aspirin chewable tablet  mg   mg Oral Once PRN         aspirin tablet 325 mg  325 mg Oral Once PRN         atropine injection 0.5-1 mg  0.5-1 mg Intravenous q1 min prn         bivalirudin (ANGIOMAX) bolus from infusion pump 72.1 mg  0.75 mg/kg Intravenous Once PRN         bivalirudin (ANGIOMAX) 250 mg in NaCl 0.9 % 50 mL infusion  1.75 mg/kg/hr Intravenous Continuous PRN         bupivacaine HCl 0.25 % preservative free injection SOLN  1-10 mL Subcutaneous Once PRN         clopidogrel (PLAVIX) tablet 75 mg  75 mg Oral Once PRN          clopidogrel (PLAVIX) tablet 300-600 mg  300-600 mg Oral Once PRN         dextrose 50 % injection 12.5-50 mL  12.5-50 mL Intravenous Q30 Min PRN         diphenhydrAMINE (BENADRYL) injection 25-50 mg  25-50 mg Intravenous Once PRN         DOBUTamine 500 mg in dextrose 5% 250 mL (adult std conc) premix  2-20 mcg/kg/min Intravenous Continuous PRN         DOPamine 400 mg in dextrose 5% 250 mL (adult std) - premix  2-20 mcg/kg/min Intravenous Continuous PRN         enalaprilat (VASOTEC) injection 1.25-2.5 mg  1.25-2.5 mg Intravenous Once PRN         EPINEPHrine PF (ADRENALIN) injection 0.3 mg  0.3 mg Subcutaneous Q3 Min PRN         EPINEPHrine PF (ADRENALIN) injection 0.3 mg  0.3 mg Intravenous Q5 Min PRN         EPINEPHrine PF (ADRENALIN) injection 1 mg  1 mg Intravenous Q5 Min PRN         fentaNYL (PF) (SUBLIMAZE) injection 25-50 mcg  25-50 mcg Intravenous Q2 Min PRN         flumazenil (ROMAZICON) injection 0.2 mg  0.2 mg Intravenous q1 min prn         furosemide (LASIX) injection  mg   mg Intravenous Once PRN         heparin (porcine) injection 1,000-10,000 Units  1,000-10,000 Units Intravenous Q5 Min PRN         hydrALAZINE (APRESOLINE) injection 10-20 mg  10-20 mg Intravenous Q15 Min PRN         lidocaine (PF) (XYLOCAINE) 1 % injection 300 mg  30 mL Subcutaneous Once PRN         lidocaine (PF) (XYLOCAINE) 1 % injection  mg  1-10 mL Subcutaneous Once PRN         LORazepam (ATIVAN) injection 0.5-2 mg  0.5-2 mg Intravenous Q4H PRN         methylPREDNISolone sodium succinate (solu-MEDROL) injection 125 mg  125 mg Intravenous Once PRN         metoprolol (LOPRESSOR) injection 5 mg  5 mg Intravenous Q5 Min PRN         midazolam (VERSED) injection 0.5-2 mg  0.5-2 mg Intravenous Q2 Min PRN         morphine (PF) injection 1-2 mg  1-2 mg Intravenous Q5 Min PRN         naloxone (NARCAN) injection 0.4 mg  0.4 mg Intravenous Q5 Min PRN         niCARdipine (CARDENE) injection 100 mcg  100 mcg INTRACORONARY q1 min  prn         NIFEdipine (PROCARDIA) capsule 10 mg  10 mg Oral Once PRN         nitroGLYcerin (NITROSTAT) sublingual tablet 0.4 mg  0.4 mg Sublingual Q5 Min PRN         nitroGLYcerin injection 100-500 mcg  100-500 mcg INTRA-ARTERIAL Once PRN         nitroGLYcerin injection 100-200 mcg  100-200 mcg INTRACORONARY q1 min prn         nitroGLYcerin 50 mg in D5W 250 mL (adult std) infusion  0.07-2 mcg/kg/min Intravenous Continuous PRN         nitroPRUsside (NIPRIDE) injection 100-200 mcg  100-200 mcg INTRACORONARY Q2 Min PRN         nitroPRUsside (NIPRIDE) 50 mg, sodium thiosulfate 500 mg in D5W 125 mL IV infusion (conc: 0.4mg/mL)  0.25-10 mcg/kg/min Intravenous Continuous PRN         0.9% sodium chloride BOLUS  500-1,000 mL Intravenous Once PRN         ondansetron (ZOFRAN) injection 4 mg  4 mg Intravenous Q4H PRN         phenylephrine (EVERARDO-SYNEPHRINE) injection  mcg   mcg INTRACORONARY Q3 Min PRN         phenylephrine (EVERARDO-SYNEPHRINE) 50 mg in NaCl 0.9 % 250 mL infusion  0.5-6 mcg/kg/min Intravenous Continuous         potassium chloride 10 mEq in 100 mL sterile water intermittent infusion (premix)  10 mEq Intravenous Once PRN         potassium chloride 20 mEq in 50 mL intermittent infusion  20 mEq Intravenous Once PRN         prasugrel (EFFIENT) tablet 10-60 mg  10-60 mg Oral Once PRN         promethazine (PHENERGAN) IV injection 6.25-25 mg  6.25-25 mg Intravenous Q4H PRN         protamine injection 1-5 mg  1-5 mg Intravenous Once PRN         protamine injection  mg   mg Intravenous Q5 Min PRN         sodium bicarbonate 8.4 % injection 50 mEq  50 mEq Intravenous Q5 Min PRN         ticagrelor (BRILINTA) tablet  mg   mg Oral Once PRN         vasopressin (PITRESSIN) injection 40 Units  40 Units Intravenous Once PRN         verapamil (ISOPTIN) injection 1-2.5 mg  1-2.5 mg INTRA-ARTERIAL Once PRN         sodium chloride (PF) 0.9% PF flush 10-20 mL  10-20 mL Intracatheter Q1H PRN   20 mL at  01/18/18 2227     sodium chloride (PF) 0.9% PF flush 10 mL  10 mL Intracatheter Q7 Days   10 mL at 01/18/18 1519     levETIRAcetam (KEPPRA) solution 500 mg  500 mg Oral BID   500 mg at 01/19/18 0914     docusate (COLACE) 50 MG/5ML liquid 100 mg  100 mg Oral BID         levothyroxine (SYNTHROID/LEVOTHROID) half-tab 12.5 mcg  12.5 mcg Oral QAM AC   12.5 mcg at 01/19/18 0913     dextrose 10% infusion   Intravenous Continuous 25 mL/hr at 01/19/18 0914       DOBUTamine 500 mg in dextrose 5% 250 mL (adult std conc) premix  2.5-20 mcg/kg/min Intravenous Continuous 14.4 mL/hr at 01/19/18 0907 5 mcg/kg/min at 01/19/18 0907     naloxone (NARCAN) injection 0.1-0.4 mg  0.1-0.4 mg Intravenous Q2 Min PRN         melatonin tablet 1 mg  1 mg Oral At Bedtime PRN         acetaminophen (TYLENOL) tablet 650 mg  650 mg Oral Q4H PRN         acetaminophen (TYLENOL) Suppository 650 mg  650 mg Rectal Q4H PRN         oxyCODONE IR (ROXICODONE) tablet 5-10 mg  5-10 mg Oral Q3H PRN         senna-docusate (SENOKOT-S;PERICOLACE) 8.6-50 MG per tablet 1 tablet  1 tablet Oral BID PRN        Or     senna-docusate (SENOKOT-S;PERICOLACE) 8.6-50 MG per tablet 2 tablet  2 tablet Oral BID PRN         polyethylene glycol (MIRALAX/GLYCOLAX) Packet 17 g  17 g Oral Daily PRN   17 g at 01/17/18 1501     ondansetron (ZOFRAN-ODT) ODT tab 4 mg  4 mg Oral Q6H PRN        Or     ondansetron (ZOFRAN) injection 4 mg  4 mg Intravenous Q6H PRN         Reason beta blocker not prescribed   Other DOES NOT GO TO MAR         atorvastatin (LIPITOR) tablet 40 mg  40 mg Oral At Bedtime   40 mg at 01/18/18 2045     clopidogrel (PLAVIX) tablet 75 mg  75 mg Oral Daily   75 mg at 01/19/18 0913     glucose 40 % gel 15-30 g  15-30 g Oral Q15 Min PRN        Or     dextrose 50 % injection 25-50 mL  25-50 mL Intravenous Q15 Min PRN        Or     glucagon injection 1 mg  1 mg Subcutaneous Q15 Min PRN         insulin aspart (NovoLOG) inj (RAPID ACTING)  1-7 Units Subcutaneous TID AC          insulin aspart (NovoLOG) inj (RAPID ACTING)  1-5 Units Subcutaneous At Bedtime         ipratropium - albuterol 0.5 mg/2.5 mg/3 mL (DUONEB) neb solution 3 mL  3 mL Nebulization Q4H PRN         No current Epic-ordered outpatient prescriptions on file.       Physical Exam   Temp: 97.8  F (36.6  C) Temp src: Tympanic BP: 124/76   Heart Rate: 64 Resp: 15 SpO2: 94 % O2 Device: None (Room air)    Vitals:    01/16/18 1600 01/17/18 0500 01/19/18 0500   Weight: 95.8 kg (211 lb 1.6 oz) 96.3 kg (212 lb 6.4 oz) 96.1 kg (211 lb 13.8 oz)     CONSTITUTIONAL: NAD, A&O to self. Calm and cooperative. Very lethargic, eyes open only minimally   HEENT: NCAT, MMM  NECK: Supple  CARDIOVASCULAR: exam deferred   RESPIRATORY: NL respiratory effort on RA  GASTROINTESTINAL: exam deferred   MUSCULOSKELETAL: Moving freely in bed   SKIN: Warm and intact. No concerning lesions or rashes on exposed skin surfaces   NEUROLOGIC: Lethargic, not much meaningful response from Kelechi  PSYCH: Affect flat    Data   Results for orders placed or performed during the hospital encounter of 01/16/18 (from the past 24 hour(s))   Glucose by meter   Result Value Ref Range    Glucose 120 (H) 70 - 99 mg/dL   Glucose by meter   Result Value Ref Range    Glucose 145 (H) 70 - 99 mg/dL   Potassium   Result Value Ref Range    Potassium 4.6 3.4 - 5.3 mmol/L   Comprehensive metabolic panel   Result Value Ref Range    Sodium 138 133 - 144 mmol/L    Potassium 4.5 3.4 - 5.3 mmol/L    Chloride 101 94 - 109 mmol/L    Carbon Dioxide 29 20 - 32 mmol/L    Anion Gap 8 3 - 14 mmol/L    Glucose 141 (H) 70 - 99 mg/dL    Urea Nitrogen 70 (H) 7 - 30 mg/dL    Creatinine 1.96 (H) 0.66 - 1.25 mg/dL    GFR Estimate 33 (L) >60 mL/min/1.7m2    GFR Estimate If Black 40 (L) >60 mL/min/1.7m2    Calcium 8.9 8.5 - 10.1 mg/dL    Bilirubin Total 1.5 (H) 0.2 - 1.3 mg/dL    Albumin 2.8 (L) 3.4 - 5.0 g/dL    Protein Total 6.4 (L) 6.8 - 8.8 g/dL    Alkaline Phosphatase 257 (H) 40 - 150 U/L    ALT 44 0  - 70 U/L    AST 42 0 - 45 U/L   Glucose by meter   Result Value Ref Range    Glucose 144 (H) 70 - 99 mg/dL   Glucose by meter   Result Value Ref Range    Glucose 133 (H) 70 - 99 mg/dL   EKG 12-lead, tracing only   Result Value Ref Range    Interpretation ECG Click View Image link to view waveform and result

## 2018-01-19 NOTE — PLAN OF CARE
Problem: Patient Care Overview  Goal: Plan of Care/Patient Progress Review  Outcome: No Change  A&O to self, sometimes place. HR50's, BP's stable on Dobutamine @5mcg/kg/min. Pt takes crushed pills with honey thick water per ST recommendations, tires easily. Q2H turn reposition. +2/+3 BLE, elevated overnight. Mancuso patent, 450mL UO. Incontinent of stool x2. Buttocks slightly reddened, barrier cream applied. Tele SB 1*AVB &BBB. Continue to monitor.

## 2018-01-19 NOTE — PLAN OF CARE
Problem: Cardiac: Heart Failure (Adult)  Goal: Signs and Symptoms of Listed Potential Problems Will be Absent, Minimized or Managed (Cardiac: Heart Failure)  Signs and symptoms of listed potential problems will be absent, minimized or managed by discharge/transition of care (reference Cardiac: Heart Failure (Adult) CPG).   Outcome: No Change  Dobutamine infusing at 5 mcg/hr, stable BP, rhythm bradycardic, temporal pacemaker placed. Pt has been somnolent, arousable to voice stimulation. Denies pain/distress. Generalized pedal edema present, urine output borderline.

## 2018-01-19 NOTE — PROCEDURES
Procedure  1) Temporary transvenous pacemaker  Approach RFV  6 Fr sheath  Complications none noted  Indication : severe bradycardia in setting of endstage heart failure, patient is potentially a candidate for PPM depending upon his course     We placed a 6 Fr venous sheath with a single stick in RFV. We placed a balloon directed 5 Fr. Temporary pacemaker under fluoroscopic guidance in the apex of the RV. We checked pacing thresholds multiple times and they remained stable at about 1 MA before and after final suturing of sheath and pacemaker. A sterile sheath was placed over the distal end of the pacemaker and a sterile occlusive dressing was placed. The patient was somnolent and occasionally made efforts to reach right hand over right groin so efforts will need to be made to prevent his accidentally pulling out the device.    Dr. Lyn

## 2018-01-19 NOTE — PLAN OF CARE
Problem: Patient Care Overview  Goal: Plan of Care/Patient Progress Review  Attempted to see patient for swallowing treatment, however, patient is off floor at procedure at this time.  Spoke with RN this morning re: patient's swallowing function and dysphagia risks with somnolence.  Recommend continue honey thick clear liquids by spoon only if alert, upright, medications crushed with honey if necessary only.  Hold PO if too fatigued to maintain alertness. Will follow as appropriate until possible hospice care decisions are made early next week.

## 2018-01-19 NOTE — PROGRESS NOTES
Pt returned from temp pacer placement with pacer set at 50bpm/5mA. Per Dr Ugalde, turned to 60bpm/5mA.

## 2018-01-20 NOTE — PLAN OF CARE
Problem: Patient Care Overview  Goal: Plan of Care/Patient Progress Review  Outcome: No Change  A&O to self, restless at times with limited positioning d/t temporary pacer in Rt groin. Q2H turn/reposition. Frequent oral cares. HR 60's. VSS on RA. Dobutamine gtt remains @5mcg/kg/min. +2/+3 BLE edema, LLE intermittently elevated overnight.Mancuso patent, 625mL UO. Pt denies pain, but does appear uncomfortable at times.  Plan: MD to discuss goals of care with POA. Continue to monitor.

## 2018-01-20 NOTE — PROGRESS NOTES
SWS PROGRESS NOTE:     I: SW approached by bed side nurse who informed SWS that pt's sister has decided to transition pt to comfort cares. Cardiology plans to stop temp pacer and discontinuing all active medical treatment. Bed side nurse asked SWS assistance in contacting local friends of pt who have visited pt during this hospitalization. SWS was able to connect with Jorge and Judit, listed on pt's face sheet. They have both been informed that pt is now comfort cares; both individuals plan to visit with pt as soon as possible. SWS updated bed side nurse. Pt was updated and seemed pleased that his friends are coming to visit with him. SWS also contacted the hospital  to provide support/prayer/comfort to pt as needed.   P: Pt will be transitioned to comfort care this evening.     ELENITA Salgado, [facility]  [phone]

## 2018-01-20 NOTE — PROGRESS NOTES
[facility]    Hospitalist Progress Note    Assessment & Plan   Kelechi Coleman is a 82 year old male who was admitted on 1/16/2018 for heart failure unable to manage with diuretic outpatient. Hx of LV dysfunction, CKD, CAD, CABG, AS, DM, Afib not on anticoagulation, Lt subarachnoid hemorrhage, seizure from SAH. Patient is on low CO failure and low EF, severe AS and bradycardia with altered MS, JUVENTINO. Prognosis is grim and comfort care measure would be appropriate. Palliative care has seen patient.     Acute decompensated HFrEF(<20%) : Patient  with prior EF of 30-35% a month ago, was on torsemide as outpatient and managed by cardiology, torsemide dose was increased recently but despite that, leg edema and dyspnea worsened. Also noted worsened creatinine, and was bradycardic to upper 30s, so he was sent to hospital from cardiology office is a direct admit.   -TTE this stay-further devreased LVEF to <20%, severe global hypokinesia of LV, anterior, septal and apical wall akinesis moderately dilated RV with moderate to severely reduced RV systolic function, possible severe AS.  -Decompensation possibly due to bradycardia.  -PTA coreg held at admission, HR up to 50s, evaluated by cardiology, now has temp pacemaker, placed 1/19, now rate to 60s with intermittently paced rhythm.  -Started on IV Lasix on admission, received 3 doses of 40 mg IV but given worsened creatinine, Lasix discontinued 11/17.  -Also on dobutamine since admission and PICC line placed 1/18, urine output has improved, about 900 cc to 1 L daily in last 2 days without Lasix (10 mg IV given once last night), creatinine slightly better, 1.7 today, diuretic per cardiology/nephrology.  - PTA aldactone on hold.    Sinus bradycardia with  1st degree AVB and occasional junctional rhythm  S/p temporary pacemaker 1/19  - Holding BB  - Started on dobutamine, heart rate now is improved to 50s, and now s/p temp pacer, rates in 60s.      Hypothyroidism:  - TSH high, T4 low.  - Started low dose synthroid and increase slowly.    Acute kidney injury on CKD stage III:   Hyperkalemia:  -Creatinine mostly 1.4-1.5 mostly as outpatient though his weight is 1.1 last month.  On torsemide as outpatient which was increased recently with which Cr PTA was 1.7.  -Also hyperkalemic with potassium up to 6.  -Creatinine worsened to 1.98 with diuretic so lasix on hold after 1/17  - Cr now 1.7, nephrology consulted and following. Defer diuretic management to nephrology/cardiology.  -Hyperkalemia resolved  -Urine output approximately 900 cc in the last 24 hours    CAD, S/P CABG: stable no chest pain. Elevated but flat troponin.  -PTA Plavix and Lipitor resumed (unable to take it due to NPO), continue when feeding tube placed.  -PTA Aldactone, beta-blocker, Imdur held due to hypotension/AKA  -Troponin elevated but flat likely due to acute decompensation of heart failure as above    Severe AS  - SANAZ might be helpful to confirm but likely won't tolerated without intubation and would not change outcome at this time  - have to be careful with dobutamine given AS    - avoid hypotension   -further eval per cardiology once/if he improves clinically.    Hx of SAH and seizure;  - Continue his seizure treatment     DM, insulin-dependent: PTA is on insulin glargine 6 units daily  -Due to low BS, glargine on hold and placed on D10 drip at 25cc/hr  -Continue ISS if needed  -BS in mid 100s   -If Molina placed and diet started today 1/20, dc D10 drip    8. Mild anemia and thrombocytopenia  - Hb stable, platelet low, chronic.  - Monitor  - Given CKD, if improves, and aggressive management continued, will check anemia labs including epo level prior to dc    9. Dysphagia: noted coughing spells while drinking.   - dysphagia due to depressed level of consciousness/generalized weakness.   -S/p SLP eval, discussed today, will keep NPO.  -discussed with his sister Samantha over the phone and  "recommended tube feeding for now, and also for PO medications, agrees with David tube, requested IR for tube feeding.  -nutrition consult for TF recommendations  -Once tube placed, medications to change to PO. Holding PO medications currently (ones not available for alternate route)    DVT Prophylaxis: Pneumatic Compression Devices  Code Status: DNR/DNI    Disposition: Expected discharge: Several days, discussed with his sister Samantha over the phone 1/20 who is expecting us to continue current aggressive care through Monday and based on his clinical course, to address his care plan, and palliative care follow up on monday. Appreciate palliative care assessment and recommendations.    Liana Thorpe MD  Hospitalist    Interval History    Patient not fully oriented and now has temp pacer and as he was pulling it, had to be restrained since 1/19. On RUE and RLE soft restraints and TLE immobilizer.  - Answers with \"yes\" \"no\" to questions, denies pain or dyspnea.   - Afebrile  - Intermittently paced, Rates in mid 60s.   -Received 10 mg Lasix last night, 970 cc urine output.  -Cr down to 1.7    -Data reviewed today: I reviewed all new labs and imaging results over the last 24 hours.   Physical Exam   Temp: 97.4  F (36.3  C) Temp src: Axillary BP: 106/82   Heart Rate: 67 Resp: 14 SpO2: 98 % O2 Device: None (Room air) Oxygen Delivery: 2 LPM  Vitals:    01/17/18 0500 01/19/18 0500 01/20/18 0700   Weight: 96.3 kg (212 lb 6.4 oz) 96.1 kg (211 lb 13.8 oz) 107.2 kg (236 lb 5.3 oz)     Vital Signs with Ranges  Temp:  [97.4  F (36.3  C)] 97.4  F (36.3  C)  Heart Rate:  [48-74] 67  Resp:  [11-19] 14  BP: ()/() 106/82  SpO2:  [93 %-100 %] 98 %  I/O last 3 completed shifts:  In: 315 [I.V.:315]  Out: 975 [Urine:975]      Constitutional: Ill appearing, not on distress.   HEENT: PERRLA EOMI  PULM: dim over the bases, and has basilar crepts, no wheezing, normal work of breathing. Is on room air.  CVS: S1S2 irregular, systolic " murmur 2/6, HR in 60s.  LE Edema: 3+, upto waist and abd wall   Abdomen: distended, non tender, Bowel sound present. abd wall edema (+)  Uro-genital: Mancuso present, no hematuria  Skin: cyanosed finger tips on both hands. No rash. fair capillary refill.        Medications     NaCl Stopped (01/19/18 2300)     dextrose 25 mL/hr at 01/20/18 0718     DOBUTamine 5 mcg/kg/min (01/20/18 0718)     Reason beta blocker order not selected         levothyroxine  12.5 mcg Intravenous Daily     sodium chloride (PF)  3 mL Intracatheter Q8H     aspirin EC  81 mg Oral Daily     levETIRAcetam  500 mg Intravenous Q12H     pantoprazole  40 mg Intravenous QAM AC     sodium chloride (PF)  10 mL Intracatheter Q7 Days     docusate  100 mg Oral BID     atorvastatin  40 mg Oral At Bedtime     clopidogrel  75 mg Oral Daily     insulin aspart  1-7 Units Subcutaneous TID AC     insulin aspart  1-5 Units Subcutaneous At Bedtime       Data     Recent Labs  Lab 01/20/18  0656 01/19/18  0635 01/18/18  2250 01/18/18  0538 01/18/18  0532  01/17/18  0545 01/16/18  2335 01/16/18 2010 01/16/18  1617   WBC  --   --   --   --  5.7  --  5.8  --   --   --    HGB  --   --   --   --  10.9*  --  11.1*  --   --   --    MCV  --   --   --   --  89  --  89  --   --   --    PLT  --   --   --   --  96*  --  112*  --   --   --    INR  --   --   --  1.54*  --   --   --   --   --   --     138  --   --  138  < > 139 138  --  140   POTASSIUM 4.0 4.5 4.6  --  5.0  < > 5.4* 5.3  --  4.8   CHLORIDE 103 101  --   --  102  < > 104 103  --  105   CO2 29 29  --   --  27  < > 28 29  --  27   BUN 61* 70*  --   --  67*  < > 67* 65*  --  64*   CR 1.77* 1.96*  --   --  1.98*  < > 1.81* 1.81*  --  1.74*   ANIONGAP 8 8  --   --  9  < > 7 6  --  8   MIGUEL 9.0 8.9  --   --  8.8  < > 8.9 8.6  --  8.4*   * 141*  --   --  107*  < > 77 72  --  60*   ALBUMIN  --  2.8*  --   --  2.9*  --   --   --   --   --    PROTTOTAL  --  6.4*  --   --  6.4*  --   --   --   --   --    BILITOTAL   --  1.5*  --   --  0.8  --   --   --   --   --    ALKPHOS  --  257*  --   --  265*  --   --   --   --   --    ALT  --  44  --   --  47  --   --   --   --   --    AST  --  42  --   --  48*  --   --   --   --   --    TROPI  --   --   --   --   --   --   --  0.476* 0.463* 0.447*   < > = values in this interval not displayed.    Imaging:  No results found for this or any previous visit (from the past 24 hour(s)).

## 2018-01-20 NOTE — CONSULTS
CLINICAL NUTRITION SERVICES  -  ASSESSMENT NOTE      Recommendations Ordered by Registered Dietitian (RD): Step #1:  Isosource 1.5 at 25 mL/hr to provide:  900 kcal (9 kcal/kg), 41 g protein (0.4 g/kg), 106 g CHO, 9 g fiber, 456 mL H2O  Flushes 60 mL every 4 hours     If above TF tolerated, advance TF to step #2:  Isosource 1.5 at 45 mL/hr to provide:  1620 kcal (17 kcal/kg), 73 g protein (0.8 g/kg), 190 g CHO, 16 g fiber, 821 mL H2O    Then if above TF tolerated, advance to goal TF:  Isosource 1.5 at 65 mL/hr to provide:  2340 kcal (24 kcal/kg), 106 g protein (1.1 g/kg), 275 g CHO, 23 g fiber, 1186 mL H2O  Total with D10 IVF= 2544 kcal (27 kcal/kg)    Malnutrition: % Weight Loss:  None noted  % Intake:  </= 50% for >/= 5 days (severe malnutrition)  Subcutaneous Fat Loss:  Orbital region mild-moderate depletion  Muscle Loss:  Temporal region moderate depletion and Clavicle bone region moderate depletion  Fluid Retention:  Severe - 3+ in lower extremities     Malnutrition Diagnosis: Severe malnutrition  In Context of:  Acute illness or injury  Chronic illness or disease        REASON FOR ASSESSMENT  Kelechi Coleman is a 82 year old male seen by Registered Dietitian for Provider Order - Registered Dietitian to Assess and Order TF per Medical Nutrition protocol      NUTRITION HISTORY  - Information obtained from dietetic intern note earlier this admit (1/17/18) + EPIC records (patient currently unable to respond to questioning)  Information obtained from:  Patient (pt was in and out of sleeping so information was difficult to obtain) and previous RD note in 2016.  Patient is still completely avoiding use of salt shaker, avoiding desserts, limiting starches, buying sugar-free foods, and occ eating a snack of nuts or Fritos a minimum of once per week.   Living situation:   Lives alone   Grocery shopping:  Does his own shopping  Meal preparation:  Mainly frozen [employer] frozen meals  Some fresh fruits (oranges and  "bananas) and vegetables   Brunch:  Still eating brunch at [facility], however, he will only have crespo at most once per week (previously 2 slices each time).   Dinner:   A chicken Stouffers meal   Fresh fruit  Previous diet instructions:  2 gm Na   Avoiding crespo at brunch and finding frozen meals that are <600mg Na  Watching CHOs and fats for CAD     Patient was hospitalized ~5 weeks ago and was experiencing variable intake (0-100% of meals) on a DD3, thin liquid diet.  At that time he had a poor appetite and was only ordering 2 meals per day (small in size).  Patient was then discharged to a LTC facility -- unsure of his intake there (patient cannot communicate this information).        CURRENT NUTRITION ORDERS  Diet Order:     NPO currently but prior to this was on a 2 gram Na diet, then changed to a Moderate CHO diet, and then CL     Current Intake/Tolerance:  Patient has only ordered 1 meal over the last 5 days (Broth, jello, and apple juice on 1/16)  Therefore, inadequate intake since admit (x 5 days)      PHYSICAL FINDINGS  Observed  Muscle Wasting - Temporal and clavicle   Subcutaneous fat loss - Orbital   Obtained from Chart/Interdisciplinary Team  Edema - 3+ up to waist and abdominal wall     ANTHROPOMETRICS  Height: 6' 1\"  Admit weight: 95.8 kg (211#)(1/16)  Current weight:  107.2 kg (236#)(1/20)  Body mass index is 27.8 kg/(m^2)  Weight Status:  Overweight BMI 25-29.9  IBW: 83.6 kg   % IBW: 115%  Weight History:   Wt Readings from Last 10 Encounters:   01/20/18 107.2 kg (236 lb 5.3 oz)   12/21/17 89.4 kg (197 lb)   01/16/18 93.2 kg (205 lb 8 oz)   01/09/18 86.2 kg (190 lb)   01/04/18 86.5 kg (190 lb 9.6 oz)   12/19/17 89.9 kg (198 lb 3.1 oz)   12/07/17 89.2 kg (196 lb 9.6 oz)   10/13/17 80.3 kg (177 lb)   09/22/17 83 kg (182 lb 15.7 oz)   08/20/17 83.1 kg (183 lb 3.2 oz)   Weight has trended upward due to fluids/edema       LABS  BUN 61 (H), Cr 1.77 (H) - JUVENTINO on CKD     MEDICATIONS  IVF at 75 " mL/hr  D10 at 25 mL/hr= 60 g CHO, 204 kcal     Dosing Weight 95.8 kg     ASSESSED NUTRITION NEEDS PER APPROVED PRACTICE GUIDELINES:  Estimated Energy Needs: 4194-3844 kcals (25-30 Kcal/Kg)  Justification: maintenance  Estimated Protein Needs:  grams protein (1-1.2 g pro/Kg)  Justification: CKD  Estimated Fluid Needs: 3530-1693 mL (1 mL/Kcal)  Justification: maintenance    MALNUTRITION:  % Weight Loss:  None noted  % Intake:  </= 50% for >/= 5 days (severe malnutrition)  Subcutaneous Fat Loss:  Orbital region mild-moderate depletion  Muscle Loss:  Temporal region moderate depletion and Clavicle bone region moderate depletion  Fluid Retention:  Severe - 3+ in lower extremities     Malnutrition Diagnosis: Severe malnutrition  In Context of:  Acute illness or injury  Chronic illness or disease    NUTRITION DIAGNOSIS:  Inadequate protein-energy intake related to NPO, plans for FT today as evidenced by meeting 0% protein and 8% energy needs via D10 IVF       NUTRITION INTERVENTIONS  Recommendations / Nutrition Prescription  Step #1:  Isosource 1.5 at 25 mL/hr to provide:  900 kcal (9 kcal/kg), 41 g protein (0.4 g/kg), 106 g CHO, 9 g fiber, 456 mL H2O  Flushes 60 mL every 4 hours     If above TF tolerated, advance TF to step #2:  Isosource 1.5 at 45 mL/hr to provide:  1620 kcal (17 kcal/kg), 73 g protein (0.8 g/kg), 190 g CHO, 16 g fiber, 821 mL H2O    Then if above TF tolerated, advance to goal TF:  Isosource 1.5 at 65 mL/hr to provide:  2340 kcal (24 kcal/kg), 106 g protein (1.1 g/kg), 275 g CHO, 23 g fiber, 1186 mL H2O  Total with D10 IVF= 2544 kcal (27 kcal/kg)     Implementation  Nutrition education: Provided education on 1/17/18 (CHF Education)  EN Composition, EN Schedule and Feeding Tube Flush:  Orders written to begin Isosource 1.5 at 25 mL/hr (do not advance).  Flushes as above.     Nutrition Goals  Patient will achieve goal TF within the next 2-3 days     MONITORING AND EVALUATION:  Progress towards goals  will be monitored and evaluated per protocol and Practice Guidelines    Lisa Jones RD, LD, CNSC   Clinical Dietitian - [facility]

## 2018-01-20 NOTE — PROGRESS NOTES
Hospitalist update:    Discussed at length with patient's sister Samantha who is his POA. Updated his guarded prognosis after discussion with cardiologist. Also nephro note reviewed, and discussed with Samantha.   - She agrees that at this time, comfort measures will be best for him. I explained to her about discontinuing all active medical treatment, and he will be on comfort care medications only.   - Will leave on keppra IV for now.  - comfort care meds/orders entered, RN was made aware, cardiology to turn off the temp pacer.    Liana Thorpe MD  Hospitalist

## 2018-01-20 NOTE — PHARMACY-CONSULT NOTE
Pharmacy Tube Feeding Consult    Medication reviewed for administration by feeding tube and for potential food/drug interactions.    Recommendation: Recommend changing the following medications to a liquid dosage form: aspirin, protonix, keppra also changed levothyroxine to PO tab.       Pharmacy will continue to follow as new medications are ordered.    Wanda Arce, PharmD

## 2018-01-20 NOTE — PROGRESS NOTES
Will try to gradually increase paced rate to 70 BPM.  Suspect he needs higher rate but will lose synchrony and atrial contribution to paced beats - a trade-off.  If he improves, will need PPM.  Will try one dose IV lasix.  Will set dobutamine infusion to 5 mcg/kg/min.  If no improvement over next 2 days, this level of intervention not likely successful.  Palliative care following.  Have discussed with his sister.    If he improves, AS will also need additional evaluation (BAV?).  Hypothyroid and now being supplemented.

## 2018-01-20 NOTE — PLAN OF CARE
Problem: Patient Care Overview  Goal: Plan of Care/Patient Progress Review      SLP - RN was consulted this am after EPIC chart review.  Patient is at high risk for aspiration for all po intake currently due to moderate-severe dysphagia found on evaluation, level of fatigue, RN observations, and head of bed elevation restrictions.  Unable to provide po trials due to head of bed elevation restrictions currently.  NPO is the safest option given continued risk factors.  Plan to page MD and check patient status/POC on 1/23.    ADDENDUM: MD consulted this am.  Plan to change patient status to NPO at this time.

## 2018-01-20 NOTE — PLAN OF CARE
Problem: Patient Care Overview  Goal: Plan of Care/Patient Progress Review  VSS. Tele SD w/ occ v-pacing. R wrist and R ankle restraints continue to prevent pulling out temp pacer leads. Skin under restraints intact, CMS intact. Repositioning q2h. R groin site CDI. Pt not able to swallow liquids without choking. Notified MD, pt to see speech again. Nutrition consult needed, MD notified.  Plan to monitor pt to see how he tolerates temp pacing, then possible PPM.

## 2018-01-20 NOTE — PROGRESS NOTES
"Cardiology Progress Note  Dianne Ann MD         Assessment and Plan:      1.  Severe cardiomyopathy, EF<20%.  Acute on chronic systolic CHF.   2.  Probably severe AS.  3.  AV conduction disease with severe bradycardia.  4.  Cognitive impairment.  5.  Acute on chronic kidney disease.  6.  DNR/DNI.  7.  H/o prior CVAs, SDH, seizures.    8.  DM type II.   9.  Prior AF.     Pt still in CICU, on iv DBT and being paced via temp wire.  Underlying rhythm is second-degree AVB with HR 40s.  He is mumbling, not responding appropriately.  Other than an improvement in his creat to 1.77 today his condition remains poor.      After 2 days of aggressive care (inotropes, temp pacing) there has been no encouraging improvement in his overall condition. Underlying medical issues are multiple and severe and mostly irrevrsible.  As the patient had expressed wish to be DNR/DNI, continuation of aggressive care at this point is in question.    Discussed with Dr. Thorpe.  He will speak to his sister in [place] who is his POA.  Consider comfort care.           Total Time: 35;  20 minutes spent in direct communication with patient / family and care coordination.               Interval History:   Does not respond appropriately          Review of Systems:   C: NEGATIVE for fever, chills, change in weight  E/M: NEGATIVE for ear, mouth and throat problems  R: NEGATIVE for significant cough or SOB  CV: NEGATIVE for chest pain, palpitations or peripheral edema          Physical Exam:        Blood pressure 106/82, temperature 97.4  F (36.3  C), temperature source Axillary, resp. rate 14, height 1.854 m (6' 1\"), weight 107.2 kg (236 lb 5.3 oz), SpO2 98 %.  Vitals:    01/17/18 0500 01/19/18 0500 01/20/18 0700   Weight: 96.3 kg (212 lb 6.4 oz) 96.1 kg (211 lb 13.8 oz) 107.2 kg (236 lb 5.3 oz)     Vital Signs with Ranges  Temp:  [97.4  F (36.3  C)] 97.4  F (36.3  C)  Heart Rate:  [56-74] 67  Resp:  [11-19] 14  BP: ()/() 106/82  SpO2:  [93 %-100 " %] 98 %  I/O's Last 24 hours  I/O last 3 completed shifts:  In: 315 [I.V.:315]  Out: 975 [Urine:975]    EXAM:  Lethargic, but opens eyes to loud voice  JVP prob elevated  Lungs (supine) shallow breathing  CV: irreg rhyth, no S3  ABD: soft  EXTR: 2 + edema         Medications:          levothyroxine  12.5 mcg Intravenous Daily     sodium chloride (PF)  3 mL Intracatheter Q8H     aspirin EC  81 mg Oral Daily     levETIRAcetam  500 mg Intravenous Q12H     pantoprazole  40 mg Intravenous QAM AC     sodium chloride (PF)  10 mL Intracatheter Q7 Days     docusate  100 mg Oral BID     atorvastatin  40 mg Oral At Bedtime     clopidogrel  75 mg Oral Daily     insulin aspart  1-7 Units Subcutaneous TID AC     insulin aspart  1-5 Units Subcutaneous At Bedtime            Data:      All new lab and imaging data was reviewed.   Recent Labs   Lab Test  01/18/18   0538  01/18/18   0532  01/17/18   0545  12/26/17   0955   12/12/17   1400   08/18/17   0215   WBC   --   5.7  5.8  9.5   < >  7.3   < >  13.4*   HGB   --   10.9*  11.1*  10.2*   < >  10.0*   < >  10.2*   MCV   --   89  89  92   < >  89   < >  90   PLT   --   96*  112*  168   < >  148*   < >  130*   INR  1.54*   --    --    --    --   1.32*   --   1.32*    < > = values in this interval not displayed.      Recent Labs   Lab Test  01/20/18   0656  01/19/18   0635  01/18/18   2250  01/18/18   0532   NA  140  138   --   138   POTASSIUM  4.0  4.5  4.6  5.0   CHLORIDE  103  101   --   102   CO2  29  29   --   27   BUN  61*  70*   --   67*   CR  1.77*  1.96*   --   1.98*   ANIONGAP  8  8   --   9   MIGUEL  9.0  8.9   --   8.8   GLC  131*  141*   --   107*     Recent Labs   Lab Test  01/16/18   2335  01/16/18 2010 01/16/18   1617   TROPI  0.476*  0.463*  0.447*         EKG results:  Reviewed      Echo Results:  No results found for this or any previous visit (from the past 4320 hour(s)).    Imaging:   No results found for this or any previous visit (from the past 24 hour(s)).

## 2018-01-21 NOTE — PROGRESS NOTES
Pt arrived to unit at 2245 from [facility].  Pt is alert to self and place only.  Garbled speech, difficult to understand.  Settled in room, L side lying with bed alarm on.  Keppra given.  External pacemaker R groin - to be removed tomorrow.  Mancuso catheter patent and draining to gravity.  Mepilex on coccyx CDI.  Bandaid on R shin- small abrasion.  No c/o pain.  Reporting off to oncoming RN.

## 2018-01-21 NOTE — PLAN OF CARE
Problem: Patient Care Overview  Goal: Plan of Care/Patient Progress Review  1900-0730:  No acute changes overnight.  Speech garbled and illogical.  YOHANNES orientation.  No s/s of pain.  External pacemaker at R groin, cardiology to pull today.  NPO.  Mancuso patent with vicky output.  T/R q2h.  Restless, mitts placed d/t pulling at lines.  Continue to monitor.

## 2018-01-21 NOTE — PROGRESS NOTES
Comfort cares initiated before support system arrival at 1700:  services, 1730: family at bedside, 1800 dobutamine off, 1805 pacer off

## 2018-01-21 NOTE — PROGRESS NOTES
[facility]    Hospitalist Progress Note  When I saw the patient: 01/21/2018    Assessment & Plan   Kelechi Coleman is a 82 year old male who was admitted on 1/16/2018 for heart failure unable to manage with diuretic outpatient. Hx of LV dysfunction, CKD, CAD, CABG, AS, DM, Afib not on anticoagulation, Lt subarachnoid hemorrhage, seizure from SAH. Patient is on low CO failure and low EF, severe AS and bradycardia with altered MS, JUVENTINO.     Prognosis was considered grim, palliative care as well consulted, and now on comfort care measure.    Following issues were managed while he was being treated.    Acute decompensated HFrEF(<20%) : Patient  with prior EF of 30-35% a month ago, was on torsemide as outpatient and managed by cardiology, torsemide dose was increased recently but despite that, leg edema and dyspnea worsened. Also noted worsened creatinine, and was bradycardic to upper 30s, so he was sent to hospital from cardiology office is a direct admit. TTE this stay-further devreased LVEF to <20%, severe global hypokinesia of LV, anterior, septal and apical wall akinesis moderately dilated RV with moderate to severely reduced RV systolic function, possible severe AS. Decompensation possibly due to bradycardia. PTA coreg was held at admission, HR improved up to 50s, evaluated by cardiology, and recommended temp pacemaker, placed 1/19, rate was set to 60s  -Also was treated with IV Lasix which worsened his renal function. Nephrology consulted.   -Overall prognosis was considered grim, palliative care also consulted. Discussed with his sister GEOVANNA Singh, at length 1/20 and transitioned to comfort care.    DC Rt groin catheter today.    Sinus bradycardia with  1st degree AVB and occasional junctional rhythm  S/p temporary pacemaker 1/19  Hypothyroidism:  Acute kidney injury on CKD stage III:   Hyperkalemia:  CAD, S/P CABG  Severe AS  Hx of SAH and seizure;  - Continue his seizure treatment while  in hospital IV  DM, insulin-dependent  Mild anemia and thrombocytopenia  Dysphagia  DVT Prophylaxis not needed  Code Status: DNR/DNI    Disposition: Expected discharge: expect several days, will discuss with SW tomorrow about hospice discharge. Discussed his sister Samantha over the phone.    Liana Thorpe MD  Hospitalist    Interval History    Disoriented. Moans but speech slurred and not clear.  Restless but not in distress.  Mittens in place, remove once the pacer catheter removed (not paced, only catheter has been left in place, will be removed today).    -Data reviewed today: I reviewed all new labs and imaging results over the last 24 hours.   Physical Exam       BP: (!) 82/72   Heart Rate: 69 Resp: 18 SpO2: 96 % O2 Device: None (Room air)    Vitals:    01/17/18 0500 01/19/18 0500 01/20/18 0700   Weight: 96.3 kg (212 lb 6.4 oz) 96.1 kg (211 lb 13.8 oz) 107.2 kg (236 lb 5.3 oz)     Vital Signs with Ranges  Heart Rate:  [65-71] 69  Resp:  [12-23] 18  BP: ()/(36-90) 82/72  SpO2:  [91 %-98 %] 96 %  I/O last 3 completed shifts:  In: -   Out: 725 [Urine:725]      Constitutional: Ill appearing, not on distress. Alert awake.   HEENT: PERRLA EOMI  PULM: dim over the bases, and has basilar crepts, no wheezing, normal work of breathing.   CVS: S1S2 irregular, systolic murmur 2/6, HR in 60s.  LE Edema: 3+, upto waist and abd wall   Abdomen: abd wall edema (+)  : Mancuso present, no hematuria      Medications     dextrose 25 mL/hr at 01/20/18 0718       sodium chloride (PF)  10 mL Intracatheter Q8H     levETIRAcetam  500 mg Oral Q12H     sodium chloride (PF)  10 mL Intracatheter Q7 Days       Data     Recent Labs  Lab 01/20/18  0656 01/19/18  0635 01/18/18  2250 01/18/18  0538 01/18/18  0532  01/17/18  0545 01/16/18  2335 01/16/18 2010 01/16/18  1617   WBC  --   --   --   --  5.7  --  5.8  --   --   --    HGB  --   --   --   --  10.9*  --  11.1*  --   --   --    MCV  --   --   --   --  89  --  89  --   --   --     PLT  --   --   --   --  96*  --  112*  --   --   --    INR  --   --   --  1.54*  --   --   --   --   --   --     138  --   --  138  < > 139 138  --  140   POTASSIUM 4.0 4.5 4.6  --  5.0  < > 5.4* 5.3  --  4.8   CHLORIDE 103 101  --   --  102  < > 104 103  --  105   CO2 29 29  --   --  27  < > 28 29  --  27   BUN 61* 70*  --   --  67*  < > 67* 65*  --  64*   CR 1.77* 1.96*  --   --  1.98*  < > 1.81* 1.81*  --  1.74*   ANIONGAP 8 8  --   --  9  < > 7 6  --  8   MIGUEL 9.0 8.9  --   --  8.8  < > 8.9 8.6  --  8.4*   * 141*  --   --  107*  < > 77 72  --  60*   ALBUMIN  --  2.8*  --   --  2.9*  --   --   --   --   --    PROTTOTAL  --  6.4*  --   --  6.4*  --   --   --   --   --    BILITOTAL  --  1.5*  --   --  0.8  --   --   --   --   --    ALKPHOS  --  257*  --   --  265*  --   --   --   --   --    ALT  --  44  --   --  47  --   --   --   --   --    AST  --  42  --   --  48*  --   --   --   --   --    TROPI  --   --   --   --   --   --   --  0.476* 0.463* 0.447*   < > = values in this interval not displayed.    Imaging:  No results found for this or any previous visit (from the past 24 hour(s)).

## 2018-01-21 NOTE — PROGRESS NOTES
"SWS PROGRESS NOTE:     I: Call placed to [facility] to discuss pt's status of comfort cares and ability to return to the facility. SW spoke with nursing supervisor for LTC at [facility] and provided an update. They are able to take pt back at their facility once he is stable for DC. Per Dr. Thorpe's note, \"Expected discharge: expect several days, will discuss with SW tomorrow about hospice discharge. Discussed his sister Samantha over the phone.\" SW will discuss hospice options with pt's sister once Dr. Thorpe feels it is appropriate for pt to DC.   P: Pt is comfort cares and will DC to his LTC facility, [facility], if appropriate under the direction of Dr. Thorpe. SW following.     ELENITA Salgado, [facility] *[phone]  "

## 2018-01-21 NOTE — PROGRESS NOTES
Pt alert to self with garbled and illogical speech.  YOHANNES orientation, denies pain and no cues of pain noted.  Remains NPO.  Mancuso patent with vicky output.  T/R q2h. Plan to d/c back to [facility] when stable on hospice. Continue to monitor.

## 2018-01-21 NOTE — PROVIDER NOTIFICATION
MD Notification    Notified Person:  MD    Notified Persons Name: Dr. Thorpe    Notification Date/Time: 01/21/18 10:56 AM    Notification Interaction:  Talked with Physician    Purpose of Notification: Pt unable to tolerate PO Keppra.  Can we get IV or have it d/c'd?    Orders Received:    Comments:

## 2018-01-21 NOTE — PLAN OF CARE
Problem: Cardiac: Heart Failure (Adult)  Goal: Signs and Symptoms of Listed Potential Problems Will be Absent, Minimized or Managed (Cardiac: Heart Failure)  Signs and symptoms of listed potential problems will be absent, minimized or managed by discharge/transition of care (reference Cardiac: Heart Failure (Adult) CPG).   Outcome: No Change  Patient had 6 friends visiting that left around 2100.  Patient awake and verbal, but confused.  Transferred to 8401 with belongings (clothes) at 2230.  Mancuso continued.  TPM line remains right groin.

## 2018-01-21 NOTE — PROGRESS NOTES
Noted no plans for feeding tube as patient has changed to comfort cares only.  Will be available if more aggressive nutrition intervention desired.    Lisa Jones RD, LD, CNSC   Clinical Dietitian - [facility]

## 2018-01-22 NOTE — PLAN OF CARE
Problem: Patient Care Overview  Goal: Plan of Care/Patient Progress Review  Outcome: No Change  Pt alert to self. Restless at times, PRN haldol administered x2 for agitation. Mancuso patent with adequate output. Pt fidgety and restless, verbalizing generalized pain. IV dilaudid administered x1. Respirations non-labored. Pt with no signs of distress. Infrequent weak, non-productive cough noted. Turn/Repo for comfort. Will continue to monitor.

## 2018-01-22 NOTE — PROGRESS NOTES
Pt  at 1255.  Sister, Samantha notified.  No friends or family present. Sister requests we release body to [facility].  No autopsy required.  Not a canidate for organ donation.  PICC and fu removed.  Shirt, shoes, pants, socks and jacket sent in belongings bag with patient.

## 2018-01-22 NOTE — PROGRESS NOTES
Brief Palliative Care Note.    Went to check on Mr Coleman and found that he had passed. Nursing notified, will contact family.     Nisha LUCAS CNP  Pager: [phone]  Palliative Medicine  January 22, 2018

## 2018-01-22 NOTE — PROGRESS NOTES
MD DEATH PRONOUNCEMENT    Called to pronounce Kelechi Coleman dead.    Physical Exam: Unresponsive to noxious stimuli, Spontaneous respirations absent, Breath sounds absent, Carotid pulse absent, Heart sounds absent, Pupillary light reflex absent and Corneal blink reflex absent    Patient was pronounced dead at 12:55 PM, 2018.    Active Problems:    CHF (congestive heart failure) (H)       Infectious disease present?: NO    Communicable disease present? (examples: HIV, chicken pox, TB, Ebola, CJD) :  NO    Multi-drug resistant organism present? (example: MRSA): NO    Please consider an autopsy if any of the following exist:  NO Unexpected or unexplained death during or following any dental, medical, or surgical diagnostic treatment procedures.   NO Death of mother at or up to seven days after delivery.     NO All  and pediatric deaths.     NO Death where the cause is sufficiently obscure to delay completion of the death certificate.   NO Deaths in which autopsy would confirm a suspected illness/condition that would affect surviving family members or recipients of transplanted organs.     The following deaths must be reported to the 's Office:  NO A death that may be due entirely or in part to any factors other than natural disease (recent surgery, recent trauma, suspected abuse/neglect).   NO A death that may be an accident, suicide, or homicide.     NO Any sudden, unexpected death in which there is no prior history of significant heart disease or any other condition associated with sudden death.   NO A death under suspicious, unusual, or unexpected circumstances.    NO Any death which is apparently due to natural causes but in which the  does not have a personal physician familiar with the patient s medical history, social, or environmental situation or the circumstances of the terminal event.   NO Any death apparently due to Sudden Infant Death Syndrome.     NO Deaths that  occur during, in association with, or as consequences of a diagnostic, therapeutic, or anesthetic procedure.   NO Any death in which a fracture of a major bone has occurred within the past (6) six months.   NO A death of persons note seen by their physician within 120 days of demise.     NO Any death in which the  was an inmate of a public institution or was in the custody of Law Enforcement personnel.   NO  All unexpected deaths of children   NO Solid organ donors   NO Unidentified bodies   NO Deaths of persons whose bodies are to be cremated or otherwise disposed of so that the bodies will later be unavailable for examination;   NO Deaths unattended by a physician outside of a licensed healthcare facility or licensed residential hospice program   NO Deaths occurring within 24 hours of arrival to a health care facility if death is unexpected.    NO Deaths associated with the decedent s employment.   NO Deaths attributed to acts of terrorism.   NO Any death in which there is uncertainty as to whether it is a medical examiner s care should be discussed with the medical investigator.        Body disposition: Autopsy was discussed with family member:  Sister by phone.  Permission for autopsy was declined.

## 2018-01-22 NOTE — PROGRESS NOTES
[facility]    Hospitalist Progress Note  When I saw the patient: 01/22/2018    Assessment & Plan   Kelechi Coleman is a 82 year old male who was admitted on 1/16/2018 for heart failure unable to manage with diuretic outpatient. Hx of LV dysfunction, CKD, CAD, CABG, AS, DM, Afib not on anticoagulation, Lt subarachnoid hemorrhage, seizure from SAH. Patient is on low CO failure and low EF, severe AS and bradycardia with altered MS, JUVENTINO.     Prognosis was considered grim, palliative care as well consulted, and now on comfort care measure.    Following issues were managed while he was being treated.    Acute decompensated HFrEF(<20%) : Patient  with prior EF of 30-35% a month ago, was on torsemide as outpatient and managed by cardiology, torsemide dose was increased recently but despite that, leg edema and dyspnea worsened. Also noted worsened creatinine, and was bradycardic to upper 30s, so he was sent to hospital from cardiology office is a direct admit. TTE this stay-further devreased LVEF to <20%, severe global hypokinesia of LV, anterior, septal and apical wall akinesis moderately dilated RV with moderate to severely reduced RV systolic function, possible severe AS. Decompensation possibly due to bradycardia. PTA coreg was held at admission, HR improved up to 50s, evaluated by cardiology, and recommended temp pacemaker, placed 1/19, rate was set to 60s  -Also was treated with IV Lasix which worsened his renal function. Nephrology consulted.   -Overall prognosis was considered grim, palliative care also consulted. Discussed with his sister GEOVANNA Singh, at length 1/20 and transitioned to comfort care.    Rt groin catheter removed 1/21    Sinus bradycardia with  1st degree AVB and occasional junctional rhythm  S/p temporary pacemaker 1/19  Hypothyroidism:  Acute kidney injury on CKD stage III:   Hyperkalemia:  CAD, S/P CABG  Severe AS  Hx of SAH and seizure;  - Continue his seizure treatment  while in hospital IV  DM, insulin-dependent  Mild anemia and thrombocytopenia  Dysphagia  DVT Prophylaxis not needed  Code Status: DNR/DNI    Disposition: Expected discharge:  hold transfer to LTC as he appears imminent at this time. I called his sister Samantha this am but unable to reach.    Liana Thorpe MD  Hospitalist    Interval History    Sleeping currently, noted apneic spells.  No acute issues, remains calm and comfortable    -Data reviewed today: I reviewed all new labs and imaging results over the last 24 hours.   Physical Exam   Temp: 97.6  F (36.4  C) Temp src: Axillary BP: 131/68 Pulse: 72   Resp: 22 SpO2: 97 % O2 Device: None (Room air)    Vitals:    01/17/18 0500 01/19/18 0500 01/20/18 0700   Weight: 96.3 kg (212 lb 6.4 oz) 96.1 kg (211 lb 13.8 oz) 107.2 kg (236 lb 5.3 oz)     Vital Signs with Ranges  Temp:  [97.6  F (36.4  C)] 97.6  F (36.4  C)  Pulse:  [72] 72  Resp:  [18-22] 22  BP: (131)/(68) 131/68  SpO2:  [97 %] 97 %  I/O last 3 completed shifts:  In: -   Out: 475 [Urine:475]      Constitutional: somnolent, minimally arousable.  PULM: Shallow   CVS: S1S2 irregular, systolic murmur 2/6. Has slow irregular thready pulse   LE Edema (+)  Abdomen: abd wall edema (+)  : Mancuso present, no hematuria      Medications     dextrose 25 mL/hr at 01/20/18 0718       sodium chloride (PF)  10 mL Intracatheter Q8H     levETIRAcetam  500 mg Intravenous Q12H     sodium chloride (PF)  10 mL Intracatheter Q7 Days       Data     Recent Labs  Lab 01/20/18  0656 01/19/18  0635 01/18/18  2250 01/18/18  0538 01/18/18  0532  01/17/18  0545 01/16/18  2335 01/16/18 2010 01/16/18  1617   WBC  --   --   --   --  5.7  --  5.8  --   --   --    HGB  --   --   --   --  10.9*  --  11.1*  --   --   --    MCV  --   --   --   --  89  --  89  --   --   --    PLT  --   --   --   --  96*  --  112*  --   --   --    INR  --   --   --  1.54*  --   --   --   --   --   --     138  --   --  138  < > 139 138  --  140   POTASSIUM 4.0  4.5 4.6  --  5.0  < > 5.4* 5.3  --  4.8   CHLORIDE 103 101  --   --  102  < > 104 103  --  105   CO2 29 29  --   --  27  < > 28 29  --  27   BUN 61* 70*  --   --  67*  < > 67* 65*  --  64*   CR 1.77* 1.96*  --   --  1.98*  < > 1.81* 1.81*  --  1.74*   ANIONGAP 8 8  --   --  9  < > 7 6  --  8   MIGUEL 9.0 8.9  --   --  8.8  < > 8.9 8.6  --  8.4*   * 141*  --   --  107*  < > 77 72  --  60*   ALBUMIN  --  2.8*  --   --  2.9*  --   --   --   --   --    PROTTOTAL  --  6.4*  --   --  6.4*  --   --   --   --   --    BILITOTAL  --  1.5*  --   --  0.8  --   --   --   --   --    ALKPHOS  --  257*  --   --  265*  --   --   --   --   --    ALT  --  44  --   --  47  --   --   --   --   --    AST  --  42  --   --  48*  --   --   --   --   --    TROPI  --   --   --   --   --   --   --  0.476* 0.463* 0.447*   < > = values in this interval not displayed.    Imaging:  No results found for this or any previous visit (from the past 24 hour(s)).

## 2018-01-29 NOTE — DISCHARGE SUMMARY
[facility]    Death Summary  Hospitalist    Date of Admission:  1/16/2018  Date of Death:         1/22/2018 at 12:55 PM  Provider Completing Death Summary: Liana Thorpe MD  Date of Service (when I saw the patient): 1/22/18     Discharge Diagnoses      Death    Other diagnoses managed during hospital stay:     Acute decompensated HFrEF(<20%)  Sinus bradycardia with  1st degree AVB and occasional junctional rhythm  S/p temporary pacemaker 1/19  Hypothyroidism:  Acute kidney injury on CKD stage III:   Hyperkalemia:  CAD, S/P CABG  Severe AS  Hx of SAH and seizure  DM, insulin-dependent  Mild anemia and thrombocytopenia  Dysphagia       History of Present Illness   Keelchi Coleman is a 82 year old male who was admitted on 1/16/2018 for heart failure unable to manage with diuretic outpatient. He had history of LV dysfunction, CKD, CAD, CABG, AS, DM, Afib not on anticoagulation, Lt subarachnoid hemorrhage, seizure from SAH. Patient is on low CO failure and low EF, severe AS and bradycardia with altered MS, JUVENTINO.     Hospital Course   Kelechi Coleman was admitted on 1/16/2018.  The following problems were addressed during his hospitalization:      Following issues were managed while he was being treated prior to starting comfort care.     Acute decompensated HFrEF(<20%) : Patient  with prior EF of 30-35% a month ago, was on torsemide as outpatient and managed by cardiology, torsemide dose was increased recently but despite that, leg edema and dyspnea worsened. Also noted worsened creatinine, and was bradycardic to upper 30s, so he was sent to hospital from cardiology office as a direct admit. TTE this stay showed further decreased LVEF to <20%, severe global hypokinesia of LV, anterior, septal and apical wall akinesis moderately dilated RV with moderate to severely reduced RV systolic function, possible severe AS. Decompensation was possibly due to bradycardia. PTA coreg was held at  admission, HR improved up to 50s, evaluated by cardiology, and recommended temp pacemaker, placed , rate was set to 60s. Also treated with IV Lasix which worsened his renal function. Nephrology consulted. Lasix held.     -Overall prognosis was considered grim, palliative care also consulted. Discussed with his sister GEOVANNA Singh, at length  and transitioned to comfort care and Temporary pacing discontinued , and Rt groin catheter removed . Patient  on  at 1255 pm.     Sinus bradycardia with  1st degree AVB and occasional junctional rhythm  S/p temporary pacemaker   Hypothyroidism:  Acute kidney injury on CKD stage III:   Hyperkalemia:  CAD, S/P CABG  Severe AS  Hx of SAH and seizure  DM, insulin-dependent  Mild anemia and thrombocytopenia  Dysphagia     Cause of death: Acute decompensated systolic CHF, sinus/junctional bradycardia, acute kidney injury.    Liana Thorpe MD  Hospitalist       Pending Results   Unresulted Labs Ordered in the Past 30 Days of this Admission     No orders found from 2017 to 2018.          Primary Care Physician   Laquita Deal    Consultations This Hospital Stay   CORE CLINIC EVALUATION IP CONSULT  CARDIAC REHAB IP CONSULT  CARE COORDINATOR IP CONSULT  NUTRITION SERVICES ADULT IP CONSULT  CARDIOLOGY IP CONSULT  PALLIATIVE CARE ADULT IP CONSULT  NEPHROLOGY IP CONSULT  SPEECH LANGUAGE PATH ADULT IP CONSULT  PHARMACY IP CONSULT  VASCULAR ACCESS ADULT IP CONSULT  SOCIAL WORK IP CONSULT  PHARMACY IP CONSULT  PHARMACY IP CONSULT  NUTRITION SERVICES ADULT IP CONSULT  PHARMACY IP CONSULT  SMOKING CESSATION PROGRAM IP CONSULT    Time Spent on this Encounter   I, Liana Thorpe, personally saw the patient today and spent less than or equal to 30 minutes discharging this patient.    Data   Most Recent 3 CBC's:  Recent Labs   Lab Test  18   0532  18   0545  17   0955   WBC  5.7  5.8  9.5   HGB  10.9*  11.1*  10.2*   MCV  89  89  92   PLT   96*  112*  168      Most Recent 3 BMP's:  Recent Labs   Lab Test  01/20/18   0656  01/19/18   0635  01/18/18   2250  01/18/18   0532   NA  140  138   --   138   POTASSIUM  4.0  4.5  4.6  5.0   CHLORIDE  103  101   --   102   CO2  29  29   --   27   BUN  61*  70*   --   67*   CR  1.77*  1.96*   --   1.98*   ANIONGAP  8  8   --   9   MIGUEL  9.0  8.9   --   8.8   GLC  131*  141*   --   107*     Most Recent 2 LFT's:  Recent Labs   Lab Test  01/19/18   0635  01/18/18   0532   AST  42  48*   ALT  44  47   ALKPHOS  257*  265*   BILITOTAL  1.5*  0.8     Most Recent INR's and Anticoagulation Dosing History:  Anticoagulation Dose History     Recent Dosing and Labs Latest Ref Rng & Units 5/9/2013 9/2/2016 9/16/2016 9/19/2016 8/18/2017 12/12/2017 1/18/2018    INR 0.86 - 1.14 0.96 1.01 1.04 1.10 1.32(H) 1.32(H) 1.54(H)        Most Recent 3 Troponin's:  Recent Labs   Lab Test  01/16/18   2335  01/16/18 2010 01/16/18   1617   TROPI  0.476*  0.463*  0.447*     Most Recent Cholesterol Panel:  Recent Labs   Lab Test  09/06/16   1103   CHOL  139   LDL  62   HDL  52   TRIG  123     Most Recent 6 Bacteria Isolates From Any Culture (See EPIC Reports for Culture Details):  Recent Labs   Lab Test  12/14/17   1345  12/14/17   1342  08/18/17 2014   CULT  Cultured on the 1st day of incubation:  Staphylococcus epidermidis  *  Critical Value/Significant Value, preliminary result only, called to and read back by  Cleo Brady RN, @7795 12/15/17.DH.    (Note)  POSITIVE for STAPHYLOCOCCUS EPIDERMIDIS and POSITIVE for the mecA  gene (resistant to methicillin) by [place] multiplex nucleic acid  test. Final identification and antimicrobial susceptibility testing  will be verified by standard methods.    Specimen tested with [place] multiplex, gram-positive blood culture  nucleic acid test for the following targets: Staph aureus, Staph  epidermidis, Staph lugdunensis, other Staph species, Enterococcus  faecalis, Enterococcus faecium,  Streptococcus species, S. agalactiae,  S. anginosus grp., S. pneumoniae, S. pyogenes, Listeria sp., mecA  (methicillin resistance) and Gilson/B (vancomycin resistance).    Critical Value/Significant Value called to and read back by Lisbet Linn RN from [facility]. 12.15.17 at 2158. GR.    No growth  <10,000 colonies/mL  Coagulase negative Staphylococcus  *  10,000 to 50,000 colonies/mL  urogenital daryl  Susceptibility testing not routinely done       Most Recent TSH, T4 and A1c Labs:  Recent Labs   Lab Test  01/16/18 2010   TSH  37.07*   T4  0.67*   A1C  6.9*     Results for orders placed or performed during the hospital encounter of 01/16/18   XR Chest Port 1 View    Narrative    XR CHEST PORT 1 VW 1/16/2018 4:13 PM    HISTORY: Short of breath.    COMPARISON: 12/14/2017    FINDINGS: Moderate left and small right pleural effusions, similar to  12/14/2017. No pneumothorax. Cardiomegaly is redemonstrated.      Impression    IMPRESSION: Moderate left and small right pleural effusions.    MANUELITO NAVARRO MD   XR Chest Port 1 View    Narrative    XR CHEST PORT 1 VW 1/18/2018 1:41 PM    HISTORY: PICC line placement.    COMPARISON: 1/16/2018    FINDINGS: Right PICC line tip is in the projection of the SVC/RA  junction. Small right and moderate left pleural effusions are  redemonstrated. No pneumothorax.      Impression    IMPRESSION: Right PICC line appears appropriately positioned.    MANUELITO NAVARRO MD

## 2021-12-21 NOTE — MR AVS SNAPSHOT
After Visit Summary   8/21/2017    Kelechi Coleman    MRN: [id number]           Patient Information     Date Of Birth          1935        Visit Information        Provider Department      8/21/2017 7:00 AM [facility] EEG TECH 4 [facility]        Today's Diagnoses     Seizures (H)    -  1       Follow-ups after your visit        Your next 10 appointments already scheduled     Aug 28, 2017 12:50 PM CDT   LAB with [facility]   [facility] ([facility]    [address]  [place] [facility] [address] [phone]           Patient must bring picture ID. Patient should be prepared to give a urine specimen  Please do not eat 10-12 hours before your appointment if you are coming in fasting for labs on lipids, cholesterol, or glucose (sugar). Pregnant women should follow their Care Team instructions. Water with medications is okay. Do not drink coffee or other fluids. If you have concerns about taking  your medications, please ask at office or if scheduling via [employer], send a message by clicking on Secure Messaging, Message Your Care Team.            Aug 28, 2017  1:50 PM CDT   Core Return with Erica Beach PA-C   [facility]    [address]  [place] [address] [phone]            Sep 14, 2017   Procedure with Gigi Wheat MD   [facility] (--)    [address], [address]  [place] [address] [phone]            Feb 09, 2018 11:30 AM CST   [facility]    [address] [id number] [phone]           Bring a current list of meds and any records pertaining to this visit. For Physicals, please bring immunization records and any forms needing to be filled out. Please arrive 10 minutes early to complete paperwork.               Who to contact     Please call your clinic at [phone] to:    Ask questions about your health    Make or cancel appointments    Discuss your medicines    Learn about your test results    Speak to your doctor   If you have compliments or concerns about an experience at your clinic, or if you wish to file a complaint, please contact [facility] Patient Relations at [phone] or email us at [email or web address]         Additional Information About Your Visit        MyChart Information     [employer] gives you secure access to your electronic health record. If you see a primary care provider, you can also send messages to your care team and make appointments. If you have questions, please call your primary care clinic.  If you do not have a primary care provider, please call [phone] and they will assist you.      [employer] is an electronic gateway that provides easy, online access to your medical records. With [employer], you can request a clinic appointment, read your test results, renew a prescription or communicate with your care team.     To access your existing account, please contact your [facility] or call [phone] for assistance.        Care EveryWhere ID     This is your Care EveryWhere ID. This could be used by other organizations to access your [place] medical records  [id number]         Blood Pressure from Last 3 Encounters:   08/21/17 104/56   08/18/17 (!) 110/91   08/09/17 107/60    Weight from Last 3 Encounters:   08/20/17 83.1 kg (183 lb 3.2 oz)   08/17/17 83.9 kg (185 lb)   08/09/17 82.8 kg (182 lb 8 oz)              We Performed the Following     Glucose by meter          Today's Medication Changes      Notice     This visit is during an admission. Changes to the med list made in this visit will be reflected in the After Visit Summary of the admission.             Primary Care Provider Office Phone # Fax #    Etienne MCKEON  MD Gerard [phone]       [address] [place] [place] [address]        Goals        Exercise    I will exercise 2x per week (15 min per time) , for the next 6 weeks.     Notes - Note created  12/22/2016 12:14 PM by Ting Bullard, RN    As of today's date 12/22/2016 goal is met at 0 - 25%.   Goal Status:  Active           General    I will not add any salt to my food and will limit my salt intake to 2,000 mg of sodium per day. (pt-stated)     Notes - Note created  9/26/2016  3:53 PM by Ting Bullard, RN    As of today's date 9/26/2016 goal is met at 26 - 50%.   Goal Status:  Active        I will weigh myself every day and call my clinic if I am more than 2 pounds heavier in a day or 5 pounds heavier in a week.  (pt-stated)     Notes - Note created  9/26/2016  3:52 PM by Ting Bullard RN    As of today's date 9/26/2016 goal is met at 26 - 50%.   Goal Status:  Active          Equal Access to Services     [facility]: Hadii christine kelsey hadasho Soemilyali, waaxda luqadaha, qaybta kaalmada adeegyada, nenita reynoso . So [facility] [phone].    ATENCIÓN: Si habla español, tiene a conteh disposición servicios gratuitos de asistencia lingüística. Llame al [phone].    We comply with applicable federal civil rights laws and [place] laws. We do not discriminate on the basis of race, color, national origin, age, disability sex, sexual orientation or gender identity.            Thank you!     Thank you for choosing [facility]  for your care. Our goal is always to provide you with excellent care. Hearing back from our patients is one way we can continue to improve our services. Please take a few minutes to complete the written survey that you may receive in the mail after your visit with us. Thank you!             Your Updated Medication List - Protect others around you: Learn how to safely use, store and throw away your medicines at www.[employer].org.      Notice     This visit is  during an admission. Changes to the med list made in this visit will be reflected in the After Visit Summary of the admission.       Size Of Lesion In Cm: 1.1

## 2023-01-09 NOTE — PLAN OF CARE
"Angelic Cardona is a 86 y.o. female who is seen as a consult at the request of Saurav Acosta MD for Rectal Bleeding.      HPI:  Angelic Cardona is a 86-year-old female who is seen as a consult at the request of Saurav Acosta MD for rectal bleeding.     . She reports that she has a \" stubborn bowels\" and when she strains it causes intermittent bleeding. The patient reports that she takes the stool softener at night, MiraLAX, and prune juice in the during the day. She states that she uses Preparation H on the external rectum. She notes blood only when she wipes. She states that she is taking Eliquis for atrial fibrillation. She states she does not have lower extremity edema. She states that she does not like drinking water. The patient reports that Dr. Acosta put her on Linzess, but she could not take it because it caused severe cramps. She states that she does not have rectal bleeding at this time.    Past Medical History:   Diagnosis Date   • Anemia 30+ years    Recent Hgb 8.9   • Atrial fibrillation (HCC)    • Carpal tunnel syndrome    • Chronic constipation    • Chronic kidney disease 03/26/2022   • Clotting disorder (HCC) 2021    Bleeding hemorrhoids   • Colon polyp 2012   • Coronary artery disease 2021    AFib   • GERD (gastroesophageal reflux disease)    • Hyperlipidemia    • Hypertension    • Irritable bowel syndrome 2005       Past Surgical History:   Procedure Laterality Date   • CARPAL TUNNEL RELEASE     • COLONOSCOPY  01/11/2016    tics, NBIH, tubulovillous adenoma, HP polyp   • COLONOSCOPY N/A 5/26/2021    Procedure: COLONOSCOPY TO CECUM AND INTO TI;  Surgeon: Saurav Acosta MD;  Location: [facility] ENDOSCOPY;  Service: Gastroenterology;  Laterality: N/A;  pre: history of colon polyps  post: hemorrhoids   • REPLACEMENT TOTAL KNEE      x 3   • TUBAL ABDOMINAL LIGATION     • UPPER GASTROINTESTINAL ENDOSCOPY  01/11/2016    HH, no specimens collected       Social History:   reports that she has " Information has been faxed to patient.   Problem: Goal Outcome Summary  Goal: Goal Outcome Summary  Outcome: No Change  VSS. RUE 2/5; 4/5 AOE. Oriented x4 at 0400; more alert this shift. VEEG leads intact; neuro consulting. No events reported or witnessed. Denies pain. Voiding spont via urinal at bedside; incontinent at times with deep sleep. NPO; speech to see again today to re-evaluate. NWB RUE; ortho consulting. PIV infusing at 75cc/hr. Up with heavy A2. Continue with POC.       never smoked. She has never been exposed to tobacco smoke. She has never used smokeless tobacco. She reports that she does not drink alcohol and does not use drugs.      Marriage status:     Family History   Problem Relation Age of Onset   • Cancer Father    • Colon cancer Father          Current Outpatient Medications:   •  Digestive Enzymes capsule, , Disp: , Rfl:   •  ferrous sulfate 325 (65 FE) MG EC tablet, Take 325 mg by mouth Daily., Disp: , Rfl:   •  metOLazone (ZAROXOLYN) 5 MG tablet, Take 5 mg by mouth Daily., Disp: , Rfl:   •  amiodarone (PACERONE) 200 MG tablet, Take 200 mg by mouth., Disp: , Rfl:   •  apixaban (ELIQUIS) 5 MG tablet tablet, Take 2.5 mg by mouth 2 (Two) Times a Day., Disp: , Rfl:   •  brimonidine (ALPHAGAN) 0.2 % ophthalmic solution, , Disp: , Rfl:   •  carvedilol (COREG) 25 MG tablet, Take 25 mg by mouth 2 (Two) Times a Day With Meals., Disp: , Rfl:   •  cefdinir (OMNICEF) 300 MG capsule, , Disp: , Rfl:   •  cinacalcet (SENSIPAR) 30 MG tablet, , Disp: , Rfl:   •  Combigan 0.2-0.5 % ophthalmic solution, , Disp: , Rfl:   •  diclofenac (VOLTAREN) 75 MG EC tablet, TAKE 1 TABLET BY MOUTH TWICE A DAY WITH FOOD AS NEEDED FOR ARTHRITIS, Disp: , Rfl:   •  dicyclomine (BENTYL) 10 MG capsule, , Disp: , Rfl:   •  fluticasone (FLONASE) 50 MCG/ACT nasal spray, , Disp: , Rfl:   •  furosemide (LASIX) 20 MG tablet, Take 80 mg by mouth 2 (Two) Times a Day., Disp: , Rfl:   •  furosemide (LASIX) 80 MG tablet, , Disp: , Rfl:   •  Multi Vitamin tablet tablet, Daily., Disp: , Rfl:   •  Multiple Vitamins-Minerals (MULTIVITAMIN ADULT PO), Take  by mouth., Disp: , Rfl:   •  pantoprazole (PROTONIX) 40 MG EC tablet, Take 1 tablet by mouth Daily., Disp: 90 tablet, Rfl: 3  •  Polyethylene Glycol 3350 (MIRALAX PO), Take  by mouth Daily., Disp: , Rfl:   •  potassium chloride (K-DUR,KLOR-CON) 20 MEQ CR tablet, , Disp: , Rfl:   •  timolol (TIMOPTIC) 0.5 % ophthalmic solution, INSTILL 1 DROP INTO BOTH EYES TWICE A  DAY, Disp: , Rfl:   •  valsartan (DIOVAN) 320 MG tablet, Take 320 mg by mouth Daily., Disp: , Rfl:     Allergy  Tuberculin tests, Shellfish-derived products, and Sulfa antibiotics    Review of Systems   Constitutional: Positive for chills and malaise/fatigue. Negative for decreased appetite and weight gain.   HENT: Negative for congestion, hearing loss and hoarse voice.    Eyes: Negative for blurred vision, discharge and visual disturbance.   Cardiovascular: Negative for chest pain, cyanosis and leg swelling.   Respiratory: Positive for shortness of breath. Negative for cough, sleep disturbances due to breathing and snoring.    Endocrine: Positive for polydipsia. Negative for cold intolerance and heat intolerance.   Hematologic/Lymphatic: Does not bruise/bleed easily.   Skin: Negative for itching, poor wound healing and skin cancer.   Musculoskeletal: Negative for arthritis, back pain, joint pain and joint swelling.   Gastrointestinal: Positive for abdominal pain, constipation and hematochezia. Negative for change in bowel habit and bowel incontinence.   Genitourinary: Positive for incomplete emptying. Negative for bladder incontinence, dysuria and hematuria.   Neurological: Negative for brief paralysis, excessive daytime sleepiness, dizziness, focal weakness, headaches, light-headedness and weakness.   Psychiatric/Behavioral: Negative for altered mental status and hallucinations. The patient does not have insomnia.    Allergic/Immunologic: Negative for HIV exposure and persistent infections.   All other systems reviewed and are negative.      Vitals:    01/09/23 0950   Temp: 97.8 °F (36.6 °C)   SpO2: 97%     Body mass index is 37.61 kg/m².    Physical Exam  Genitourinary:     Rectum: No mass or external hemorrhoid.      Comments: Perianal exam: External hemorrhoids are within normal limits.   Digital rectal exam: No masses felt. Anoscopy performed and internal hemorrhoids are grade 2 with some irritation  x3.      Review of Medical Record: The patient had a colonoscopy on 05/26/2022 for a history of polyps. There were no polyps at that colonoscopy, but internal hemorrhoids were enlarged. The patient is on iron, Lasix 80 mg twice a day, MiraLAX, Bentyl, and Eliquis    Assessment:  No diagnosis found.    Plan:  The patient is doing well overall. She was advised to apply hemorrhoid cream internally. Nozzles were provided as were gloves.   She will follow up as needed.      Transcribed from ambient dictation for Francois Powers MD by Anju Castillo.  01/09/23   12:36 EST    Patient or patient representative verbalized consent to the visit recording.  I have personally performed the services described in this document as transcribed by the above individual, and it is both accurate and complete.

## 2025-02-06 NOTE — DISCHARGE SUMMARY
Fasting Guidelines    NPO Instructions:    Do not eat any food after midnight the night before your surgery/procedure.  You may have up to 13.5 ounces of clear liquids until TWO hours before your instructed arrival time to the hospital. This includes water, black tea/coffee, (no milk or cream), apple juice, and/or electrolyte drinks (Gatorade).  You may chew gum up to TWO hours before your surgery/procedure.    Additional Instructions:    Avoid herbal supplements, multivitamins and NSAIDS (non-steroidal anti-inflammatory drugs) such as Advil, Aleve, Ibuprofen, Naproxen, Excedrin, Meloxicam or Celebrex for at least 7 days prior to surgery. May take Tylenol as needed.    Avoid tobacco and alcohol products for 24 hours prior to surgery.    CONTACT SURGEON'S OFFICE IF YOU DEVELOP:  * Fever = 100.4 F   * New respiratory symptoms (e.g. cough, shortness of breath, respiratory distress, sore throat)  * Recent loss of taste or smell  *Flu like symptoms such as headache, fatigue or gastrointestinal symptoms  * You develop any open sores, shingles, burning or painful urination   AND/OR:  * You no longer wish to have the surgery.  * Any other personal circumstances change that may lead to the need to cancel or defer this surgery.  *You were admitted to any hospital within one week of your planned procedure.    Seven/Six Days before Surgery:  Review your medication instructions, stop indicated medications    Day of Surgery:  Review your medication instructions, take indicated medications  Wear comfortable loose fitting clothing  Do not use moisturizers, creams, lotions or perfume  All jewelry and valuables should be left at home    [facility]  [facility]  Adnuo-[phone]  Cwh-[phone]  [email or web address]      Preoperative Brain Exercises    What are brain exercises?  A brain exercise is any activity that engages your thinking (cognitive) skills.    What types of  [facility], [place]    Discharge Summary  Trauma Surgery Service    Date of Admission:  8/18/2017  Date of Discharge:  8/24/2017  Discharging Provider: Rosario Martinez   Date of Service (when I saw the patient): 08/24/17    Primary Provider: Etienne Gee  Primary Care clinic: [place] [address] [address]  Phone: [phone]  Fax number: [phone]     Discharge Diagnoses   1. Fall down 6 stairs  2. Left Subarachnoid Hemorrhage  3. Left Subdural Hematoma  4. Left temporal/parietal scalp abrasion   5. Left elbow abrasion  6. Right knee abrasion   7. Acute pain   8. Acute on chronic renal failure, resolved   9. Systolic heart failure s/p CABG  10. PAD   11. A.fib   12. DM Type II  13. Hypertension   14. Hyperlipidemia   15. 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 He noted bleeding from head following fall but no new neurological deficits and denied any loss of consciousness.  Has noted right  shoulder pain and right upper extremity weakness which he has had intermittently from his strokes. It is worse than baseline and has some weakeness in right upper extremity. He was brought to [facility] where he  was found to have a left subdural and subarachnoid bleeds along with a scalp laceration. He was loaded with Keppra, had the left parietal laceration repaired. He was then transferred to [facility] for trauma admission and speciality cares. His hospital course is as follows:     S/p fall down stairs:   The patient sustained the above injury as a result of mechanical fall.  He was seen by the Trauma Resource Nurse and injury prevention education was performed.  The mechanism of injury and factors contributing  activities are considered brain exercises?  Jigsaw puzzles, crossword puzzles, word jumble, memory games, word search, and many more.  Many can be found free online or on your phone via a mobile kemal.    Why should I do brain exercises before my surgery?  More recent research has shown brain exercise before surgery can lower the risk of postoperative delirium (confusion) which can be especially important for older adults.  Patients who did brain exercises for 5 to 10 hours the days before surgery, cut their risk of postoperative delirium in half up to 1 week after surgery.         [facility]    Preoperative Deep Breathing Exercises    Why it is important to do deep breathing exercises before my surgery?  Deep breathing exercises strengthen your breathing muscles.  This helps you to recover after your surgery and decreases the chance of breathing complications.      How are the deep breathing exercises done?  Sit straight with your back supported.  Breathe in deeply and slowly through your nose. Your lower rib cage should expand and your abdomen may move forward.  Hold that breath for 3 to 5 seconds.  Breathe out through pursed lips, slowly and completely.  Rest and repeat 10 times every hour while awake.  Rest longer if you become dizzy or lightheaded.         Patient and Family Education             Ways You Can Help Prevent Blood Clots             This handout explains some simple things you can do to help prevent blood clots.      Blood clots are blockages that can form in the body's veins. When a blood clot forms in your deep veins, it may be called a deep vein thrombosis, or DVT for short. Blood clots can happen in any part of the body where blood flows, but they are most common in the arms and legs. If a piece of a blood clot breaks free and travels to the lungs, it is called a pulmonary embolus (PE). A PE can be a very serious problem.         Being in the hospital or having surgery  to the accident were discussed with the patient.  Strategies on how to prevent future accidents were reviewed.  The patient underwent tertiary examination to evaluate for additional injuries.  The systematic review did not find any other injuries.    Right shoulder pain, tendinoplasty.   Kelechi Coleman was evaluated by Orthopedics for his right arm pain and weakness. He underwent MRI of this extremity which was notable for tendinoplasty of the rotator cuff.  This injury was likely and exacerbation of his chronic issue given his fall. He is requested to follow up in the Orthopedic Clinic as needed. He can weightbear and do range of motion as he tolerates for his injury. He  was evaluated by physical and occupational therapies during his hospitalization.  Please see summary of most current therapy notes below.     Left subdural and left subarachnoid. Right arm weakness and movements.   He was  evaluated by Neurosurgery for his injury. He was managed non-operatively.   He had repeat imaging with CT of head injury which showed stability. He was monitored with serial neurological examinations which remained stable.  Neurosurgery recommends follow up in Neurosurgery clinic in 4 weeks with repeat CT of his head.     He was evaluated by physical and occupational therapies during his hospitalization.  Patient underwent Traumatic Brain Injury evaluation and screening by therapies. He has been provided with information for TBI follow-up. Please see summary of most current therapy notes below.    He was seen by neurology this admission to evaluate for right arm movements and tremors. Daljit nous EEG monitoring did not show any seizure activity however given his recent intracranial hemorrhages, he will continue on Trileptal and  Keppra until he is seen in epilepsy clinic with follow up with neurology.     Hx of CVA 1998, on Plavix. PAD, CABG 2000 s/p stents. He unfortunately fell and sustained the above injuries. Because  can raise your chances of getting a blood clot because you may not be well enough to move around as much as you normally do.         Ways you can help prevent blood clots in the hospital         Wearing SCDs. SCDs stands for Sequential Compression Devices.   SCDs are special sleeves that wrap around your legs  They attach to a pump that fills them with air to gently squeeze your legs every few minutes.   This helps return the blood in your legs to your heart.   SCDs should only be taken off when walking or bathing.   SCDs may not be comfortable, but they can help save your life.               Wearing compression stockings - if your doctor orders them. These special snug fitting stockings gently squeeze your legs to help blood flow.       Walking. Walking helps move the blood in your legs.   If your doctor says it is ok, try walking the halls at least   5 times a day. Ask us to help you get up, so you don't fall.      Taking any blood thinning medicines your doctor orders.        Page 1 of 2     [facility]; 3/23   Ways you can help prevent blood clots at home       Wearing compression stockings - if your doctor orders them. ? Walking - to help move the blood in your legs.       Taking any blood thinning medicines your doctor orders.      Signs of a blood clot or PE      Tell your doctor or nurse know right away if you have of the problems listed below.    If you are at home, seek medical care right away. Call 911 for chest pain or problems breathing.               Signs of a blood clot (DVT) - such as pain,  swelling, redness or warmth in your arm or leg      Signs of a pulmonary embolism (PE) - such as chest     pain or feeling short of breath                                                     of these head bleeds, neurosurgery request that his plavix be on hold until follow up in clinic and repeat CT can be done.    Abrasions. This was treated supportively with local wound cares.     Diabetes Mellitus.   He was previously taking anywhere form 20-25 units of Lantus at bedtime prior to admissions, but he was not on meal or carbohydrate coverage during this admission with fairly low blood sugars given the lack of carbs, as his oral intake increased we have started him at 10 units lantus and started on meal coverage with sliding scale insulin.     Hypertension. Hyperlipidemia. His home medications were resumed. No changes were made to this home regimen.     Acute pain. His pain was controlled with a multi-modality approach.  The current regimen for Kelechi Coleman includes the following, acetaminophen.  We have avoided narcotics to preserve mental status as much as possible and not contribute to any confusion or delirium he may have.  Adequate pain control was achieved with this regimen.  We anticipate that they will taper off this regimen over the next several weeks.    Atrial fibrillation. Systolic heart failure. His home diuretics and heart rate controlling medications were resumed, no changes were made to this regimen.     ETOH. The patient was screened for hazardous consumption of alcohol and dependence.  The patient reported use of ETOH, typically drinks 2 drinks a day and did not require intervention.  He showed no signs of withdrawal during his stay.    Palliative Care Consult  The palliative care team was consulted to assist in the care and future life planning regarding the changes the above injuries have created. Often this sort of injury is a clear marker of general decline in the aged population.  During their time with the patient and family, these are the things they focused on this admission:  POLST, they were unable to complete this while here.  They also are concerned with recent  "weight loss and request this be watched closely.    Therapy Recommendations:   Current status of physical therapies on discharge:  8/23/17\"Pt was sitting up in upright chair upon arrival. Increased rest breaks needed due to fatigue. Delay in response time noted. Pt required MODA-MAXA x 2 sit<>stand and to pivot from bedside chair back to bed with support under UEs, with care for R UE (WBAT per MD orders). Pt able to stand x 2.5 min with ALANA-MODA x 2 once standing. Pt sat at edge of bed with initial ALANA to steady due to R lean and progressed SBA x 6 min. MAXA x 2 required for sit>supine.      Discussed need for R UE sling during therapy when transferring to further protect AC jt/shoulder. Pt's nurse Kate agreed to order for pt.      Recommend ARU vs TCU. Activity tolerance yet reduced. Pt progressing slowly in PT, participated in ~ 30 min visit with encouragement needed for participation. Pt however would benefit from ARU stay to maximize functional return and address complex rehab needs as pt is far from functional baseline.    \"   Current status of occupational therapies on discharge: 8/23/17: \"Pt mod Ax1 and CGA of 2nd person for sit>stand, and min Ax2 for pivot to chair. Ambulated forward approx 5 feet with min Ax2, and tolerating RUE neuro re-ed well without major episodes of tremors. Rec d/c to ARU vs TCU with improved activity tolerance.  \"      Code Status   Full Code  Physical Exam   Temp: 95.1  F (35.1  C) Temp src: Oral BP: 102/56 Pulse: 61 Heart Rate: 68 Resp: 16 SpO2: 97 % O2 Device: None (Room air)    Vitals:    08/18/17 0400 08/19/17 0600 08/20/17 0500   Weight: 83.5 kg (184 lb 1.4 oz) 82.4 kg (181 lb 10.5 oz) 83.1 kg (183 lb 3.2 oz)     Vital Signs with Ranges  Temp:  [95.1  F (35.1  C)-97.7  F (36.5  C)] 95.1  F (35.1  C)  Pulse:  [61-71] 61  Heart Rate:  [68] 68  Resp:  [16] 16  BP: (102-129)/(52-67) 102/56  SpO2:  [93 %-97 %] 97 %  I/O last 3 completed shifts:  In: 820 [P.O.:820]  Out: 500 " "[Urine:500]    Eric Coma Scale - Total 14/15  Constitutional: Awake, alert, cooperative, no apparent distress  Eyes: Lids and lashes normal, pupils equal, round and reactive to light, extra ocular muscles intact, sclera clear, conjunctiva normal.  ENT: Normocephalic, atraumatic, teeth with poor hygiene, left posterior scalp obsured by dried blood  Respiratory: No increased work of breathing, good air exchange, clear to auscultation bilaterally, no crackles or wheezing.  Cardiovascular:  regular rate and rhythm, normal S1 and S2, and no murmur noted.  GI: Normal bowel sounds, soft, non-distended, non-tender, no guarding  Genitourinary:  No urine to assess  Skin:  Normal skin color pale, warm and dry  Musculoskeletal: There is no redness, warmth, or swelling of the joints.  Pedal pulse palpated  Neurologic: Awake, alert,   Cranial nerves II-XII are grossly intact. RUE weakness  Neuropsychiatric: Calm, perseverating. Difficult to assess orientation    Discharge Disposition   Discharged to rehabilitation facility  Condition at discharge: Stable  Discharge VS: Blood pressure 102/56, pulse 61, temperature 95.1  F (35.1  C), temperature source Oral, resp. rate 16, height 1.88 m (6' 2\"), weight 83.1 kg (183 lb 3.2 oz), SpO2 97 %.    1. Consultations This Hospital Stay   2. PHYSICAL MEDICINE & REHAB GENERAL ADULT IP CONSULT  3. MEDICATION HISTORY IP PHARMACY CONSULT  4. NEUROLOGY GENERAL ADULT IP CONSULT  5. PALLIATIVE CARE ADULT  6. ORTHOPEDICS IP CONSULT  7. VASCULAR ACCESS CARE ADULT IP CONSULT  8. PHYSICAL THERAPY ADULT IP CONSULT  9. OCCUPATIONAL THERAPY ADULT IP CONSULT  10. SPEECH LANGUAGE PATH ADULT IP CONSULT    Discharge Orders     General info for SNF   Length of Stay Estimate: Short Term Care: Estimated # of Days <30  Condition at Discharge: Stable  Level of care:skilled   Rehabilitation Potential: Good  Admission H&P remains valid and up-to-date: Yes  Recent Chemotherapy: N/A  Use Nursing Home Standing " Orders: Yes     Mantoux instructions   Give two-step Mantoux (PPD) Per Facility Policy Yes     Reason for your hospital stay   Trauma mechanism:Fall down 6 stairs on plavix on  8/17/2017  Known Injuries:  1. Left Subarachnoid Hemorrhage  2. Left Subdural Hematoma  3. Left temporal/parietal scalp abrasion   4. Left elbow abrasion  5. Right knee abrasion     Other diagnoses:   1. Acute pain   2. Acute on chronic renal failure  3. Systolic heart failure   4. A.fib   5. DM Type II  6. Hypertension   7. Hyperlipidemia   8. Leukocytosis, resolved  9. HX CVA-1998, on Plavix     Daily weights   Call Provider for weight gain of more than 2 pounds per day or 5 pounds per week.     Intake and output   Every shift     Wound care (specify)   Site:   Knees and elbows   Instructions:  Bacitracin twice per day     Activity - Up with assistive device   Use assistive devices as needed for ambulation     Weight bearing status   Weight bearing as tolerated   Right shoulder range of motion as tolerated     Encourage PO fluids     Follow Up and recommended labs and tests   Follow up with your primary care provider for continued medical care and hospital follow up in 5-10 days.     Trauma Clinic as needed   [facility]  Floor 4  [address] [place] [address]   Appointments: [phone]    Follow up in Orthopaedic Clinic as needed for right shoulder tendinous   [facility]  Floor 4   [address] [place] [address]   Appointments: [phone]     Neurosurgery Clinic--follow up with Ms. Karenscott Taylor in 4 weeks with repeat CT of head ( no contrast)  Floor 3   [address] [place] [address]   Appointments: [phone]    Follow up if you have persistent headache, nausea, dizziness, or thinking problems.  Concussion Clinic  [facility]  Floor 4   [address] [place] [address]   Appointments/Questions: [phone]    Neurology  Clinic--follow up with Dr Smith in epilepsy clinic in one month after discharge   [facility]  Floor 3   [address] [place] [address]   Appointments: [phone]     Additional Discharge Instructions   Hold Plavix until seen in clinic for followup per neurosurgery.     Full Code     Physical Therapy Adult Consult   Evaluate and treat as clinically indicated.    Reason:  Fall, subdural hematoma     Occupational Therapy Adult Consult   Evaluate and treat as clinically indicated.    Reason:  Fall, subdural hematoma     Speech Language Path Adult Consult   Evaluate and treat as clinically indicated.    Reason:  Dysphagia, diet advancement     Fall precautions     Advance Diet as Tolerated   Follow this diet upon discharge:      Full Liquid Diet Nectar Thickened Liquids (pre-thickened or use instant food thickener)       Discharge Medications   Current Discharge Medication List      START taking these medications    Details   mupirocin (BACTROBAN) 2 % ointment Apply topically 2 times daily  Qty: 22 g    Associated Diagnoses: Abrasion, right knee, initial encounter      senna-docusate (SENOKOT-S;PERICOLACE) 8.6-50 MG per tablet Take 1-2 tablets by mouth 2 times daily  Qty: 60 tablet, Refills: 0    Comments: Hold for loose stools  Associated Diagnoses: Drug-induced constipation      acetaminophen (TYLENOL) 325 MG tablet Take 2 tablets (650 mg) by mouth every 4 hours as needed for mild pain  Qty: 100 tablet    Associated Diagnoses: Pain         CONTINUE these medications which have CHANGED    Details   hydrALAZINE (APRESOLINE) 50 MG tablet Take 1 tablet (50 mg) by mouth 2 times daily    Associated Diagnoses: Essential hypertension with goal blood pressure less than 130/80      torsemide (DEMADEX) 10 MG tablet Take 1 tablet (10 mg) by mouth daily  Qty: 90 tablet, Refills: 3    Associated Diagnoses: Acute on chronic combined systolic and diastolic congestive heart failure (H)       spironolactone (ALDACTONE) 50 MG tablet Take 1 tablet (50 mg) by mouth daily  Qty: 90 tablet, Refills: 3    Associated Diagnoses: Chronic systolic congestive heart failure (H)      pantoprazole (PROTONIX) 40 MG EC tablet TAKE 1 TABLET BY MOUTH EVERY EVENING  Qty: 90 tablet, Refills: 2    Associated Diagnoses: Gastroesophageal reflux disease, esophagitis presence not specified      Multiple Vitamins-Minerals (CENTRUM SILVER) per tablet Take 1 tablet by mouth daily  Qty: 30 tablet    Associated Diagnoses: Vitamin deficiency      Calcium Carb-Cholecalciferol (CALTRATE 600+D) 600-800 MG-UNIT TABS Take 1 tablet by mouth daily  Qty: 30 tablet, Refills: 0    Associated Diagnoses: Vitamin deficiency         CONTINUE these medications which have NOT CHANGED    Details   carvedilol (COREG) 25 MG tablet Take 0.5 tablets (12.5 mg) by mouth every morning  Qty: 180 tablet, Refills: 3    Associated Diagnoses: Essential hypertension with goal blood pressure less than 130/80      LANTUS SOLOSTAR 100 UNIT/ML soln ADMINISTER 35 TO 40 UNITS UNDER THE SKIN AT BEDTIME  Qty: 30 mL, Refills: 0    Associated Diagnoses: Type 2 diabetes mellitus with stage 3 chronic kidney disease, with long-term current use of insulin (H)      amLODIPine (NORVASC) 10 MG tablet Take 1 tablet (10 mg) by mouth daily  Qty: 90 tablet, Refills: 4    Associated Diagnoses: Peripheral artery disease (H); Coronary artery disease involving native coronary artery of native heart without angina pectoris      atorvastatin (LIPITOR) 40 MG tablet Take 1 tablet (40 mg) by mouth daily  Qty: 90 tablet, Refills: 3    Associated Diagnoses: Hyperlipidemia LDL goal <70      clopidogrel (PLAVIX) 75 MG tablet Take 1 tablet (75 mg) by mouth daily  Qty: 90 tablet, Refills: 3    Associated Diagnoses: Chronic ischemic heart disease, unspecified      isosorbide mononitrate (IMDUR) 60 MG 24 hr tablet Take 2 tablets (120 mg) by mouth daily  Qty: 180 tablet, Refills: 3    Associated  Diagnoses: Coronary artery disease involving native coronary artery of native heart without angina pectoris      ASPIRIN NOT PRESCRIBED (INTENTIONAL) Please choose reason not prescribed, below  Qty: 0 each, Refills: 0    Associated Diagnoses: Chronic systolic congestive heart failure (H)      ACCU-CHEK MARY PLUS test strip TEST THREE TIMES DAILY  Qty: 300 strip, Refills: 1    Associated Diagnoses: Type 2 diabetes mellitus with stage 3 chronic kidney disease, with long-term current use of insulin (H)      B-D U/F 31G X 8 MM insulin pen needle            Allergies   Allergies   Allergen Reactions     Aspirin Hives     Penicillins Hives     Contrast Dye Hives     Data   Most Recent 3 CBC's:  Recent Labs   Lab Test  08/23/17   0840  08/22/17   0806  08/21/17   0749   WBC  6.5  7.5  8.2   HGB  10.1*  9.9*  9.9*   MCV  91  91  90   PLT  144*  142*  134*      Most Recent 3 BMP's:  Recent Labs   Lab Test  08/23/17   0840  08/22/17   0806  08/21/17   0749   NA  139  140  138   POTASSIUM  4.4  4.3  4.4   CHLORIDE  110*  110*  108   CO2  20  18*  19*   BUN  40*  37*  40*   CR  1.19  1.27*  1.42*   ANIONGAP  9  12  11   MIGUEL  9.2  9.5  9.9   GLC  199*  147*  154*     Most Recent 2 LFT's:  Recent Labs   Lab Test  08/17/17   2322  09/19/16   0651   AST  22  15   ALT  22  20   ALKPHOS  259*  107   BILITOTAL  1.3  0.7     Most Recent INR's and Anticoagulation Dosing History:  Anticoagulation Dose History     Recent Dosing and Labs Latest Ref Rng & Units 5/9/2013 9/2/2016 9/16/2016 9/19/2016 8/18/2017    INR 0.86 - 1.14 0.96 1.01 1.04 1.10 1.32(H)        Most Recent 3 Troponin's:  Recent Labs   Lab Test  09/19/16   1800  09/19/16   1152  09/19/16   0651   TROPI  9.746*  10.287*  11.294*     Most Recent 6 Bacteria Isolates From Any Culture (See EPIC Reports for Culture Details):  Recent Labs   Lab Test  08/18/17 2014   CULT  <10,000 colonies/mL  Coagulase negative Staphylococcus  *  10,000 to 50,000 colonies/mL  urogenital  daryl  Susceptibility testing not routinely done       Most Recent TSH, T4 and A1c Labs:  Recent Labs   Lab Test  08/19/17   0303   10/26/16   1139  09/16/16   1559   TSH   --    --   5.55*  4.41*   T4   --    --    --   1.17   A1C  7.4*   < >   --    --     < > = values in this interval not displayed.     Results for orders placed or performed during the hospital encounter of 08/18/17   Chest  XR, 1 view portable    Narrative    Exam:  XR CHEST PORT 1 VW, 8/18/2017 2:49 AM    History: trauma.  Chest pain    Comparison:  Chest x-ray 2/13/2017    Findings:  Single AP view the chest. Cardiac silhouette is enlarged.  Pulmonary vascularity within normal limits. There is dense  retrocardiac opacity, hazy left basilar opacities and a probable  layering left pleural effusion. Chronic blunting of the right  costophrenic angle is unchanged. No pneumothorax. No displaced rib  fracture identified. Median sternotomy wires. Aortic knob with  calcifications.      Impression    Impression:    1. Left retrocardiac opacity as well as hazy left basilar opacities,  differential for which includes contusion, pneumonia, or atelectasis.  2. Probable layering left-sided pleural effusion. In the setting of  trauma, hemothorax not excluded.    I have personally reviewed the examination and initial interpretation  and I agree with the findings.    GEORGE ROWAN MD   XR Shoulder Right Port G/E 2 Views     Value    Radiologist flags Mild widening of AC joint space.    Narrative    Exam:  XR SHOULDER RT PORT G/E 2 VW, 8/18/2017 3:16 AM    History: R shoulder pain    Comparison:  Chest radiographs September 2, 2016    Findings:    AP and transscapular Y views of the right shoulder were obtained.    There is flattening of the superolateral aspect of the humeral head,  similar to prior study and likely representing sequelae of Hill-Sachs  lesion. Glenohumeral joint is congruent on these 2 views.    Widening of acromioclavicular joint space,  measuring 11 mm, previously  widened but slightly increased from prior study.    Patchy lucencies of visualized bones, likely related to underlying  osteopenia.      Impression    Impression:    1. No acute osseous abnormality.  2. Widening of acromioclavicular joint space, measuring 11 mm,  previously widened but slightly increased from prior study. Correlate  clinically for focal symptom.    [Consider Follow Up: Mild widening of AC joint space.]    This report will be copied to the [facility] to ensure a  provider acknowledges the finding.     I have personally reviewed the examination and initial interpretation  and I agree with the findings.    LOLIS CONTRERAS   CT Head w/o Contrast    Narrative    CT HEAD W/O CONTRAST 8/18/2017 5:43 AM    Provided History: Evaluate for progression of head bleed    Comparison: Head CT 8/17/2017.    Technique: Using multidetector thin collimation helical acquisition  technique, axial, coronal and sagittal CT images from the skull base  to the vertex were obtained without intravenous contrast.     Findings:    No significant change in extensive subarachnoid hemorrhage in the  sulci overlying the left frontal and parietal lobes. Hemorrhage  overlying the anterior left frontal lobe measuring 7 mm in thickness  is also unchanged, representing additional subarachnoid hemorrhage  versus a small subdural hemorrhage. No evidence of new intracranial  hemorrhage. The ventricles are proportionate to the cerebral sulci.  Mild generalized cerebral volume loss is unchanged. Chronic lacunar  infarct in the right basal ganglia The gray to white matter  differentiation of the cerebral hemispheres is preserved. The basal  cisterns are patent. Atherosclerotic calcifications of the vertebral  arteries and intracranial ICAs. Left parietal scalp hematoma.    Mucosal thickening versus mucus retention cyst in the left maxilla  sinus. Otherwise, the paranasal sinuses are clear. The mastoid  air  cells are clear.       Impression    Impression:  Subarachnoid hemorrhage over the left frontal and  parietal lobes and probable small subdural hematoma over the anterior  left frontal lobe are unchanged in extent since head CT 8/17/2017. No  new intracranial hemorrhage.    I have personally reviewed the examination and initial interpretation  and I agree with the findings.    ISMAEL MARTIN MD   MR Shoulder Right w/o Contrast    Narrative    EXAM: MR right shoulder without  contrast 8/20/2017 5:28 PM    TECHNIQUE: Multiplanar, multisequence imaging of the right shoulder  were obtained without administration of intravenous or intra-articular  gadolinium contrast using routine protocol.    History: new acute weakness of right arm    Comparison: Radiographs August 18, 2017    Findings:    A marker is placed at the distal aspect of the clavicle.    ROTATOR CUFF and ASSOCIATED STRUCTURES  Rotator cuff: In a background tendinosis, there is moderate grade  intrasubstance tearing of the supraspinatus involving the posterior  fibers at the footprint with associated subjacent bony cystic changes.  In a background of tendinosis, moderate grade intrasubstance tearing  of the anterior fibers of infraspinatus at the footprint with  associated subjacent bony cystic changes. Teres minor tendon is  intact. Severe tendinosis of subscapularis.    Bursa: Trace subacromial/subdeltoid bursal fluid.    Musculature: Muscle bulk of rotator cuff is preserved.  Deltoid muscle  bulk is also preserved. Mild muscle edema involving the infraspinatus,  and teres minor muscles.    Acromioclavicular joint.  Redemonstration of mild widening of acromioclavicular joint with  heterogeneous fluid signal within the joint, measuring 9 mm in space  with associated mild to moderate degenerative change. Coracoacromial  ligament is not thickened. There are tearing of the superior and  inferior acromioclavicular ligaments. Acromion is type II in  sagittal  morphology.    OSSEOUS STRUCTURES  There is flattening of the contour of the superior lateral aspect of  humeral head with subjacent cystic changes, likely related to prior  Hill-Sachs lesion. No evidence of marrow infiltrative change,  fracture, or marrow contusion.    LONG BICIPITAL TENDON  The long head of the biceps tendon is normally situated within the  bicipital groove. Tendinosis of long head of biceps tendon with  thinning particularly at the bicipital pulley, possibly representing  partial tearing.    GLENOHUMERAL JOINT  Joint fluid: Small joint effusion. There is leakage of joint fluid  along the medial aspect of the humerus. Associated tearing of the  inferior glenohumeral ligaments and axillary pouch without bony  change.    Cartilage and subarticular bone:  Limited evaluation without evidence  of high-grade chondral loss. No evidence of bony Bankart lesion.    Labrum: Limited assessment on this study with relative lack of joint  distention shows diminutive appearance of the posterior superior and  anterior superior labrum, possibly torn.    ANCILLARY FINDINGS:      Impression    Impression:  1. No evidence of occult fracture.  2. Redemonstration of mild widening of acromioclavicular joint space  with associated acromioclavicular ligament tear. Given pain marker  located over this area, finding may possibly represent acute on  chronic injury.  3. Tear of inferior glenohumeral ligaments and axillary pouch. No  associated bony edema.  4. In a background of tendinosis, moderate grade intrasubstance tear  of the supraspinatus and infraspinatus tendons at the footprint. No  associated muscle bulk loss or full thickness tear.  5. Severe tendinosis of subscapularis.  6. Tendinosis of long head of biceps tendon with thinning particularly  at the bicipital pulley, possibly representing partial tearing.  7. Diminutive appearance of the posterior superior and anterior  superior labrum, possibly  gardenia.    LOLIS CONTRERAS       Time Spent on this Encounter   I, Rosario Martinez, personally saw the patient today and spent greater than 30 minutes discharging this patient.      We appreciate the opportunity to care for your patient while in the hospital.  Should you have any questions about their injuries or this discharge summary our contact information is below.    Trauma Services  [facility]   Department of Critical Care and Acute Care Surgery  [address] [place] [id number]  Office: [phone]

## 2025-06-12 NOTE — PROGRESS NOTES
[facility]    Hospitalist Progress Note    Date of Service (when I saw the patient): 12/15/2017    Assessment & Plan   Kelechi Coleman is a 82 year old male who was admitted on 12/12/2017 after a fall.    1. Acute on chronic heart failure exacerbation - Continue torsemide 20 mg daily.  Continue Coreg and Imdur.   Discontinue amlodipine and reduce hydralazine to 25 mg tid due low BP on 12/13.  Reduce Imdur to 90 mg daily on 12/15.  Cardiology consult appreciated.  Echo demonstrates slightly decreased EF from previous echo in Sept 2017, now 30-35%.    2. NSTEMI, type 2 - Likely stress induced from volume depletion.  Continue plavix.    3. Fall with occipital laceration - Staples to be removed in 10 days from 12/12.  PT/OT consult for physical deconditioning, but patient unable to participate due to lethargy and fatigue.    4. DM2 - Lantus on hold, continue ISS    5. Seizures - Continue Keppra and Trileptal.    6. GERD - Continue protonix.    7. JUVENTINO - Spironolactone on hold.  Cr 1.57 on admission, improved to 1.52 after 1 L NS bolus.  Baseline Cr 1.2-1.3.  Cr today is 1.76.    8. HL - Continue atorvastatin.    9. Malnutrition - Consult Nutrition.    10. Metabolic encephalopathy - UA, CXR, C diff, stool THIAGO, blood culture unrevealing.  Likely due to delirium.  Discussed Comfort Cares with patient and sister.  Patient to think about it further.    DVT Prophylaxis: Pneumatic Compression Devices  Code Status: DNR/DNI/DNI, discussed with sister, Samantha Fenton on 12/14/17.    Disposition: Expected discharge in 1-3 days.    Boaz Champion  Text Page (7 am to 6 pm)    Interval History   The patient is resting in bed.  He somnolent, but oriented to surroundings.  Answers questions appropriately.    -Data reviewed today: I reviewed all new labs and imaging results over the last 24 hours. I personally reviewed no images or EKG's today.    Physical Exam   Temp: 98.6  F (37  C) Temp src: Axillary BP:  Rec'd refill request from pharmacy requesting refill on Seroquel. Ok to refill this medication however patient needs a follow up appointment for further refills. Patient was informed on 5/13/2025 of needing a follow up appointment for further refills. At that time patient did schedule an appointment with Dr. De Dios for 6/3/2025 however this appointment was cancelled. Left message informing patient of needing a follow up appointment for further refills. Patient advised to call office to schedule this appointment. Refill sent to pharmacy with 30 day supply and 0 refills.    100/57   Heart Rate: 61 Resp: 18 SpO2: 95 % O2 Device: None (Room air)    Vitals:    12/12/17 2100 12/13/17 0500 12/15/17 0423   Weight: 89.2 kg (196 lb 9.6 oz) 88.2 kg (194 lb 7.1 oz) 88.9 kg (195 lb 15.8 oz)     Vital Signs with Ranges  Temp:  [96.5  F (35.8  C)-98.6  F (37  C)] 98.6  F (37  C)  Heart Rate:  [55-74] 61  Resp:  [16-18] 18  BP: ()/(51-63) 100/57  SpO2:  [91 %-96 %] 95 %  I/O last 3 completed shifts:  In: 273 [P.O.:270; I.V.:3]  Out: 500 [Urine:500]    Gen: Debilitated deconditioned, thin elderly male, no acute distressed  HEENT: Normocephalic, stellate laceration occiput.  Lungs: Clear to ausculation without wheezes, rhonchi, or rales  Heart: Regular rate and rhythm, no gallops or rubs, 2/6 NENA  GI: Bowel sound normal, no hepatosplenomegaly or masses  Lymph: No lymphadenopathy, 3 + BLE edema to sacrum  Skin: No rashes     Medications        isosorbide mononitrate  90 mg Oral Daily     levETIRAcetam  500 mg Oral BID     torsemide  20 mg Oral Daily     hydrALAZINE  25 mg Oral Q8H MAR     atorvastatin  40 mg Oral At Bedtime     carvedilol  12.5 mg Oral QAM     OXcarbazepine  150 mg Oral BID     pantoprazole  40 mg Oral QPM     clopidogrel  75 mg Oral Daily     sodium chloride (PF)  3 mL Intracatheter Q8H     insulin aspart  1-7 Units Subcutaneous TID AC     insulin aspart  1-5 Units Subcutaneous At Bedtime       Data     Recent Labs  Lab 12/15/17  0535 12/14/17  0555 12/13/17  0548 12/13/17  0200 12/12/17  2130  12/12/17  1400   WBC 3.3* 4.4 5.9  --   --   --  7.3   HGB 9.5* 10.2* 9.6*  --   --   < > 10.0*   MCV 90 88 88  --   --   --  89   * 131* 145*  --   --   --  148*   INR  --   --   --   --   --   --  1.32*    139 141  --   --   --  141   POTASSIUM 3.9 4.0 3.8  --   --   --  3.9   CHLORIDE 107 106 106  --   --   --  106   CO2 27 26 25  --   --   --  26   BUN 46* 42* 39*  --   --   --  39*   CR 1.76* 1.52* 1.44*  --   --   --  1.57*   ANIONGAP 8 7 10  --   --   --  9   MIGUEL 7.7*  8.2* 8.4*  --   --   --  8.7   GLC 99 151* 85  --   --   --  131*   ALBUMIN  --   --   --   --   --   --  2.8*   PROTTOTAL  --   --   --   --   --   --  6.6*   BILITOTAL  --   --   --   --   --   --  0.8   ALKPHOS  --   --   --   --   --   --  221*   ALT  --   --   --   --   --   --  20   AST  --   --   --   --   --   --  21   TROPI  --   --  0.714* 0.714* 0.709*  < > 0.629*   < > = values in this interval not displayed.    Recent Results (from the past 24 hour(s))   XR Chest 2 Views    Narrative    XR CHEST 2 VW 12/14/2017 3:00 PM    COMPARISON: 8/18/2017    HISTORY: Altered mental status.      Impression    IMPRESSION: Enlarged cardiac silhouette is again seen and unchanged.  Small left pleural effusion and possible trace right pleural effusion,  stable to slightly increased since comparison study. There is  associated bibasilar atelectasis. No pneumothorax seen on either side.  Median sternotomy wires appear intact.    FELICIA LE MD

## (undated) RX ORDER — LIDOCAINE HYDROCHLORIDE 10 MG/ML
INJECTION, SOLUTION EPIDURAL; INFILTRATION; INTRACAUDAL; PERINEURAL
Status: DISPENSED
Start: 2018-01-01

## (undated) RX ORDER — HEPARIN SODIUM 1000 [USP'U]/ML
INJECTION, SOLUTION INTRAVENOUS; SUBCUTANEOUS
Status: DISPENSED
Start: 2018-01-01